# Patient Record
Sex: FEMALE | Race: WHITE | NOT HISPANIC OR LATINO
[De-identification: names, ages, dates, MRNs, and addresses within clinical notes are randomized per-mention and may not be internally consistent; named-entity substitution may affect disease eponyms.]

---

## 2017-04-06 ENCOUNTER — CHART COPY (OUTPATIENT)
Age: 82
End: 2017-04-06

## 2017-05-05 ENCOUNTER — CHART COPY (OUTPATIENT)
Age: 82
End: 2017-05-05

## 2017-06-07 ENCOUNTER — APPOINTMENT (OUTPATIENT)
Dept: FAMILY MEDICINE | Facility: CLINIC | Age: 82
End: 2017-06-07

## 2017-06-07 VITALS
RESPIRATION RATE: 15 BRPM | SYSTOLIC BLOOD PRESSURE: 110 MMHG | HEART RATE: 67 BPM | BODY MASS INDEX: 22.64 KG/M2 | OXYGEN SATURATION: 98 % | DIASTOLIC BLOOD PRESSURE: 73 MMHG | WEIGHT: 114 LBS

## 2017-06-07 DIAGNOSIS — K64.4 RESIDUAL HEMORRHOIDAL SKIN TAGS: ICD-10-CM

## 2017-06-08 LAB
25(OH)D3 SERPL-MCNC: 41.9 NG/ML
ALBUMIN SERPL ELPH-MCNC: 4.4 G/DL
ALP BLD-CCNC: 66 U/L
ALT SERPL-CCNC: 18 U/L
ANION GAP SERPL CALC-SCNC: 17 MMOL/L
AST SERPL-CCNC: 26 U/L
BASOPHILS # BLD AUTO: 0.02 K/UL
BASOPHILS NFR BLD AUTO: 0.3 %
BILIRUB SERPL-MCNC: 0.7 MG/DL
BUN SERPL-MCNC: 16 MG/DL
CALCIUM SERPL-MCNC: 9.4 MG/DL
CHLORIDE SERPL-SCNC: 100 MMOL/L
CHOLEST SERPL-MCNC: 178 MG/DL
CHOLEST/HDLC SERPL: 2.4 RATIO
CO2 SERPL-SCNC: 24 MMOL/L
CREAT SERPL-MCNC: 0.83 MG/DL
EOSINOPHIL # BLD AUTO: 0.08 K/UL
EOSINOPHIL NFR BLD AUTO: 1.2 %
FOLATE SERPL-MCNC: >20 NG/ML
GLUCOSE SERPL-MCNC: 134 MG/DL
HBA1C MFR BLD HPLC: 5.6 %
HCT VFR BLD CALC: 38.5 %
HDLC SERPL-MCNC: 75 MG/DL
HGB BLD-MCNC: 12.6 G/DL
IMM GRANULOCYTES NFR BLD AUTO: 0.8 %
LDLC SERPL CALC-MCNC: 87 MG/DL
LYMPHOCYTES # BLD AUTO: 1.03 K/UL
LYMPHOCYTES NFR BLD AUTO: 16 %
MAN DIFF?: NORMAL
MCHC RBC-ENTMCNC: 32.7 GM/DL
MCHC RBC-ENTMCNC: 33.1 PG
MCV RBC AUTO: 101 FL
MONOCYTES # BLD AUTO: 0.43 K/UL
MONOCYTES NFR BLD AUTO: 6.7 %
NEUTROPHILS # BLD AUTO: 4.84 K/UL
NEUTROPHILS NFR BLD AUTO: 75 %
PLATELET # BLD AUTO: 218 K/UL
POTASSIUM SERPL-SCNC: 4.1 MMOL/L
PROT SERPL-MCNC: 6.6 G/DL
RBC # BLD: 3.81 M/UL
RBC # FLD: 15.9 %
SODIUM SERPL-SCNC: 141 MMOL/L
TRIGL SERPL-MCNC: 80 MG/DL
TSH SERPL-ACNC: 2.87 UIU/ML
URATE SERPL-MCNC: 3.8 MG/DL
VIT B12 SERPL-MCNC: 1110 PG/ML
WBC # FLD AUTO: 6.45 K/UL

## 2017-06-26 ENCOUNTER — APPOINTMENT (OUTPATIENT)
Dept: ORTHOPEDIC SURGERY | Facility: CLINIC | Age: 82
End: 2017-06-26

## 2017-06-26 VITALS
WEIGHT: 112 LBS | DIASTOLIC BLOOD PRESSURE: 71 MMHG | SYSTOLIC BLOOD PRESSURE: 122 MMHG | HEIGHT: 59.5 IN | BODY MASS INDEX: 22.28 KG/M2 | HEART RATE: 68 BPM

## 2017-07-06 ENCOUNTER — CHART COPY (OUTPATIENT)
Age: 82
End: 2017-07-06

## 2017-07-06 DIAGNOSIS — R26.89 OTHER ABNORMALITIES OF GAIT AND MOBILITY: ICD-10-CM

## 2017-07-21 DIAGNOSIS — R27.8 OTHER LACK OF COORDINATION: ICD-10-CM

## 2017-07-21 DIAGNOSIS — M47.12 OTHER SPONDYLOSIS WITH MYELOPATHY, CERVICAL REGION: ICD-10-CM

## 2017-10-05 PROBLEM — M47.12 OSTEOARTHRITIS OF CERVICAL SPINE WITH MYELOPATHY: Status: ACTIVE | Noted: 2017-06-26

## 2017-12-01 ENCOUNTER — LABORATORY RESULT (OUTPATIENT)
Age: 82
End: 2017-12-01

## 2017-12-01 ENCOUNTER — APPOINTMENT (OUTPATIENT)
Dept: FAMILY MEDICINE | Facility: CLINIC | Age: 82
End: 2017-12-01
Payer: MEDICARE

## 2017-12-01 VITALS
WEIGHT: 112 LBS | DIASTOLIC BLOOD PRESSURE: 64 MMHG | SYSTOLIC BLOOD PRESSURE: 124 MMHG | HEIGHT: 59 IN | TEMPERATURE: 98.8 F | OXYGEN SATURATION: 96 % | BODY MASS INDEX: 22.58 KG/M2 | RESPIRATION RATE: 14 BRPM | HEART RATE: 87 BPM

## 2017-12-01 PROCEDURE — 99214 OFFICE O/P EST MOD 30 MIN: CPT | Mod: 25

## 2017-12-01 PROCEDURE — 36415 COLL VENOUS BLD VENIPUNCTURE: CPT

## 2017-12-04 ENCOUNTER — EMERGENCY (EMERGENCY)
Facility: HOSPITAL | Age: 82
LOS: 1 days | Discharge: HOME | End: 2017-12-04
Admitting: EMERGENCY MEDICINE
Payer: MEDICARE
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
## 2017-12-04 DIAGNOSIS — M54.9 DORSALGIA, UNSPECIFIED: ICD-10-CM
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
## 2017-12-04 DIAGNOSIS — Z79.899 OTHER LONG TERM (CURRENT) DRUG THERAPY: ICD-10-CM

## 2017-12-04 DIAGNOSIS — R07.81 PLEURODYNIA: ICD-10-CM

## 2017-12-04 DIAGNOSIS — R05 COUGH: ICD-10-CM
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
## 2017-12-04 DIAGNOSIS — R10.9 UNSPECIFIED ABDOMINAL PAIN: ICD-10-CM

## 2017-12-04 DIAGNOSIS — Z79.2 LONG TERM (CURRENT) USE OF ANTIBIOTICS: ICD-10-CM

## 2017-12-04 LAB
25(OH)D3 SERPL-MCNC: 34.8 NG/ML
ALBUMIN SERPL ELPH-MCNC: 3.8 G/DL
ALP BLD-CCNC: 161 U/L
ALT SERPL-CCNC: 49 U/L
ANION GAP SERPL CALC-SCNC: 14 MMOL/L
AST SERPL-CCNC: 39 U/L
BASOPHILS # BLD AUTO: 0.02 K/UL
BASOPHILS NFR BLD AUTO: 0.1 %
BILIRUB DIRECT SERPL-MCNC: 0.3 MG/DL
BILIRUB INDIRECT SERPL-MCNC: 0.6 MG/DL
BILIRUB SERPL-MCNC: 0.9 MG/DL
BUN SERPL-MCNC: 19 MG/DL
CALCIUM SERPL-MCNC: 8.7 MG/DL
CHLORIDE SERPL-SCNC: 100 MMOL/L
CHOLEST SERPL-MCNC: 153 MG/DL
CHOLEST/HDLC SERPL: 2.3 RATIO
CO2 SERPL-SCNC: 23 MMOL/L
CREAT SERPL-MCNC: 0.68 MG/DL
EOSINOPHIL # BLD AUTO: 0.07 K/UL
EOSINOPHIL NFR BLD AUTO: 0.5 %
GLUCOSE SERPL-MCNC: 141 MG/DL
HBA1C MFR BLD HPLC: 5.4 %
HCT VFR BLD CALC: 32.9 %
HDLC SERPL-MCNC: 68 MG/DL
HGB BLD-MCNC: 11 G/DL
IMM GRANULOCYTES NFR BLD AUTO: 1.2 %
LDLC SERPL CALC-MCNC: 72 MG/DL
LYMPHOCYTES # BLD AUTO: 0.59 K/UL
LYMPHOCYTES NFR BLD AUTO: 3.8 %
MAN DIFF?: NORMAL
MCHC RBC-ENTMCNC: 33.4 GM/DL
MCHC RBC-ENTMCNC: 33.5 PG
MCV RBC AUTO: 100.3 FL
MONOCYTES # BLD AUTO: 1.53 K/UL
MONOCYTES NFR BLD AUTO: 9.9 %
NEUTROPHILS # BLD AUTO: 13 K/UL
NEUTROPHILS NFR BLD AUTO: 84.5 %
PLATELET # BLD AUTO: 244 K/UL
POTASSIUM SERPL-SCNC: 3.5 MMOL/L
PROT SERPL-MCNC: 6 G/DL
RBC # BLD: 3.28 M/UL
RBC # FLD: 16.1 %
SODIUM SERPL-SCNC: 137 MMOL/L
TRIGL SERPL-MCNC: 65 MG/DL
TSH SERPL-ACNC: 2.99 UIU/ML
URATE SERPL-MCNC: 3.6 MG/DL
VIT B12 SERPL-MCNC: 1708 PG/ML
WBC # FLD AUTO: 15.4 K/UL

## 2017-12-04 PROCEDURE — 74177 CT ABD & PELVIS W/CONTRAST: CPT | Mod: 26

## 2017-12-04 PROCEDURE — 83605 ASSAY OF LACTIC ACID: CPT

## 2017-12-04 PROCEDURE — 71020: CPT | Mod: 26

## 2017-12-04 PROCEDURE — 99284 EMERGENCY DEPT VISIT MOD MDM: CPT | Mod: 25

## 2017-12-04 PROCEDURE — 99284 EMERGENCY DEPT VISIT MOD MDM: CPT

## 2017-12-04 PROCEDURE — 85027 COMPLETE CBC AUTOMATED: CPT

## 2017-12-04 PROCEDURE — 80048 BASIC METABOLIC PNL TOTAL CA: CPT

## 2017-12-04 PROCEDURE — 74177 CT ABD & PELVIS W/CONTRAST: CPT

## 2017-12-04 PROCEDURE — 71046 X-RAY EXAM CHEST 2 VIEWS: CPT

## 2017-12-04 PROCEDURE — 96360 HYDRATION IV INFUSION INIT: CPT | Mod: XU

## 2017-12-04 PROCEDURE — 96361 HYDRATE IV INFUSION ADD-ON: CPT

## 2017-12-05 ENCOUNTER — LABORATORY RESULT (OUTPATIENT)
Age: 82
End: 2017-12-05

## 2017-12-05 ENCOUNTER — APPOINTMENT (OUTPATIENT)
Dept: FAMILY MEDICINE | Facility: CLINIC | Age: 82
End: 2017-12-05
Payer: MEDICARE

## 2017-12-05 VITALS
HEART RATE: 84 BPM | SYSTOLIC BLOOD PRESSURE: 138 MMHG | RESPIRATION RATE: 14 BRPM | TEMPERATURE: 97.9 F | OXYGEN SATURATION: 97 % | DIASTOLIC BLOOD PRESSURE: 84 MMHG

## 2017-12-05 DIAGNOSIS — M54.5 LOW BACK PAIN: ICD-10-CM

## 2017-12-05 DIAGNOSIS — R93.8 ABNORMAL FINDINGS ON DIAGNOSTIC IMAGING OF OTHER SPECIFIED BODY STRUCTURES: ICD-10-CM

## 2017-12-05 PROCEDURE — 99214 OFFICE O/P EST MOD 30 MIN: CPT | Mod: 25

## 2017-12-05 PROCEDURE — 36415 COLL VENOUS BLD VENIPUNCTURE: CPT

## 2017-12-06 ENCOUNTER — RESULT CHARGE (OUTPATIENT)
Age: 82
End: 2017-12-06

## 2017-12-06 ENCOUNTER — RX RENEWAL (OUTPATIENT)
Age: 82
End: 2017-12-06

## 2017-12-06 LAB
AMYLASE/CREAT SERPL: 58 U/L
ANION GAP SERPL CALC-SCNC: 16 MMOL/L
BASOPHILS # BLD AUTO: 0 K/UL
BASOPHILS NFR BLD AUTO: 0 %
BUN SERPL-MCNC: 16 MG/DL
CALCIUM SERPL-MCNC: 9.1 MG/DL
CHLORIDE SERPL-SCNC: 98 MMOL/L
CO2 SERPL-SCNC: 21 MMOL/L
CREAT SERPL-MCNC: 0.68 MG/DL
EOSINOPHIL # BLD AUTO: 0 K/UL
EOSINOPHIL NFR BLD AUTO: 0
GLUCOSE SERPL-MCNC: 88 MG/DL
HCT VFR BLD CALC: 35.3 %
HGB BLD-MCNC: 11.7 G/DL
LPL SERPL-CCNC: 20 U/L
LYMPHOCYTES # BLD AUTO: 1.04 K/UL
LYMPHOCYTES NFR BLD AUTO: 5.3 %
MAN DIFF?: NORMAL
MCHC RBC-ENTMCNC: 33.1 GM/DL
MCHC RBC-ENTMCNC: 33.1 PG
MCV RBC AUTO: 99.7 FL
MONOCYTES # BLD AUTO: 1.22 K/UL
MONOCYTES NFR BLD AUTO: 6.2 %
NEUTROPHILS # BLD AUTO: 15.32 K/UL
NEUTROPHILS NFR BLD AUTO: 77.9 %
PLATELET # BLD AUTO: 352 K/UL
POTASSIUM SERPL-SCNC: 4 MMOL/L
RBC # BLD: 3.54 M/UL
RBC # FLD: 16.2 %
SODIUM SERPL-SCNC: 135 MMOL/L
WBC # FLD AUTO: 19.67 K/UL

## 2017-12-06 RX ORDER — LIDOCAINE 5% 700 MG/1
5 PATCH TOPICAL
Qty: 30 | Refills: 3 | Status: COMPLETED | COMMUNITY
Start: 2017-12-05 | End: 2017-12-06

## 2017-12-07 ENCOUNTER — FORM ENCOUNTER (OUTPATIENT)
Age: 82
End: 2017-12-07

## 2017-12-08 ENCOUNTER — APPOINTMENT (OUTPATIENT)
Dept: CT IMAGING | Facility: HOSPITAL | Age: 82
End: 2017-12-08
Payer: MEDICARE

## 2017-12-08 ENCOUNTER — OUTPATIENT (OUTPATIENT)
Dept: OUTPATIENT SERVICES | Facility: HOSPITAL | Age: 82
LOS: 1 days | End: 2017-12-08
Payer: MEDICARE

## 2017-12-08 DIAGNOSIS — J98.11 ATELECTASIS: ICD-10-CM

## 2017-12-08 DIAGNOSIS — R93.8 ABNORMAL FINDINGS ON DIAGNOSTIC IMAGING OF OTHER SPECIFIED BODY STRUCTURES: ICD-10-CM

## 2017-12-08 DIAGNOSIS — J90 PLEURAL EFFUSION, NOT ELSEWHERE CLASSIFIED: ICD-10-CM

## 2017-12-08 PROCEDURE — 71250 CT THORAX DX C-: CPT | Mod: 26

## 2017-12-08 PROCEDURE — 71250 CT THORAX DX C-: CPT

## 2017-12-12 ENCOUNTER — APPOINTMENT (OUTPATIENT)
Dept: FAMILY MEDICINE | Facility: CLINIC | Age: 82
End: 2017-12-12
Payer: MEDICARE

## 2017-12-12 ENCOUNTER — LABORATORY RESULT (OUTPATIENT)
Age: 82
End: 2017-12-12

## 2017-12-12 VITALS
WEIGHT: 112 LBS | SYSTOLIC BLOOD PRESSURE: 122 MMHG | OXYGEN SATURATION: 85 % | BODY MASS INDEX: 21.99 KG/M2 | DIASTOLIC BLOOD PRESSURE: 82 MMHG | HEART RATE: 81 BPM | HEIGHT: 60 IN

## 2017-12-12 PROCEDURE — 36415 COLL VENOUS BLD VENIPUNCTURE: CPT

## 2017-12-12 PROCEDURE — 99214 OFFICE O/P EST MOD 30 MIN: CPT | Mod: 25

## 2017-12-12 RX ORDER — AMOXICILLIN AND CLAVULANATE POTASSIUM 875; 125 MG/1; MG/1
875-125 TABLET, COATED ORAL
Qty: 14 | Refills: 1 | Status: COMPLETED | COMMUNITY
Start: 2017-12-01 | End: 2017-12-12

## 2017-12-12 RX ORDER — PREDNISONE 10 MG/1
10 TABLET ORAL DAILY
Qty: 10 | Refills: 0 | Status: COMPLETED | COMMUNITY
Start: 2017-12-01 | End: 2017-12-12

## 2017-12-14 LAB
ALBUMIN SERPL ELPH-MCNC: 4 G/DL
ALP BLD-CCNC: 113 U/L
ALT SERPL-CCNC: 22 U/L
ANION GAP SERPL CALC-SCNC: 15 MMOL/L
AST SERPL-CCNC: 22 U/L
BASOPHILS # BLD AUTO: 0 K/UL
BASOPHILS NFR BLD AUTO: 0 %
BILIRUB SERPL-MCNC: 0.4 MG/DL
BUN SERPL-MCNC: 24 MG/DL
CALCIUM SERPL-MCNC: 9.3 MG/DL
CHLORIDE SERPL-SCNC: 97 MMOL/L
CO2 SERPL-SCNC: 26 MMOL/L
CREAT SERPL-MCNC: 0.78 MG/DL
EOSINOPHIL # BLD AUTO: 0.14 K/UL
EOSINOPHIL NFR BLD AUTO: 0.9
GLUCOSE SERPL-MCNC: 98 MG/DL
HCT VFR BLD CALC: 35.9 %
HGB BLD-MCNC: 11.9 G/DL
LYMPHOCYTES # BLD AUTO: 1.76 K/UL
LYMPHOCYTES NFR BLD AUTO: 11.2 %
MAGNESIUM SERPL-MCNC: 2.4 MG/DL
MAN DIFF?: NORMAL
MCHC RBC-ENTMCNC: 33.1 GM/DL
MCHC RBC-ENTMCNC: 33.6 PG
MCV RBC AUTO: 101.4 FL
MONOCYTES # BLD AUTO: 0.53 K/UL
MONOCYTES NFR BLD AUTO: 3.4 %
NEUTROPHILS # BLD AUTO: 12.06 K/UL
NEUTROPHILS NFR BLD AUTO: 76.7 %
PLATELET # BLD AUTO: 342 K/UL
POTASSIUM SERPL-SCNC: 4.1 MMOL/L
PROT SERPL-MCNC: 6.2 G/DL
RBC # BLD: 3.54 M/UL
RBC # FLD: 16.4 %
SODIUM SERPL-SCNC: 138 MMOL/L
WBC # FLD AUTO: 15.72 K/UL

## 2017-12-25 ENCOUNTER — FORM ENCOUNTER (OUTPATIENT)
Age: 82
End: 2017-12-25

## 2017-12-26 ENCOUNTER — APPOINTMENT (OUTPATIENT)
Dept: FAMILY MEDICINE | Facility: CLINIC | Age: 82
End: 2017-12-26
Payer: MEDICARE

## 2017-12-26 ENCOUNTER — OUTPATIENT (OUTPATIENT)
Dept: OUTPATIENT SERVICES | Facility: HOSPITAL | Age: 82
LOS: 1 days | End: 2017-12-26
Payer: MEDICARE

## 2017-12-26 ENCOUNTER — APPOINTMENT (OUTPATIENT)
Dept: ULTRASOUND IMAGING | Facility: HOSPITAL | Age: 82
End: 2017-12-26
Payer: MEDICARE

## 2017-12-26 ENCOUNTER — LABORATORY RESULT (OUTPATIENT)
Age: 82
End: 2017-12-26

## 2017-12-26 VITALS
SYSTOLIC BLOOD PRESSURE: 122 MMHG | RESPIRATION RATE: 15 BRPM | OXYGEN SATURATION: 99 % | HEART RATE: 84 BPM | DIASTOLIC BLOOD PRESSURE: 72 MMHG

## 2017-12-26 DIAGNOSIS — S22.39XA FRACTURE OF ONE RIB, UNSPECIFIED SIDE, INITIAL ENCOUNTER FOR CLOSED FRACTURE: ICD-10-CM
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
## 2017-12-26 DIAGNOSIS — R60.0 LOCALIZED EDEMA: ICD-10-CM
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
## 2017-12-26 PROCEDURE — 93970 EXTREMITY STUDY: CPT | Mod: 26

## 2017-12-26 PROCEDURE — 93970 EXTREMITY STUDY: CPT

## 2017-12-26 PROCEDURE — 99214 OFFICE O/P EST MOD 30 MIN: CPT | Mod: 25

## 2017-12-26 PROCEDURE — 36415 COLL VENOUS BLD VENIPUNCTURE: CPT

## 2017-12-26 RX ORDER — BENZONATATE 200 MG/1
200 CAPSULE ORAL
Qty: 20 | Refills: 0 | Status: COMPLETED | COMMUNITY
Start: 2017-12-01 | End: 2017-12-26

## 2017-12-26 RX ORDER — LEVOFLOXACIN 500 MG/1
500 TABLET, FILM COATED ORAL DAILY
Qty: 10 | Refills: 0 | Status: COMPLETED | COMMUNITY
Start: 2017-12-06 | End: 2017-12-26

## 2017-12-27 LAB
ALBUMIN SERPL ELPH-MCNC: 3.7 G/DL
ALP BLD-CCNC: 136 U/L
ALT SERPL-CCNC: 35 U/L
ANION GAP SERPL CALC-SCNC: 13 MMOL/L
AST SERPL-CCNC: 28 U/L
BASOPHILS # BLD AUTO: 0.09 K/UL
BASOPHILS NFR BLD AUTO: 0.9 %
BILIRUB SERPL-MCNC: 0.3 MG/DL
BUN SERPL-MCNC: 23 MG/DL
CALCIUM SERPL-MCNC: 8.7 MG/DL
CHLORIDE SERPL-SCNC: 103 MMOL/L
CO2 SERPL-SCNC: 26 MMOL/L
CREAT SERPL-MCNC: 0.67 MG/DL
EOSINOPHIL # BLD AUTO: 0.64 K/UL
EOSINOPHIL NFR BLD AUTO: 6.1
GLUCOSE SERPL-MCNC: 94 MG/DL
HCT VFR BLD CALC: 31.5 %
HGB BLD-MCNC: 10.5 G/DL
LYMPHOCYTES # BLD AUTO: 1.28 K/UL
LYMPHOCYTES NFR BLD AUTO: 12.2 %
MAN DIFF?: NORMAL
MCHC RBC-ENTMCNC: 33.2 PG
MCHC RBC-ENTMCNC: 33.3 GM/DL
MCV RBC AUTO: 99.7 FL
MONOCYTES # BLD AUTO: 0.91 K/UL
MONOCYTES NFR BLD AUTO: 8.7 %
NEUTROPHILS # BLD AUTO: 7.38 K/UL
NEUTROPHILS NFR BLD AUTO: 70.4 %
PLATELET # BLD AUTO: 250 K/UL
POTASSIUM SERPL-SCNC: 4.3 MMOL/L
PROT SERPL-MCNC: 6.2 G/DL
RBC # BLD: 3.16 M/UL
RBC # FLD: 17 %
SODIUM SERPL-SCNC: 142 MMOL/L
WBC # FLD AUTO: 10.49 K/UL

## 2018-01-10 ENCOUNTER — APPOINTMENT (OUTPATIENT)
Dept: ORTHOPEDIC SURGERY | Facility: CLINIC | Age: 83
End: 2018-01-10
Payer: MEDICARE

## 2018-01-10 VITALS
HEIGHT: 60 IN | SYSTOLIC BLOOD PRESSURE: 129 MMHG | HEART RATE: 73 BPM | WEIGHT: 112 LBS | DIASTOLIC BLOOD PRESSURE: 71 MMHG | BODY MASS INDEX: 21.99 KG/M2

## 2018-01-10 PROCEDURE — 72050 X-RAY EXAM NECK SPINE 4/5VWS: CPT

## 2018-01-10 PROCEDURE — 99213 OFFICE O/P EST LOW 20 MIN: CPT

## 2018-01-17 ENCOUNTER — EMERGENCY (EMERGENCY)
Facility: HOSPITAL | Age: 83
LOS: 1 days | Discharge: HOME | End: 2018-01-17
Admitting: EMERGENCY MEDICINE
Payer: MEDICARE

## 2018-01-17 ENCOUNTER — APPOINTMENT (OUTPATIENT)
Dept: FAMILY MEDICINE | Facility: CLINIC | Age: 83
End: 2018-01-17
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
## 2018-01-17 DIAGNOSIS — Z79.2 LONG TERM (CURRENT) USE OF ANTIBIOTICS: ICD-10-CM

## 2018-01-17 DIAGNOSIS — Z79.52 LONG TERM (CURRENT) USE OF SYSTEMIC STEROIDS: ICD-10-CM

## 2018-01-17 DIAGNOSIS — Z79.899 OTHER LONG TERM (CURRENT) DRUG THERAPY: ICD-10-CM
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
## 2018-01-17 DIAGNOSIS — S30.1XXA CONTUSION OF ABDOMINAL WALL, INITIAL ENCOUNTER: ICD-10-CM
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
## 2018-01-17 DIAGNOSIS — R10.9 UNSPECIFIED ABDOMINAL PAIN: ICD-10-CM

## 2018-01-17 PROCEDURE — 99284 EMERGENCY DEPT VISIT MOD MDM: CPT | Mod: 25

## 2018-01-17 PROCEDURE — 99284 EMERGENCY DEPT VISIT MOD MDM: CPT

## 2018-01-17 PROCEDURE — 74177 CT ABD & PELVIS W/CONTRAST: CPT

## 2018-01-17 PROCEDURE — 36415 COLL VENOUS BLD VENIPUNCTURE: CPT

## 2018-01-17 PROCEDURE — 80048 BASIC METABOLIC PNL TOTAL CA: CPT

## 2018-01-17 PROCEDURE — 74177 CT ABD & PELVIS W/CONTRAST: CPT | Mod: 26

## 2018-01-19 ENCOUNTER — APPOINTMENT (OUTPATIENT)
Dept: FAMILY MEDICINE | Facility: CLINIC | Age: 83
End: 2018-01-19
Payer: MEDICARE

## 2018-01-19 VITALS
RESPIRATION RATE: 14 BRPM | HEART RATE: 78 BPM | DIASTOLIC BLOOD PRESSURE: 78 MMHG | OXYGEN SATURATION: 98 % | SYSTOLIC BLOOD PRESSURE: 118 MMHG

## 2018-01-19 DIAGNOSIS — Z91.81 HISTORY OF FALLING: ICD-10-CM

## 2018-01-19 PROCEDURE — 99214 OFFICE O/P EST MOD 30 MIN: CPT

## 2018-01-29 ENCOUNTER — CHART COPY (OUTPATIENT)
Age: 83
End: 2018-01-29

## 2018-01-30 ENCOUNTER — APPOINTMENT (OUTPATIENT)
Dept: FAMILY MEDICINE | Facility: CLINIC | Age: 83
End: 2018-01-30

## 2018-01-30 ENCOUNTER — APPOINTMENT (OUTPATIENT)
Dept: FAMILY MEDICINE | Facility: CLINIC | Age: 83
End: 2018-01-30
Payer: MEDICARE

## 2018-01-30 VITALS
HEART RATE: 77 BPM | SYSTOLIC BLOOD PRESSURE: 120 MMHG | RESPIRATION RATE: 15 BRPM | TEMPERATURE: 98.3 F | DIASTOLIC BLOOD PRESSURE: 74 MMHG | OXYGEN SATURATION: 99 %

## 2018-01-30 PROCEDURE — 99214 OFFICE O/P EST MOD 30 MIN: CPT

## 2018-02-01 ENCOUNTER — FORM ENCOUNTER (OUTPATIENT)
Age: 83
End: 2018-02-01

## 2018-02-02 ENCOUNTER — OUTPATIENT (OUTPATIENT)
Dept: OUTPATIENT SERVICES | Facility: HOSPITAL | Age: 83
LOS: 1 days | End: 2018-02-02
Payer: MEDICARE

## 2018-02-02 ENCOUNTER — APPOINTMENT (OUTPATIENT)
Dept: MRI IMAGING | Facility: HOSPITAL | Age: 83
End: 2018-02-02
Payer: MEDICARE

## 2018-02-02 DIAGNOSIS — Z00.8 ENCOUNTER FOR OTHER GENERAL EXAMINATION: ICD-10-CM

## 2018-02-02 DIAGNOSIS — S29.9XXA UNSPECIFIED INJURY OF THORAX, INITIAL ENCOUNTER: ICD-10-CM

## 2018-02-02 PROCEDURE — 72146 MRI CHEST SPINE W/O DYE: CPT

## 2018-02-02 PROCEDURE — 72146 MRI CHEST SPINE W/O DYE: CPT | Mod: 26

## 2018-02-13 ENCOUNTER — APPOINTMENT (OUTPATIENT)
Dept: FAMILY MEDICINE | Facility: CLINIC | Age: 83
End: 2018-02-13
Payer: MEDICARE

## 2018-02-13 VITALS
RESPIRATION RATE: 14 BRPM | HEART RATE: 96 BPM | SYSTOLIC BLOOD PRESSURE: 100 MMHG | BODY MASS INDEX: 22.46 KG/M2 | WEIGHT: 115 LBS | DIASTOLIC BLOOD PRESSURE: 60 MMHG | OXYGEN SATURATION: 98 %

## 2018-02-13 DIAGNOSIS — K86.89 OTHER SPECIFIED DISEASES OF PANCREAS: ICD-10-CM

## 2018-02-13 DIAGNOSIS — M81.0 AGE-RELATED OSTEOPOROSIS W/OUT CURRENT PATHOLOGICAL FRACTURE: ICD-10-CM

## 2018-02-13 DIAGNOSIS — K59.09 OTHER CONSTIPATION: ICD-10-CM

## 2018-02-13 PROCEDURE — 99214 OFFICE O/P EST MOD 30 MIN: CPT

## 2018-02-14 PROBLEM — K59.09 CONSTIPATION, CHRONIC: Status: ACTIVE | Noted: 2017-06-07

## 2018-02-14 PROBLEM — K86.89 PANCREATIC DUCT DILATED: Status: ACTIVE | Noted: 2017-12-05

## 2018-02-15 ENCOUNTER — OUTPATIENT (OUTPATIENT)
Dept: OUTPATIENT SERVICES | Facility: HOSPITAL | Age: 83
LOS: 1 days | End: 2018-02-15
Payer: MEDICARE

## 2018-02-15 ENCOUNTER — APPOINTMENT (OUTPATIENT)
Dept: MRI IMAGING | Facility: CLINIC | Age: 83
End: 2018-02-15
Payer: MEDICARE

## 2018-02-15 ENCOUNTER — APPOINTMENT (OUTPATIENT)
Dept: FAMILY MEDICINE | Facility: CLINIC | Age: 83
End: 2018-02-15

## 2018-02-15 DIAGNOSIS — K86.9 DISEASE OF PANCREAS, UNSPECIFIED: ICD-10-CM

## 2018-02-15 PROCEDURE — 74183 MRI ABD W/O CNTR FLWD CNTR: CPT

## 2018-02-15 PROCEDURE — A9585: CPT

## 2018-02-15 PROCEDURE — 74183 MRI ABD W/O CNTR FLWD CNTR: CPT | Mod: 26

## 2018-02-26 ENCOUNTER — APPOINTMENT (OUTPATIENT)
Dept: ORTHOPEDIC SURGERY | Facility: CLINIC | Age: 83
End: 2018-02-26
Payer: MEDICARE

## 2018-02-26 VITALS
BODY MASS INDEX: 22.58 KG/M2 | SYSTOLIC BLOOD PRESSURE: 127 MMHG | HEART RATE: 89 BPM | WEIGHT: 115 LBS | HEIGHT: 60 IN | DIASTOLIC BLOOD PRESSURE: 80 MMHG

## 2018-02-26 PROCEDURE — 99214 OFFICE O/P EST MOD 30 MIN: CPT

## 2018-03-15 ENCOUNTER — CHART COPY (OUTPATIENT)
Age: 83
End: 2018-03-15

## 2018-05-10 ENCOUNTER — CHART COPY (OUTPATIENT)
Age: 83
End: 2018-05-10

## 2018-05-16 ENCOUNTER — APPOINTMENT (OUTPATIENT)
Dept: ORTHOPEDIC SURGERY | Facility: CLINIC | Age: 83
End: 2018-05-16
Payer: MEDICARE

## 2018-05-16 VITALS
HEART RATE: 71 BPM | SYSTOLIC BLOOD PRESSURE: 134 MMHG | DIASTOLIC BLOOD PRESSURE: 87 MMHG | HEIGHT: 60 IN | BODY MASS INDEX: 22.58 KG/M2 | WEIGHT: 115 LBS

## 2018-05-16 DIAGNOSIS — M40.03 POSTURAL KYPHOSIS, CERVICOTHORACIC REGION: ICD-10-CM

## 2018-05-16 PROCEDURE — 99214 OFFICE O/P EST MOD 30 MIN: CPT

## 2018-05-16 RX ORDER — TRAMADOL HYDROCHLORIDE 50 MG/1
50 TABLET, COATED ORAL TWICE DAILY
Qty: 40 | Refills: 0 | Status: DISCONTINUED | COMMUNITY
Start: 2018-01-30 | End: 2018-05-16

## 2018-05-16 RX ORDER — DOCUSATE SODIUM 100 MG/1
100 CAPSULE ORAL
Qty: 180 | Refills: 2 | Status: DISCONTINUED | COMMUNITY
Start: 2017-06-07 | End: 2018-05-16

## 2018-05-16 RX ORDER — HYDROCORTISONE 25 MG/G
2.5 CREAM TOPICAL 3 TIMES DAILY
Qty: 1 | Refills: 3 | Status: DISCONTINUED | COMMUNITY
Start: 2017-06-07 | End: 2018-05-16

## 2018-05-16 RX ORDER — SENNOSIDES 8.6 MG
8.6 TABLET ORAL
Qty: 60 | Refills: 0 | Status: DISCONTINUED | COMMUNITY
Start: 2017-06-07 | End: 2018-05-16

## 2018-05-16 RX ORDER — MELOXICAM 15 MG/1
15 TABLET ORAL
Qty: 30 | Refills: 1 | Status: DISCONTINUED | COMMUNITY
Start: 2018-01-19 | End: 2018-05-16

## 2018-05-16 RX ORDER — LINACLOTIDE 72 UG/1
72 CAPSULE, GELATIN COATED ORAL
Qty: 90 | Refills: 0 | Status: DISCONTINUED | COMMUNITY
Start: 2017-10-24 | End: 2018-05-16

## 2018-05-16 RX ORDER — LIDOCAINE, MENTHOL, AND METHYL SALICYLATE 4; 5; 4 MG/100MG; MG/100MG; MG/100MG
4-4-5 PATCH TOPICAL
Qty: 30 | Refills: 1 | Status: DISCONTINUED | COMMUNITY
Start: 2017-12-06 | End: 2018-05-16

## 2018-07-23 ENCOUNTER — RX RENEWAL (OUTPATIENT)
Age: 83
End: 2018-07-23

## 2018-08-08 ENCOUNTER — APPOINTMENT (OUTPATIENT)
Dept: ORTHOPEDIC SURGERY | Facility: CLINIC | Age: 83
End: 2018-08-08

## 2018-08-17 ENCOUNTER — CHART COPY (OUTPATIENT)
Age: 83
End: 2018-08-17

## 2018-08-22 ENCOUNTER — APPOINTMENT (OUTPATIENT)
Dept: ORTHOPEDIC SURGERY | Facility: CLINIC | Age: 83
End: 2018-08-22
Payer: MEDICARE

## 2018-08-22 VITALS
HEART RATE: 70 BPM | HEIGHT: 60 IN | SYSTOLIC BLOOD PRESSURE: 144 MMHG | WEIGHT: 115 LBS | BODY MASS INDEX: 22.58 KG/M2 | DIASTOLIC BLOOD PRESSURE: 82 MMHG

## 2018-08-22 PROCEDURE — 99213 OFFICE O/P EST LOW 20 MIN: CPT

## 2018-09-11 ENCOUNTER — CHART COPY (OUTPATIENT)
Age: 83
End: 2018-09-11

## 2018-09-19 ENCOUNTER — CHART COPY (OUTPATIENT)
Age: 83
End: 2018-09-19

## 2018-11-06 ENCOUNTER — CHART COPY (OUTPATIENT)
Age: 83
End: 2018-11-06

## 2018-12-03 ENCOUNTER — APPOINTMENT (OUTPATIENT)
Dept: ORTHOPEDIC SURGERY | Facility: CLINIC | Age: 83
End: 2018-12-03
Payer: MEDICARE

## 2018-12-03 VITALS
HEART RATE: 69 BPM | WEIGHT: 115 LBS | BODY MASS INDEX: 22.58 KG/M2 | DIASTOLIC BLOOD PRESSURE: 79 MMHG | HEIGHT: 60 IN | SYSTOLIC BLOOD PRESSURE: 124 MMHG

## 2018-12-03 PROCEDURE — 99213 OFFICE O/P EST LOW 20 MIN: CPT

## 2018-12-31 ENCOUNTER — TRANSCRIPTION ENCOUNTER (OUTPATIENT)
Age: 83
End: 2018-12-31

## 2018-12-31 ENCOUNTER — CHART COPY (OUTPATIENT)
Age: 83
End: 2018-12-31

## 2019-01-04 ENCOUNTER — LABORATORY RESULT (OUTPATIENT)
Age: 84
End: 2019-01-04

## 2019-01-04 ENCOUNTER — APPOINTMENT (OUTPATIENT)
Dept: FAMILY MEDICINE | Facility: CLINIC | Age: 84
End: 2019-01-04
Payer: MEDICARE

## 2019-01-04 VITALS
TEMPERATURE: 97.6 F | HEIGHT: 60 IN | OXYGEN SATURATION: 91 % | HEART RATE: 86 BPM | SYSTOLIC BLOOD PRESSURE: 140 MMHG | RESPIRATION RATE: 16 BRPM | DIASTOLIC BLOOD PRESSURE: 80 MMHG | WEIGHT: 105 LBS | BODY MASS INDEX: 20.62 KG/M2

## 2019-01-04 DIAGNOSIS — Z00.00 ENCOUNTER FOR GENERAL ADULT MEDICAL EXAMINATION W/OUT ABNORMAL FINDINGS: ICD-10-CM

## 2019-01-04 DIAGNOSIS — J20.9 ACUTE BRONCHITIS, UNSPECIFIED: ICD-10-CM

## 2019-01-04 DIAGNOSIS — M50.30 OTHER CERVICAL DISC DEGENERATION, UNSPECIFIED CERVICAL REGION: ICD-10-CM

## 2019-01-04 PROCEDURE — 99214 OFFICE O/P EST MOD 30 MIN: CPT | Mod: 25

## 2019-01-04 PROCEDURE — 36415 COLL VENOUS BLD VENIPUNCTURE: CPT

## 2019-01-05 ENCOUNTER — EMERGENCY (EMERGENCY)
Facility: HOSPITAL | Age: 84
LOS: 1 days | Discharge: HOME | End: 2019-01-05
Attending: EMERGENCY MEDICINE
Payer: MEDICARE

## 2019-01-05 ENCOUNTER — EMERGENCY (EMERGENCY)
Facility: HOSPITAL | Age: 84
LOS: 1 days | Discharge: OTHER INSTITUTION | End: 2019-01-05
Attending: EMERGENCY MEDICINE | Admitting: EMERGENCY MEDICINE
Payer: MEDICARE

## 2019-01-05 VITALS
OXYGEN SATURATION: 98 % | WEIGHT: 104.94 LBS | TEMPERATURE: 98 F | DIASTOLIC BLOOD PRESSURE: 84 MMHG | SYSTOLIC BLOOD PRESSURE: 137 MMHG | HEART RATE: 85 BPM | RESPIRATION RATE: 18 BRPM

## 2019-01-05 VITALS
HEART RATE: 86 BPM | RESPIRATION RATE: 17 BRPM | DIASTOLIC BLOOD PRESSURE: 49 MMHG | SYSTOLIC BLOOD PRESSURE: 112 MMHG | OXYGEN SATURATION: 96 %

## 2019-01-05 VITALS
SYSTOLIC BLOOD PRESSURE: 158 MMHG | OXYGEN SATURATION: 96 % | RESPIRATION RATE: 16 BRPM | HEART RATE: 61 BPM | TEMPERATURE: 99 F | DIASTOLIC BLOOD PRESSURE: 78 MMHG

## 2019-01-05 VITALS
OXYGEN SATURATION: 98 % | SYSTOLIC BLOOD PRESSURE: 117 MMHG | RESPIRATION RATE: 16 BRPM | TEMPERATURE: 98 F | HEART RATE: 88 BPM | DIASTOLIC BLOOD PRESSURE: 60 MMHG

## 2019-01-05 DIAGNOSIS — R89.9 UNSPECIFIED ABNORMAL FINDING IN SPECIMENS FROM OTHER ORGANS, SYSTEMS AND TISSUES: ICD-10-CM

## 2019-01-05 PROBLEM — J20.9 ACUTE BRONCHITIS, UNSPECIFIED ORGANISM: Status: ACTIVE | Noted: 2017-12-01

## 2019-01-05 LAB
25(OH)D3 SERPL-MCNC: 33.1 NG/ML
ALBUMIN SERPL ELPH-MCNC: 3.5 G/DL — SIGNIFICANT CHANGE UP (ref 3.3–5)
ALP SERPL-CCNC: 311 U/L — HIGH (ref 40–120)
ALT FLD-CCNC: 29 U/L DA — SIGNIFICANT CHANGE UP (ref 10–45)
ANION GAP SERPL CALC-SCNC: 7 MMOL/L — SIGNIFICANT CHANGE UP (ref 5–17)
ANISOCYTOSIS BLD QL: SLIGHT — SIGNIFICANT CHANGE UP
ANISOCYTOSIS BLD QL: SLIGHT — SIGNIFICANT CHANGE UP
APPEARANCE UR: SIGNIFICANT CHANGE UP
APPEARANCE: ABNORMAL
APTT BLD: 31.6 SEC — SIGNIFICANT CHANGE UP (ref 27.5–36.3)
AST SERPL-CCNC: 25 U/L — SIGNIFICANT CHANGE UP (ref 10–40)
BACTERIA # UR AUTO: ABNORMAL /HPF
BASOPHILS # BLD AUTO: 0 K/UL
BASOPHILS # BLD AUTO: 0.1 K/UL — SIGNIFICANT CHANGE UP (ref 0–0.2)
BASOPHILS # BLD AUTO: 0.2 K/UL — SIGNIFICANT CHANGE UP (ref 0–0.2)
BASOPHILS NFR BLD AUTO: 0 %
BASOPHILS NFR BLD AUTO: 0.3 % — SIGNIFICANT CHANGE UP (ref 0–2)
BILIRUB SERPL-MCNC: 0.4 MG/DL — SIGNIFICANT CHANGE UP (ref 0.2–1.2)
BILIRUB UR-MCNC: NEGATIVE — SIGNIFICANT CHANGE UP
BILIRUBIN URINE: NEGATIVE
BLOOD URINE: ABNORMAL
BUN SERPL-MCNC: 16 MG/DL — SIGNIFICANT CHANGE UP (ref 7–23)
CALCIUM SERPL-MCNC: 8.7 MG/DL — SIGNIFICANT CHANGE UP (ref 8.4–10.5)
CHLORIDE SERPL-SCNC: 100 MMOL/L — SIGNIFICANT CHANGE UP (ref 96–108)
CO2 SERPL-SCNC: 30 MMOL/L — SIGNIFICANT CHANGE UP (ref 22–31)
COLOR SPEC: YELLOW — SIGNIFICANT CHANGE UP
COLOR: YELLOW
CREAT SERPL-MCNC: 0.95 MG/DL — SIGNIFICANT CHANGE UP (ref 0.5–1.3)
DIFF PNL FLD: ABNORMAL
ELLIPTOCYTES BLD QL SMEAR: SLIGHT — SIGNIFICANT CHANGE UP
ELLIPTOCYTES BLD QL SMEAR: SLIGHT — SIGNIFICANT CHANGE UP
EOSINOPHIL # BLD AUTO: 0 K/UL
EOSINOPHIL # BLD AUTO: 0 K/UL — SIGNIFICANT CHANGE UP (ref 0–0.5)
EOSINOPHIL # BLD AUTO: 0.3 K/UL — SIGNIFICANT CHANGE UP (ref 0–0.5)
EOSINOPHIL NFR BLD AUTO: 0 %
EOSINOPHIL NFR BLD AUTO: 0.3 % — SIGNIFICANT CHANGE UP (ref 0–6)
EPI CELLS # UR: SIGNIFICANT CHANGE UP
FOLATE SERPL-MCNC: 6.8 NG/ML
GLUCOSE QUALITATIVE U: NEGATIVE MG/DL
GLUCOSE SERPL-MCNC: 104 MG/DL — HIGH (ref 70–99)
GLUCOSE UR QL: NEGATIVE — SIGNIFICANT CHANGE UP
HCT VFR BLD CALC: 30.5 % — LOW (ref 34.5–45)
HCT VFR BLD CALC: 31.8 % — LOW (ref 34.5–45)
HCT VFR BLD CALC: 33.7 %
HGB BLD-MCNC: 10.4 G/DL — LOW (ref 11.5–15.5)
HGB BLD-MCNC: 10.7 G/DL
HGB BLD-MCNC: 10.7 G/DL — LOW (ref 11.5–15.5)
INR BLD: 1.09 RATIO — SIGNIFICANT CHANGE UP (ref 0.88–1.16)
KETONES UR-MCNC: NEGATIVE — SIGNIFICANT CHANGE UP
KETONES URINE: NEGATIVE
LACTATE SERPL-SCNC: 1 MMOL/L — SIGNIFICANT CHANGE UP (ref 0.7–2)
LEUKOCYTE ESTERASE UR-ACNC: ABNORMAL
LEUKOCYTE ESTERASE URINE: ABNORMAL
LG PLATELETS BLD QL AUTO: SLIGHT — SIGNIFICANT CHANGE UP
LYMPHOCYTES # BLD AUTO: 1 % — LOW (ref 13–44)
LYMPHOCYTES # BLD AUTO: 1.4 K/UL — SIGNIFICANT CHANGE UP (ref 1–3.3)
LYMPHOCYTES # BLD AUTO: 2.4 K/UL — SIGNIFICANT CHANGE UP (ref 1–3.3)
LYMPHOCYTES # BLD AUTO: 2.45 K/UL
LYMPHOCYTES NFR BLD AUTO: 4.5 %
MACROCYTES BLD QL: SLIGHT — SIGNIFICANT CHANGE UP
MACROCYTES BLD QL: SLIGHT — SIGNIFICANT CHANGE UP
MAN DIFF?: NORMAL
MCHC RBC-ENTMCNC: 31.8 GM/DL
MCHC RBC-ENTMCNC: 32.8 PG — SIGNIFICANT CHANGE UP (ref 27–34)
MCHC RBC-ENTMCNC: 33 PG
MCHC RBC-ENTMCNC: 33.7 GM/DL — SIGNIFICANT CHANGE UP (ref 32–36)
MCHC RBC-ENTMCNC: 33.7 PG — SIGNIFICANT CHANGE UP (ref 27–34)
MCHC RBC-ENTMCNC: 34.1 GM/DL — SIGNIFICANT CHANGE UP (ref 32–36)
MCV RBC AUTO: 104 FL
MCV RBC AUTO: 97.3 FL — SIGNIFICANT CHANGE UP (ref 80–100)
MCV RBC AUTO: 98.7 FL — SIGNIFICANT CHANGE UP (ref 80–100)
METAMYELOCYTES # FLD: 1 % — HIGH (ref 0–0)
METAMYELOCYTES # FLD: 3 % — HIGH (ref 0–0)
MONOCYTES # BLD AUTO: 0.98 K/UL
MONOCYTES # BLD AUTO: 1.5 K/UL — HIGH (ref 0–0.9)
MONOCYTES # BLD AUTO: 1.8 K/UL — HIGH (ref 0–0.9)
MONOCYTES NFR BLD AUTO: 1 % — LOW (ref 2–14)
MONOCYTES NFR BLD AUTO: 1.8 %
MONOCYTES NFR BLD AUTO: 4 % — SIGNIFICANT CHANGE UP (ref 2–14)
MYELOCYTES NFR BLD: 4 % — HIGH (ref 0–0)
NEUTROPHILS # BLD AUTO: 46.7 K/UL — HIGH (ref 1.8–7.4)
NEUTROPHILS # BLD AUTO: 48.09 K/UL
NEUTROPHILS # BLD AUTO: 48.9 K/UL — HIGH (ref 1.8–7.4)
NEUTROPHILS NFR BLD AUTO: 77.6 %
NEUTROPHILS NFR BLD AUTO: 86 % — HIGH (ref 43–77)
NEUTROPHILS NFR BLD AUTO: 89 % — HIGH (ref 43–77)
NEUTS BAND # BLD: 3 % — SIGNIFICANT CHANGE UP (ref 0–8)
NEUTS BAND # BLD: 8 % — SIGNIFICANT CHANGE UP (ref 0–8)
NITRITE UR-MCNC: NEGATIVE — SIGNIFICANT CHANGE UP
NITRITE URINE: NEGATIVE
OVALOCYTES BLD QL SMEAR: SLIGHT — SIGNIFICANT CHANGE UP
PH UR: 7 — SIGNIFICANT CHANGE UP (ref 5–8)
PH URINE: 6
PLAT MORPH BLD: ABNORMAL
PLAT MORPH BLD: NORMAL — SIGNIFICANT CHANGE UP
PLATELET # BLD AUTO: 215 K/UL
PLATELET # BLD AUTO: 216 K/UL — SIGNIFICANT CHANGE UP (ref 150–400)
PLATELET # BLD AUTO: 235 K/UL — SIGNIFICANT CHANGE UP (ref 150–400)
POIKILOCYTOSIS BLD QL AUTO: SLIGHT — SIGNIFICANT CHANGE UP
POIKILOCYTOSIS BLD QL AUTO: SLIGHT — SIGNIFICANT CHANGE UP
POTASSIUM SERPL-MCNC: 3.8 MMOL/L — SIGNIFICANT CHANGE UP (ref 3.5–5.3)
POTASSIUM SERPL-SCNC: 3.8 MMOL/L — SIGNIFICANT CHANGE UP (ref 3.5–5.3)
PROT SERPL-MCNC: 6.4 G/DL — SIGNIFICANT CHANGE UP (ref 6–8.3)
PROT UR-MCNC: 15
PROTEIN URINE: 30 MG/DL
PROTHROM AB SERPL-ACNC: 12.2 SEC — SIGNIFICANT CHANGE UP (ref 10–12.9)
RBC # BLD: 3.09 M/UL — LOW (ref 3.8–5.2)
RBC # BLD: 3.24 M/UL
RBC # BLD: 3.27 M/UL — LOW (ref 3.8–5.2)
RBC # FLD: 17.8 % — HIGH (ref 10.3–14.5)
RBC # FLD: 18.1 % — HIGH (ref 10.3–14.5)
RBC # FLD: 19.4 %
RBC BLD AUTO: ABNORMAL
RBC BLD AUTO: ABNORMAL
RBC CASTS # UR COMP ASSIST: SIGNIFICANT CHANGE UP /HPF (ref 0–4)
SODIUM SERPL-SCNC: 137 MMOL/L — SIGNIFICANT CHANGE UP (ref 135–145)
SP GR SPEC: 1.01 — SIGNIFICANT CHANGE UP (ref 1.01–1.02)
SPECIFIC GRAVITY URINE: 1.02
TSH SERPL-ACNC: 3.14 UIU/ML
UROBILINOGEN FLD QL: NEGATIVE — SIGNIFICANT CHANGE UP
UROBILINOGEN URINE: NEGATIVE MG/DL
VARIANT LYMPHS # BLD: 1 % — SIGNIFICANT CHANGE UP (ref 0–6)
VIT B12 SERPL-MCNC: >2000 PG/ML
WBC # BLD: 50.9 K/UL — CRITICAL HIGH (ref 3.8–10.5)
WBC # BLD: 52.1 K/UL — CRITICAL HIGH (ref 3.8–10.5)
WBC # FLD AUTO: 50.9 K/UL — CRITICAL HIGH (ref 3.8–10.5)
WBC # FLD AUTO: 52.1 K/UL — CRITICAL HIGH (ref 3.8–10.5)
WBC # FLD AUTO: 54.46 K/UL
WBC UR QL: SIGNIFICANT CHANGE UP /HPF (ref 0–5)

## 2019-01-05 PROCEDURE — 99284 EMERGENCY DEPT VISIT MOD MDM: CPT | Mod: GC,25

## 2019-01-05 PROCEDURE — 85027 COMPLETE CBC AUTOMATED: CPT

## 2019-01-05 PROCEDURE — 85730 THROMBOPLASTIN TIME PARTIAL: CPT

## 2019-01-05 PROCEDURE — 81001 URINALYSIS AUTO W/SCOPE: CPT

## 2019-01-05 PROCEDURE — 93010 ELECTROCARDIOGRAM REPORT: CPT | Mod: GC

## 2019-01-05 PROCEDURE — 71046 X-RAY EXAM CHEST 2 VIEWS: CPT | Mod: 26

## 2019-01-05 PROCEDURE — 93010 ELECTROCARDIOGRAM REPORT: CPT

## 2019-01-05 PROCEDURE — 87040 BLOOD CULTURE FOR BACTERIA: CPT

## 2019-01-05 PROCEDURE — 87086 URINE CULTURE/COLONY COUNT: CPT

## 2019-01-05 PROCEDURE — 71046 X-RAY EXAM CHEST 2 VIEWS: CPT

## 2019-01-05 PROCEDURE — 85610 PROTHROMBIN TIME: CPT

## 2019-01-05 PROCEDURE — 93005 ELECTROCARDIOGRAM TRACING: CPT

## 2019-01-05 PROCEDURE — 99285 EMERGENCY DEPT VISIT HI MDM: CPT | Mod: 25

## 2019-01-05 PROCEDURE — 99285 EMERGENCY DEPT VISIT HI MDM: CPT

## 2019-01-05 PROCEDURE — 99283 EMERGENCY DEPT VISIT LOW MDM: CPT

## 2019-01-05 PROCEDURE — 80053 COMPREHEN METABOLIC PANEL: CPT

## 2019-01-05 PROCEDURE — 83605 ASSAY OF LACTIC ACID: CPT

## 2019-01-05 NOTE — ED PROVIDER NOTE - OBJECTIVE STATEMENT
88F no pmh no meds last labs x1 year ago presents as transfer from  w/ a c/f elevated WBC, reports x1 week has had cough congestion runny nose went to urgent care yesterday was told had high WBC, instructed to present to hosiptal went today to  found again to have WBC to 50 transferred to [facility] for heme/onc eval lost 20lbs over last year, more tired, decreased PO and energy. no night sweats, no rashes. no sick contacts. Notes worsening neck and back pain 2/2 fall x1 year ago. no new acute changes. Denies n/v/f/c/cp/sob. Denies headache, syncope, lightheadedness, dizziness. Denies chest palpitations, abdominal pain. Denies dysuria, hematuria, hematochezia, BRBPR, tarry stools, diarrhea, constipation.

## 2019-01-05 NOTE — ED PROVIDER NOTE - ATTENDING CONTRIBUTION TO CARE
Dr. Crisostomo: I performed a face to face bedside interview with patient regarding history of present illness, review of symptoms and past medical history. I completed an independent physical exam.  I have discussed patient's plan of care with PA.   I agree with note as stated above, having amended the EMR as needed to reflect my findings.   This includes HISTORY OF PRESENT ILLNESS, HIV, PAST MEDICAL/SURGICAL/FAMILY/SOCIAL HISTORY, ALLERGIES AND HOME MEDICATIONS, REVIEW OF SYSTEMS, PHYSICAL EXAM, and any PROGRESS NOTES during the time I functioned as the attending physician for this patient.    dr crisostomo: pt with cough, uri symptoms, started on augmentin yesterday, found to have wbc 54 yesterday, here for eval, will recheck bloodwork, eval for source of infection

## 2019-01-05 NOTE — ED PROVIDER NOTE - CARE PLAN
Principal Discharge DX:	Leukocytosis  Assessment and plan of treatment:	Thank you for visiting our Emergency Department, it has been a pleasure taking part in your healthcare.    Your discharge diagnosis is: leukocytosis  Please take all discharge medications as indicated below:  Please continue all medications as rx'd by your PMD.  Please follow up with your PMD within x48 hours.  Please follow up with heme/onc at the [facility]  Please call [phone]  A copy of resulted labs, imaging, and findings have been provided to you.   You have had a detailed discussion with your provider regarding your diagnosis, care management and discharge planning including, but not limited to: return precautions, follow up visits with existing or new providers, new prescriptions and/or medication changes, wound and/or splint/cast care or other care   aspects specific to your diagnosis and treatment. You have been given the opportunity to have your questions answered. At this time you have been deemed stable and fit for discharge.  Return precautions to the Emergency Department include but are not limited to: unrelenting nausea, vomiting, fever, chills, chest pain, shortness of breath, dizziness, chest or abdominal pain, worsening back pain, syncope, blood in urine or stool, headache that doesn't resolve, numbness or tingling, loss of sensation, loss of motor function, or any other concerning symptoms.

## 2019-01-05 NOTE — ED PROVIDER NOTE - PHYSICAL EXAMINATION
GEN APPEARANCE: WDWN, alert and cooperative, non-toxic appearing and in NAD, elderly, thin.   HEAD: Atraumatic, normocephalic   EYES: PERRLa, EOMI, vision grossly intact.   EARS: Gross hearing intact.   NOSE: No nasal discharge, no external evidence of epistaxis.   NECK: Supple  CV: RRR, S1S2, no c/r/m/g. No cyanosis or pallor. Extremities warm, well perfused. Cap refill <2 seconds. No bruits.   LUNGS: CTAB. No wheezing. No rales. No rhonchi. No diminished breath sounds.   ABDOMEN: Soft, NTND. No guarding or rebound. No masses.   MSK: Spine appears normal, no spine point tenderness. No CVA ttp. No joint erythema or tenderness. Normal muscular development. Pelvis stable.  EXTREMITIES: No peripheral edema. No obvious joint or bony deformity.  NEURO: Alert, follows commands. Weight bearing normal. Speech normal. Sensation and motor normal x4 extremities.   SKIN: Normal color for race, warm, dry and intact. No evidence of rash.  PSYCH: Normal mood and affect.

## 2019-01-05 NOTE — ED ADULT TRIAGE NOTE - CHIEF COMPLAINT QUOTE
Pt arrived from home states she was sent in by her MD who she saw yesterday and had some blood work and her WBC count was high

## 2019-01-05 NOTE — ED PROVIDER NOTE - ATTENDING CONTRIBUTION TO CARE
89 yo F transferred from [place] for leukocytosis of 50K. for the past 1 week she has had cough productive of yellow phlegm. + uri symptoms. she had outpatient bloodwork at PMD's office yesterday and was called with abnormal results and reffered to the er. she was started on empiric augmentin yesterday, took one dose thus far.   reports 15-20 lb weight loss over past year.   no f/ch, night sweats, cp, sob, abd pain, N/V/D, urinary complkaints, rash  PE well appearing. lungs cta. abd soft and NT. no skin changes 87 yo F transferred from [place] for leukocytosis of 50K for hem/onc eval. for the past 1 week she has had cough productive of yellow phlegm. + uri symptoms. she had outpatient bloodwork at PMD's office yesterday and was called with abnormal results and reffered to the er. she was started on empiric augmentin yesterday, took one dose thus far.   reports 15-20 lb weight loss over past year.   no f/ch, night sweats, cp, sob, abd pain, N/V/D, urinary complaints, rash  PE well appearing. lungs cta. oropharync normal. abd soft and NT. no skin changes. no spinal TTP. normal rectal tone. normal perirecal sensation. NEURO: pupils 3 mm, PERRL bl, EOMI bl, CN2-12 intact, finger to nose test nl bilat, normal heel-shin test bl, negative pronator drift bilat, speech is clear without dysarthria; 5/5 motor strength BUE and BLE; sensation intact to light touch BUE and BLE; normal gait with no ataxia  labs with wbc50k.  cxr already done as outpatient, no e/o infection. has age indeterminant compression fractures, but entirely neuro intct. we consulted hem/onc who determined that pt could be dicharged with an outpatient oncologic wokrup. patient amenable with plan. ambulating around the er. tolerating po, well appearing. Patient was discharged with instructions to take all meds as prescribed, and follow up with hem/onc in 1-2 days for repeat evaluation and further management.    I reviewed all results from this ED visit, and discussed ALL results with the patient and/or family, including all abnormal results and incidental findings. All questions/concerns were addressed, and I recommended appropriate follow up for all findings. All discharge instructions were thoroughly discussed with the patient and/or family, as well as important warning signs and new/ worsening symptoms which should necessitate patient's immediate return to the ED. The patient expressed understanding of all results discussed and follow up instructions given.The patient was agreeable with discharge and was discharged from the ED in stable condition.

## 2019-01-05 NOTE — HEALTH RISK ASSESSMENT
[Patient declined mammogram] : Patient declined mammogram [Patient declined PAP Smear] : Patient declined PAP Smear [Patient declined bone density test] : Patient declined bone density test [Patient declined colonoscopy] : Patient declined colonoscopy [HIV test declined] : HIV test declined [Hepatitis C test declined] : Hepatitis C test declined [0] : 2) Feeling down, depressed, or hopeless: Not at all (0) [OFW1Sapsp] : 0

## 2019-01-05 NOTE — ED PROVIDER NOTE - PLAN OF CARE
Thank you for visiting our Emergency Department, it has been a pleasure taking part in your healthcare.    Your discharge diagnosis is: leukocytosis  Please take all discharge medications as indicated below:  Please continue all medications as rx'd by your PMD.  Please follow up with your PMD within x48 hours.  Please follow up with heme/onc at the [facility]  Please call [phone]  A copy of resulted labs, imaging, and findings have been provided to you.   You have had a detailed discussion with your provider regarding your diagnosis, care management and discharge planning including, but not limited to: return precautions, follow up visits with existing or new providers, new prescriptions and/or medication changes, wound and/or splint/cast care or other care   aspects specific to your diagnosis and treatment. You have been given the opportunity to have your questions answered. At this time you have been deemed stable and fit for discharge.  Return precautions to the Emergency Department include but are not limited to: unrelenting nausea, vomiting, fever, chills, chest pain, shortness of breath, dizziness, chest or abdominal pain, worsening back pain, syncope, blood in urine or stool, headache that doesn't resolve, numbness or tingling, loss of sensation, loss of motor function, or any other concerning symptoms.

## 2019-01-05 NOTE — ED PROVIDER NOTE - MEDICAL DECISION MAKING DETAILS
88 yr old female with no pmhx presents from pmd Dr. Owens office due to high wbc. Pt reports URI symptoms, and seen on 1/31- told supportive treatment. Pt seen pmd yesterday, and labs were drawn, called today to come to ED due to wbc of 50,000. Pt reports intermittent cough. Denies any fever, chills or any other symptoms.: will check rectal temp, cbc, cmp, cxray 88 yr old female with no pmhx presents from pmd Dr. Owens office due to high wbc. Pt reports URI symptoms, and seen on 12/31- told supportive treatment. Pt seen pmd yesterday, and labs were drawn, called today to come to ED due to wbc of 50,000. Pt reports intermittent cough. Denies any fever, chills or any other symptoms.: will check rectal temp, cbc, cmp, cxray 88 yr old female with no pmhx presents from pmd Dr. Owens office due to high wbc. Pt reports URI symptoms, and seen on 12/31- told supportive treatment. Pt seen pmd yesterday, and labs were drawn, called today to come to ED due to wbc of 50,000. Pt reports intermittent cough. Denies any fever, chills or any other symptoms.: will check rectal temp, cbc, cmp, cxray.

## 2019-01-05 NOTE — ED PROVIDER NOTE - PROGRESS NOTE DETAILS
elevated WBCS. no signs of infection. Spoke to heme/ onco on call-Dr. Mills- suggest pt to be transferred to [facility] for consult for r/o acute leukemia. Transfer center called- pt accepted to [facility] ED. Pt and family agree with plan. Pt awaiting for EMS for transfer. Dr. Valiente called about pt condition, and agrees with transfer.

## 2019-01-05 NOTE — ED ADULT NURSE NOTE - OBJECTIVE STATEMENT
Patient presents to Ed via amb transferred from [facility] for elevated white blood cells. Per Ems patient  was seen at [place] and her wbc count was 52.1. Patient transferred here for a hematology oncology consultation.  Patient A&Ox3 arrived with RFA 20g iv lock in place, denies chest pain sob nausea vomiting fever chills headache or  dizziness. Patient presents to Ed via amb transferred from [facility] for elevated white blood cells. Per Ems patient  was seen at [place] and her wbc count was 52.1. Patient transferred here for a hematology oncology consultation.  Patient A&Ox3 arrived with RFA 20g iv lock in place, denies chest pain sob nausea vomiting fever chills headache or  dizziness. Patient ambulates with a cane.

## 2019-01-05 NOTE — ED PROVIDER NOTE - NSFOLLOWUPINSTRUCTIONS_ED_ALL_ED_FT
Thank you for visiting our Emergency Department, it has been a pleasure taking part in your healthcare.    Your discharge diagnosis is: leukocytosis  Please take all discharge medications as indicated below:  Please continue all medications as rx'd by your PMD.  Please follow up with your PMD within x48 hours.  Please follow up with heme/onc at the [facility]  Please call [phone]  A copy of resulted labs, imaging, and findings have been provided to you.   You have had a detailed discussion with your provider regarding your diagnosis, care management and discharge planning including, but not limited to: return precautions, follow up visits with existing or new providers, new prescriptions and/or medication changes, wound and/or splint/cast care or other care   aspects specific to your diagnosis and treatment. You have been given the opportunity to have your questions answered. At this time you have been deemed stable and fit for discharge.  Return precautions to the Emergency Department include but are not limited to: unrelenting nausea, vomiting, fever, chills, chest pain, shortness of breath, dizziness, chest or abdominal pain, worsening back pain, syncope, blood in urine or stool, headache that doesn't resolve, numbness or tingling, loss of sensation, loss of motor function, or any other concerning symptoms.

## 2019-01-05 NOTE — REVIEW OF SYSTEMS
[Shortness Of Breath] : no shortness of breath [Wheezing] : no wheezing [Cough] : cough [Dyspnea on Exertion] : dyspnea on exertion [Joint Pain] : joint pain [Joint Stiffness] : joint stiffness [Muscle Pain] : muscle pain [Back Pain] : back pain [Negative] : Genitourinary

## 2019-01-05 NOTE — ED ADULT NURSE NOTE - NSIMPLEMENTINTERV_GEN_ALL_ED
Implemented All Fall with Harm Risk Interventions:  [place] to call system. Call bell, personal items and telephone within reach. Instruct patient to call for assistance. Room bathroom lighting operational. Non-slip footwear when patient is off stretcher. Physically safe environment: no spills, clutter or unnecessary equipment. Stretcher in lowest position, wheels locked, appropriate side rails in place. Provide visual cue, wrist band, yellow gown, etc. Monitor gait and stability. Monitor for mental status changes and reorient to person, place, and time. Review medications for side effects contributing to fall risk. Reinforce activity limits and safety measures with patient and family. Provide visual clues: red socks.

## 2019-01-05 NOTE — ED PROVIDER NOTE - PROGRESS NOTE DETAILS
Todd PGY2: discussed with heme/onc okay to dc w pmd [facility] follow up. pt feels well w/o complaints. Will d/c with PMD f/u, strict return precautions given with read back per pt/family/caregiver.

## 2019-01-05 NOTE — HISTORY OF PRESENT ILLNESS
[Congestion] : congestion [Moderate] : moderate [___ Weeks ago] :  [unfilled] weeks ago [Constant] : constant [Cough] : cough [Worsening] : worsening [Chills] : no chills [Anorexia] : no anorexia [Shortness Of Breath] : no shortness of breath [Earache] : no earache [Fatigue] : not fatigue [Headache] : no headache [Fever] : no fever [FreeTextEntry8] : Patient was seen in UC on  12/31/18 - Dgn with URI - CXR was negative, was Dgn with URI , supportive care was recommended . Patient was instructed if no improvement f/u with PCP. Pt was brought by her friend - she denies CP, no ABd pain.  [Friend] : friend

## 2019-01-05 NOTE — ED PROVIDER NOTE - PHYSICAL EXAMINATION
Gen:  alert, awake, no acute distress  Head:  atraumatic, normocephalic, kyphosis  HEENT: PERRLA, EOMI, normal nose, normal oropharynx, no tonsillar edema, erythema, or exudate  CV:  rrr, nl S1, S2, no m/r/g  Pulm:  lungs CTA b/l  Abd: s/nt/nd, +BS  MSK:  moving all extremities, no back midline ttp, no stepoffs, no cva TTP  Neuro:  grossly intact, no focal deficits  Skin:  clear, dry, intact  Psych: AOx3, normal affect, no apparent risk to self or others

## 2019-01-05 NOTE — ED PROVIDER NOTE - OBJECTIVE STATEMENT
88 yr old female with no pmhx presents from pmd Dr. Owens office due to high wbc. Pt reports URI symptoms, and seen on 1/31- told supportive treatment. Pt seen pmd yesterday, and labs were drawn, called today to come to ED due to wbc of 50,000. Pt reports intermittent cough. Denies any fever, chills or any other symptoms. 88 yr old female with no pmhx presents from pmd Dr. Owens office due to high wbc. Pt reports URI symptoms, and seen on 12/31- told supportive treatment. Pt seen pmd yesterday, and labs were drawn, called today to come to ED due to wbc of 54,000. Pt reports intermittent cough. Denies any fever, chills or any other symptoms. Was started on augmentin, took today and tessalon perles. 88 yr old female with no pmhx presents from pmd Dr. Owens office due to high wbc. Pt reports URI symptoms, and seen on 12/31- told supportive treatment. Pt seen pmd yesterday, and labs were drawn, called today to come to ED due to wbc of 54,000. Pt reports intermittent cough. Denies any fever, chills or any other symptoms. Was started on Augmentin, took today and tessalon perles.

## 2019-01-05 NOTE — ED PROVIDER NOTE - MEDICAL DECISION MAKING DETAILS
Todd PGY2: 88F no pmh no meds last labs x1 year ago presents as transfer from  w/ a c/f elevated WBC, reports x1 week has had cough congestion runny nose went to urgent care yesterday was told had high WBC, [facility]  found again to have WBC to 50 transferred to [facility] for heme/onc eval lost 20lbs over last year, more tired, decreased PO and energy. exam vss well appearing non toxic elderly appearing WBC to 50 from  will discuss with heme onc, repeat cbc, reassess

## 2019-01-05 NOTE — CHART NOTE - NSCHARTNOTEFT_GEN_A_CORE
88F no pmh transferred from , c/o 1 week cough congestion runny nose found to have WBC to 50, anemia to 10.7, plts wnl, coags wnl.  Peripheral smear reviewed with mostly left shift neutrophils, toxic neutrophils, smudge cells, no blasts, normochromic rbcs, some giant plts.  Low concern for acute leukemia.  Rec to fu with repeat labs in 1 week by PMD or can give new patient referral line at [facility] [phone].  Discussed recs with CUATE.    Pau Keyes  Hematology Fellow  [phone]

## 2019-01-05 NOTE — PHYSICAL EXAM
[No Acute Distress] : no acute distress [Normal Sclera/Conjunctiva] : normal sclera/conjunctiva [Normal Outer Ear/Nose] : the outer ears and nose were normal in appearance [No Respiratory Distress] : no respiratory distress  [No Accessory Muscle Use] : no accessory muscle use [Normal Rate] : normal rate  [Regular Rhythm] : with a regular rhythm [Normal S1, S2] : normal S1 and S2 [No Varicosities] : no varicosities [No Edema] : there was no peripheral edema [Soft] : abdomen soft [Non Tender] : non-tender [Non-distended] : non-distended [No HSM] : no HSM [Normal Supraclavicular Nodes] : no supraclavicular lymphadenopathy [Normal Axillary Nodes] : no axillary lymphadenopathy [Normal Posterior Cervical Nodes] : no posterior cervical lymphadenopathy [Normal Anterior Cervical Nodes] : no anterior cervical lymphadenopathy [No Joint Swelling] : no joint swelling [No Rash] : no rash [de-identified] : fragile  [de-identified] : + pharynx erythema  [de-identified] : + b/l posterior rhonchi  [de-identified] : walking with the walker

## 2019-01-05 NOTE — ED PROVIDER NOTE - NSFOLLOWUPCLINICS_GEN_ALL_ED_FT
[facility]  Hematology/Oncology  [address] [id number]  Phone: [phone]  Fax:   Follow Up Time:

## 2019-01-05 NOTE — ED ADULT NURSE NOTE - NSFALLRSKASSESSTYPE_ED_ALL_ED
Initial (On Arrival) patient slept, feels much better, is po tolerant, advised to stop smoking marijuana and f/u with GI. Patient counseled regarding conditions which should prompt return.

## 2019-01-05 NOTE — ED ADULT NURSE NOTE - NSIMPLEMENTINTERV_GEN_ALL_ED
Implemented All Universal Safety Interventions:  [place] to call system. Call bell, personal items and telephone within reach. Instruct patient to call for assistance. Room bathroom lighting operational. Non-slip footwear when patient is off stretcher. Physically safe environment: no spills, clutter or unnecessary equipment. Stretcher in lowest position, wheels locked, appropriate side rails in place.

## 2019-01-06 LAB
ALBUMIN SERPL ELPH-MCNC: 4.4 G/DL
ALP BLD-CCNC: 358 U/L
ALT SERPL-CCNC: 24 U/L
ANION GAP SERPL CALC-SCNC: 18 MMOL/L
AST SERPL-CCNC: 28 U/L
BILIRUB SERPL-MCNC: 0.6 MG/DL
BUN SERPL-MCNC: 20 MG/DL
CALCIUM SERPL-MCNC: 8.7 MG/DL
CHLORIDE SERPL-SCNC: 101 MMOL/L
CHOLEST SERPL-MCNC: 123 MG/DL
CHOLEST/HDLC SERPL: 2.9 RATIO
CO2 SERPL-SCNC: 21 MMOL/L
CREAT SERPL-MCNC: 1.56 MG/DL
CULTURE RESULTS: NO GROWTH — SIGNIFICANT CHANGE UP
GLUCOSE SERPL-MCNC: 93 MG/DL
HDLC SERPL-MCNC: 42 MG/DL
LDLC SERPL CALC-MCNC: 64 MG/DL
POTASSIUM SERPL-SCNC: 4.7 MMOL/L
PROT SERPL-MCNC: 6.4 G/DL
SODIUM SERPL-SCNC: 140 MMOL/L
SPECIMEN SOURCE: SIGNIFICANT CHANGE UP
TRIGL SERPL-MCNC: 87 MG/DL
URATE SERPL-MCNC: 7.4 MG/DL

## 2019-01-10 LAB
CULTURE RESULTS: SIGNIFICANT CHANGE UP
CULTURE RESULTS: SIGNIFICANT CHANGE UP
SPECIMEN SOURCE: SIGNIFICANT CHANGE UP
SPECIMEN SOURCE: SIGNIFICANT CHANGE UP

## 2019-01-11 ENCOUNTER — RX RENEWAL (OUTPATIENT)
Age: 84
End: 2019-01-11

## 2019-01-19 ENCOUNTER — OUTPATIENT (OUTPATIENT)
Dept: OUTPATIENT SERVICES | Facility: HOSPITAL | Age: 84
LOS: 1 days | Discharge: HOME | End: 2019-01-19

## 2019-01-19 DIAGNOSIS — D72.89 OTHER SPECIFIED DISORDERS OF WHITE BLOOD CELLS: ICD-10-CM

## 2019-01-21 PROBLEM — R27.8 ABNORMAL COORDINATION: Status: ACTIVE | Noted: 2017-10-05

## 2019-01-22 ENCOUNTER — CHART COPY (OUTPATIENT)
Age: 84
End: 2019-01-22

## 2019-01-28 ENCOUNTER — RESULT REVIEW (OUTPATIENT)
Age: 84
End: 2019-01-28

## 2019-01-28 ENCOUNTER — OUTPATIENT (OUTPATIENT)
Dept: OUTPATIENT SERVICES | Facility: HOSPITAL | Age: 84
LOS: 1 days | End: 2019-01-28
Payer: MEDICARE

## 2019-01-28 ENCOUNTER — APPOINTMENT (OUTPATIENT)
Dept: HEMATOLOGY ONCOLOGY | Facility: CLINIC | Age: 84
End: 2019-01-28
Payer: MEDICARE

## 2019-01-28 VITALS
HEIGHT: 59.84 IN | WEIGHT: 108.03 LBS | SYSTOLIC BLOOD PRESSURE: 136 MMHG | HEART RATE: 76 BPM | DIASTOLIC BLOOD PRESSURE: 76 MMHG | RESPIRATION RATE: 16 BRPM | TEMPERATURE: 98.1 F | BODY MASS INDEX: 21.21 KG/M2 | OXYGEN SATURATION: 98 %

## 2019-01-28 DIAGNOSIS — D72.829 ELEVATED WHITE BLOOD CELL COUNT, UNSPECIFIED: ICD-10-CM

## 2019-01-28 LAB
ALBUMIN SERPL ELPH-MCNC: 4 G/DL
ALP BLD-CCNC: 296 U/L
ALT SERPL-CCNC: 16 U/L
ANION GAP SERPL CALC-SCNC: 12 MMOL/L
AST SERPL-CCNC: 23 U/L
BASOPHILS # BLD AUTO: 0.1 K/UL — SIGNIFICANT CHANGE UP (ref 0–0.2)
BASOPHILS NFR BLD AUTO: 1 % — SIGNIFICANT CHANGE UP (ref 0–2)
BILIRUB SERPL-MCNC: 0.5 MG/DL
BUN SERPL-MCNC: 19 MG/DL
CALCIUM SERPL-MCNC: 8.9 MG/DL
CHLORIDE SERPL-SCNC: 102 MMOL/L
CO2 SERPL-SCNC: 27 MMOL/L
CREAT SERPL-MCNC: 0.82 MG/DL
EOSINOPHIL # BLD AUTO: 0.3 K/UL — SIGNIFICANT CHANGE UP (ref 0–0.5)
EOSINOPHIL NFR BLD AUTO: 1 % — SIGNIFICANT CHANGE UP (ref 0–6)
GLUCOSE SERPL-MCNC: 92 MG/DL
HCT VFR BLD CALC: 32.7 % — LOW (ref 34.5–45)
HGB BLD-MCNC: 10.6 G/DL — LOW (ref 11.5–15.5)
LDH SERPL-CCNC: 398 U/L
LYMPHOCYTES # BLD AUTO: 1.8 K/UL — SIGNIFICANT CHANGE UP (ref 1–3.3)
LYMPHOCYTES # BLD AUTO: 4 % — LOW (ref 13–44)
MACROCYTES BLD QL: SLIGHT — SIGNIFICANT CHANGE UP
MCHC RBC-ENTMCNC: 32.3 PG — SIGNIFICANT CHANGE UP (ref 27–34)
MCHC RBC-ENTMCNC: 32.5 G/DL — SIGNIFICANT CHANGE UP (ref 32–36)
MCV RBC AUTO: 99.4 FL — SIGNIFICANT CHANGE UP (ref 80–100)
METAMYELOCYTES # FLD: 1 % — HIGH (ref 0–0)
MONOCYTES # BLD AUTO: 1 K/UL — HIGH (ref 0–0.9)
MONOCYTES NFR BLD AUTO: 3 % — SIGNIFICANT CHANGE UP (ref 2–14)
NEUTROPHILS # BLD AUTO: 55.4 K/UL — HIGH (ref 1.8–7.4)
NEUTROPHILS NFR BLD AUTO: 84 % — HIGH (ref 43–77)
NEUTS BAND # BLD: 6 % — SIGNIFICANT CHANGE UP (ref 0–8)
PLAT MORPH BLD: NORMAL — SIGNIFICANT CHANGE UP
PLATELET # BLD AUTO: 186 K/UL — SIGNIFICANT CHANGE UP (ref 150–400)
POTASSIUM SERPL-SCNC: 4.2 MMOL/L
PROT SERPL-MCNC: 6 G/DL
RBC # BLD: 3.29 M/UL — LOW (ref 3.8–5.2)
RBC # FLD: 18.5 % — HIGH (ref 10.3–14.5)
RBC BLD AUTO: ABNORMAL
SODIUM SERPL-SCNC: 141 MMOL/L
URATE SERPL-MCNC: 6.2 MG/DL
WBC # BLD: 58.5 K/UL — CRITICAL HIGH (ref 3.8–10.5)
WBC # FLD AUTO: 58.5 K/UL — CRITICAL HIGH (ref 3.8–10.5)

## 2019-01-28 PROCEDURE — 88189 FLOWCYTOMETRY/READ 16 & >: CPT

## 2019-01-28 PROCEDURE — G0452: CPT | Mod: 26

## 2019-01-28 PROCEDURE — 99205 OFFICE O/P NEW HI 60 MIN: CPT

## 2019-01-28 RX ORDER — BENZONATATE 200 MG/1
200 CAPSULE ORAL 3 TIMES DAILY
Qty: 60 | Refills: 1 | Status: DISCONTINUED | COMMUNITY
Start: 2019-01-04 | End: 2019-01-28

## 2019-01-28 RX ORDER — AMOXICILLIN AND CLAVULANATE POTASSIUM 875; 125 MG/1; MG/1
875-125 TABLET, COATED ORAL
Qty: 14 | Refills: 0 | Status: DISCONTINUED | COMMUNITY
Start: 2019-01-04 | End: 2019-01-28

## 2019-01-28 NOTE — ASSESSMENT
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 Will do peripheral flow cytometry and BCR-ABL peripheral blood today\par \par -given wbc high, pt may have tumor lysis -thus, will check CMP, Mg, Phos, LDH and uric acid today\par \par -will check a baseline Hep B, C and HIV\par \par -discussed with pt and son that if CML is confirmed, pt will need to start TKI, and Gleevec is a good initial option given no cardiac toxicities\par \par -BM bx scheduled for 1/31 -send r/o CML, r/o CMMoL, cytogenetics/FISH CML and MDS panel\par \par -follow up on 2/12/19 to go over results with pt

## 2019-01-28 NOTE — RESULTS/DATA
[FreeTextEntry1] : Today's CBC (On 1/28/19) wbc 58.5 ANC 55.4 ALC 1800 AMoC 1000 Eo 300 Ba 100 Hb 10.6 plt 186\par \par The peripheral smear was reviewed. It showed neutrophils with toxic granulations, Dohle bodies. Monocytes noted, few myelocytes as well. Normocytic normochromic rbc's. Platelets -large plt noted. \par \par The previous medical records were reviewed\par On 1/4/19 wbc 54.46 Hb 10.7 plt 215\par on 12/26/17 wbc 10.5 hb 10.5 plt 250\par ON 12/5/17 wbc 19.7 Hb 11.7 plt 352

## 2019-01-28 NOTE — HISTORY OF PRESENT ILLNESS
[de-identified] : Ms. Muir was referred to my office for evaluation of leukocytosis -noted when she saw her PCP on 1/4/19 (wbc 54k/ul). The patient is present here with her son Enrique who is helping related the medical history (patient suffers from dementia). According to the son, the patient had a 'sinusitis' on 1/4/19, had fevers/sinus pains, was prescribed Augmentin for 10 days which she has since finished. \par  [de-identified] : The patient is here for leukocytosis. She has no complaints.

## 2019-01-28 NOTE — REVIEW OF SYSTEMS
[Fatigue] : fatigue [Recent Change In Weight] : ~T recent weight change [Negative] : Allergic/Immunologic [Easy Bruising] : a tendency for easy bruising [Fever] : no fever [Chills] : no chills [Dysphagia] : no dysphagia [Loss of Hearing] : no loss of hearing [Odynophagia] : no odynophagia [Chest Pain] : no chest pain [Palpitations] : no palpitations [Leg Claudication] : no intermittent leg claudication [Lower Ext Edema] : no lower extremity edema [Wheezing] : no wheezing [Cough] : no cough [SOB on Exertion] : no shortness of breath during exertion [Abdominal Pain] : no abdominal pain [Joint Pain] : no joint pain [Joint Stiffness] : no joint stiffness [Skin Rash] : no skin rash [Proptosis] : no proptosis [Muscle Weakness] : no muscle weakness [Easy Bleeding] : no tendency for easy bleeding [Swollen Glands] : no swollen glands [FreeTextEntry2] : lost 8 lbs over the last 6 months [de-identified] : s/p falls -in 2018 fell 2x, in Jan 2019 also fell. Has dementia

## 2019-01-28 NOTE — PHYSICAL EXAM
[Capable of only limited self care, confined to bed or chair more than 50% of waking hours] : Status 3- Capable of only limited self care, confined to bed or chair more than 50% of waking hours [Normal] : affect appropriate [Thin] : thin [Ulcers] : no ulcers [de-identified] : mod b/l LE edema pedal [de-identified] : uses cane for balance. Severe kyphosis [de-identified] : multiple actinic keratoses present on chest [de-identified] : unstable, shuffling gait

## 2019-01-29 LAB
HBV CORE IGG+IGM SER QL: NONREACTIVE
HBV SURFACE AB SER QL: NONREACTIVE
HBV SURFACE AG SER QL: NONREACTIVE
HCV AB SER QL: NONREACTIVE
HCV S/CO RATIO: 0.18 S/CO
HIV1+2 AB SPEC QL IA.RAPID: NONREACTIVE
TM INTERPRETATION: SIGNIFICANT CHANGE UP

## 2019-01-31 ENCOUNTER — RESULT REVIEW (OUTPATIENT)
Age: 84
End: 2019-01-31

## 2019-01-31 ENCOUNTER — APPOINTMENT (OUTPATIENT)
Dept: HEMATOLOGY ONCOLOGY | Facility: CLINIC | Age: 84
End: 2019-01-31
Payer: MEDICARE

## 2019-01-31 ENCOUNTER — OUTPATIENT (OUTPATIENT)
Dept: OUTPATIENT SERVICES | Facility: HOSPITAL | Age: 84
LOS: 1 days | End: 2019-01-31
Payer: MEDICARE

## 2019-01-31 VITALS
SYSTOLIC BLOOD PRESSURE: 159 MMHG | DIASTOLIC BLOOD PRESSURE: 94 MMHG | WEIGHT: 107.81 LBS | RESPIRATION RATE: 16 BRPM | BODY MASS INDEX: 21.17 KG/M2 | OXYGEN SATURATION: 99 % | TEMPERATURE: 97.6 F | HEART RATE: 79 BPM

## 2019-01-31 LAB
BASOPHILS # BLD AUTO: 0 K/UL — SIGNIFICANT CHANGE UP (ref 0–0.2)
BCR/ABL BY RT - PCR QUANTITATIVE: SIGNIFICANT CHANGE UP
DOHLE BOD BLD QL SMEAR: PRESENT — SIGNIFICANT CHANGE UP
EOSINOPHIL # BLD AUTO: 0.3 K/UL — SIGNIFICANT CHANGE UP (ref 0–0.5)
EOSINOPHIL NFR BLD AUTO: 1 % — SIGNIFICANT CHANGE UP (ref 0–6)
HCT VFR BLD CALC: 33.2 % — LOW (ref 34.5–45)
HGB BLD-MCNC: 10.7 G/DL — LOW (ref 11.5–15.5)
LYMPHOCYTES # BLD AUTO: 2 K/UL — SIGNIFICANT CHANGE UP (ref 1–3.3)
LYMPHOCYTES # BLD AUTO: 7 % — LOW (ref 13–44)
MCHC RBC-ENTMCNC: 32.3 G/DL — SIGNIFICANT CHANGE UP (ref 32–36)
MCHC RBC-ENTMCNC: 32.4 PG — SIGNIFICANT CHANGE UP (ref 27–34)
MCV RBC AUTO: 101 FL — HIGH (ref 80–100)
METAMYELOCYTES # FLD: 2 % — HIGH (ref 0–0)
MONOCYTES # BLD AUTO: 1.5 K/UL — HIGH (ref 0–0.9)
MONOCYTES NFR BLD AUTO: 7 % — SIGNIFICANT CHANGE UP (ref 2–14)
MYELOCYTES NFR BLD: 3 % — HIGH (ref 0–0)
NEUTROPHILS # BLD AUTO: 46.9 K/UL — HIGH (ref 1.8–7.4)
NEUTROPHILS NFR BLD AUTO: 77 % — SIGNIFICANT CHANGE UP (ref 43–77)
NEUTS BAND # BLD: 3 % — SIGNIFICANT CHANGE UP (ref 0–8)
PLAT MORPH BLD: NORMAL — SIGNIFICANT CHANGE UP
PLATELET # BLD AUTO: 166 K/UL — SIGNIFICANT CHANGE UP (ref 150–400)
RBC # BLD: 3.3 M/UL — LOW (ref 3.8–5.2)
RBC # FLD: 18.3 % — HIGH (ref 10.3–14.5)
RBC BLD AUTO: SIGNIFICANT CHANGE UP
TOXIC GRANULES BLD QL SMEAR: PRESENT — SIGNIFICANT CHANGE UP
WBC # BLD: 50.8 K/UL — CRITICAL HIGH (ref 3.8–10.5)
WBC # FLD AUTO: 50.8 K/UL — CRITICAL HIGH (ref 3.8–10.5)

## 2019-01-31 PROCEDURE — 81402 MOPATH PROCEDURE LEVEL 3: CPT

## 2019-01-31 PROCEDURE — 81170 ABL1 GENE: CPT

## 2019-01-31 PROCEDURE — 81275 KRAS GENE VARIANTS EXON 2: CPT

## 2019-01-31 PROCEDURE — 88189 FLOWCYTOMETRY/READ 16 & >: CPT

## 2019-01-31 PROCEDURE — 88342 IMHCHEM/IMCYTCHM 1ST ANTB: CPT | Mod: 26,59

## 2019-01-31 PROCEDURE — 81311 NRAS GENE VARIANTS EXON 2&3: CPT

## 2019-01-31 PROCEDURE — 88305 TISSUE EXAM BY PATHOLOGIST: CPT | Mod: 26

## 2019-01-31 PROCEDURE — 88360 TUMOR IMMUNOHISTOCHEM/MANUAL: CPT | Mod: 26

## 2019-01-31 PROCEDURE — 88313 SPECIAL STAINS GROUP 2: CPT | Mod: 26

## 2019-01-31 PROCEDURE — 81272 KIT GENE TARGETED SEQ ANALYS: CPT

## 2019-01-31 PROCEDURE — 81210 BRAF GENE: CPT

## 2019-01-31 PROCEDURE — 81246 FLT3 GENE ANALYSIS: CPT

## 2019-01-31 PROCEDURE — 81276 KRAS GENE ADDL VARIANTS: CPT

## 2019-01-31 PROCEDURE — 81403 MOPATH PROCEDURE LEVEL 4: CPT

## 2019-01-31 PROCEDURE — 81219 CALR GENE COM VARIANTS: CPT

## 2019-01-31 PROCEDURE — 81405 MOPATH PROCEDURE LEVEL 6: CPT

## 2019-01-31 PROCEDURE — 81121 IDH2 COMMON VARIANTS: CPT

## 2019-01-31 PROCEDURE — 88341 IMHCHEM/IMCYTCHM EA ADD ANTB: CPT | Mod: 26,59

## 2019-01-31 PROCEDURE — 81120 IDH1 COMMON VARIANTS: CPT

## 2019-01-31 PROCEDURE — 81270 JAK2 GENE: CPT

## 2019-01-31 PROCEDURE — 85097 BONE MARROW INTERPRETATION: CPT

## 2019-01-31 PROCEDURE — 81245 FLT3 GENE: CPT

## 2019-01-31 PROCEDURE — 38222 DX BONE MARROW BX & ASPIR: CPT | Mod: RT

## 2019-01-31 PROCEDURE — 81310 NPM1 GENE: CPT

## 2019-01-31 NOTE — REASON FOR VISIT
[Bone Marrow Biopsy] : bone marrow biopsy [Bone Marrow Aspiration] : bone marrow aspiration [Formal Caregiver] : formal caregiver [FreeTextEntry2] : 89 yrs old F h/o dementia,  leukocytosis r/o CML/ CMMol

## 2019-01-31 NOTE — PROCEDURE
[Bone Marrow Biopsy] : bone marrow biopsy [Bone Marrow Aspiration] : bone marrow aspiration  [Patient] : the patient [Verbal Consent Obtained] : verbal consent was obtained prior to the procedure [Patient identification verified] : patient identification verified [Procedure verified and consent obtained] : procedure verified and consent obtained [Laterality verified and correct site marked] : laterality verified and correct site marked [Right] : site: right [Correct positioning] : correct positioning [Correct implant and/ or special equipment obtained] : correct impact and/ or special equipment obtained [Prone] : prone [Superior iliac spine was identified] : the superior iliac spine was identified. [The right posterior iliac crest was prepped with betadine and draped, using sterile technique.] : The right posterior iliac crest was prepped with betadine and draped, using sterile technique. [Lidocaine was injected and into the periosteum overlying the site.] : Lidocaine was injected and into the periosteum overlying the site. [Aspirate] : aspirate [Cytogenetics] : cytogenetics [FISH] : FISH [Biopsy] : biopsy [Flow Cytometry] : flow cytometry [] : The patient was instructed to remove the bandage the following AM. The patient may bathe. Acetaminophen may be taken for discomfort, as per package directions.If there are any other problems, the patient was instructed to call the office. The patient verbalized understanding, and is aware of the office contact numbers. [FreeTextEntry1] : 89 yrs old F h/o dementia,  leukocytosis r/o CML/ CMMol

## 2019-02-01 ENCOUNTER — RESULT REVIEW (OUTPATIENT)
Age: 84
End: 2019-02-01

## 2019-02-01 LAB
MAGNESIUM SERPL-MCNC: 2.5 MG/DL
PHOSPHATE SERPL-MCNC: 3.9 MG/DL

## 2019-02-04 LAB — TM INTERPRETATION: SIGNIFICANT CHANGE UP

## 2019-02-05 LAB — HEMATOPATHOLOGY REPORT: SIGNIFICANT CHANGE UP

## 2019-02-12 ENCOUNTER — RESULT REVIEW (OUTPATIENT)
Age: 84
End: 2019-02-12

## 2019-02-12 ENCOUNTER — APPOINTMENT (OUTPATIENT)
Dept: HEMATOLOGY ONCOLOGY | Facility: CLINIC | Age: 84
End: 2019-02-12
Payer: MEDICARE

## 2019-02-12 VITALS
RESPIRATION RATE: 16 BRPM | DIASTOLIC BLOOD PRESSURE: 81 MMHG | HEART RATE: 78 BPM | SYSTOLIC BLOOD PRESSURE: 159 MMHG | TEMPERATURE: 97.5 F | BODY MASS INDEX: 21.21 KG/M2 | OXYGEN SATURATION: 99 % | WEIGHT: 108.02 LBS

## 2019-02-12 LAB
BASOPHILS # BLD AUTO: 0.1 K/UL — SIGNIFICANT CHANGE UP (ref 0–0.2)
BLD GP AB SCN SERPL QL: NEGATIVE — SIGNIFICANT CHANGE UP
DOHLE BOD BLD QL SMEAR: PRESENT — SIGNIFICANT CHANGE UP
EOSINOPHIL NFR BLD AUTO: 1 % — SIGNIFICANT CHANGE UP (ref 0–6)
HCT VFR BLD CALC: 30.7 % — LOW (ref 34.5–45)
HGB BLD-MCNC: 10.2 G/DL — LOW (ref 11.5–15.5)
LYMPHOCYTES # BLD AUTO: 2.5 K/UL — SIGNIFICANT CHANGE UP (ref 1–3.3)
LYMPHOCYTES # BLD AUTO: 6 % — LOW (ref 13–44)
MCHC RBC-ENTMCNC: 32.9 PG — SIGNIFICANT CHANGE UP (ref 27–34)
MCHC RBC-ENTMCNC: 33.1 G/DL — SIGNIFICANT CHANGE UP (ref 32–36)
MCV RBC AUTO: 99.3 FL — SIGNIFICANT CHANGE UP (ref 80–100)
METAMYELOCYTES # FLD: 5 % — HIGH (ref 0–0)
MONOCYTES # BLD AUTO: 1.3 K/UL — HIGH (ref 0–0.9)
MONOCYTES NFR BLD AUTO: 1 % — LOW (ref 2–14)
NEUTROPHILS # BLD AUTO: 71 K/UL — HIGH (ref 1.8–7.4)
NEUTROPHILS NFR BLD AUTO: 82 % — HIGH (ref 43–77)
NEUTS BAND # BLD: 5 % — SIGNIFICANT CHANGE UP (ref 0–8)
PLAT MORPH BLD: NORMAL — SIGNIFICANT CHANGE UP
PLATELET # BLD AUTO: 188 K/UL — SIGNIFICANT CHANGE UP (ref 150–400)
RBC # BLD: 3.09 M/UL — LOW (ref 3.8–5.2)
RBC # FLD: 18.6 % — HIGH (ref 10.3–14.5)
RBC BLD AUTO: SIGNIFICANT CHANGE UP
RH IG SCN BLD-IMP: POSITIVE — SIGNIFICANT CHANGE UP
TOXIC GRANULES BLD QL SMEAR: PRESENT — SIGNIFICANT CHANGE UP
WBC # BLD: 75 K/UL — CRITICAL HIGH (ref 3.8–10.5)
WBC # FLD AUTO: 75 K/UL — CRITICAL HIGH (ref 3.8–10.5)

## 2019-02-12 PROCEDURE — 88305 TISSUE EXAM BY PATHOLOGIST: CPT

## 2019-02-12 PROCEDURE — 88237 TISSUE CULTURE BONE MARROW: CPT

## 2019-02-12 PROCEDURE — 81210 BRAF GENE: CPT

## 2019-02-12 PROCEDURE — 81170 ABL1 GENE: CPT

## 2019-02-12 PROCEDURE — 81275 KRAS GENE VARIANTS EXON 2: CPT

## 2019-02-12 PROCEDURE — 88360 TUMOR IMMUNOHISTOCHEM/MANUAL: CPT

## 2019-02-12 PROCEDURE — 81403 MOPATH PROCEDURE LEVEL 4: CPT

## 2019-02-12 PROCEDURE — 81219 CALR GENE COM VARIANTS: CPT

## 2019-02-12 PROCEDURE — 81272 KIT GENE TARGETED SEQ ANALYS: CPT

## 2019-02-12 PROCEDURE — 81207 BCR/ABL1 GENE MINOR BP: CPT

## 2019-02-12 PROCEDURE — 88264 CHROMOSOME ANALYSIS 20-25: CPT

## 2019-02-12 PROCEDURE — 85097 BONE MARROW INTERPRETATION: CPT

## 2019-02-12 PROCEDURE — 81120 IDH1 COMMON VARIANTS: CPT

## 2019-02-12 PROCEDURE — 99215 OFFICE O/P EST HI 40 MIN: CPT

## 2019-02-12 PROCEDURE — 86901 BLOOD TYPING SEROLOGIC RH(D): CPT

## 2019-02-12 PROCEDURE — 81121 IDH2 COMMON VARIANTS: CPT

## 2019-02-12 PROCEDURE — 81206 BCR/ABL1 GENE MAJOR BP: CPT

## 2019-02-12 PROCEDURE — 81276 KRAS GENE ADDL VARIANTS: CPT

## 2019-02-12 PROCEDURE — 88185 FLOWCYTOMETRY/TC ADD-ON: CPT

## 2019-02-12 PROCEDURE — 87205 SMEAR GRAM STAIN: CPT

## 2019-02-12 PROCEDURE — 86900 BLOOD TYPING SEROLOGIC ABO: CPT

## 2019-02-12 PROCEDURE — 88313 SPECIAL STAINS GROUP 2: CPT

## 2019-02-12 PROCEDURE — 81246 FLT3 GENE ANALYSIS: CPT

## 2019-02-12 PROCEDURE — 81402 MOPATH PROCEDURE LEVEL 3: CPT

## 2019-02-12 PROCEDURE — 88184 FLOWCYTOMETRY/ TC 1 MARKER: CPT

## 2019-02-12 PROCEDURE — 81245 FLT3 GENE: CPT

## 2019-02-12 PROCEDURE — 88280 CHROMOSOME KARYOTYPE STUDY: CPT

## 2019-02-12 PROCEDURE — 88341 IMHCHEM/IMCYTCHM EA ADD ANTB: CPT

## 2019-02-12 PROCEDURE — 81405 MOPATH PROCEDURE LEVEL 6: CPT

## 2019-02-12 PROCEDURE — 88275 CYTOGENETICS 100-300: CPT

## 2019-02-12 PROCEDURE — 81310 NPM1 GENE: CPT

## 2019-02-12 PROCEDURE — 88342 IMHCHEM/IMCYTCHM 1ST ANTB: CPT

## 2019-02-12 PROCEDURE — 86850 RBC ANTIBODY SCREEN: CPT

## 2019-02-12 PROCEDURE — 81270 JAK2 GENE: CPT

## 2019-02-12 PROCEDURE — 88271 CYTOGENETICS DNA PROBE: CPT

## 2019-02-12 PROCEDURE — 81311 NRAS GENE VARIANTS EXON 2&3: CPT

## 2019-02-12 RX ORDER — DONEPEZIL HYDROCHLORIDE 5 MG/1
5 TABLET ORAL
Qty: 30 | Refills: 0 | Status: DISCONTINUED | COMMUNITY
Start: 2018-11-28 | End: 2019-02-12

## 2019-02-12 NOTE — RESULTS/DATA
[FreeTextEntry1] : Today's CBC (On 2/12/19) wbc 75 Hb 10.2 plt 188\par \par On 1/28/19) wbc 58.5 ANC 55.4 ALC 1800 AMoC 1000 Eo 300 Ba 100 Hb 10.6 plt 186\par On 1/4/19 wbc 54.46 Hb 10.7 plt 215\par on 12/26/17 wbc 10.5 hb 10.5 plt 250\par ON 12/5/17 wbc 19.7 Hb 11.7 plt 352

## 2019-02-12 NOTE — CONSULT LETTER
[Dear  ___] : Dear  [unfilled], [Please see my note below.] : Please see my note below. [Consult Closing:] : Thank you very much for allowing me to participate in the care of this patient.  If you have any questions, please do not hesitate to contact me. [Sincerely,] : Sincerely, [Courtesy Letter:] : I had the pleasure of seeing your patient, [unfilled], in my office today.

## 2019-02-12 NOTE — REVIEW OF SYSTEMS
[Fatigue] : fatigue [Recent Change In Weight] : ~T recent weight change [Easy Bruising] : a tendency for easy bruising [Negative] : Allergic/Immunologic [Fever] : no fever [Chills] : no chills [Dysphagia] : no dysphagia [Loss of Hearing] : no loss of hearing [Odynophagia] : no odynophagia [Chest Pain] : no chest pain [Palpitations] : no palpitations [Leg Claudication] : no intermittent leg claudication [Lower Ext Edema] : no lower extremity edema [Wheezing] : no wheezing [Cough] : no cough [SOB on Exertion] : no shortness of breath during exertion [Abdominal Pain] : no abdominal pain [Joint Pain] : no joint pain [Joint Stiffness] : no joint stiffness [Skin Rash] : no skin rash [Proptosis] : no proptosis [Muscle Weakness] : no muscle weakness [Easy Bleeding] : no tendency for easy bleeding [Swollen Glands] : no swollen glands [FreeTextEntry2] : lost 8 lbs over the last 6 months [de-identified] : s/p falls -in 2018 fell 2x, in Jan 2019 also fell. Has dementia

## 2019-02-12 NOTE — ASSESSMENT
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 Discussed with patient and son's mother in law, Shruthi Soto, results of BM bx and possible tx -which is hypomethylating agent Dacogen x5 days -to be scheduled for 2/25/19. Written consent obtained\par \par -Meantime, will start Hydrea 500mg po daily to lower wbc count. CBC weekly -on Wed's, next CBC on 2/20 to monitor and adjust Hydrea\par \par -will do blood work today for iron studies, Epo level, CMP/LDH/uric acid and lytes and type and screen for possible transfusions. Transfusion consent done today\par -given wbc high, pt may have tumor lysis -LDH/uric acid checked on 1/28, uric acid 6.2. Will start Allopurinol 300mg po daily\par \par -baseline Hep B, C and HIV done on 1/28 -nonreactive\par \par -follow up in 3 wks

## 2019-02-12 NOTE — HISTORY OF PRESENT ILLNESS
[de-identified] : Ms. Muir was referred to my office for evaluation of leukocytosis -noted when she saw her PCP on 1/4/19 (wbc 54k/ul). The patient is present here with her son Enrique who is helping related the medical history (patient suffers from dementia). According to the son, the patient had a 'sinusitis' on 1/4/19, had fevers/sinus pains, was prescribed Augmentin for 10 days which she has since finished. \par  [de-identified] : The patient is here for leukocytosis follow up. She is here for BM bx results.

## 2019-02-12 NOTE — PHYSICAL EXAM
[Ulcers] : no ulcers [de-identified] : mod b/l LE edema pedal [de-identified] : uses cane for balance. Severe kyphosis [de-identified] : multiple actinic keratoses present on chest [de-identified] : in wheelchair

## 2019-02-13 ENCOUNTER — OUTPATIENT (OUTPATIENT)
Dept: OUTPATIENT SERVICES | Facility: HOSPITAL | Age: 84
LOS: 1 days | Discharge: HOME | End: 2019-02-13

## 2019-02-13 DIAGNOSIS — D72.89 OTHER SPECIFIED DISORDERS OF WHITE BLOOD CELLS: ICD-10-CM

## 2019-02-13 LAB
ALBUMIN SERPL ELPH-MCNC: 4.3 G/DL
ALP BLD-CCNC: 359 U/L
ALT SERPL-CCNC: 18 U/L
ANION GAP SERPL CALC-SCNC: 13 MMOL/L
AST SERPL-CCNC: 25 U/L
BILIRUB SERPL-MCNC: 0.6 MG/DL
BUN SERPL-MCNC: 19 MG/DL
CALCIUM SERPL-MCNC: 9 MG/DL
CHLORIDE SERPL-SCNC: 102 MMOL/L
CO2 SERPL-SCNC: 27 MMOL/L
CREAT SERPL-MCNC: 0.77 MG/DL
FERRITIN SERPL-MCNC: 428 NG/ML
GLUCOSE SERPL-MCNC: 98 MG/DL
IRON SATN MFR SERPL: 31 %
IRON SERPL-MCNC: 92 UG/DL
LDH SERPL-CCNC: 447 U/L
MAGNESIUM SERPL-MCNC: 2.5 MG/DL
PHOSPHATE SERPL-MCNC: 3.5 MG/DL
POTASSIUM SERPL-SCNC: 4.3 MMOL/L
PROT SERPL-MCNC: 6.4 G/DL
SODIUM SERPL-SCNC: 142 MMOL/L
TIBC SERPL-MCNC: 300 UG/DL
UIBC SERPL-MCNC: 208 UG/DL
URATE SERPL-MCNC: 6.4 MG/DL

## 2019-02-14 LAB
CHROM ANALY INTERPHASE BLD FISH-IMP: SIGNIFICANT CHANGE UP
EPO SERPL-MCNC: 34.1 MIU/ML

## 2019-02-15 LAB — CHROM ANALY OVERALL INTERP SPEC-IMP: SIGNIFICANT CHANGE UP

## 2019-02-20 ENCOUNTER — APPOINTMENT (OUTPATIENT)
Dept: HEMATOLOGY ONCOLOGY | Facility: CLINIC | Age: 84
End: 2019-02-20

## 2019-02-20 ENCOUNTER — RESULT REVIEW (OUTPATIENT)
Age: 84
End: 2019-02-20

## 2019-02-20 LAB
BASOPHILS # BLD AUTO: 0.1 K/UL — SIGNIFICANT CHANGE UP (ref 0–0.2)
BASOPHILS NFR BLD AUTO: 1 % — SIGNIFICANT CHANGE UP (ref 0–2)
EOSINOPHIL # BLD AUTO: 0 K/UL — SIGNIFICANT CHANGE UP (ref 0–0.5)
HCT VFR BLD CALC: 31 % — LOW (ref 34.5–45)
HGB BLD-MCNC: 10 G/DL — LOW (ref 11.5–15.5)
LYMPHOCYTES # BLD AUTO: 2.6 K/UL — SIGNIFICANT CHANGE UP (ref 1–3.3)
LYMPHOCYTES # BLD AUTO: 3 % — LOW (ref 13–44)
MCHC RBC-ENTMCNC: 32.3 G/DL — SIGNIFICANT CHANGE UP (ref 32–36)
MCHC RBC-ENTMCNC: 32.3 PG — SIGNIFICANT CHANGE UP (ref 27–34)
MCV RBC AUTO: 100 FL — SIGNIFICANT CHANGE UP (ref 80–100)
METAMYELOCYTES # FLD: 10 % — HIGH (ref 0–0)
MONOCYTES # BLD AUTO: 1.7 K/UL — HIGH (ref 0–0.9)
MONOCYTES NFR BLD AUTO: 4 % — SIGNIFICANT CHANGE UP (ref 2–14)
MYELOCYTES NFR BLD: 5 % — HIGH (ref 0–0)
NEUTROPHILS # BLD AUTO: 65.9 K/UL — HIGH (ref 1.8–7.4)
NEUTROPHILS NFR BLD AUTO: 70 % — SIGNIFICANT CHANGE UP (ref 43–77)
NEUTS BAND # BLD: 7 % — SIGNIFICANT CHANGE UP (ref 0–8)
PLAT MORPH BLD: NORMAL — SIGNIFICANT CHANGE UP
PLATELET # BLD AUTO: 169 K/UL — SIGNIFICANT CHANGE UP (ref 150–400)
RBC # BLD: 3.1 M/UL — LOW (ref 3.8–5.2)
RBC # FLD: 19.5 % — HIGH (ref 10.3–14.5)
RBC BLD AUTO: SIGNIFICANT CHANGE UP
WBC # BLD: 70.3 K/UL — CRITICAL HIGH (ref 3.8–10.5)
WBC # FLD AUTO: 70.3 K/UL — CRITICAL HIGH (ref 3.8–10.5)

## 2019-02-25 ENCOUNTER — APPOINTMENT (OUTPATIENT)
Dept: INFUSION THERAPY | Facility: HOSPITAL | Age: 84
End: 2019-02-25

## 2019-02-26 ENCOUNTER — APPOINTMENT (OUTPATIENT)
Dept: INFUSION THERAPY | Facility: HOSPITAL | Age: 84
End: 2019-02-26

## 2019-02-26 ENCOUNTER — APPOINTMENT (OUTPATIENT)
Dept: ULTRASOUND IMAGING | Facility: IMAGING CENTER | Age: 84
End: 2019-02-26

## 2019-02-27 ENCOUNTER — APPOINTMENT (OUTPATIENT)
Dept: INFUSION THERAPY | Facility: HOSPITAL | Age: 84
End: 2019-02-27

## 2019-02-27 ENCOUNTER — APPOINTMENT (OUTPATIENT)
Dept: HEMATOLOGY ONCOLOGY | Facility: CLINIC | Age: 84
End: 2019-02-27
Payer: MEDICARE

## 2019-02-27 ENCOUNTER — RESULT REVIEW (OUTPATIENT)
Age: 84
End: 2019-02-27

## 2019-02-27 ENCOUNTER — APPOINTMENT (OUTPATIENT)
Dept: HEMATOLOGY ONCOLOGY | Facility: CLINIC | Age: 84
End: 2019-02-27

## 2019-02-27 VITALS
WEIGHT: 106.9 LBS | BODY MASS INDEX: 20.99 KG/M2 | HEART RATE: 84 BPM | RESPIRATION RATE: 16 BRPM | TEMPERATURE: 97.8 F | OXYGEN SATURATION: 99 % | SYSTOLIC BLOOD PRESSURE: 132 MMHG | DIASTOLIC BLOOD PRESSURE: 80 MMHG

## 2019-02-27 LAB
BASOPHILS # BLD AUTO: 0 K/UL — SIGNIFICANT CHANGE UP (ref 0–0.2)
EOSINOPHIL # BLD AUTO: 0 K/UL — SIGNIFICANT CHANGE UP (ref 0–0.5)
EOSINOPHIL NFR BLD AUTO: 1 % — SIGNIFICANT CHANGE UP (ref 0–6)
HCT VFR BLD CALC: 28.8 % — LOW (ref 34.5–45)
HGB BLD-MCNC: 9.5 G/DL — LOW (ref 11.5–15.5)
LYMPHOCYTES # BLD AUTO: 1.2 K/UL — SIGNIFICANT CHANGE UP (ref 1–3.3)
LYMPHOCYTES # BLD AUTO: 3 % — LOW (ref 13–44)
MCHC RBC-ENTMCNC: 33.1 PG — SIGNIFICANT CHANGE UP (ref 27–34)
MCHC RBC-ENTMCNC: 33.2 G/DL — SIGNIFICANT CHANGE UP (ref 32–36)
MCV RBC AUTO: 99.7 FL — SIGNIFICANT CHANGE UP (ref 80–100)
MONOCYTES # BLD AUTO: 0.3 K/UL — SIGNIFICANT CHANGE UP (ref 0–0.9)
MONOCYTES NFR BLD AUTO: 4 % — SIGNIFICANT CHANGE UP (ref 2–14)
NEUTROPHILS # BLD AUTO: 31.8 K/UL — HIGH (ref 1.8–7.4)
NEUTROPHILS NFR BLD AUTO: 88 % — HIGH (ref 43–77)
NEUTS BAND # BLD: 4 % — SIGNIFICANT CHANGE UP (ref 0–8)
PLAT MORPH BLD: NORMAL — SIGNIFICANT CHANGE UP
PLATELET # BLD AUTO: 149 K/UL — LOW (ref 150–400)
RBC # BLD: 2.88 M/UL — LOW (ref 3.8–5.2)
RBC # FLD: 20 % — HIGH (ref 10.3–14.5)
RBC BLD AUTO: SIGNIFICANT CHANGE UP
WBC # BLD: 33.3 K/UL — HIGH (ref 3.8–10.5)
WBC # FLD AUTO: 33.3 K/UL — HIGH (ref 3.8–10.5)

## 2019-02-27 PROCEDURE — 99214 OFFICE O/P EST MOD 30 MIN: CPT

## 2019-02-27 NOTE — ASSESSMENT
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 Risks/benefits discussed with pt, will schedule in 2 wks, hold for Hb>10g/dl. First Aranesp to be given on 3/5/19\par \par -cont Allopurinol 300mg po daily\par \par -baseline Hep B, C and HIV done on 1/28 -nonreactive\par \par -follow up in 4 wks

## 2019-02-27 NOTE — PHYSICAL EXAM
[Capable of only limited self care, confined to bed or chair more than 50% of waking hours] : Status 3- Capable of only limited self care, confined to bed or chair more than 50% of waking hours [Normal] : affect appropriate [Ulcers] : no ulcers [de-identified] : mod b/l LE edema pedal [de-identified] : uses cane for balance. Severe kyphosis [de-identified] : multiple actinic keratoses present on chest [de-identified] : in wheelchair

## 2019-02-27 NOTE — RESULTS/DATA
[FreeTextEntry1] : Today's CBC (On 2/27/19) wbc 33.3 hb 9.5 plt 149\par \par On 2/20/19 wbc 70.3 Hb 10 plt 169\par On 2/12/19) wbc 75 Hb 10.2 plt 188\par On 1/28/19) wbc 58.5 ANC 55.4 ALC 1800 AMoC 1000 Eo 300 Ba 100 Hb 10.6 plt 186\par On 1/4/19 wbc 54.46 Hb 10.7 plt 215\par on 12/26/17 wbc 10.5 hb 10.5 plt 250\par ON 12/5/17 wbc 19.7 Hb 11.7 plt 352

## 2019-02-27 NOTE — HISTORY OF PRESENT ILLNESS
[de-identified] : Ms. Muir was referred to my office for evaluation of leukocytosis -noted when she saw her PCP on 1/4/19 (wbc 54k/ul). The patient is present here with her son Enrique who is helping related the medical history (patient suffers from dementia). According to the son, the patient had a 'sinusitis' on 1/4/19, had fevers/sinus pains, was prescribed Augmentin for 10 days which she has since finished. \par  [de-identified] : The patient is here for leukocytosis follow up. She is here for follow up of blood counts. At last blood check on 2/20/19, her Hydrea was increased to 500mg twice daily Thurs through Sunday, once daily Mon-Wed. She is tolerating it well, has no symptoms.

## 2019-02-27 NOTE — REVIEW OF SYSTEMS
[Fatigue] : fatigue [Recent Change In Weight] : ~T recent weight change [Easy Bruising] : a tendency for easy bruising [Negative] : Allergic/Immunologic [Fever] : no fever [Chills] : no chills [Dysphagia] : no dysphagia [Loss of Hearing] : no loss of hearing [Odynophagia] : no odynophagia [Chest Pain] : no chest pain [Palpitations] : no palpitations [Leg Claudication] : no intermittent leg claudication [Lower Ext Edema] : no lower extremity edema [Wheezing] : no wheezing [Cough] : no cough [SOB on Exertion] : no shortness of breath during exertion [Abdominal Pain] : no abdominal pain [Joint Pain] : no joint pain [Joint Stiffness] : no joint stiffness [Skin Rash] : no skin rash [Proptosis] : no proptosis [Muscle Weakness] : no muscle weakness [Easy Bleeding] : no tendency for easy bleeding [Swollen Glands] : no swollen glands [FreeTextEntry2] : lost 8 lbs over the last 6 months [de-identified] : s/p falls -in 2018 fell 2x, in Jan 2019 also fell. Has dementia

## 2019-02-28 ENCOUNTER — APPOINTMENT (OUTPATIENT)
Dept: INFUSION THERAPY | Facility: HOSPITAL | Age: 84
End: 2019-02-28

## 2019-02-28 LAB
BLUEBERRY IGG RAST: (no result)
BROCCOLI IGG RAST: SIGNIFICANT CHANGE UP
CABBAGE IGG RAST: SIGNIFICANT CHANGE UP
CARDAMON IGE RAST: SIGNIFICANT CHANGE UP
CARP IGE RAST: SIGNIFICANT CHANGE UP
CARROT IGG RAST: SIGNIFICANT CHANGE UP
CASEIN IGG RAST: SIGNIFICANT CHANGE UP
CAULIFLOWER IGG RAST: SIGNIFICANT CHANGE UP
MYE REFERENCES 2: SIGNIFICANT CHANGE UP
ONKOSIGHT MYELOID SEQUENCE: (no result)

## 2019-03-01 ENCOUNTER — APPOINTMENT (OUTPATIENT)
Dept: INFUSION THERAPY | Facility: HOSPITAL | Age: 84
End: 2019-03-01

## 2019-03-05 ENCOUNTER — CHART COPY (OUTPATIENT)
Age: 84
End: 2019-03-05

## 2019-03-05 ENCOUNTER — RESULT REVIEW (OUTPATIENT)
Age: 84
End: 2019-03-05

## 2019-03-05 ENCOUNTER — APPOINTMENT (OUTPATIENT)
Dept: INFUSION THERAPY | Facility: HOSPITAL | Age: 84
End: 2019-03-05

## 2019-03-05 LAB
EOSINOPHIL # BLD AUTO: 0.1 K/UL — SIGNIFICANT CHANGE UP (ref 0–0.5)
HCT VFR BLD CALC: 28.2 % — LOW (ref 34.5–45)
HGB BLD-MCNC: 9.7 G/DL — LOW (ref 11.5–15.5)
LYMPHOCYTES # BLD AUTO: 1 K/UL — SIGNIFICANT CHANGE UP (ref 1–3.3)
LYMPHOCYTES # BLD AUTO: 2 % — LOW (ref 13–44)
MCHC RBC-ENTMCNC: 34.5 G/DL — SIGNIFICANT CHANGE UP (ref 32–36)
MCHC RBC-ENTMCNC: 34.8 PG — HIGH (ref 27–34)
MCV RBC AUTO: 101 FL — HIGH (ref 80–100)
MONOCYTES # BLD AUTO: 0.2 K/UL — SIGNIFICANT CHANGE UP (ref 0–0.9)
MONOCYTES NFR BLD AUTO: 3 % — SIGNIFICANT CHANGE UP (ref 2–14)
NEUTROPHILS # BLD AUTO: 22.2 K/UL — HIGH (ref 1.8–7.4)
NEUTROPHILS NFR BLD AUTO: 95 % — HIGH (ref 43–77)
PLAT MORPH BLD: NORMAL — SIGNIFICANT CHANGE UP
PLATELET # BLD AUTO: 140 K/UL — LOW (ref 150–400)
RBC # BLD: 2.79 M/UL — LOW (ref 3.8–5.2)
RBC # FLD: 21.4 % — HIGH (ref 10.3–14.5)
RBC BLD AUTO: SIGNIFICANT CHANGE UP
WBC # BLD: 23.7 K/UL — HIGH (ref 3.8–10.5)
WBC # FLD AUTO: 23.7 K/UL — HIGH (ref 3.8–10.5)

## 2019-03-06 ENCOUNTER — APPOINTMENT (OUTPATIENT)
Dept: HEMATOLOGY ONCOLOGY | Facility: CLINIC | Age: 84
End: 2019-03-06

## 2019-03-06 DIAGNOSIS — D46.9 MYELODYSPLASTIC SYNDROME, UNSPECIFIED: ICD-10-CM

## 2019-03-08 ENCOUNTER — OUTPATIENT (OUTPATIENT)
Dept: OUTPATIENT SERVICES | Facility: HOSPITAL | Age: 84
LOS: 1 days | Discharge: HOME | End: 2019-03-08

## 2019-03-08 DIAGNOSIS — D72.89 OTHER SPECIFIED DISORDERS OF WHITE BLOOD CELLS: ICD-10-CM

## 2019-03-19 ENCOUNTER — APPOINTMENT (OUTPATIENT)
Dept: INFUSION THERAPY | Facility: HOSPITAL | Age: 84
End: 2019-03-19

## 2019-03-19 ENCOUNTER — RESULT REVIEW (OUTPATIENT)
Age: 84
End: 2019-03-19

## 2019-03-19 LAB
BASOPHILS # BLD AUTO: 0 K/UL — SIGNIFICANT CHANGE UP (ref 0–0.2)
BASOPHILS NFR BLD AUTO: 0.1 % — SIGNIFICANT CHANGE UP (ref 0–2)
EOSINOPHIL # BLD AUTO: 0 K/UL — SIGNIFICANT CHANGE UP (ref 0–0.5)
EOSINOPHIL NFR BLD AUTO: 0 % — SIGNIFICANT CHANGE UP (ref 0–6)
HCT VFR BLD CALC: 27.1 % — LOW (ref 34.5–45)
HGB BLD-MCNC: 9.8 G/DL — LOW (ref 11.5–15.5)
LYMPHOCYTES # BLD AUTO: 0.8 K/UL — LOW (ref 1–3.3)
LYMPHOCYTES # BLD AUTO: 6 % — LOW (ref 13–44)
MCHC RBC-ENTMCNC: 36.3 G/DL — HIGH (ref 32–36)
MCHC RBC-ENTMCNC: 39.1 PG — HIGH (ref 27–34)
MCV RBC AUTO: 108 FL — HIGH (ref 80–100)
MONOCYTES # BLD AUTO: 0.4 K/UL — SIGNIFICANT CHANGE UP (ref 0–0.9)
MONOCYTES NFR BLD AUTO: 2.7 % — SIGNIFICANT CHANGE UP (ref 2–14)
NEUTROPHILS # BLD AUTO: 12.6 K/UL — HIGH (ref 1.8–7.4)
NEUTROPHILS NFR BLD AUTO: 91.2 % — HIGH (ref 43–77)
PLATELET # BLD AUTO: 79 K/UL — LOW (ref 150–400)
RBC # BLD: 2.52 M/UL — LOW (ref 3.8–5.2)
RBC # FLD: 22.3 % — HIGH (ref 10.3–14.5)
WBC # BLD: 13.8 K/UL — HIGH (ref 3.8–10.5)
WBC # FLD AUTO: 13.8 K/UL — HIGH (ref 3.8–10.5)

## 2019-03-20 ENCOUNTER — APPOINTMENT (OUTPATIENT)
Dept: HEMATOLOGY ONCOLOGY | Facility: CLINIC | Age: 84
End: 2019-03-20

## 2019-03-20 DIAGNOSIS — D46.9 MYELODYSPLASTIC SYNDROME, UNSPECIFIED: ICD-10-CM

## 2019-03-26 ENCOUNTER — RESULT REVIEW (OUTPATIENT)
Age: 84
End: 2019-03-26

## 2019-03-26 ENCOUNTER — APPOINTMENT (OUTPATIENT)
Dept: HEMATOLOGY ONCOLOGY | Facility: CLINIC | Age: 84
End: 2019-03-26
Payer: MEDICARE

## 2019-03-26 VITALS
HEART RATE: 74 BPM | TEMPERATURE: 97.8 F | WEIGHT: 104.72 LBS | BODY MASS INDEX: 20.56 KG/M2 | SYSTOLIC BLOOD PRESSURE: 145 MMHG | OXYGEN SATURATION: 100 % | RESPIRATION RATE: 16 BRPM | DIASTOLIC BLOOD PRESSURE: 77 MMHG

## 2019-03-26 DIAGNOSIS — D46.9 MYELODYSPLASTIC SYNDROME, UNSPECIFIED: ICD-10-CM

## 2019-03-26 LAB
BASOPHILS # BLD AUTO: 0.1 K/UL — SIGNIFICANT CHANGE UP (ref 0–0.2)
BASOPHILS NFR BLD AUTO: 0.3 % — SIGNIFICANT CHANGE UP (ref 0–2)
EOSINOPHIL # BLD AUTO: 0.1 K/UL — SIGNIFICANT CHANGE UP (ref 0–0.5)
EOSINOPHIL NFR BLD AUTO: 0.7 % — SIGNIFICANT CHANGE UP (ref 0–6)
HCT VFR BLD CALC: 30.1 % — LOW (ref 34.5–45)
HGB BLD-MCNC: 10.6 G/DL — LOW (ref 11.5–15.5)
LYMPHOCYTES # BLD AUTO: 1 K/UL — SIGNIFICANT CHANGE UP (ref 1–3.3)
LYMPHOCYTES # BLD AUTO: 5.6 % — LOW (ref 13–44)
MCHC RBC-ENTMCNC: 35.2 G/DL — SIGNIFICANT CHANGE UP (ref 32–36)
MCHC RBC-ENTMCNC: 38.8 PG — HIGH (ref 27–34)
MCV RBC AUTO: 110 FL — HIGH (ref 80–100)
MONOCYTES # BLD AUTO: 0.4 K/UL — SIGNIFICANT CHANGE UP (ref 0–0.9)
MONOCYTES NFR BLD AUTO: 2.2 % — SIGNIFICANT CHANGE UP (ref 2–14)
NEUTROPHILS # BLD AUTO: 15.6 K/UL — HIGH (ref 1.8–7.4)
NEUTROPHILS NFR BLD AUTO: 91.1 % — HIGH (ref 43–77)
PLATELET # BLD AUTO: 67 K/UL — LOW (ref 150–400)
RBC # BLD: 2.73 M/UL — LOW (ref 3.8–5.2)
RBC # FLD: 21.7 % — HIGH (ref 10.3–14.5)
WBC # BLD: 17.1 K/UL — HIGH (ref 3.8–10.5)
WBC # FLD AUTO: 17.1 K/UL — HIGH (ref 3.8–10.5)

## 2019-03-26 PROCEDURE — 99213 OFFICE O/P EST LOW 20 MIN: CPT

## 2019-03-26 NOTE — ASSESSMENT
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 Repeat counts in 1 week\par \par -anemia improved on Aranesp -last given on 3/19/19. Hb 10.6g/dl today\par \par -cont Allopurinol 300mg po daily\par \par -follow up in 4 wks

## 2019-03-26 NOTE — REVIEW OF SYSTEMS
[Fatigue] : fatigue [Recent Change In Weight] : ~T recent weight change [Easy Bruising] : a tendency for easy bruising [Negative] : Allergic/Immunologic [Fever] : no fever [Chills] : no chills [Dysphagia] : no dysphagia [Loss of Hearing] : no loss of hearing [Odynophagia] : no odynophagia [Chest Pain] : no chest pain [Palpitations] : no palpitations [Leg Claudication] : no intermittent leg claudication [Lower Ext Edema] : no lower extremity edema [Wheezing] : no wheezing [Cough] : no cough [SOB on Exertion] : no shortness of breath during exertion [Abdominal Pain] : no abdominal pain [Joint Pain] : no joint pain [Joint Stiffness] : no joint stiffness [Skin Rash] : no skin rash [Proptosis] : no proptosis [Muscle Weakness] : no muscle weakness [Easy Bleeding] : no tendency for easy bleeding [Swollen Glands] : no swollen glands [FreeTextEntry2] : lost 8 lbs over the last 6 months [de-identified] : s/p falls -in 2018 fell 2x, in Jan 2019 also fell. Has dementia

## 2019-03-26 NOTE — HISTORY OF PRESENT ILLNESS
[de-identified] : Ms. Muir was referred to my office for evaluation of leukocytosis -noted when she saw her PCP on 1/4/19 (wbc 54k/ul). The patient is present here with her son Enrique who is helping related the medical history (patient suffers from dementia). According to the son, the patient had a 'sinusitis' on 1/4/19, had fevers/sinus pains, was prescribed Augmentin for 10 days which she has since finished. \par  [de-identified] : The patient is here for leukocytosis follow up. She is here for follow up of blood counts. At last blood check on 3/19/19, her Hydrea was decreased to 500mg twice daily Frid through Sunday, once daily Mon-Thursday She is tolerating it well, has no symptoms.

## 2019-04-02 ENCOUNTER — RESULT REVIEW (OUTPATIENT)
Age: 84
End: 2019-04-02

## 2019-04-02 ENCOUNTER — APPOINTMENT (OUTPATIENT)
Dept: INFUSION THERAPY | Facility: HOSPITAL | Age: 84
End: 2019-04-02

## 2019-04-02 LAB
BASOPHILS # BLD AUTO: 0 K/UL — SIGNIFICANT CHANGE UP (ref 0–0.2)
DOHLE BOD BLD QL SMEAR: PRESENT — SIGNIFICANT CHANGE UP
EOSINOPHIL # BLD AUTO: 0 K/UL — SIGNIFICANT CHANGE UP (ref 0–0.5)
HCT VFR BLD CALC: 30.7 % — LOW (ref 34.5–45)
HGB BLD-MCNC: 11.1 G/DL — LOW (ref 11.5–15.5)
LYMPHOCYTES # BLD AUTO: 1.4 K/UL — SIGNIFICANT CHANGE UP (ref 1–3.3)
LYMPHOCYTES # BLD AUTO: 7 % — LOW (ref 13–44)
MCHC RBC-ENTMCNC: 36.1 G/DL — HIGH (ref 32–36)
MCHC RBC-ENTMCNC: 40.6 PG — HIGH (ref 27–34)
MCV RBC AUTO: 113 FL — HIGH (ref 80–100)
MONOCYTES # BLD AUTO: 0.4 K/UL — SIGNIFICANT CHANGE UP (ref 0–0.9)
MONOCYTES NFR BLD AUTO: 4 % — SIGNIFICANT CHANGE UP (ref 2–14)
NEUTROPHILS # BLD AUTO: 23.3 K/UL — HIGH (ref 1.8–7.4)
NEUTROPHILS NFR BLD AUTO: 80 % — HIGH (ref 43–77)
NEUTS BAND # BLD: 9 % — HIGH (ref 0–8)
PLAT MORPH BLD: NORMAL — SIGNIFICANT CHANGE UP
PLATELET # BLD AUTO: 133 K/UL — LOW (ref 150–400)
RBC # BLD: 2.73 M/UL — LOW (ref 3.8–5.2)
RBC # FLD: 21.7 % — HIGH (ref 10.3–14.5)
RBC BLD AUTO: SIGNIFICANT CHANGE UP
WBC # BLD: 25.1 K/UL — HIGH (ref 3.8–10.5)
WBC # FLD AUTO: 25.1 K/UL — HIGH (ref 3.8–10.5)

## 2019-04-03 ENCOUNTER — OUTPATIENT (OUTPATIENT)
Dept: OUTPATIENT SERVICES | Facility: HOSPITAL | Age: 84
LOS: 1 days | Discharge: HOME | End: 2019-04-03

## 2019-04-03 ENCOUNTER — APPOINTMENT (OUTPATIENT)
Dept: ORTHOPEDIC SURGERY | Facility: CLINIC | Age: 84
End: 2019-04-03
Payer: MEDICARE

## 2019-04-03 VITALS
DIASTOLIC BLOOD PRESSURE: 68 MMHG | BODY MASS INDEX: 20.96 KG/M2 | WEIGHT: 104 LBS | HEART RATE: 85 BPM | SYSTOLIC BLOOD PRESSURE: 127 MMHG | HEIGHT: 59 IN

## 2019-04-03 DIAGNOSIS — M40.202 UNSPECIFIED KYPHOSIS, CERVICAL REGION: ICD-10-CM

## 2019-04-03 DIAGNOSIS — D47.1 CHRONIC MYELOPROLIFERATIVE DISEASE: ICD-10-CM

## 2019-04-03 DIAGNOSIS — D72.89 OTHER SPECIFIED DISORDERS OF WHITE BLOOD CELLS: ICD-10-CM

## 2019-04-03 DIAGNOSIS — D46.9 MYELODYSPLASTIC SYNDROME, UNSPECIFIED: ICD-10-CM

## 2019-04-03 DIAGNOSIS — D72.829 ELEVATED WHITE BLOOD CELL COUNT, UNSPECIFIED: ICD-10-CM

## 2019-04-03 PROCEDURE — 99214 OFFICE O/P EST MOD 30 MIN: CPT

## 2019-04-08 ENCOUNTER — APPOINTMENT (OUTPATIENT)
Dept: HEMATOLOGY ONCOLOGY | Facility: CLINIC | Age: 84
End: 2019-04-08

## 2019-04-16 ENCOUNTER — APPOINTMENT (OUTPATIENT)
Dept: INFUSION THERAPY | Facility: HOSPITAL | Age: 84
End: 2019-04-16

## 2019-04-16 ENCOUNTER — RESULT REVIEW (OUTPATIENT)
Age: 84
End: 2019-04-16

## 2019-04-16 LAB
BASOPHILS # BLD AUTO: 0 K/UL — SIGNIFICANT CHANGE UP (ref 0–0.2)
BASOPHILS NFR BLD AUTO: 0.2 % — SIGNIFICANT CHANGE UP (ref 0–2)
EOSINOPHIL # BLD AUTO: 0 K/UL — SIGNIFICANT CHANGE UP (ref 0–0.5)
EOSINOPHIL NFR BLD AUTO: 0 % — SIGNIFICANT CHANGE UP (ref 0–6)
HCT VFR BLD CALC: 32.4 % — LOW (ref 34.5–45)
HGB BLD-MCNC: 11.3 G/DL — LOW (ref 11.5–15.5)
LYMPHOCYTES # BLD AUTO: 1.3 K/UL — SIGNIFICANT CHANGE UP (ref 1–3.3)
LYMPHOCYTES # BLD AUTO: 6.1 % — LOW (ref 13–44)
MCHC RBC-ENTMCNC: 35 G/DL — SIGNIFICANT CHANGE UP (ref 32–36)
MCHC RBC-ENTMCNC: 39.8 PG — HIGH (ref 27–34)
MCV RBC AUTO: 114 FL — HIGH (ref 80–100)
MONOCYTES # BLD AUTO: 0.2 K/UL — SIGNIFICANT CHANGE UP (ref 0–0.9)
MONOCYTES NFR BLD AUTO: 1 % — LOW (ref 2–14)
NEUTROPHILS # BLD AUTO: 20 K/UL — HIGH (ref 1.8–7.4)
NEUTROPHILS NFR BLD AUTO: 92.7 % — HIGH (ref 43–77)
PLATELET # BLD AUTO: 98 K/UL — LOW (ref 150–400)
RBC # BLD: 2.85 M/UL — LOW (ref 3.8–5.2)
RBC # FLD: 18.9 % — HIGH (ref 10.3–14.5)
WBC # BLD: 21.6 K/UL — HIGH (ref 3.8–10.5)
WBC # FLD AUTO: 21.6 K/UL — HIGH (ref 3.8–10.5)

## 2019-04-30 ENCOUNTER — RESULT REVIEW (OUTPATIENT)
Age: 84
End: 2019-04-30

## 2019-04-30 ENCOUNTER — APPOINTMENT (OUTPATIENT)
Dept: INFUSION THERAPY | Facility: HOSPITAL | Age: 84
End: 2019-04-30

## 2019-04-30 LAB
EOSINOPHIL # BLD AUTO: 0 K/UL — SIGNIFICANT CHANGE UP (ref 0–0.5)
HCT VFR BLD CALC: 30.7 % — LOW (ref 34.5–45)
HGB BLD-MCNC: 11.1 G/DL — LOW (ref 11.5–15.5)
LYMPHOCYTES # BLD AUTO: 1.5 K/UL — SIGNIFICANT CHANGE UP (ref 1–3.3)
LYMPHOCYTES # BLD AUTO: 7 % — LOW (ref 13–44)
MCHC RBC-ENTMCNC: 36.2 G/DL — HIGH (ref 32–36)
MCHC RBC-ENTMCNC: 42 PG — HIGH (ref 27–34)
MCV RBC AUTO: 116 FL — HIGH (ref 80–100)
MONOCYTES # BLD AUTO: 0.4 K/UL — SIGNIFICANT CHANGE UP (ref 0–0.9)
MONOCYTES NFR BLD AUTO: 6 % — SIGNIFICANT CHANGE UP (ref 2–14)
NEUTROPHILS # BLD AUTO: 28.7 K/UL — HIGH (ref 1.8–7.4)
NEUTROPHILS NFR BLD AUTO: 85 % — HIGH (ref 43–77)
NEUTS BAND # BLD: 2 % — SIGNIFICANT CHANGE UP (ref 0–8)
PLAT MORPH BLD: NORMAL — SIGNIFICANT CHANGE UP
PLATELET # BLD AUTO: 193 K/UL — SIGNIFICANT CHANGE UP (ref 150–400)
RBC # BLD: 2.65 M/UL — LOW (ref 3.8–5.2)
RBC # FLD: 17.3 % — HIGH (ref 10.3–14.5)
RBC BLD AUTO: SIGNIFICANT CHANGE UP
WBC # BLD: 30.6 K/UL — HIGH (ref 3.8–10.5)
WBC # FLD AUTO: 30.6 K/UL — HIGH (ref 3.8–10.5)

## 2019-05-03 ENCOUNTER — OUTPATIENT (OUTPATIENT)
Dept: OUTPATIENT SERVICES | Facility: HOSPITAL | Age: 84
LOS: 1 days | Discharge: HOME | End: 2019-05-03

## 2019-05-03 ENCOUNTER — CHART COPY (OUTPATIENT)
Age: 84
End: 2019-05-03

## 2019-05-03 DIAGNOSIS — D72.89 OTHER SPECIFIED DISORDERS OF WHITE BLOOD CELLS: ICD-10-CM

## 2019-05-07 ENCOUNTER — APPOINTMENT (OUTPATIENT)
Dept: HEMATOLOGY ONCOLOGY | Facility: CLINIC | Age: 84
End: 2019-05-07
Payer: MEDICARE

## 2019-05-07 ENCOUNTER — RESULT REVIEW (OUTPATIENT)
Age: 84
End: 2019-05-07

## 2019-05-07 VITALS
OXYGEN SATURATION: 100 % | WEIGHT: 103.62 LBS | SYSTOLIC BLOOD PRESSURE: 151 MMHG | BODY MASS INDEX: 20.93 KG/M2 | HEART RATE: 72 BPM | TEMPERATURE: 98 F | DIASTOLIC BLOOD PRESSURE: 81 MMHG | RESPIRATION RATE: 16 BRPM

## 2019-05-07 DIAGNOSIS — D72.829 ELEVATED WHITE BLOOD CELL COUNT, UNSPECIFIED: ICD-10-CM

## 2019-05-07 LAB
BASOPHILS # BLD AUTO: 0 K/UL — SIGNIFICANT CHANGE UP (ref 0–0.2)
EOSINOPHIL # BLD AUTO: 0.1 K/UL — SIGNIFICANT CHANGE UP (ref 0–0.5)
HCT VFR BLD CALC: 30.1 % — LOW (ref 34.5–45)
HGB BLD-MCNC: 10.9 G/DL — LOW (ref 11.5–15.5)
LYMPHOCYTES # BLD AUTO: 1.3 K/UL — SIGNIFICANT CHANGE UP (ref 1–3.3)
LYMPHOCYTES # BLD AUTO: 5 % — LOW (ref 13–44)
MCHC RBC-ENTMCNC: 36.2 G/DL — HIGH (ref 32–36)
MCHC RBC-ENTMCNC: 42.2 PG — HIGH (ref 27–34)
MCV RBC AUTO: 117 FL — HIGH (ref 80–100)
MONOCYTES # BLD AUTO: 0.2 K/UL — SIGNIFICANT CHANGE UP (ref 0–0.9)
MONOCYTES NFR BLD AUTO: 2 % — SIGNIFICANT CHANGE UP (ref 2–14)
NEUTROPHILS # BLD AUTO: 26.1 K/UL — HIGH (ref 1.8–7.4)
NEUTROPHILS NFR BLD AUTO: 76 % — SIGNIFICANT CHANGE UP (ref 43–77)
NEUTS BAND # BLD: 17 % — HIGH (ref 0–8)
PLAT MORPH BLD: NORMAL — SIGNIFICANT CHANGE UP
PLATELET # BLD AUTO: 173 K/UL — SIGNIFICANT CHANGE UP (ref 150–400)
RBC # BLD: 2.58 M/UL — LOW (ref 3.8–5.2)
RBC # FLD: 16.2 % — HIGH (ref 10.3–14.5)
RBC BLD AUTO: SIGNIFICANT CHANGE UP
WBC # BLD: 27.7 K/UL — HIGH (ref 3.8–10.5)
WBC # FLD AUTO: 27.7 K/UL — HIGH (ref 3.8–10.5)

## 2019-05-07 PROCEDURE — 99213 OFFICE O/P EST LOW 20 MIN: CPT

## 2019-05-07 NOTE — HISTORY OF PRESENT ILLNESS
[de-identified] : Ms. Muir was referred to my office for evaluation of leukocytosis -noted when she saw her PCP on 1/4/19 (wbc 54k/ul). The patient is present here with her son Enrique who is helping related the medical history (patient suffers from dementia). According to the son, the patient had a 'sinusitis' on 1/4/19, had fevers/sinus pains, was prescribed Augmentin for 10 days which she has since finished. \par  [de-identified] : The patient is here for leukocytosis follow up. She is here for follow up of blood counts. At last blood check on 3/19/19, her Hydrea was decreased to 500mg twice daily Frid through Sunday, once daily Mon-Thursday She is tolerating it well, has no symptoms.

## 2019-05-07 NOTE — REVIEW OF SYSTEMS
[Recent Change In Weight] : ~T recent weight change [Fatigue] : fatigue [Easy Bruising] : a tendency for easy bruising [Negative] : Allergic/Immunologic [Fever] : no fever [Dysphagia] : no dysphagia [Chills] : no chills [Loss of Hearing] : no loss of hearing [Chest Pain] : no chest pain [Odynophagia] : no odynophagia [Palpitations] : no palpitations [Leg Claudication] : no intermittent leg claudication [Wheezing] : no wheezing [Cough] : no cough [Lower Ext Edema] : no lower extremity edema [SOB on Exertion] : no shortness of breath during exertion [Abdominal Pain] : no abdominal pain [Joint Pain] : no joint pain [Joint Stiffness] : no joint stiffness [Skin Rash] : no skin rash [Proptosis] : no proptosis [Muscle Weakness] : no muscle weakness [Easy Bleeding] : no tendency for easy bleeding [Swollen Glands] : no swollen glands [FreeTextEntry2] : lost 8 lbs over the last 6 months [de-identified] : s/p falls -in 2018 fell 2x, in Jan 2019 also fell. Has dementia

## 2019-05-07 NOTE — RESULTS/DATA
[FreeTextEntry1] : Today's CBC (ON 5/7/19) wbc 27.7 Hb 10.9 plt 173\par \par On 2/27/19) wbc 33.3 hb 9.5 plt 149\par On 2/20/19 wbc 70.3 Hb 10 plt 169\par On 2/12/19) wbc 75 Hb 10.2 plt 188\par On 1/28/19) wbc 58.5 ANC 55.4 ALC 1800 AMoC 1000 Eo 300 Ba 100 Hb 10.6 plt 186\par On 1/4/19 wbc 54.46 Hb 10.7 plt 215\par on 12/26/17 wbc 10.5 hb 10.5 plt 250\par ON 12/5/17 wbc 19.7 Hb 11.7 plt 352

## 2019-05-08 LAB
ALBUMIN SERPL ELPH-MCNC: 4.5 G/DL
ALP BLD-CCNC: 213 U/L
ALT SERPL-CCNC: 15 U/L
ANION GAP SERPL CALC-SCNC: 15 MMOL/L
AST SERPL-CCNC: 23 U/L
BILIRUB SERPL-MCNC: 0.6 MG/DL
BUN SERPL-MCNC: 20 MG/DL
CALCIUM SERPL-MCNC: 9.2 MG/DL
CHLORIDE SERPL-SCNC: 97 MMOL/L
CO2 SERPL-SCNC: 25 MMOL/L
CREAT SERPL-MCNC: 0.68 MG/DL
GLUCOSE SERPL-MCNC: 81 MG/DL
POTASSIUM SERPL-SCNC: 4.5 MMOL/L
PROT SERPL-MCNC: 6.3 G/DL
SODIUM SERPL-SCNC: 137 MMOL/L

## 2019-06-06 ENCOUNTER — OUTPATIENT (OUTPATIENT)
Dept: OUTPATIENT SERVICES | Facility: HOSPITAL | Age: 84
LOS: 1 days | Discharge: HOME | End: 2019-06-06

## 2019-06-06 DIAGNOSIS — D72.89 OTHER SPECIFIED DISORDERS OF WHITE BLOOD CELLS: ICD-10-CM

## 2019-06-11 ENCOUNTER — RESULT REVIEW (OUTPATIENT)
Age: 84
End: 2019-06-11

## 2019-06-11 ENCOUNTER — APPOINTMENT (OUTPATIENT)
Dept: INFUSION THERAPY | Facility: HOSPITAL | Age: 84
End: 2019-06-11

## 2019-06-11 LAB
BASOPHILS # BLD AUTO: 0.1 K/UL — SIGNIFICANT CHANGE UP (ref 0–0.2)
EOSINOPHIL # BLD AUTO: 0 K/UL — SIGNIFICANT CHANGE UP (ref 0–0.5)
EOSINOPHIL NFR BLD AUTO: 1 % — SIGNIFICANT CHANGE UP (ref 0–6)
HCT VFR BLD CALC: 28.3 % — LOW (ref 34.5–45)
HGB BLD-MCNC: 10.3 G/DL — LOW (ref 11.5–15.5)
LYMPHOCYTES # BLD AUTO: 1.1 K/UL — SIGNIFICANT CHANGE UP (ref 1–3.3)
LYMPHOCYTES # BLD AUTO: 6 % — LOW (ref 13–44)
MCHC RBC-ENTMCNC: 36.2 G/DL — HIGH (ref 32–36)
MCHC RBC-ENTMCNC: 42.6 PG — HIGH (ref 27–34)
MCV RBC AUTO: 118 FL — HIGH (ref 80–100)
MONOCYTES # BLD AUTO: 0.6 K/UL — SIGNIFICANT CHANGE UP (ref 0–0.9)
MONOCYTES NFR BLD AUTO: 2 % — SIGNIFICANT CHANGE UP (ref 2–14)
NEUTROPHILS # BLD AUTO: 25.4 K/UL — HIGH (ref 1.8–7.4)
NEUTROPHILS NFR BLD AUTO: 85 % — HIGH (ref 43–77)
NEUTS BAND # BLD: 6 % — SIGNIFICANT CHANGE UP (ref 0–8)
PLAT MORPH BLD: NORMAL — SIGNIFICANT CHANGE UP
PLATELET # BLD AUTO: 160 K/UL — SIGNIFICANT CHANGE UP (ref 150–400)
RBC # BLD: 2.41 M/UL — LOW (ref 3.8–5.2)
RBC # FLD: 14.4 % — SIGNIFICANT CHANGE UP (ref 10.3–14.5)
RBC BLD AUTO: SIGNIFICANT CHANGE UP
WBC # BLD: 27.1 K/UL — HIGH (ref 3.8–10.5)
WBC # FLD AUTO: 27.1 K/UL — HIGH (ref 3.8–10.5)

## 2019-06-25 ENCOUNTER — APPOINTMENT (OUTPATIENT)
Dept: HEMATOLOGY ONCOLOGY | Facility: CLINIC | Age: 84
End: 2019-06-25

## 2019-06-25 ENCOUNTER — RESULT REVIEW (OUTPATIENT)
Age: 84
End: 2019-06-25

## 2019-06-25 ENCOUNTER — APPOINTMENT (OUTPATIENT)
Dept: INFUSION THERAPY | Facility: HOSPITAL | Age: 84
End: 2019-06-25

## 2019-06-25 LAB
EOSINOPHIL # BLD AUTO: 0 K/UL — SIGNIFICANT CHANGE UP (ref 0–0.5)
HCT VFR BLD CALC: 28.9 % — LOW (ref 34.5–45)
HGB BLD-MCNC: 10.2 G/DL — LOW (ref 11.5–15.5)
LYMPHOCYTES # BLD AUTO: 0.9 K/UL — LOW (ref 1–3.3)
LYMPHOCYTES # BLD AUTO: 3 % — LOW (ref 13–44)
MCHC RBC-ENTMCNC: 35.4 G/DL — SIGNIFICANT CHANGE UP (ref 32–36)
MCHC RBC-ENTMCNC: 42.8 PG — HIGH (ref 27–34)
MCV RBC AUTO: 121 FL — HIGH (ref 80–100)
MONOCYTES # BLD AUTO: 0.5 K/UL — SIGNIFICANT CHANGE UP (ref 0–0.9)
MONOCYTES NFR BLD AUTO: 5 % — SIGNIFICANT CHANGE UP (ref 2–14)
NEUTROPHILS # BLD AUTO: 25.1 K/UL — HIGH (ref 1.8–7.4)
NEUTROPHILS NFR BLD AUTO: 90 % — HIGH (ref 43–77)
NEUTS BAND # BLD: 2 % — SIGNIFICANT CHANGE UP (ref 0–8)
PLAT MORPH BLD: NORMAL — SIGNIFICANT CHANGE UP
PLATELET # BLD AUTO: 144 K/UL — LOW (ref 150–400)
RBC # BLD: 2.39 M/UL — LOW (ref 3.8–5.2)
RBC # FLD: 14.3 % — SIGNIFICANT CHANGE UP (ref 10.3–14.5)
RBC BLD AUTO: SIGNIFICANT CHANGE UP
WBC # BLD: 26.5 K/UL — HIGH (ref 3.8–10.5)
WBC # FLD AUTO: 26.5 K/UL — HIGH (ref 3.8–10.5)

## 2019-07-01 ENCOUNTER — CHART COPY (OUTPATIENT)
Age: 84
End: 2019-07-01

## 2019-07-03 ENCOUNTER — OUTPATIENT (OUTPATIENT)
Dept: OUTPATIENT SERVICES | Facility: HOSPITAL | Age: 84
LOS: 1 days | Discharge: HOME | End: 2019-07-03

## 2019-07-03 DIAGNOSIS — D72.89 OTHER SPECIFIED DISORDERS OF WHITE BLOOD CELLS: ICD-10-CM

## 2019-07-09 ENCOUNTER — RESULT REVIEW (OUTPATIENT)
Age: 84
End: 2019-07-09

## 2019-07-09 ENCOUNTER — APPOINTMENT (OUTPATIENT)
Dept: INFUSION THERAPY | Facility: HOSPITAL | Age: 84
End: 2019-07-09

## 2019-07-09 LAB
BASOPHILS # BLD AUTO: 0.1 K/UL — SIGNIFICANT CHANGE UP (ref 0–0.2)
EOSINOPHIL # BLD AUTO: 0 K/UL — SIGNIFICANT CHANGE UP (ref 0–0.5)
EOSINOPHIL NFR BLD AUTO: 2 % — SIGNIFICANT CHANGE UP (ref 0–6)
HCT VFR BLD CALC: 31.1 % — LOW (ref 34.5–45)
HGB BLD-MCNC: 10.3 G/DL — LOW (ref 11.5–15.5)
LYMPHOCYTES # BLD AUTO: 1.1 K/UL — SIGNIFICANT CHANGE UP (ref 1–3.3)
LYMPHOCYTES # BLD AUTO: 3 % — LOW (ref 13–44)
MCHC RBC-ENTMCNC: 33.1 G/DL — SIGNIFICANT CHANGE UP (ref 32–36)
MCHC RBC-ENTMCNC: 40.4 PG — HIGH (ref 27–34)
MCV RBC AUTO: 122 FL — HIGH (ref 80–100)
MONOCYTES # BLD AUTO: 0.1 K/UL — SIGNIFICANT CHANGE UP (ref 0–0.9)
MONOCYTES NFR BLD AUTO: 5 % — SIGNIFICANT CHANGE UP (ref 2–14)
NEUTROPHILS # BLD AUTO: 27.2 K/UL — HIGH (ref 1.8–7.4)
NEUTROPHILS NFR BLD AUTO: 85 % — HIGH (ref 43–77)
NEUTS BAND # BLD: 5 % — SIGNIFICANT CHANGE UP (ref 0–8)
PLAT MORPH BLD: NORMAL — SIGNIFICANT CHANGE UP
PLATELET # BLD AUTO: 164 K/UL — SIGNIFICANT CHANGE UP (ref 150–400)
RBC # BLD: 2.55 M/UL — LOW (ref 3.8–5.2)
RBC # FLD: 13.6 % — SIGNIFICANT CHANGE UP (ref 10.3–14.5)
RBC BLD AUTO: SIGNIFICANT CHANGE UP
WBC # BLD: 28.4 K/UL — HIGH (ref 3.8–10.5)
WBC # FLD AUTO: 28.4 K/UL — HIGH (ref 3.8–10.5)

## 2019-07-16 ENCOUNTER — APPOINTMENT (OUTPATIENT)
Dept: INFUSION THERAPY | Facility: HOSPITAL | Age: 84
End: 2019-07-16

## 2019-07-16 ENCOUNTER — RESULT REVIEW (OUTPATIENT)
Age: 84
End: 2019-07-16

## 2019-07-16 LAB
BASOPHILS # BLD AUTO: 0 K/UL — SIGNIFICANT CHANGE UP (ref 0–0.2)
EOSINOPHIL # BLD AUTO: 0.2 K/UL — SIGNIFICANT CHANGE UP (ref 0–0.5)
EOSINOPHIL NFR BLD AUTO: 1 % — SIGNIFICANT CHANGE UP (ref 0–6)
HCT VFR BLD CALC: 30.8 % — LOW (ref 34.5–45)
HGB BLD-MCNC: 10.5 G/DL — LOW (ref 11.5–15.5)
LYMPHOCYTES # BLD AUTO: 1.2 K/UL — SIGNIFICANT CHANGE UP (ref 1–3.3)
LYMPHOCYTES # BLD AUTO: 7 % — LOW (ref 13–44)
MCHC RBC-ENTMCNC: 34.2 G/DL — SIGNIFICANT CHANGE UP (ref 32–36)
MCHC RBC-ENTMCNC: 41.7 PG — HIGH (ref 27–34)
MCV RBC AUTO: 122 FL — HIGH (ref 80–100)
MONOCYTES # BLD AUTO: 0.6 K/UL — SIGNIFICANT CHANGE UP (ref 0–0.9)
MONOCYTES NFR BLD AUTO: 5 % — SIGNIFICANT CHANGE UP (ref 2–14)
NEUTROPHILS # BLD AUTO: 27.7 K/UL — HIGH (ref 1.8–7.4)
NEUTROPHILS NFR BLD AUTO: 81 % — HIGH (ref 43–77)
NEUTS BAND # BLD: 6 % — SIGNIFICANT CHANGE UP (ref 0–8)
PLAT MORPH BLD: NORMAL — SIGNIFICANT CHANGE UP
PLATELET # BLD AUTO: 207 K/UL — SIGNIFICANT CHANGE UP (ref 150–400)
RBC # BLD: 2.52 M/UL — LOW (ref 3.8–5.2)
RBC # FLD: 14.3 % — SIGNIFICANT CHANGE UP (ref 10.3–14.5)
RBC BLD AUTO: SIGNIFICANT CHANGE UP
WBC # BLD: 29.9 K/UL — HIGH (ref 3.8–10.5)
WBC # FLD AUTO: 29.9 K/UL — HIGH (ref 3.8–10.5)

## 2019-08-09 ENCOUNTER — OUTPATIENT (OUTPATIENT)
Dept: OUTPATIENT SERVICES | Facility: HOSPITAL | Age: 84
LOS: 1 days | Discharge: HOME | End: 2019-08-09

## 2019-08-09 DIAGNOSIS — D72.89 OTHER SPECIFIED DISORDERS OF WHITE BLOOD CELLS: ICD-10-CM

## 2019-08-12 ENCOUNTER — RX RENEWAL (OUTPATIENT)
Age: 84
End: 2019-08-12

## 2019-08-13 ENCOUNTER — RESULT REVIEW (OUTPATIENT)
Age: 84
End: 2019-08-13

## 2019-08-13 ENCOUNTER — APPOINTMENT (OUTPATIENT)
Dept: INFUSION THERAPY | Facility: HOSPITAL | Age: 84
End: 2019-08-13

## 2019-08-13 ENCOUNTER — APPOINTMENT (OUTPATIENT)
Dept: HEMATOLOGY ONCOLOGY | Facility: CLINIC | Age: 84
End: 2019-08-13
Payer: MEDICARE

## 2019-08-13 VITALS
WEIGHT: 102.93 LBS | RESPIRATION RATE: 14 BRPM | TEMPERATURE: 98.3 F | DIASTOLIC BLOOD PRESSURE: 72 MMHG | HEART RATE: 72 BPM | BODY MASS INDEX: 20.79 KG/M2 | OXYGEN SATURATION: 98 % | SYSTOLIC BLOOD PRESSURE: 130 MMHG

## 2019-08-13 LAB
BASOPHILS # BLD AUTO: 0 K/UL — SIGNIFICANT CHANGE UP (ref 0–0.2)
BASOPHILS NFR BLD AUTO: 0.2 % — SIGNIFICANT CHANGE UP (ref 0–2)
EOSINOPHIL # BLD AUTO: 0.2 K/UL — SIGNIFICANT CHANGE UP (ref 0–0.5)
EOSINOPHIL NFR BLD AUTO: 0.7 % — SIGNIFICANT CHANGE UP (ref 0–6)
HCT VFR BLD CALC: 30.3 % — LOW (ref 34.5–45)
HGB BLD-MCNC: 10.3 G/DL — LOW (ref 11.5–15.5)
LYMPHOCYTES # BLD AUTO: 0.9 K/UL — LOW (ref 1–3.3)
LYMPHOCYTES # BLD AUTO: 3.5 % — LOW (ref 13–44)
MCHC RBC-ENTMCNC: 34 G/DL — SIGNIFICANT CHANGE UP (ref 32–36)
MCHC RBC-ENTMCNC: 41.4 PG — HIGH (ref 27–34)
MCV RBC AUTO: 122 FL — HIGH (ref 80–100)
MONOCYTES # BLD AUTO: 0.5 K/UL — SIGNIFICANT CHANGE UP (ref 0–0.9)
MONOCYTES NFR BLD AUTO: 1.9 % — LOW (ref 2–14)
NEUTROPHILS # BLD AUTO: 25.1 K/UL — HIGH (ref 1.8–7.4)
NEUTROPHILS NFR BLD AUTO: 93.7 % — HIGH (ref 43–77)
PLATELET # BLD AUTO: 177 K/UL — SIGNIFICANT CHANGE UP (ref 150–400)
RBC # BLD: 2.49 M/UL — LOW (ref 3.8–5.2)
RBC # FLD: 14.4 % — SIGNIFICANT CHANGE UP (ref 10.3–14.5)
WBC # BLD: 26.8 K/UL — HIGH (ref 3.8–10.5)
WBC # FLD AUTO: 26.8 K/UL — HIGH (ref 3.8–10.5)

## 2019-08-13 PROCEDURE — 99214 OFFICE O/P EST MOD 30 MIN: CPT

## 2019-08-13 NOTE — ASSESSMENT
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 Refills given today\par \par -anemia improved on Aranesp -last given on 3/19/19. Hb 10.3g/dl today\par \par -cont Allopurinol 300mg po daily\par \par -follow up in 3 months\par \par

## 2019-08-13 NOTE — RESULTS/DATA
[FreeTextEntry1] : Today's CBC (ON 8/13/19) wbc 26.8 Hb 10.3 plt 177\par \par ON 5/7/19) wbc 27.7 Hb 10.9 plt 173\par On 2/27/19) wbc 33.3 hb 9.5 plt 149\par On 2/20/19 wbc 70.3 Hb 10 plt 169\par On 2/12/19) wbc 75 Hb 10.2 plt 188\par On 1/28/19) wbc 58.5 ANC 55.4 ALC 1800 AMoC 1000 Eo 300 Ba 100 Hb 10.6 plt 186\par On 1/4/19 wbc 54.46 Hb 10.7 plt 215\par on 12/26/17 wbc 10.5 hb 10.5 plt 250\par ON 12/5/17 wbc 19.7 Hb 11.7 plt 352

## 2019-08-13 NOTE — REVIEW OF SYSTEMS
[Fatigue] : fatigue [Recent Change In Weight] : ~T recent weight change [Easy Bruising] : a tendency for easy bruising [Negative] : Endocrine [Fever] : no fever [Chills] : no chills [Dysphagia] : no dysphagia [Loss of Hearing] : no loss of hearing [Odynophagia] : no odynophagia [Chest Pain] : no chest pain [Palpitations] : no palpitations [Leg Claudication] : no intermittent leg claudication [Lower Ext Edema] : no lower extremity edema [Wheezing] : no wheezing [Cough] : no cough [SOB on Exertion] : no shortness of breath during exertion [Abdominal Pain] : no abdominal pain [Joint Pain] : no joint pain [Joint Stiffness] : no joint stiffness [Skin Rash] : no skin rash [Proptosis] : no proptosis [Muscle Weakness] : no muscle weakness [Easy Bleeding] : no tendency for easy bleeding [Swollen Glands] : no swollen glands [de-identified] : s/p falls -in 2018 fell 2x, in Jan 2019 also fell. Has dementia [FreeTextEntry2] : lost 8 lbs over the last 6 months

## 2019-08-13 NOTE — PHYSICAL EXAM
[Ambulatory and capable of all self care but unable to carry out any work activities] : Status 2- Ambulatory and capable of all self care but unable to carry out any work activities. Up and about more than 50% of waking hours [Thin] : thin [Ulcers] : no ulcers [Normal] : affect appropriate [de-identified] : significant foot/ankle edema b/l, chronic, wears supportive stockings [de-identified] : few crackles at bases b/l, improved after coughing [de-identified] : distended [de-identified] : bruising, no wounds [de-identified] : severe kyphosis

## 2019-08-13 NOTE — HISTORY OF PRESENT ILLNESS
[de-identified] : Ms. Muir was referred to my office for evaluation of leukocytosis -noted when she saw her PCP on 1/4/19 (wbc 54k/ul). The patient is present here with her son Enrique who is helping related the medical history (patient suffers from dementia). According to the son, the patient had a 'sinusitis' on 1/4/19, had fevers/sinus pains, was prescribed Augmentin for 10 days which she has since finished. \par  [de-identified] : The patient is here for leukocytosis follow up. She is here for follow up of blood counts. She has been taking Hydrea was  500mg twice daily Fri through Sunday, once daily Mon-Thursday She is tolerating it well, has no symptoms. She has no wounds.

## 2019-09-04 ENCOUNTER — CHART COPY (OUTPATIENT)
Age: 84
End: 2019-09-04

## 2019-09-06 NOTE — ED PROVIDER NOTE - CROS ED GU ALL NEG
eMERGENCY dEPARTMENT eNCOUnter      Pt Name: Meng Santos  MRN: [id number]  [place] 1998  Date of evaluation: 9/6/2019  Provider: Liana Regalado MD     [address] [place] [address]       Chief Complaint   Patient presents with    Laceration     to left index finger approx 1030 this am- taking down drywall and cut fonger on metal bracket- not wearing gloves         HISTORY OF PRESENT ILLNESS   (Location/Symptom, Timing/Onset,Context/Setting, Quality, Duration, Modifying Factors, Severity) Note limiting factors. HPI    Meng Santos is a 24 y.o. male who presents to the emergency department with a left index finger avulsion laceration that started at work today. Patient states he was picking up some metal scrap when he was removing some drywall and 1 of the sharp edges cut his left index finger. There was some bleeding initially. This was work-related. He has a 2 cm avulsion flap laceration on the radial aspect of the index finger. Bleeding is controlled on arrival.  Patient denies any numbness no tingling sensation. There was no tendon involvement. Nursing Notes were reviewed. REVIEW OFSYSTEMS    (2+ for level 4; 10+ for level 5)   Review of Systems    General: No fevers, chills or night sweats, No weight loss    Head:  No Sore throat,  No Ear Pain    Chest:  Nontender. No Cough, No SOB,  Chest Pain    GI: No abdominal pain or vomiting    : No dysuria or hematuria    Musculoskeletal: No unrelenting pain or night pain    Neurologic: No bowel or bladder incontinence, No saddle anesthesia, No leg weakness    All other systems reviewed and are negative. PAST MEDICAL HISTORY     Past Medical History:   Diagnosis Date    ADHD (attention deficit hyperactivity disorder)     Asthma        SURGICAL HISTORY     History reviewed. No pertinent surgical history.     CURRENT MEDICATIONS       Previous Medications    MELATONIN 3 MG TABS TABLET    Take 3 mg by mouth daily    VENTOLIN  (90 BASE) rate and rhythm with no murmurs rubs or gallops  Pulmonary: Lungs are clear in all lung fields. No wheezing. No Rales. Abdomen: Soft and nontender. Negative hepatosplenomegaly. Bowel sounds are active  Extremities: Moving all extremities. No calf tenderness. Peripheral pulses all intact. 2 cm avulsion flap laceration noted. Superficial.  No tendon involvement. Sensory exam was normal.  No bleeding. Skin: No skin lesions. No rashes  Neurologic: Cranial nerves II through XII was grossly intact. Nonfocal neurological exam  Psychiatric: Patient is pleasant. Mood is appropriate. DIAGNOSTIC RESULTS     EKG (Per Emergency Physician):     RADIOLOGY (Per Emergency Physician): Interpretation per the Radiologist below, if available at the time of this note:  No results found. ED BEDSIDE ULTRASOUND:   Performed by ED Physician - none    LABS:  Labs Reviewed - No data to display     All other labs were within normal range or not returned as of this dictation. Lac Repair  Date/Time: 9/6/2019 11:28 AM  Performed by: Micaela Dozier MD  Authorized by: Micaela Dozier MD     Consent:     Consent obtained:  Verbal    Consent given by:  Patient    Risks discussed:  Infection and pain    Alternatives discussed:  No treatment  Anesthesia (see MAR for exact dosages):      Anesthesia method:  None  Laceration details:     Location:  Finger    Finger location:  L index finger    Length (cm):  2    Depth (mm):  2  Repair type:     Repair type:  Simple  Pre-procedure details:     Preparation:  Patient was prepped and draped in usual sterile fashion  Exploration:     Hemostasis achieved with:  Direct pressure    Wound exploration: wound explored through full range of motion      Contaminated: no    Treatment:     Area cleansed with:  Narendra-Lida    Amount of cleaning:  Standard    Irrigation solution:  Sterile saline    Irrigation method:  Syringe    Visualized foreign bodies/material removed: no    Skin repair: negative...

## 2019-11-11 ENCOUNTER — CHART COPY (OUTPATIENT)
Age: 84
End: 2019-11-11

## 2019-11-11 ENCOUNTER — OUTPATIENT (OUTPATIENT)
Dept: OUTPATIENT SERVICES | Facility: HOSPITAL | Age: 84
LOS: 1 days | Discharge: HOME | End: 2019-11-11

## 2019-11-11 DIAGNOSIS — D72.89 OTHER SPECIFIED DISORDERS OF WHITE BLOOD CELLS: ICD-10-CM

## 2019-11-19 ENCOUNTER — APPOINTMENT (OUTPATIENT)
Dept: HEMATOLOGY ONCOLOGY | Facility: CLINIC | Age: 84
End: 2019-11-19

## 2019-12-04 ENCOUNTER — CHART COPY (OUTPATIENT)
Age: 84
End: 2019-12-04

## 2019-12-12 ENCOUNTER — RX RENEWAL (OUTPATIENT)
Age: 84
End: 2019-12-12

## 2020-01-01 ENCOUNTER — APPOINTMENT (OUTPATIENT)
Dept: FAMILY MEDICINE | Facility: CLINIC | Age: 85
End: 2020-01-01

## 2020-01-01 ENCOUNTER — NON-APPOINTMENT (OUTPATIENT)
Age: 85
End: 2020-01-01

## 2020-01-09 ENCOUNTER — APPOINTMENT (OUTPATIENT)
Dept: CARDIOLOGY | Facility: CLINIC | Age: 85
End: 2020-01-09
Payer: MEDICARE

## 2020-01-09 ENCOUNTER — NON-APPOINTMENT (OUTPATIENT)
Age: 85
End: 2020-01-09

## 2020-01-09 VITALS
RESPIRATION RATE: 16 BRPM | HEART RATE: 74 BPM | DIASTOLIC BLOOD PRESSURE: 96 MMHG | BODY MASS INDEX: 21.57 KG/M2 | HEIGHT: 59 IN | OXYGEN SATURATION: 100 % | WEIGHT: 107 LBS | SYSTOLIC BLOOD PRESSURE: 164 MMHG

## 2020-01-09 VITALS — DIASTOLIC BLOOD PRESSURE: 82 MMHG | HEART RATE: 84 BPM | SYSTOLIC BLOOD PRESSURE: 120 MMHG

## 2020-01-09 PROCEDURE — 93000 ELECTROCARDIOGRAM COMPLETE: CPT

## 2020-01-09 PROCEDURE — 99204 OFFICE O/P NEW MOD 45 MIN: CPT

## 2020-01-09 PROCEDURE — 93306 TTE W/DOPPLER COMPLETE: CPT

## 2020-01-09 NOTE — ASSESSMENT
[FreeTextEntry1] : The patient was referred to the echocardiography laboratory. Echocardiogram revealed normal left ventricular systolic function and no evidence of significant valvular stenosis or insufficiency.\par \par In summary, the patient is an elderly woman with bilateral nonpitting edema was probably in keeping with venous insufficiency. Diastolic dysfunction causing CHF is much less likely.\par \par For now I have reinforced that she should undergo bilateral Dopplers. She will continue with support stockings and leg elevation. Should she still have difficulty with leg edema could at some point consider diuretics although these often are not efficacious and may result in significant mitral abnormalities and dehydration.\par \par Discussed the case with the patient's son by phone

## 2020-01-09 NOTE — PHYSICAL EXAM
[General Appearance - Well Developed] : well developed [Well Groomed] : well groomed [General Appearance - Well Nourished] : well nourished [Normal Appearance] : normal appearance [No Deformities] : no deformities [General Appearance - In No Acute Distress] : no acute distress [Normal Oral Mucosa] : normal oral mucosa [No Oral Cyanosis] : no oral cyanosis [No Oral Pallor] : no oral pallor [Normal Jugular Venous V Waves Present] : normal jugular venous V waves present [No Jugular Venous Lan A Waves] : no jugular venous lan A waves [Normal Jugular Venous A Waves Present] : normal jugular venous A waves present [Normal S2] : normal S2 [Normal Rate] : normal [Normal S1] : normal S1 [No Murmur] : no murmurs heard [2+] : left 2+ [No Abnormalities] : the abdominal aorta was not enlarged and no bruit was heard [Nonpitting Edema] : nonpitting edema present [Auscultation Breath Sounds / Voice Sounds] : lungs were clear to auscultation bilaterally [Respiration, Rhythm And Depth] : normal respiratory rhythm and effort [Exaggerated Use Of Accessory Muscles For Inspiration] : no accessory muscle use [Abdomen Tenderness] : non-tender [Abdomen Mass (___ Cm)] : no abdominal mass palpated [Abdomen Soft] : soft [Gait - Sufficient For Exercise Testing] : the gait was sufficient for exercise testing [Abnormal Walk] : normal gait [Nail Clubbing] : no clubbing of the fingernails [Cyanosis, Localized] : no localized cyanosis [Petechial Hemorrhages (___cm)] : no petechial hemorrhages [Skin Color & Pigmentation] : normal skin color and pigmentation [] : no rash [No Venous Stasis] : no venous stasis [Skin Lesions] : no skin lesions [Oriented To Time, Place, And Person] : oriented to person, place, and time [No Skin Ulcers] : no skin ulcer [No Xanthoma] : no  xanthoma was observed [Affect] : the affect was normal [No Anxiety] : not feeling anxious [Mood] : the mood was normal [S3] : no S3 [Right Carotid Bruit] : no bruit heard over the right carotid [S4] : no S4 [Left Femoral Bruit] : no bruit heard over the left femoral artery [Right Femoral Bruit] : no bruit heard over the right femoral artery [Left Carotid Bruit] : no bruit heard over the left carotid

## 2020-01-09 NOTE — REASON FOR VISIT
[Consultation] : a consultation regarding [FreeTextEntry1] : 89-year-old woman referred for cardiac evaluation for leg edema. The patient is scheduled to have a vascular study with Dr. Rosado next week. The patient states she has had leg edema "forever". She wears support stockings. She does not notice whether they are better in the morning. She does not state that they are getting any worse. She denies any prior cardiac history. She specifically denies a history of coronary disease or heart failure. She denies hypertension diabetes smoking or alcohol use. She denies chest discomfort shortness of breath palpitations dizziness or syncope.

## 2020-01-14 ENCOUNTER — RX RENEWAL (OUTPATIENT)
Age: 85
End: 2020-01-14

## 2020-01-17 ENCOUNTER — OUTPATIENT (OUTPATIENT)
Dept: OUTPATIENT SERVICES | Facility: HOSPITAL | Age: 85
LOS: 1 days | Discharge: HOME | End: 2020-01-17

## 2020-01-17 DIAGNOSIS — D72.89 OTHER SPECIFIED DISORDERS OF WHITE BLOOD CELLS: ICD-10-CM

## 2020-01-23 ENCOUNTER — APPOINTMENT (OUTPATIENT)
Dept: HEMATOLOGY ONCOLOGY | Facility: CLINIC | Age: 85
End: 2020-01-23

## 2020-01-30 ENCOUNTER — RESULT REVIEW (OUTPATIENT)
Age: 85
End: 2020-01-30

## 2020-01-30 ENCOUNTER — APPOINTMENT (OUTPATIENT)
Dept: HEMATOLOGY ONCOLOGY | Facility: CLINIC | Age: 85
End: 2020-01-30

## 2020-01-30 LAB
BASOPHILS # BLD AUTO: 0 K/UL — SIGNIFICANT CHANGE UP (ref 0–0.2)
BASOPHILS NFR BLD AUTO: 0.2 % — SIGNIFICANT CHANGE UP (ref 0–2)
EOSINOPHIL # BLD AUTO: 0.3 K/UL — SIGNIFICANT CHANGE UP (ref 0–0.5)
EOSINOPHIL NFR BLD AUTO: 1.4 % — SIGNIFICANT CHANGE UP (ref 0–6)
HCT VFR BLD CALC: 29.9 % — LOW (ref 34.5–45)
HGB BLD-MCNC: 10.2 G/DL — LOW (ref 11.5–15.5)
LYMPHOCYTES # BLD AUTO: 0.9 K/UL — LOW (ref 1–3.3)
LYMPHOCYTES # BLD AUTO: 4.6 % — LOW (ref 13–44)
MCHC RBC-ENTMCNC: 34 G/DL — SIGNIFICANT CHANGE UP (ref 32–36)
MCHC RBC-ENTMCNC: 40 PG — HIGH (ref 27–34)
MCV RBC AUTO: 118 FL — HIGH (ref 80–100)
MONOCYTES # BLD AUTO: 0.3 K/UL — SIGNIFICANT CHANGE UP (ref 0–0.9)
MONOCYTES NFR BLD AUTO: 1.4 % — LOW (ref 2–14)
NEUTROPHILS # BLD AUTO: 17.6 K/UL — HIGH (ref 1.8–7.4)
NEUTROPHILS NFR BLD AUTO: 92.4 % — HIGH (ref 43–77)
PLATELET # BLD AUTO: 100 K/UL — LOW (ref 150–400)
RBC # BLD: 2.54 M/UL — LOW (ref 3.8–5.2)
RBC # FLD: 12.7 % — SIGNIFICANT CHANGE UP (ref 10.3–14.5)
WBC # BLD: 19 K/UL — HIGH (ref 3.8–10.5)
WBC # FLD AUTO: 19 K/UL — HIGH (ref 3.8–10.5)

## 2020-02-10 ENCOUNTER — RX RENEWAL (OUTPATIENT)
Age: 85
End: 2020-02-10

## 2020-02-11 ENCOUNTER — RESULT REVIEW (OUTPATIENT)
Age: 85
End: 2020-02-11

## 2020-02-11 ENCOUNTER — APPOINTMENT (OUTPATIENT)
Dept: HEMATOLOGY ONCOLOGY | Facility: CLINIC | Age: 85
End: 2020-02-11
Payer: MEDICARE

## 2020-02-11 VITALS
TEMPERATURE: 97.7 F | BODY MASS INDEX: 22.17 KG/M2 | HEART RATE: 68 BPM | SYSTOLIC BLOOD PRESSURE: 148 MMHG | WEIGHT: 109.79 LBS | RESPIRATION RATE: 17 BRPM | OXYGEN SATURATION: 98 % | DIASTOLIC BLOOD PRESSURE: 86 MMHG

## 2020-02-11 LAB
BASOPHILS # BLD AUTO: 0.1 K/UL — SIGNIFICANT CHANGE UP (ref 0–0.2)
BASOPHILS NFR BLD AUTO: 1 % — SIGNIFICANT CHANGE UP (ref 0–2)
EOSINOPHIL # BLD AUTO: 0.2 K/UL — SIGNIFICANT CHANGE UP (ref 0–0.5)
EOSINOPHIL NFR BLD AUTO: 1 % — SIGNIFICANT CHANGE UP (ref 0–6)
HCT VFR BLD CALC: 31.7 % — LOW (ref 34.5–45)
HGB BLD-MCNC: 10.9 G/DL — LOW (ref 11.5–15.5)
LYMPHOCYTES # BLD AUTO: 1.2 K/UL — SIGNIFICANT CHANGE UP (ref 1–3.3)
LYMPHOCYTES # BLD AUTO: 6 % — LOW (ref 13–44)
MCHC RBC-ENTMCNC: 34.4 G/DL — SIGNIFICANT CHANGE UP (ref 32–36)
MCHC RBC-ENTMCNC: 40.3 PG — HIGH (ref 27–34)
MCV RBC AUTO: 117 FL — HIGH (ref 80–100)
MONOCYTES # BLD AUTO: 0.5 K/UL — SIGNIFICANT CHANGE UP (ref 0–0.9)
MONOCYTES NFR BLD AUTO: 1 % — LOW (ref 2–14)
NEUTROPHILS # BLD AUTO: 22.6 K/UL — HIGH (ref 1.8–7.4)
NEUTROPHILS NFR BLD AUTO: 91 % — HIGH (ref 43–77)
PLAT MORPH BLD: NORMAL — SIGNIFICANT CHANGE UP
PLATELET # BLD AUTO: 246 K/UL — SIGNIFICANT CHANGE UP (ref 150–400)
RBC # BLD: 2.7 M/UL — LOW (ref 3.8–5.2)
RBC # FLD: 12.5 % — SIGNIFICANT CHANGE UP (ref 10.3–14.5)
RBC BLD AUTO: SIGNIFICANT CHANGE UP
WBC # BLD: 24.6 K/UL — HIGH (ref 3.8–10.5)
WBC # FLD AUTO: 24.6 K/UL — HIGH (ref 3.8–10.5)

## 2020-02-11 PROCEDURE — 99214 OFFICE O/P EST MOD 30 MIN: CPT

## 2020-02-13 NOTE — ASSESSMENT
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 \par \par -anemia improved on Aranesp -last given on 3/19/19. Hb 10.9 g/dl today\par \par -cont Allopurinol 300mg po daily\par \par -follow up in 3 months\par \par

## 2020-02-13 NOTE — PHYSICAL EXAM
[Ambulatory and capable of all self care but unable to carry out any work activities] : Status 2- Ambulatory and capable of all self care but unable to carry out any work activities. Up and about more than 50% of waking hours [Thin] : thin [Normal] : affect appropriate [Ulcers] : no ulcers [de-identified] : few crackles at bases b/l, improved after coughing [de-identified] : significant foot/ankle edema b/l, chronic, wears supportive stockings [de-identified] : distended [de-identified] : severe kyphosis [de-identified] : bruising, no wounds

## 2020-02-13 NOTE — HISTORY OF PRESENT ILLNESS
[de-identified] : Ms. Muir was referred to my office for evaluation of leukocytosis -noted when she saw her PCP on 1/4/19 (wbc 54k/ul). The patient is present here with her son Enrique who is helping related the medical history (patient suffers from dementia). According to the son, the patient had a 'sinusitis' on 1/4/19, had fevers/sinus pains, was prescribed Augmentin for 10 days which she has since finished. \par  [de-identified] : The patient is here for leukocytosis follow up. She is here for follow up of blood counts. She has been taking Hydrea was  500mg twice daily Fri through Sunday, once daily Mon-Thursday She is tolerating it well, has no symptoms. She has no wounds.   Reports no recent infections-some fatigue.

## 2020-02-13 NOTE — RESULTS/DATA
[FreeTextEntry1] : Today's CBC (2/11/20) wbc 24.6 Hb 10.9 plt 246\par \par (ON 8/13/19) wbc 26.8 Hb 10.3 plt 177\par ON 5/7/19) wbc 27.7 Hb 10.9 plt 173\par On 2/27/19) wbc 33.3 hb 9.5 plt 149\par On 2/20/19 wbc 70.3 Hb 10 plt 169\par On 2/12/19) wbc 75 Hb 10.2 plt 188\par On 1/28/19) wbc 58.5 ANC 55.4 ALC 1800 AMoC 1000 Eo 300 Ba 100 Hb 10.6 plt 186\par On 1/4/19 wbc 54.46 Hb 10.7 plt 215\par on 12/26/17 wbc 10.5 hb 10.5 plt 250\par ON 12/5/17 wbc 19.7 Hb 11.7 plt 352

## 2020-02-13 NOTE — REVIEW OF SYSTEMS
[Fatigue] : fatigue [Easy Bruising] : a tendency for easy bruising [Negative] : Allergic/Immunologic [Fever] : no fever [Recent Change In Weight] : ~T no recent weight change [Dysphagia] : no dysphagia [Chills] : no chills [Loss of Hearing] : no loss of hearing [Palpitations] : no palpitations [Odynophagia] : no odynophagia [Chest Pain] : no chest pain [Lower Ext Edema] : no lower extremity edema [Leg Claudication] : no intermittent leg claudication [Wheezing] : no wheezing [Cough] : no cough [Abdominal Pain] : no abdominal pain [SOB on Exertion] : no shortness of breath during exertion [Joint Pain] : no joint pain [Joint Stiffness] : no joint stiffness [Skin Rash] : no skin rash [Muscle Weakness] : no muscle weakness [Proptosis] : no proptosis [Easy Bleeding] : no tendency for easy bleeding [Swollen Glands] : no swollen glands [FreeTextEntry2] : weight is stable since last visit [de-identified] : s/p falls -in 2018 fell 2x, in Jan 2019 also fell. Has dementia

## 2020-02-18 ENCOUNTER — RX RENEWAL (OUTPATIENT)
Age: 85
End: 2020-02-18

## 2020-03-20 ENCOUNTER — RX RENEWAL (OUTPATIENT)
Age: 85
End: 2020-03-20

## 2020-03-25 ENCOUNTER — RX RENEWAL (OUTPATIENT)
Age: 85
End: 2020-03-25

## 2020-04-26 ENCOUNTER — APPOINTMENT (OUTPATIENT)
Dept: HEMATOLOGY ONCOLOGY | Facility: CLINIC | Age: 85
End: 2020-04-26
Payer: MEDICARE

## 2020-04-26 DIAGNOSIS — M54.6 PAIN IN THORACIC SPINE: ICD-10-CM

## 2020-04-26 PROCEDURE — 99443: CPT | Mod: CR

## 2020-05-05 ENCOUNTER — APPOINTMENT (OUTPATIENT)
Dept: CARDIOLOGY | Facility: CLINIC | Age: 85
End: 2020-05-05
Payer: MEDICARE

## 2020-05-05 DIAGNOSIS — R26.2 DIFFICULTY IN WALKING, NOT ELSEWHERE CLASSIFIED: ICD-10-CM

## 2020-05-05 PROCEDURE — 99443: CPT | Mod: CR

## 2020-05-06 PROBLEM — R26.2 DIFFICULTY IN WALKING: Status: ACTIVE | Noted: 2017-10-05

## 2020-05-06 NOTE — REASON FOR VISIT
[Follow-Up - Clinic] : a clinic follow-up of [FreeTextEntry1] : A phone call initiated by patient's son. The patient saw Dr. Rosado and was told that her legs were most probably do a heart problem and told to see a cardiologist immediately. Due to the COVID situation the patient is refusing office visit. Over the last several months since I saw her in January a leg edema has gotten worse according to her son and the patient. She also has developed back pain that is radiating to the front of her chest at times. It is made better with repositioning. She has been inactive. She has not been able to raise her legs as she normally does. There is no shortness of breath with minimal exertion. She does not wish to go out of the house to get a blood draw either. The son is asking me what she should do about her back pain as well.

## 2020-05-06 NOTE — ASSESSMENT
[FreeTextEntry1] : in summary, the patient is an elderly woman who has had leg edema for many years. An echocardiogram several months ago revealed normal LV systolic function and no evidence of significant valvular stenosis or insufficiency. It is my opinion that her leg edema is due to chronic venous insufficiency. According to the son blood clots have been ruled out.\par \par I have explained to them that we could do a trial of diuretics although these usually do not help in venous insufficiency. She however has not had a renal profile for approximately one year and therefore this should be done so that if we are to initiate diuretics we can do so safely.\par \par I have told the patient and her son that we will arrange a home draw and depending on the results of this could consider initiation of diuretic therapy. I have offered them office visits whenever they are able

## 2020-05-07 ENCOUNTER — LABORATORY RESULT (OUTPATIENT)
Age: 85
End: 2020-05-07

## 2020-05-12 ENCOUNTER — APPOINTMENT (OUTPATIENT)
Dept: FAMILY MEDICINE | Facility: CLINIC | Age: 85
End: 2020-05-12
Payer: MEDICARE

## 2020-05-12 VITALS
DIASTOLIC BLOOD PRESSURE: 56 MMHG | HEART RATE: 84 BPM | TEMPERATURE: 98.3 F | OXYGEN SATURATION: 94 % | SYSTOLIC BLOOD PRESSURE: 92 MMHG

## 2020-05-12 PROCEDURE — 99215 OFFICE O/P EST HI 40 MIN: CPT

## 2020-05-12 RX ORDER — DONEPEZIL HYDROCHLORIDE 5 MG/1
5 TABLET, FILM COATED ORAL DAILY
Refills: 3 | Status: ACTIVE | COMMUNITY
Start: 2020-05-12

## 2020-05-12 RX ORDER — FUROSEMIDE 20 MG/1
20 TABLET ORAL DAILY
Qty: 7 | Refills: 0 | Status: DISCONTINUED | COMMUNITY
Start: 2020-05-12 | End: 2020-05-12

## 2020-05-12 RX ORDER — IBUPROFEN 200 MG/1
200 TABLET ORAL EVERY 4 HOURS
Refills: 0 | Status: ACTIVE | COMMUNITY
Start: 2020-05-12

## 2020-05-12 NOTE — HISTORY OF PRESENT ILLNESS
[Formal Caregiver] : formal caregiver [FreeTextEntry8] : cc: b/l LE edema \par Patient presented with her Aid , she c/o b/l LE edema, getting recently worse, she is not able to put her shoes on, seen by Vascular and had tele phone encounter Cardiol last week. Patient is not able to get her PT due to COVID, she deneis CP,SOB, c/o chronic neck discomfort. Patient has an Aid twice a week, lives alone.

## 2020-05-12 NOTE — COUNSELING
[Fall prevention counseling provided] : Fall prevention counseling provided [Adequate lighting] : Adequate lighting [No throw rugs] : No throw rugs [Use proper foot wear] : Use proper foot wear [Use recommended devices] : Use recommended devices [Good understanding] : Patient has a good understanding of lifestyle changes and steps needed to achieve self management goal

## 2020-05-12 NOTE — REVIEW OF SYSTEMS
[Lower Ext Edema] : lower extremity edema [Negative] : Psychiatric [Chest Pain] : no chest pain [Palpitations] : no palpitations [Orthopnea] : no orthopnea [Paroxysmal Nocturnal Dyspnea] : no paroxysmal nocturnal dyspnea [FreeTextEntry5] : +

## 2020-05-12 NOTE — PHYSICAL EXAM
[Normal] : soft, non-tender, non-distended, no masses palpated, no HSM and normal bowel sounds [de-identified] : + 2 b/l LE edema , b/l LE erythema., left LE shine + warm  [de-identified] : unsteady gait - using cane  [de-identified] : decreased ROM

## 2020-05-14 ENCOUNTER — APPOINTMENT (OUTPATIENT)
Dept: ORTHOPEDIC SURGERY | Facility: CLINIC | Age: 85
End: 2020-05-14
Payer: MEDICARE

## 2020-05-14 DIAGNOSIS — M54.2 CERVICALGIA: ICD-10-CM

## 2020-05-14 DIAGNOSIS — M48.062 SPINAL STENOSIS, LUMBAR REGION WITH NEUROGENIC CLAUDICATION: ICD-10-CM

## 2020-05-14 PROCEDURE — 99211 OFF/OP EST MAY X REQ PHY/QHP: CPT

## 2020-05-19 ENCOUNTER — APPOINTMENT (OUTPATIENT)
Dept: FAMILY MEDICINE | Facility: CLINIC | Age: 85
End: 2020-05-19
Payer: MEDICARE

## 2020-05-19 ENCOUNTER — LABORATORY RESULT (OUTPATIENT)
Age: 85
End: 2020-05-19

## 2020-05-19 VITALS
WEIGHT: 113.31 LBS | HEART RATE: 86 BPM | OXYGEN SATURATION: 99 % | RESPIRATION RATE: 14 BRPM | HEIGHT: 57 IN | DIASTOLIC BLOOD PRESSURE: 80 MMHG | SYSTOLIC BLOOD PRESSURE: 145 MMHG | TEMPERATURE: 97.8 F | BODY MASS INDEX: 24.45 KG/M2

## 2020-05-19 DIAGNOSIS — R14.0 ABDOMINAL DISTENSION (GASEOUS): ICD-10-CM

## 2020-05-19 PROCEDURE — 36415 COLL VENOUS BLD VENIPUNCTURE: CPT

## 2020-05-19 PROCEDURE — 99214 OFFICE O/P EST MOD 30 MIN: CPT | Mod: 25

## 2020-05-19 NOTE — COUNSELING
[Adequate lighting] : Adequate lighting [Fall prevention counseling provided] : Fall prevention counseling provided [No throw rugs] : No throw rugs [Use proper foot wear] : Use proper foot wear [Inadequate social support] : Inadequate social support [Other: ____] : [unfilled] [Needs reinforcement, provided] : Patient needs reinforcement on understanding of lifestyle changes and steps needed to achieve self management goal; reinforcement was provided [de-identified] : spoke to the son several times in the last week that patient needs more supervision, ELENA adler recommedned

## 2020-05-19 NOTE — HEALTH RISK ASSESSMENT
[No] : No [No falls in past year] : Patient reported no falls in the past year [] : No [de-identified] : walking

## 2020-05-19 NOTE — HEALTH RISK ASSESSMENT
[No] : No [No falls in past year] : Patient reported no falls in the past year [] : No [de-identified] : walking

## 2020-05-19 NOTE — COUNSELING
[Fall prevention counseling provided] : Fall prevention counseling provided [Adequate lighting] : Adequate lighting [No throw rugs] : No throw rugs [Use proper foot wear] : Use proper foot wear [Inadequate social support] : Inadequate social support [Other: ____] : [unfilled] [Needs reinforcement, provided] : Patient needs reinforcement on understanding of lifestyle changes and steps needed to achieve self management goal; reinforcement was provided [de-identified] : spoke to the son several times in the last week that patient needs more supervision, ELENA adler recommedned

## 2020-05-19 NOTE — REVIEW OF SYSTEMS
[Lower Ext Edema] : lower extremity edema [Skin Rash] : skin rash [Negative] : Musculoskeletal [Abdominal Pain] : no abdominal pain [Vomiting] : no vomiting [FreeTextEntry7] : abd distension  [de-identified] : LE less red

## 2020-05-19 NOTE — HISTORY OF PRESENT ILLNESS
[FreeTextEntry1] : cc: LE edema, cellulitis [de-identified] : [phone]. Patient is doing better, her LE edema improved - was able to walk to the Office with the cane, her LE  excoriation -  healed , she deneis CP, SOB, abd pain, no N,V,C, + abd distension on and off, She was seen by Vaculatricia Garcia on 5/5/20 - Consult d/w the pt today. VNS was not able to see the patient because she lives alone. Patient c/o [place]  and that her shoes are to narrow. Patient has a medical alert but not able to find it.

## 2020-05-19 NOTE — REVIEW OF SYSTEMS
[Lower Ext Edema] : lower extremity edema [Skin Rash] : skin rash [Negative] : Musculoskeletal [Abdominal Pain] : no abdominal pain [Vomiting] : no vomiting [FreeTextEntry7] : abd distension  [de-identified] : LE less red

## 2020-05-19 NOTE — HISTORY OF PRESENT ILLNESS
[FreeTextEntry1] : cc: LE edema, cellulitis [de-identified] : [phone]. Patient is doing better, her LE edema improved - was able to walk to the Office with the cane, her LE  excoriation -  healed , she deneis CP, SOB, abd pain, no N,V,C, + abd distension on and off, She was seen by Vaculatricia Garcia on 5/5/20 - Consult d/w the pt today. VNS was not able to see the patient because she lives alone. Patient c/o [place]  and that her shoes are to narrow. Patient has a medical alert but not able to find it.

## 2020-05-19 NOTE — PHYSICAL EXAM
[Soft] : abdomen soft [Non Tender] : non-tender [No HSM] : no HSM [Normal Bowel Sounds] : normal bowel sounds [No Rash] : no rash [Alert and Oriented x3] : oriented to person, place, and time [Normal] : affect was normal and insight and judgment were intact [de-identified] : b/l LE edema improved - now 2/3 above the ankle b/l  [de-identified] : 2 small almost healed  decoration  [de-identified] : + distension

## 2020-05-19 NOTE — PHYSICAL EXAM
[Soft] : abdomen soft [Non Tender] : non-tender [No HSM] : no HSM [Normal Bowel Sounds] : normal bowel sounds [No Rash] : no rash [Alert and Oriented x3] : oriented to person, place, and time [Normal] : affect was normal and insight and judgment were intact [de-identified] : b/l LE edema improved - now 2/3 above the ankle b/l  [de-identified] : 2 small almost healed  decoration  [de-identified] : + distension

## 2020-05-20 ENCOUNTER — OUTPATIENT (OUTPATIENT)
Dept: OUTPATIENT SERVICES | Facility: HOSPITAL | Age: 85
LOS: 1 days | Discharge: HOME | End: 2020-05-20

## 2020-05-20 DIAGNOSIS — D72.89 OTHER SPECIFIED DISORDERS OF WHITE BLOOD CELLS: ICD-10-CM

## 2020-05-20 LAB
ALBUMIN SERPL ELPH-MCNC: 4 G/DL
ALP BLD-CCNC: 159 U/L
ALT SERPL-CCNC: 16 U/L
ANION GAP SERPL CALC-SCNC: 12 MMOL/L
AST SERPL-CCNC: 21 U/L
BASOPHILS # BLD AUTO: 0.1 K/UL
BASOPHILS NFR BLD AUTO: 0.6 %
BILIRUB SERPL-MCNC: 0.4 MG/DL
BUN SERPL-MCNC: 29 MG/DL
CALCIUM SERPL-MCNC: 8.6 MG/DL
CHLORIDE SERPL-SCNC: 100 MMOL/L
CO2 SERPL-SCNC: 26 MMOL/L
CREAT SERPL-MCNC: 0.73 MG/DL
EOSINOPHIL # BLD AUTO: 0.08 K/UL
EOSINOPHIL NFR BLD AUTO: 0.4 %
GLUCOSE SERPL-MCNC: 93 MG/DL
HCT VFR BLD CALC: 31.8 %
HGB BLD-MCNC: 10.2 G/DL
IMM GRANULOCYTES NFR BLD AUTO: 3.5 %
LYMPHOCYTES # BLD AUTO: 0.63 K/UL
LYMPHOCYTES NFR BLD AUTO: 3.5 %
MAN DIFF?: NORMAL
MCHC RBC-ENTMCNC: 32.1 GM/DL
MCHC RBC-ENTMCNC: 38.5 PG
MCV RBC AUTO: 120 FL
MONOCYTES # BLD AUTO: 0.62 K/UL
MONOCYTES NFR BLD AUTO: 3.4 %
NEUTROPHILS # BLD AUTO: 15.97 K/UL
NEUTROPHILS NFR BLD AUTO: 88.6 %
PLATELET # BLD AUTO: 265 K/UL
POTASSIUM SERPL-SCNC: 4.3 MMOL/L
PROT SERPL-MCNC: 5.7 G/DL
RBC # BLD: 2.65 M/UL
RBC # FLD: 14.1 %
SODIUM SERPL-SCNC: 138 MMOL/L
WBC # FLD AUTO: 18.04 K/UL

## 2020-05-26 ENCOUNTER — APPOINTMENT (OUTPATIENT)
Dept: HEMATOLOGY ONCOLOGY | Facility: CLINIC | Age: 85
End: 2020-05-26

## 2020-05-28 ENCOUNTER — RX RENEWAL (OUTPATIENT)
Age: 85
End: 2020-05-28

## 2020-05-28 ENCOUNTER — APPOINTMENT (OUTPATIENT)
Dept: HEMATOLOGY ONCOLOGY | Facility: CLINIC | Age: 85
End: 2020-05-28
Payer: MEDICARE

## 2020-05-28 PROCEDURE — 99442: CPT | Mod: 95

## 2020-05-28 RX ORDER — AMOXICILLIN AND CLAVULANATE POTASSIUM 875; 125 MG/1; MG/1
875-125 TABLET, COATED ORAL TWICE DAILY
Qty: 20 | Refills: 0 | Status: DISCONTINUED | COMMUNITY
Start: 2020-05-12 | End: 2020-05-28

## 2020-05-28 NOTE — HISTORY OF PRESENT ILLNESS
[FreeTextEntry3] : aid [FreeTextEntry4] : Sarah [de-identified] : Ms. Muir was referred to my office for evaluation of leukocytosis -noted when she saw her PCP on 1/4/19 (wbc 54k/ul). The patient is present here with her son Enrique who is helping related the medical history (patient suffers from dementia). According to the son, the patient had a 'sinusitis' on 1/4/19, had fevers/sinus pains, was prescribed Augmentin for 10 days which she has since finished. \par  [de-identified] : The patient is being followed for MDS/MPN NOS, on Hydrea. Patient seen via Telehealth today in order to minimize risk of francy COVID-19 infection. Verbal consent given on 5/28/20 at 3:10pm by patient. She feels ok -no fevers/cough/SOB. The aid reports that the patient's LE's are swollen and red, she saw her cardiologist. NO ulcerations. \par She lives alone, has aids Tue/Thursdays. She has not had any sick contacts, no hx COVID19.\par \par \par \par \par

## 2020-05-28 NOTE — ASSESSMENT
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 Patient is asymptomatic. NO exposure to COVID. Cont Hydrea 500mg po daily as directed -will set up home blood draws q2 wks, 1st blood on 6/2/20\par \par -also discussed with patient signs/symptoms of COVID-19 infection and preventative measures\par \par -cont Allopurinol 300mg po daily to prevent tumor lysis syndrome\par \par -follow up in 4 wks TELE visit, in person in July 2020\par

## 2020-05-28 NOTE — REVIEW OF SYSTEMS
[Fever] : no fever [Chills] : no chills [Dysphagia] : no dysphagia [Loss of Hearing] : no loss of hearing [Odynophagia] : no odynophagia [Chest Pain] : no chest pain [Palpitations] : no palpitations [Leg Claudication] : no intermittent leg claudication [Lower Ext Edema] : no lower extremity edema [Wheezing] : no wheezing [Cough] : no cough [SOB on Exertion] : no shortness of breath during exertion [Abdominal Pain] : no abdominal pain [Joint Pain] : no joint pain [Joint Stiffness] : no joint stiffness [Skin Rash] : no skin rash [Proptosis] : no proptosis [Muscle Weakness] : no muscle weakness [Easy Bleeding] : no tendency for easy bleeding [Swollen Glands] : no swollen glands [FreeTextEntry2] : lost 8 lbs over the last 6 months [de-identified] : s/p falls -in 2018 fell 2x, in Jan 2019 also fell. Has dementia

## 2020-05-28 NOTE — PHYSICAL EXAM
[Ulcers] : no ulcers [de-identified] : uses cane for balance. Severe kyphosis [de-identified] : multiple actinic keratoses present on chest [de-identified] : in wheelchair

## 2020-05-28 NOTE — RESULTS/DATA
[FreeTextEntry1] : On 5/19/20 wbc 18 Hb 10.2 plt 265\par On 5/7/20 wbc 26 Hb 10.3 plt \par \par On 2/12/19) wbc 75 Hb 10.2 plt 188\par On 1/28/19) wbc 58.5 ANC 55.4 ALC 1800 AMoC 1000 Eo 300 Ba 100 Hb 10.6 plt 186\par On 1/4/19 wbc 54.46 Hb 10.7 plt 215\par on 12/26/17 wbc 10.5 hb 10.5 plt 250\par ON 12/5/17 wbc 19.7 Hb 11.7 plt 352

## 2020-06-04 ENCOUNTER — APPOINTMENT (OUTPATIENT)
Dept: FAMILY MEDICINE | Facility: CLINIC | Age: 85
End: 2020-06-04
Payer: MEDICARE

## 2020-06-04 ENCOUNTER — LABORATORY RESULT (OUTPATIENT)
Age: 85
End: 2020-06-04

## 2020-06-04 VITALS
DIASTOLIC BLOOD PRESSURE: 70 MMHG | HEIGHT: 57 IN | SYSTOLIC BLOOD PRESSURE: 118 MMHG | HEART RATE: 79 BPM | BODY MASS INDEX: 23.95 KG/M2 | OXYGEN SATURATION: 96 % | TEMPERATURE: 98.1 F | WEIGHT: 111 LBS

## 2020-06-04 DIAGNOSIS — H91.90 UNSPECIFIED HEARING LOSS, UNSPECIFIED EAR: ICD-10-CM

## 2020-06-04 DIAGNOSIS — Z11.59 ENCOUNTER FOR SCREENING FOR OTHER VIRAL DISEASES: ICD-10-CM

## 2020-06-04 PROCEDURE — 99214 OFFICE O/P EST MOD 30 MIN: CPT | Mod: 25

## 2020-06-04 PROCEDURE — 36415 COLL VENOUS BLD VENIPUNCTURE: CPT | Mod: 59

## 2020-06-04 NOTE — PHYSICAL EXAM
[No Acute Distress] : no acute distress [Well Nourished] : well nourished [Soft] : abdomen soft [Non Tender] : non-tender [No HSM] : no HSM [Normal Bowel Sounds] : normal bowel sounds [No Rash] : no rash [Alert and Oriented x3] : oriented to person, place, and time [Normal] : affect was normal and insight and judgment were intact [de-identified] : pt hols her head flex, full ROM  [de-identified] : + distension  [de-identified] : b/l LE edema improved - now 1/3 above the ankle b/l  [de-identified] : 2 small almost healed  decoration

## 2020-06-04 NOTE — COUNSELING
[Fall prevention counseling provided] : Fall prevention counseling provided [No throw rugs] : No throw rugs [Adequate lighting] : Adequate lighting [Use recommended devices] : Use recommended devices [Use proper foot wear] : Use proper foot wear [Good understanding] : Patient has a good understanding of disease, goals and obesity follow-up plan

## 2020-06-04 NOTE — ASSESSMENT
[FreeTextEntry1] : cont medication, leg elevation\par low NA diet\par f/u blood work done in the Office\par restart PT

## 2020-06-04 NOTE — REVIEW OF SYSTEMS
[Fatigue] : fatigue [Recent Change In Weight] : ~T recent weight change [Easy Bruising] : a tendency for easy bruising [Joint Stiffness] : joint stiffness [Muscle Weakness] : muscle weakness [Unsteady Walking] : ataxia [Negative] : Psychiatric [Joint Pain] : no joint pain [Joint Swelling] : no joint swelling [Muscle Pain] : no muscle pain [FreeTextEntry9] : neck stiffness  [Back Pain] : no back pain

## 2020-06-04 NOTE — HISTORY OF PRESENT ILLNESS
[Home] : at home, [unfilled] , at the time of the visit. [Medical Office: ([facility])___] : at the medical office located in  [Other:____] : [unfilled] [FreeTextEntry1] : cc: f/u LE edema, hearing problem,  [de-identified] : patient came with her AId, doing much better, her LE b/l edema decreased, no erythema ,no warm, she is able to walk better with the cane, she keeps her legs up often , deneis any recent falls, she deneis CP,SOB,Abd pain, She has TEB with Oncol, have not had BW yet - will repeat today at the Office, She was evaluated for her hearing  aid. Next week she is scheduled to see Podiatrist. Patient wants to be tasted for COVID Ab.

## 2020-06-08 LAB
ALBUMIN SERPL ELPH-MCNC: 4 G/DL
ALP BLD-CCNC: 148 U/L
ALT SERPL-CCNC: 15 U/L
ANION GAP SERPL CALC-SCNC: 12 MMOL/L
AST SERPL-CCNC: 19 U/L
BASOPHILS # BLD AUTO: 0.08 K/UL
BASOPHILS NFR BLD AUTO: 0.5 %
BILIRUB SERPL-MCNC: 0.4 MG/DL
BUN SERPL-MCNC: 32 MG/DL
CALCIUM SERPL-MCNC: 8.6 MG/DL
CHLORIDE SERPL-SCNC: 102 MMOL/L
CHOLEST SERPL-MCNC: 130 MG/DL
CHOLEST/HDLC SERPL: 2.5 RATIO
CO2 SERPL-SCNC: 24 MMOL/L
CREAT SERPL-MCNC: 0.73 MG/DL
EOSINOPHIL # BLD AUTO: 0.03 K/UL
EOSINOPHIL NFR BLD AUTO: 0.2 %
ESTIMATED AVERAGE GLUCOSE: 103 MG/DL
FOLATE SERPL-MCNC: 3.7 NG/ML
GLUCOSE SERPL-MCNC: 78 MG/DL
HBA1C MFR BLD HPLC: 5.2 %
HCT VFR BLD CALC: 30.9 %
HDLC SERPL-MCNC: 52 MG/DL
HGB BLD-MCNC: 10 G/DL
IMM GRANULOCYTES NFR BLD AUTO: 1.9 %
LDLC SERPL CALC-MCNC: 65 MG/DL
LYMPHOCYTES # BLD AUTO: 0.82 K/UL
LYMPHOCYTES NFR BLD AUTO: 5.3 %
MAN DIFF?: NORMAL
MCHC RBC-ENTMCNC: 32.4 GM/DL
MCHC RBC-ENTMCNC: 39.2 PG
MCV RBC AUTO: 121.2 FL
MONOCYTES # BLD AUTO: 0.59 K/UL
MONOCYTES NFR BLD AUTO: 3.8 %
NEUTROPHILS # BLD AUTO: 13.57 K/UL
NEUTROPHILS NFR BLD AUTO: 88.3 %
PLATELET # BLD AUTO: 218 K/UL
POTASSIUM SERPL-SCNC: 4.3 MMOL/L
PROT SERPL-MCNC: 5.7 G/DL
RBC # BLD: 2.55 M/UL
RBC # FLD: 14.2 %
SARS-COV-2 IGG SERPL IA-ACNC: <0.1 INDEX
SARS-COV-2 IGG SERPL QL IA: NEGATIVE
SODIUM SERPL-SCNC: 137 MMOL/L
TRIGL SERPL-MCNC: 63 MG/DL
URATE SERPL-MCNC: 2.6 MG/DL
VIT B12 SERPL-MCNC: >2000 PG/ML
WBC # FLD AUTO: 15.38 K/UL

## 2020-06-11 RX ORDER — FOLIC ACID 1 MG/1
1 TABLET ORAL
Qty: 90 | Refills: 1 | Status: ACTIVE | COMMUNITY
Start: 2020-06-10 | End: 1900-01-01

## 2020-06-22 ENCOUNTER — RX RENEWAL (OUTPATIENT)
Age: 85
End: 2020-06-22

## 2020-07-27 ENCOUNTER — RX RENEWAL (OUTPATIENT)
Age: 85
End: 2020-07-27

## 2020-07-30 ENCOUNTER — APPOINTMENT (OUTPATIENT)
Dept: FAMILY MEDICINE | Facility: CLINIC | Age: 85
End: 2020-07-30
Payer: MEDICARE

## 2020-07-30 VITALS
TEMPERATURE: 96 F | RESPIRATION RATE: 17 BRPM | DIASTOLIC BLOOD PRESSURE: 70 MMHG | BODY MASS INDEX: 23.08 KG/M2 | SYSTOLIC BLOOD PRESSURE: 106 MMHG | WEIGHT: 107 LBS | OXYGEN SATURATION: 97 % | HEART RATE: 81 BPM | HEIGHT: 57 IN

## 2020-07-30 DIAGNOSIS — Z00.00 ENCOUNTER FOR GENERAL ADULT MEDICAL EXAMINATION W/OUT ABNORMAL FINDINGS: ICD-10-CM

## 2020-07-30 DIAGNOSIS — E53.8 DEFICIENCY OF OTHER SPECIFIED B GROUP VITAMINS: ICD-10-CM

## 2020-07-30 DIAGNOSIS — M48.02 SPINAL STENOSIS, CERVICAL REGION: ICD-10-CM

## 2020-07-30 DIAGNOSIS — L03.116 CELLULITIS OF LEFT LOWER LIMB: ICD-10-CM

## 2020-07-30 DIAGNOSIS — R60.0 LOCALIZED EDEMA: ICD-10-CM

## 2020-07-30 PROCEDURE — 36415 COLL VENOUS BLD VENIPUNCTURE: CPT

## 2020-07-30 PROCEDURE — G0439: CPT

## 2020-07-30 NOTE — COUNSELING
[Fall prevention counseling provided] : Fall prevention counseling provided [Adequate lighting] : Adequate lighting [Use recommended devices] : Use recommended devices [No throw rugs] : No throw rugs [Use proper foot wear] : Use proper foot wear [Good understanding] : Patient has a good understanding of lifestyle changes and steps needed to achieve self management goal

## 2020-07-30 NOTE — REVIEW OF SYSTEMS
[Lower Ext Edema] : lower extremity edema [Joint Pain] : joint pain [Joint Stiffness] : joint stiffness [Muscle Pain] : muscle pain [Back Pain] : back pain [Negative] : Neurological [Chest Pain] : no chest pain [Palpitations] : no palpitations [Leg Claudication] : no leg claudication [Orthopnea] : no orthopnea [Insomnia] : no insomnia [Suicidal] : not suicidal [Paroxysmal Nocturnal Dyspnea] : no paroxysmal nocturnal dyspnea [Anxiety] : no anxiety [Depression] : no depression

## 2020-07-30 NOTE — HISTORY OF PRESENT ILLNESS
[FreeTextEntry1] : cc: physical ,f/u  [de-identified] : Patient came for f/u and wellness visit.  Denies CP,SOB,Abd pain, no N,V,C,DLE edema chronic, positional " If I elevate my legs - they are much better". Patient has  twice a week for 4 hours, d/w the son multiple times that his mother needs more supervision and help. patient still dose not have specialty shoes - I made her aware again re: importance of wearing comfortable shoes. \par

## 2020-07-30 NOTE — HEALTH RISK ASSESSMENT
[Good] : ~his/her~  mood as  good [1 or 2 (0 pts)] : 1 or 2 (0 points) [No] : In the past 12 months have you used drugs other than those required for medical reasons? No [Never (0 pts)] : Never (0 points) [No falls in past year] : Patient reported no falls in the past year [0] : 2) Feeling down, depressed, or hopeless: Not at all (0) [Patient declined mammogram] : Patient declined mammogram [Patient declined PAP Smear] : Patient declined PAP Smear [Patient declined colonoscopy] : Patient declined colonoscopy [Patient declined bone density test] : Patient declined bone density test [Alone] : lives alone [None] : None [] :  [# Of Children ___] : has [unfilled] children [Feels Safe at Home] : Feels safe at home [Independent] : managing finances [Full assistance needed] : using transportation [Seat Belt] :  uses seat belt [Aggressive treatment] : aggressive treatment [FreeTextEntry1] : lower extr edema  [] : No [de-identified] : No [Audit-CScore] : 0 [de-identified] : Card  [de-identified] : healing  [[id number]] : 0 [de-identified] : walking   [Change in mental status noted] : No change in mental status noted [Language] : denies difficulty with language [Reports changes in hearing] : Reports no changes in hearing [Reports changes in vision] : Reports no changes in vision [Reports changes in dental health] : Reports no changes in dental health [Smoke Detector] : no smoke detector [Carbon Monoxide Detector] : no carbon monoxide detector [HIVDate] : 01/19 [HepatitisCDate] : 01/19 [AdvancecareDate] : 07/20

## 2020-07-30 NOTE — PHYSICAL EXAM
[No Acute Distress] : no acute distress [Normal Outer Ear/Nose] : the outer ears and nose were normal in appearance [Normal Sclera/Conjunctiva] : normal sclera/conjunctiva [No JVD] : no jugular venous distention [No Joint Swelling] : no joint swelling [Normal] : no rash [Alert and Oriented x3] : oriented to person, place, and time [de-identified] : + 2 edema ( improved since last time )  [de-identified] : walking with the cane

## 2020-07-31 ENCOUNTER — LABORATORY RESULT (OUTPATIENT)
Age: 85
End: 2020-07-31

## 2020-08-02 LAB
ALBUMIN SERPL ELPH-MCNC: 4.4 G/DL
ALP BLD-CCNC: 143 U/L
ALT SERPL-CCNC: 14 U/L
ANION GAP SERPL CALC-SCNC: 11 MMOL/L
AST SERPL-CCNC: 23 U/L
BASOPHILS # BLD AUTO: 0.08 K/UL
BASOPHILS NFR BLD AUTO: 0.5 %
BILIRUB SERPL-MCNC: 0.4 MG/DL
BUN SERPL-MCNC: 26 MG/DL
CALCIUM SERPL-MCNC: 8.5 MG/DL
CHLORIDE SERPL-SCNC: 102 MMOL/L
CO2 SERPL-SCNC: 24 MMOL/L
CREAT SERPL-MCNC: 0.67 MG/DL
EOSINOPHIL # BLD AUTO: 0.03 K/UL
EOSINOPHIL NFR BLD AUTO: 0.2 %
FOLATE SERPL-MCNC: >20 NG/ML
GLUCOSE SERPL-MCNC: 86 MG/DL
HCT VFR BLD CALC: 32 %
HGB BLD-MCNC: 10.3 G/DL
IMM GRANULOCYTES NFR BLD AUTO: 1.5 %
LYMPHOCYTES # BLD AUTO: 0.9 K/UL
LYMPHOCYTES NFR BLD AUTO: 5.6 %
MAN DIFF?: NORMAL
MCHC RBC-ENTMCNC: 32.2 GM/DL
MCHC RBC-ENTMCNC: 39.3 PG
MCV RBC AUTO: 122.1 FL
MONOCYTES # BLD AUTO: 0.54 K/UL
MONOCYTES NFR BLD AUTO: 3.4 %
NEUTROPHILS # BLD AUTO: 14.25 K/UL
NEUTROPHILS NFR BLD AUTO: 88.8 %
PLATELET # BLD AUTO: 205 K/UL
POTASSIUM SERPL-SCNC: 4.1 MMOL/L
PROT SERPL-MCNC: 5.9 G/DL
RBC # BLD: 2.62 M/UL
RBC # FLD: 14.5 %
SODIUM SERPL-SCNC: 137 MMOL/L
T3FREE SERPL-MCNC: 2.57 PG/ML
T4 FREE SERPL-MCNC: 0.9 NG/DL
TSH SERPL-ACNC: 3.5 UIU/ML
WBC # FLD AUTO: 16.04 K/UL

## 2020-08-20 ENCOUNTER — RX RENEWAL (OUTPATIENT)
Age: 85
End: 2020-08-20

## 2020-09-30 ENCOUNTER — LABORATORY RESULT (OUTPATIENT)
Age: 85
End: 2020-09-30

## 2020-09-30 ENCOUNTER — APPOINTMENT (OUTPATIENT)
Dept: FAMILY MEDICINE | Facility: CLINIC | Age: 85
End: 2020-09-30
Payer: MEDICARE

## 2020-09-30 VITALS
HEART RATE: 69 BPM | RESPIRATION RATE: 16 BRPM | WEIGHT: 101 LBS | TEMPERATURE: 97.9 F | OXYGEN SATURATION: 98 % | HEIGHT: 57 IN | BODY MASS INDEX: 21.79 KG/M2 | SYSTOLIC BLOOD PRESSURE: 122 MMHG | DIASTOLIC BLOOD PRESSURE: 76 MMHG

## 2020-09-30 DIAGNOSIS — Z23 ENCOUNTER FOR IMMUNIZATION: ICD-10-CM

## 2020-09-30 DIAGNOSIS — R41.3 OTHER AMNESIA: ICD-10-CM

## 2020-09-30 PROCEDURE — 36415 COLL VENOUS BLD VENIPUNCTURE: CPT

## 2020-09-30 PROCEDURE — 90662 IIV NO PRSV INCREASED AG IM: CPT

## 2020-09-30 PROCEDURE — G0008: CPT

## 2020-09-30 PROCEDURE — 99215 OFFICE O/P EST HI 40 MIN: CPT | Mod: 25

## 2020-09-30 RX ORDER — LIDOCAINE 5% 700 MG/1
5 PATCH TOPICAL
Qty: 1 | Refills: 0 | Status: COMPLETED | COMMUNITY
Start: 2020-04-28 | End: 2020-09-30

## 2020-09-30 NOTE — HISTORY OF PRESENT ILLNESS
[Formal Caregiver] : formal caregiver [FreeTextEntry1] : cc: f/u LE edema, weight,   [de-identified] : Patient presented to f/u on her LE edema. Patient came with her HHA Sarah - who noticed that patient lost weight, patient has no refrigerator for the past 6 weeks , son Enrique is working to be fixed - pt dose not have any fresh food, no milk, she dose not have fresh cook meals - for  dinner she eats can soup. As per aid son is limiting sugar intake for his mom. Patient  CP,SOB,Abd pain, no N,V,C,DLE edema chronic, better in AM.  Patient has  twice a week for 4 hours, d/w the son multiple times that his mother needs more supervision and help.I tried to speak to the son during the encounter - LMOM to call back. Patient has RN home evaluation today.Pt needs a new cane - more stable.

## 2020-09-30 NOTE — HEALTH RISK ASSESSMENT
[No] : In the past 12 months have you used drugs other than those required for medical reasons? No [] : No [Audit-CScore] : 0 [de-identified] : walking  [de-identified] : regular

## 2020-09-30 NOTE — PHYSICAL EXAM
[No Acute Distress] : no acute distress [Normal] : no joint swelling and grossly normal strength and tone [de-identified] : fragile  [de-identified] : + 1 b/l edema  [de-identified] : walking with the cane  [de-identified] : AAO x 2 ( place and time)

## 2020-10-05 LAB
ALBUMIN SERPL ELPH-MCNC: 4.4 G/DL
ALP BLD-CCNC: 140 U/L
ALT SERPL-CCNC: 12 U/L
ANION GAP SERPL CALC-SCNC: 12 MMOL/L
AST SERPL-CCNC: 24 U/L
BILIRUB SERPL-MCNC: 0.5 MG/DL
BUN SERPL-MCNC: 23 MG/DL
CALCIUM SERPL-MCNC: 8.5 MG/DL
CHLORIDE SERPL-SCNC: 102 MMOL/L
CO2 SERPL-SCNC: 27 MMOL/L
CREAT SERPL-MCNC: 0.77 MG/DL
FOLATE SERPL-MCNC: 16.5 NG/ML
GLUCOSE SERPL-MCNC: 73 MG/DL
POTASSIUM SERPL-SCNC: 4.2 MMOL/L
PREALB SERPL NEPH-MCNC: 18 MG/DL
PROT SERPL-MCNC: 5.8 G/DL
SODIUM SERPL-SCNC: 141 MMOL/L
T3FREE SERPL-MCNC: 2.01 PG/ML
T4 FREE SERPL-MCNC: 0.9 NG/DL
TSH SERPL-ACNC: 4.74 UIU/ML
VIT B12 SERPL-MCNC: >2000 PG/ML

## 2020-10-13 ENCOUNTER — OUTPATIENT (OUTPATIENT)
Dept: OUTPATIENT SERVICES | Facility: HOSPITAL | Age: 85
LOS: 1 days | Discharge: HOME | End: 2020-10-13

## 2020-10-13 DIAGNOSIS — D72.89 OTHER SPECIFIED DISORDERS OF WHITE BLOOD CELLS: ICD-10-CM

## 2020-10-14 ENCOUNTER — APPOINTMENT (OUTPATIENT)
Dept: FAMILY MEDICINE | Facility: CLINIC | Age: 85
End: 2020-10-14
Payer: MEDICARE

## 2020-10-14 ENCOUNTER — INPATIENT (INPATIENT)
Facility: HOSPITAL | Age: 85
LOS: 2 days | Discharge: NURSING, REHAB OR LONG-TERM CARE | DRG: 641 | End: 2020-10-17
Attending: INTERNAL MEDICINE | Admitting: HOSPITALIST
Payer: MEDICARE

## 2020-10-14 VITALS
WEIGHT: 106.04 LBS | SYSTOLIC BLOOD PRESSURE: 127 MMHG | OXYGEN SATURATION: 97 % | HEART RATE: 85 BPM | HEIGHT: 60 IN | DIASTOLIC BLOOD PRESSURE: 75 MMHG | RESPIRATION RATE: 17 BRPM | TEMPERATURE: 98 F

## 2020-10-14 VITALS
TEMPERATURE: 98.1 F | HEART RATE: 67 BPM | WEIGHT: 106 LBS | RESPIRATION RATE: 14 BRPM | HEIGHT: 57 IN | OXYGEN SATURATION: 98 % | DIASTOLIC BLOOD PRESSURE: 69 MMHG | BODY MASS INDEX: 22.87 KG/M2 | SYSTOLIC BLOOD PRESSURE: 131 MMHG

## 2020-10-14 DIAGNOSIS — R41.0 DISORIENTATION, UNSPECIFIED: ICD-10-CM

## 2020-10-14 DIAGNOSIS — D46.9 MYELODYSPLASTIC SYNDROME, UNSPECIFIED: ICD-10-CM

## 2020-10-14 DIAGNOSIS — R63.4 ABNORMAL WEIGHT LOSS: ICD-10-CM
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
## 2020-10-14 DIAGNOSIS — R62.7 ADULT FAILURE TO THRIVE: ICD-10-CM

## 2020-10-14 LAB
ALBUMIN SERPL ELPH-MCNC: 3.4 G/DL — SIGNIFICANT CHANGE UP (ref 3.3–5)
ALP SERPL-CCNC: 178 U/L — HIGH (ref 40–120)
ALT FLD-CCNC: 44 U/L — SIGNIFICANT CHANGE UP (ref 10–45)
ANION GAP SERPL CALC-SCNC: 6 MMOL/L — SIGNIFICANT CHANGE UP (ref 5–17)
ANISOCYTOSIS BLD QL: SLIGHT — SIGNIFICANT CHANGE UP
AST SERPL-CCNC: 32 U/L — SIGNIFICANT CHANGE UP (ref 10–40)
BASOPHILS # BLD AUTO: 0 K/UL — SIGNIFICANT CHANGE UP (ref 0–0.2)
BASOPHILS NFR BLD AUTO: 0 % — SIGNIFICANT CHANGE UP (ref 0–2)
BILIRUB SERPL-MCNC: 0.3 MG/DL — SIGNIFICANT CHANGE UP (ref 0.2–1.2)
BUN SERPL-MCNC: 33 MG/DL — HIGH (ref 7–23)
CALCIUM SERPL-MCNC: 8.5 MG/DL — SIGNIFICANT CHANGE UP (ref 8.4–10.5)
CHLORIDE SERPL-SCNC: 103 MMOL/L — SIGNIFICANT CHANGE UP (ref 96–108)
CO2 SERPL-SCNC: 29 MMOL/L — SIGNIFICANT CHANGE UP (ref 22–31)
CREAT SERPL-MCNC: 0.84 MG/DL — SIGNIFICANT CHANGE UP (ref 0.5–1.3)
DACRYOCYTES BLD QL SMEAR: SLIGHT — SIGNIFICANT CHANGE UP
EOSINOPHIL # BLD AUTO: 0 K/UL — SIGNIFICANT CHANGE UP (ref 0–0.5)
EOSINOPHIL NFR BLD AUTO: 0 % — SIGNIFICANT CHANGE UP (ref 0–6)
GLUCOSE SERPL-MCNC: 115 MG/DL — HIGH (ref 70–99)
HCT VFR BLD CALC: 31.2 % — LOW (ref 34.5–45)
HGB BLD-MCNC: 10.1 G/DL — LOW (ref 11.5–15.5)
LYMPHOCYTES # BLD AUTO: 1.26 K/UL — SIGNIFICANT CHANGE UP (ref 1–3.3)
LYMPHOCYTES # BLD AUTO: 8 % — LOW (ref 13–44)
MACROCYTES BLD QL: SLIGHT — SIGNIFICANT CHANGE UP
MANUAL SMEAR VERIFICATION: SIGNIFICANT CHANGE UP
MCHC RBC-ENTMCNC: 32.4 GM/DL — SIGNIFICANT CHANGE UP (ref 32–36)
MCHC RBC-ENTMCNC: 38.7 PG — HIGH (ref 27–34)
MCV RBC AUTO: 119.5 FL — HIGH (ref 80–100)
MONOCYTES # BLD AUTO: 0.79 K/UL — SIGNIFICANT CHANGE UP (ref 0–0.9)
MONOCYTES NFR BLD AUTO: 5 % — SIGNIFICANT CHANGE UP (ref 2–14)
NEUTROPHILS # BLD AUTO: 13.73 K/UL — HIGH (ref 1.8–7.4)
NEUTROPHILS NFR BLD AUTO: 81 % — HIGH (ref 43–77)
NEUTS BAND # BLD: 6 % — SIGNIFICANT CHANGE UP (ref 0–8)
NRBC # BLD: 0 /100 — SIGNIFICANT CHANGE UP (ref 0–0)
PLAT MORPH BLD: NORMAL — SIGNIFICANT CHANGE UP
PLATELET # BLD AUTO: 246 K/UL — SIGNIFICANT CHANGE UP (ref 150–400)
POTASSIUM SERPL-MCNC: 4.1 MMOL/L — SIGNIFICANT CHANGE UP (ref 3.5–5.3)
POTASSIUM SERPL-SCNC: 4.1 MMOL/L — SIGNIFICANT CHANGE UP (ref 3.5–5.3)
PROT SERPL-MCNC: 6.1 G/DL — SIGNIFICANT CHANGE UP (ref 6–8.3)
RBC # BLD: 2.61 M/UL — LOW (ref 3.8–5.2)
RBC # FLD: 15.9 % — HIGH (ref 10.3–14.5)
RBC BLD AUTO: ABNORMAL
SARS-COV-2 RNA SPEC QL NAA+PROBE: SIGNIFICANT CHANGE UP
SCHISTOCYTES BLD QL AUTO: SLIGHT — SIGNIFICANT CHANGE UP
SODIUM SERPL-SCNC: 138 MMOL/L — SIGNIFICANT CHANGE UP (ref 135–145)
WBC # BLD: 15.78 K/UL — HIGH (ref 3.8–10.5)
WBC # FLD AUTO: 15.78 K/UL — HIGH (ref 3.8–10.5)

## 2020-10-14 PROCEDURE — 99215 OFFICE O/P EST HI 40 MIN: CPT

## 2020-10-14 PROCEDURE — 99223 1ST HOSP IP/OBS HIGH 75: CPT

## 2020-10-14 PROCEDURE — 93010 ELECTROCARDIOGRAM REPORT: CPT

## 2020-10-14 PROCEDURE — 99285 EMERGENCY DEPT VISIT HI MDM: CPT | Mod: CS

## 2020-10-14 PROCEDURE — 71045 X-RAY EXAM CHEST 1 VIEW: CPT | Mod: 26

## 2020-10-14 RX ORDER — ALLOPURINOL 300 MG
0 TABLET ORAL
Qty: 0 | Refills: 0 | DISCHARGE

## 2020-10-14 RX ORDER — DONEPEZIL HYDROCHLORIDE 10 MG/1
5 TABLET, FILM COATED ORAL AT BEDTIME
Refills: 0 | Status: DISCONTINUED | OUTPATIENT
Start: 2020-10-14 | End: 2020-10-17

## 2020-10-14 RX ORDER — ALLOPURINOL 300 MG
300 TABLET ORAL DAILY
Refills: 0 | Status: DISCONTINUED | OUTPATIENT
Start: 2020-10-14 | End: 2020-10-17

## 2020-10-14 RX ORDER — HYDROXYUREA 500 MG/1
500 CAPSULE ORAL
Refills: 0 | Status: DISCONTINUED | OUTPATIENT
Start: 2020-10-15 | End: 2020-10-17

## 2020-10-14 RX ORDER — HYDROXYUREA 500 MG/1
500 CAPSULE ORAL
Refills: 0 | Status: DISCONTINUED | OUTPATIENT
Start: 2020-10-16 | End: 2020-10-17

## 2020-10-14 RX ORDER — DONEPEZIL HYDROCHLORIDE 10 MG/1
0 TABLET, FILM COATED ORAL
Qty: 0 | Refills: 0 | DISCHARGE

## 2020-10-14 RX ORDER — AMOXICILLIN 500 MG/1
500 CAPSULE ORAL
Qty: 4 | Refills: 6 | Status: COMPLETED | COMMUNITY
Start: 2020-09-02 | End: 2020-10-14

## 2020-10-14 RX ADMIN — DONEPEZIL HYDROCHLORIDE 5 MILLIGRAM(S): 10 TABLET, FILM COATED ORAL at 21:50

## 2020-10-14 NOTE — ASSESSMENT
[FreeTextEntry1] : Patient was referred to ED [facility] with her Aid for further  evaluation and admission. Case d/w [place] aware of patient condition and situation. I called the son to inform him about above , unfortunately I was not able to speak to him and I left a message to call me back ASAP re: his mother.
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
## 2020-10-14 NOTE — H&P ADULT - NSHPSOCIALHISTORY_GEN_ALL_CORE
Lives home alone  Has 2 HHA from [employer] who come once a week for 4hrs  Denies smoking, EtOH use
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
## 2020-10-14 NOTE — COUNSELING
[Fall prevention counseling provided] : Fall prevention counseling provided [Adequate lighting] : Adequate lighting [No throw rugs] : No throw rugs [Use recommended devices] : Use recommended devices [Use proper foot wear] : Use proper foot wear [Good understanding] : Patient has a good understanding of lifestyle changes and steps needed to achieve self management goal
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
## 2020-10-14 NOTE — PHYSICAL EXAM
[No Acute Distress] : no acute distress [Non Tender] : non-tender [Soft] : abdomen soft [Normal] : affect was normal and insight and judgment were intact [de-identified] : fragile  [de-identified] : + 1  edema  right LE, Left LE + 2 edema  [de-identified] : + mild distension  [de-identified] : walking with the cane  [de-identified] : AAO x 1 person

## 2020-10-14 NOTE — ED ADULT NURSE NOTE - INTERVENTIONS DEFINITIONS
[place] to call system/Monitor gait and stability/Non-slip footwear when patient is off stretcher/Physically safe environment: no spills, clutter or unnecessary equipment
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
## 2020-10-14 NOTE — REVIEW OF SYSTEMS
[Confusion] : confusion [Memory Loss] : memory loss [Unsteady Walking] : ataxia [Negative] : Cardiovascular [Dizziness] : no dizziness [Headache] : no headache [Fainting] : no fainting
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
## 2020-10-14 NOTE — GOALS OF CARE CONVERSATION - ADVANCED CARE PLANNING - CONVERSATION DETAILS
Met with patient at bedside. She has private pay aide Adelaide at her bedside. Patient oriented to person, place, month, POTUS, not year. Unsure if she ever designayted anyone as her HCP, but only has 1 son, Enrique. Pt. pleasant and cooperative. She states she wants attempted resuscitation and intubation in the event of cardiac arrest or cardiopulmonary decompensation. Full Code. "I want to fight".

## 2020-10-15 ENCOUNTER — APPOINTMENT (OUTPATIENT)
Dept: HEMATOLOGY ONCOLOGY | Facility: CLINIC | Age: 85
End: 2020-10-15

## 2020-10-15 LAB
ALBUMIN SERPL ELPH-MCNC: 3.2 G/DL — LOW (ref 3.3–5)
ALP SERPL-CCNC: 168 U/L — HIGH (ref 40–120)
ALT FLD-CCNC: 39 U/L — SIGNIFICANT CHANGE UP (ref 10–45)
ANION GAP SERPL CALC-SCNC: 6 MMOL/L — SIGNIFICANT CHANGE UP (ref 5–17)
AST SERPL-CCNC: 26 U/L — SIGNIFICANT CHANGE UP (ref 10–40)
BASOPHILS # BLD AUTO: 0.08 K/UL — SIGNIFICANT CHANGE UP (ref 0–0.2)
BASOPHILS NFR BLD AUTO: 0.5 % — SIGNIFICANT CHANGE UP (ref 0–2)
BILIRUB SERPL-MCNC: 0.5 MG/DL — SIGNIFICANT CHANGE UP (ref 0.2–1.2)
BUN SERPL-MCNC: 26 MG/DL — HIGH (ref 7–23)
CALCIUM SERPL-MCNC: 8.5 MG/DL — SIGNIFICANT CHANGE UP (ref 8.4–10.5)
CHLORIDE SERPL-SCNC: 105 MMOL/L — SIGNIFICANT CHANGE UP (ref 96–108)
CO2 SERPL-SCNC: 30 MMOL/L — SIGNIFICANT CHANGE UP (ref 22–31)
CREAT SERPL-MCNC: 0.78 MG/DL — SIGNIFICANT CHANGE UP (ref 0.5–1.3)
EOSINOPHIL # BLD AUTO: 0.13 K/UL — SIGNIFICANT CHANGE UP (ref 0–0.5)
EOSINOPHIL NFR BLD AUTO: 0.9 % — SIGNIFICANT CHANGE UP (ref 0–6)
GLUCOSE SERPL-MCNC: 88 MG/DL — SIGNIFICANT CHANGE UP (ref 70–99)
HCT VFR BLD CALC: 31.6 % — LOW (ref 34.5–45)
HGB BLD-MCNC: 10 G/DL — LOW (ref 11.5–15.5)
IMM GRANULOCYTES NFR BLD AUTO: 5.1 % — HIGH (ref 0–1.5)
LYMPHOCYTES # BLD AUTO: 1.19 K/UL — SIGNIFICANT CHANGE UP (ref 1–3.3)
LYMPHOCYTES # BLD AUTO: 8.2 % — LOW (ref 13–44)
MAGNESIUM SERPL-MCNC: 2.6 MG/DL — SIGNIFICANT CHANGE UP (ref 1.6–2.6)
MCHC RBC-ENTMCNC: 31.6 GM/DL — LOW (ref 32–36)
MCHC RBC-ENTMCNC: 37.2 PG — HIGH (ref 27–34)
MCV RBC AUTO: 117.5 FL — HIGH (ref 80–100)
MONOCYTES # BLD AUTO: 0.63 K/UL — SIGNIFICANT CHANGE UP (ref 0–0.9)
MONOCYTES NFR BLD AUTO: 4.3 % — SIGNIFICANT CHANGE UP (ref 2–14)
NEUTROPHILS # BLD AUTO: 11.82 K/UL — HIGH (ref 1.8–7.4)
NEUTROPHILS NFR BLD AUTO: 81 % — HIGH (ref 43–77)
NRBC # BLD: 0 /100 WBCS — SIGNIFICANT CHANGE UP (ref 0–0)
PHOSPHATE SERPL-MCNC: 3.7 MG/DL — SIGNIFICANT CHANGE UP (ref 2.5–4.5)
PLATELET # BLD AUTO: 213 K/UL — SIGNIFICANT CHANGE UP (ref 150–400)
POTASSIUM SERPL-MCNC: 3.6 MMOL/L — SIGNIFICANT CHANGE UP (ref 3.5–5.3)
POTASSIUM SERPL-SCNC: 3.6 MMOL/L — SIGNIFICANT CHANGE UP (ref 3.5–5.3)
PREALB SERPL-MCNC: 20 MG/DL — SIGNIFICANT CHANGE UP (ref 20–40)
PROT SERPL-MCNC: 5.7 G/DL — LOW (ref 6–8.3)
RBC # BLD: 2.69 M/UL — LOW (ref 3.8–5.2)
RBC # FLD: 15.2 % — HIGH (ref 10.3–14.5)
SARS-COV-2 IGG SERPL QL IA: NEGATIVE — SIGNIFICANT CHANGE UP
SARS-COV-2 IGM SERPL IA-ACNC: 0.2 RATIO — SIGNIFICANT CHANGE UP
SODIUM SERPL-SCNC: 141 MMOL/L — SIGNIFICANT CHANGE UP (ref 135–145)
TSH SERPL-MCNC: 6.32 UIU/ML — HIGH (ref 0.27–4.2)
WBC # BLD: 14.6 K/UL — HIGH (ref 3.8–10.5)
WBC # FLD AUTO: 14.6 K/UL — HIGH (ref 3.8–10.5)

## 2020-10-15 PROCEDURE — 99223 1ST HOSP IP/OBS HIGH 75: CPT

## 2020-10-15 PROCEDURE — 99232 SBSQ HOSP IP/OBS MODERATE 35: CPT

## 2020-10-15 RX ADMIN — Medication 300 MILLIGRAM(S): at 11:29

## 2020-10-15 RX ADMIN — HYDROXYUREA 500 MILLIGRAM(S): 500 CAPSULE ORAL at 08:51

## 2020-10-15 RX ADMIN — DONEPEZIL HYDROCHLORIDE 5 MILLIGRAM(S): 10 TABLET, FILM COATED ORAL at 22:34
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
## 2020-10-15 NOTE — DIETITIAN INITIAL EVALUATION ADULT. - ORAL INTAKE PTA/DIET HISTORY
Pt. states her appetite is good. Hx dementia; poor historian. denies any n/v/ diarrhea. last bm was "a couple of days ago". no preferences at this time. skin intact. left leg / left ankle edema. HHA reported 16 lb. weight loss in several months. eating well; tolerates regular diet well. denies any difficulty chewing/ swallowing. pt. may benefit by supplements.
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
## 2020-10-15 NOTE — DIETITIAN INITIAL EVALUATION ADULT. - PATIENT PROFILE REVIEWED
yes I will STOP taking the medications listed below when I get home from the hospital:    HYDROmorphone 2 mg oral tablet  -- 1 tab(s) by mouth every 4 hours, As Needed    hydrocodone-acetaminophen 10 mg-325 mg oral tablet  -- 1 tab(s) by mouth every 6 hours, As Needed    morphine 15 mg/12 hr oral tablet, extended release  -- 2 tab(s) by mouth 2 times a day    ibuprofen 600 mg oral tablet  -- 1 tab(s) by mouth every 6 hours, As Needed    metoclopramide 5 mg oral tablet  -- 1 tab(s) by mouth 3 times a day, As Needed

## 2020-10-15 NOTE — DIETITIAN INITIAL EVALUATION ADULT. - BODY MASS INDEX
CMS SUB-3  Measure  Has the patient ever used any alcohol or any other substance such as cocaine, marijuana, ecstasy, pain medications, heroin, methamphetamines, etc. at any point in their lifetime? Yes    If yes,...  1. Is the pt interested in being prescribed an FDA-approved medication for alcohol or opiate addiction at discharge? No  2. Is the pt interested in an aftercare appointment for addictions treatment (i.e., AODA PHP/IOP, RTC, MH PHP/IOP, OP therapy, OP psychiatry, OP PCP, OP NP/PA)? No    Therapist will notify patient's inpatient attending physician and .     Release of Information (Verbal Consent)  Reviewed and discussed Release of Information (ÁNGEL) form for his wife, Lois Cadet, with patient. Verbal consent obtained in order to maintain social distancing recommendations during the COVID crisis.    Jazmine Wesley, LPC-IT, Behavioral Health Therapist           19.6

## 2020-10-16 PROCEDURE — 99232 SBSQ HOSP IP/OBS MODERATE 35: CPT

## 2020-10-16 RX ADMIN — HYDROXYUREA 500 MILLIGRAM(S): 500 CAPSULE ORAL at 22:04

## 2020-10-16 RX ADMIN — DONEPEZIL HYDROCHLORIDE 5 MILLIGRAM(S): 10 TABLET, FILM COATED ORAL at 22:04

## 2020-10-16 RX ADMIN — Medication 300 MILLIGRAM(S): at 12:07

## 2020-10-16 RX ADMIN — HYDROXYUREA 500 MILLIGRAM(S): 500 CAPSULE ORAL at 08:25

## 2020-10-16 NOTE — CHART NOTE - NSCHARTNOTEFT_GEN_A_CORE
Spoke to patient's son Enrique    Discussed Dr. Chong's absence as she had moved out of state. Columba was to see Dr. Carrion on 10/15 the same day she was admitted to . Informed them not to worry about it as I have started seeing Patient here at  which accomplishes same purpose as the missed visit.  Informed them that the CBC is doing remarkably well and the counts are better now than they have been. Clarified that the MDS/MPD is not a metastatic cancer and does not metastasize which means she does not need to have body imaging to rule out metastasis or progression.  It may not even be malignant per say. There was some thought at one point in 2019 that this could have progressed to a CMML however this was uncertain.   There was consideration that  patient would start Vidaza therapy at one point but this was never actually given.  The counts now look better than they have, which suggests it had not progressed to CMML.      Informed family that while I do hear their concerns about an organic diet at the Nursing facility, I do not believe this will have any impact on her hematologic disorder.  Asked them to please talk to  about this accomodation.
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
## 2020-10-17 ENCOUNTER — TRANSCRIPTION ENCOUNTER (OUTPATIENT)
Age: 85
End: 2020-10-17

## 2020-10-17 VITALS
HEART RATE: 81 BPM | RESPIRATION RATE: 16 BRPM | TEMPERATURE: 98 F | OXYGEN SATURATION: 98 % | DIASTOLIC BLOOD PRESSURE: 78 MMHG | SYSTOLIC BLOOD PRESSURE: 130 MMHG

## 2020-10-17 PROCEDURE — 97162 PT EVAL MOD COMPLEX 30 MIN: CPT

## 2020-10-17 PROCEDURE — 93005 ELECTROCARDIOGRAM TRACING: CPT

## 2020-10-17 PROCEDURE — 36415 COLL VENOUS BLD VENIPUNCTURE: CPT

## 2020-10-17 PROCEDURE — 99285 EMERGENCY DEPT VISIT HI MDM: CPT | Mod: 25

## 2020-10-17 PROCEDURE — 84443 ASSAY THYROID STIM HORMONE: CPT

## 2020-10-17 PROCEDURE — 87635 SARS-COV-2 COVID-19 AMP PRB: CPT

## 2020-10-17 PROCEDURE — 85025 COMPLETE CBC W/AUTO DIFF WBC: CPT

## 2020-10-17 PROCEDURE — 83735 ASSAY OF MAGNESIUM: CPT

## 2020-10-17 PROCEDURE — 84100 ASSAY OF PHOSPHORUS: CPT

## 2020-10-17 PROCEDURE — 99239 HOSP IP/OBS DSCHRG MGMT >30: CPT

## 2020-10-17 PROCEDURE — 71045 X-RAY EXAM CHEST 1 VIEW: CPT

## 2020-10-17 PROCEDURE — 84134 ASSAY OF PREALBUMIN: CPT

## 2020-10-17 PROCEDURE — 80053 COMPREHEN METABOLIC PANEL: CPT

## 2020-10-17 PROCEDURE — 86769 SARS-COV-2 COVID-19 ANTIBODY: CPT

## 2020-10-17 PROCEDURE — 36000 PLACE NEEDLE IN VEIN: CPT

## 2020-10-17 RX ADMIN — HYDROXYUREA 500 MILLIGRAM(S): 500 CAPSULE ORAL at 09:16

## 2020-10-17 RX ADMIN — Medication 300 MILLIGRAM(S): at 13:20
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
## 2020-10-17 NOTE — DISCHARGE NOTE PROVIDER - INSTRUCTIONS
Reg Diet  Felice Nunez-Requests Organic Vegetarian Diet although pt has been eating meats well during this admission
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
## 2020-10-17 NOTE — DISCHARGE NOTE NURSING/CASE MANAGEMENT/SOCIAL WORK - PATIENT PORTAL LINK FT
You can access the FollowMyHealth Patient Portal offered by [facility] by registering at the following website: [email or web address]. By joining [facility]’s FollowMyHealth portal, you will also be able to view your health information using other applications (apps) compatible with our system.

## 2020-10-17 NOTE — DISCHARGE NOTE PROVIDER - NSDCCPCAREPLAN_GEN_ALL_CORE_FT
PRINCIPAL DISCHARGE DIAGNOSIS  Diagnosis: Unintentional weight loss  Assessment and Plan of Treatment:

## 2020-10-17 NOTE — DISCHARGE NOTE PROVIDER - NSDCMRMEDTOKEN_GEN_ALL_CORE_FT
allopurinol 300 mg oral tablet: 1 tab(s) orally once a day  Aricept 5 mg oral tablet: 1 tab(s) orally once a day (at bedtime)  hydroxyurea 500 mg oral capsule: 1 tab(s) orally once a day M-Thur  1 tab(s) orally twice a day Fr-Sun

## 2020-10-17 NOTE — DISCHARGE NOTE PROVIDER - CARE PROVIDER_API CALL
Jaymie Petersen  [facility] MEDICINE  [address] [place] [address], [address]  [place] [address]  Phone: [phone]  Fax: [phone]  Established Patient  Follow Up Time: 1 week

## 2020-10-19 ENCOUNTER — NON-APPOINTMENT (OUTPATIENT)
Age: 85
End: 2020-10-19

## 2020-10-21 PROBLEM — D46.9 MYELODYSPLASTIC SYNDROME, UNSPECIFIED: Chronic | Status: ACTIVE | Noted: 2020-10-14

## 2020-10-21 PROBLEM — F03.90 UNSPECIFIED DEMENTIA WITHOUT BEHAVIORAL DISTURBANCE: Chronic | Status: ACTIVE | Noted: 2020-10-14

## 2020-11-09 ENCOUNTER — APPOINTMENT (OUTPATIENT)
Dept: HEMATOLOGY ONCOLOGY | Facility: CLINIC | Age: 85
End: 2020-11-09

## 2021-01-01 ENCOUNTER — TRANSCRIPTION ENCOUNTER (OUTPATIENT)
Age: 86
End: 2021-01-01

## 2021-01-01 ENCOUNTER — RESULT REVIEW (OUTPATIENT)
Age: 86
End: 2021-01-01

## 2021-01-01 ENCOUNTER — NON-APPOINTMENT (OUTPATIENT)
Age: 86
End: 2021-01-01

## 2021-01-01 ENCOUNTER — INPATIENT (INPATIENT)
Facility: HOSPITAL | Age: 86
LOS: 15 days | Discharge: NURSING, REHAB OR LONG-TERM CARE | DRG: 956 | End: 2021-10-12
Attending: SURGERY | Admitting: SURGERY
Payer: MEDICARE

## 2021-01-01 ENCOUNTER — EMERGENCY (EMERGENCY)
Facility: HOSPITAL | Age: 86
LOS: 1 days | Discharge: HOME | End: 2021-01-01
Attending: EMERGENCY MEDICINE | Admitting: EMERGENCY MEDICINE
Payer: MEDICARE

## 2021-01-01 ENCOUNTER — APPOINTMENT (OUTPATIENT)
Dept: ORTHOPEDIC SURGERY | Facility: CLINIC | Age: 86
End: 2021-01-01

## 2021-01-01 ENCOUNTER — EMERGENCY (EMERGENCY)
Facility: HOSPITAL | Age: 86
LOS: 1 days | Discharge: HOME | End: 2021-01-01
Attending: STUDENT IN AN ORGANIZED HEALTH CARE EDUCATION/TRAINING PROGRAM | Admitting: STUDENT IN AN ORGANIZED HEALTH CARE EDUCATION/TRAINING PROGRAM
Payer: MEDICARE

## 2021-01-01 ENCOUNTER — APPOINTMENT (OUTPATIENT)
Dept: SPINE | Facility: HOSPITAL | Age: 86
End: 2021-01-01

## 2021-01-01 ENCOUNTER — INPATIENT (INPATIENT)
Facility: HOSPITAL | Age: 86
LOS: 2 days | Discharge: NURSING, REHAB OR LONG-TERM CARE | DRG: 511 | End: 2021-02-27
Attending: INTERNAL MEDICINE | Admitting: INTERNAL MEDICINE
Payer: MEDICARE

## 2021-01-01 ENCOUNTER — EMERGENCY (EMERGENCY)
Facility: HOSPITAL | Age: 86
LOS: 1 days | Discharge: ANOTHER HOSPITAL | End: 2021-01-01
Attending: EMERGENCY MEDICINE | Admitting: EMERGENCY MEDICINE
Payer: MEDICARE

## 2021-01-01 ENCOUNTER — INPATIENT (INPATIENT)
Facility: HOSPITAL | Age: 86
LOS: 20 days | DRG: 25 | End: 2021-11-18
Attending: HOSPITALIST | Admitting: PSYCHIATRY & NEUROLOGY
Payer: MEDICARE

## 2021-01-01 ENCOUNTER — INPATIENT (INPATIENT)
Facility: HOSPITAL | Age: 86
LOS: 2 days | Discharge: HOME | DRG: 480 | End: 2021-05-22
Attending: INTERNAL MEDICINE | Admitting: INTERNAL MEDICINE
Payer: MEDICARE

## 2021-01-01 ENCOUNTER — INPATIENT (INPATIENT)
Facility: HOSPITAL | Age: 86
LOS: 0 days | Discharge: ANOTHER HOSPITAL | DRG: 871 | End: 2021-10-28
Attending: INTERNAL MEDICINE | Admitting: INTERNAL MEDICINE
Payer: MEDICARE

## 2021-01-01 VITALS
RESPIRATION RATE: 16 BRPM | HEIGHT: 64 IN | WEIGHT: 125 LBS | TEMPERATURE: 98 F | HEART RATE: 75 BPM | SYSTOLIC BLOOD PRESSURE: 125 MMHG | DIASTOLIC BLOOD PRESSURE: 73 MMHG | OXYGEN SATURATION: 94 %

## 2021-01-01 VITALS
DIASTOLIC BLOOD PRESSURE: 50 MMHG | RESPIRATION RATE: 18 BRPM | TEMPERATURE: 98 F | HEART RATE: 105 BPM | OXYGEN SATURATION: 100 % | SYSTOLIC BLOOD PRESSURE: 81 MMHG

## 2021-01-01 VITALS
OXYGEN SATURATION: 96 % | RESPIRATION RATE: 18 BRPM | SYSTOLIC BLOOD PRESSURE: 135 MMHG | HEART RATE: 86 BPM | TEMPERATURE: 99 F | DIASTOLIC BLOOD PRESSURE: 77 MMHG

## 2021-01-01 VITALS
WEIGHT: 95.02 LBS | RESPIRATION RATE: 16 BRPM | OXYGEN SATURATION: 96 % | SYSTOLIC BLOOD PRESSURE: 170 MMHG | DIASTOLIC BLOOD PRESSURE: 89 MMHG | TEMPERATURE: 97 F | HEIGHT: 64 IN | HEART RATE: 94 BPM

## 2021-01-01 VITALS
WEIGHT: 108.03 LBS | SYSTOLIC BLOOD PRESSURE: 145 MMHG | TEMPERATURE: 98 F | HEART RATE: 69 BPM | HEIGHT: 64 IN | RESPIRATION RATE: 14 BRPM | OXYGEN SATURATION: 99 % | DIASTOLIC BLOOD PRESSURE: 81 MMHG

## 2021-01-01 VITALS
OXYGEN SATURATION: 92 % | HEART RATE: 82 BPM | TEMPERATURE: 98 F | RESPIRATION RATE: 18 BRPM | SYSTOLIC BLOOD PRESSURE: 129 MMHG | DIASTOLIC BLOOD PRESSURE: 67 MMHG

## 2021-01-01 VITALS
TEMPERATURE: 98 F | DIASTOLIC BLOOD PRESSURE: 39 MMHG | HEART RATE: 129 BPM | WEIGHT: 110.01 LBS | RESPIRATION RATE: 20 BRPM | SYSTOLIC BLOOD PRESSURE: 53 MMHG | OXYGEN SATURATION: 100 % | HEIGHT: 58 IN

## 2021-01-01 VITALS
OXYGEN SATURATION: 94 % | SYSTOLIC BLOOD PRESSURE: 113 MMHG | DIASTOLIC BLOOD PRESSURE: 71 MMHG | HEART RATE: 81 BPM | RESPIRATION RATE: 18 BRPM | TEMPERATURE: 99 F

## 2021-01-01 VITALS
DIASTOLIC BLOOD PRESSURE: 66 MMHG | RESPIRATION RATE: 16 BRPM | OXYGEN SATURATION: 96 % | HEART RATE: 72 BPM | SYSTOLIC BLOOD PRESSURE: 112 MMHG

## 2021-01-01 VITALS
DIASTOLIC BLOOD PRESSURE: 66 MMHG | TEMPERATURE: 97 F | HEART RATE: 87 BPM | SYSTOLIC BLOOD PRESSURE: 106 MMHG | OXYGEN SATURATION: 100 % | RESPIRATION RATE: 22 BRPM

## 2021-01-01 VITALS
HEART RATE: 85 BPM | OXYGEN SATURATION: 99 % | SYSTOLIC BLOOD PRESSURE: 105 MMHG | DIASTOLIC BLOOD PRESSURE: 71 MMHG | RESPIRATION RATE: 17 BRPM

## 2021-01-01 VITALS
SYSTOLIC BLOOD PRESSURE: 162 MMHG | RESPIRATION RATE: 17 BRPM | DIASTOLIC BLOOD PRESSURE: 89 MMHG | OXYGEN SATURATION: 96 % | HEART RATE: 102 BPM | TEMPERATURE: 98 F

## 2021-01-01 VITALS
RESPIRATION RATE: 19 BRPM | DIASTOLIC BLOOD PRESSURE: 70 MMHG | HEART RATE: 80 BPM | OXYGEN SATURATION: 98 % | SYSTOLIC BLOOD PRESSURE: 118 MMHG

## 2021-01-01 VITALS
TEMPERATURE: 97 F | RESPIRATION RATE: 16 BRPM | OXYGEN SATURATION: 97 % | HEIGHT: 64 IN | DIASTOLIC BLOOD PRESSURE: 63 MMHG | WEIGHT: 104.94 LBS | SYSTOLIC BLOOD PRESSURE: 91 MMHG | HEART RATE: 73 BPM

## 2021-01-01 VITALS
TEMPERATURE: 98 F | HEIGHT: 64 IN | WEIGHT: 100.09 LBS | RESPIRATION RATE: 18 BRPM | SYSTOLIC BLOOD PRESSURE: 151 MMHG | HEART RATE: 78 BPM | OXYGEN SATURATION: 93 % | DIASTOLIC BLOOD PRESSURE: 73 MMHG

## 2021-01-01 VITALS — HEIGHT: 64 IN

## 2021-01-01 DIAGNOSIS — S42.402A UNSPECIFIED FRACTURE OF LOWER END OF LEFT HUMERUS, INITIAL ENCOUNTER FOR CLOSED FRACTURE: ICD-10-CM

## 2021-01-01 DIAGNOSIS — Z98.890 OTHER SPECIFIED POSTPROCEDURAL STATES: Chronic | ICD-10-CM

## 2021-01-01 DIAGNOSIS — R09.89 OTHER SPECIFIED SYMPTOMS AND SIGNS INVOLVING THE CIRCULATORY AND RESPIRATORY SYSTEMS: ICD-10-CM

## 2021-01-01 DIAGNOSIS — A41.9 SEPSIS, UNSPECIFIED ORGANISM: ICD-10-CM

## 2021-01-01 DIAGNOSIS — I21.4 NON-ST ELEVATION (NSTEMI) MYOCARDIAL INFARCTION: ICD-10-CM

## 2021-01-01 DIAGNOSIS — R13.10 DYSPHAGIA, UNSPECIFIED: ICD-10-CM

## 2021-01-01 DIAGNOSIS — D62 ACUTE POSTHEMORRHAGIC ANEMIA: ICD-10-CM

## 2021-01-01 DIAGNOSIS — F03.90 UNSPECIFIED DEMENTIA, UNSPECIFIED SEVERITY, WITHOUT BEHAVIORAL DISTURBANCE, PSYCHOTIC DISTURBANCE, MOOD DISTURBANCE, AND ANXIETY: ICD-10-CM

## 2021-01-01 DIAGNOSIS — R50.9 FEVER, UNSPECIFIED: ICD-10-CM

## 2021-01-01 DIAGNOSIS — D72.829 ELEVATED WHITE BLOOD CELL COUNT, UNSPECIFIED: ICD-10-CM

## 2021-01-01 DIAGNOSIS — I48.20 CHRONIC ATRIAL FIBRILLATION, UNSPECIFIED: ICD-10-CM

## 2021-01-01 DIAGNOSIS — I21.A1 MYOCARDIAL INFARCTION TYPE 2: ICD-10-CM

## 2021-01-01 DIAGNOSIS — K56.609 UNSPECIFIED INTESTINAL OBSTRUCTION, UNSPECIFIED AS TO PARTIAL VERSUS COMPLETE OBSTRUCTION: ICD-10-CM

## 2021-01-01 DIAGNOSIS — Z51.5 ENCOUNTER FOR PALLIATIVE CARE: ICD-10-CM

## 2021-01-01 DIAGNOSIS — S06.5X9A TRAUMATIC SUBDURAL HEMORRHAGE WITH LOSS OF CONSCIOUSNESS OF UNSPECIFIED DURATION, INITIAL ENCOUNTER: ICD-10-CM

## 2021-01-01 DIAGNOSIS — S72.009A FRACTURE OF UNSPECIFIED PART OF NECK OF UNSPECIFIED FEMUR, INITIAL ENCOUNTER FOR CLOSED FRACTURE: ICD-10-CM

## 2021-01-01 DIAGNOSIS — D46.9 MYELODYSPLASTIC SYNDROME, UNSPECIFIED: ICD-10-CM

## 2021-01-01 DIAGNOSIS — R94.31 ABNORMAL ELECTROCARDIOGRAM [ECG] [EKG]: ICD-10-CM

## 2021-01-01 DIAGNOSIS — E87.0 HYPEROSMOLALITY AND HYPERNATREMIA: ICD-10-CM

## 2021-01-01 DIAGNOSIS — R41.82 ALTERED MENTAL STATUS, UNSPECIFIED: ICD-10-CM

## 2021-01-01 DIAGNOSIS — I63.9 CEREBRAL INFARCTION, UNSPECIFIED: ICD-10-CM

## 2021-01-01 DIAGNOSIS — K56.7 ILEUS, UNSPECIFIED: ICD-10-CM

## 2021-01-01 DIAGNOSIS — E87.6 HYPOKALEMIA: ICD-10-CM

## 2021-01-01 DIAGNOSIS — K52.9 NONINFECTIVE GASTROENTERITIS AND COLITIS, UNSPECIFIED: ICD-10-CM

## 2021-01-01 DIAGNOSIS — Z29.9 ENCOUNTER FOR PROPHYLACTIC MEASURES, UNSPECIFIED: ICD-10-CM

## 2021-01-01 DIAGNOSIS — R06.00 DYSPNEA, UNSPECIFIED: ICD-10-CM

## 2021-01-01 DIAGNOSIS — Z01.818 ENCOUNTER FOR OTHER PREPROCEDURAL EXAMINATION: ICD-10-CM

## 2021-01-01 DIAGNOSIS — D64.9 ANEMIA, UNSPECIFIED: ICD-10-CM

## 2021-01-01 DIAGNOSIS — Z71.89 OTHER SPECIFIED COUNSELING: ICD-10-CM

## 2021-01-01 DIAGNOSIS — F03.90 UNSPECIFIED DEMENTIA WITHOUT BEHAVIORAL DISTURBANCE: ICD-10-CM

## 2021-01-01 DIAGNOSIS — S72.001A FRACTURE OF UNSPECIFIED PART OF NECK OF RIGHT FEMUR, INITIAL ENCOUNTER FOR CLOSED FRACTURE: ICD-10-CM

## 2021-01-01 DIAGNOSIS — R29.898 OTHER SYMPTOMS AND SIGNS INVOLVING THE MUSCULOSKELETAL SYSTEM: ICD-10-CM

## 2021-01-01 DIAGNOSIS — K59.00 CONSTIPATION, UNSPECIFIED: ICD-10-CM

## 2021-01-01 DIAGNOSIS — R53.2 FUNCTIONAL QUADRIPLEGIA: ICD-10-CM

## 2021-01-01 DIAGNOSIS — R53.81 OTHER MALAISE: ICD-10-CM

## 2021-01-01 LAB
-  COAGULASE NEGATIVE STAPHYLOCOCCUS: SIGNIFICANT CHANGE UP
A1C WITH ESTIMATED AVERAGE GLUCOSE RESULT: 5.1 % — SIGNIFICANT CHANGE UP (ref 4–5.6)
ACETOHEXAMIDE SERPL-MCNC: <0.5 CD:431152031 — LOW
ALBUMIN SERPL ELPH-MCNC: 1.3 G/DL — LOW (ref 3.3–5)
ALBUMIN SERPL ELPH-MCNC: 1.6 G/DL — LOW (ref 3.3–5)
ALBUMIN SERPL ELPH-MCNC: 1.6 G/DL — LOW (ref 3.3–5)
ALBUMIN SERPL ELPH-MCNC: 1.9 G/DL — LOW (ref 3.3–5)
ALBUMIN SERPL ELPH-MCNC: 2.1 G/DL — LOW (ref 3.3–5)
ALBUMIN SERPL ELPH-MCNC: 2.2 G/DL — LOW (ref 3.3–5)
ALBUMIN SERPL ELPH-MCNC: 2.4 G/DL — LOW (ref 3.3–5)
ALBUMIN SERPL ELPH-MCNC: 2.5 G/DL — LOW (ref 3.3–5)
ALBUMIN SERPL ELPH-MCNC: 2.5 G/DL — LOW (ref 3.3–5)
ALBUMIN SERPL ELPH-MCNC: 2.6 G/DL — LOW (ref 3.3–5)
ALBUMIN SERPL ELPH-MCNC: 2.8 G/DL — LOW (ref 3.3–5)
ALBUMIN SERPL ELPH-MCNC: 2.8 G/DL — LOW (ref 3.3–5)
ALBUMIN SERPL ELPH-MCNC: 2.9 G/DL — LOW (ref 3.3–5)
ALBUMIN SERPL ELPH-MCNC: 2.9 G/DL — LOW (ref 3.3–5)
ALBUMIN SERPL ELPH-MCNC: 3.2 G/DL — LOW (ref 3.3–5)
ALBUMIN SERPL ELPH-MCNC: 3.2 G/DL — LOW (ref 3.3–5)
ALBUMIN SERPL ELPH-MCNC: 3.8 G/DL — SIGNIFICANT CHANGE UP (ref 3.3–5)
ALBUMIN SERPL ELPH-MCNC: 3.8 G/DL — SIGNIFICANT CHANGE UP (ref 3.3–5)
ALBUMIN SERPL ELPH-MCNC: 3.9 G/DL — SIGNIFICANT CHANGE UP (ref 3.3–5)
ALP SERPL-CCNC: 103 U/L — SIGNIFICANT CHANGE UP (ref 30–120)
ALP SERPL-CCNC: 107 U/L — SIGNIFICANT CHANGE UP (ref 40–120)
ALP SERPL-CCNC: 109 U/L — SIGNIFICANT CHANGE UP (ref 40–120)
ALP SERPL-CCNC: 126 U/L — HIGH (ref 40–120)
ALP SERPL-CCNC: 143 U/L — HIGH (ref 40–120)
ALP SERPL-CCNC: 145 U/L — HIGH (ref 40–120)
ALP SERPL-CCNC: 157 U/L — HIGH (ref 40–120)
ALP SERPL-CCNC: 165 U/L — HIGH (ref 40–120)
ALP SERPL-CCNC: 171 U/L — HIGH (ref 40–120)
ALP SERPL-CCNC: 177 U/L — HIGH (ref 40–120)
ALP SERPL-CCNC: 182 U/L — HIGH (ref 40–120)
ALP SERPL-CCNC: 186 U/L — HIGH (ref 40–120)
ALP SERPL-CCNC: 208 U/L — HIGH (ref 40–120)
ALP SERPL-CCNC: 240 U/L — HIGH (ref 40–120)
ALP SERPL-CCNC: 242 U/L — HIGH (ref 40–120)
ALP SERPL-CCNC: 250 U/L — HIGH (ref 40–120)
ALP SERPL-CCNC: 253 U/L — HIGH (ref 40–120)
ALP SERPL-CCNC: 91 U/L — SIGNIFICANT CHANGE UP (ref 40–120)
ALT FLD-CCNC: 107 U/L — HIGH (ref 10–45)
ALT FLD-CCNC: 12 U/L — SIGNIFICANT CHANGE UP (ref 10–45)
ALT FLD-CCNC: 12 U/L — SIGNIFICANT CHANGE UP (ref 10–45)
ALT FLD-CCNC: 134 U/L — HIGH (ref 10–45)
ALT FLD-CCNC: 148 U/L — HIGH (ref 10–45)
ALT FLD-CCNC: 196 U/L — HIGH (ref 10–45)
ALT FLD-CCNC: 22 U/L DA — SIGNIFICANT CHANGE UP (ref 10–60)
ALT FLD-CCNC: 23 U/L — SIGNIFICANT CHANGE UP (ref 10–45)
ALT FLD-CCNC: 239 U/L — HIGH (ref 10–45)
ALT FLD-CCNC: 259 U/L — HIGH (ref 10–45)
ALT FLD-CCNC: 31 U/L — SIGNIFICANT CHANGE UP (ref 10–45)
ALT FLD-CCNC: 366 U/L — HIGH (ref 10–45)
ALT FLD-CCNC: 390 U/L — HIGH (ref 10–45)
ALT FLD-CCNC: 6 U/L — LOW (ref 10–45)
ALT FLD-CCNC: 7 U/L — LOW (ref 10–45)
ALT FLD-CCNC: 70 U/L — HIGH (ref 10–45)
ALT FLD-CCNC: 8 U/L — LOW (ref 10–45)
ALT FLD-CCNC: 93 U/L — HIGH (ref 10–45)
ANION GAP SERPL CALC-SCNC: 10 MMOL/L — SIGNIFICANT CHANGE UP (ref 5–17)
ANION GAP SERPL CALC-SCNC: 11 MMOL/L — SIGNIFICANT CHANGE UP (ref 5–17)
ANION GAP SERPL CALC-SCNC: 12 MMOL/L — SIGNIFICANT CHANGE UP (ref 5–17)
ANION GAP SERPL CALC-SCNC: 13 MMOL/L — SIGNIFICANT CHANGE UP (ref 5–17)
ANION GAP SERPL CALC-SCNC: 13 MMOL/L — SIGNIFICANT CHANGE UP (ref 5–17)
ANION GAP SERPL CALC-SCNC: 14 MMOL/L — SIGNIFICANT CHANGE UP (ref 5–17)
ANION GAP SERPL CALC-SCNC: 15 MMOL/L — SIGNIFICANT CHANGE UP (ref 5–17)
ANION GAP SERPL CALC-SCNC: 16 MMOL/L — SIGNIFICANT CHANGE UP (ref 5–17)
ANION GAP SERPL CALC-SCNC: 17 MMOL/L — SIGNIFICANT CHANGE UP (ref 5–17)
ANION GAP SERPL CALC-SCNC: 18 MMOL/L — HIGH (ref 5–17)
ANION GAP SERPL CALC-SCNC: 18 MMOL/L — HIGH (ref 5–17)
ANION GAP SERPL CALC-SCNC: 22 MMOL/L — HIGH (ref 5–17)
ANION GAP SERPL CALC-SCNC: 25 MMOL/L — HIGH (ref 5–17)
ANION GAP SERPL CALC-SCNC: 3 MMOL/L — LOW (ref 5–17)
ANION GAP SERPL CALC-SCNC: 4 MMOL/L — LOW (ref 5–17)
ANION GAP SERPL CALC-SCNC: 4 MMOL/L — LOW (ref 5–17)
ANION GAP SERPL CALC-SCNC: 5 MMOL/L — SIGNIFICANT CHANGE UP (ref 5–17)
ANION GAP SERPL CALC-SCNC: 6 MMOL/L — SIGNIFICANT CHANGE UP (ref 5–17)
ANION GAP SERPL CALC-SCNC: 6 MMOL/L — SIGNIFICANT CHANGE UP (ref 5–17)
ANION GAP SERPL CALC-SCNC: 7 MMOL/L — SIGNIFICANT CHANGE UP (ref 5–17)
ANION GAP SERPL CALC-SCNC: 8 MMOL/L — SIGNIFICANT CHANGE UP (ref 5–17)
ANION GAP SERPL CALC-SCNC: 9 MMOL/L — SIGNIFICANT CHANGE UP (ref 5–17)
ANISOCYTOSIS BLD QL: SIGNIFICANT CHANGE UP
ANISOCYTOSIS BLD QL: SLIGHT — SIGNIFICANT CHANGE UP
APPEARANCE UR: ABNORMAL
APPEARANCE UR: CLEAR — SIGNIFICANT CHANGE UP
APTT BLD: 28 SEC — SIGNIFICANT CHANGE UP (ref 27.5–35.5)
APTT BLD: 28.3 SEC — SIGNIFICANT CHANGE UP (ref 27.5–35.5)
APTT BLD: 28.5 SEC — SIGNIFICANT CHANGE UP (ref 27.5–35.5)
APTT BLD: 28.8 SEC — SIGNIFICANT CHANGE UP (ref 27.5–35.5)
APTT BLD: 29.3 SEC — SIGNIFICANT CHANGE UP (ref 27.5–35.5)
APTT BLD: 30.2 SEC — SIGNIFICANT CHANGE UP (ref 27.5–35.5)
APTT BLD: 30.5 SEC — SIGNIFICANT CHANGE UP (ref 27.5–35.5)
APTT BLD: 31.1 SEC — SIGNIFICANT CHANGE UP (ref 27.5–35.5)
APTT BLD: 31.2 SEC — SIGNIFICANT CHANGE UP (ref 27.5–35.5)
APTT BLD: 31.2 SEC — SIGNIFICANT CHANGE UP (ref 27.5–35.5)
APTT BLD: 32 SEC — SIGNIFICANT CHANGE UP (ref 27.5–35.5)
AST SERPL-CCNC: 1158 U/L — HIGH (ref 10–40)
AST SERPL-CCNC: 1177 U/L — HIGH (ref 10–40)
AST SERPL-CCNC: 120 U/L — HIGH (ref 10–40)
AST SERPL-CCNC: 125 U/L — HIGH (ref 10–40)
AST SERPL-CCNC: 148 U/L — HIGH (ref 10–40)
AST SERPL-CCNC: 16 U/L — SIGNIFICANT CHANGE UP (ref 10–40)
AST SERPL-CCNC: 17 U/L — SIGNIFICANT CHANGE UP (ref 10–40)
AST SERPL-CCNC: 173 U/L — HIGH (ref 10–40)
AST SERPL-CCNC: 200 U/L — HIGH (ref 10–40)
AST SERPL-CCNC: 22 U/L — SIGNIFICANT CHANGE UP (ref 10–40)
AST SERPL-CCNC: 24 U/L — SIGNIFICANT CHANGE UP (ref 10–40)
AST SERPL-CCNC: 25 U/L — SIGNIFICANT CHANGE UP (ref 10–40)
AST SERPL-CCNC: 305 U/L — HIGH (ref 10–40)
AST SERPL-CCNC: 36 U/L — SIGNIFICANT CHANGE UP (ref 10–40)
AST SERPL-CCNC: 37 U/L — SIGNIFICANT CHANGE UP (ref 10–40)
AST SERPL-CCNC: 434 U/L — HIGH (ref 10–40)
AST SERPL-CCNC: 46 U/L — HIGH (ref 10–40)
AST SERPL-CCNC: 558 U/L — HIGH (ref 10–40)
BACTERIA # UR AUTO: ABNORMAL
BACTERIA # UR AUTO: ABNORMAL
BACTERIA # UR AUTO: ABNORMAL /HPF
BACTERIA # UR AUTO: ABNORMAL /HPF
BACTERIA # UR AUTO: NEGATIVE — SIGNIFICANT CHANGE UP
BASE EXCESS BLDA CALC-SCNC: 0.8 MMOL/L — SIGNIFICANT CHANGE UP (ref -2–3)
BASE EXCESS BLDA CALC-SCNC: 3.1 MMOL/L — HIGH (ref -2–3)
BASOPHILS # BLD AUTO: 0 K/UL — SIGNIFICANT CHANGE UP (ref 0–0.2)
BASOPHILS # BLD AUTO: 0.03 K/UL — SIGNIFICANT CHANGE UP (ref 0–0.2)
BASOPHILS # BLD AUTO: 0.03 K/UL — SIGNIFICANT CHANGE UP (ref 0–0.2)
BASOPHILS # BLD AUTO: 0.06 K/UL — SIGNIFICANT CHANGE UP (ref 0–0.2)
BASOPHILS # BLD AUTO: 0.09 K/UL — SIGNIFICANT CHANGE UP (ref 0–0.2)
BASOPHILS # BLD AUTO: 0.12 K/UL — SIGNIFICANT CHANGE UP (ref 0–0.2)
BASOPHILS NFR BLD AUTO: 0 % — SIGNIFICANT CHANGE UP (ref 0–2)
BASOPHILS NFR BLD AUTO: 0.2 % — SIGNIFICANT CHANGE UP (ref 0–2)
BASOPHILS NFR BLD AUTO: 0.2 % — SIGNIFICANT CHANGE UP (ref 0–2)
BASOPHILS NFR BLD AUTO: 0.3 % — SIGNIFICANT CHANGE UP (ref 0–2)
BASOPHILS NFR BLD AUTO: 0.3 % — SIGNIFICANT CHANGE UP (ref 0–2)
BASOPHILS NFR BLD AUTO: 0.7 % — SIGNIFICANT CHANGE UP (ref 0–2)
BILIRUB DIRECT SERPL-MCNC: 0.1 MG/DL — SIGNIFICANT CHANGE UP (ref 0–0.2)
BILIRUB DIRECT SERPL-MCNC: 0.1 MG/DL — SIGNIFICANT CHANGE UP (ref 0–0.2)
BILIRUB INDIRECT FLD-MCNC: 0.4 MG/DL — SIGNIFICANT CHANGE UP (ref 0.2–1)
BILIRUB INDIRECT FLD-MCNC: 0.4 MG/DL — SIGNIFICANT CHANGE UP (ref 0.2–1.2)
BILIRUB SERPL-MCNC: 0.3 MG/DL — SIGNIFICANT CHANGE UP (ref 0.2–1.2)
BILIRUB SERPL-MCNC: 0.3 MG/DL — SIGNIFICANT CHANGE UP (ref 0.2–1.2)
BILIRUB SERPL-MCNC: 0.4 MG/DL — SIGNIFICANT CHANGE UP (ref 0.2–1.2)
BILIRUB SERPL-MCNC: 0.5 MG/DL — SIGNIFICANT CHANGE UP (ref 0.2–1.2)
BILIRUB SERPL-MCNC: 0.5 MG/DL — SIGNIFICANT CHANGE UP (ref 0.2–1.2)
BILIRUB SERPL-MCNC: 0.6 MG/DL — SIGNIFICANT CHANGE UP (ref 0.2–1.2)
BILIRUB SERPL-MCNC: 0.7 MG/DL — SIGNIFICANT CHANGE UP (ref 0.2–1.2)
BILIRUB SERPL-MCNC: 0.7 MG/DL — SIGNIFICANT CHANGE UP (ref 0.2–1.2)
BILIRUB SERPL-MCNC: 0.8 MG/DL — SIGNIFICANT CHANGE UP (ref 0.2–1.2)
BILIRUB SERPL-MCNC: 0.8 MG/DL — SIGNIFICANT CHANGE UP (ref 0.2–1.2)
BILIRUB SERPL-MCNC: 0.9 MG/DL — SIGNIFICANT CHANGE UP (ref 0.2–1.2)
BILIRUB SERPL-MCNC: 1 MG/DL — SIGNIFICANT CHANGE UP (ref 0.2–1.2)
BILIRUB UR-MCNC: NEGATIVE — SIGNIFICANT CHANGE UP
BLD GP AB SCN SERPL QL: NEGATIVE — SIGNIFICANT CHANGE UP
BLD GP AB SCN SERPL QL: SIGNIFICANT CHANGE UP
BLD GP AB SCN SERPL QL: SIGNIFICANT CHANGE UP
BLOOD GAS COMMENTS ARTERIAL: SIGNIFICANT CHANGE UP
BUN SERPL-MCNC: 10 MG/DL — SIGNIFICANT CHANGE UP (ref 7–23)
BUN SERPL-MCNC: 11 MG/DL — SIGNIFICANT CHANGE UP (ref 7–23)
BUN SERPL-MCNC: 12 MG/DL — SIGNIFICANT CHANGE UP (ref 7–23)
BUN SERPL-MCNC: 12 MG/DL — SIGNIFICANT CHANGE UP (ref 7–23)
BUN SERPL-MCNC: 13 MG/DL — SIGNIFICANT CHANGE UP (ref 7–23)
BUN SERPL-MCNC: 15 MG/DL — SIGNIFICANT CHANGE UP (ref 7–23)
BUN SERPL-MCNC: 16 MG/DL — SIGNIFICANT CHANGE UP (ref 7–23)
BUN SERPL-MCNC: 16 MG/DL — SIGNIFICANT CHANGE UP (ref 7–23)
BUN SERPL-MCNC: 17 MG/DL — SIGNIFICANT CHANGE UP (ref 7–23)
BUN SERPL-MCNC: 18 MG/DL — SIGNIFICANT CHANGE UP (ref 7–23)
BUN SERPL-MCNC: 19 MG/DL — SIGNIFICANT CHANGE UP (ref 7–23)
BUN SERPL-MCNC: 20 MG/DL — SIGNIFICANT CHANGE UP (ref 7–23)
BUN SERPL-MCNC: 20 MG/DL — SIGNIFICANT CHANGE UP (ref 7–23)
BUN SERPL-MCNC: 21 MG/DL — SIGNIFICANT CHANGE UP (ref 7–23)
BUN SERPL-MCNC: 21 MG/DL — SIGNIFICANT CHANGE UP (ref 7–23)
BUN SERPL-MCNC: 22 MG/DL — SIGNIFICANT CHANGE UP (ref 7–23)
BUN SERPL-MCNC: 23 MG/DL — SIGNIFICANT CHANGE UP (ref 7–23)
BUN SERPL-MCNC: 24 MG/DL — HIGH (ref 7–23)
BUN SERPL-MCNC: 26 MG/DL — HIGH (ref 7–23)
BUN SERPL-MCNC: 27 MG/DL — HIGH (ref 7–23)
BUN SERPL-MCNC: 27 MG/DL — HIGH (ref 7–23)
BUN SERPL-MCNC: 28 MG/DL — HIGH (ref 7–23)
BUN SERPL-MCNC: 28 MG/DL — HIGH (ref 7–23)
BUN SERPL-MCNC: 29 MG/DL — HIGH (ref 7–23)
BUN SERPL-MCNC: 30 MG/DL — HIGH (ref 7–23)
BUN SERPL-MCNC: 31 MG/DL — HIGH (ref 7–23)
BUN SERPL-MCNC: 32 MG/DL — HIGH (ref 7–23)
BUN SERPL-MCNC: 32 MG/DL — HIGH (ref 7–23)
BUN SERPL-MCNC: 33 MG/DL — HIGH (ref 7–23)
BUN SERPL-MCNC: 34 MG/DL — HIGH (ref 7–23)
BUN SERPL-MCNC: 34 MG/DL — HIGH (ref 7–23)
BUN SERPL-MCNC: 40 MG/DL — HIGH (ref 7–23)
BUN SERPL-MCNC: 48 MG/DL — HIGH (ref 7–23)
BUN SERPL-MCNC: 49 MG/DL — HIGH (ref 7–23)
BUN SERPL-MCNC: 49 MG/DL — HIGH (ref 7–23)
BUN SERPL-MCNC: 51 MG/DL — HIGH (ref 7–23)
C DIFF GDH STL QL: NEGATIVE — SIGNIFICANT CHANGE UP
C DIFF GDH STL QL: SIGNIFICANT CHANGE UP
CALCIUM SERPL-MCNC: 3.6 MG/DL — CRITICAL LOW (ref 8.4–10.5)
CALCIUM SERPL-MCNC: 4.7 MG/DL — CRITICAL LOW (ref 8.4–10.5)
CALCIUM SERPL-MCNC: 5.8 MG/DL — CRITICAL LOW (ref 8.4–10.5)
CALCIUM SERPL-MCNC: 6 MG/DL — CRITICAL LOW (ref 8.4–10.5)
CALCIUM SERPL-MCNC: 6.8 MG/DL — LOW (ref 8.4–10.5)
CALCIUM SERPL-MCNC: 6.9 MG/DL — LOW (ref 8.4–10.5)
CALCIUM SERPL-MCNC: 6.9 MG/DL — LOW (ref 8.4–10.5)
CALCIUM SERPL-MCNC: 7 MG/DL — LOW (ref 8.4–10.5)
CALCIUM SERPL-MCNC: 7.2 MG/DL — LOW (ref 8.4–10.5)
CALCIUM SERPL-MCNC: 7.3 MG/DL — LOW (ref 8.4–10.5)
CALCIUM SERPL-MCNC: 7.4 MG/DL — LOW (ref 8.4–10.5)
CALCIUM SERPL-MCNC: 7.5 MG/DL — LOW (ref 8.4–10.5)
CALCIUM SERPL-MCNC: 7.5 MG/DL — LOW (ref 8.4–10.5)
CALCIUM SERPL-MCNC: 7.6 MG/DL — LOW (ref 8.4–10.5)
CALCIUM SERPL-MCNC: 7.7 MG/DL — LOW (ref 8.4–10.5)
CALCIUM SERPL-MCNC: 7.7 MG/DL — LOW (ref 8.4–10.5)
CALCIUM SERPL-MCNC: 7.8 MG/DL — LOW (ref 8.4–10.5)
CALCIUM SERPL-MCNC: 7.9 MG/DL — LOW (ref 8.4–10.5)
CALCIUM SERPL-MCNC: 8 MG/DL — LOW (ref 8.4–10.5)
CALCIUM SERPL-MCNC: 8.1 MG/DL — LOW (ref 8.4–10.5)
CALCIUM SERPL-MCNC: 8.2 MG/DL — LOW (ref 8.4–10.5)
CALCIUM SERPL-MCNC: 8.3 MG/DL — LOW (ref 8.4–10.5)
CALCIUM SERPL-MCNC: 8.3 MG/DL — LOW (ref 8.4–10.5)
CALCIUM SERPL-MCNC: 8.4 MG/DL — SIGNIFICANT CHANGE UP (ref 8.4–10.5)
CALCIUM SERPL-MCNC: 8.6 MG/DL — SIGNIFICANT CHANGE UP (ref 8.4–10.5)
CALCIUM SERPL-MCNC: 8.7 MG/DL — SIGNIFICANT CHANGE UP (ref 8.4–10.5)
CALCIUM SERPL-MCNC: 8.8 MG/DL — SIGNIFICANT CHANGE UP (ref 8.4–10.5)
CHLORIDE SERPL-SCNC: 100 MMOL/L — SIGNIFICANT CHANGE UP (ref 96–108)
CHLORIDE SERPL-SCNC: 101 MMOL/L — SIGNIFICANT CHANGE UP (ref 96–108)
CHLORIDE SERPL-SCNC: 102 MMOL/L — SIGNIFICANT CHANGE UP (ref 96–108)
CHLORIDE SERPL-SCNC: 103 MMOL/L — SIGNIFICANT CHANGE UP (ref 96–108)
CHLORIDE SERPL-SCNC: 104 MMOL/L — SIGNIFICANT CHANGE UP (ref 96–108)
CHLORIDE SERPL-SCNC: 106 MMOL/L — SIGNIFICANT CHANGE UP (ref 96–108)
CHLORIDE SERPL-SCNC: 106 MMOL/L — SIGNIFICANT CHANGE UP (ref 96–108)
CHLORIDE SERPL-SCNC: 107 MMOL/L — SIGNIFICANT CHANGE UP (ref 96–108)
CHLORIDE SERPL-SCNC: 107 MMOL/L — SIGNIFICANT CHANGE UP (ref 96–108)
CHLORIDE SERPL-SCNC: 111 MMOL/L — HIGH (ref 96–108)
CHLORIDE SERPL-SCNC: 112 MMOL/L — HIGH (ref 96–108)
CHLORIDE SERPL-SCNC: 113 MMOL/L — HIGH (ref 96–108)
CHLORIDE SERPL-SCNC: 114 MMOL/L — HIGH (ref 96–108)
CHLORIDE SERPL-SCNC: 115 MMOL/L — HIGH (ref 96–108)
CHLORIDE SERPL-SCNC: 115 MMOL/L — HIGH (ref 96–108)
CHLORIDE SERPL-SCNC: 116 MMOL/L — HIGH (ref 96–108)
CHLORIDE SERPL-SCNC: 118 MMOL/L — HIGH (ref 96–108)
CHLORIDE SERPL-SCNC: 118 MMOL/L — HIGH (ref 96–108)
CHLORIDE SERPL-SCNC: 119 MMOL/L — HIGH (ref 96–108)
CHLORIDE SERPL-SCNC: 119 MMOL/L — HIGH (ref 96–108)
CHLORIDE SERPL-SCNC: 120 MMOL/L — HIGH (ref 96–108)
CHLORIDE SERPL-SCNC: 121 MMOL/L — HIGH (ref 96–108)
CHLORIDE SERPL-SCNC: 94 MMOL/L — LOW (ref 96–108)
CHLORIDE SERPL-SCNC: 95 MMOL/L — LOW (ref 96–108)
CHLORIDE SERPL-SCNC: 96 MMOL/L — SIGNIFICANT CHANGE UP (ref 96–108)
CHLORIDE SERPL-SCNC: 96 MMOL/L — SIGNIFICANT CHANGE UP (ref 96–108)
CHLORIDE SERPL-SCNC: 97 MMOL/L — SIGNIFICANT CHANGE UP (ref 96–108)
CHLORIDE SERPL-SCNC: 98 MMOL/L — SIGNIFICANT CHANGE UP (ref 96–108)
CHLORIDE SERPL-SCNC: 99 MMOL/L — SIGNIFICANT CHANGE UP (ref 96–108)
CHLORIDE UR-SCNC: 77 MMOL/L — SIGNIFICANT CHANGE UP
CK SERPL-CCNC: 75 U/L — SIGNIFICANT CHANGE UP (ref 26–192)
CO2 BLDA-SCNC: 26 MMOL/L — HIGH (ref 19–24)
CO2 BLDA-SCNC: 28 MMOL/L — HIGH (ref 19–24)
CO2 SERPL-SCNC: 10 MMOL/L — CRITICAL LOW (ref 22–31)
CO2 SERPL-SCNC: 13 MMOL/L — LOW (ref 22–31)
CO2 SERPL-SCNC: 17 MMOL/L — LOW (ref 22–31)
CO2 SERPL-SCNC: 18 MMOL/L — LOW (ref 22–31)
CO2 SERPL-SCNC: 19 MMOL/L — LOW (ref 22–31)
CO2 SERPL-SCNC: 20 MMOL/L — LOW (ref 22–31)
CO2 SERPL-SCNC: 20 MMOL/L — LOW (ref 22–31)
CO2 SERPL-SCNC: 21 MMOL/L — LOW (ref 22–31)
CO2 SERPL-SCNC: 22 MMOL/L — SIGNIFICANT CHANGE UP (ref 22–31)
CO2 SERPL-SCNC: 23 MMOL/L — SIGNIFICANT CHANGE UP (ref 22–31)
CO2 SERPL-SCNC: 24 MMOL/L — SIGNIFICANT CHANGE UP (ref 22–31)
CO2 SERPL-SCNC: 25 MMOL/L — SIGNIFICANT CHANGE UP (ref 22–31)
CO2 SERPL-SCNC: 26 MMOL/L — SIGNIFICANT CHANGE UP (ref 22–31)
CO2 SERPL-SCNC: 27 MMOL/L — SIGNIFICANT CHANGE UP (ref 22–31)
CO2 SERPL-SCNC: 28 MMOL/L — SIGNIFICANT CHANGE UP (ref 22–31)
CO2 SERPL-SCNC: 30 MMOL/L — SIGNIFICANT CHANGE UP (ref 22–31)
CO2 SERPL-SCNC: 30 MMOL/L — SIGNIFICANT CHANGE UP (ref 22–31)
CO2 SERPL-SCNC: 31 MMOL/L — SIGNIFICANT CHANGE UP (ref 22–31)
CO2 SERPL-SCNC: 33 MMOL/L — HIGH (ref 22–31)
CO2 SERPL-SCNC: 33 MMOL/L — HIGH (ref 22–31)
COD CRY URNS QL: ABNORMAL
COLOR SPEC: ABNORMAL
COLOR SPEC: SIGNIFICANT CHANGE UP
COLOR SPEC: SIGNIFICANT CHANGE UP
COLOR SPEC: YELLOW — SIGNIFICANT CHANGE UP
COMMENT - URINE: SIGNIFICANT CHANGE UP
COVID-19 SPIKE DOMAIN AB INTERP: NEGATIVE — SIGNIFICANT CHANGE UP
COVID-19 SPIKE DOMAIN AB INTERP: NEGATIVE — SIGNIFICANT CHANGE UP
COVID-19 SPIKE DOMAIN ANTIBODY RESULT: 0.4 U/ML — SIGNIFICANT CHANGE UP
COVID-19 SPIKE DOMAIN ANTIBODY RESULT: 0.4 U/ML — SIGNIFICANT CHANGE UP
CREAT ?TM UR-MCNC: 43 MG/DL — SIGNIFICANT CHANGE UP
CREAT SERPL-MCNC: 0.33 MG/DL — LOW (ref 0.5–1.3)
CREAT SERPL-MCNC: 0.34 MG/DL — LOW (ref 0.5–1.3)
CREAT SERPL-MCNC: 0.34 MG/DL — LOW (ref 0.5–1.3)
CREAT SERPL-MCNC: 0.35 MG/DL — LOW (ref 0.5–1.3)
CREAT SERPL-MCNC: 0.36 MG/DL — LOW (ref 0.5–1.3)
CREAT SERPL-MCNC: 0.4 MG/DL — LOW (ref 0.5–1.3)
CREAT SERPL-MCNC: 0.4 MG/DL — LOW (ref 0.5–1.3)
CREAT SERPL-MCNC: 0.44 MG/DL — LOW (ref 0.5–1.3)
CREAT SERPL-MCNC: 0.47 MG/DL — LOW (ref 0.5–1.3)
CREAT SERPL-MCNC: 0.48 MG/DL — LOW (ref 0.5–1.3)
CREAT SERPL-MCNC: 0.49 MG/DL — LOW (ref 0.5–1.3)
CREAT SERPL-MCNC: 0.52 MG/DL — SIGNIFICANT CHANGE UP (ref 0.5–1.3)
CREAT SERPL-MCNC: 0.53 MG/DL — SIGNIFICANT CHANGE UP (ref 0.5–1.3)
CREAT SERPL-MCNC: 0.54 MG/DL — SIGNIFICANT CHANGE UP (ref 0.5–1.3)
CREAT SERPL-MCNC: 0.55 MG/DL — SIGNIFICANT CHANGE UP (ref 0.5–1.3)
CREAT SERPL-MCNC: 0.55 MG/DL — SIGNIFICANT CHANGE UP (ref 0.5–1.3)
CREAT SERPL-MCNC: 0.56 MG/DL — SIGNIFICANT CHANGE UP (ref 0.5–1.3)
CREAT SERPL-MCNC: 0.57 MG/DL — SIGNIFICANT CHANGE UP (ref 0.5–1.3)
CREAT SERPL-MCNC: 0.59 MG/DL — SIGNIFICANT CHANGE UP (ref 0.5–1.3)
CREAT SERPL-MCNC: 0.6 MG/DL — SIGNIFICANT CHANGE UP (ref 0.5–1.3)
CREAT SERPL-MCNC: 0.61 MG/DL — SIGNIFICANT CHANGE UP (ref 0.5–1.3)
CREAT SERPL-MCNC: 0.62 MG/DL — SIGNIFICANT CHANGE UP (ref 0.5–1.3)
CREAT SERPL-MCNC: 0.63 MG/DL — SIGNIFICANT CHANGE UP (ref 0.5–1.3)
CREAT SERPL-MCNC: 0.64 MG/DL — SIGNIFICANT CHANGE UP (ref 0.5–1.3)
CREAT SERPL-MCNC: 0.65 MG/DL — SIGNIFICANT CHANGE UP (ref 0.5–1.3)
CREAT SERPL-MCNC: 0.66 MG/DL — SIGNIFICANT CHANGE UP (ref 0.5–1.3)
CREAT SERPL-MCNC: 0.74 MG/DL — SIGNIFICANT CHANGE UP (ref 0.5–1.3)
CREAT SERPL-MCNC: 0.77 MG/DL — SIGNIFICANT CHANGE UP (ref 0.5–1.3)
CREAT SERPL-MCNC: 0.8 MG/DL — SIGNIFICANT CHANGE UP (ref 0.5–1.3)
CREAT SERPL-MCNC: 0.81 MG/DL — SIGNIFICANT CHANGE UP (ref 0.5–1.3)
CREAT SERPL-MCNC: 0.83 MG/DL — SIGNIFICANT CHANGE UP (ref 0.5–1.3)
CREAT SERPL-MCNC: 0.86 MG/DL — SIGNIFICANT CHANGE UP (ref 0.5–1.3)
CREAT SERPL-MCNC: 0.86 MG/DL — SIGNIFICANT CHANGE UP (ref 0.5–1.3)
CREAT SERPL-MCNC: 1.01 MG/DL — SIGNIFICANT CHANGE UP (ref 0.5–1.3)
CREAT SERPL-MCNC: 1.13 MG/DL — SIGNIFICANT CHANGE UP (ref 0.5–1.3)
CREAT SERPL-MCNC: 1.14 MG/DL — SIGNIFICANT CHANGE UP (ref 0.5–1.3)
CREAT SERPL-MCNC: 1.42 MG/DL — HIGH (ref 0.5–1.3)
CREAT SERPL-MCNC: <0.3 MG/DL — LOW (ref 0.5–1.3)
CRP SERPL-MCNC: 3.1 MG/DL — HIGH (ref 0–0.4)
CULTURE RESULTS: NO GROWTH — SIGNIFICANT CHANGE UP
CULTURE RESULTS: SIGNIFICANT CHANGE UP
D DIMER BLD IA.RAPID-MCNC: 2145 NG/ML DDU — HIGH
DACRYOCYTES BLD QL SMEAR: SLIGHT — SIGNIFICANT CHANGE UP
DACRYOCYTES BLD QL SMEAR: SLIGHT — SIGNIFICANT CHANGE UP
DAT POLY-SP REAG RBC QL: NEGATIVE — SIGNIFICANT CHANGE UP
DIFF PNL FLD: ABNORMAL
ELLIPTOCYTES BLD QL SMEAR: SLIGHT — SIGNIFICANT CHANGE UP
EOSINOPHIL # BLD AUTO: 0 K/UL — SIGNIFICANT CHANGE UP (ref 0–0.5)
EOSINOPHIL # BLD AUTO: 0.02 K/UL — SIGNIFICANT CHANGE UP (ref 0–0.5)
EOSINOPHIL # BLD AUTO: 0.08 K/UL — SIGNIFICANT CHANGE UP (ref 0–0.5)
EOSINOPHIL # BLD AUTO: 0.08 K/UL — SIGNIFICANT CHANGE UP (ref 0–0.5)
EOSINOPHIL # BLD AUTO: 0.49 K/UL — SIGNIFICANT CHANGE UP (ref 0–0.5)
EOSINOPHIL # BLD AUTO: 0.61 K/UL — HIGH (ref 0–0.5)
EOSINOPHIL NFR BLD AUTO: 0 % — SIGNIFICANT CHANGE UP (ref 0–6)
EOSINOPHIL NFR BLD AUTO: 0.2 % — SIGNIFICANT CHANGE UP (ref 0–6)
EOSINOPHIL NFR BLD AUTO: 0.4 % — SIGNIFICANT CHANGE UP (ref 0–6)
EOSINOPHIL NFR BLD AUTO: 0.5 % — SIGNIFICANT CHANGE UP (ref 0–6)
EOSINOPHIL NFR BLD AUTO: 1.7 % — SIGNIFICANT CHANGE UP (ref 0–6)
EOSINOPHIL NFR BLD AUTO: 1.8 % — SIGNIFICANT CHANGE UP (ref 0–6)
EPI CELLS # UR: 12 /HPF — HIGH
EPI CELLS # UR: 13 — HIGH
EPI CELLS # UR: 2 /HPF — SIGNIFICANT CHANGE UP
EPI CELLS # UR: ABNORMAL
EPI CELLS # UR: SIGNIFICANT CHANGE UP
ERYTHROCYTE [SEDIMENTATION RATE] IN BLOOD: 50 MM/HR — HIGH (ref 0–20)
ESTIMATED AVERAGE GLUCOSE: 100 MG/DL — SIGNIFICANT CHANGE UP (ref 68–114)
ETHANOL SERPL-MCNC: SIGNIFICANT CHANGE UP MG/DL (ref 0–10)
FERRITIN SERPL-MCNC: 111 NG/ML — SIGNIFICANT CHANGE UP (ref 15–150)
FERRITIN SERPL-MCNC: 1285 NG/ML — HIGH (ref 15–150)
FOLATE SERPL-MCNC: 4.9 NG/ML — SIGNIFICANT CHANGE UP
GAS PNL BLDA: SIGNIFICANT CHANGE UP
GAS PNL BLDA: SIGNIFICANT CHANGE UP
GAS PNL BLDV: SIGNIFICANT CHANGE UP
GAS PNL BLDV: SIGNIFICANT CHANGE UP
GIANT PLATELETS BLD QL SMEAR: PRESENT — SIGNIFICANT CHANGE UP
GIANT PLATELETS BLD QL SMEAR: PRESENT — SIGNIFICANT CHANGE UP
GLUCOSE BLDC GLUCOMTR-MCNC: 107 MG/DL — HIGH (ref 70–99)
GLUCOSE BLDC GLUCOMTR-MCNC: 109 MG/DL — HIGH (ref 70–99)
GLUCOSE BLDC GLUCOMTR-MCNC: 110 MG/DL — HIGH (ref 70–99)
GLUCOSE BLDC GLUCOMTR-MCNC: 127 MG/DL — HIGH (ref 70–99)
GLUCOSE SERPL-MCNC: 100 MG/DL — HIGH (ref 70–99)
GLUCOSE SERPL-MCNC: 101 MG/DL — HIGH (ref 70–99)
GLUCOSE SERPL-MCNC: 102 MG/DL — HIGH (ref 70–99)
GLUCOSE SERPL-MCNC: 103 MG/DL — HIGH (ref 70–99)
GLUCOSE SERPL-MCNC: 105 MG/DL — HIGH (ref 70–99)
GLUCOSE SERPL-MCNC: 105 MG/DL — HIGH (ref 70–99)
GLUCOSE SERPL-MCNC: 107 MG/DL — HIGH (ref 70–99)
GLUCOSE SERPL-MCNC: 110 MG/DL — HIGH (ref 70–99)
GLUCOSE SERPL-MCNC: 111 MG/DL — HIGH (ref 70–99)
GLUCOSE SERPL-MCNC: 111 MG/DL — HIGH (ref 70–99)
GLUCOSE SERPL-MCNC: 114 MG/DL — HIGH (ref 70–99)
GLUCOSE SERPL-MCNC: 114 MG/DL — HIGH (ref 70–99)
GLUCOSE SERPL-MCNC: 115 MG/DL — HIGH (ref 70–99)
GLUCOSE SERPL-MCNC: 115 MG/DL — HIGH (ref 70–99)
GLUCOSE SERPL-MCNC: 118 MG/DL — HIGH (ref 70–99)
GLUCOSE SERPL-MCNC: 120 MG/DL — HIGH (ref 70–99)
GLUCOSE SERPL-MCNC: 122 MG/DL — HIGH (ref 70–99)
GLUCOSE SERPL-MCNC: 123 MG/DL — HIGH (ref 70–99)
GLUCOSE SERPL-MCNC: 124 MG/DL — HIGH (ref 70–99)
GLUCOSE SERPL-MCNC: 128 MG/DL — HIGH (ref 70–99)
GLUCOSE SERPL-MCNC: 134 MG/DL — HIGH (ref 70–99)
GLUCOSE SERPL-MCNC: 136 MG/DL — HIGH (ref 70–99)
GLUCOSE SERPL-MCNC: 136 MG/DL — HIGH (ref 70–99)
GLUCOSE SERPL-MCNC: 139 MG/DL — HIGH (ref 70–99)
GLUCOSE SERPL-MCNC: 141 MG/DL — HIGH (ref 70–99)
GLUCOSE SERPL-MCNC: 142 MG/DL — HIGH (ref 70–99)
GLUCOSE SERPL-MCNC: 142 MG/DL — HIGH (ref 70–99)
GLUCOSE SERPL-MCNC: 145 MG/DL — HIGH (ref 70–99)
GLUCOSE SERPL-MCNC: 148 MG/DL — HIGH (ref 70–99)
GLUCOSE SERPL-MCNC: 151 MG/DL — HIGH (ref 70–99)
GLUCOSE SERPL-MCNC: 164 MG/DL — HIGH (ref 70–99)
GLUCOSE SERPL-MCNC: 168 MG/DL — HIGH (ref 70–99)
GLUCOSE SERPL-MCNC: 173 MG/DL — HIGH (ref 70–99)
GLUCOSE SERPL-MCNC: 178 MG/DL — HIGH (ref 70–99)
GLUCOSE SERPL-MCNC: 179 MG/DL — HIGH (ref 70–99)
GLUCOSE SERPL-MCNC: 237 MG/DL — HIGH (ref 70–99)
GLUCOSE SERPL-MCNC: 62 MG/DL — LOW (ref 70–99)
GLUCOSE SERPL-MCNC: 662 MG/DL — CRITICAL HIGH (ref 70–99)
GLUCOSE SERPL-MCNC: 78 MG/DL — SIGNIFICANT CHANGE UP (ref 70–99)
GLUCOSE SERPL-MCNC: 79 MG/DL — SIGNIFICANT CHANGE UP (ref 70–99)
GLUCOSE SERPL-MCNC: 80 MG/DL — SIGNIFICANT CHANGE UP (ref 70–99)
GLUCOSE SERPL-MCNC: 815 MG/DL — CRITICAL HIGH (ref 70–99)
GLUCOSE SERPL-MCNC: 86 MG/DL — SIGNIFICANT CHANGE UP (ref 70–99)
GLUCOSE SERPL-MCNC: 87 MG/DL — SIGNIFICANT CHANGE UP (ref 70–99)
GLUCOSE SERPL-MCNC: 91 MG/DL — SIGNIFICANT CHANGE UP (ref 70–99)
GLUCOSE SERPL-MCNC: 92 MG/DL — SIGNIFICANT CHANGE UP (ref 70–99)
GLUCOSE SERPL-MCNC: 92 MG/DL — SIGNIFICANT CHANGE UP (ref 70–99)
GLUCOSE SERPL-MCNC: 94 MG/DL — SIGNIFICANT CHANGE UP (ref 70–99)
GLUCOSE SERPL-MCNC: 96 MG/DL — SIGNIFICANT CHANGE UP (ref 70–99)
GLUCOSE UR QL: ABNORMAL
GLUCOSE UR QL: ABNORMAL
GLUCOSE UR QL: NEGATIVE MG/DL — SIGNIFICANT CHANGE UP
GLUCOSE UR QL: NEGATIVE — SIGNIFICANT CHANGE UP
GRAM STN FLD: SIGNIFICANT CHANGE UP
GRAN CASTS # UR COMP ASSIST: 3 /LPF — SIGNIFICANT CHANGE UP
HAPTOGLOB SERPL-MCNC: 94 MG/DL — SIGNIFICANT CHANGE UP (ref 34–200)
HAPTOGLOB SERPL-MCNC: <20 MG/DL — LOW (ref 34–200)
HAPTOGLOB SERPL-MCNC: <20 MG/DL — LOW (ref 34–200)
HAV IGM SER-ACNC: SIGNIFICANT CHANGE UP
HBV CORE IGM SER-ACNC: SIGNIFICANT CHANGE UP
HBV SURFACE AG SER-ACNC: SIGNIFICANT CHANGE UP
HCO3 BLDA-SCNC: 25 MMOL/L — SIGNIFICANT CHANGE UP (ref 21–28)
HCO3 BLDA-SCNC: 27 MMOL/L — SIGNIFICANT CHANGE UP (ref 21–28)
HCT VFR BLD CALC: 11.3 % — CRITICAL LOW (ref 34.5–45)
HCT VFR BLD CALC: 11.8 % — CRITICAL LOW (ref 34.5–45)
HCT VFR BLD CALC: 16.4 % — CRITICAL LOW (ref 34.5–45)
HCT VFR BLD CALC: 19.5 % — CRITICAL LOW (ref 34.5–45)
HCT VFR BLD CALC: 20.5 % — CRITICAL LOW (ref 34.5–45)
HCT VFR BLD CALC: 21.7 % — LOW (ref 34.5–45)
HCT VFR BLD CALC: 21.8 % — LOW (ref 34.5–45)
HCT VFR BLD CALC: 22.1 % — LOW (ref 34.5–45)
HCT VFR BLD CALC: 22.2 % — LOW (ref 34.5–45)
HCT VFR BLD CALC: 22.2 % — LOW (ref 34.5–45)
HCT VFR BLD CALC: 22.6 % — LOW (ref 34.5–45)
HCT VFR BLD CALC: 22.9 % — LOW (ref 34.5–45)
HCT VFR BLD CALC: 23.4 % — LOW (ref 34.5–45)
HCT VFR BLD CALC: 23.6 % — LOW (ref 34.5–45)
HCT VFR BLD CALC: 23.9 % — LOW (ref 34.5–45)
HCT VFR BLD CALC: 24.1 % — LOW (ref 34.5–45)
HCT VFR BLD CALC: 24.3 % — LOW (ref 34.5–45)
HCT VFR BLD CALC: 24.4 % — LOW (ref 34.5–45)
HCT VFR BLD CALC: 24.4 % — LOW (ref 34.5–45)
HCT VFR BLD CALC: 24.8 % — LOW (ref 34.5–45)
HCT VFR BLD CALC: 25.1 % — LOW (ref 34.5–45)
HCT VFR BLD CALC: 25.1 % — LOW (ref 34.5–45)
HCT VFR BLD CALC: 25.5 % — LOW (ref 34.5–45)
HCT VFR BLD CALC: 25.5 % — LOW (ref 34.5–45)
HCT VFR BLD CALC: 25.7 % — LOW (ref 34.5–45)
HCT VFR BLD CALC: 25.9 % — LOW (ref 34.5–45)
HCT VFR BLD CALC: 26.2 % — LOW (ref 34.5–45)
HCT VFR BLD CALC: 26.4 % — LOW (ref 34.5–45)
HCT VFR BLD CALC: 26.4 % — LOW (ref 34.5–45)
HCT VFR BLD CALC: 26.6 % — LOW (ref 34.5–45)
HCT VFR BLD CALC: 26.7 % — LOW (ref 34.5–45)
HCT VFR BLD CALC: 26.8 % — LOW (ref 34.5–45)
HCT VFR BLD CALC: 26.8 % — LOW (ref 34.5–45)
HCT VFR BLD CALC: 26.9 % — LOW (ref 34.5–45)
HCT VFR BLD CALC: 26.9 % — LOW (ref 34.5–45)
HCT VFR BLD CALC: 27.1 % — LOW (ref 34.5–45)
HCT VFR BLD CALC: 27.2 % — LOW (ref 34.5–45)
HCT VFR BLD CALC: 27.6 % — LOW (ref 34.5–45)
HCT VFR BLD CALC: 27.9 % — LOW (ref 34.5–45)
HCT VFR BLD CALC: 27.9 % — LOW (ref 34.5–45)
HCT VFR BLD CALC: 28.1 % — LOW (ref 34.5–45)
HCT VFR BLD CALC: 28.4 % — LOW (ref 34.5–45)
HCT VFR BLD CALC: 28.6 % — LOW (ref 34.5–45)
HCT VFR BLD CALC: 28.6 % — LOW (ref 34.5–45)
HCT VFR BLD CALC: 28.7 % — LOW (ref 34.5–45)
HCT VFR BLD CALC: 28.9 % — LOW (ref 34.5–45)
HCT VFR BLD CALC: 28.9 % — LOW (ref 34.5–45)
HCT VFR BLD CALC: 29.1 % — LOW (ref 34.5–45)
HCT VFR BLD CALC: 29.2 % — LOW (ref 34.5–45)
HCT VFR BLD CALC: 29.5 % — LOW (ref 34.5–45)
HCT VFR BLD CALC: 29.8 % — LOW (ref 34.5–45)
HCT VFR BLD CALC: 29.9 % — LOW (ref 34.5–45)
HCT VFR BLD CALC: 29.9 % — LOW (ref 34.5–45)
HCT VFR BLD CALC: 30.3 % — LOW (ref 34.5–45)
HCT VFR BLD CALC: 30.5 % — LOW (ref 34.5–45)
HCT VFR BLD CALC: 30.7 % — LOW (ref 34.5–45)
HCT VFR BLD CALC: 31.5 % — LOW (ref 34.5–45)
HCT VFR BLD CALC: 31.8 % — LOW (ref 34.5–45)
HCT VFR BLD CALC: 32.4 % — LOW (ref 34.5–45)
HCT VFR BLD CALC: 32.8 % — LOW (ref 34.5–45)
HCT VFR BLD CALC: 33 % — LOW (ref 34.5–45)
HCT VFR BLD CALC: 33.5 % — LOW (ref 34.5–45)
HCT VFR BLD CALC: 33.8 % — LOW (ref 34.5–45)
HCT VFR BLD CALC: 34.5 % — SIGNIFICANT CHANGE UP (ref 34.5–45)
HCV AB S/CO SERPL IA: 0.11 S/CO — SIGNIFICANT CHANGE UP (ref 0–0.99)
HCV AB SERPL-IMP: SIGNIFICANT CHANGE UP
HEPARINASE TEG R TIME: 4.5 MIN — SIGNIFICANT CHANGE UP (ref 4.3–8.3)
HGB BLD-MCNC: 10 G/DL — LOW (ref 11.5–15.5)
HGB BLD-MCNC: 10.2 G/DL — LOW (ref 11.5–15.5)
HGB BLD-MCNC: 10.3 G/DL — LOW (ref 11.5–15.5)
HGB BLD-MCNC: 10.4 G/DL — LOW (ref 11.5–15.5)
HGB BLD-MCNC: 10.6 G/DL — LOW (ref 11.5–15.5)
HGB BLD-MCNC: 10.7 G/DL — LOW (ref 11.5–15.5)
HGB BLD-MCNC: 10.7 G/DL — LOW (ref 11.5–15.5)
HGB BLD-MCNC: 10.8 G/DL — LOW (ref 11.5–15.5)
HGB BLD-MCNC: 3.3 G/DL — CRITICAL LOW (ref 11.5–15.5)
HGB BLD-MCNC: 3.4 G/DL — CRITICAL LOW (ref 11.5–15.5)
HGB BLD-MCNC: 4.8 G/DL — CRITICAL LOW (ref 11.5–15.5)
HGB BLD-MCNC: 6.2 G/DL — CRITICAL LOW (ref 11.5–15.5)
HGB BLD-MCNC: 6.3 G/DL — CRITICAL LOW (ref 11.5–15.5)
HGB BLD-MCNC: 6.3 G/DL — CRITICAL LOW (ref 11.5–15.5)
HGB BLD-MCNC: 6.6 G/DL — CRITICAL LOW (ref 11.5–15.5)
HGB BLD-MCNC: 6.7 G/DL — CRITICAL LOW (ref 11.5–15.5)
HGB BLD-MCNC: 6.7 G/DL — CRITICAL LOW (ref 11.5–15.5)
HGB BLD-MCNC: 6.8 G/DL — CRITICAL LOW (ref 11.5–15.5)
HGB BLD-MCNC: 6.9 G/DL — CRITICAL LOW (ref 11.5–15.5)
HGB BLD-MCNC: 7 G/DL — CRITICAL LOW (ref 11.5–15.5)
HGB BLD-MCNC: 7.1 G/DL — LOW (ref 11.5–15.5)
HGB BLD-MCNC: 7.3 G/DL — LOW (ref 11.5–15.5)
HGB BLD-MCNC: 7.4 G/DL — LOW (ref 11.5–15.5)
HGB BLD-MCNC: 7.4 G/DL — LOW (ref 11.5–15.5)
HGB BLD-MCNC: 7.6 G/DL — LOW (ref 11.5–15.5)
HGB BLD-MCNC: 7.8 G/DL — LOW (ref 11.5–15.5)
HGB BLD-MCNC: 7.8 G/DL — LOW (ref 11.5–15.5)
HGB BLD-MCNC: 7.9 G/DL — LOW (ref 11.5–15.5)
HGB BLD-MCNC: 8 G/DL — LOW (ref 11.5–15.5)
HGB BLD-MCNC: 8.1 G/DL — LOW (ref 11.5–15.5)
HGB BLD-MCNC: 8.2 G/DL — LOW (ref 11.5–15.5)
HGB BLD-MCNC: 8.4 G/DL — LOW (ref 11.5–15.5)
HGB BLD-MCNC: 8.5 G/DL — LOW (ref 11.5–15.5)
HGB BLD-MCNC: 8.5 G/DL — LOW (ref 11.5–15.5)
HGB BLD-MCNC: 8.6 G/DL — LOW (ref 11.5–15.5)
HGB BLD-MCNC: 8.8 G/DL — LOW (ref 11.5–15.5)
HGB BLD-MCNC: 8.8 G/DL — LOW (ref 11.5–15.5)
HGB BLD-MCNC: 8.9 G/DL — LOW (ref 11.5–15.5)
HGB BLD-MCNC: 8.9 G/DL — LOW (ref 11.5–15.5)
HGB BLD-MCNC: 9 G/DL — LOW (ref 11.5–15.5)
HGB BLD-MCNC: 9 G/DL — LOW (ref 11.5–15.5)
HGB BLD-MCNC: 9.1 G/DL — LOW (ref 11.5–15.5)
HGB BLD-MCNC: 9.3 G/DL — LOW (ref 11.5–15.5)
HGB BLD-MCNC: 9.3 G/DL — LOW (ref 11.5–15.5)
HGB BLD-MCNC: 9.4 G/DL — LOW (ref 11.5–15.5)
HGB BLD-MCNC: 9.5 G/DL — LOW (ref 11.5–15.5)
HGB BLD-MCNC: 9.6 G/DL — LOW (ref 11.5–15.5)
HGB BLD-MCNC: 9.7 G/DL — LOW (ref 11.5–15.5)
HGB BLD-MCNC: 9.7 G/DL — LOW (ref 11.5–15.5)
HGB BLD-MCNC: 9.9 G/DL — LOW (ref 11.5–15.5)
HGB BLD-MCNC: 9.9 G/DL — LOW (ref 11.5–15.5)
HOROWITZ INDEX BLDA+IHG-RTO: 100 — SIGNIFICANT CHANGE UP
HOROWITZ INDEX BLDA+IHG-RTO: 28 — SIGNIFICANT CHANGE UP
HYALINE CASTS # UR AUTO: 0 /LPF — SIGNIFICANT CHANGE UP (ref 0–2)
HYALINE CASTS # UR AUTO: 126 /LPF — HIGH (ref 0–2)
HYALINE CASTS # UR AUTO: 5 /LPF — HIGH (ref 0–7)
HYALINE CASTS # UR AUTO: ABNORMAL
HYALINE CASTS # UR AUTO: SIGNIFICANT CHANGE UP /LPF (ref 0–7)
HYPOCHROMIA BLD QL: SLIGHT — SIGNIFICANT CHANGE UP
HYPOCHROMIA BLD QL: SLIGHT — SIGNIFICANT CHANGE UP
IMM GRANULOCYTES NFR BLD AUTO: 11.4 % — HIGH (ref 0–1.5)
IMM GRANULOCYTES NFR BLD AUTO: 13.6 % — HIGH (ref 0–1.5)
IMM GRANULOCYTES NFR BLD AUTO: 33.7 % — HIGH (ref 0–1.5)
IMM GRANULOCYTES NFR BLD AUTO: 7.7 % — HIGH (ref 0–1.5)
IMM GRANULOCYTES NFR BLD AUTO: 8.3 % — HIGH (ref 0–1.5)
INR BLD: 0.96 RATIO — SIGNIFICANT CHANGE UP (ref 0.88–1.16)
INR BLD: 0.97 RATIO — SIGNIFICANT CHANGE UP (ref 0.88–1.16)
INR BLD: 0.98 RATIO — SIGNIFICANT CHANGE UP (ref 0.88–1.16)
INR BLD: 0.99 RATIO — SIGNIFICANT CHANGE UP (ref 0.88–1.16)
INR BLD: 1.02 RATIO — SIGNIFICANT CHANGE UP (ref 0.88–1.16)
INR BLD: 1.07 RATIO — SIGNIFICANT CHANGE UP (ref 0.88–1.16)
INR BLD: 1.08 RATIO — SIGNIFICANT CHANGE UP (ref 0.88–1.16)
INR BLD: 1.08 RATIO — SIGNIFICANT CHANGE UP (ref 0.88–1.16)
INR BLD: 1.11 RATIO — SIGNIFICANT CHANGE UP (ref 0.88–1.16)
INR BLD: 1.11 RATIO — SIGNIFICANT CHANGE UP (ref 0.88–1.16)
INR BLD: 1.14 RATIO — SIGNIFICANT CHANGE UP (ref 0.88–1.16)
INR BLD: 1.58 RATIO — HIGH (ref 0.88–1.16)
INR BLD: 1.9 RATIO — HIGH (ref 0.88–1.16)
IRON SATN MFR SERPL: 27 % — SIGNIFICANT CHANGE UP (ref 14–50)
IRON SATN MFR SERPL: 28 % — SIGNIFICANT CHANGE UP (ref 14–50)
IRON SATN MFR SERPL: 51 UG/DL — SIGNIFICANT CHANGE UP (ref 30–160)
IRON SATN MFR SERPL: 66 UG/DL — SIGNIFICANT CHANGE UP (ref 30–160)
KETONES UR-MCNC: ABNORMAL
KETONES UR-MCNC: NEGATIVE — SIGNIFICANT CHANGE UP
KETONES UR-MCNC: SIGNIFICANT CHANGE UP
KETONES UR-MCNC: SIGNIFICANT CHANGE UP
LACTATE SERPL-SCNC: 0.6 MMOL/L — LOW (ref 0.7–2)
LACTATE SERPL-SCNC: 0.7 MMOL/L — SIGNIFICANT CHANGE UP (ref 0.7–2)
LACTATE SERPL-SCNC: 0.9 MMOL/L — SIGNIFICANT CHANGE UP (ref 0.7–2)
LACTATE SERPL-SCNC: 1 MMOL/L — SIGNIFICANT CHANGE UP (ref 0.7–2)
LACTATE SERPL-SCNC: 1.1 MMOL/L — SIGNIFICANT CHANGE UP (ref 0.7–2)
LACTATE SERPL-SCNC: 1.3 MMOL/L — SIGNIFICANT CHANGE UP (ref 0.7–2)
LACTATE SERPL-SCNC: 2.1 MMOL/L — HIGH (ref 0.7–2)
LACTATE SERPL-SCNC: 2.3 MMOL/L — HIGH (ref 0.7–2)
LDH SERPL L TO P-CCNC: 292 U/L — HIGH (ref 50–242)
LDH SERPL L TO P-CCNC: 776 U/L — HIGH (ref 50–242)
LEUKOCYTE ESTERASE UR-ACNC: ABNORMAL
LEUKOCYTE ESTERASE UR-ACNC: NEGATIVE — SIGNIFICANT CHANGE UP
LG PLATELETS BLD QL AUTO: SIGNIFICANT CHANGE UP
LIDOCAIN IGE QN: 57 U/L — SIGNIFICANT CHANGE UP (ref 7–60)
LYMPHOCYTES # BLD AUTO: 0.28 K/UL — LOW (ref 1–3.3)
LYMPHOCYTES # BLD AUTO: 0.41 K/UL — LOW (ref 1–3.3)
LYMPHOCYTES # BLD AUTO: 0.49 K/UL — LOW (ref 1–3.3)
LYMPHOCYTES # BLD AUTO: 0.65 K/UL — LOW (ref 1–3.3)
LYMPHOCYTES # BLD AUTO: 0.65 K/UL — LOW (ref 1–3.3)
LYMPHOCYTES # BLD AUTO: 0.7 K/UL — LOW (ref 1–3.3)
LYMPHOCYTES # BLD AUTO: 0.78 K/UL — LOW (ref 1–3.3)
LYMPHOCYTES # BLD AUTO: 1.07 K/UL — SIGNIFICANT CHANGE UP (ref 1–3.3)
LYMPHOCYTES # BLD AUTO: 1.3 % — LOW (ref 13–44)
LYMPHOCYTES # BLD AUTO: 1.5 % — LOW (ref 13–44)
LYMPHOCYTES # BLD AUTO: 1.6 % — LOW (ref 13–44)
LYMPHOCYTES # BLD AUTO: 1.7 % — LOW (ref 13–44)
LYMPHOCYTES # BLD AUTO: 1.77 K/UL — SIGNIFICANT CHANGE UP (ref 1–3.3)
LYMPHOCYTES # BLD AUTO: 2.4 K/UL — SIGNIFICANT CHANGE UP (ref 1–3.3)
LYMPHOCYTES # BLD AUTO: 2.57 K/UL — SIGNIFICANT CHANGE UP (ref 1–3.3)
LYMPHOCYTES # BLD AUTO: 3 % — LOW (ref 13–44)
LYMPHOCYTES # BLD AUTO: 3.7 % — LOW (ref 13–44)
LYMPHOCYTES # BLD AUTO: 3.7 % — LOW (ref 13–44)
LYMPHOCYTES # BLD AUTO: 4.9 % — LOW (ref 13–44)
LYMPHOCYTES # BLD AUTO: 5.6 % — LOW (ref 13–44)
LYMPHOCYTES # BLD AUTO: 6.1 % — LOW (ref 13–44)
LYMPHOCYTES # BLD AUTO: 7.1 % — LOW (ref 13–44)
MACROCYTES BLD QL: SLIGHT — SIGNIFICANT CHANGE UP
MAGNESIUM SERPL-MCNC: 1.8 MG/DL — SIGNIFICANT CHANGE UP (ref 1.6–2.6)
MAGNESIUM SERPL-MCNC: 1.9 MG/DL — SIGNIFICANT CHANGE UP (ref 1.6–2.6)
MAGNESIUM SERPL-MCNC: 2 MG/DL — SIGNIFICANT CHANGE UP (ref 1.6–2.6)
MAGNESIUM SERPL-MCNC: 2.1 MG/DL — SIGNIFICANT CHANGE UP (ref 1.6–2.6)
MAGNESIUM SERPL-MCNC: 2.2 MG/DL — SIGNIFICANT CHANGE UP (ref 1.6–2.6)
MAGNESIUM SERPL-MCNC: 2.2 MG/DL — SIGNIFICANT CHANGE UP (ref 1.6–2.6)
MAGNESIUM SERPL-MCNC: 2.3 MG/DL — SIGNIFICANT CHANGE UP (ref 1.6–2.6)
MAGNESIUM SERPL-MCNC: 2.4 MG/DL — SIGNIFICANT CHANGE UP (ref 1.6–2.6)
MAGNESIUM SERPL-MCNC: 2.4 MG/DL — SIGNIFICANT CHANGE UP (ref 1.6–2.6)
MAGNESIUM SERPL-MCNC: 2.5 MG/DL — SIGNIFICANT CHANGE UP (ref 1.6–2.6)
MAGNESIUM SERPL-MCNC: 2.6 MG/DL — SIGNIFICANT CHANGE UP (ref 1.6–2.6)
MAGNESIUM SERPL-MCNC: 3.7 MG/DL — HIGH (ref 1.6–2.6)
MANUAL SMEAR VERIFICATION: SIGNIFICANT CHANGE UP
MCHC RBC-ENTMCNC: 27.9 GM/DL — LOW (ref 32–36)
MCHC RBC-ENTMCNC: 28 GM/DL — LOW (ref 32–36)
MCHC RBC-ENTMCNC: 28.5 GM/DL — LOW (ref 32–36)
MCHC RBC-ENTMCNC: 28.9 GM/DL — LOW (ref 32–36)
MCHC RBC-ENTMCNC: 29 GM/DL — LOW (ref 32–36)
MCHC RBC-ENTMCNC: 29.3 GM/DL — LOW (ref 32–36)
MCHC RBC-ENTMCNC: 29.4 GM/DL — LOW (ref 32–36)
MCHC RBC-ENTMCNC: 29.7 GM/DL — LOW (ref 32–36)
MCHC RBC-ENTMCNC: 29.9 PG — SIGNIFICANT CHANGE UP (ref 27–34)
MCHC RBC-ENTMCNC: 29.9 PG — SIGNIFICANT CHANGE UP (ref 27–34)
MCHC RBC-ENTMCNC: 30 PG — SIGNIFICANT CHANGE UP (ref 27–34)
MCHC RBC-ENTMCNC: 30 PG — SIGNIFICANT CHANGE UP (ref 27–34)
MCHC RBC-ENTMCNC: 30.1 GM/DL — LOW (ref 32–36)
MCHC RBC-ENTMCNC: 30.1 PG — SIGNIFICANT CHANGE UP (ref 27–34)
MCHC RBC-ENTMCNC: 30.2 PG — SIGNIFICANT CHANGE UP (ref 27–34)
MCHC RBC-ENTMCNC: 30.3 GM/DL — LOW (ref 32–36)
MCHC RBC-ENTMCNC: 30.3 GM/DL — LOW (ref 32–36)
MCHC RBC-ENTMCNC: 30.3 PG — SIGNIFICANT CHANGE UP (ref 27–34)
MCHC RBC-ENTMCNC: 30.4 GM/DL — LOW (ref 32–36)
MCHC RBC-ENTMCNC: 30.4 PG — SIGNIFICANT CHANGE UP (ref 27–34)
MCHC RBC-ENTMCNC: 30.4 PG — SIGNIFICANT CHANGE UP (ref 27–34)
MCHC RBC-ENTMCNC: 30.5 GM/DL — LOW (ref 32–36)
MCHC RBC-ENTMCNC: 30.5 GM/DL — LOW (ref 32–36)
MCHC RBC-ENTMCNC: 30.5 PG — SIGNIFICANT CHANGE UP (ref 27–34)
MCHC RBC-ENTMCNC: 30.6 GM/DL — LOW (ref 32–36)
MCHC RBC-ENTMCNC: 30.6 GM/DL — LOW (ref 32–36)
MCHC RBC-ENTMCNC: 30.7 GM/DL — LOW (ref 32–36)
MCHC RBC-ENTMCNC: 30.7 GM/DL — LOW (ref 32–36)
MCHC RBC-ENTMCNC: 30.7 PG — SIGNIFICANT CHANGE UP (ref 27–34)
MCHC RBC-ENTMCNC: 30.8 GM/DL — LOW (ref 32–36)
MCHC RBC-ENTMCNC: 30.8 GM/DL — LOW (ref 32–36)
MCHC RBC-ENTMCNC: 30.8 PG — SIGNIFICANT CHANGE UP (ref 27–34)
MCHC RBC-ENTMCNC: 30.8 PG — SIGNIFICANT CHANGE UP (ref 27–34)
MCHC RBC-ENTMCNC: 30.9 GM/DL — LOW (ref 32–36)
MCHC RBC-ENTMCNC: 30.9 PG — SIGNIFICANT CHANGE UP (ref 27–34)
MCHC RBC-ENTMCNC: 31 GM/DL — LOW (ref 32–36)
MCHC RBC-ENTMCNC: 31 GM/DL — LOW (ref 32–36)
MCHC RBC-ENTMCNC: 31 PG — SIGNIFICANT CHANGE UP (ref 27–34)
MCHC RBC-ENTMCNC: 31.1 GM/DL — LOW (ref 32–36)
MCHC RBC-ENTMCNC: 31.2 PG — SIGNIFICANT CHANGE UP (ref 27–34)
MCHC RBC-ENTMCNC: 31.3 GM/DL — LOW (ref 32–36)
MCHC RBC-ENTMCNC: 31.3 PG — SIGNIFICANT CHANGE UP (ref 27–34)
MCHC RBC-ENTMCNC: 31.3 PG — SIGNIFICANT CHANGE UP (ref 27–34)
MCHC RBC-ENTMCNC: 31.4 GM/DL — LOW (ref 32–36)
MCHC RBC-ENTMCNC: 31.4 GM/DL — LOW (ref 32–36)
MCHC RBC-ENTMCNC: 31.5 PG — SIGNIFICANT CHANGE UP (ref 27–34)
MCHC RBC-ENTMCNC: 31.6 PG — SIGNIFICANT CHANGE UP (ref 27–34)
MCHC RBC-ENTMCNC: 31.7 GM/DL — LOW (ref 32–36)
MCHC RBC-ENTMCNC: 31.7 PG — SIGNIFICANT CHANGE UP (ref 27–34)
MCHC RBC-ENTMCNC: 31.8 PG — SIGNIFICANT CHANGE UP (ref 27–34)
MCHC RBC-ENTMCNC: 31.9 GM/DL — LOW (ref 32–36)
MCHC RBC-ENTMCNC: 31.9 GM/DL — LOW (ref 32–36)
MCHC RBC-ENTMCNC: 31.9 PG — SIGNIFICANT CHANGE UP (ref 27–34)
MCHC RBC-ENTMCNC: 32 GM/DL — SIGNIFICANT CHANGE UP (ref 32–36)
MCHC RBC-ENTMCNC: 32 GM/DL — SIGNIFICANT CHANGE UP (ref 32–36)
MCHC RBC-ENTMCNC: 32.1 GM/DL — SIGNIFICANT CHANGE UP (ref 32–36)
MCHC RBC-ENTMCNC: 32.1 GM/DL — SIGNIFICANT CHANGE UP (ref 32–36)
MCHC RBC-ENTMCNC: 32.1 PG — SIGNIFICANT CHANGE UP (ref 27–34)
MCHC RBC-ENTMCNC: 32.2 GM/DL — SIGNIFICANT CHANGE UP (ref 32–36)
MCHC RBC-ENTMCNC: 32.2 PG — SIGNIFICANT CHANGE UP (ref 27–34)
MCHC RBC-ENTMCNC: 32.2 PG — SIGNIFICANT CHANGE UP (ref 27–34)
MCHC RBC-ENTMCNC: 32.3 GM/DL — SIGNIFICANT CHANGE UP (ref 32–36)
MCHC RBC-ENTMCNC: 32.3 PG — SIGNIFICANT CHANGE UP (ref 27–34)
MCHC RBC-ENTMCNC: 32.3 PG — SIGNIFICANT CHANGE UP (ref 27–34)
MCHC RBC-ENTMCNC: 32.4 GM/DL — SIGNIFICANT CHANGE UP (ref 32–36)
MCHC RBC-ENTMCNC: 32.4 PG — SIGNIFICANT CHANGE UP (ref 27–34)
MCHC RBC-ENTMCNC: 32.5 GM/DL — SIGNIFICANT CHANGE UP (ref 32–36)
MCHC RBC-ENTMCNC: 32.5 GM/DL — SIGNIFICANT CHANGE UP (ref 32–36)
MCHC RBC-ENTMCNC: 32.5 PG — SIGNIFICANT CHANGE UP (ref 27–34)
MCHC RBC-ENTMCNC: 32.6 GM/DL — SIGNIFICANT CHANGE UP (ref 32–36)
MCHC RBC-ENTMCNC: 32.6 GM/DL — SIGNIFICANT CHANGE UP (ref 32–36)
MCHC RBC-ENTMCNC: 32.7 GM/DL — SIGNIFICANT CHANGE UP (ref 32–36)
MCHC RBC-ENTMCNC: 32.7 PG — SIGNIFICANT CHANGE UP (ref 27–34)
MCHC RBC-ENTMCNC: 32.9 GM/DL — SIGNIFICANT CHANGE UP (ref 32–36)
MCHC RBC-ENTMCNC: 32.9 GM/DL — SIGNIFICANT CHANGE UP (ref 32–36)
MCHC RBC-ENTMCNC: 33 GM/DL — SIGNIFICANT CHANGE UP (ref 32–36)
MCHC RBC-ENTMCNC: 33.1 PG — SIGNIFICANT CHANGE UP (ref 27–34)
MCHC RBC-ENTMCNC: 33.2 GM/DL — SIGNIFICANT CHANGE UP (ref 32–36)
MCHC RBC-ENTMCNC: 33.3 GM/DL — SIGNIFICANT CHANGE UP (ref 32–36)
MCHC RBC-ENTMCNC: 33.3 GM/DL — SIGNIFICANT CHANGE UP (ref 32–36)
MCHC RBC-ENTMCNC: 33.5 PG — SIGNIFICANT CHANGE UP (ref 27–34)
MCHC RBC-ENTMCNC: 33.6 GM/DL — SIGNIFICANT CHANGE UP (ref 32–36)
MCHC RBC-ENTMCNC: 33.6 GM/DL — SIGNIFICANT CHANGE UP (ref 32–36)
MCHC RBC-ENTMCNC: 33.7 GM/DL — SIGNIFICANT CHANGE UP (ref 32–36)
MCHC RBC-ENTMCNC: 33.8 PG — SIGNIFICANT CHANGE UP (ref 27–34)
MCHC RBC-ENTMCNC: 33.8 PG — SIGNIFICANT CHANGE UP (ref 27–34)
MCHC RBC-ENTMCNC: 33.9 PG — SIGNIFICANT CHANGE UP (ref 27–34)
MCHC RBC-ENTMCNC: 33.9 PG — SIGNIFICANT CHANGE UP (ref 27–34)
MCHC RBC-ENTMCNC: 34.6 PG — HIGH (ref 27–34)
MCHC RBC-ENTMCNC: 35 PG — HIGH (ref 27–34)
MCHC RBC-ENTMCNC: 36.2 PG — HIGH (ref 27–34)
MCHC RBC-ENTMCNC: 36.3 PG — HIGH (ref 27–34)
MCHC RBC-ENTMCNC: 36.4 PG — HIGH (ref 27–34)
MCHC RBC-ENTMCNC: 37.6 PG — HIGH (ref 27–34)
MCHC RBC-ENTMCNC: 37.9 PG — HIGH (ref 27–34)
MCHC RBC-ENTMCNC: 38 PG — HIGH (ref 27–34)
MCHC RBC-ENTMCNC: 38 PG — HIGH (ref 27–34)
MCHC RBC-ENTMCNC: 38.2 PG — HIGH (ref 27–34)
MCHC RBC-ENTMCNC: 38.3 PG — HIGH (ref 27–34)
MCV RBC AUTO: 100 FL — SIGNIFICANT CHANGE UP (ref 80–100)
MCV RBC AUTO: 100.7 FL — HIGH (ref 80–100)
MCV RBC AUTO: 101 FL — HIGH (ref 80–100)
MCV RBC AUTO: 101.1 FL — HIGH (ref 80–100)
MCV RBC AUTO: 101.4 FL — HIGH (ref 80–100)
MCV RBC AUTO: 101.9 FL — HIGH (ref 80–100)
MCV RBC AUTO: 101.9 FL — HIGH (ref 80–100)
MCV RBC AUTO: 102.1 FL — HIGH (ref 80–100)
MCV RBC AUTO: 102.5 FL — HIGH (ref 80–100)
MCV RBC AUTO: 102.7 FL — HIGH (ref 80–100)
MCV RBC AUTO: 103.5 FL — HIGH (ref 80–100)
MCV RBC AUTO: 103.7 FL — HIGH (ref 80–100)
MCV RBC AUTO: 103.8 FL — HIGH (ref 80–100)
MCV RBC AUTO: 104 FL — HIGH (ref 80–100)
MCV RBC AUTO: 104.2 FL — HIGH (ref 80–100)
MCV RBC AUTO: 104.5 FL — HIGH (ref 80–100)
MCV RBC AUTO: 104.6 FL — HIGH (ref 80–100)
MCV RBC AUTO: 104.8 FL — HIGH (ref 80–100)
MCV RBC AUTO: 105 FL — HIGH (ref 80–100)
MCV RBC AUTO: 105 FL — HIGH (ref 80–100)
MCV RBC AUTO: 105.3 FL — HIGH (ref 80–100)
MCV RBC AUTO: 105.4 FL — HIGH (ref 80–100)
MCV RBC AUTO: 105.5 FL — HIGH (ref 80–100)
MCV RBC AUTO: 105.7 FL — HIGH (ref 80–100)
MCV RBC AUTO: 106.1 FL — HIGH (ref 80–100)
MCV RBC AUTO: 106.4 FL — HIGH (ref 80–100)
MCV RBC AUTO: 106.8 FL — HIGH (ref 80–100)
MCV RBC AUTO: 107.7 FL — HIGH (ref 80–100)
MCV RBC AUTO: 107.9 FL — HIGH (ref 80–100)
MCV RBC AUTO: 108.6 FL — HIGH (ref 80–100)
MCV RBC AUTO: 109.9 FL — HIGH (ref 80–100)
MCV RBC AUTO: 110.1 FL — HIGH (ref 80–100)
MCV RBC AUTO: 110.3 FL — HIGH (ref 80–100)
MCV RBC AUTO: 111 FL — HIGH (ref 80–100)
MCV RBC AUTO: 111.1 FL — HIGH (ref 80–100)
MCV RBC AUTO: 112.5 FL — HIGH (ref 80–100)
MCV RBC AUTO: 113.3 FL — HIGH (ref 80–100)
MCV RBC AUTO: 113.3 FL — HIGH (ref 80–100)
MCV RBC AUTO: 113.8 FL — HIGH (ref 80–100)
MCV RBC AUTO: 113.9 FL — HIGH (ref 80–100)
MCV RBC AUTO: 114.6 FL — HIGH (ref 80–100)
MCV RBC AUTO: 115.3 FL — HIGH (ref 80–100)
MCV RBC AUTO: 92.7 FL — SIGNIFICANT CHANGE UP (ref 80–100)
MCV RBC AUTO: 92.8 FL — SIGNIFICANT CHANGE UP (ref 80–100)
MCV RBC AUTO: 93.6 FL — SIGNIFICANT CHANGE UP (ref 80–100)
MCV RBC AUTO: 95.1 FL — SIGNIFICANT CHANGE UP (ref 80–100)
MCV RBC AUTO: 96.2 FL — SIGNIFICANT CHANGE UP (ref 80–100)
MCV RBC AUTO: 96.2 FL — SIGNIFICANT CHANGE UP (ref 80–100)
MCV RBC AUTO: 96.7 FL — SIGNIFICANT CHANGE UP (ref 80–100)
MCV RBC AUTO: 97.1 FL — SIGNIFICANT CHANGE UP (ref 80–100)
MCV RBC AUTO: 97.3 FL — SIGNIFICANT CHANGE UP (ref 80–100)
MCV RBC AUTO: 97.4 FL — SIGNIFICANT CHANGE UP (ref 80–100)
MCV RBC AUTO: 97.6 FL — SIGNIFICANT CHANGE UP (ref 80–100)
MCV RBC AUTO: 97.8 FL — SIGNIFICANT CHANGE UP (ref 80–100)
MCV RBC AUTO: 98 FL — SIGNIFICANT CHANGE UP (ref 80–100)
MCV RBC AUTO: 98.1 FL — SIGNIFICANT CHANGE UP (ref 80–100)
MCV RBC AUTO: 98.1 FL — SIGNIFICANT CHANGE UP (ref 80–100)
MCV RBC AUTO: 98.2 FL — SIGNIFICANT CHANGE UP (ref 80–100)
MCV RBC AUTO: 98.5 FL — SIGNIFICANT CHANGE UP (ref 80–100)
MCV RBC AUTO: 98.5 FL — SIGNIFICANT CHANGE UP (ref 80–100)
MCV RBC AUTO: 98.7 FL — SIGNIFICANT CHANGE UP (ref 80–100)
MCV RBC AUTO: 99 FL — SIGNIFICANT CHANGE UP (ref 80–100)
MCV RBC AUTO: 99.1 FL — SIGNIFICANT CHANGE UP (ref 80–100)
MCV RBC AUTO: 99.2 FL — SIGNIFICANT CHANGE UP (ref 80–100)
MCV RBC AUTO: 99.3 FL — SIGNIFICANT CHANGE UP (ref 80–100)
MCV RBC AUTO: 99.6 FL — SIGNIFICANT CHANGE UP (ref 80–100)
METAMYELOCYTES # FLD: 1 % — HIGH (ref 0–0)
METAMYELOCYTES # FLD: 2 % — HIGH (ref 0–0)
METAMYELOCYTES # FLD: 2.6 % — HIGH (ref 0–0)
METAMYELOCYTES # FLD: 6.7 % — HIGH (ref 0–0)
METHOD TYPE: SIGNIFICANT CHANGE UP
MICROCYTES BLD QL: SLIGHT — SIGNIFICANT CHANGE UP
MONOCYTES # BLD AUTO: 0.26 K/UL — SIGNIFICANT CHANGE UP (ref 0–0.9)
MONOCYTES # BLD AUTO: 0.33 K/UL — SIGNIFICANT CHANGE UP (ref 0–0.9)
MONOCYTES # BLD AUTO: 0.38 K/UL — SIGNIFICANT CHANGE UP (ref 0–0.9)
MONOCYTES # BLD AUTO: 0.48 K/UL — SIGNIFICANT CHANGE UP (ref 0–0.9)
MONOCYTES # BLD AUTO: 0.52 K/UL — SIGNIFICANT CHANGE UP (ref 0–0.9)
MONOCYTES # BLD AUTO: 0.54 K/UL — SIGNIFICANT CHANGE UP (ref 0–0.9)
MONOCYTES # BLD AUTO: 0.69 K/UL — SIGNIFICANT CHANGE UP (ref 0–0.9)
MONOCYTES # BLD AUTO: 0.72 K/UL — SIGNIFICANT CHANGE UP (ref 0–0.9)
MONOCYTES # BLD AUTO: 0.89 K/UL — SIGNIFICANT CHANGE UP (ref 0–0.9)
MONOCYTES # BLD AUTO: 1.18 K/UL — HIGH (ref 0–0.9)
MONOCYTES # BLD AUTO: 1.43 K/UL — HIGH (ref 0–0.9)
MONOCYTES NFR BLD AUTO: 0.9 % — LOW (ref 2–14)
MONOCYTES NFR BLD AUTO: 1.6 % — LOW (ref 2–14)
MONOCYTES NFR BLD AUTO: 1.7 % — LOW (ref 2–14)
MONOCYTES NFR BLD AUTO: 1.7 % — LOW (ref 2–14)
MONOCYTES NFR BLD AUTO: 1.8 % — LOW (ref 2–14)
MONOCYTES NFR BLD AUTO: 2 % — SIGNIFICANT CHANGE UP (ref 2–14)
MONOCYTES NFR BLD AUTO: 2.8 % — SIGNIFICANT CHANGE UP (ref 2–14)
MONOCYTES NFR BLD AUTO: 2.9 % — SIGNIFICANT CHANGE UP (ref 2–14)
MONOCYTES NFR BLD AUTO: 3.5 % — SIGNIFICANT CHANGE UP (ref 2–14)
MONOCYTES NFR BLD AUTO: 3.5 % — SIGNIFICANT CHANGE UP (ref 2–14)
MONOCYTES NFR BLD AUTO: 4.1 % — SIGNIFICANT CHANGE UP (ref 2–14)
MRSA PCR RESULT.: SIGNIFICANT CHANGE UP
MYELOCYTES NFR BLD: 11 % — HIGH (ref 0–0)
MYELOCYTES NFR BLD: 4.4 % — HIGH (ref 0–0)
MYELOCYTES NFR BLD: 9.2 % — HIGH (ref 0–0)
MYOGLOBIN SERPL-MCNC: 95 NG/ML — HIGH (ref 25–58)
NEUTROPHILS # BLD AUTO: 11.14 K/UL — HIGH (ref 1.8–7.4)
NEUTROPHILS # BLD AUTO: 13.64 K/UL — HIGH (ref 1.8–7.4)
NEUTROPHILS # BLD AUTO: 15.14 K/UL — HIGH (ref 1.8–7.4)
NEUTROPHILS # BLD AUTO: 16.21 K/UL — HIGH (ref 1.8–7.4)
NEUTROPHILS # BLD AUTO: 17.95 K/UL — HIGH (ref 1.8–7.4)
NEUTROPHILS # BLD AUTO: 19.52 K/UL — HIGH (ref 1.8–7.4)
NEUTROPHILS # BLD AUTO: 24.97 K/UL — HIGH (ref 1.8–7.4)
NEUTROPHILS # BLD AUTO: 28.72 K/UL — HIGH (ref 1.8–7.4)
NEUTROPHILS # BLD AUTO: 32.65 K/UL — HIGH (ref 1.8–7.4)
NEUTROPHILS # BLD AUTO: 35.94 K/UL — HIGH (ref 1.8–7.4)
NEUTROPHILS # BLD AUTO: 42.14 K/UL — HIGH (ref 1.8–7.4)
NEUTROPHILS NFR BLD AUTO: 61 % — SIGNIFICANT CHANGE UP (ref 43–77)
NEUTROPHILS NFR BLD AUTO: 63.1 % — SIGNIFICANT CHANGE UP (ref 43–77)
NEUTROPHILS NFR BLD AUTO: 77.4 % — HIGH (ref 43–77)
NEUTROPHILS NFR BLD AUTO: 78.3 % — HIGH (ref 43–77)
NEUTROPHILS NFR BLD AUTO: 79.5 % — HIGH (ref 43–77)
NEUTROPHILS NFR BLD AUTO: 79.5 % — HIGH (ref 43–77)
NEUTROPHILS NFR BLD AUTO: 84.7 % — HIGH (ref 43–77)
NEUTROPHILS NFR BLD AUTO: 85 % — HIGH (ref 43–77)
NEUTROPHILS NFR BLD AUTO: 85.1 % — HIGH (ref 43–77)
NEUTROPHILS NFR BLD AUTO: 85.2 % — HIGH (ref 43–77)
NEUTROPHILS NFR BLD AUTO: 88.8 % — HIGH (ref 43–77)
NEUTS BAND # BLD: 0.9 % — SIGNIFICANT CHANGE UP (ref 0–8)
NEUTS BAND # BLD: 1 % — SIGNIFICANT CHANGE UP (ref 0–8)
NEUTS BAND # BLD: 2.5 % — SIGNIFICANT CHANGE UP (ref 0–8)
NEUTS BAND # BLD: 8 % — SIGNIFICANT CHANGE UP (ref 0–8)
NITRITE UR-MCNC: NEGATIVE — SIGNIFICANT CHANGE UP
NITRITE UR-MCNC: POSITIVE
NRBC # BLD: 0 /100 WBCS — SIGNIFICANT CHANGE UP (ref 0–0)
NRBC # BLD: 0 /100 — SIGNIFICANT CHANGE UP (ref 0–0)
NRBC # BLD: 1 /100 — HIGH (ref 0–0)
NT-PROBNP SERPL-SCNC: 545 PG/ML — HIGH (ref 0–450)
OB PNL STL: NEGATIVE — SIGNIFICANT CHANGE UP
ORGANISM # SPEC MICROSCOPIC CNT: SIGNIFICANT CHANGE UP
ORGANISM # SPEC MICROSCOPIC CNT: SIGNIFICANT CHANGE UP
OSMOLALITY 24H UR: 547 MOSM/KG — SIGNIFICANT CHANGE UP (ref 50–1200)
OVALOCYTES BLD QL SMEAR: SLIGHT — SIGNIFICANT CHANGE UP
OVALOCYTES BLD QL SMEAR: SLIGHT — SIGNIFICANT CHANGE UP
PCO2 BLDA: 38 MMHG — SIGNIFICANT CHANGE UP (ref 32–45)
PCO2 BLDA: 39 MMHG — HIGH (ref 32–35)
PH BLDA: 7.42 — SIGNIFICANT CHANGE UP (ref 7.35–7.45)
PH BLDA: 7.46 — HIGH (ref 7.35–7.45)
PH UR: 5 — SIGNIFICANT CHANGE UP (ref 5–8)
PH UR: 6 — SIGNIFICANT CHANGE UP (ref 5–8)
PH UR: 6.5 — SIGNIFICANT CHANGE UP (ref 5–8)
PH UR: 7 — SIGNIFICANT CHANGE UP (ref 5–8)
PHOSPHATE SERPL-MCNC: 1.2 MG/DL — LOW (ref 2.5–4.5)
PHOSPHATE SERPL-MCNC: 1.6 MG/DL — LOW (ref 2.5–4.5)
PHOSPHATE SERPL-MCNC: 1.8 MG/DL — LOW (ref 2.5–4.5)
PHOSPHATE SERPL-MCNC: 1.9 MG/DL — LOW (ref 2.5–4.5)
PHOSPHATE SERPL-MCNC: 2 MG/DL — LOW (ref 2.5–4.5)
PHOSPHATE SERPL-MCNC: 2 MG/DL — LOW (ref 2.5–4.5)
PHOSPHATE SERPL-MCNC: 2.2 MG/DL — LOW (ref 2.5–4.5)
PHOSPHATE SERPL-MCNC: 2.3 MG/DL — LOW (ref 2.5–4.5)
PHOSPHATE SERPL-MCNC: 2.3 MG/DL — LOW (ref 2.5–4.5)
PHOSPHATE SERPL-MCNC: 2.4 MG/DL — LOW (ref 2.5–4.5)
PHOSPHATE SERPL-MCNC: 2.5 MG/DL — SIGNIFICANT CHANGE UP (ref 2.5–4.5)
PHOSPHATE SERPL-MCNC: 2.5 MG/DL — SIGNIFICANT CHANGE UP (ref 2.5–4.5)
PHOSPHATE SERPL-MCNC: 2.6 MG/DL — SIGNIFICANT CHANGE UP (ref 2.5–4.5)
PHOSPHATE SERPL-MCNC: 2.7 MG/DL — SIGNIFICANT CHANGE UP (ref 2.5–4.5)
PHOSPHATE SERPL-MCNC: 2.8 MG/DL — SIGNIFICANT CHANGE UP (ref 2.5–4.5)
PHOSPHATE SERPL-MCNC: 2.9 MG/DL — SIGNIFICANT CHANGE UP (ref 2.5–4.5)
PHOSPHATE SERPL-MCNC: 3.1 MG/DL — SIGNIFICANT CHANGE UP (ref 2.5–4.5)
PHOSPHATE SERPL-MCNC: 3.2 MG/DL — SIGNIFICANT CHANGE UP (ref 2.5–4.5)
PHOSPHATE SERPL-MCNC: 3.2 MG/DL — SIGNIFICANT CHANGE UP (ref 2.5–4.5)
PHOSPHATE SERPL-MCNC: 3.3 MG/DL — SIGNIFICANT CHANGE UP (ref 2.5–4.5)
PHOSPHATE SERPL-MCNC: 3.3 MG/DL — SIGNIFICANT CHANGE UP (ref 2.5–4.5)
PHOSPHATE SERPL-MCNC: 3.4 MG/DL — SIGNIFICANT CHANGE UP (ref 2.5–4.5)
PHOSPHATE SERPL-MCNC: 4.3 MG/DL — SIGNIFICANT CHANGE UP (ref 2.5–4.5)
PHOSPHATE SERPL-MCNC: 4.9 MG/DL — HIGH (ref 2.5–4.5)
PHOSPHATE SERPL-MCNC: 5.5 MG/DL — HIGH (ref 2.5–4.5)
PHOSPHATE SERPL-MCNC: 6.9 MG/DL — HIGH (ref 2.5–4.5)
PLAT MORPH BLD: ABNORMAL
PLAT MORPH BLD: NORMAL — SIGNIFICANT CHANGE UP
PLAT MORPH BLD: NORMAL — SIGNIFICANT CHANGE UP
PLATELET # BLD AUTO: 135 K/UL — LOW (ref 150–400)
PLATELET # BLD AUTO: 140 K/UL — LOW (ref 150–400)
PLATELET # BLD AUTO: 141 K/UL — LOW (ref 150–400)
PLATELET # BLD AUTO: 147 K/UL — LOW (ref 150–400)
PLATELET # BLD AUTO: 151 K/UL — SIGNIFICANT CHANGE UP (ref 150–400)
PLATELET # BLD AUTO: 154 K/UL — SIGNIFICANT CHANGE UP (ref 150–400)
PLATELET # BLD AUTO: 162 K/UL — SIGNIFICANT CHANGE UP (ref 150–400)
PLATELET # BLD AUTO: 165 K/UL — SIGNIFICANT CHANGE UP (ref 150–400)
PLATELET # BLD AUTO: 166 K/UL — SIGNIFICANT CHANGE UP (ref 150–400)
PLATELET # BLD AUTO: 190 K/UL — SIGNIFICANT CHANGE UP (ref 150–400)
PLATELET # BLD AUTO: 202 K/UL — SIGNIFICANT CHANGE UP (ref 150–400)
PLATELET # BLD AUTO: 206 K/UL — SIGNIFICANT CHANGE UP (ref 150–400)
PLATELET # BLD AUTO: 208 K/UL — SIGNIFICANT CHANGE UP (ref 150–400)
PLATELET # BLD AUTO: 208 K/UL — SIGNIFICANT CHANGE UP (ref 150–400)
PLATELET # BLD AUTO: 230 K/UL — SIGNIFICANT CHANGE UP (ref 150–400)
PLATELET # BLD AUTO: 243 K/UL — SIGNIFICANT CHANGE UP (ref 150–400)
PLATELET # BLD AUTO: 258 K/UL — SIGNIFICANT CHANGE UP (ref 150–400)
PLATELET # BLD AUTO: 268 K/UL — SIGNIFICANT CHANGE UP (ref 150–400)
PLATELET # BLD AUTO: 276 K/UL — SIGNIFICANT CHANGE UP (ref 150–400)
PLATELET # BLD AUTO: 278 K/UL — SIGNIFICANT CHANGE UP (ref 150–400)
PLATELET # BLD AUTO: 281 K/UL — SIGNIFICANT CHANGE UP (ref 150–400)
PLATELET # BLD AUTO: 291 K/UL — SIGNIFICANT CHANGE UP (ref 150–400)
PLATELET # BLD AUTO: 292 K/UL — SIGNIFICANT CHANGE UP (ref 150–400)
PLATELET # BLD AUTO: 297 K/UL — SIGNIFICANT CHANGE UP (ref 150–400)
PLATELET # BLD AUTO: 306 K/UL — SIGNIFICANT CHANGE UP (ref 150–400)
PLATELET # BLD AUTO: 320 K/UL — SIGNIFICANT CHANGE UP (ref 150–400)
PLATELET # BLD AUTO: 321 K/UL — SIGNIFICANT CHANGE UP (ref 150–400)
PLATELET # BLD AUTO: 322 K/UL — SIGNIFICANT CHANGE UP (ref 150–400)
PLATELET # BLD AUTO: 324 K/UL — SIGNIFICANT CHANGE UP (ref 150–400)
PLATELET # BLD AUTO: 333 K/UL — SIGNIFICANT CHANGE UP (ref 150–400)
PLATELET # BLD AUTO: 334 K/UL — SIGNIFICANT CHANGE UP (ref 150–400)
PLATELET # BLD AUTO: 345 K/UL — SIGNIFICANT CHANGE UP (ref 150–400)
PLATELET # BLD AUTO: 349 K/UL — SIGNIFICANT CHANGE UP (ref 150–400)
PLATELET # BLD AUTO: 352 K/UL — SIGNIFICANT CHANGE UP (ref 150–400)
PLATELET # BLD AUTO: 361 K/UL — SIGNIFICANT CHANGE UP (ref 150–400)
PLATELET # BLD AUTO: 362 K/UL — SIGNIFICANT CHANGE UP (ref 150–400)
PLATELET # BLD AUTO: 374 K/UL — SIGNIFICANT CHANGE UP (ref 150–400)
PLATELET # BLD AUTO: 375 K/UL — SIGNIFICANT CHANGE UP (ref 150–400)
PLATELET # BLD AUTO: 376 K/UL — SIGNIFICANT CHANGE UP (ref 150–400)
PLATELET # BLD AUTO: 379 K/UL — SIGNIFICANT CHANGE UP (ref 150–400)
PLATELET # BLD AUTO: 382 K/UL — SIGNIFICANT CHANGE UP (ref 150–400)
PLATELET # BLD AUTO: 408 K/UL — HIGH (ref 150–400)
PLATELET # BLD AUTO: 412 K/UL — HIGH (ref 150–400)
PLATELET # BLD AUTO: 415 K/UL — HIGH (ref 150–400)
PLATELET # BLD AUTO: 441 K/UL — HIGH (ref 150–400)
PLATELET # BLD AUTO: 445 K/UL — HIGH (ref 150–400)
PLATELET # BLD AUTO: 450 K/UL — HIGH (ref 150–400)
PLATELET # BLD AUTO: 460 K/UL — HIGH (ref 150–400)
PLATELET # BLD AUTO: 462 K/UL — HIGH (ref 150–400)
PLATELET # BLD AUTO: 465 K/UL — HIGH (ref 150–400)
PLATELET # BLD AUTO: 486 K/UL — HIGH (ref 150–400)
PLATELET # BLD AUTO: 490 K/UL — HIGH (ref 150–400)
PLATELET # BLD AUTO: 494 K/UL — HIGH (ref 150–400)
PLATELET # BLD AUTO: 513 K/UL — HIGH (ref 150–400)
PLATELET # BLD AUTO: 539 K/UL — HIGH (ref 150–400)
PLATELET # BLD AUTO: 564 K/UL — HIGH (ref 150–400)
PLATELET # BLD AUTO: 570 K/UL — HIGH (ref 150–400)
PLATELET # BLD AUTO: 573 K/UL — HIGH (ref 150–400)
PLATELET # BLD AUTO: 576 K/UL — HIGH (ref 150–400)
PLATELET # BLD AUTO: 592 K/UL — HIGH (ref 150–400)
PLATELET # BLD AUTO: 616 K/UL — HIGH (ref 150–400)
PLATELET # BLD AUTO: 620 K/UL — HIGH (ref 150–400)
PLATELET # BLD AUTO: 741 K/UL — HIGH (ref 150–400)
PLATELET # BLD AUTO: 751 K/UL — HIGH (ref 150–400)
PLATELET # BLD AUTO: 753 K/UL — HIGH (ref 150–400)
PLATELET # BLD AUTO: SIGNIFICANT CHANGE UP (ref 150–400)
PLATELET COUNT - ESTIMATE: NORMAL — SIGNIFICANT CHANGE UP
PLATELET MAPPING ACTF MAX AMPLITUDE: 12.2 MM — SIGNIFICANT CHANGE UP (ref 2–19)
PLATELET MAPPING ADP MAXIMUM AMPLITUDE: 22.5 MM (ref 45–69)
PLATELET MAPPING ADP PERCENT INHIBITION: 82.1 % (ref 0–17)
PLATELET MAPPING HKH MAXIMUM AMPLITUDE: 69.6 MM (ref 53–68)
PO2 BLDA: 359 MMHG — HIGH (ref 83–108)
PO2 BLDA: 83 MMHG — SIGNIFICANT CHANGE UP (ref 83–108)
POIKILOCYTOSIS BLD QL AUTO: SIGNIFICANT CHANGE UP
POIKILOCYTOSIS BLD QL AUTO: SLIGHT — SIGNIFICANT CHANGE UP
POLYCHROMASIA BLD QL SMEAR: SLIGHT — SIGNIFICANT CHANGE UP
POTASSIUM SERPL-MCNC: 2 MMOL/L — CRITICAL LOW (ref 3.5–5.3)
POTASSIUM SERPL-MCNC: 2.4 MMOL/L — CRITICAL LOW (ref 3.5–5.3)
POTASSIUM SERPL-MCNC: 2.6 MMOL/L — CRITICAL LOW (ref 3.5–5.3)
POTASSIUM SERPL-MCNC: 2.6 MMOL/L — CRITICAL LOW (ref 3.5–5.3)
POTASSIUM SERPL-MCNC: 2.8 MMOL/L — CRITICAL LOW (ref 3.5–5.3)
POTASSIUM SERPL-MCNC: 2.8 MMOL/L — CRITICAL LOW (ref 3.5–5.3)
POTASSIUM SERPL-MCNC: 2.9 MMOL/L — CRITICAL LOW (ref 3.5–5.3)
POTASSIUM SERPL-MCNC: 3 MMOL/L — LOW (ref 3.5–5.3)
POTASSIUM SERPL-MCNC: 3.1 MMOL/L — LOW (ref 3.5–5.3)
POTASSIUM SERPL-MCNC: 3.1 MMOL/L — LOW (ref 3.5–5.3)
POTASSIUM SERPL-MCNC: 3.2 MMOL/L — LOW (ref 3.5–5.3)
POTASSIUM SERPL-MCNC: 3.3 MMOL/L — LOW (ref 3.5–5.3)
POTASSIUM SERPL-MCNC: 3.5 MMOL/L — SIGNIFICANT CHANGE UP (ref 3.5–5.3)
POTASSIUM SERPL-MCNC: 3.6 MMOL/L — SIGNIFICANT CHANGE UP (ref 3.5–5.3)
POTASSIUM SERPL-MCNC: 3.7 MMOL/L — SIGNIFICANT CHANGE UP (ref 3.5–5.3)
POTASSIUM SERPL-MCNC: 3.8 MMOL/L — SIGNIFICANT CHANGE UP (ref 3.5–5.3)
POTASSIUM SERPL-MCNC: 3.9 MMOL/L — SIGNIFICANT CHANGE UP (ref 3.5–5.3)
POTASSIUM SERPL-MCNC: 4 MMOL/L — SIGNIFICANT CHANGE UP (ref 3.5–5.3)
POTASSIUM SERPL-MCNC: 4.1 MMOL/L — SIGNIFICANT CHANGE UP (ref 3.5–5.3)
POTASSIUM SERPL-MCNC: 4.2 MMOL/L — SIGNIFICANT CHANGE UP (ref 3.5–5.3)
POTASSIUM SERPL-MCNC: 4.2 MMOL/L — SIGNIFICANT CHANGE UP (ref 3.5–5.3)
POTASSIUM SERPL-MCNC: 4.3 MMOL/L — SIGNIFICANT CHANGE UP (ref 3.5–5.3)
POTASSIUM SERPL-MCNC: 4.3 MMOL/L — SIGNIFICANT CHANGE UP (ref 3.5–5.3)
POTASSIUM SERPL-MCNC: 4.4 MMOL/L — SIGNIFICANT CHANGE UP (ref 3.5–5.3)
POTASSIUM SERPL-MCNC: 4.5 MMOL/L — SIGNIFICANT CHANGE UP (ref 3.5–5.3)
POTASSIUM SERPL-MCNC: 6 MMOL/L — HIGH (ref 3.5–5.3)
POTASSIUM SERPL-SCNC: 2 MMOL/L — CRITICAL LOW (ref 3.5–5.3)
POTASSIUM SERPL-SCNC: 2.4 MMOL/L — CRITICAL LOW (ref 3.5–5.3)
POTASSIUM SERPL-SCNC: 2.6 MMOL/L — CRITICAL LOW (ref 3.5–5.3)
POTASSIUM SERPL-SCNC: 2.6 MMOL/L — CRITICAL LOW (ref 3.5–5.3)
POTASSIUM SERPL-SCNC: 2.8 MMOL/L — CRITICAL LOW (ref 3.5–5.3)
POTASSIUM SERPL-SCNC: 2.8 MMOL/L — CRITICAL LOW (ref 3.5–5.3)
POTASSIUM SERPL-SCNC: 2.9 MMOL/L — CRITICAL LOW (ref 3.5–5.3)
POTASSIUM SERPL-SCNC: 3 MMOL/L — LOW (ref 3.5–5.3)
POTASSIUM SERPL-SCNC: 3.1 MMOL/L — LOW (ref 3.5–5.3)
POTASSIUM SERPL-SCNC: 3.1 MMOL/L — LOW (ref 3.5–5.3)
POTASSIUM SERPL-SCNC: 3.2 MMOL/L — LOW (ref 3.5–5.3)
POTASSIUM SERPL-SCNC: 3.3 MMOL/L — LOW (ref 3.5–5.3)
POTASSIUM SERPL-SCNC: 3.5 MMOL/L — SIGNIFICANT CHANGE UP (ref 3.5–5.3)
POTASSIUM SERPL-SCNC: 3.6 MMOL/L — SIGNIFICANT CHANGE UP (ref 3.5–5.3)
POTASSIUM SERPL-SCNC: 3.7 MMOL/L — SIGNIFICANT CHANGE UP (ref 3.5–5.3)
POTASSIUM SERPL-SCNC: 3.8 MMOL/L — SIGNIFICANT CHANGE UP (ref 3.5–5.3)
POTASSIUM SERPL-SCNC: 3.9 MMOL/L — SIGNIFICANT CHANGE UP (ref 3.5–5.3)
POTASSIUM SERPL-SCNC: 4 MMOL/L — SIGNIFICANT CHANGE UP (ref 3.5–5.3)
POTASSIUM SERPL-SCNC: 4.1 MMOL/L — SIGNIFICANT CHANGE UP (ref 3.5–5.3)
POTASSIUM SERPL-SCNC: 4.2 MMOL/L — SIGNIFICANT CHANGE UP (ref 3.5–5.3)
POTASSIUM SERPL-SCNC: 4.2 MMOL/L — SIGNIFICANT CHANGE UP (ref 3.5–5.3)
POTASSIUM SERPL-SCNC: 4.3 MMOL/L — SIGNIFICANT CHANGE UP (ref 3.5–5.3)
POTASSIUM SERPL-SCNC: 4.3 MMOL/L — SIGNIFICANT CHANGE UP (ref 3.5–5.3)
POTASSIUM SERPL-SCNC: 4.4 MMOL/L — SIGNIFICANT CHANGE UP (ref 3.5–5.3)
POTASSIUM SERPL-SCNC: 4.5 MMOL/L — SIGNIFICANT CHANGE UP (ref 3.5–5.3)
POTASSIUM SERPL-SCNC: 6 MMOL/L — HIGH (ref 3.5–5.3)
POTASSIUM UR-SCNC: 57 MMOL/L — SIGNIFICANT CHANGE UP
PROCALCITONIN SERPL-MCNC: 0.14 NG/ML — HIGH (ref 0.02–0.1)
PROCALCITONIN SERPL-MCNC: 0.14 NG/ML — HIGH (ref 0.02–0.1)
PROCALCITONIN SERPL-MCNC: 0.17 NG/ML — HIGH (ref 0.02–0.1)
PROCALCITONIN SERPL-MCNC: 0.47 NG/ML — HIGH (ref 0.02–0.1)
PROCALCITONIN SERPL-MCNC: 1.42 NG/ML — HIGH
PROCALCITONIN SERPL-MCNC: 5.87 NG/ML — HIGH (ref 0.02–0.1)
PROMYELOCYTES # FLD: 1 % — HIGH (ref 0–0)
PROMYELOCYTES # FLD: 25 % — HIGH (ref 0–0)
PROT SERPL-MCNC: 2.2 G/DL — LOW (ref 6–8.3)
PROT SERPL-MCNC: 2.7 G/DL — LOW (ref 6–8.3)
PROT SERPL-MCNC: 3.5 G/DL — LOW (ref 6–8.3)
PROT SERPL-MCNC: 4.2 G/DL — LOW (ref 6–8.3)
PROT SERPL-MCNC: 4.2 G/DL — LOW (ref 6–8.3)
PROT SERPL-MCNC: 4.4 G/DL — LOW (ref 6–8.3)
PROT SERPL-MCNC: 4.6 G/DL — LOW (ref 6–8.3)
PROT SERPL-MCNC: 4.8 G/DL — LOW (ref 6–8.3)
PROT SERPL-MCNC: 5.1 G/DL — LOW (ref 6–8.3)
PROT SERPL-MCNC: 5.7 G/DL — LOW (ref 6–8.3)
PROT SERPL-MCNC: 6 G/DL — SIGNIFICANT CHANGE UP (ref 6–8.3)
PROT SERPL-MCNC: 6.7 G/DL — SIGNIFICANT CHANGE UP (ref 6–8.3)
PROT SERPL-MCNC: 6.9 G/DL — SIGNIFICANT CHANGE UP (ref 6–8.3)
PROT UR-MCNC: 100
PROT UR-MCNC: 30 MG/DL
PROT UR-MCNC: 30 MG/DL
PROT UR-MCNC: ABNORMAL
PROTHROM AB SERPL-ACNC: 11.6 SEC — SIGNIFICANT CHANGE UP (ref 10.6–13.6)
PROTHROM AB SERPL-ACNC: 11.8 SEC — SIGNIFICANT CHANGE UP (ref 10.6–13.6)
PROTHROM AB SERPL-ACNC: 11.8 SEC — SIGNIFICANT CHANGE UP (ref 10.6–13.6)
PROTHROM AB SERPL-ACNC: 11.9 SEC — SIGNIFICANT CHANGE UP (ref 10.6–13.6)
PROTHROM AB SERPL-ACNC: 12.2 SEC — SIGNIFICANT CHANGE UP (ref 10.6–13.6)
PROTHROM AB SERPL-ACNC: 12.8 SEC — SIGNIFICANT CHANGE UP (ref 10.6–13.6)
PROTHROM AB SERPL-ACNC: 12.9 SEC — SIGNIFICANT CHANGE UP (ref 10.6–13.6)
PROTHROM AB SERPL-ACNC: 13 SEC — SIGNIFICANT CHANGE UP (ref 10.6–13.6)
PROTHROM AB SERPL-ACNC: 13.3 SEC — SIGNIFICANT CHANGE UP (ref 10.6–13.6)
PROTHROM AB SERPL-ACNC: 13.3 SEC — SIGNIFICANT CHANGE UP (ref 10.6–13.6)
PROTHROM AB SERPL-ACNC: 13.6 SEC — SIGNIFICANT CHANGE UP (ref 10.6–13.6)
PROTHROM AB SERPL-ACNC: 18.7 SEC — HIGH (ref 10.6–13.6)
PROTHROM AB SERPL-ACNC: 22.1 SEC — HIGH (ref 10.6–13.6)
RAPID RVP RESULT: SIGNIFICANT CHANGE UP
RAPIDTEG MAXIMUM AMPLITUDE: 68.9 MM — SIGNIFICANT CHANGE UP (ref 52–70)
RBC # BLD: 1.07 M/UL — LOW (ref 3.8–5.2)
RBC # BLD: 1.1 M/UL — LOW (ref 3.8–5.2)
RBC # BLD: 1.51 M/UL — LOW (ref 3.8–5.2)
RBC # BLD: 1.8 M/UL — LOW (ref 3.8–5.2)
RBC # BLD: 1.89 M/UL — LOW (ref 3.8–5.2)
RBC # BLD: 1.95 M/UL — LOW (ref 3.8–5.2)
RBC # BLD: 2 M/UL — LOW (ref 3.8–5.2)
RBC # BLD: 2 M/UL — LOW (ref 3.8–5.2)
RBC # BLD: 2.02 M/UL — LOW (ref 3.8–5.2)
RBC # BLD: 2.04 M/UL — LOW (ref 3.8–5.2)
RBC # BLD: 2.05 M/UL — LOW (ref 3.8–5.2)
RBC # BLD: 2.07 M/UL — LOW (ref 3.8–5.2)
RBC # BLD: 2.12 M/UL — LOW (ref 3.8–5.2)
RBC # BLD: 2.12 M/UL — LOW (ref 3.8–5.2)
RBC # BLD: 2.13 M/UL — LOW (ref 3.8–5.2)
RBC # BLD: 2.18 M/UL — LOW (ref 3.8–5.2)
RBC # BLD: 2.24 M/UL — LOW (ref 3.8–5.2)
RBC # BLD: 2.3 M/UL — LOW (ref 3.8–5.2)
RBC # BLD: 2.35 M/UL — LOW (ref 3.8–5.2)
RBC # BLD: 2.38 M/UL — LOW (ref 3.8–5.2)
RBC # BLD: 2.39 M/UL — LOW (ref 3.8–5.2)
RBC # BLD: 2.4 M/UL — LOW (ref 3.8–5.2)
RBC # BLD: 2.42 M/UL — LOW (ref 3.8–5.2)
RBC # BLD: 2.43 M/UL — LOW (ref 3.8–5.2)
RBC # BLD: 2.45 M/UL — LOW (ref 3.8–5.2)
RBC # BLD: 2.49 M/UL — LOW (ref 3.8–5.2)
RBC # BLD: 2.51 M/UL — LOW (ref 3.8–5.2)
RBC # BLD: 2.54 M/UL — LOW (ref 3.8–5.2)
RBC # BLD: 2.55 M/UL — LOW (ref 3.8–5.2)
RBC # BLD: 2.55 M/UL — LOW (ref 3.8–5.2)
RBC # BLD: 2.58 M/UL — LOW (ref 3.8–5.2)
RBC # BLD: 2.59 M/UL — LOW (ref 3.8–5.2)
RBC # BLD: 2.61 M/UL — LOW (ref 3.8–5.2)
RBC # BLD: 2.62 M/UL — LOW (ref 3.8–5.2)
RBC # BLD: 2.63 M/UL — LOW (ref 3.8–5.2)
RBC # BLD: 2.63 M/UL — LOW (ref 3.8–5.2)
RBC # BLD: 2.64 M/UL — LOW (ref 3.8–5.2)
RBC # BLD: 2.64 M/UL — LOW (ref 3.8–5.2)
RBC # BLD: 2.65 M/UL — LOW (ref 3.8–5.2)
RBC # BLD: 2.66 M/UL — LOW (ref 3.8–5.2)
RBC # BLD: 2.66 M/UL — LOW (ref 3.8–5.2)
RBC # BLD: 2.68 M/UL — LOW (ref 3.8–5.2)
RBC # BLD: 2.69 M/UL — LOW (ref 3.8–5.2)
RBC # BLD: 2.7 M/UL — LOW (ref 3.8–5.2)
RBC # BLD: 2.71 M/UL — LOW (ref 3.8–5.2)
RBC # BLD: 2.71 M/UL — LOW (ref 3.8–5.2)
RBC # BLD: 2.73 M/UL — LOW (ref 3.8–5.2)
RBC # BLD: 2.78 M/UL — LOW (ref 3.8–5.2)
RBC # BLD: 2.79 M/UL — LOW (ref 3.8–5.2)
RBC # BLD: 2.8 M/UL — LOW (ref 3.8–5.2)
RBC # BLD: 2.82 M/UL — LOW (ref 3.8–5.2)
RBC # BLD: 2.83 M/UL — LOW (ref 3.8–5.2)
RBC # BLD: 2.84 M/UL — LOW (ref 3.8–5.2)
RBC # BLD: 2.89 M/UL — LOW (ref 3.8–5.2)
RBC # BLD: 2.95 M/UL — LOW (ref 3.8–5.2)
RBC # BLD: 2.96 M/UL — LOW (ref 3.8–5.2)
RBC # BLD: 3.09 M/UL — LOW (ref 3.8–5.2)
RBC # BLD: 3.11 M/UL — LOW (ref 3.8–5.2)
RBC # BLD: 3.11 M/UL — LOW (ref 3.8–5.2)
RBC # BLD: 3.25 M/UL — LOW (ref 3.8–5.2)
RBC # BLD: 3.27 M/UL — LOW (ref 3.8–5.2)
RBC # BLD: 3.41 M/UL — LOW (ref 3.8–5.2)
RBC # BLD: 3.43 M/UL — LOW (ref 3.8–5.2)
RBC # BLD: 3.48 M/UL — LOW (ref 3.8–5.2)
RBC # BLD: 3.49 M/UL — LOW (ref 3.8–5.2)
RBC # BLD: 3.58 M/UL — LOW (ref 3.8–5.2)
RBC # FLD: 14.1 % — SIGNIFICANT CHANGE UP (ref 10.3–14.5)
RBC # FLD: 14.3 % — SIGNIFICANT CHANGE UP (ref 10.3–14.5)
RBC # FLD: 14.4 % — SIGNIFICANT CHANGE UP (ref 10.3–14.5)
RBC # FLD: 14.4 % — SIGNIFICANT CHANGE UP (ref 10.3–14.5)
RBC # FLD: 14.6 % — HIGH (ref 10.3–14.5)
RBC # FLD: 14.8 % — HIGH (ref 10.3–14.5)
RBC # FLD: 15 % — HIGH (ref 10.3–14.5)
RBC # FLD: 15.1 % — HIGH (ref 10.3–14.5)
RBC # FLD: 15.3 % — HIGH (ref 10.3–14.5)
RBC # FLD: 15.4 % — HIGH (ref 10.3–14.5)
RBC # FLD: 15.5 % — HIGH (ref 10.3–14.5)
RBC # FLD: 15.6 % — HIGH (ref 10.3–14.5)
RBC # FLD: 15.7 % — HIGH (ref 10.3–14.5)
RBC # FLD: 16.2 % — HIGH (ref 10.3–14.5)
RBC # FLD: 16.4 % — HIGH (ref 10.3–14.5)
RBC # FLD: 16.5 % — HIGH (ref 10.3–14.5)
RBC # FLD: 16.5 % — HIGH (ref 10.3–14.5)
RBC # FLD: 16.6 % — HIGH (ref 10.3–14.5)
RBC # FLD: 16.7 % — HIGH (ref 10.3–14.5)
RBC # FLD: 17.2 % — HIGH (ref 10.3–14.5)
RBC # FLD: 17.2 % — HIGH (ref 10.3–14.5)
RBC # FLD: 17.4 % — HIGH (ref 10.3–14.5)
RBC # FLD: 17.5 % — HIGH (ref 10.3–14.5)
RBC # FLD: 17.6 % — HIGH (ref 10.3–14.5)
RBC # FLD: 17.6 % — HIGH (ref 10.3–14.5)
RBC # FLD: 17.8 % — HIGH (ref 10.3–14.5)
RBC # FLD: 17.9 % — HIGH (ref 10.3–14.5)
RBC # FLD: 18 % — HIGH (ref 10.3–14.5)
RBC # FLD: 18.1 % — HIGH (ref 10.3–14.5)
RBC # FLD: 18.2 % — HIGH (ref 10.3–14.5)
RBC # FLD: 18.3 % — HIGH (ref 10.3–14.5)
RBC # FLD: 18.5 % — HIGH (ref 10.3–14.5)
RBC # FLD: 18.6 % — HIGH (ref 10.3–14.5)
RBC # FLD: 18.7 % — HIGH (ref 10.3–14.5)
RBC # FLD: 18.8 % — HIGH (ref 10.3–14.5)
RBC # FLD: 18.8 % — HIGH (ref 10.3–14.5)
RBC # FLD: 19.1 % — HIGH (ref 10.3–14.5)
RBC # FLD: 19.2 % — HIGH (ref 10.3–14.5)
RBC # FLD: 19.3 % — HIGH (ref 10.3–14.5)
RBC # FLD: 19.8 % — HIGH (ref 10.3–14.5)
RBC # FLD: 19.9 % — HIGH (ref 10.3–14.5)
RBC # FLD: 20 % — HIGH (ref 10.3–14.5)
RBC # FLD: 20.1 % — HIGH (ref 10.3–14.5)
RBC # FLD: 20.1 % — HIGH (ref 10.3–14.5)
RBC # FLD: 20.2 % — HIGH (ref 10.3–14.5)
RBC # FLD: 20.3 % — HIGH (ref 10.3–14.5)
RBC # FLD: 20.3 % — HIGH (ref 10.3–14.5)
RBC # FLD: 20.5 % — HIGH (ref 10.3–14.5)
RBC # FLD: 20.9 % — HIGH (ref 10.3–14.5)
RBC # FLD: 21.1 % — HIGH (ref 10.3–14.5)
RBC # FLD: 21.6 % — HIGH (ref 10.3–14.5)
RBC # FLD: 21.6 % — HIGH (ref 10.3–14.5)
RBC # FLD: 22.2 % — HIGH (ref 10.3–14.5)
RBC # FLD: 22.4 % — HIGH (ref 10.3–14.5)
RBC BLD AUTO: ABNORMAL
RBC BLD AUTO: NORMAL — SIGNIFICANT CHANGE UP
RBC BLD AUTO: SIGNIFICANT CHANGE UP
RBC CASTS # UR COMP ASSIST: 1 /HPF — SIGNIFICANT CHANGE UP (ref 0–4)
RBC CASTS # UR COMP ASSIST: 1177 /HPF — HIGH (ref 0–4)
RBC CASTS # UR COMP ASSIST: 349.3 /HPF — SIGNIFICANT CHANGE UP (ref 0–4)
RBC CASTS # UR COMP ASSIST: 5 /HPF — HIGH (ref 0–4)
RBC CASTS # UR COMP ASSIST: ABNORMAL /HPF (ref 0–4)
RETICS #: 65 K/UL — SIGNIFICANT CHANGE UP (ref 25–125)
RETICS #: 93.4 K/UL — SIGNIFICANT CHANGE UP (ref 25–125)
RETICS/RBC NFR: 2.6 % — HIGH (ref 0.5–2.5)
RETICS/RBC NFR: 4.7 % — HIGH (ref 0.5–2.5)
RH IG SCN BLD-IMP: POSITIVE — SIGNIFICANT CHANGE UP
S AUREUS DNA NOSE QL NAA+PROBE: SIGNIFICANT CHANGE UP
SAO2 % BLDA: 98.6 % — HIGH (ref 94–98)
SAO2 % BLDA: 99.7 % — HIGH (ref 94–98)
SARS-COV-2 IGG SERPL QL IA: NEGATIVE — SIGNIFICANT CHANGE UP
SARS-COV-2 IGG+IGM SERPL QL IA: 0.4 U/ML — SIGNIFICANT CHANGE UP
SARS-COV-2 IGG+IGM SERPL QL IA: 0.4 U/ML — SIGNIFICANT CHANGE UP
SARS-COV-2 IGG+IGM SERPL QL IA: NEGATIVE — SIGNIFICANT CHANGE UP
SARS-COV-2 IGG+IGM SERPL QL IA: NEGATIVE — SIGNIFICANT CHANGE UP
SARS-COV-2 IGM SERPL IA-ACNC: <0.1 INDEX — SIGNIFICANT CHANGE UP
SARS-COV-2 RNA SPEC QL NAA+PROBE: SIGNIFICANT CHANGE UP
SCHISTOCYTES BLD QL AUTO: SLIGHT — SIGNIFICANT CHANGE UP
SMUDGE CELLS # BLD: PRESENT — SIGNIFICANT CHANGE UP
SODIUM SERPL-SCNC: 127 MMOL/L — LOW (ref 135–145)
SODIUM SERPL-SCNC: 129 MMOL/L — LOW (ref 135–145)
SODIUM SERPL-SCNC: 131 MMOL/L — LOW (ref 135–145)
SODIUM SERPL-SCNC: 131 MMOL/L — LOW (ref 135–145)
SODIUM SERPL-SCNC: 132 MMOL/L — LOW (ref 135–145)
SODIUM SERPL-SCNC: 133 MMOL/L — LOW (ref 135–145)
SODIUM SERPL-SCNC: 134 MMOL/L — LOW (ref 135–145)
SODIUM SERPL-SCNC: 135 MMOL/L — SIGNIFICANT CHANGE UP (ref 135–145)
SODIUM SERPL-SCNC: 136 MMOL/L — SIGNIFICANT CHANGE UP (ref 135–145)
SODIUM SERPL-SCNC: 137 MMOL/L — SIGNIFICANT CHANGE UP (ref 135–145)
SODIUM SERPL-SCNC: 138 MMOL/L — SIGNIFICANT CHANGE UP (ref 135–145)
SODIUM SERPL-SCNC: 139 MMOL/L — SIGNIFICANT CHANGE UP (ref 135–145)
SODIUM SERPL-SCNC: 141 MMOL/L — SIGNIFICANT CHANGE UP (ref 135–145)
SODIUM SERPL-SCNC: 142 MMOL/L — SIGNIFICANT CHANGE UP (ref 135–145)
SODIUM SERPL-SCNC: 143 MMOL/L — SIGNIFICANT CHANGE UP (ref 135–145)
SODIUM SERPL-SCNC: 144 MMOL/L — SIGNIFICANT CHANGE UP (ref 135–145)
SODIUM SERPL-SCNC: 144 MMOL/L — SIGNIFICANT CHANGE UP (ref 135–145)
SODIUM SERPL-SCNC: 145 MMOL/L — SIGNIFICANT CHANGE UP (ref 135–145)
SODIUM SERPL-SCNC: 145 MMOL/L — SIGNIFICANT CHANGE UP (ref 135–145)
SODIUM SERPL-SCNC: 147 MMOL/L — HIGH (ref 135–145)
SODIUM SERPL-SCNC: 148 MMOL/L — HIGH (ref 135–145)
SODIUM SERPL-SCNC: 148 MMOL/L — HIGH (ref 135–145)
SODIUM SERPL-SCNC: 149 MMOL/L — HIGH (ref 135–145)
SODIUM SERPL-SCNC: 149 MMOL/L — HIGH (ref 135–145)
SODIUM SERPL-SCNC: 150 MMOL/L — HIGH (ref 135–145)
SODIUM SERPL-SCNC: 151 MMOL/L — HIGH (ref 135–145)
SODIUM SERPL-SCNC: 152 MMOL/L — HIGH (ref 135–145)
SODIUM SERPL-SCNC: 155 MMOL/L — HIGH (ref 135–145)
SODIUM SERPL-SCNC: 156 MMOL/L — HIGH (ref 135–145)
SODIUM UR-SCNC: 65 MMOL/L — SIGNIFICANT CHANGE UP
SP GR SPEC: 1.01 — SIGNIFICANT CHANGE UP (ref 1.01–1.02)
SP GR SPEC: 1.02 — SIGNIFICANT CHANGE UP (ref 1.01–1.02)
SP GR SPEC: 1.03 — HIGH (ref 1.01–1.02)
SP GR SPEC: 1.04 — HIGH (ref 1.01–1.02)
SP GR SPEC: >1.05 (ref 1.01–1.02)
SPECIMEN SOURCE: SIGNIFICANT CHANGE UP
SPHEROCYTES BLD QL SMEAR: SLIGHT — SIGNIFICANT CHANGE UP
STOMATOCYTES BLD QL SMEAR: SLIGHT — SIGNIFICANT CHANGE UP
TEG FUNCTIONAL FIBRINOGEN: 21.8 MM — SIGNIFICANT CHANGE UP (ref 15–32)
TEG MAXIMUM AMPLITUDE: 69.4 MM (ref 52–69)
TEG REACTION TIME: 4.4 MIN (ref 4.6–9.1)
THROMBIN TIME: 27.9 SEC — HIGH (ref 16–25)
TIBC SERPL-MCNC: 184 UG/DL — LOW (ref 220–430)
TIBC SERPL-MCNC: 245 UG/DL — SIGNIFICANT CHANGE UP (ref 220–430)
TOXIC GRANULES BLD QL SMEAR: PRESENT — SIGNIFICANT CHANGE UP
TROPONIN I SERPL-MCNC: 0.01 NG/ML — LOW (ref 0.02–0.06)
TROPONIN I SERPL-MCNC: <.017 NG/ML — LOW (ref 0.02–0.06)
TROPONIN I, HIGH SENSITIVITY RESULT: 1861.7 NG/L — HIGH
UIBC SERPL-MCNC: 133 UG/DL — SIGNIFICANT CHANGE UP (ref 110–370)
UIBC SERPL-MCNC: 179 UG/DL — SIGNIFICANT CHANGE UP (ref 110–370)
UROBILINOGEN FLD QL: 1
UROBILINOGEN FLD QL: ABNORMAL
UROBILINOGEN FLD QL: NEGATIVE MG/DL — SIGNIFICANT CHANGE UP
UROBILINOGEN FLD QL: NEGATIVE — SIGNIFICANT CHANGE UP
VARIANT LYMPHS # BLD: 0.9 % — SIGNIFICANT CHANGE UP (ref 0–6)
VIT B12 SERPL-MCNC: >2000 PG/ML — HIGH (ref 232–1245)
WBC # BLD: 11.97 K/UL — HIGH (ref 3.8–10.5)
WBC # BLD: 13.15 K/UL — HIGH (ref 3.8–10.5)
WBC # BLD: 13.95 K/UL — HIGH (ref 3.8–10.5)
WBC # BLD: 14.55 K/UL — HIGH (ref 3.8–10.5)
WBC # BLD: 16.47 K/UL — HIGH (ref 3.8–10.5)
WBC # BLD: 16.6 K/UL — HIGH (ref 3.8–10.5)
WBC # BLD: 16.68 K/UL — HIGH (ref 3.8–10.5)
WBC # BLD: 17.61 K/UL — HIGH (ref 3.8–10.5)
WBC # BLD: 17.74 K/UL — HIGH (ref 3.8–10.5)
WBC # BLD: 18.1 K/UL — HIGH (ref 3.8–10.5)
WBC # BLD: 18.26 K/UL — HIGH (ref 3.8–10.5)
WBC # BLD: 19.06 K/UL — HIGH (ref 3.8–10.5)
WBC # BLD: 19.75 K/UL — HIGH (ref 3.8–10.5)
WBC # BLD: 19.95 K/UL — HIGH (ref 3.8–10.5)
WBC # BLD: 20.66 K/UL — HIGH (ref 3.8–10.5)
WBC # BLD: 20.86 K/UL — HIGH (ref 3.8–10.5)
WBC # BLD: 21.15 K/UL — HIGH (ref 3.8–10.5)
WBC # BLD: 23.36 K/UL — HIGH (ref 3.8–10.5)
WBC # BLD: 23.4 K/UL — HIGH (ref 3.8–10.5)
WBC # BLD: 23.71 K/UL — HIGH (ref 3.8–10.5)
WBC # BLD: 24.2 K/UL — HIGH (ref 3.8–10.5)
WBC # BLD: 26.01 K/UL — HIGH (ref 3.8–10.5)
WBC # BLD: 26.84 K/UL — HIGH (ref 3.8–10.5)
WBC # BLD: 27.26 K/UL — HIGH (ref 3.8–10.5)
WBC # BLD: 27.47 K/UL — HIGH (ref 3.8–10.5)
WBC # BLD: 27.85 K/UL — HIGH (ref 3.8–10.5)
WBC # BLD: 29.03 K/UL — HIGH (ref 3.8–10.5)
WBC # BLD: 29.07 K/UL — HIGH (ref 3.8–10.5)
WBC # BLD: 29.48 K/UL — HIGH (ref 3.8–10.5)
WBC # BLD: 29.54 K/UL — HIGH (ref 3.8–10.5)
WBC # BLD: 30.17 K/UL — HIGH (ref 3.8–10.5)
WBC # BLD: 30.37 K/UL — HIGH (ref 3.8–10.5)
WBC # BLD: 30.92 K/UL — HIGH (ref 3.8–10.5)
WBC # BLD: 31.54 K/UL — HIGH (ref 3.8–10.5)
WBC # BLD: 31.65 K/UL — HIGH (ref 3.8–10.5)
WBC # BLD: 31.8 K/UL — HIGH (ref 3.8–10.5)
WBC # BLD: 32.26 K/UL — HIGH (ref 3.8–10.5)
WBC # BLD: 33.36 K/UL — HIGH (ref 3.8–10.5)
WBC # BLD: 33.73 K/UL — HIGH (ref 3.8–10.5)
WBC # BLD: 33.79 K/UL — HIGH (ref 3.8–10.5)
WBC # BLD: 33.87 K/UL — HIGH (ref 3.8–10.5)
WBC # BLD: 34.09 K/UL — HIGH (ref 3.8–10.5)
WBC # BLD: 34.33 K/UL — HIGH (ref 3.8–10.5)
WBC # BLD: 34.7 K/UL — HIGH (ref 3.8–10.5)
WBC # BLD: 35.09 K/UL — HIGH (ref 3.8–10.5)
WBC # BLD: 35.3 K/UL — HIGH (ref 3.8–10.5)
WBC # BLD: 35.32 K/UL — HIGH (ref 3.8–10.5)
WBC # BLD: 35.6 K/UL — HIGH (ref 3.8–10.5)
WBC # BLD: 36.26 K/UL — HIGH (ref 3.8–10.5)
WBC # BLD: 37.8 K/UL — HIGH (ref 3.8–10.5)
WBC # BLD: 38.03 K/UL — HIGH (ref 3.8–10.5)
WBC # BLD: 38.96 K/UL — HIGH (ref 3.8–10.5)
WBC # BLD: 39.19 K/UL — HIGH (ref 3.8–10.5)
WBC # BLD: 39.34 K/UL — HIGH (ref 3.8–10.5)
WBC # BLD: 39.34 K/UL — HIGH (ref 3.8–10.5)
WBC # BLD: 39.83 K/UL — HIGH (ref 3.8–10.5)
WBC # BLD: 40.41 K/UL — CRITICAL HIGH (ref 3.8–10.5)
WBC # BLD: 40.5 K/UL — CRITICAL HIGH (ref 3.8–10.5)
WBC # BLD: 40.93 K/UL — CRITICAL HIGH (ref 3.8–10.5)
WBC # BLD: 41.1 K/UL — CRITICAL HIGH (ref 3.8–10.5)
WBC # BLD: 42.01 K/UL — CRITICAL HIGH (ref 3.8–10.5)
WBC # BLD: 46.48 K/UL — CRITICAL HIGH (ref 3.8–10.5)
WBC # BLD: 48.35 K/UL — CRITICAL HIGH (ref 3.8–10.5)
WBC # BLD: 52.48 K/UL — CRITICAL HIGH (ref 3.8–10.5)
WBC # BLD: 56.23 K/UL — CRITICAL HIGH (ref 3.8–10.5)
WBC # BLD: 89.95 K/UL — CRITICAL HIGH (ref 3.8–10.5)
WBC # FLD AUTO: 11.97 K/UL — HIGH (ref 3.8–10.5)
WBC # FLD AUTO: 13.15 K/UL — HIGH (ref 3.8–10.5)
WBC # FLD AUTO: 13.95 K/UL — HIGH (ref 3.8–10.5)
WBC # FLD AUTO: 14.55 K/UL — HIGH (ref 3.8–10.5)
WBC # FLD AUTO: 16.47 K/UL — HIGH (ref 3.8–10.5)
WBC # FLD AUTO: 16.6 K/UL — HIGH (ref 3.8–10.5)
WBC # FLD AUTO: 16.68 K/UL — HIGH (ref 3.8–10.5)
WBC # FLD AUTO: 17.61 K/UL — HIGH (ref 3.8–10.5)
WBC # FLD AUTO: 17.74 K/UL — HIGH (ref 3.8–10.5)
WBC # FLD AUTO: 18.1 K/UL — HIGH (ref 3.8–10.5)
WBC # FLD AUTO: 18.26 K/UL — HIGH (ref 3.8–10.5)
WBC # FLD AUTO: 19.06 K/UL — HIGH (ref 3.8–10.5)
WBC # FLD AUTO: 19.75 K/UL — HIGH (ref 3.8–10.5)
WBC # FLD AUTO: 19.95 K/UL — HIGH (ref 3.8–10.5)
WBC # FLD AUTO: 20.66 K/UL — HIGH (ref 3.8–10.5)
WBC # FLD AUTO: 20.86 K/UL — HIGH (ref 3.8–10.5)
WBC # FLD AUTO: 21.15 K/UL — HIGH (ref 3.8–10.5)
WBC # FLD AUTO: 23.36 K/UL — HIGH (ref 3.8–10.5)
WBC # FLD AUTO: 23.4 K/UL — HIGH (ref 3.8–10.5)
WBC # FLD AUTO: 23.71 K/UL — HIGH (ref 3.8–10.5)
WBC # FLD AUTO: 24.2 K/UL — HIGH (ref 3.8–10.5)
WBC # FLD AUTO: 26.01 K/UL — HIGH (ref 3.8–10.5)
WBC # FLD AUTO: 26.84 K/UL — HIGH (ref 3.8–10.5)
WBC # FLD AUTO: 27.26 K/UL — HIGH (ref 3.8–10.5)
WBC # FLD AUTO: 27.47 K/UL — HIGH (ref 3.8–10.5)
WBC # FLD AUTO: 27.85 K/UL — HIGH (ref 3.8–10.5)
WBC # FLD AUTO: 29.03 K/UL — HIGH (ref 3.8–10.5)
WBC # FLD AUTO: 29.07 K/UL — HIGH (ref 3.8–10.5)
WBC # FLD AUTO: 29.48 K/UL — HIGH (ref 3.8–10.5)
WBC # FLD AUTO: 29.54 K/UL — HIGH (ref 3.8–10.5)
WBC # FLD AUTO: 30.17 K/UL — HIGH (ref 3.8–10.5)
WBC # FLD AUTO: 30.37 K/UL — HIGH (ref 3.8–10.5)
WBC # FLD AUTO: 30.92 K/UL — HIGH (ref 3.8–10.5)
WBC # FLD AUTO: 31.54 K/UL — HIGH (ref 3.8–10.5)
WBC # FLD AUTO: 31.65 K/UL — HIGH (ref 3.8–10.5)
WBC # FLD AUTO: 31.8 K/UL — HIGH (ref 3.8–10.5)
WBC # FLD AUTO: 32.26 K/UL — HIGH (ref 3.8–10.5)
WBC # FLD AUTO: 33.36 K/UL — HIGH (ref 3.8–10.5)
WBC # FLD AUTO: 33.73 K/UL — HIGH (ref 3.8–10.5)
WBC # FLD AUTO: 33.79 K/UL — HIGH (ref 3.8–10.5)
WBC # FLD AUTO: 33.87 K/UL — HIGH (ref 3.8–10.5)
WBC # FLD AUTO: 34.09 K/UL — HIGH (ref 3.8–10.5)
WBC # FLD AUTO: 34.33 K/UL — HIGH (ref 3.8–10.5)
WBC # FLD AUTO: 34.7 K/UL — HIGH (ref 3.8–10.5)
WBC # FLD AUTO: 35.09 K/UL — HIGH (ref 3.8–10.5)
WBC # FLD AUTO: 35.3 K/UL — HIGH (ref 3.8–10.5)
WBC # FLD AUTO: 35.32 K/UL — HIGH (ref 3.8–10.5)
WBC # FLD AUTO: 35.6 K/UL — HIGH (ref 3.8–10.5)
WBC # FLD AUTO: 36.26 K/UL — HIGH (ref 3.8–10.5)
WBC # FLD AUTO: 37.8 K/UL — HIGH (ref 3.8–10.5)
WBC # FLD AUTO: 38.03 K/UL — HIGH (ref 3.8–10.5)
WBC # FLD AUTO: 38.96 K/UL — HIGH (ref 3.8–10.5)
WBC # FLD AUTO: 39.19 K/UL — HIGH (ref 3.8–10.5)
WBC # FLD AUTO: 39.34 K/UL — HIGH (ref 3.8–10.5)
WBC # FLD AUTO: 39.34 K/UL — HIGH (ref 3.8–10.5)
WBC # FLD AUTO: 39.83 K/UL — HIGH (ref 3.8–10.5)
WBC # FLD AUTO: 40.41 K/UL — CRITICAL HIGH (ref 3.8–10.5)
WBC # FLD AUTO: 40.5 K/UL — CRITICAL HIGH (ref 3.8–10.5)
WBC # FLD AUTO: 40.93 K/UL — CRITICAL HIGH (ref 3.8–10.5)
WBC # FLD AUTO: 41.1 K/UL — CRITICAL HIGH (ref 3.8–10.5)
WBC # FLD AUTO: 42.01 K/UL — CRITICAL HIGH (ref 3.8–10.5)
WBC # FLD AUTO: 46.48 K/UL — CRITICAL HIGH (ref 3.8–10.5)
WBC # FLD AUTO: 48.35 K/UL — CRITICAL HIGH (ref 3.8–10.5)
WBC # FLD AUTO: 52.48 K/UL — CRITICAL HIGH (ref 3.8–10.5)
WBC # FLD AUTO: 56.23 K/UL — CRITICAL HIGH (ref 3.8–10.5)
WBC # FLD AUTO: 89.95 K/UL — CRITICAL HIGH (ref 3.8–10.5)
WBC UR QL: 218 /HPF — HIGH (ref 0–5)
WBC UR QL: 2477.3 /HPF — SIGNIFICANT CHANGE UP (ref 0–5)
WBC UR QL: 7 /HPF — HIGH (ref 0–5)
WBC UR QL: 8 /HPF — HIGH (ref 0–5)
WBC UR QL: SIGNIFICANT CHANGE UP
WBC UR QL: SIGNIFICANT CHANGE UP /HPF (ref 0–5)
WBC UR QL: SIGNIFICANT CHANGE UP /HPF (ref 0–5)

## 2021-01-01 PROCEDURE — 70450 CT HEAD/BRAIN W/O DYE: CPT | Mod: MA

## 2021-01-01 PROCEDURE — 80048 BASIC METABOLIC PNL TOTAL CA: CPT

## 2021-01-01 PROCEDURE — 96361 HYDRATE IV INFUSION ADD-ON: CPT

## 2021-01-01 PROCEDURE — 80053 COMPREHEN METABOLIC PANEL: CPT

## 2021-01-01 PROCEDURE — 84145 PROCALCITONIN (PCT): CPT

## 2021-01-01 PROCEDURE — 70450 CT HEAD/BRAIN W/O DYE: CPT | Mod: 26,77

## 2021-01-01 PROCEDURE — 93005 ELECTROCARDIOGRAM TRACING: CPT

## 2021-01-01 PROCEDURE — 85045 AUTOMATED RETICULOCYTE COUNT: CPT

## 2021-01-01 PROCEDURE — 74018 RADEX ABDOMEN 1 VIEW: CPT | Mod: 26

## 2021-01-01 PROCEDURE — 82728 ASSAY OF FERRITIN: CPT

## 2021-01-01 PROCEDURE — 99223 1ST HOSP IP/OBS HIGH 75: CPT

## 2021-01-01 PROCEDURE — 85025 COMPLETE CBC W/AUTO DIFF WBC: CPT

## 2021-01-01 PROCEDURE — 99285 EMERGENCY DEPT VISIT HI MDM: CPT

## 2021-01-01 PROCEDURE — 97535 SELF CARE MNGMENT TRAINING: CPT

## 2021-01-01 PROCEDURE — 99233 SBSQ HOSP IP/OBS HIGH 50: CPT

## 2021-01-01 PROCEDURE — 93970 EXTREMITY STUDY: CPT

## 2021-01-01 PROCEDURE — 99291 CRITICAL CARE FIRST HOUR: CPT

## 2021-01-01 PROCEDURE — 73070 X-RAY EXAM OF ELBOW: CPT

## 2021-01-01 PROCEDURE — 82570 ASSAY OF URINE CREATININE: CPT

## 2021-01-01 PROCEDURE — 99231 SBSQ HOSP IP/OBS SF/LOW 25: CPT

## 2021-01-01 PROCEDURE — 87635 SARS-COV-2 COVID-19 AMP PRB: CPT

## 2021-01-01 PROCEDURE — 82247 BILIRUBIN TOTAL: CPT

## 2021-01-01 PROCEDURE — 99285 EMERGENCY DEPT VISIT HI MDM: CPT | Mod: 25

## 2021-01-01 PROCEDURE — 87507 IADNA-DNA/RNA PROBE TQ 12-25: CPT

## 2021-01-01 PROCEDURE — 83880 ASSAY OF NATRIURETIC PEPTIDE: CPT

## 2021-01-01 PROCEDURE — P9016: CPT

## 2021-01-01 PROCEDURE — 81001 URINALYSIS AUTO W/SCOPE: CPT

## 2021-01-01 PROCEDURE — 70450 CT HEAD/BRAIN W/O DYE: CPT | Mod: 26

## 2021-01-01 PROCEDURE — 72170 X-RAY EXAM OF PELVIS: CPT

## 2021-01-01 PROCEDURE — 74177 CT ABD & PELVIS W/CONTRAST: CPT | Mod: 26

## 2021-01-01 PROCEDURE — 71250 CT THORAX DX C-: CPT | Mod: MA

## 2021-01-01 PROCEDURE — 83615 LACTATE (LD) (LDH) ENZYME: CPT

## 2021-01-01 PROCEDURE — 97110 THERAPEUTIC EXERCISES: CPT

## 2021-01-01 PROCEDURE — 86900 BLOOD TYPING SEROLOGIC ABO: CPT

## 2021-01-01 PROCEDURE — 83735 ASSAY OF MAGNESIUM: CPT

## 2021-01-01 PROCEDURE — 83010 ASSAY OF HAPTOGLOBIN QUANT: CPT

## 2021-01-01 PROCEDURE — 99232 SBSQ HOSP IP/OBS MODERATE 35: CPT

## 2021-01-01 PROCEDURE — 93306 TTE W/DOPPLER COMPLETE: CPT | Mod: 26

## 2021-01-01 PROCEDURE — 74176 CT ABD & PELVIS W/O CONTRAST: CPT | Mod: 26

## 2021-01-01 PROCEDURE — 72170 X-RAY EXAM OF PELVIS: CPT | Mod: 26

## 2021-01-01 PROCEDURE — 51701 INSERT BLADDER CATHETER: CPT

## 2021-01-01 PROCEDURE — 71045 X-RAY EXAM CHEST 1 VIEW: CPT | Mod: 26

## 2021-01-01 PROCEDURE — 97162 PT EVAL MOD COMPLEX 30 MIN: CPT

## 2021-01-01 PROCEDURE — 36430 TRANSFUSION BLD/BLD COMPNT: CPT

## 2021-01-01 PROCEDURE — 99223 1ST HOSP IP/OBS HIGH 75: CPT | Mod: GC

## 2021-01-01 PROCEDURE — 86901 BLOOD TYPING SEROLOGIC RH(D): CPT

## 2021-01-01 PROCEDURE — 82248 BILIRUBIN DIRECT: CPT

## 2021-01-01 PROCEDURE — 99497 ADVNCD CARE PLAN 30 MIN: CPT | Mod: 25

## 2021-01-01 PROCEDURE — 82962 GLUCOSE BLOOD TEST: CPT

## 2021-01-01 PROCEDURE — 73060 X-RAY EXAM OF HUMERUS: CPT | Mod: 26,LT

## 2021-01-01 PROCEDURE — 73502 X-RAY EXAM HIP UNI 2-3 VIEWS: CPT

## 2021-01-01 PROCEDURE — 36415 COLL VENOUS BLD VENIPUNCTURE: CPT

## 2021-01-01 PROCEDURE — 76775 US EXAM ABDO BACK WALL LIM: CPT

## 2021-01-01 PROCEDURE — 73552 X-RAY EXAM OF FEMUR 2/>: CPT | Mod: 26,RT

## 2021-01-01 PROCEDURE — 70450 CT HEAD/BRAIN W/O DYE: CPT | Mod: 26,MA

## 2021-01-01 PROCEDURE — C1713: CPT

## 2021-01-01 PROCEDURE — 99239 HOSP IP/OBS DSCHRG MGMT >30: CPT

## 2021-01-01 PROCEDURE — 99284 EMERGENCY DEPT VISIT MOD MDM: CPT | Mod: 25

## 2021-01-01 PROCEDURE — U0003: CPT

## 2021-01-01 PROCEDURE — 72192 CT PELVIS W/O DYE: CPT | Mod: 26,MA

## 2021-01-01 PROCEDURE — 85610 PROTHROMBIN TIME: CPT

## 2021-01-01 PROCEDURE — 72125 CT NECK SPINE W/O DYE: CPT

## 2021-01-01 PROCEDURE — 83935 ASSAY OF URINE OSMOLALITY: CPT

## 2021-01-01 PROCEDURE — 74018 RADEX ABDOMEN 1 VIEW: CPT

## 2021-01-01 PROCEDURE — 76775 US EXAM ABDO BACK WALL LIM: CPT | Mod: 26

## 2021-01-01 PROCEDURE — 96365 THER/PROPH/DIAG IV INF INIT: CPT

## 2021-01-01 PROCEDURE — 99232 SBSQ HOSP IP/OBS MODERATE 35: CPT | Mod: GC

## 2021-01-01 PROCEDURE — 85384 FIBRINOGEN ACTIVITY: CPT

## 2021-01-01 PROCEDURE — 93010 ELECTROCARDIOGRAM REPORT: CPT

## 2021-01-01 PROCEDURE — 27236 TREAT THIGH FRACTURE: CPT | Mod: AS,RT

## 2021-01-01 PROCEDURE — 87150 DNA/RNA AMPLIFIED PROBE: CPT

## 2021-01-01 PROCEDURE — 73620 X-RAY EXAM OF FOOT: CPT | Mod: 26,RT

## 2021-01-01 PROCEDURE — 87040 BLOOD CULTURE FOR BACTERIA: CPT

## 2021-01-01 PROCEDURE — 86850 RBC ANTIBODY SCREEN: CPT

## 2021-01-01 PROCEDURE — 85652 RBC SED RATE AUTOMATED: CPT

## 2021-01-01 PROCEDURE — 74176 CT ABD & PELVIS W/O CONTRAST: CPT | Mod: MA

## 2021-01-01 PROCEDURE — P9040: CPT

## 2021-01-01 PROCEDURE — 85027 COMPLETE CBC AUTOMATED: CPT

## 2021-01-01 PROCEDURE — 93306 TTE W/DOPPLER COMPLETE: CPT

## 2021-01-01 PROCEDURE — 84300 ASSAY OF URINE SODIUM: CPT

## 2021-01-01 PROCEDURE — 70450 CT HEAD/BRAIN W/O DYE: CPT

## 2021-01-01 PROCEDURE — 71250 CT THORAX DX C-: CPT | Mod: 26

## 2021-01-01 PROCEDURE — 99222 1ST HOSP IP/OBS MODERATE 55: CPT

## 2021-01-01 PROCEDURE — 76000 FLUOROSCOPY <1 HR PHYS/QHP: CPT

## 2021-01-01 PROCEDURE — 84100 ASSAY OF PHOSPHORUS: CPT

## 2021-01-01 PROCEDURE — 73610 X-RAY EXAM OF ANKLE: CPT | Mod: 26,RT

## 2021-01-01 PROCEDURE — 87077 CULTURE AEROBIC IDENTIFY: CPT

## 2021-01-01 PROCEDURE — 80299 QUANTITATIVE ASSAY DRUG: CPT

## 2021-01-01 PROCEDURE — 73502 X-RAY EXAM HIP UNI 2-3 VIEWS: CPT | Mod: 26,RT

## 2021-01-01 PROCEDURE — 71045 X-RAY EXAM CHEST 1 VIEW: CPT

## 2021-01-01 PROCEDURE — 72125 CT NECK SPINE W/O DYE: CPT | Mod: 26,MH

## 2021-01-01 PROCEDURE — 86880 COOMBS TEST DIRECT: CPT

## 2021-01-01 PROCEDURE — 85635 REPTILASE TEST: CPT

## 2021-01-01 PROCEDURE — 72125 CT NECK SPINE W/O DYE: CPT | Mod: MA

## 2021-01-01 PROCEDURE — 87324 CLOSTRIDIUM AG IA: CPT

## 2021-01-01 PROCEDURE — 82272 OCCULT BLD FECES 1-3 TESTS: CPT

## 2021-01-01 PROCEDURE — 73502 X-RAY EXAM HIP UNI 2-3 VIEWS: CPT | Mod: 26,LT

## 2021-01-01 PROCEDURE — 82550 ASSAY OF CK (CPK): CPT

## 2021-01-01 PROCEDURE — 86140 C-REACTIVE PROTEIN: CPT

## 2021-01-01 PROCEDURE — 72125 CT NECK SPINE W/O DYE: CPT | Mod: 26,MA

## 2021-01-01 PROCEDURE — C1776: CPT

## 2021-01-01 PROCEDURE — 85732 THROMBOPLASTIN TIME PARTIAL: CPT

## 2021-01-01 PROCEDURE — 83605 ASSAY OF LACTIC ACID: CPT

## 2021-01-01 PROCEDURE — 97116 GAIT TRAINING THERAPY: CPT

## 2021-01-01 PROCEDURE — 74176 CT ABD & PELVIS W/O CONTRAST: CPT

## 2021-01-01 PROCEDURE — 96375 TX/PRO/DX INJ NEW DRUG ADDON: CPT

## 2021-01-01 PROCEDURE — U0005: CPT

## 2021-01-01 PROCEDURE — 87086 URINE CULTURE/COLONY COUNT: CPT

## 2021-01-01 PROCEDURE — 86769 SARS-COV-2 COVID-19 ANTIBODY: CPT

## 2021-01-01 PROCEDURE — 73706 CT ANGIO LWR EXTR W/O&W/DYE: CPT | Mod: 26,LT

## 2021-01-01 PROCEDURE — 36000 PLACE NEEDLE IN VEIN: CPT

## 2021-01-01 PROCEDURE — 27236 TREAT THIGH FRACTURE: CPT | Mod: LT

## 2021-01-01 PROCEDURE — 85730 THROMBOPLASTIN TIME PARTIAL: CPT

## 2021-01-01 PROCEDURE — 97164 PT RE-EVAL EST PLAN CARE: CPT

## 2021-01-01 PROCEDURE — 76376 3D RENDER W/INTRP POSTPROCES: CPT

## 2021-01-01 PROCEDURE — 83540 ASSAY OF IRON: CPT

## 2021-01-01 PROCEDURE — 93970 EXTREMITY STUDY: CPT | Mod: 26

## 2021-01-01 PROCEDURE — 82803 BLOOD GASES ANY COMBINATION: CPT

## 2021-01-01 PROCEDURE — 83874 ASSAY OF MYOGLOBIN: CPT

## 2021-01-01 PROCEDURE — 84484 ASSAY OF TROPONIN QUANT: CPT

## 2021-01-01 PROCEDURE — 80076 HEPATIC FUNCTION PANEL: CPT

## 2021-01-01 PROCEDURE — 95714 VEEG EA 12-26 HR UNMNTR: CPT

## 2021-01-01 PROCEDURE — 82607 VITAMIN B-12: CPT

## 2021-01-01 PROCEDURE — 84132 ASSAY OF SERUM POTASSIUM: CPT

## 2021-01-01 PROCEDURE — C9254: CPT

## 2021-01-01 PROCEDURE — 80074 ACUTE HEPATITIS PANEL: CPT

## 2021-01-01 PROCEDURE — 73610 X-RAY EXAM OF ANKLE: CPT

## 2021-01-01 PROCEDURE — 99292 CRITICAL CARE ADDL 30 MIN: CPT

## 2021-01-01 PROCEDURE — 0225U NFCT DS DNA&RNA 21 SARSCOV2: CPT

## 2021-01-01 PROCEDURE — 71045 X-RAY EXAM CHEST 1 VIEW: CPT | Mod: 26,77

## 2021-01-01 PROCEDURE — 72192 CT PELVIS W/O DYE: CPT | Mod: MA

## 2021-01-01 PROCEDURE — 95720 EEG PHY/QHP EA INCR W/VEEG: CPT

## 2021-01-01 PROCEDURE — 73501 X-RAY EXAM HIP UNI 1 VIEW: CPT | Mod: 26,RT

## 2021-01-01 PROCEDURE — 84133 ASSAY OF URINE POTASSIUM: CPT

## 2021-01-01 PROCEDURE — 95700 EEG CONT REC W/VID EEG TECH: CPT

## 2021-01-01 PROCEDURE — 87640 STAPH A DNA AMP PROBE: CPT

## 2021-01-01 PROCEDURE — 72131 CT LUMBAR SPINE W/O DYE: CPT | Mod: MA

## 2021-01-01 PROCEDURE — 70450 CT HEAD/BRAIN W/O DYE: CPT | Mod: 26,MH

## 2021-01-01 PROCEDURE — 73552 X-RAY EXAM OF FEMUR 2/>: CPT

## 2021-01-01 PROCEDURE — 96374 THER/PROPH/DIAG INJ IV PUSH: CPT

## 2021-01-01 PROCEDURE — 87641 MR-STAPH DNA AMP PROBE: CPT

## 2021-01-01 PROCEDURE — 93971 EXTREMITY STUDY: CPT | Mod: 26,RT

## 2021-01-01 PROCEDURE — 76376 3D RENDER W/INTRP POSTPROCES: CPT | Mod: 26

## 2021-01-01 PROCEDURE — 73590 X-RAY EXAM OF LOWER LEG: CPT

## 2021-01-01 PROCEDURE — 99284 EMERGENCY DEPT VISIT MOD MDM: CPT

## 2021-01-01 PROCEDURE — 95711 VEEG 2-12 HR UNMONITORED: CPT

## 2021-01-01 PROCEDURE — 82746 ASSAY OF FOLIC ACID SERUM: CPT

## 2021-01-01 PROCEDURE — 86923 COMPATIBILITY TEST ELECTRIC: CPT

## 2021-01-01 PROCEDURE — 85379 FIBRIN DEGRADATION QUANT: CPT

## 2021-01-01 PROCEDURE — 73620 X-RAY EXAM OF FOOT: CPT

## 2021-01-01 PROCEDURE — 82040 ASSAY OF SERUM ALBUMIN: CPT

## 2021-01-01 PROCEDURE — 61154 BURR HOLE W/EVAC&/DRG HMTMA: CPT

## 2021-01-01 PROCEDURE — 73610 X-RAY EXAM OF ANKLE: CPT | Mod: 26,50

## 2021-01-01 PROCEDURE — 97530 THERAPEUTIC ACTIVITIES: CPT

## 2021-01-01 PROCEDURE — 97161 PT EVAL LOW COMPLEX 20 MIN: CPT

## 2021-01-01 PROCEDURE — 87449 NOS EACH ORGANISM AG IA: CPT

## 2021-01-01 PROCEDURE — 73502 X-RAY EXAM HIP UNI 2-3 VIEWS: CPT | Mod: 26,LT,77

## 2021-01-01 PROCEDURE — 85670 THROMBIN TIME PLASMA: CPT

## 2021-01-01 PROCEDURE — 73070 X-RAY EXAM OF ELBOW: CPT | Mod: 26,LT

## 2021-01-01 PROCEDURE — 83550 IRON BINDING TEST: CPT

## 2021-01-01 PROCEDURE — 27236 TREAT THIGH FRACTURE: CPT | Mod: RT

## 2021-01-01 PROCEDURE — 73552 X-RAY EXAM OF FEMUR 2/>: CPT | Mod: 26,LT

## 2021-01-01 PROCEDURE — 73700 CT LOWER EXTREMITY W/O DYE: CPT | Mod: 26,LT,MA

## 2021-01-01 PROCEDURE — 99285 EMERGENCY DEPT VISIT HI MDM: CPT | Mod: CS

## 2021-01-01 PROCEDURE — 73060 X-RAY EXAM OF HUMERUS: CPT

## 2021-01-01 PROCEDURE — 73590 X-RAY EXAM OF LOWER LEG: CPT | Mod: 26,RT

## 2021-01-01 PROCEDURE — 73702 CT LWR EXTREMITY W/O&W/DYE: CPT | Mod: 26,50

## 2021-01-01 PROCEDURE — 97165 OT EVAL LOW COMPLEX 30 MIN: CPT

## 2021-01-01 PROCEDURE — 72131 CT LUMBAR SPINE W/O DYE: CPT | Mod: 26,MA

## 2021-01-01 PROCEDURE — 93971 EXTREMITY STUDY: CPT

## 2021-01-01 PROCEDURE — 95718 EEG PHYS/QHP 2-12 HR W/VEEG: CPT

## 2021-01-01 PROCEDURE — 72192 CT PELVIS W/O DYE: CPT

## 2021-01-01 PROCEDURE — 99223 1ST HOSP IP/OBS HIGH 75: CPT | Mod: 57

## 2021-01-01 PROCEDURE — 82553 CREATINE MB FRACTION: CPT

## 2021-01-01 PROCEDURE — 71260 CT THORAX DX C+: CPT | Mod: 26,MA

## 2021-01-01 PROCEDURE — 73501 X-RAY EXAM HIP UNI 1 VIEW: CPT

## 2021-01-01 PROCEDURE — 73700 CT LOWER EXTREMITY W/O DYE: CPT | Mod: MA

## 2021-01-01 PROCEDURE — 82436 ASSAY OF URINE CHLORIDE: CPT

## 2021-01-01 RX ORDER — MAGNESIUM SULFATE 500 MG/ML
2 VIAL (ML) INJECTION ONCE
Refills: 0 | Status: COMPLETED | OUTPATIENT
Start: 2021-01-01 | End: 2021-01-01

## 2021-01-01 RX ORDER — POTASSIUM CHLORIDE 20 MEQ
10 PACKET (EA) ORAL
Refills: 0 | Status: COMPLETED | OUTPATIENT
Start: 2021-01-01 | End: 2021-01-01

## 2021-01-01 RX ORDER — ALLOPURINOL 300 MG
300 TABLET ORAL DAILY
Refills: 0 | Status: DISCONTINUED | OUTPATIENT
Start: 2021-01-01 | End: 2021-01-01

## 2021-01-01 RX ORDER — POTASSIUM CHLORIDE 20 MEQ
40 PACKET (EA) ORAL ONCE
Refills: 0 | Status: COMPLETED | OUTPATIENT
Start: 2021-01-01 | End: 2021-01-01

## 2021-01-01 RX ORDER — DONEPEZIL HYDROCHLORIDE 10 MG/1
5 TABLET, FILM COATED ORAL AT BEDTIME
Refills: 0 | Status: DISCONTINUED | OUTPATIENT
Start: 2021-01-01 | End: 2021-01-01

## 2021-01-01 RX ORDER — OXYCODONE AND ACETAMINOPHEN 5; 325 MG/1; MG/1
1 TABLET ORAL EVERY 4 HOURS
Refills: 0 | Status: DISCONTINUED | OUTPATIENT
Start: 2021-01-01 | End: 2021-01-01

## 2021-01-01 RX ORDER — OXYCODONE AND ACETAMINOPHEN 5; 325 MG/1; MG/1
2 TABLET ORAL EVERY 6 HOURS
Refills: 0 | Status: DISCONTINUED | OUTPATIENT
Start: 2021-01-01 | End: 2021-01-01

## 2021-01-01 RX ORDER — LACOSAMIDE 50 MG/1
10 TABLET ORAL
Qty: 0 | Refills: 0 | DISCHARGE
Start: 2021-01-01

## 2021-01-01 RX ORDER — POTASSIUM CHLORIDE 20 MEQ
10 PACKET (EA) ORAL ONCE
Refills: 0 | Status: COMPLETED | OUTPATIENT
Start: 2021-01-01 | End: 2021-01-01

## 2021-01-01 RX ORDER — PROTHROMBIN COMPLEX CONCENTRATE (HUMAN) 25.5; 16.5; 24; 22; 22; 26 [IU]/ML; [IU]/ML; [IU]/ML; [IU]/ML; [IU]/ML; [IU]/ML
1500 POWDER, FOR SOLUTION INTRAVENOUS ONCE
Refills: 0 | Status: COMPLETED | OUTPATIENT
Start: 2021-01-01 | End: 2021-01-01

## 2021-01-01 RX ORDER — HYDROMORPHONE HYDROCHLORIDE 2 MG/ML
0.2 INJECTION INTRAMUSCULAR; INTRAVENOUS; SUBCUTANEOUS
Refills: 0 | Status: DISCONTINUED | OUTPATIENT
Start: 2021-01-01 | End: 2021-01-01

## 2021-01-01 RX ORDER — SODIUM CHLORIDE 9 MG/ML
1000 INJECTION, SOLUTION INTRAVENOUS
Refills: 0 | Status: DISCONTINUED | OUTPATIENT
Start: 2021-01-01 | End: 2021-01-01

## 2021-01-01 RX ORDER — CEFAZOLIN SODIUM 1 G
2000 VIAL (EA) INJECTION EVERY 8 HOURS
Refills: 0 | Status: COMPLETED | OUTPATIENT
Start: 2021-01-01 | End: 2021-01-01

## 2021-01-01 RX ORDER — PIPERACILLIN AND TAZOBACTAM 4; .5 G/20ML; G/20ML
3.38 INJECTION, POWDER, LYOPHILIZED, FOR SOLUTION INTRAVENOUS EVERY 12 HOURS
Refills: 0 | Status: DISCONTINUED | OUTPATIENT
Start: 2021-01-01 | End: 2021-01-01

## 2021-01-01 RX ORDER — SODIUM CHLORIDE 9 MG/ML
1000 INJECTION INTRAMUSCULAR; INTRAVENOUS; SUBCUTANEOUS
Refills: 0 | Status: DISCONTINUED | OUTPATIENT
Start: 2021-01-01 | End: 2021-01-01

## 2021-01-01 RX ORDER — MIDAZOLAM HYDROCHLORIDE 1 MG/ML
4 INJECTION, SOLUTION INTRAMUSCULAR; INTRAVENOUS ONCE
Refills: 0 | Status: DISCONTINUED | OUTPATIENT
Start: 2021-01-01 | End: 2021-01-01

## 2021-01-01 RX ORDER — ONDANSETRON 8 MG/1
4 TABLET, FILM COATED ORAL ONCE
Refills: 0 | Status: DISCONTINUED | OUTPATIENT
Start: 2021-01-01 | End: 2021-01-01

## 2021-01-01 RX ORDER — POTASSIUM CHLORIDE 20 MEQ
1 PACKET (EA) ORAL
Qty: 0 | Refills: 0 | DISCHARGE

## 2021-01-01 RX ORDER — SENNA PLUS 8.6 MG/1
1 TABLET ORAL
Qty: 0 | Refills: 0 | DISCHARGE
Start: 2021-01-01

## 2021-01-01 RX ORDER — MAGNESIUM SULFATE 500 MG/ML
1 VIAL (ML) INJECTION ONCE
Refills: 0 | Status: COMPLETED | OUTPATIENT
Start: 2021-01-01 | End: 2021-01-01

## 2021-01-01 RX ORDER — MAGNESIUM HYDROXIDE 400 MG/1
30 TABLET, CHEWABLE ORAL DAILY
Refills: 0 | Status: DISCONTINUED | OUTPATIENT
Start: 2021-01-01 | End: 2021-01-01

## 2021-01-01 RX ORDER — AMIODARONE HYDROCHLORIDE 400 MG/1
200 TABLET ORAL EVERY 12 HOURS
Refills: 0 | Status: DISCONTINUED | OUTPATIENT
Start: 2021-01-01 | End: 2021-01-01

## 2021-01-01 RX ORDER — HYDROXYUREA 500 MG/1
500 CAPSULE ORAL
Refills: 0 | Status: DISCONTINUED | OUTPATIENT
Start: 2021-01-01 | End: 2021-01-01

## 2021-01-01 RX ORDER — BACITRACIN ZINC 500 UNIT/G
1 OINTMENT IN PACKET (EA) TOPICAL
Qty: 0 | Refills: 0 | DISCHARGE
Start: 2021-01-01

## 2021-01-01 RX ORDER — HYDROMORPHONE HYDROCHLORIDE 2 MG/ML
0.5 INJECTION INTRAMUSCULAR; INTRAVENOUS; SUBCUTANEOUS EVERY 4 HOURS
Refills: 0 | Status: DISCONTINUED | OUTPATIENT
Start: 2021-01-01 | End: 2021-01-01

## 2021-01-01 RX ORDER — CHOLECALCIFEROL (VITAMIN D3) 125 MCG
400 CAPSULE ORAL DAILY
Refills: 0 | Status: DISCONTINUED | OUTPATIENT
Start: 2021-01-01 | End: 2021-01-01

## 2021-01-01 RX ORDER — HYDROXYUREA 500 MG/1
500 CAPSULE ORAL DAILY
Refills: 0 | Status: DISCONTINUED | OUTPATIENT
Start: 2021-01-01 | End: 2021-01-01

## 2021-01-01 RX ORDER — LACOSAMIDE 50 MG/1
200 TABLET ORAL EVERY 12 HOURS
Refills: 0 | Status: DISCONTINUED | OUTPATIENT
Start: 2021-01-01 | End: 2021-01-01

## 2021-01-01 RX ORDER — AMIODARONE HYDROCHLORIDE 400 MG/1
200 TABLET ORAL DAILY
Refills: 0 | Status: DISCONTINUED | OUTPATIENT
Start: 2021-01-01 | End: 2021-01-01

## 2021-01-01 RX ORDER — METRONIDAZOLE 500 MG
500 TABLET ORAL ONCE
Refills: 0 | Status: COMPLETED | OUTPATIENT
Start: 2021-01-01 | End: 2021-01-01

## 2021-01-01 RX ORDER — PROTHROMBIN COMPLEX CONCENTRATE (HUMAN) 25.5; 16.5; 24; 22; 22; 26 [IU]/ML; [IU]/ML; [IU]/ML; [IU]/ML; [IU]/ML; [IU]/ML
1500 POWDER, FOR SOLUTION INTRAVENOUS ONCE
Refills: 0 | Status: DISCONTINUED | OUTPATIENT
Start: 2021-01-01 | End: 2021-01-01

## 2021-01-01 RX ORDER — ROBINUL 0.2 MG/ML
0.4 INJECTION INTRAMUSCULAR; INTRAVENOUS EVERY 6 HOURS
Refills: 0 | Status: DISCONTINUED | OUTPATIENT
Start: 2021-01-01 | End: 2021-01-01

## 2021-01-01 RX ORDER — POTASSIUM CHLORIDE 20 MEQ
40 PACKET (EA) ORAL EVERY 4 HOURS
Refills: 0 | Status: COMPLETED | OUTPATIENT
Start: 2021-01-01 | End: 2021-01-01

## 2021-01-01 RX ORDER — SODIUM CHLORIDE 9 MG/ML
500 INJECTION, SOLUTION INTRAVENOUS ONCE
Refills: 0 | Status: COMPLETED | OUTPATIENT
Start: 2021-01-01 | End: 2021-01-01

## 2021-01-01 RX ORDER — VANCOMYCIN HCL 1 G
1000 VIAL (EA) INTRAVENOUS ONCE
Refills: 0 | Status: COMPLETED | OUTPATIENT
Start: 2021-01-01 | End: 2021-01-01

## 2021-01-01 RX ORDER — HYDROXYUREA 500 MG/1
1 CAPSULE ORAL
Qty: 0 | Refills: 0 | DISCHARGE

## 2021-01-01 RX ORDER — CEFAZOLIN SODIUM 1 G
2000 VIAL (EA) INJECTION ONCE
Refills: 0 | Status: COMPLETED | OUTPATIENT
Start: 2021-01-01 | End: 2021-01-01

## 2021-01-01 RX ORDER — SODIUM CHLORIDE 9 MG/ML
1000 INJECTION INTRAMUSCULAR; INTRAVENOUS; SUBCUTANEOUS ONCE
Refills: 0 | Status: COMPLETED | OUTPATIENT
Start: 2021-01-01 | End: 2021-01-01

## 2021-01-01 RX ORDER — ACETAMINOPHEN 500 MG
975 TABLET ORAL EVERY 8 HOURS
Refills: 0 | Status: COMPLETED | OUTPATIENT
Start: 2021-01-01 | End: 2021-01-01

## 2021-01-01 RX ORDER — CALCIUM GLUCONATE 100 MG/ML
2 VIAL (ML) INTRAVENOUS ONCE
Refills: 0 | Status: COMPLETED | OUTPATIENT
Start: 2021-01-01 | End: 2021-01-01

## 2021-01-01 RX ORDER — ASPIRIN/CALCIUM CARB/MAGNESIUM 324 MG
81 TABLET ORAL DAILY
Refills: 0 | Status: DISCONTINUED | OUTPATIENT
Start: 2021-01-01 | End: 2021-01-01

## 2021-01-01 RX ORDER — LACOSAMIDE 50 MG/1
100 TABLET ORAL EVERY 12 HOURS
Refills: 0 | Status: DISCONTINUED | OUTPATIENT
Start: 2021-01-01 | End: 2021-01-01

## 2021-01-01 RX ORDER — METRONIDAZOLE 500 MG
TABLET ORAL
Refills: 0 | Status: DISCONTINUED | OUTPATIENT
Start: 2021-01-01 | End: 2021-01-01

## 2021-01-01 RX ORDER — CEFAZOLIN SODIUM 1 G
2000 VIAL (EA) INJECTION ONCE
Refills: 0 | Status: DISCONTINUED | OUTPATIENT
Start: 2021-01-01 | End: 2021-01-01

## 2021-01-01 RX ORDER — ASPIRIN/CALCIUM CARB/MAGNESIUM 324 MG
81 TABLET ORAL DAILY
Refills: 0 | Status: CANCELLED | OUTPATIENT
Start: 2021-01-01 | End: 2021-01-01

## 2021-01-01 RX ORDER — POTASSIUM CHLORIDE 20 MEQ
10 PACKET (EA) ORAL
Refills: 0 | Status: DISCONTINUED | OUTPATIENT
Start: 2021-01-01 | End: 2021-01-01

## 2021-01-01 RX ORDER — HYDROXYUREA 500 MG/1
500 CAPSULE ORAL
Qty: 40 | Refills: 0 | Status: ACTIVE | COMMUNITY
Start: 2019-02-12 | End: 1900-01-01

## 2021-01-01 RX ORDER — BACITRACIN ZINC 500 UNIT/G
1 OINTMENT IN PACKET (EA) TOPICAL DAILY
Refills: 0 | Status: DISCONTINUED | OUTPATIENT
Start: 2021-01-01 | End: 2021-01-01

## 2021-01-01 RX ORDER — SENNA PLUS 8.6 MG/1
1 TABLET ORAL AT BEDTIME
Refills: 0 | Status: DISCONTINUED | OUTPATIENT
Start: 2021-01-01 | End: 2021-01-01

## 2021-01-01 RX ORDER — HYDROMORPHONE HYDROCHLORIDE 2 MG/ML
0.2 INJECTION INTRAMUSCULAR; INTRAVENOUS; SUBCUTANEOUS EVERY 4 HOURS
Refills: 0 | Status: DISCONTINUED | OUTPATIENT
Start: 2021-01-01 | End: 2021-01-01

## 2021-01-01 RX ORDER — POTASSIUM PHOSPHATE, MONOBASIC POTASSIUM PHOSPHATE, DIBASIC 236; 224 MG/ML; MG/ML
15 INJECTION, SOLUTION INTRAVENOUS ONCE
Refills: 0 | Status: COMPLETED | OUTPATIENT
Start: 2021-01-01 | End: 2021-01-01

## 2021-01-01 RX ORDER — CEFEPIME 1 G/1
1000 INJECTION, POWDER, FOR SOLUTION INTRAMUSCULAR; INTRAVENOUS ONCE
Refills: 0 | Status: COMPLETED | OUTPATIENT
Start: 2021-01-01 | End: 2021-01-01

## 2021-01-01 RX ORDER — NOREPINEPHRINE BITARTRATE/D5W 8 MG/250ML
0 PLASTIC BAG, INJECTION (ML) INTRAVENOUS
Qty: 0 | Refills: 0 | DISCHARGE
Start: 2021-01-01

## 2021-01-01 RX ORDER — ACETAMINOPHEN 500 MG
2 TABLET ORAL
Qty: 0 | Refills: 0 | DISCHARGE
Start: 2021-01-01

## 2021-01-01 RX ORDER — ASPIRIN/CALCIUM CARB/MAGNESIUM 324 MG
81 TABLET ORAL
Refills: 0 | Status: DISCONTINUED | OUTPATIENT
Start: 2021-01-01 | End: 2021-01-01

## 2021-01-01 RX ORDER — SENNA PLUS 8.6 MG/1
10 TABLET ORAL DAILY
Refills: 0 | Status: DISCONTINUED | OUTPATIENT
Start: 2021-01-01 | End: 2021-01-01

## 2021-01-01 RX ORDER — ACETAMINOPHEN 500 MG
1000 TABLET ORAL ONCE
Refills: 0 | Status: COMPLETED | OUTPATIENT
Start: 2021-01-01 | End: 2021-01-01

## 2021-01-01 RX ORDER — POTASSIUM CHLORIDE 20 MEQ
5 PACKET (EA) ORAL
Qty: 0 | Refills: 0 | DISCHARGE
Start: 2021-01-01

## 2021-01-01 RX ORDER — CHLORHEXIDINE GLUCONATE 213 G/1000ML
1 SOLUTION TOPICAL EVERY 24 HOURS
Refills: 0 | Status: DISCONTINUED | OUTPATIENT
Start: 2021-01-01 | End: 2021-01-01

## 2021-01-01 RX ORDER — SENNA PLUS 8.6 MG/1
2 TABLET ORAL AT BEDTIME
Refills: 0 | Status: DISCONTINUED | OUTPATIENT
Start: 2021-01-01 | End: 2021-01-01

## 2021-01-01 RX ORDER — POTASSIUM CHLORIDE 20 MEQ
20 PACKET (EA) ORAL ONCE
Refills: 0 | Status: DISCONTINUED | OUTPATIENT
Start: 2021-01-01 | End: 2021-01-01

## 2021-01-01 RX ORDER — ACETAMINOPHEN 500 MG
650 TABLET ORAL ONCE
Refills: 0 | Status: COMPLETED | OUTPATIENT
Start: 2021-01-01 | End: 2021-01-01

## 2021-01-01 RX ORDER — ASPIRIN/CALCIUM CARB/MAGNESIUM 324 MG
1 TABLET ORAL
Qty: 0 | Refills: 0 | DISCHARGE
Start: 2021-01-01

## 2021-01-01 RX ORDER — POLYETHYLENE GLYCOL 3350 17 G/17G
17 POWDER, FOR SOLUTION ORAL DAILY
Refills: 0 | Status: DISCONTINUED | OUTPATIENT
Start: 2021-01-01 | End: 2021-01-01

## 2021-01-01 RX ORDER — HYDROXYUREA 500 MG/1
1 CAPSULE ORAL
Qty: 0 | Refills: 0 | DISCHARGE
Start: 2021-01-01

## 2021-01-01 RX ORDER — METOPROLOL TARTRATE 50 MG
2.5 TABLET ORAL EVERY 6 HOURS
Refills: 0 | Status: DISCONTINUED | OUTPATIENT
Start: 2021-01-01 | End: 2021-01-01

## 2021-01-01 RX ORDER — CHOLECALCIFEROL (VITAMIN D3) 125 MCG
1 CAPSULE ORAL
Qty: 0 | Refills: 0 | DISCHARGE

## 2021-01-01 RX ORDER — LACOSAMIDE 50 MG/1
200 TABLET ORAL
Refills: 0 | Status: DISCONTINUED | OUTPATIENT
Start: 2021-01-01 | End: 2021-01-01

## 2021-01-01 RX ORDER — CEFEPIME 1 G/1
2000 INJECTION, POWDER, FOR SOLUTION INTRAMUSCULAR; INTRAVENOUS EVERY 12 HOURS
Refills: 0 | Status: DISCONTINUED | OUTPATIENT
Start: 2021-01-01 | End: 2021-01-01

## 2021-01-01 RX ORDER — INFLUENZA VIRUS VACCINE 15; 15; 15; 15 UG/.5ML; UG/.5ML; UG/.5ML; UG/.5ML
0.5 SUSPENSION INTRAMUSCULAR ONCE
Refills: 0 | Status: DISCONTINUED | OUTPATIENT
Start: 2021-01-01 | End: 2021-01-01

## 2021-01-01 RX ORDER — POLYETHYLENE GLYCOL 3350 17 G/17G
17 POWDER, FOR SOLUTION ORAL
Qty: 0 | Refills: 0 | DISCHARGE
Start: 2021-01-01

## 2021-01-01 RX ORDER — ACETAMINOPHEN 500 MG
975 TABLET ORAL EVERY 8 HOURS
Refills: 0 | Status: DISCONTINUED | OUTPATIENT
Start: 2021-01-01 | End: 2021-01-01

## 2021-01-01 RX ORDER — CEFAZOLIN SODIUM 1 G
1000 VIAL (EA) INJECTION EVERY 8 HOURS
Refills: 0 | Status: COMPLETED | OUTPATIENT
Start: 2021-01-01 | End: 2021-01-01

## 2021-01-01 RX ORDER — ALLOPURINOL 300 MG
1 TABLET ORAL
Qty: 0 | Refills: 0 | DISCHARGE

## 2021-01-01 RX ORDER — CHLORHEXIDINE GLUCONATE 213 G/1000ML
1 SOLUTION TOPICAL
Refills: 0 | Status: DISCONTINUED | OUTPATIENT
Start: 2021-01-01 | End: 2021-01-01

## 2021-01-01 RX ORDER — SODIUM CHLORIDE 9 MG/ML
250 INJECTION, SOLUTION INTRAVENOUS ONCE
Refills: 0 | Status: COMPLETED | OUTPATIENT
Start: 2021-01-01 | End: 2021-01-01

## 2021-01-01 RX ORDER — DONEPEZIL HYDROCHLORIDE 10 MG/1
1 TABLET, FILM COATED ORAL
Qty: 0 | Refills: 0 | DISCHARGE

## 2021-01-01 RX ORDER — FUROSEMIDE 40 MG
20 TABLET ORAL ONCE
Refills: 0 | Status: COMPLETED | OUTPATIENT
Start: 2021-01-01 | End: 2021-01-01

## 2021-01-01 RX ORDER — FOLIC ACID 0.8 MG
1 TABLET ORAL DAILY
Refills: 0 | Status: DISCONTINUED | OUTPATIENT
Start: 2021-01-01 | End: 2021-01-01

## 2021-01-01 RX ORDER — OXYCODONE HYDROCHLORIDE 5 MG/1
5 TABLET ORAL EVERY 4 HOURS
Refills: 0 | Status: DISCONTINUED | OUTPATIENT
Start: 2021-01-01 | End: 2021-01-01

## 2021-01-01 RX ORDER — PANTOPRAZOLE SODIUM 20 MG/1
40 TABLET, DELAYED RELEASE ORAL DAILY
Refills: 0 | Status: DISCONTINUED | OUTPATIENT
Start: 2021-01-01 | End: 2021-01-01

## 2021-01-01 RX ORDER — THIAMINE MONONITRATE (VIT B1) 100 MG
100 TABLET ORAL DAILY
Refills: 0 | Status: DISCONTINUED | OUTPATIENT
Start: 2021-01-01 | End: 2021-01-01

## 2021-01-01 RX ORDER — LACOSAMIDE 50 MG/1
100 TABLET ORAL ONCE
Refills: 0 | Status: DISCONTINUED | OUTPATIENT
Start: 2021-01-01 | End: 2021-01-01

## 2021-01-01 RX ORDER — POTASSIUM CHLORIDE 20 MEQ
30 PACKET (EA) ORAL
Refills: 0 | Status: COMPLETED | OUTPATIENT
Start: 2021-01-01 | End: 2021-01-01

## 2021-01-01 RX ORDER — CEFEPIME 1 G/1
INJECTION, POWDER, FOR SOLUTION INTRAMUSCULAR; INTRAVENOUS
Refills: 0 | Status: DISCONTINUED | OUTPATIENT
Start: 2021-01-01 | End: 2021-01-01

## 2021-01-01 RX ORDER — FENTANYL CITRATE 50 UG/ML
25 INJECTION INTRAVENOUS ONCE
Refills: 0 | Status: DISCONTINUED | OUTPATIENT
Start: 2021-01-01 | End: 2021-01-01

## 2021-01-01 RX ORDER — ACETAMINOPHEN 500 MG
650 TABLET ORAL EVERY 6 HOURS
Refills: 0 | Status: DISCONTINUED | OUTPATIENT
Start: 2021-01-01 | End: 2021-01-01

## 2021-01-01 RX ORDER — SODIUM CHLORIDE 9 MG/ML
500 INJECTION INTRAMUSCULAR; INTRAVENOUS; SUBCUTANEOUS ONCE
Refills: 0 | Status: COMPLETED | OUTPATIENT
Start: 2021-01-01 | End: 2021-01-01

## 2021-01-01 RX ORDER — METRONIDAZOLE 500 MG
500 TABLET ORAL EVERY 12 HOURS
Refills: 0 | Status: DISCONTINUED | OUTPATIENT
Start: 2021-01-01 | End: 2021-01-01

## 2021-01-01 RX ORDER — PIPERACILLIN AND TAZOBACTAM 4; .5 G/20ML; G/20ML
0 INJECTION, POWDER, LYOPHILIZED, FOR SOLUTION INTRAVENOUS
Qty: 0 | Refills: 0 | DISCHARGE
Start: 2021-01-01

## 2021-01-01 RX ORDER — PIPERACILLIN AND TAZOBACTAM 4; .5 G/20ML; G/20ML
3.38 INJECTION, POWDER, LYOPHILIZED, FOR SOLUTION INTRAVENOUS EVERY 8 HOURS
Refills: 0 | Status: DISCONTINUED | OUTPATIENT
Start: 2021-01-01 | End: 2021-01-01

## 2021-01-01 RX ORDER — HYDROMORPHONE HYDROCHLORIDE 2 MG/ML
0.5 INJECTION INTRAMUSCULAR; INTRAVENOUS; SUBCUTANEOUS
Refills: 0 | Status: DISCONTINUED | OUTPATIENT
Start: 2021-01-01 | End: 2021-01-01

## 2021-01-01 RX ORDER — ONDANSETRON 8 MG/1
4 TABLET, FILM COATED ORAL EVERY 6 HOURS
Refills: 0 | Status: DISCONTINUED | OUTPATIENT
Start: 2021-01-01 | End: 2021-01-01

## 2021-01-01 RX ORDER — DEXTROSE MONOHYDRATE, SODIUM CHLORIDE, AND POTASSIUM CHLORIDE 50; .745; 4.5 G/1000ML; G/1000ML; G/1000ML
1000 INJECTION, SOLUTION INTRAVENOUS
Refills: 0 | Status: DISCONTINUED | OUTPATIENT
Start: 2021-01-01 | End: 2021-01-01

## 2021-01-01 RX ORDER — OXYCODONE HYDROCHLORIDE 5 MG/1
5 TABLET ORAL
Refills: 0 | Status: DISCONTINUED | OUTPATIENT
Start: 2021-01-01 | End: 2021-01-01

## 2021-01-01 RX ORDER — LIDOCAINE 4 G/100G
1 CREAM TOPICAL
Qty: 0 | Refills: 0 | DISCHARGE
Start: 2021-01-01

## 2021-01-01 RX ORDER — METRONIDAZOLE 500 MG
500 TABLET ORAL EVERY 8 HOURS
Refills: 0 | Status: DISCONTINUED | OUTPATIENT
Start: 2021-01-01 | End: 2021-01-01

## 2021-01-01 RX ORDER — SODIUM CHLORIDE 9 MG/ML
1000 INJECTION, SOLUTION INTRAVENOUS ONCE
Refills: 0 | Status: COMPLETED | OUTPATIENT
Start: 2021-01-01 | End: 2021-01-01

## 2021-01-01 RX ORDER — VANCOMYCIN HCL 1 G
1000 VIAL (EA) INTRAVENOUS EVERY 24 HOURS
Refills: 0 | Status: DISCONTINUED | OUTPATIENT
Start: 2021-01-01 | End: 2021-01-01

## 2021-01-01 RX ORDER — CEFEPIME 1 G/1
2000 INJECTION, POWDER, FOR SOLUTION INTRAMUSCULAR; INTRAVENOUS ONCE
Refills: 0 | Status: COMPLETED | OUTPATIENT
Start: 2021-01-01 | End: 2021-01-01

## 2021-01-01 RX ORDER — NOREPINEPHRINE BITARTRATE/D5W 8 MG/250ML
0.05 PLASTIC BAG, INJECTION (ML) INTRAVENOUS
Qty: 8 | Refills: 0 | Status: DISCONTINUED | OUTPATIENT
Start: 2021-01-01 | End: 2021-01-01

## 2021-01-01 RX ORDER — CEFEPIME 1 G/1
1000 INJECTION, POWDER, FOR SOLUTION INTRAMUSCULAR; INTRAVENOUS EVERY 12 HOURS
Refills: 0 | Status: DISCONTINUED | OUTPATIENT
Start: 2021-01-01 | End: 2021-01-01

## 2021-01-01 RX ORDER — VALPROIC ACID (AS SODIUM SALT) 250 MG/5ML
250 SOLUTION, ORAL ORAL EVERY 8 HOURS
Refills: 0 | Status: DISCONTINUED | OUTPATIENT
Start: 2021-01-01 | End: 2021-01-01

## 2021-01-01 RX ORDER — METOPROLOL TARTRATE 50 MG
25 TABLET ORAL
Refills: 0 | Status: DISCONTINUED | OUTPATIENT
Start: 2021-01-01 | End: 2021-01-01

## 2021-01-01 RX ORDER — ALLOPURINOL 300 MG/1
300 TABLET ORAL
Qty: 30 | Refills: 0 | Status: ACTIVE | COMMUNITY
Start: 2019-02-12 | End: 1900-01-01

## 2021-01-01 RX ORDER — LANOLIN ALCOHOL/MO/W.PET/CERES
3 CREAM (GRAM) TOPICAL ONCE
Refills: 0 | Status: COMPLETED | OUTPATIENT
Start: 2021-01-01 | End: 2021-01-01

## 2021-01-01 RX ORDER — ALLOPURINOL 300 MG
100 TABLET ORAL DAILY
Refills: 0 | Status: DISCONTINUED | OUTPATIENT
Start: 2021-01-01 | End: 2021-01-01

## 2021-01-01 RX ORDER — OXYCODONE HYDROCHLORIDE 5 MG/1
1 TABLET ORAL
Qty: 0 | Refills: 0 | DISCHARGE
Start: 2021-01-01

## 2021-01-01 RX ORDER — SODIUM,POTASSIUM PHOSPHATES 278-250MG
1 POWDER IN PACKET (EA) ORAL ONCE
Refills: 0 | Status: COMPLETED | OUTPATIENT
Start: 2021-01-01 | End: 2021-01-01

## 2021-01-01 RX ORDER — POLYETHYLENE GLYCOL 3350 17 G/17G
17 POWDER, FOR SOLUTION ORAL
Refills: 0 | Status: DISCONTINUED | OUTPATIENT
Start: 2021-01-01 | End: 2021-01-01

## 2021-01-01 RX ORDER — FENTANYL CITRATE 50 UG/ML
50 INJECTION INTRAVENOUS ONCE
Refills: 0 | Status: DISCONTINUED | OUTPATIENT
Start: 2021-01-01 | End: 2021-01-01

## 2021-01-01 RX ORDER — SODIUM CHLORIDE 9 MG/ML
1550 INJECTION INTRAMUSCULAR; INTRAVENOUS; SUBCUTANEOUS ONCE
Refills: 0 | Status: COMPLETED | OUTPATIENT
Start: 2021-01-01 | End: 2021-01-01

## 2021-01-01 RX ORDER — HEPARIN SODIUM 5000 [USP'U]/ML
5000 INJECTION INTRAVENOUS; SUBCUTANEOUS
Refills: 0 | Status: DISCONTINUED | OUTPATIENT
Start: 2021-01-01 | End: 2021-01-01

## 2021-01-01 RX ORDER — PHENYLEPHRINE HYDROCHLORIDE 10 MG/ML
0.5 INJECTION INTRAVENOUS
Qty: 40 | Refills: 0 | Status: DISCONTINUED | OUTPATIENT
Start: 2021-01-01 | End: 2021-01-01

## 2021-01-01 RX ORDER — POTASSIUM PHOSPHATE, MONOBASIC POTASSIUM PHOSPHATE, DIBASIC 236; 224 MG/ML; MG/ML
30 INJECTION, SOLUTION INTRAVENOUS ONCE
Refills: 0 | Status: DISCONTINUED | OUTPATIENT
Start: 2021-01-01 | End: 2021-01-01

## 2021-01-01 RX ORDER — HYDROMORPHONE HYDROCHLORIDE 2 MG/ML
0.25 INJECTION INTRAMUSCULAR; INTRAVENOUS; SUBCUTANEOUS
Refills: 0 | Status: DISCONTINUED | OUTPATIENT
Start: 2021-01-01 | End: 2021-01-01

## 2021-01-01 RX ORDER — TRAMADOL HYDROCHLORIDE 50 MG/1
0.5 TABLET ORAL
Qty: 0 | Refills: 0 | DISCHARGE
Start: 2021-01-01

## 2021-01-01 RX ORDER — SODIUM CHLORIDE 9 MG/ML
250 INJECTION INTRAMUSCULAR; INTRAVENOUS; SUBCUTANEOUS ONCE
Refills: 0 | Status: COMPLETED | OUTPATIENT
Start: 2021-01-01 | End: 2021-01-01

## 2021-01-01 RX ORDER — CEFEPIME 1 G/1
1000 INJECTION, POWDER, FOR SOLUTION INTRAMUSCULAR; INTRAVENOUS ONCE
Refills: 0 | Status: DISCONTINUED | OUTPATIENT
Start: 2021-01-01 | End: 2021-01-01

## 2021-01-01 RX ORDER — ACETAMINOPHEN 500 MG
500 TABLET ORAL EVERY 6 HOURS
Refills: 0 | Status: CANCELLED | OUTPATIENT
Start: 2021-01-01 | End: 2021-01-01

## 2021-01-01 RX ORDER — PIPERACILLIN AND TAZOBACTAM 4; .5 G/20ML; G/20ML
3.38 INJECTION, POWDER, LYOPHILIZED, FOR SOLUTION INTRAVENOUS ONCE
Refills: 0 | Status: COMPLETED | OUTPATIENT
Start: 2021-01-01 | End: 2021-01-01

## 2021-01-01 RX ORDER — FENTANYL CITRATE 50 UG/ML
100 INJECTION INTRAVENOUS ONCE
Refills: 0 | Status: DISCONTINUED | OUTPATIENT
Start: 2021-01-01 | End: 2021-01-01

## 2021-01-01 RX ORDER — SODIUM CHLORIDE 9 MG/ML
0 INJECTION, SOLUTION INTRAVENOUS
Qty: 0 | Refills: 0 | DISCHARGE
Start: 2021-01-01

## 2021-01-01 RX ORDER — POTASSIUM CHLORIDE 20 MEQ
30 PACKET (EA) ORAL ONCE
Refills: 0 | Status: COMPLETED | OUTPATIENT
Start: 2021-01-01 | End: 2021-01-01

## 2021-01-01 RX ORDER — ACETAMINOPHEN 500 MG
750 TABLET ORAL ONCE
Refills: 0 | Status: COMPLETED | OUTPATIENT
Start: 2021-01-01 | End: 2021-01-01

## 2021-01-01 RX ORDER — HYDROMORPHONE HYDROCHLORIDE 2 MG/ML
0.5 INJECTION INTRAMUSCULAR; INTRAVENOUS; SUBCUTANEOUS EVERY 6 HOURS
Refills: 0 | Status: DISCONTINUED | OUTPATIENT
Start: 2021-01-01 | End: 2021-01-01

## 2021-01-01 RX ORDER — DESMOPRESSIN ACETATE 0.1 MG/1
15 TABLET ORAL ONCE
Refills: 0 | Status: COMPLETED | OUTPATIENT
Start: 2021-01-01 | End: 2021-01-01

## 2021-01-01 RX ORDER — NICARDIPINE HYDROCHLORIDE 30 MG/1
5 CAPSULE, EXTENDED RELEASE ORAL
Qty: 40 | Refills: 0 | Status: DISCONTINUED | OUTPATIENT
Start: 2021-01-01 | End: 2021-01-01

## 2021-01-01 RX ORDER — LIDOCAINE 4 G/100G
1 CREAM TOPICAL EVERY 24 HOURS
Refills: 0 | Status: DISCONTINUED | OUTPATIENT
Start: 2021-01-01 | End: 2021-01-01

## 2021-01-01 RX ORDER — TRAMADOL HYDROCHLORIDE 50 MG/1
25 TABLET ORAL EVERY 4 HOURS
Refills: 0 | Status: DISCONTINUED | OUTPATIENT
Start: 2021-01-01 | End: 2021-01-01

## 2021-01-01 RX ORDER — PHYTONADIONE (VIT K1) 5 MG
5 TABLET ORAL DAILY
Refills: 0 | Status: DISCONTINUED | OUTPATIENT
Start: 2021-01-01 | End: 2021-01-01

## 2021-01-01 RX ORDER — LEVETIRACETAM 250 MG/1
500 TABLET, FILM COATED ORAL EVERY 12 HOURS
Refills: 0 | Status: DISCONTINUED | OUTPATIENT
Start: 2021-01-01 | End: 2021-01-01

## 2021-01-01 RX ADMIN — OXYCODONE AND ACETAMINOPHEN 1 TABLET(S): 5; 325 TABLET ORAL at 05:12

## 2021-01-01 RX ADMIN — LACOSAMIDE 140 MILLIGRAM(S): 50 TABLET ORAL at 17:08

## 2021-01-01 RX ADMIN — Medication 100 GRAM(S): at 23:21

## 2021-01-01 RX ADMIN — DONEPEZIL HYDROCHLORIDE 5 MILLIGRAM(S): 10 TABLET, FILM COATED ORAL at 21:10

## 2021-01-01 RX ADMIN — LACOSAMIDE 140 MILLIGRAM(S): 50 TABLET ORAL at 17:07

## 2021-01-01 RX ADMIN — LACOSAMIDE 140 MILLIGRAM(S): 50 TABLET ORAL at 17:02

## 2021-01-01 RX ADMIN — LACOSAMIDE 120 MILLIGRAM(S): 50 TABLET ORAL at 05:59

## 2021-01-01 RX ADMIN — SODIUM CHLORIDE 20 MILLILITER(S): 9 INJECTION, SOLUTION INTRAVENOUS at 06:04

## 2021-01-01 RX ADMIN — SODIUM CHLORIDE 75 MILLILITER(S): 9 INJECTION, SOLUTION INTRAVENOUS at 16:30

## 2021-01-01 RX ADMIN — Medication 25 MILLIGRAM(S): at 17:26

## 2021-01-01 RX ADMIN — HYDROMORPHONE HYDROCHLORIDE 0.2 MILLIGRAM(S): 2 INJECTION INTRAMUSCULAR; INTRAVENOUS; SUBCUTANEOUS at 01:27

## 2021-01-01 RX ADMIN — MAGNESIUM HYDROXIDE 30 MILLILITER(S): 400 TABLET, CHEWABLE ORAL at 11:24

## 2021-01-01 RX ADMIN — POLYETHYLENE GLYCOL 3350 17 GRAM(S): 17 POWDER, FOR SOLUTION ORAL at 17:03

## 2021-01-01 RX ADMIN — LIDOCAINE 1 PATCH: 4 CREAM TOPICAL at 08:52

## 2021-01-01 RX ADMIN — HYDROMORPHONE HYDROCHLORIDE 0.2 MILLIGRAM(S): 2 INJECTION INTRAMUSCULAR; INTRAVENOUS; SUBCUTANEOUS at 06:39

## 2021-01-01 RX ADMIN — Medication 100 MILLIGRAM(S): at 21:09

## 2021-01-01 RX ADMIN — CEFEPIME 100 MILLIGRAM(S): 1 INJECTION, POWDER, FOR SOLUTION INTRAMUSCULAR; INTRAVENOUS at 06:59

## 2021-01-01 RX ADMIN — CEFEPIME 100 MILLIGRAM(S): 1 INJECTION, POWDER, FOR SOLUTION INTRAMUSCULAR; INTRAVENOUS at 05:24

## 2021-01-01 RX ADMIN — Medication 83.33 MILLIMOLE(S): at 09:22

## 2021-01-01 RX ADMIN — Medication 975 MILLIGRAM(S): at 06:30

## 2021-01-01 RX ADMIN — HYDROMORPHONE HYDROCHLORIDE 0.5 MILLIGRAM(S): 2 INJECTION INTRAMUSCULAR; INTRAVENOUS; SUBCUTANEOUS at 16:48

## 2021-01-01 RX ADMIN — Medication 400 UNIT(S): at 12:17

## 2021-01-01 RX ADMIN — DONEPEZIL HYDROCHLORIDE 5 MILLIGRAM(S): 10 TABLET, FILM COATED ORAL at 22:11

## 2021-01-01 RX ADMIN — PROTHROMBIN COMPLEX CONCENTRATE (HUMAN) 400 INTERNATIONAL UNIT(S): 25.5; 16.5; 24; 22; 22; 26 POWDER, FOR SOLUTION INTRAVENOUS at 13:19

## 2021-01-01 RX ADMIN — Medication 100 MILLIGRAM(S): at 05:24

## 2021-01-01 RX ADMIN — LACOSAMIDE 120 MILLIGRAM(S): 50 TABLET ORAL at 05:09

## 2021-01-01 RX ADMIN — Medication 1 MILLIGRAM(S): at 12:40

## 2021-01-01 RX ADMIN — LACOSAMIDE 140 MILLIGRAM(S): 50 TABLET ORAL at 17:52

## 2021-01-01 RX ADMIN — Medication 4.68 MICROGRAM(S)/KG/MIN: at 02:47

## 2021-01-01 RX ADMIN — HYDROXYUREA 500 MILLIGRAM(S): 500 CAPSULE ORAL at 11:52

## 2021-01-01 RX ADMIN — Medication 100 MILLIGRAM(S): at 12:34

## 2021-01-01 RX ADMIN — Medication 2.5 MILLIGRAM(S): at 19:02

## 2021-01-01 RX ADMIN — Medication 100 MILLIEQUIVALENT(S): at 13:00

## 2021-01-01 RX ADMIN — LIDOCAINE 1 PATCH: 4 CREAM TOPICAL at 19:29

## 2021-01-01 RX ADMIN — LIDOCAINE 1 PATCH: 4 CREAM TOPICAL at 12:30

## 2021-01-01 RX ADMIN — TRAMADOL HYDROCHLORIDE 25 MILLIGRAM(S): 50 TABLET ORAL at 11:00

## 2021-01-01 RX ADMIN — Medication 3 MILLIGRAM(S): at 01:46

## 2021-01-01 RX ADMIN — SODIUM CHLORIDE 500 MILLILITER(S): 9 INJECTION, SOLUTION INTRAVENOUS at 06:59

## 2021-01-01 RX ADMIN — DEXTROSE MONOHYDRATE, SODIUM CHLORIDE, AND POTASSIUM CHLORIDE 50 MILLILITER(S): 50; .745; 4.5 INJECTION, SOLUTION INTRAVENOUS at 05:29

## 2021-01-01 RX ADMIN — DONEPEZIL HYDROCHLORIDE 5 MILLIGRAM(S): 10 TABLET, FILM COATED ORAL at 21:41

## 2021-01-01 RX ADMIN — Medication 0.5 MILLIGRAM(S): at 18:23

## 2021-01-01 RX ADMIN — HYDROMORPHONE HYDROCHLORIDE 0.5 MILLIGRAM(S): 2 INJECTION INTRAMUSCULAR; INTRAVENOUS; SUBCUTANEOUS at 22:11

## 2021-01-01 RX ADMIN — Medication 100 MILLIGRAM(S): at 15:37

## 2021-01-01 RX ADMIN — LIDOCAINE 1 PATCH: 4 CREAM TOPICAL at 19:08

## 2021-01-01 RX ADMIN — HYDROXYUREA 500 MILLIGRAM(S): 500 CAPSULE ORAL at 14:14

## 2021-01-01 RX ADMIN — Medication 1 MILLIGRAM(S): at 13:07

## 2021-01-01 RX ADMIN — Medication 975 MILLIGRAM(S): at 06:59

## 2021-01-01 RX ADMIN — DONEPEZIL HYDROCHLORIDE 5 MILLIGRAM(S): 10 TABLET, FILM COATED ORAL at 21:43

## 2021-01-01 RX ADMIN — LACOSAMIDE 140 MILLIGRAM(S): 50 TABLET ORAL at 06:26

## 2021-01-01 RX ADMIN — SENNA PLUS 2 TABLET(S): 8.6 TABLET ORAL at 21:16

## 2021-01-01 RX ADMIN — LACOSAMIDE 120 MILLIGRAM(S): 50 TABLET ORAL at 17:58

## 2021-01-01 RX ADMIN — SENNA PLUS 2 TABLET(S): 8.6 TABLET ORAL at 21:00

## 2021-01-01 RX ADMIN — Medication 100 MILLIGRAM(S): at 06:59

## 2021-01-01 RX ADMIN — Medication 100 MILLIGRAM(S): at 12:27

## 2021-01-01 RX ADMIN — ROBINUL 0.4 MILLIGRAM(S): 0.2 INJECTION INTRAMUSCULAR; INTRAVENOUS at 18:04

## 2021-01-01 RX ADMIN — DEXTROSE MONOHYDRATE, SODIUM CHLORIDE, AND POTASSIUM CHLORIDE 75 MILLILITER(S): 50; .745; 4.5 INJECTION, SOLUTION INTRAVENOUS at 17:53

## 2021-01-01 RX ADMIN — Medication 1 MILLIGRAM(S): at 14:42

## 2021-01-01 RX ADMIN — DONEPEZIL HYDROCHLORIDE 5 MILLIGRAM(S): 10 TABLET, FILM COATED ORAL at 21:32

## 2021-01-01 RX ADMIN — SODIUM CHLORIDE 35 MILLILITER(S): 9 INJECTION, SOLUTION INTRAVENOUS at 08:26

## 2021-01-01 RX ADMIN — SODIUM CHLORIDE 20 MILLILITER(S): 9 INJECTION, SOLUTION INTRAVENOUS at 05:27

## 2021-01-01 RX ADMIN — Medication 400 UNIT(S): at 12:27

## 2021-01-01 RX ADMIN — POLYETHYLENE GLYCOL 3350 17 GRAM(S): 17 POWDER, FOR SOLUTION ORAL at 04:40

## 2021-01-01 RX ADMIN — ROBINUL 0.4 MILLIGRAM(S): 0.2 INJECTION INTRAMUSCULAR; INTRAVENOUS at 06:03

## 2021-01-01 RX ADMIN — Medication 400 UNIT(S): at 13:07

## 2021-01-01 RX ADMIN — LACOSAMIDE 120 MILLIGRAM(S): 50 TABLET ORAL at 17:05

## 2021-01-01 RX ADMIN — OXYCODONE AND ACETAMINOPHEN 1 TABLET(S): 5; 325 TABLET ORAL at 04:42

## 2021-01-01 RX ADMIN — LACOSAMIDE 140 MILLIGRAM(S): 50 TABLET ORAL at 17:19

## 2021-01-01 RX ADMIN — HYDROMORPHONE HYDROCHLORIDE 0.2 MILLIGRAM(S): 2 INJECTION INTRAMUSCULAR; INTRAVENOUS; SUBCUTANEOUS at 14:46

## 2021-01-01 RX ADMIN — Medication 100 MILLIEQUIVALENT(S): at 03:21

## 2021-01-01 RX ADMIN — LACOSAMIDE 140 MILLIGRAM(S): 50 TABLET ORAL at 05:08

## 2021-01-01 RX ADMIN — POLYETHYLENE GLYCOL 3350 17 GRAM(S): 17 POWDER, FOR SOLUTION ORAL at 11:23

## 2021-01-01 RX ADMIN — MAGNESIUM HYDROXIDE 30 MILLILITER(S): 400 TABLET, CHEWABLE ORAL at 12:18

## 2021-01-01 RX ADMIN — HYDROMORPHONE HYDROCHLORIDE 0.2 MILLIGRAM(S): 2 INJECTION INTRAMUSCULAR; INTRAVENOUS; SUBCUTANEOUS at 10:05

## 2021-01-01 RX ADMIN — LACOSAMIDE 140 MILLIGRAM(S): 50 TABLET ORAL at 05:53

## 2021-01-01 RX ADMIN — MAGNESIUM HYDROXIDE 30 MILLILITER(S): 400 TABLET, CHEWABLE ORAL at 12:06

## 2021-01-01 RX ADMIN — Medication 650 MILLIGRAM(S): at 10:33

## 2021-01-01 RX ADMIN — POLYETHYLENE GLYCOL 3350 17 GRAM(S): 17 POWDER, FOR SOLUTION ORAL at 12:07

## 2021-01-01 RX ADMIN — Medication 5 MILLIGRAM(S): at 22:11

## 2021-01-01 RX ADMIN — Medication 25 MILLIGRAM(S): at 04:40

## 2021-01-01 RX ADMIN — Medication 100 MILLIGRAM(S): at 05:08

## 2021-01-01 RX ADMIN — LIDOCAINE 1 PATCH: 4 CREAM TOPICAL at 12:05

## 2021-01-01 RX ADMIN — Medication 100 MILLIEQUIVALENT(S): at 15:15

## 2021-01-01 RX ADMIN — LACOSAMIDE 140 MILLIGRAM(S): 50 TABLET ORAL at 06:04

## 2021-01-01 RX ADMIN — Medication 400 UNIT(S): at 12:18

## 2021-01-01 RX ADMIN — CHLORHEXIDINE GLUCONATE 1 APPLICATION(S): 213 SOLUTION TOPICAL at 05:08

## 2021-01-01 RX ADMIN — HYDROMORPHONE HYDROCHLORIDE 0.5 MILLIGRAM(S): 2 INJECTION INTRAMUSCULAR; INTRAVENOUS; SUBCUTANEOUS at 17:37

## 2021-01-01 RX ADMIN — SODIUM CHLORIDE 35 MILLILITER(S): 9 INJECTION, SOLUTION INTRAVENOUS at 13:25

## 2021-01-01 RX ADMIN — Medication 25 MILLIGRAM(S): at 17:02

## 2021-01-01 RX ADMIN — Medication 975 MILLIGRAM(S): at 23:59

## 2021-01-01 RX ADMIN — LACOSAMIDE 120 MILLIGRAM(S): 50 TABLET ORAL at 05:29

## 2021-01-01 RX ADMIN — DEXTROSE MONOHYDRATE, SODIUM CHLORIDE, AND POTASSIUM CHLORIDE 50 MILLILITER(S): 50; .745; 4.5 INJECTION, SOLUTION INTRAVENOUS at 06:13

## 2021-01-01 RX ADMIN — LACOSAMIDE 120 MILLIGRAM(S): 50 TABLET ORAL at 18:38

## 2021-01-01 RX ADMIN — LACOSAMIDE 140 MILLIGRAM(S): 50 TABLET ORAL at 05:59

## 2021-01-01 RX ADMIN — LIDOCAINE 1 PATCH: 4 CREAM TOPICAL at 20:02

## 2021-01-01 RX ADMIN — HYDROMORPHONE HYDROCHLORIDE 0.5 MILLIGRAM(S): 2 INJECTION INTRAMUSCULAR; INTRAVENOUS; SUBCUTANEOUS at 12:32

## 2021-01-01 RX ADMIN — LIDOCAINE 1 PATCH: 4 CREAM TOPICAL at 19:38

## 2021-01-01 RX ADMIN — POLYETHYLENE GLYCOL 3350 17 GRAM(S): 17 POWDER, FOR SOLUTION ORAL at 11:28

## 2021-01-01 RX ADMIN — LACOSAMIDE 140 MILLIGRAM(S): 50 TABLET ORAL at 17:00

## 2021-01-01 RX ADMIN — LACOSAMIDE 140 MILLIGRAM(S): 50 TABLET ORAL at 05:42

## 2021-01-01 RX ADMIN — LACOSAMIDE 120 MILLIGRAM(S): 50 TABLET ORAL at 05:54

## 2021-01-01 RX ADMIN — LACOSAMIDE 140 MILLIGRAM(S): 50 TABLET ORAL at 17:04

## 2021-01-01 RX ADMIN — Medication 100 MILLIGRAM(S): at 11:39

## 2021-01-01 RX ADMIN — HYDROMORPHONE HYDROCHLORIDE 0.2 MILLIGRAM(S): 2 INJECTION INTRAMUSCULAR; INTRAVENOUS; SUBCUTANEOUS at 05:09

## 2021-01-01 RX ADMIN — LIDOCAINE 1 PATCH: 4 CREAM TOPICAL at 17:51

## 2021-01-01 RX ADMIN — Medication 100 MILLIGRAM(S): at 12:18

## 2021-01-01 RX ADMIN — LACOSAMIDE 120 MILLIGRAM(S): 50 TABLET ORAL at 17:08

## 2021-01-01 RX ADMIN — Medication 100 MILLIEQUIVALENT(S): at 02:12

## 2021-01-01 RX ADMIN — Medication 100 MILLIEQUIVALENT(S): at 13:06

## 2021-01-01 RX ADMIN — ROBINUL 0.4 MILLIGRAM(S): 0.2 INJECTION INTRAMUSCULAR; INTRAVENOUS at 12:46

## 2021-01-01 RX ADMIN — LIDOCAINE 1 PATCH: 4 CREAM TOPICAL at 22:16

## 2021-01-01 RX ADMIN — HYDROMORPHONE HYDROCHLORIDE 0.2 MILLIGRAM(S): 2 INJECTION INTRAMUSCULAR; INTRAVENOUS; SUBCUTANEOUS at 14:34

## 2021-01-01 RX ADMIN — AMIODARONE HYDROCHLORIDE 200 MILLIGRAM(S): 400 TABLET ORAL at 17:20

## 2021-01-01 RX ADMIN — Medication 40 MILLIEQUIVALENT(S): at 13:05

## 2021-01-01 RX ADMIN — SODIUM CHLORIDE 35 MILLILITER(S): 9 INJECTION, SOLUTION INTRAVENOUS at 19:30

## 2021-01-01 RX ADMIN — Medication 25 MILLIGRAM(S): at 05:45

## 2021-01-01 RX ADMIN — LACOSAMIDE 120 MILLIGRAM(S): 50 TABLET ORAL at 17:15

## 2021-01-01 RX ADMIN — Medication 1 MILLIGRAM(S): at 11:29

## 2021-01-01 RX ADMIN — ROBINUL 0.4 MILLIGRAM(S): 0.2 INJECTION INTRAMUSCULAR; INTRAVENOUS at 17:18

## 2021-01-01 RX ADMIN — HYDROMORPHONE HYDROCHLORIDE 0.2 MILLIGRAM(S): 2 INJECTION INTRAMUSCULAR; INTRAVENOUS; SUBCUTANEOUS at 13:32

## 2021-01-01 RX ADMIN — Medication 250 MILLIMOLE(S): at 03:20

## 2021-01-01 RX ADMIN — Medication 400 UNIT(S): at 17:49

## 2021-01-01 RX ADMIN — Medication 100 MILLIEQUIVALENT(S): at 17:25

## 2021-01-01 RX ADMIN — CHLORHEXIDINE GLUCONATE 1 APPLICATION(S): 213 SOLUTION TOPICAL at 10:18

## 2021-01-01 RX ADMIN — DEXTROSE MONOHYDRATE, SODIUM CHLORIDE, AND POTASSIUM CHLORIDE 50 MILLILITER(S): 50; .745; 4.5 INJECTION, SOLUTION INTRAVENOUS at 05:54

## 2021-01-01 RX ADMIN — Medication 1 MILLIGRAM(S): at 13:08

## 2021-01-01 RX ADMIN — LACOSAMIDE 140 MILLIGRAM(S): 50 TABLET ORAL at 17:35

## 2021-01-01 RX ADMIN — Medication 40 MILLIEQUIVALENT(S): at 15:37

## 2021-01-01 RX ADMIN — Medication 30 MILLIEQUIVALENT(S): at 02:24

## 2021-01-01 RX ADMIN — Medication 250 MILLIGRAM(S): at 14:33

## 2021-01-01 RX ADMIN — LACOSAMIDE 120 MILLIGRAM(S): 50 TABLET ORAL at 06:59

## 2021-01-01 RX ADMIN — LIDOCAINE 1 PATCH: 4 CREAM TOPICAL at 07:00

## 2021-01-01 RX ADMIN — Medication 650 MILLIGRAM(S): at 20:19

## 2021-01-01 RX ADMIN — LACOSAMIDE 120 MILLIGRAM(S): 50 TABLET ORAL at 17:04

## 2021-01-01 RX ADMIN — Medication 0.5 MILLIGRAM(S): at 02:36

## 2021-01-01 RX ADMIN — SENNA PLUS 2 TABLET(S): 8.6 TABLET ORAL at 21:10

## 2021-01-01 RX ADMIN — HYDROMORPHONE HYDROCHLORIDE 0.2 MILLIGRAM(S): 2 INJECTION INTRAMUSCULAR; INTRAVENOUS; SUBCUTANEOUS at 10:20

## 2021-01-01 RX ADMIN — Medication 100 MILLIEQUIVALENT(S): at 05:50

## 2021-01-01 RX ADMIN — Medication 400 UNIT(S): at 12:57

## 2021-01-01 RX ADMIN — Medication 975 MILLIGRAM(S): at 23:50

## 2021-01-01 RX ADMIN — MAGNESIUM HYDROXIDE 30 MILLILITER(S): 400 TABLET, CHEWABLE ORAL at 11:53

## 2021-01-01 RX ADMIN — Medication 1 PACKET(S): at 12:55

## 2021-01-01 RX ADMIN — HYDROMORPHONE HYDROCHLORIDE 0.2 MILLIGRAM(S): 2 INJECTION INTRAMUSCULAR; INTRAVENOUS; SUBCUTANEOUS at 18:15

## 2021-01-01 RX ADMIN — LACOSAMIDE 120 MILLIGRAM(S): 50 TABLET ORAL at 05:47

## 2021-01-01 RX ADMIN — HYDROXYUREA 500 MILLIGRAM(S): 500 CAPSULE ORAL at 12:42

## 2021-01-01 RX ADMIN — Medication 200 GRAM(S): at 13:19

## 2021-01-01 RX ADMIN — HYDROMORPHONE HYDROCHLORIDE 0.2 MILLIGRAM(S): 2 INJECTION INTRAMUSCULAR; INTRAVENOUS; SUBCUTANEOUS at 05:53

## 2021-01-01 RX ADMIN — DONEPEZIL HYDROCHLORIDE 5 MILLIGRAM(S): 10 TABLET, FILM COATED ORAL at 22:26

## 2021-01-01 RX ADMIN — SODIUM CHLORIDE 80 MILLILITER(S): 9 INJECTION, SOLUTION INTRAVENOUS at 23:31

## 2021-01-01 RX ADMIN — LACOSAMIDE 120 MILLIGRAM(S): 50 TABLET ORAL at 17:19

## 2021-01-01 RX ADMIN — LIDOCAINE 1 PATCH: 4 CREAM TOPICAL at 17:44

## 2021-01-01 RX ADMIN — LACOSAMIDE 140 MILLIGRAM(S): 50 TABLET ORAL at 17:16

## 2021-01-01 RX ADMIN — CEFEPIME 100 MILLIGRAM(S): 1 INJECTION, POWDER, FOR SOLUTION INTRAMUSCULAR; INTRAVENOUS at 05:08

## 2021-01-01 RX ADMIN — Medication 975 MILLIGRAM(S): at 21:00

## 2021-01-01 RX ADMIN — Medication 100 MILLIEQUIVALENT(S): at 16:12

## 2021-01-01 RX ADMIN — Medication 1 APPLICATION(S): at 12:04

## 2021-01-01 RX ADMIN — CEFEPIME 100 MILLIGRAM(S): 1 INJECTION, POWDER, FOR SOLUTION INTRAMUSCULAR; INTRAVENOUS at 17:40

## 2021-01-01 RX ADMIN — MAGNESIUM HYDROXIDE 30 MILLILITER(S): 400 TABLET, CHEWABLE ORAL at 12:30

## 2021-01-01 RX ADMIN — Medication 100 MILLIGRAM(S): at 12:32

## 2021-01-01 RX ADMIN — Medication 100 MILLIGRAM(S): at 18:33

## 2021-01-01 RX ADMIN — DONEPEZIL HYDROCHLORIDE 5 MILLIGRAM(S): 10 TABLET, FILM COATED ORAL at 21:18

## 2021-01-01 RX ADMIN — SODIUM CHLORIDE 2000 MILLILITER(S): 9 INJECTION, SOLUTION INTRAVENOUS at 10:53

## 2021-01-01 RX ADMIN — ROBINUL 0.4 MILLIGRAM(S): 0.2 INJECTION INTRAMUSCULAR; INTRAVENOUS at 06:21

## 2021-01-01 RX ADMIN — SODIUM CHLORIDE 75 MILLILITER(S): 9 INJECTION, SOLUTION INTRAVENOUS at 15:31

## 2021-01-01 RX ADMIN — Medication 400 UNIT(S): at 13:57

## 2021-01-01 RX ADMIN — Medication 300 MILLIGRAM(S): at 11:47

## 2021-01-01 RX ADMIN — FENTANYL CITRATE 25 MICROGRAM(S): 50 INJECTION INTRAVENOUS at 01:53

## 2021-01-01 RX ADMIN — Medication 100 MILLIEQUIVALENT(S): at 15:38

## 2021-01-01 RX ADMIN — LACOSAMIDE 140 MILLIGRAM(S): 50 TABLET ORAL at 05:04

## 2021-01-01 RX ADMIN — CHLORHEXIDINE GLUCONATE 1 APPLICATION(S): 213 SOLUTION TOPICAL at 06:59

## 2021-01-01 RX ADMIN — Medication 5 MILLIGRAM(S): at 21:43

## 2021-01-01 RX ADMIN — DONEPEZIL HYDROCHLORIDE 5 MILLIGRAM(S): 10 TABLET, FILM COATED ORAL at 23:57

## 2021-01-01 RX ADMIN — Medication 100 MILLIEQUIVALENT(S): at 08:53

## 2021-01-01 RX ADMIN — Medication 1 MILLIGRAM(S): at 12:09

## 2021-01-01 RX ADMIN — DONEPEZIL HYDROCHLORIDE 5 MILLIGRAM(S): 10 TABLET, FILM COATED ORAL at 22:02

## 2021-01-01 RX ADMIN — Medication 100 MILLIGRAM(S): at 12:17

## 2021-01-01 RX ADMIN — Medication 1 PACKET(S): at 11:33

## 2021-01-01 RX ADMIN — DEXTROSE MONOHYDRATE, SODIUM CHLORIDE, AND POTASSIUM CHLORIDE 50 MILLILITER(S): 50; .745; 4.5 INJECTION, SOLUTION INTRAVENOUS at 05:20

## 2021-01-01 RX ADMIN — LIDOCAINE 1 PATCH: 4 CREAM TOPICAL at 13:57

## 2021-01-01 RX ADMIN — ROBINUL 0.4 MILLIGRAM(S): 0.2 INJECTION INTRAMUSCULAR; INTRAVENOUS at 13:33

## 2021-01-01 RX ADMIN — CHLORHEXIDINE GLUCONATE 1 APPLICATION(S): 213 SOLUTION TOPICAL at 21:01

## 2021-01-01 RX ADMIN — HYDROMORPHONE HYDROCHLORIDE 0.2 MILLIGRAM(S): 2 INJECTION INTRAMUSCULAR; INTRAVENOUS; SUBCUTANEOUS at 21:43

## 2021-01-01 RX ADMIN — Medication 40 MILLIEQUIVALENT(S): at 17:22

## 2021-01-01 RX ADMIN — MAGNESIUM HYDROXIDE 30 MILLILITER(S): 400 TABLET, CHEWABLE ORAL at 12:46

## 2021-01-01 RX ADMIN — Medication 300 MILLIGRAM(S): at 11:00

## 2021-01-01 RX ADMIN — LIDOCAINE 1 PATCH: 4 CREAM TOPICAL at 10:02

## 2021-01-01 RX ADMIN — Medication 30 MILLIEQUIVALENT(S): at 02:37

## 2021-01-01 RX ADMIN — LIDOCAINE 1 PATCH: 4 CREAM TOPICAL at 19:21

## 2021-01-01 RX ADMIN — SODIUM CHLORIDE 50 MILLILITER(S): 9 INJECTION INTRAMUSCULAR; INTRAVENOUS; SUBCUTANEOUS at 14:33

## 2021-01-01 RX ADMIN — SENNA PLUS 2 TABLET(S): 8.6 TABLET ORAL at 22:45

## 2021-01-01 RX ADMIN — SODIUM CHLORIDE 75 MILLILITER(S): 9 INJECTION, SOLUTION INTRAVENOUS at 14:48

## 2021-01-01 RX ADMIN — MAGNESIUM HYDROXIDE 30 MILLILITER(S): 400 TABLET, CHEWABLE ORAL at 12:58

## 2021-01-01 RX ADMIN — Medication 100 MILLIGRAM(S): at 14:03

## 2021-01-01 RX ADMIN — SODIUM CHLORIDE 75 MILLILITER(S): 9 INJECTION INTRAMUSCULAR; INTRAVENOUS; SUBCUTANEOUS at 12:05

## 2021-01-01 RX ADMIN — Medication 100 MILLIEQUIVALENT(S): at 13:23

## 2021-01-01 RX ADMIN — LIDOCAINE 1 PATCH: 4 CREAM TOPICAL at 20:01

## 2021-01-01 RX ADMIN — Medication 100 MILLIGRAM(S): at 12:07

## 2021-01-01 RX ADMIN — Medication 81 MILLIGRAM(S): at 12:07

## 2021-01-01 RX ADMIN — PIPERACILLIN AND TAZOBACTAM 25 GRAM(S): 4; .5 INJECTION, POWDER, LYOPHILIZED, FOR SOLUTION INTRAVENOUS at 18:24

## 2021-01-01 RX ADMIN — Medication 975 MILLIGRAM(S): at 23:58

## 2021-01-01 RX ADMIN — SODIUM CHLORIDE 75 MILLILITER(S): 9 INJECTION INTRAMUSCULAR; INTRAVENOUS; SUBCUTANEOUS at 08:15

## 2021-01-01 RX ADMIN — LIDOCAINE 1 PATCH: 4 CREAM TOPICAL at 09:05

## 2021-01-01 RX ADMIN — ROBINUL 0.4 MILLIGRAM(S): 0.2 INJECTION INTRAMUSCULAR; INTRAVENOUS at 12:47

## 2021-01-01 RX ADMIN — DONEPEZIL HYDROCHLORIDE 5 MILLIGRAM(S): 10 TABLET, FILM COATED ORAL at 22:38

## 2021-01-01 RX ADMIN — LACOSAMIDE 120 MILLIGRAM(S): 50 TABLET ORAL at 16:40

## 2021-01-01 RX ADMIN — Medication 100 MILLIGRAM(S): at 03:50

## 2021-01-01 RX ADMIN — LIDOCAINE 1 PATCH: 4 CREAM TOPICAL at 23:55

## 2021-01-01 RX ADMIN — Medication 1 ENEMA: at 23:55

## 2021-01-01 RX ADMIN — CEFEPIME 100 MILLIGRAM(S): 1 INJECTION, POWDER, FOR SOLUTION INTRAMUSCULAR; INTRAVENOUS at 17:26

## 2021-01-01 RX ADMIN — Medication 83.33 MILLIMOLE(S): at 12:25

## 2021-01-01 RX ADMIN — CEFEPIME 100 MILLIGRAM(S): 1 INJECTION, POWDER, FOR SOLUTION INTRAMUSCULAR; INTRAVENOUS at 18:18

## 2021-01-01 RX ADMIN — LIDOCAINE 1 PATCH: 4 CREAM TOPICAL at 08:51

## 2021-01-01 RX ADMIN — LACOSAMIDE 120 MILLIGRAM(S): 50 TABLET ORAL at 18:51

## 2021-01-01 RX ADMIN — HYDROMORPHONE HYDROCHLORIDE 0.2 MILLIGRAM(S): 2 INJECTION INTRAMUSCULAR; INTRAVENOUS; SUBCUTANEOUS at 19:04

## 2021-01-01 RX ADMIN — Medication 975 MILLIGRAM(S): at 12:40

## 2021-01-01 RX ADMIN — POLYETHYLENE GLYCOL 3350 17 GRAM(S): 17 POWDER, FOR SOLUTION ORAL at 12:09

## 2021-01-01 RX ADMIN — POLYETHYLENE GLYCOL 3350 17 GRAM(S): 17 POWDER, FOR SOLUTION ORAL at 22:45

## 2021-01-01 RX ADMIN — Medication 100 MILLIGRAM(S): at 10:45

## 2021-01-01 RX ADMIN — Medication 650 MILLIGRAM(S): at 15:55

## 2021-01-01 RX ADMIN — ROBINUL 0.4 MILLIGRAM(S): 0.2 INJECTION INTRAMUSCULAR; INTRAVENOUS at 17:51

## 2021-01-01 RX ADMIN — LACOSAMIDE 140 MILLIGRAM(S): 50 TABLET ORAL at 17:12

## 2021-01-01 RX ADMIN — LACOSAMIDE 140 MILLIGRAM(S): 50 TABLET ORAL at 05:07

## 2021-01-01 RX ADMIN — Medication 100 MILLIEQUIVALENT(S): at 07:46

## 2021-01-01 RX ADMIN — LACOSAMIDE 140 MILLIGRAM(S): 50 TABLET ORAL at 17:38

## 2021-01-01 RX ADMIN — HYDROMORPHONE HYDROCHLORIDE 0.2 MILLIGRAM(S): 2 INJECTION INTRAMUSCULAR; INTRAVENOUS; SUBCUTANEOUS at 21:22

## 2021-01-01 RX ADMIN — Medication 81 MILLIGRAM(S): at 06:45

## 2021-01-01 RX ADMIN — CEFEPIME 100 MILLIGRAM(S): 1 INJECTION, POWDER, FOR SOLUTION INTRAMUSCULAR; INTRAVENOUS at 05:05

## 2021-01-01 RX ADMIN — Medication 5 MILLIGRAM(S): at 22:03

## 2021-01-01 RX ADMIN — SENNA PLUS 2 TABLET(S): 8.6 TABLET ORAL at 21:03

## 2021-01-01 RX ADMIN — LACOSAMIDE 140 MILLIGRAM(S): 50 TABLET ORAL at 18:40

## 2021-01-01 RX ADMIN — LIDOCAINE 1 PATCH: 4 CREAM TOPICAL at 08:41

## 2021-01-01 RX ADMIN — Medication 650 MILLIGRAM(S): at 02:08

## 2021-01-01 RX ADMIN — Medication 400 MILLIGRAM(S): at 08:58

## 2021-01-01 RX ADMIN — Medication 400 UNIT(S): at 12:30

## 2021-01-01 RX ADMIN — Medication 100 MILLIGRAM(S): at 12:46

## 2021-01-01 RX ADMIN — LACOSAMIDE 140 MILLIGRAM(S): 50 TABLET ORAL at 05:56

## 2021-01-01 RX ADMIN — Medication 200 GRAM(S): at 00:36

## 2021-01-01 RX ADMIN — Medication 20 MILLIGRAM(S): at 09:17

## 2021-01-01 RX ADMIN — LACOSAMIDE 140 MILLIGRAM(S): 50 TABLET ORAL at 05:27

## 2021-01-01 RX ADMIN — Medication 100 MILLIGRAM(S): at 12:31

## 2021-01-01 RX ADMIN — Medication 1 MILLIGRAM(S): at 12:46

## 2021-01-01 RX ADMIN — Medication 1 APPLICATION(S): at 11:35

## 2021-01-01 RX ADMIN — Medication 100 MILLIGRAM(S): at 11:29

## 2021-01-01 RX ADMIN — ROBINUL 0.4 MILLIGRAM(S): 0.2 INJECTION INTRAMUSCULAR; INTRAVENOUS at 05:07

## 2021-01-01 RX ADMIN — HYDROMORPHONE HYDROCHLORIDE 0.2 MILLIGRAM(S): 2 INJECTION INTRAMUSCULAR; INTRAVENOUS; SUBCUTANEOUS at 05:08

## 2021-01-01 RX ADMIN — Medication 100 MILLIEQUIVALENT(S): at 07:40

## 2021-01-01 RX ADMIN — PIPERACILLIN AND TAZOBACTAM 200 GRAM(S): 4; .5 INJECTION, POWDER, LYOPHILIZED, FOR SOLUTION INTRAVENOUS at 12:35

## 2021-01-01 RX ADMIN — OXYCODONE HYDROCHLORIDE 5 MILLIGRAM(S): 5 TABLET ORAL at 17:27

## 2021-01-01 RX ADMIN — Medication 250 MILLIGRAM(S): at 09:35

## 2021-01-01 RX ADMIN — SODIUM CHLORIDE 1000 MILLILITER(S): 9 INJECTION INTRAMUSCULAR; INTRAVENOUS; SUBCUTANEOUS at 01:10

## 2021-01-01 RX ADMIN — LACOSAMIDE 140 MILLIGRAM(S): 50 TABLET ORAL at 17:39

## 2021-01-01 RX ADMIN — Medication 5 MILLIGRAM(S): at 22:38

## 2021-01-01 RX ADMIN — Medication 100 MILLIGRAM(S): at 21:27

## 2021-01-01 RX ADMIN — LACOSAMIDE 120 MILLIGRAM(S): 50 TABLET ORAL at 05:13

## 2021-01-01 RX ADMIN — OXYCODONE HYDROCHLORIDE 5 MILLIGRAM(S): 5 TABLET ORAL at 02:47

## 2021-01-01 RX ADMIN — SODIUM CHLORIDE 1550 MILLILITER(S): 9 INJECTION INTRAMUSCULAR; INTRAVENOUS; SUBCUTANEOUS at 02:10

## 2021-01-01 RX ADMIN — POLYETHYLENE GLYCOL 3350 17 GRAM(S): 17 POWDER, FOR SOLUTION ORAL at 17:20

## 2021-01-01 RX ADMIN — POLYETHYLENE GLYCOL 3350 17 GRAM(S): 17 POWDER, FOR SOLUTION ORAL at 13:29

## 2021-01-01 RX ADMIN — Medication 1 APPLICATION(S): at 12:30

## 2021-01-01 RX ADMIN — LACOSAMIDE 140 MILLIGRAM(S): 50 TABLET ORAL at 06:07

## 2021-01-01 RX ADMIN — Medication 100 MILLIEQUIVALENT(S): at 11:37

## 2021-01-01 RX ADMIN — DEXTROSE MONOHYDRATE, SODIUM CHLORIDE, AND POTASSIUM CHLORIDE 50 MILLILITER(S): 50; .745; 4.5 INJECTION, SOLUTION INTRAVENOUS at 21:32

## 2021-01-01 RX ADMIN — Medication 1 APPLICATION(S): at 13:30

## 2021-01-01 RX ADMIN — HYDROMORPHONE HYDROCHLORIDE 0.5 MILLIGRAM(S): 2 INJECTION INTRAMUSCULAR; INTRAVENOUS; SUBCUTANEOUS at 15:55

## 2021-01-01 RX ADMIN — HYDROMORPHONE HYDROCHLORIDE 0.2 MILLIGRAM(S): 2 INJECTION INTRAMUSCULAR; INTRAVENOUS; SUBCUTANEOUS at 17:51

## 2021-01-01 RX ADMIN — HYDROMORPHONE HYDROCHLORIDE 0.2 MILLIGRAM(S): 2 INJECTION INTRAMUSCULAR; INTRAVENOUS; SUBCUTANEOUS at 02:56

## 2021-01-01 RX ADMIN — CHLORHEXIDINE GLUCONATE 1 APPLICATION(S): 213 SOLUTION TOPICAL at 21:21

## 2021-01-01 RX ADMIN — Medication 1 MILLIGRAM(S): at 11:54

## 2021-01-01 RX ADMIN — Medication 81 MILLIGRAM(S): at 05:17

## 2021-01-01 RX ADMIN — HYDROMORPHONE HYDROCHLORIDE 0.2 MILLIGRAM(S): 2 INJECTION INTRAMUSCULAR; INTRAVENOUS; SUBCUTANEOUS at 14:07

## 2021-01-01 RX ADMIN — SODIUM CHLORIDE 75 MILLILITER(S): 9 INJECTION, SOLUTION INTRAVENOUS at 19:53

## 2021-01-01 RX ADMIN — Medication 975 MILLIGRAM(S): at 23:20

## 2021-01-01 RX ADMIN — Medication 100 MILLIGRAM(S): at 13:28

## 2021-01-01 RX ADMIN — SODIUM CHLORIDE 75 MILLILITER(S): 9 INJECTION, SOLUTION INTRAVENOUS at 20:04

## 2021-01-01 RX ADMIN — Medication 100 MILLIGRAM(S): at 11:28

## 2021-01-01 RX ADMIN — POLYETHYLENE GLYCOL 3350 17 GRAM(S): 17 POWDER, FOR SOLUTION ORAL at 12:58

## 2021-01-01 RX ADMIN — Medication 975 MILLIGRAM(S): at 13:02

## 2021-01-01 RX ADMIN — LACOSAMIDE 120 MILLIGRAM(S): 50 TABLET ORAL at 05:35

## 2021-01-01 RX ADMIN — ROBINUL 0.4 MILLIGRAM(S): 0.2 INJECTION INTRAMUSCULAR; INTRAVENOUS at 05:53

## 2021-01-01 RX ADMIN — Medication 300 MILLIGRAM(S): at 12:36

## 2021-01-01 RX ADMIN — Medication 1 APPLICATION(S): at 12:32

## 2021-01-01 RX ADMIN — LIDOCAINE 1 PATCH: 4 CREAM TOPICAL at 08:38

## 2021-01-01 RX ADMIN — ROBINUL 0.4 MILLIGRAM(S): 0.2 INJECTION INTRAMUSCULAR; INTRAVENOUS at 12:42

## 2021-01-01 RX ADMIN — Medication 1 MILLIGRAM(S): at 11:15

## 2021-01-01 RX ADMIN — LACOSAMIDE 140 MILLIGRAM(S): 50 TABLET ORAL at 05:23

## 2021-01-01 RX ADMIN — CEFEPIME 100 MILLIGRAM(S): 1 INJECTION, POWDER, FOR SOLUTION INTRAMUSCULAR; INTRAVENOUS at 17:12

## 2021-01-01 RX ADMIN — Medication 100 MILLIEQUIVALENT(S): at 14:08

## 2021-01-01 RX ADMIN — DONEPEZIL HYDROCHLORIDE 5 MILLIGRAM(S): 10 TABLET, FILM COATED ORAL at 21:07

## 2021-01-01 RX ADMIN — LIDOCAINE 1 PATCH: 4 CREAM TOPICAL at 21:05

## 2021-01-01 RX ADMIN — CHLORHEXIDINE GLUCONATE 1 APPLICATION(S): 213 SOLUTION TOPICAL at 05:30

## 2021-01-01 RX ADMIN — LIDOCAINE 1 PATCH: 4 CREAM TOPICAL at 20:00

## 2021-01-01 RX ADMIN — Medication 100 MILLIEQUIVALENT(S): at 03:50

## 2021-01-01 RX ADMIN — Medication 100 MILLIGRAM(S): at 13:25

## 2021-01-01 RX ADMIN — LACOSAMIDE 120 MILLIGRAM(S): 50 TABLET ORAL at 16:48

## 2021-01-01 RX ADMIN — HYDROMORPHONE HYDROCHLORIDE 0.2 MILLIGRAM(S): 2 INJECTION INTRAMUSCULAR; INTRAVENOUS; SUBCUTANEOUS at 10:23

## 2021-01-01 RX ADMIN — Medication 100 MILLIGRAM(S): at 05:43

## 2021-01-01 RX ADMIN — SENNA PLUS 2 TABLET(S): 8.6 TABLET ORAL at 21:01

## 2021-01-01 RX ADMIN — Medication 81 MILLIGRAM(S): at 18:06

## 2021-01-01 RX ADMIN — Medication 1 APPLICATION(S): at 17:08

## 2021-01-01 RX ADMIN — LACOSAMIDE 140 MILLIGRAM(S): 50 TABLET ORAL at 06:25

## 2021-01-01 RX ADMIN — LIDOCAINE 1 PATCH: 4 CREAM TOPICAL at 19:22

## 2021-01-01 RX ADMIN — AMIODARONE HYDROCHLORIDE 200 MILLIGRAM(S): 400 TABLET ORAL at 05:05

## 2021-01-01 RX ADMIN — DONEPEZIL HYDROCHLORIDE 5 MILLIGRAM(S): 10 TABLET, FILM COATED ORAL at 22:41

## 2021-01-01 RX ADMIN — DONEPEZIL HYDROCHLORIDE 5 MILLIGRAM(S): 10 TABLET, FILM COATED ORAL at 23:20

## 2021-01-01 RX ADMIN — POTASSIUM PHOSPHATE, MONOBASIC POTASSIUM PHOSPHATE, DIBASIC 62.5 MILLIMOLE(S): 236; 224 INJECTION, SOLUTION INTRAVENOUS at 22:47

## 2021-01-01 RX ADMIN — Medication 650 MILLIGRAM(S): at 16:46

## 2021-01-01 RX ADMIN — Medication 975 MILLIGRAM(S): at 14:08

## 2021-01-01 RX ADMIN — LIDOCAINE 1 PATCH: 4 CREAM TOPICAL at 23:16

## 2021-01-01 RX ADMIN — SENNA PLUS 2 TABLET(S): 8.6 TABLET ORAL at 22:37

## 2021-01-01 RX ADMIN — ROBINUL 0.4 MILLIGRAM(S): 0.2 INJECTION INTRAMUSCULAR; INTRAVENOUS at 12:25

## 2021-01-01 RX ADMIN — Medication 100 MILLIGRAM(S): at 19:22

## 2021-01-01 RX ADMIN — Medication 1 APPLICATION(S): at 12:22

## 2021-01-01 RX ADMIN — LIDOCAINE 1 PATCH: 4 CREAM TOPICAL at 19:50

## 2021-01-01 RX ADMIN — Medication 400 UNIT(S): at 11:50

## 2021-01-01 RX ADMIN — SODIUM CHLORIDE 1000 MILLILITER(S): 9 INJECTION, SOLUTION INTRAVENOUS at 07:13

## 2021-01-01 RX ADMIN — LIDOCAINE 1 PATCH: 4 CREAM TOPICAL at 10:29

## 2021-01-01 RX ADMIN — Medication 1 APPLICATION(S): at 12:08

## 2021-01-01 RX ADMIN — ROBINUL 0.4 MILLIGRAM(S): 0.2 INJECTION INTRAMUSCULAR; INTRAVENOUS at 17:48

## 2021-01-01 RX ADMIN — ROBINUL 0.4 MILLIGRAM(S): 0.2 INJECTION INTRAMUSCULAR; INTRAVENOUS at 05:27

## 2021-01-01 RX ADMIN — SODIUM CHLORIDE 100 MILLILITER(S): 9 INJECTION, SOLUTION INTRAVENOUS at 05:50

## 2021-01-01 RX ADMIN — Medication 2.5 MILLIGRAM(S): at 01:53

## 2021-01-01 RX ADMIN — Medication 100 MILLIGRAM(S): at 05:05

## 2021-01-01 RX ADMIN — SODIUM CHLORIDE 3000 MILLILITER(S): 9 INJECTION INTRAMUSCULAR; INTRAVENOUS; SUBCUTANEOUS at 18:39

## 2021-01-01 RX ADMIN — CHLORHEXIDINE GLUCONATE 1 APPLICATION(S): 213 SOLUTION TOPICAL at 05:51

## 2021-01-01 RX ADMIN — SODIUM CHLORIDE 35 MILLILITER(S): 9 INJECTION, SOLUTION INTRAVENOUS at 05:03

## 2021-01-01 RX ADMIN — LIDOCAINE 1 PATCH: 4 CREAM TOPICAL at 10:03

## 2021-01-01 RX ADMIN — HYDROMORPHONE HYDROCHLORIDE 0.2 MILLIGRAM(S): 2 INJECTION INTRAMUSCULAR; INTRAVENOUS; SUBCUTANEOUS at 10:11

## 2021-01-01 RX ADMIN — HYDROMORPHONE HYDROCHLORIDE 0.2 MILLIGRAM(S): 2 INJECTION INTRAMUSCULAR; INTRAVENOUS; SUBCUTANEOUS at 16:13

## 2021-01-01 RX ADMIN — HYDROMORPHONE HYDROCHLORIDE 0.2 MILLIGRAM(S): 2 INJECTION INTRAMUSCULAR; INTRAVENOUS; SUBCUTANEOUS at 17:07

## 2021-01-01 RX ADMIN — HYDROMORPHONE HYDROCHLORIDE 0.2 MILLIGRAM(S): 2 INJECTION INTRAMUSCULAR; INTRAVENOUS; SUBCUTANEOUS at 22:42

## 2021-01-01 RX ADMIN — CEFEPIME 100 MILLIGRAM(S): 1 INJECTION, POWDER, FOR SOLUTION INTRAMUSCULAR; INTRAVENOUS at 05:09

## 2021-01-01 RX ADMIN — Medication 100 MILLIGRAM(S): at 11:50

## 2021-01-01 RX ADMIN — POLYETHYLENE GLYCOL 3350 17 GRAM(S): 17 POWDER, FOR SOLUTION ORAL at 12:34

## 2021-01-01 RX ADMIN — Medication 83.33 MILLIMOLE(S): at 11:23

## 2021-01-01 RX ADMIN — CEFEPIME 100 MILLIGRAM(S): 1 INJECTION, POWDER, FOR SOLUTION INTRAMUSCULAR; INTRAVENOUS at 17:03

## 2021-01-01 RX ADMIN — HYDROMORPHONE HYDROCHLORIDE 0.5 MILLIGRAM(S): 2 INJECTION INTRAMUSCULAR; INTRAVENOUS; SUBCUTANEOUS at 14:33

## 2021-01-01 RX ADMIN — Medication 100 MILLIGRAM(S): at 11:16

## 2021-01-01 RX ADMIN — Medication 100 MILLIEQUIVALENT(S): at 11:12

## 2021-01-01 RX ADMIN — Medication 1 MILLIGRAM(S): at 12:52

## 2021-01-01 RX ADMIN — DONEPEZIL HYDROCHLORIDE 5 MILLIGRAM(S): 10 TABLET, FILM COATED ORAL at 21:00

## 2021-01-01 RX ADMIN — Medication 100 MILLIGRAM(S): at 11:54

## 2021-01-01 RX ADMIN — HYDROXYUREA 500 MILLIGRAM(S): 500 CAPSULE ORAL at 11:01

## 2021-01-01 RX ADMIN — Medication 100 MILLIEQUIVALENT(S): at 14:27

## 2021-01-01 RX ADMIN — Medication 1 APPLICATION(S): at 12:00

## 2021-01-01 RX ADMIN — OXYCODONE HYDROCHLORIDE 5 MILLIGRAM(S): 5 TABLET ORAL at 12:24

## 2021-01-01 RX ADMIN — POLYETHYLENE GLYCOL 3350 17 GRAM(S): 17 POWDER, FOR SOLUTION ORAL at 17:25

## 2021-01-01 RX ADMIN — CEFEPIME 100 MILLIGRAM(S): 1 INJECTION, POWDER, FOR SOLUTION INTRAMUSCULAR; INTRAVENOUS at 05:04

## 2021-01-01 RX ADMIN — HYDROMORPHONE HYDROCHLORIDE 0.2 MILLIGRAM(S): 2 INJECTION INTRAMUSCULAR; INTRAVENOUS; SUBCUTANEOUS at 10:35

## 2021-01-01 RX ADMIN — Medication 40 MILLIEQUIVALENT(S): at 09:06

## 2021-01-01 RX ADMIN — LIDOCAINE 1 PATCH: 4 CREAM TOPICAL at 22:03

## 2021-01-01 RX ADMIN — Medication 975 MILLIGRAM(S): at 12:10

## 2021-01-01 RX ADMIN — ROBINUL 0.4 MILLIGRAM(S): 0.2 INJECTION INTRAMUSCULAR; INTRAVENOUS at 12:29

## 2021-01-01 RX ADMIN — SODIUM CHLORIDE 1550 MILLILITER(S): 9 INJECTION INTRAMUSCULAR; INTRAVENOUS; SUBCUTANEOUS at 03:17

## 2021-01-01 RX ADMIN — CEFEPIME 100 MILLIGRAM(S): 1 INJECTION, POWDER, FOR SOLUTION INTRAMUSCULAR; INTRAVENOUS at 05:18

## 2021-01-01 RX ADMIN — LACOSAMIDE 140 MILLIGRAM(S): 50 TABLET ORAL at 17:27

## 2021-01-01 RX ADMIN — HYDROMORPHONE HYDROCHLORIDE 0.2 MILLIGRAM(S): 2 INJECTION INTRAMUSCULAR; INTRAVENOUS; SUBCUTANEOUS at 13:07

## 2021-01-01 RX ADMIN — Medication 5 MILLIGRAM(S): at 21:08

## 2021-01-01 RX ADMIN — LACOSAMIDE 140 MILLIGRAM(S): 50 TABLET ORAL at 18:04

## 2021-01-01 RX ADMIN — Medication 100 MILLIEQUIVALENT(S): at 01:02

## 2021-01-01 RX ADMIN — HYDROXYUREA 500 MILLIGRAM(S): 500 CAPSULE ORAL at 12:40

## 2021-01-01 RX ADMIN — POLYETHYLENE GLYCOL 3350 17 GRAM(S): 17 POWDER, FOR SOLUTION ORAL at 12:35

## 2021-01-01 RX ADMIN — SODIUM CHLORIDE 40 MILLILITER(S): 9 INJECTION, SOLUTION INTRAVENOUS at 12:56

## 2021-01-01 RX ADMIN — HYDROMORPHONE HYDROCHLORIDE 0.2 MILLIGRAM(S): 2 INJECTION INTRAMUSCULAR; INTRAVENOUS; SUBCUTANEOUS at 03:08

## 2021-01-01 RX ADMIN — Medication 40 MILLIEQUIVALENT(S): at 06:30

## 2021-01-01 RX ADMIN — SODIUM CHLORIDE 35 MILLILITER(S): 9 INJECTION, SOLUTION INTRAVENOUS at 05:06

## 2021-01-01 RX ADMIN — Medication 100 MILLIGRAM(S): at 12:40

## 2021-01-01 RX ADMIN — POLYETHYLENE GLYCOL 3350 17 GRAM(S): 17 POWDER, FOR SOLUTION ORAL at 12:19

## 2021-01-01 RX ADMIN — ROBINUL 0.4 MILLIGRAM(S): 0.2 INJECTION INTRAMUSCULAR; INTRAVENOUS at 18:15

## 2021-01-01 RX ADMIN — SENNA PLUS 2 TABLET(S): 8.6 TABLET ORAL at 21:07

## 2021-01-01 RX ADMIN — Medication 650 MILLIGRAM(S): at 11:33

## 2021-01-01 RX ADMIN — CHLORHEXIDINE GLUCONATE 1 APPLICATION(S): 213 SOLUTION TOPICAL at 21:08

## 2021-01-01 RX ADMIN — LIDOCAINE 1 PATCH: 4 CREAM TOPICAL at 19:37

## 2021-01-01 RX ADMIN — Medication 5 MILLIGRAM(S): at 21:31

## 2021-01-01 RX ADMIN — Medication 100 MILLIGRAM(S): at 11:14

## 2021-01-01 RX ADMIN — POLYETHYLENE GLYCOL 3350 17 GRAM(S): 17 POWDER, FOR SOLUTION ORAL at 12:05

## 2021-01-01 RX ADMIN — LACOSAMIDE 120 MILLIGRAM(S): 50 TABLET ORAL at 17:29

## 2021-01-01 RX ADMIN — Medication 400 UNIT(S): at 11:34

## 2021-01-01 RX ADMIN — SODIUM CHLORIDE 1000 MILLILITER(S): 9 INJECTION INTRAMUSCULAR; INTRAVENOUS; SUBCUTANEOUS at 03:16

## 2021-01-01 RX ADMIN — SODIUM CHLORIDE 250 MILLILITER(S): 9 INJECTION INTRAMUSCULAR; INTRAVENOUS; SUBCUTANEOUS at 12:05

## 2021-01-01 RX ADMIN — Medication 1 MILLIGRAM(S): at 12:45

## 2021-01-01 RX ADMIN — ROBINUL 0.4 MILLIGRAM(S): 0.2 INJECTION INTRAMUSCULAR; INTRAVENOUS at 23:03

## 2021-01-01 RX ADMIN — SENNA PLUS 2 TABLET(S): 8.6 TABLET ORAL at 21:41

## 2021-01-01 RX ADMIN — LACOSAMIDE 140 MILLIGRAM(S): 50 TABLET ORAL at 17:37

## 2021-01-01 RX ADMIN — AMIODARONE HYDROCHLORIDE 200 MILLIGRAM(S): 400 TABLET ORAL at 06:32

## 2021-01-01 RX ADMIN — Medication 650 MILLIGRAM(S): at 09:41

## 2021-01-01 RX ADMIN — Medication 200 GRAM(S): at 02:30

## 2021-01-01 RX ADMIN — LIDOCAINE 1 PATCH: 4 CREAM TOPICAL at 10:00

## 2021-01-01 RX ADMIN — Medication 400 UNIT(S): at 12:34

## 2021-01-01 RX ADMIN — Medication 1 MILLIGRAM(S): at 11:39

## 2021-01-01 RX ADMIN — Medication 100 MILLIGRAM(S): at 17:40

## 2021-01-01 RX ADMIN — SODIUM CHLORIDE 1000 MILLILITER(S): 9 INJECTION INTRAMUSCULAR; INTRAVENOUS; SUBCUTANEOUS at 12:56

## 2021-01-01 RX ADMIN — Medication 100 MILLIGRAM(S): at 11:25

## 2021-01-01 RX ADMIN — FENTANYL CITRATE 25 MICROGRAM(S): 50 INJECTION INTRAVENOUS at 02:20

## 2021-01-01 RX ADMIN — SODIUM CHLORIDE 50 MILLILITER(S): 9 INJECTION, SOLUTION INTRAVENOUS at 16:56

## 2021-01-01 RX ADMIN — Medication 2.5 MILLIGRAM(S): at 12:32

## 2021-01-01 RX ADMIN — Medication 100 MILLIGRAM(S): at 12:04

## 2021-01-01 RX ADMIN — HYDROMORPHONE HYDROCHLORIDE 0.2 MILLIGRAM(S): 2 INJECTION INTRAMUSCULAR; INTRAVENOUS; SUBCUTANEOUS at 23:01

## 2021-01-01 RX ADMIN — Medication 750 MILLIGRAM(S): at 10:31

## 2021-01-01 RX ADMIN — Medication 100 MILLIGRAM(S): at 12:52

## 2021-01-01 RX ADMIN — POLYETHYLENE GLYCOL 3350 17 GRAM(S): 17 POWDER, FOR SOLUTION ORAL at 11:48

## 2021-01-01 RX ADMIN — Medication 100 MILLIEQUIVALENT(S): at 02:18

## 2021-01-01 RX ADMIN — Medication 100 MILLIEQUIVALENT(S): at 18:24

## 2021-01-01 RX ADMIN — POLYETHYLENE GLYCOL 3350 17 GRAM(S): 17 POWDER, FOR SOLUTION ORAL at 06:05

## 2021-01-01 RX ADMIN — AMIODARONE HYDROCHLORIDE 200 MILLIGRAM(S): 400 TABLET ORAL at 17:40

## 2021-01-01 RX ADMIN — Medication 100 MILLIGRAM(S): at 13:01

## 2021-01-01 RX ADMIN — Medication 81 MILLIGRAM(S): at 11:47

## 2021-01-01 RX ADMIN — POLYETHYLENE GLYCOL 3350 17 GRAM(S): 17 POWDER, FOR SOLUTION ORAL at 12:45

## 2021-01-01 RX ADMIN — FENTANYL CITRATE 50 MICROGRAM(S): 50 INJECTION INTRAVENOUS at 10:45

## 2021-01-01 RX ADMIN — LACOSAMIDE 120 MILLIGRAM(S): 50 TABLET ORAL at 17:00

## 2021-01-01 RX ADMIN — DEXTROSE MONOHYDRATE, SODIUM CHLORIDE, AND POTASSIUM CHLORIDE 50 MILLILITER(S): 50; .745; 4.5 INJECTION, SOLUTION INTRAVENOUS at 18:14

## 2021-01-01 RX ADMIN — LACOSAMIDE 140 MILLIGRAM(S): 50 TABLET ORAL at 05:45

## 2021-01-01 RX ADMIN — DONEPEZIL HYDROCHLORIDE 5 MILLIGRAM(S): 10 TABLET, FILM COATED ORAL at 21:56

## 2021-01-01 RX ADMIN — SODIUM CHLORIDE 35 MILLILITER(S): 9 INJECTION, SOLUTION INTRAVENOUS at 07:47

## 2021-01-01 RX ADMIN — CEFEPIME 2000 MILLIGRAM(S): 1 INJECTION, POWDER, FOR SOLUTION INTRAMUSCULAR; INTRAVENOUS at 02:40

## 2021-01-01 RX ADMIN — HYDROMORPHONE HYDROCHLORIDE 0.2 MILLIGRAM(S): 2 INJECTION INTRAMUSCULAR; INTRAVENOUS; SUBCUTANEOUS at 06:04

## 2021-01-01 RX ADMIN — HYDROMORPHONE HYDROCHLORIDE 0.2 MILLIGRAM(S): 2 INJECTION INTRAMUSCULAR; INTRAVENOUS; SUBCUTANEOUS at 10:59

## 2021-01-01 RX ADMIN — Medication 81 MILLIGRAM(S): at 12:40

## 2021-01-01 RX ADMIN — Medication 100 MILLIEQUIVALENT(S): at 12:16

## 2021-01-01 RX ADMIN — LACOSAMIDE 120 MILLIGRAM(S): 50 TABLET ORAL at 05:18

## 2021-01-01 RX ADMIN — Medication 83.33 MILLIMOLE(S): at 13:26

## 2021-01-01 RX ADMIN — Medication 400 MILLIGRAM(S): at 02:15

## 2021-01-01 RX ADMIN — Medication 100 MILLIGRAM(S): at 11:47

## 2021-01-01 RX ADMIN — ROBINUL 0.4 MILLIGRAM(S): 0.2 INJECTION INTRAMUSCULAR; INTRAVENOUS at 00:34

## 2021-01-01 RX ADMIN — Medication 100 MILLIEQUIVALENT(S): at 04:01

## 2021-01-01 RX ADMIN — Medication 2.5 MILLIGRAM(S): at 12:03

## 2021-01-01 RX ADMIN — HYDROMORPHONE HYDROCHLORIDE 0.2 MILLIGRAM(S): 2 INJECTION INTRAMUSCULAR; INTRAVENOUS; SUBCUTANEOUS at 05:42

## 2021-01-01 RX ADMIN — AMIODARONE HYDROCHLORIDE 200 MILLIGRAM(S): 400 TABLET ORAL at 15:37

## 2021-01-01 RX ADMIN — Medication 1 MILLIGRAM(S): at 12:32

## 2021-01-01 RX ADMIN — Medication 100 MILLIEQUIVALENT(S): at 00:39

## 2021-01-01 RX ADMIN — Medication 40 MILLIEQUIVALENT(S): at 17:51

## 2021-01-01 RX ADMIN — Medication 1000 MILLIGRAM(S): at 02:45

## 2021-01-01 RX ADMIN — Medication 400 UNIT(S): at 12:46

## 2021-01-01 RX ADMIN — Medication 1 APPLICATION(S): at 11:25

## 2021-01-01 RX ADMIN — SODIUM CHLORIDE 75 MILLILITER(S): 9 INJECTION INTRAMUSCULAR; INTRAVENOUS; SUBCUTANEOUS at 18:34

## 2021-01-01 RX ADMIN — LIDOCAINE 1 PATCH: 4 CREAM TOPICAL at 20:17

## 2021-01-01 RX ADMIN — Medication 85 MILLIMOLE(S): at 04:16

## 2021-01-01 RX ADMIN — LIDOCAINE 1 PATCH: 4 CREAM TOPICAL at 08:02

## 2021-01-01 RX ADMIN — AMIODARONE HYDROCHLORIDE 200 MILLIGRAM(S): 400 TABLET ORAL at 05:08

## 2021-01-01 RX ADMIN — Medication 100 GRAM(S): at 12:46

## 2021-01-01 RX ADMIN — Medication 300 MILLIGRAM(S): at 10:29

## 2021-01-01 RX ADMIN — Medication 1 APPLICATION(S): at 11:54

## 2021-01-01 RX ADMIN — Medication 100 MILLIGRAM(S): at 13:08

## 2021-01-01 RX ADMIN — Medication 100 MILLIGRAM(S): at 12:45

## 2021-01-01 RX ADMIN — HYDROMORPHONE HYDROCHLORIDE 0.2 MILLIGRAM(S): 2 INJECTION INTRAMUSCULAR; INTRAVENOUS; SUBCUTANEOUS at 10:16

## 2021-01-01 RX ADMIN — Medication 250 MILLIMOLE(S): at 02:19

## 2021-01-01 RX ADMIN — CHLORHEXIDINE GLUCONATE 1 APPLICATION(S): 213 SOLUTION TOPICAL at 09:45

## 2021-01-01 RX ADMIN — LACOSAMIDE 120 MILLIGRAM(S): 50 TABLET ORAL at 17:53

## 2021-01-01 RX ADMIN — Medication 40 MILLIEQUIVALENT(S): at 12:07

## 2021-01-01 RX ADMIN — Medication 100 MILLIEQUIVALENT(S): at 03:05

## 2021-01-01 RX ADMIN — CHLORHEXIDINE GLUCONATE 1 APPLICATION(S): 213 SOLUTION TOPICAL at 06:30

## 2021-01-01 RX ADMIN — DONEPEZIL HYDROCHLORIDE 5 MILLIGRAM(S): 10 TABLET, FILM COATED ORAL at 21:01

## 2021-01-01 RX ADMIN — Medication 400 UNIT(S): at 12:03

## 2021-01-01 RX ADMIN — SENNA PLUS 2 TABLET(S): 8.6 TABLET ORAL at 22:11

## 2021-01-01 RX ADMIN — LACOSAMIDE 120 MILLIGRAM(S): 50 TABLET ORAL at 06:35

## 2021-01-01 RX ADMIN — Medication 100 MILLIGRAM(S): at 12:09

## 2021-01-01 RX ADMIN — Medication 400 UNIT(S): at 11:27

## 2021-01-01 RX ADMIN — Medication 1 ENEMA: at 17:25

## 2021-01-01 RX ADMIN — HYDROMORPHONE HYDROCHLORIDE 0.2 MILLIGRAM(S): 2 INJECTION INTRAMUSCULAR; INTRAVENOUS; SUBCUTANEOUS at 18:05

## 2021-01-01 RX ADMIN — LACOSAMIDE 140 MILLIGRAM(S): 50 TABLET ORAL at 05:03

## 2021-01-01 RX ADMIN — HYDROMORPHONE HYDROCHLORIDE 0.2 MILLIGRAM(S): 2 INJECTION INTRAMUSCULAR; INTRAVENOUS; SUBCUTANEOUS at 01:19

## 2021-01-01 RX ADMIN — POLYETHYLENE GLYCOL 3350 17 GRAM(S): 17 POWDER, FOR SOLUTION ORAL at 12:27

## 2021-01-01 RX ADMIN — LACOSAMIDE 120 MILLIGRAM(S): 50 TABLET ORAL at 17:48

## 2021-01-01 RX ADMIN — Medication 1 APPLICATION(S): at 12:19

## 2021-01-01 RX ADMIN — HYDROMORPHONE HYDROCHLORIDE 0.2 MILLIGRAM(S): 2 INJECTION INTRAMUSCULAR; INTRAVENOUS; SUBCUTANEOUS at 21:38

## 2021-01-01 RX ADMIN — HYDROMORPHONE HYDROCHLORIDE 0.2 MILLIGRAM(S): 2 INJECTION INTRAMUSCULAR; INTRAVENOUS; SUBCUTANEOUS at 05:26

## 2021-01-01 RX ADMIN — Medication 5 MILLIGRAM(S): at 22:39

## 2021-01-01 RX ADMIN — Medication 300 MILLIGRAM(S): at 14:15

## 2021-01-01 RX ADMIN — POLYETHYLENE GLYCOL 3350 17 GRAM(S): 17 POWDER, FOR SOLUTION ORAL at 12:17

## 2021-01-01 RX ADMIN — Medication 500 MILLIGRAM(S): at 17:42

## 2021-01-01 RX ADMIN — ROBINUL 0.4 MILLIGRAM(S): 0.2 INJECTION INTRAMUSCULAR; INTRAVENOUS at 23:18

## 2021-01-01 RX ADMIN — SODIUM CHLORIDE 1000 MILLILITER(S): 9 INJECTION INTRAMUSCULAR; INTRAVENOUS; SUBCUTANEOUS at 01:45

## 2021-01-01 RX ADMIN — Medication 1 APPLICATION(S): at 12:28

## 2021-01-01 RX ADMIN — LIDOCAINE 1 PATCH: 4 CREAM TOPICAL at 20:18

## 2021-01-01 RX ADMIN — Medication 25 MILLIGRAM(S): at 06:05

## 2021-01-01 RX ADMIN — POLYETHYLENE GLYCOL 3350 17 GRAM(S): 17 POWDER, FOR SOLUTION ORAL at 05:45

## 2021-01-01 RX ADMIN — AMIODARONE HYDROCHLORIDE 200 MILLIGRAM(S): 400 TABLET ORAL at 05:09

## 2021-01-01 RX ADMIN — LIDOCAINE 1 PATCH: 4 CREAM TOPICAL at 08:08

## 2021-01-01 RX ADMIN — Medication 1 APPLICATION(S): at 12:26

## 2021-01-01 RX ADMIN — Medication 100 MILLIEQUIVALENT(S): at 04:20

## 2021-01-01 RX ADMIN — CEFEPIME 100 MILLIGRAM(S): 1 INJECTION, POWDER, FOR SOLUTION INTRAMUSCULAR; INTRAVENOUS at 17:21

## 2021-01-01 RX ADMIN — Medication 300 MILLIGRAM(S): at 12:40

## 2021-01-01 RX ADMIN — AMIODARONE HYDROCHLORIDE 200 MILLIGRAM(S): 400 TABLET ORAL at 17:41

## 2021-01-01 RX ADMIN — LIDOCAINE 1 PATCH: 4 CREAM TOPICAL at 21:30

## 2021-01-01 RX ADMIN — ROBINUL 0.4 MILLIGRAM(S): 0.2 INJECTION INTRAMUSCULAR; INTRAVENOUS at 23:38

## 2021-01-01 RX ADMIN — Medication 100 MILLIGRAM(S): at 11:34

## 2021-01-01 RX ADMIN — Medication 1 APPLICATION(S): at 12:46

## 2021-01-01 RX ADMIN — LIDOCAINE 1 PATCH: 4 CREAM TOPICAL at 02:04

## 2021-01-01 RX ADMIN — AMIODARONE HYDROCHLORIDE 200 MILLIGRAM(S): 400 TABLET ORAL at 17:43

## 2021-01-01 RX ADMIN — Medication 1 MILLIGRAM(S): at 15:37

## 2021-01-01 RX ADMIN — Medication 400 UNIT(S): at 13:28

## 2021-01-01 RX ADMIN — SENNA PLUS 2 TABLET(S): 8.6 TABLET ORAL at 22:39

## 2021-01-01 RX ADMIN — CEFEPIME 100 MILLIGRAM(S): 1 INJECTION, POWDER, FOR SOLUTION INTRAMUSCULAR; INTRAVENOUS at 02:10

## 2021-01-01 RX ADMIN — LACOSAMIDE 120 MILLIGRAM(S): 50 TABLET ORAL at 17:02

## 2021-01-01 RX ADMIN — Medication 100 MILLIEQUIVALENT(S): at 10:13

## 2021-01-01 RX ADMIN — SODIUM CHLORIDE 35 MILLILITER(S): 9 INJECTION, SOLUTION INTRAVENOUS at 06:26

## 2021-01-01 RX ADMIN — SENNA PLUS 2 TABLET(S): 8.6 TABLET ORAL at 22:03

## 2021-01-01 RX ADMIN — LIDOCAINE 1 PATCH: 4 CREAM TOPICAL at 22:59

## 2021-01-01 RX ADMIN — DEXTROSE MONOHYDRATE, SODIUM CHLORIDE, AND POTASSIUM CHLORIDE 50 MILLILITER(S): 50; .745; 4.5 INJECTION, SOLUTION INTRAVENOUS at 05:18

## 2021-01-01 RX ADMIN — Medication 2.5 MILLIGRAM(S): at 06:01

## 2021-01-01 RX ADMIN — Medication 650 MILLIGRAM(S): at 03:08

## 2021-01-01 RX ADMIN — Medication 50 GRAM(S): at 02:18

## 2021-01-01 RX ADMIN — Medication 100 MILLIEQUIVALENT(S): at 01:46

## 2021-01-01 RX ADMIN — SENNA PLUS 2 TABLET(S): 8.6 TABLET ORAL at 21:43

## 2021-01-01 RX ADMIN — LACOSAMIDE 140 MILLIGRAM(S): 50 TABLET ORAL at 05:28

## 2021-01-01 RX ADMIN — POTASSIUM PHOSPHATE, MONOBASIC POTASSIUM PHOSPHATE, DIBASIC 62.5 MILLIMOLE(S): 236; 224 INJECTION, SOLUTION INTRAVENOUS at 02:37

## 2021-01-01 RX ADMIN — SENNA PLUS 2 TABLET(S): 8.6 TABLET ORAL at 22:41

## 2021-01-01 RX ADMIN — LACOSAMIDE 140 MILLIGRAM(S): 50 TABLET ORAL at 05:05

## 2021-01-01 RX ADMIN — Medication 650 MILLIGRAM(S): at 01:49

## 2021-01-01 RX ADMIN — LIDOCAINE 1 PATCH: 4 CREAM TOPICAL at 08:10

## 2021-01-01 RX ADMIN — Medication 40 MILLIEQUIVALENT(S): at 08:53

## 2021-01-01 RX ADMIN — Medication 100 MILLIGRAM(S): at 12:55

## 2021-01-01 RX ADMIN — Medication 1 MILLIGRAM(S): at 12:03

## 2021-01-01 RX ADMIN — SENNA PLUS 2 TABLET(S): 8.6 TABLET ORAL at 21:32

## 2021-01-01 RX ADMIN — HYDROMORPHONE HYDROCHLORIDE 0.2 MILLIGRAM(S): 2 INJECTION INTRAMUSCULAR; INTRAVENOUS; SUBCUTANEOUS at 09:32

## 2021-01-01 RX ADMIN — Medication 1 MILLIGRAM(S): at 11:14

## 2021-01-01 RX ADMIN — AMIODARONE HYDROCHLORIDE 200 MILLIGRAM(S): 400 TABLET ORAL at 05:24

## 2021-01-01 RX ADMIN — LACOSAMIDE 140 MILLIGRAM(S): 50 TABLET ORAL at 17:03

## 2021-01-01 RX ADMIN — Medication 100 MILLIEQUIVALENT(S): at 03:15

## 2021-01-01 RX ADMIN — Medication 100 MILLIGRAM(S): at 13:07

## 2021-01-01 RX ADMIN — Medication 100 MILLIGRAM(S): at 12:03

## 2021-01-01 RX ADMIN — Medication 650 MILLIGRAM(S): at 02:19

## 2021-01-01 RX ADMIN — Medication 300 MILLIGRAM(S): at 12:07

## 2021-01-01 RX ADMIN — SODIUM CHLORIDE 1000 MILLILITER(S): 9 INJECTION INTRAMUSCULAR; INTRAVENOUS; SUBCUTANEOUS at 10:03

## 2021-01-01 RX ADMIN — LACOSAMIDE 120 MILLIGRAM(S): 50 TABLET ORAL at 05:49

## 2021-01-01 RX ADMIN — SENNA PLUS 1 TABLET(S): 8.6 TABLET ORAL at 21:56

## 2021-01-01 RX ADMIN — LACOSAMIDE 120 MILLIGRAM(S): 50 TABLET ORAL at 05:16

## 2021-01-01 RX ADMIN — Medication 2.5 MILLIGRAM(S): at 05:56

## 2021-01-01 RX ADMIN — SODIUM CHLORIDE 20 MILLILITER(S): 9 INJECTION, SOLUTION INTRAVENOUS at 07:29

## 2021-01-01 RX ADMIN — POLYETHYLENE GLYCOL 3350 17 GRAM(S): 17 POWDER, FOR SOLUTION ORAL at 11:34

## 2021-01-01 RX ADMIN — LIDOCAINE 1 PATCH: 4 CREAM TOPICAL at 00:17

## 2021-01-01 RX ADMIN — Medication 400 MILLIGRAM(S): at 03:05

## 2021-01-01 RX ADMIN — LACOSAMIDE 120 MILLIGRAM(S): 50 TABLET ORAL at 17:51

## 2021-01-01 RX ADMIN — Medication 100 MILLIGRAM(S): at 15:36

## 2021-01-01 RX ADMIN — PANTOPRAZOLE SODIUM 40 MILLIGRAM(S): 20 TABLET, DELAYED RELEASE ORAL at 17:57

## 2021-01-01 RX ADMIN — LIDOCAINE 1 PATCH: 4 CREAM TOPICAL at 12:33

## 2021-01-01 RX ADMIN — POTASSIUM PHOSPHATE, MONOBASIC POTASSIUM PHOSPHATE, DIBASIC 62.5 MILLIMOLE(S): 236; 224 INJECTION, SOLUTION INTRAVENOUS at 01:02

## 2021-01-01 RX ADMIN — SODIUM CHLORIDE 20 MILLILITER(S): 9 INJECTION, SOLUTION INTRAVENOUS at 18:04

## 2021-01-01 RX ADMIN — Medication 100 MILLIEQUIVALENT(S): at 13:19

## 2021-01-01 RX ADMIN — AMIODARONE HYDROCHLORIDE 200 MILLIGRAM(S): 400 TABLET ORAL at 05:28

## 2021-01-01 RX ADMIN — SODIUM CHLORIDE 35 MILLILITER(S): 9 INJECTION, SOLUTION INTRAVENOUS at 08:18

## 2021-01-01 RX ADMIN — SODIUM CHLORIDE 20 MILLILITER(S): 9 INJECTION, SOLUTION INTRAVENOUS at 10:59

## 2021-01-01 RX ADMIN — Medication 400 UNIT(S): at 12:32

## 2021-01-01 RX ADMIN — CEFEPIME 100 MILLIGRAM(S): 1 INJECTION, POWDER, FOR SOLUTION INTRAMUSCULAR; INTRAVENOUS at 09:35

## 2021-01-01 RX ADMIN — Medication 100 MILLIEQUIVALENT(S): at 23:30

## 2021-01-01 RX ADMIN — Medication 10 MILLIGRAM(S): at 16:22

## 2021-01-01 RX ADMIN — LACOSAMIDE 120 MILLIGRAM(S): 50 TABLET ORAL at 17:43

## 2021-01-01 RX ADMIN — ONDANSETRON 4 MILLIGRAM(S): 8 TABLET, FILM COATED ORAL at 22:11

## 2021-01-01 RX ADMIN — Medication 0.2 MILLIGRAM(S): at 13:22

## 2021-01-01 RX ADMIN — Medication 100 MILLIGRAM(S): at 13:04

## 2021-01-01 RX ADMIN — LIDOCAINE 1 PATCH: 4 CREAM TOPICAL at 17:52

## 2021-01-01 RX ADMIN — DEXTROSE MONOHYDRATE, SODIUM CHLORIDE, AND POTASSIUM CHLORIDE 50 MILLILITER(S): 50; .745; 4.5 INJECTION, SOLUTION INTRAVENOUS at 22:03

## 2021-01-01 RX ADMIN — LACOSAMIDE 140 MILLIGRAM(S): 50 TABLET ORAL at 17:47

## 2021-01-01 RX ADMIN — Medication 5 MILLIGRAM(S): at 21:18

## 2021-01-01 RX ADMIN — LIDOCAINE 1 PATCH: 4 CREAM TOPICAL at 18:33

## 2021-01-01 RX ADMIN — SODIUM CHLORIDE 75 MILLILITER(S): 9 INJECTION, SOLUTION INTRAVENOUS at 04:40

## 2021-01-01 RX ADMIN — LACOSAMIDE 120 MILLIGRAM(S): 50 TABLET ORAL at 05:34

## 2021-01-01 RX ADMIN — LIDOCAINE 1 PATCH: 4 CREAM TOPICAL at 18:27

## 2021-01-01 RX ADMIN — Medication 2.5 MILLIGRAM(S): at 00:31

## 2021-01-01 RX ADMIN — CEFEPIME 100 MILLIGRAM(S): 1 INJECTION, POWDER, FOR SOLUTION INTRAMUSCULAR; INTRAVENOUS at 17:25

## 2021-01-01 RX ADMIN — ROBINUL 0.4 MILLIGRAM(S): 0.2 INJECTION INTRAMUSCULAR; INTRAVENOUS at 17:37

## 2021-01-01 RX ADMIN — LACOSAMIDE 120 MILLIGRAM(S): 50 TABLET ORAL at 06:01

## 2021-01-01 RX ADMIN — Medication 100 MILLIGRAM(S): at 06:52

## 2021-01-01 RX ADMIN — POTASSIUM PHOSPHATE, MONOBASIC POTASSIUM PHOSPHATE, DIBASIC 62.5 MILLIMOLE(S): 236; 224 INJECTION, SOLUTION INTRAVENOUS at 05:19

## 2021-01-01 RX ADMIN — LACOSAMIDE 120 MILLIGRAM(S): 50 TABLET ORAL at 17:26

## 2021-01-01 RX ADMIN — Medication 1 MILLIGRAM(S): at 13:04

## 2021-01-01 RX ADMIN — ROBINUL 0.4 MILLIGRAM(S): 0.2 INJECTION INTRAMUSCULAR; INTRAVENOUS at 00:10

## 2021-01-01 RX ADMIN — SENNA PLUS 2 TABLET(S): 8.6 TABLET ORAL at 23:58

## 2021-01-01 RX ADMIN — Medication 100 MILLIEQUIVALENT(S): at 10:56

## 2021-01-01 RX ADMIN — Medication 30 MILLIEQUIVALENT(S): at 23:22

## 2021-01-01 RX ADMIN — POLYETHYLENE GLYCOL 3350 17 GRAM(S): 17 POWDER, FOR SOLUTION ORAL at 11:49

## 2021-01-01 RX ADMIN — SENNA PLUS 2 TABLET(S): 8.6 TABLET ORAL at 21:38

## 2021-01-01 RX ADMIN — POLYETHYLENE GLYCOL 3350 17 GRAM(S): 17 POWDER, FOR SOLUTION ORAL at 05:28

## 2021-01-01 RX ADMIN — Medication 100 MILLIGRAM(S): at 21:21

## 2021-01-01 RX ADMIN — HYDROMORPHONE HYDROCHLORIDE 0.2 MILLIGRAM(S): 2 INJECTION INTRAMUSCULAR; INTRAVENOUS; SUBCUTANEOUS at 06:03

## 2021-01-01 RX ADMIN — OXYCODONE HYDROCHLORIDE 5 MILLIGRAM(S): 5 TABLET ORAL at 02:17

## 2021-01-01 RX ADMIN — PANTOPRAZOLE SODIUM 40 MILLIGRAM(S): 20 TABLET, DELAYED RELEASE ORAL at 11:54

## 2021-01-01 RX ADMIN — LIDOCAINE 1 PATCH: 4 CREAM TOPICAL at 21:09

## 2021-01-01 RX ADMIN — Medication 40 MILLIEQUIVALENT(S): at 13:27

## 2021-01-01 RX ADMIN — Medication 1 APPLICATION(S): at 12:56

## 2021-01-01 RX ADMIN — LACOSAMIDE 140 MILLIGRAM(S): 50 TABLET ORAL at 05:24

## 2021-01-01 RX ADMIN — LACOSAMIDE 120 MILLIGRAM(S): 50 TABLET ORAL at 06:02

## 2021-01-01 RX ADMIN — SODIUM CHLORIDE 1000 MILLILITER(S): 9 INJECTION INTRAMUSCULAR; INTRAVENOUS; SUBCUTANEOUS at 03:17

## 2021-01-01 RX ADMIN — Medication 200 GRAM(S): at 02:37

## 2021-01-01 RX ADMIN — LACOSAMIDE 140 MILLIGRAM(S): 50 TABLET ORAL at 17:26

## 2021-01-01 RX ADMIN — LACOSAMIDE 120 MILLIGRAM(S): 50 TABLET ORAL at 05:20

## 2021-01-01 RX ADMIN — DONEPEZIL HYDROCHLORIDE 5 MILLIGRAM(S): 10 TABLET, FILM COATED ORAL at 21:03

## 2021-01-01 RX ADMIN — DONEPEZIL HYDROCHLORIDE 5 MILLIGRAM(S): 10 TABLET, FILM COATED ORAL at 23:29

## 2021-01-01 RX ADMIN — SENNA PLUS 2 TABLET(S): 8.6 TABLET ORAL at 21:18

## 2021-01-01 RX ADMIN — CHLORHEXIDINE GLUCONATE 1 APPLICATION(S): 213 SOLUTION TOPICAL at 05:34

## 2021-01-01 RX ADMIN — Medication 5 MILLIGRAM(S): at 21:41

## 2021-01-01 RX ADMIN — HYDROMORPHONE HYDROCHLORIDE 0.5 MILLIGRAM(S): 2 INJECTION INTRAMUSCULAR; INTRAVENOUS; SUBCUTANEOUS at 07:25

## 2021-01-01 RX ADMIN — LACOSAMIDE 120 MILLIGRAM(S): 50 TABLET ORAL at 17:28

## 2021-01-01 RX ADMIN — DEXTROSE MONOHYDRATE, SODIUM CHLORIDE, AND POTASSIUM CHLORIDE 50 MILLILITER(S): 50; .745; 4.5 INJECTION, SOLUTION INTRAVENOUS at 23:03

## 2021-01-01 RX ADMIN — MAGNESIUM HYDROXIDE 30 MILLILITER(S): 400 TABLET, CHEWABLE ORAL at 13:29

## 2021-01-01 RX ADMIN — POLYETHYLENE GLYCOL 3350 17 GRAM(S): 17 POWDER, FOR SOLUTION ORAL at 12:32

## 2021-01-01 RX ADMIN — HYDROMORPHONE HYDROCHLORIDE 0.2 MILLIGRAM(S): 2 INJECTION INTRAMUSCULAR; INTRAVENOUS; SUBCUTANEOUS at 22:12

## 2021-01-01 RX ADMIN — LACOSAMIDE 120 MILLIGRAM(S): 50 TABLET ORAL at 05:28

## 2021-02-24 NOTE — H&P ADULT - PROBLEM SELECTOR PLAN 1
ortho eval called  awail evaluation  continue sling  pain control  further management pending ortho eval
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
## 2021-02-24 NOTE — CONSULT NOTE ADULT - SUBJECTIVE AND OBJECTIVE BOX
History of Present Illness: The patient is a 89 year old female with a history of MDS, dementia who presents with a fall. She was walking to her window when she turned poorly and fell. She denies dizziness, palpitations, chest pain, shortness of breath. She does not recall any recent cardiac testing but she is a poor historian.    Past Medical/Surgical History:  MDS, dementia    Medications:  Home Medications:  allopurinol 300 mg oral tablet: 1 tab(s) orally once a day (24 Feb 2021 08:28)  Aricept 5 mg oral tablet: 1 tab(s) orally once a day (at bedtime) (24 Feb 2021 08:28)  hydroxyurea 500 mg oral capsule: 1 tab(s) orally once a day M-Thur  1 tab(s) orally twice a day Fr-Sun (24 Feb 2021 08:28)      Family History: Non-contributory family history of premature cardiovascular atherosclerotic disease    Social History: No tobacco, alcohol or drug use    Review of Systems:  General: No fevers, chills, weight loss or gain  Skin: No rashes, color changes  Cardiovascular: No chest pain, orthopnea  Respiratory: No shortness of breath, cough  Gastrointestinal: No nausea, abdominal pain  Genitourinary: No incontinence, pain with urination  Musculoskeletal: No pain, swelling, decreased range of motion  Neurological: No headache, weakness  Psychiatric: No depression, anxiety  Endocrine: No weight loss or gain, increased thirst  All other systems are comprehensively negative.    Physical Exam:  Vitals:        Vital Signs Last 24 Hrs  T(C): 36.7 (24 Feb 2021 08:24), Max: 36.7 (24 Feb 2021 08:24)  T(F): 98 (24 Feb 2021 08:24), Max: 98 (24 Feb 2021 08:24)  HR: 69 (24 Feb 2021 08:24) (69 - 69)  BP: 145/81 (24 Feb 2021 08:24) (145/81 - 145/81)  BP(mean): --  RR: 14 (24 Feb 2021 08:24) (14 - 14)  SpO2: 99% (24 Feb 2021 08:24) (99% - 99%)  General: NAD  HEENT: MMM  Neck: No JVD, no carotid bruit  Lungs: CTAB  CV: RRR, nl S1/S2, 2/6 systolic murmur  Abdomen: S/NT/ND, +BS  Extremities: No LE edema, no cyanosis  Neuro: AAOx3, non-focal  Skin: No rash    Labs:                        9.7    13.15 )-----------( 165      ( 24 Feb 2021 10:21 )             28.9     02-24    135  |  99  |  28<H>  ----------------------------<  105<H>  3.3<L>   |  33<H>  |  0.61    Ca    8.4      24 Feb 2021 10:21    TPro  5.7<L>  /  Alb  3.2<L>  /  TBili  0.6  /  DBili  x   /  AST  36  /  ALT  22  /  AlkPhos  103  02-24    CARDIAC MARKERS ( 24 Feb 2021 10:21 )  .006 ng/mL / x     / 75 U/L / x     / x          PT/INR - ( 24 Feb 2021 10:21 )   PT: 13.0 sec;   INR: 1.08 ratio         PTT - ( 24 Feb 2021 10:21 )  PTT:30.2 sec    ECG: NSR, LAD, anterior and lateral TWI

## 2021-02-24 NOTE — ED PROVIDER NOTE - PROGRESS NOTE DETAILS
Xray reveals elbow fx. Dr Fajardo to eval. Dr Villanueva to admit. If no surgery indicated or planned will need rehab admission.

## 2021-02-24 NOTE — ED PROVIDER NOTE - ENMT, MLM
Airway patent, Nasal mucosa clear. Mouth with normal mucosa. Throat has no vesicles, no oropharyngeal exudates and uvula is midline.  Atraumatic scalp.

## 2021-02-24 NOTE — H&P ADULT - ATTENDING COMMENTS
ortho/pt and sw eval addendum  cardio addendum noted  pt cardiac wise optimized for or  pt medially optimized for orthopedic intervention  pt medically stable for s  potassium supplements underway addendum  cardio addendum noted  pt cardiac wise optimized for or  pt medially optimized for orthopedic intervention  pt medically stable for proposed orthopedic intervention  potassium supplements underway
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
## 2021-02-24 NOTE — H&P ADULT - PROBLEM SELECTOR PLAN 4
ekg noted  cardio eval noted  no signs of acute ischmia  check echo  will need echo and cardio clearance if sx planned

## 2021-02-24 NOTE — ED PROVIDER NOTE - CARE PLAN
Principal Discharge DX:	Elbow fracture, left  Secondary Diagnosis:	Dementia  Secondary Diagnosis:	MDS (myelodysplastic syndrome)
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
## 2021-02-24 NOTE — CONSULT NOTE ADULT - ASSESSMENT
The patient is a 89 year old female with a history of MDS, dementia who presents with a fall.    Plan:  - ECG with anterior and lateral TWI  - No symptoms suggestive of ischemia  - Cardiac enzymes negative  - BNP not significantly elevated at 545  - Check echo given ECG abnormalities  - Ortho eval  - If plan is for surgery and echo unremarkable, patient will be optimized from a cardiac perspective to proceed for surgery The patient is a 89 year old female with a history of MDS, dementia who presents with a fall.    Plan:  - ECG with anterior and lateral TWI  - No symptoms suggestive of ischemia  - Cardiac enzymes negative  - BNP not significantly elevated at 545  - Check echo given ECG abnormalities  - Ortho eval  - If plan is for surgery and echo unremarkable, patient will be optimized from a cardiac perspective to proceed for surgery    ADDENDUM:  - Echo with normal LV systolic function, no significant valve issues, normal pulmonary pressures  - Optimized from a cardiac perspective if plan is for surgery

## 2021-02-25 NOTE — PROVIDER CONTACT NOTE (CRITICAL VALUE NOTIFICATION) - RECOMMENDATIONS
Dr. Ann ordered earlier 2 riders KCL 10 meq Dr. Ann ordered earlier 3 riders KCL 10 meq + Manesium 1 rider

## 2021-02-25 NOTE — DISCHARGE NOTE PROVIDER - NSDCFUADDAPPT_GEN_ALL_CORE_FT
Call Dr. Fajardo, the Orthopedic surgeon, to set up follow up appointment for 2 weeks after surgery to evaluate left elbow wound and for xrays.    It is advisable to follow up with your primary care provider within the next 2-3 weeks to ensure your medications are appropriate and there are no underlying problems after your procedure.

## 2021-02-25 NOTE — DISCHARGE NOTE PROVIDER - HOSPITAL COURSE
The patient is a 89 yo female with dementia who fell on 2/24/21 at her residence and injured her left elbow. Found on xrays in [facility] ED to have an olecranon fracture of her left elbow and was admitted for surgical repair of same by Orthopedic surgeon, Dr. Serafin Fajardo. After appropriate preop medical clearance, the patient was taken to the operating room on 2/24 and she underwent an uncomplicated open reduction and internal fixation of her left olecranon. Appropriate antibiotics were ordered for surgical prophylaxis. Aspirin and PAS were ordered for DVT prevention. Medical, anesthesia, PT and OT consults were requested during her hospitalization. After clearance by all attendings, the patient was discharged to subacute rehab with a good prognosis on 2/26/21. The patient is a 89 yo female with dementia who fell on 2/24/21 at her residence and injured her left elbow. Found on xrays in [facility] ED to have an olecranon fracture of her left elbow and was admitted for surgical repair of same by Orthopedic surgeon, Dr. Serafin Fajardo. After appropriate preop medical clearance, the patient was taken to the operating room on 2/24 and she underwent an uncomplicated open reduction and internal fixation of her left olecranon. Appropriate antibiotics were ordered for surgical prophylaxis. Aspirin and PAS were ordered for DVT prevention. Medical, anesthesia, PT and OT consults were requested during her hospitalization. After clearance by all attendings, the patient was discharged to subacute rehab with a good prognosis on 2/26/21. Pt has hx of MDS for which she was evaluated by [facility], she was transfused with one unit of pRBC. She is stable for dc and outpt [facility] fu.    >35 minutes spent on discharge

## 2021-02-25 NOTE — DIETITIAN INITIAL EVALUATION ADULT. - PHYSCIAL ASSESSMENT
12/5/2019      RE: Jhoan Hoffman  [address] [facility] [address]       Dear Colleague,    Thank you for the opportunity to participate in the care of your patient, Jhoan Hoffman, at the [facility] NEPHROLOGY at [facility]. Please see a copy of my visit note below.    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  Will refer patient for retransplant evaluation.   - Baseline Cr ~ 3.0-3.4   - Proteinuria: Moderate (1-3 grams)   - Date DSA Last Checked: Jul/2019      Latest DSA: No   - BK Viremia: No   - Kidney Tx Biopsy: Jul 23, 2019; Result: No diagnostic evidence of acute rejection.  Transplant glomerulopathy.             Jun 26, 2019; Result: Acute cellular-mediated rejection, [place] 1A.    # Immunosuppression: Tacrolimus immediate release (goal 4-6), Mycophenolate mofetil (goal not followed) and Prednisone (dose 5 mg daily)   - Changes: No    # Infection Prophylaxis:   - PJP: Sulfa/TMP (Bactrim)    # Hypertension: Borderline control;  Goal BP: < 130/80   - Changes: Yes - Will increase carvedilol to 12.5 mg bid.    # Leukocytosis: High normal to high WBC, stable.  No signs or symptoms of infection.     # Mineral Bone Disorder:   - Vitamin D; level: Not checked recently        On Supplement: Yes  - Calcium; level: Normal        On Supplement: No    # Electrolytes:   - Potassium; level: Normal        On Supplement: No  - Bicarbonate; level: Normal        On Supplement: No    # Obesity: Increased weight over the last year.   - Recommend increased exercise and watch caloric intake.    # Depression: Mood has been good lately, but still some anxiety at times.     # Skin Cancer Risk:    - Discussed sun protection and recommend regular follow up with Dermatology.    # Medical Compliance: Yes    # Transplant  "History:  Etiology of Kidney Failure: Congenital dysplagia  Tx: DDKT  Transplant: 4/28/2012 (Kidney), 7/13/1995 (Kidney), 5/18/2011 (Kidney)  Donor Type: Donation after Brain Death Donor Class: Standard Criteria Donor  Significant changes in immunosuppression: None  Significant transplant-related complications: None    Transplant Office Phone Number: [phone]    Assessment and plan was discussed with the patient and he voiced his understanding and agreement.    Return visit: Return in about 6 months (around 6/5/2020).    Jose Culp MD    Chief Complaint   Mr. Hoffman is a 25 year old here for routine follow up.    History of Present Illness    Mr. Hoffman reports feeling good overall with some medical complaints.  Since last clinic visit, patient reports no hospitalizations or new medical complaints and has been doing well overall.  He did have a sore throat last week for a few days, but that is resolved now.  His energy level is \"average\" and has been a bit up and down.  He is active, although really gets minimal exercise.  Denies any chest pain or shortness of breath with exertion.  Appetite is \"too good\" and his weight is up ~ 10-20 lbs in the last year or so.  No nausea, vomiting or diarrhea.  No fever, sweats or chills.  Some leg swelling, but decreased after he lowered his salt intake.    Recent Hospitalizations:  [x] No [] Yes    New Medical Issues: [x] No [] Yes    Decreased energy: [x] No [] Yes    Chest pain or SOB with exertion:  [x] No [] Yes    Appetite change or weight change: [] No [x] Yes Weight is up ~ 10-20 lbs in the last year or so   Nausea, vomiting or diarrhea:  [x] No [] Yes    Fever, sweats or chills: [x] No [] Yes    Leg swelling: [] No [x] Yes Better     Home BP: 140/90s    Review of Systems   A comprehensive review of systems was obtained and negative, except as noted in the HPI or PMH.    Problem List   Patient Active Problem List   Diagnosis     Kidney replaced by transplant "     HTN, kidney transplant related     Depression     BK viremia     GERD (gastroesophageal reflux disease)     Secondary renal hyperparathyroidism (H)     Vitamin D deficiency     Immunosuppression (H)     Aftercare following organ transplant       Social History   Social History     Tobacco Use     Smoking status: Never Smoker     Smokeless tobacco: Never Used     Tobacco comment: mother smokes outside   Substance Use Topics     Alcohol use: No     Drug use: No       Allergies   Allergies   Allergen Reactions     Amoxicillin Swelling     Azithromycin      Z pack - can't take with cyclosporine.     Vancomycin      Ruth syndrome     Cefprozil Rash       Medications   Current Outpatient Medications   Medication Sig     amLODIPine (NORVASC) 10 MG tablet Take 1 tablet (10 mg) by mouth At Bedtime     aspirin (ASPIRIN ADULT LOW STRENGTH) 81 MG EC tablet Take 1 tablet by mouth daily.     carvedilol (COREG) 12.5 MG tablet Take 1 tablet (12.5 mg) by mouth 2 times daily (with meals)     cholecalciferol 2000 UNITS CAPS Take 2,000 Int'l Units by mouth daily     FLUoxetine (PROZAC) 20 MG capsule Take 40 mg by mouth daily      mycophenolate (GENERIC EQUIVALENT) 250 MG capsule Take 3 capsules (750 mg) by mouth 2 times daily     omeprazole 20 MG tablet Take 1 tablet (20 mg) by mouth every 12 hours     predniSONE (DELTASONE) 10 MG tablet Take 5 mg by mouth daily.     sulfamethoxazole-trimethoprim (BACTRIM/SEPTRA) 400-80 MG tablet Take 1 tablet by mouth daily     tacrolimus (GENERIC EQUIVALENT) 0.5 MG capsule Take 3 capsules (1.5 mg) by mouth 2 times daily     clonazePAM (KLONOPIN) 0.5 MG tablet Take 1 tablet (0.5 mg) by mouth 2 times daily as needed for anxiety     No current facility-administered medications for this visit.      Medications Discontinued During This Encounter   Medication Reason     carvedilol (COREG) 6.25 MG tablet        Physical Exam   Vital Signs: BP (!) 140/99 (BP Location: Right arm, Patient Position:  Chair, Cuff Size: Adult Large)   Pulse 120   Temp 98.7  F (37.1  C) (Oral)   Wt 100.1 kg (220 lb 9.6 oz)   SpO2 96%   BMI 35.61 kg/m       GENERAL APPEARANCE: alert and no distress  HENT: mouth without ulcers or lesions  LYMPHATICS: no cervical or supraclavicular nodes  RESP: lungs clear to auscultation - no rales, rhonchi or wheezes  CV: regular rhythm, normal rate, no rub, no murmur  EDEMA: trace LE edema bilaterally  ABDOMEN: soft, nondistended, nontender, bowel sounds normal  MS: extremities normal - no gross deformities noted, no evidence of inflammation in joints, no muscle tenderness  SKIN: no rash  TX KIDNEY: normal  DIALYSIS ACCESS:  None      Data     Renal Latest Ref Rng & Units 11/22/2019 11/7/2019 9/17/2019   Na 133 - 144 mmol/L - - -   Na (external) 134 - 145 mmol/L 141 139 143   K 3.4 - 5.3 mmol/L - - -   K (external) 3.5 - 5.1 mmol/L 4.3 3.4(L) 4.0   Cl 94 - 109 mmol/L - - -   Cl (external) 97 - 107 mmol/L 105 106 110(H)   CO2 20 - 32 mmol/L - - -   CO2 (external) 22 - 29 mmol/L 22 18(L) 19(L)   BUN 7 - 30 mg/dL - - -   BUN (external) 7.0 - 27.0 mg/dL 34.0(H) 28.0(H) 33.0(H)   Cr 0.66 - 1.25 mg/dL - - -   Cr (external) 0.64 - 1.34 mg/dL 3.39(H) 3.01(H) 2.42(H)   Glucose 70 - 99 mg/dL - - -   Glucose (external) 70 - 110 mg/dL 130(H) 165(H) 111(H)   Ca  8.5 - 10.1 mg/dL - - -   Ca (external) 8.6 - 10.3 mg/dL 9.4 9.1 9.4   Mg 1.6 - 2.3 mg/dL - - -   Mg (external) 1.8 - 2.4 MG/DL - - -     Bone Health Latest Ref Rng & Units 9/18/2017 3/27/2015 1/28/2015   Phos 2.8 - 4.6 mg/dL - - -   Phos (external) 2.5 - 4.9 MG/DL - 4.1 3.7   PTHi 12 - 72 pg/mL 151(H) - -   Vit D Def 20 - 75 ug/L 28 - -     Heme Latest Ref Rng & Units 11/22/2019 11/7/2019 9/17/2019   WBC 4.0 - 11.0 10e9/L - - -   WBC (external) 4.8 - 10.8 10(3)/uL 13.0(H) 12.4(H) 11.8(H)   Hgb 13.3 - 17.7 g/dL - - -   Hgb (external) 14.0 - 18.0 g/dL 13.0(L) 12.8(L) 13.1(L)   Plt 150 - 450 10e9/L - - -   Plt (external) 150 - 350 10(3)/uL 214 242 191      Liver Latest Ref Rng & Units 6/17/2019 11/7/2017 9/18/2015   AP 40 - 150 U/L - - -   AP (external) 45 - 108 U/L 116(H) 146(H) 102   TBili 0.2 - 1.3 mg/dL - - -   TBili (external) 0.2 - 1.2 mg/dL 0.7 0.3 0.5   ALT 0 - 70 U/L - - -   ALT (external) 8 - 51 U/L 24 30 34   AST 0 - 45 U/L - - -   AST (external) 10 - 36 U/L 22 14(L) 15   Tot Protein 6.8 - 8.8 g/dL - - -   Tot Protein (external) 6.3 - 8.2 g/dL 7.1 7.3 7.0   Albumin 3.9 - 5.1 g/dL - - -   Albumin (external) 3.4 - 5.3 g/dL 4.5 4.0 4.1     Pancreas Latest Ref Rng & Units 2/16/2011 10/20/2010   Amylase 30 - 110 U/L 142(H) 127(H)   Lipase 20 - 250 U/L 202 256(H)     Iron studies Latest Ref Rng & Units 3/21/2012 12/13/2011 9/27/2011   Iron 35 - 180 ug/dL 138 63 81   Iron sat 15 - 46 % 75(H) 33 55(H)   Ferritin 20 - 300 ng/mL - - -     [facility] Txp Virology Latest Ref Rng & Units 7/23/2019 6/26/2019 11/7/2017   CMV IgG EU/mL - - -   CMV IgM <0.90 - - -   CMV IgM Interp <0.90 - - -   CVM DNA Quant - - - -   CMV Quant <100 Copies/mL - - -   CMV QT Log <2.0 Log copies/mL - - -   BK Spec - Plasma Plasma -   BK Res BKNEG:BK Virus DNA Not Detected copies/mL BK Virus DNA Not Detected BK Virus DNA Not Detected -   BK Log <2.7 Log copies/mL Not Calculated Not Calculated -   BK Quant Log Ext - - - -   BK Quant Result Ext None detected - - None detected   BK Quant Spec Ext - - - -   EBV IgG - - - -   Hep B Core NEG - - -   Hep B Surf - - - -   HIV 1&2 NEG - - -        Recent Labs   Lab Test 09/17/19  0955 11/07/19  0905 11/22/19  0930   DOSTAC 2100 09/16/19 11/06/19 2030 11/21/2019 21:30   TACROL 5.2 6.0 11.3           Please do not hesitate to contact me if you have any questions/concerns.     Sincerely,     Kidney/Pancreas Recipient   pt is underweight for age/underweight

## 2021-02-25 NOTE — OCCUPATIONAL THERAPY INITIAL EVALUATION ADULT - PERSONAL SAFETY AND JUDGMENT, REHAB EVAL
Call to pt's wife. She states pt woke up in am today with a scant amount of blood on the dressing on his chest. Blood is dark in color. Dressing is still intact and does not appear to be bleeding. Instructed to keep the dressing on till Thursday. On Thursday, pt's wife will do dressing change with steri strips and tape (pt lives 3 hrs away and not able to come in to clinic for nurse only visit) Educated pt's wife to watch for signs of infection, stitches that ruptured, open wounds. She verbalized understanding.  
Pt was seen 5/4/17 and states wound site was bleeding this AM (5/9).  Please advise.  
+ dementia/impaired

## 2021-02-25 NOTE — OCCUPATIONAL THERAPY INITIAL EVALUATION ADULT - PLANNED THERAPY INTERVENTIONS, OT EVAL
Patient will transfer to toilet with DME as needed with minimal assistance with in 3-5  sessions./ADL retraining/bed mobility training/transfer training

## 2021-02-25 NOTE — PHYSICAL THERAPY INITIAL EVALUATION ADULT - ADDITIONAL COMMENTS
As per pt, pt lives alone in a house w/ 2 steps to enter with rail.   No steps inside.  Pt states that she has a HHA 8 hrs/ 7 days. Pt stated she mostly used straight cane, but also uses RW sometimes
As per pt, pt lives alone in a house w/ 2 steps to enter with rail.   No steps inside.  Pt states that she has a HHA 8 hrs/ 7 days

## 2021-02-25 NOTE — PHYSICAL THERAPY INITIAL EVALUATION ADULT - BED MOBILITY LIMITATIONS, REHAB EVAL
decreased ability to use legs for bridging/pushing
decreased ability to use arms for pushing/pulling

## 2021-02-25 NOTE — DISCHARGE NOTE PROVIDER - NSDCFUADDINST_GEN_ALL_CORE_FT
Patient is presenting to the Emergency Department with a request to have COVID-19 testing.  Denies symptoms, including cough, shortness of breath, fever, chills, bodyaches or sore throat.   Call your doctor if you experience:  • An increase in pain not controlled by pain medication or change in activity or  position.  • Temperature greater than 101° F.  • Redness, increased swelling or foul smelling drainage from or around the surgical   incision.  • Numbness, tingling or a change in color or temperature of the operative arm.  • Call your doctor immediately if you experience chest pain, shortness of breath or calf pain.

## 2021-02-25 NOTE — PROGRESS NOTE ADULT - SUBJECTIVE AND OBJECTIVE BOX
Patient is a 89y old  Female who presents with a chief complaint of fall (2021 09:00)      INTERVAL HPI/OVERNIGHT EVENTS: stable, pod 1    MEDICATIONS  (STANDING):  allopurinol 300 milliGRAM(s) Oral daily  aspirin enteric coated 81 milliGRAM(s) Oral daily  ceFAZolin   IVPB 2000 milliGRAM(s) IV Intermittent every 8 hours  donepezil 5 milliGRAM(s) Oral at bedtime  hydroxyurea 500 milliGRAM(s) Oral daily  influenza   Vaccine 0.5 milliLiter(s) IntraMuscular once    MEDICATIONS  (PRN):  HYDROmorphone  Injectable 0.5 milliGRAM(s) IV Push every 6 hours PRN Moderate Pain (4 - 6)  ondansetron Injectable 4 milliGRAM(s) IV Push every 6 hours PRN Nausea and/or Vomiting  oxyCODONE    IR 5 milliGRAM(s) Oral every 3 hours PRN Mild Pain (1 - 3)      Allergies    No Known Allergies    Intolerances        REVIEW OF SYSTEMS:  CONSTITUTIONAL: No fever, weight loss, or fatigue  EYES: No eye pain, visual disturbances  ENMT:  No difficulty hearing, tinnitus, vertigo; No sinus or throat pain  NECK: No pain or stiffness  RESPIRATORY: No cough, wheezing, chills or hemoptysis; No shortness of breath  CARDIOVASCULAR: No chest pain, palpitations, dizziness  GASTROINTESTINAL: No abdominal or epigastric pain. No nausea, vomiting, or hematemesis; No diarrhea or constipation. No melena or hematochezia.  GENITOURINARY: No dysuria, frequency, hematuria, or incontinence  NEUROLOGICAL: No headaches, memory loss, loss of strength, numbness, or tremors  SKIN: No itching, burning  LYMPH NODES: No enlarged glands  MUSCULOSKELETAL: no joint or elbow pain  PSYCHIATRIC: No depression, mood swings  HEME/LYMPH: No easy bruising, or bleeding gums  ALLERGY AND IMMUNOLOGIC: No hives    Vital Signs Last 24 Hrs  T(C): 36.4 (2021 05:47), Max: 37.2 (2021 16:21)  T(F): 97.6 (2021 05:47), Max: 99 (2021 16:21)  HR: 74 (2021 05:47) (63 - 89)  BP: 102/57 (2021 05:47) (100/38 - 140/75)  BP(mean): --  RR: 16 (2021 05:47) (11 - 17)  SpO2: 98% (2021 05:47) (93% - 100%)    PHYSICAL EXAM:  GENERAL: NAD, well-groomed, well-developed  HEAD:  Atraumatic, Normocephalic  EYES: EOMI, PERRLA, conjunctiva and sclera clear  ENMT: No tonsillar erythema, exudates, or enlargement   NECK: Supple, No JVD  NERVOUS SYSTEM:  Alert & Oriented X3, Good concentration  CHEST/LUNG: Clear to auscultation bilaterally; No rales, rhonchi, wheezing  HEART: Regular rate and rhythm  ABDOMEN: Soft, Nontender, Nondistended; Bowel sounds present  EXTREMITIES:  2+ Peripheral Pulses, left arm in cast   LYMPH: No lymphadenopathy noted  SKIN: No rashes     LABS:                        8.1    18.26 )-----------( 166      ( 2021 06:31 )             24.3     2021 06:31    136    |  102    |  18     ----------------------------<  139    3.7     |  26     |  0.74     Ca    8.0        2021 06:31  Mg     3.7       2021 06:31    TPro  x      /  Alb  2.9    /  TBili  x      /  DBili  x      /  AST  x      /  ALT  x      /  AlkPhos  x      2021 06:31    PT/INR - ( 2021 10:21 )   PT: 13.0 sec;   INR: 1.08 ratio         PTT - ( 2021 10:21 )  PTT:30.2 sec  Urinalysis Basic - ( 2021 14:30 )    Color: Yellow / Appearance: Clear / S.010 / pH: x  Gluc: x / Ketone: Negative  / Bili: Negative / Urobili: Negative mg/dL   Blood: x / Protein: 30 mg/dL / Nitrite: Negative   Leuk Esterase: Negative / RBC: 25-50 /HPF / WBC 0-2   Sq Epi: x / Non Sq Epi: Few / Bacteria: Few      CAPILLARY BLOOD GLUCOSE                RADIOLOGY & ADDITIONAL TESTS:      Consultant(s) Notes Reviewed:  [x ] YES  [ ] NO    Care Discussed with Consultants/Other Providers [x ] YES  [ ] NO    Advanced care planning discussed with patient and family, advanced care planning forms reviewed, discussed, and completed.  20 minutes spent.

## 2021-02-25 NOTE — PHYSICAL THERAPY INITIAL EVALUATION ADULT - GAIT TRAINING, PT EVAL
Goals 1-3 days, Pt will ambulate 50 ft w/ straight cane with CGA
ambulate 50 feet with HHA and min assist x1 in 3-5 days

## 2021-02-25 NOTE — DISCHARGE NOTE PROVIDER - NSDCACTIVITY_GEN_ALL_CORE
Showering allowed/Stairs allowed/Walking - Indoors allowed/No heavy lifting/straining/Walking - Outdoors allowed Do not drive or operate machinery/Showering allowed/Do not make important decisions/Stairs allowed/Walking - Indoors allowed/No heavy lifting/straining/Walking - Outdoors allowed

## 2021-02-25 NOTE — DISCHARGE NOTE PROVIDER - NSDCMRMEDTOKEN_GEN_ALL_CORE_FT
allopurinol 300 mg oral tablet: 1 tab(s) orally once a day  Aricept 5 mg oral tablet: 1 tab(s) orally once a day (at bedtime)  aspirin 81 mg oral delayed release tablet: 1 tab(s) orally once a day while in Rehab.  hydroxyurea 500 mg oral capsule: 1 tab(s) orally once a day M-Thur  1 tab(s) orally twice a day Fr-Sun  oxyCODONE 5 mg oral tablet: 1 or 2 tab(s) orally every 4 hours, As Needed for moderate to severe pain.   allopurinol 300 mg oral tablet: 1 tab(s) orally once a day  Aricept 5 mg oral tablet: 1 tab(s) orally once a day (at bedtime)  aspirin 81 mg oral delayed release tablet: 1 tab(s) orally once a day while in Rehab.  hydroxyurea 500 mg oral capsule: hold for one week  resume 3/6/2021  oxyCODONE 5 mg oral tablet: 1 or 2 tab(s) orally every 4 hours, As Needed for moderate to severe pain.

## 2021-02-25 NOTE — DISCHARGE NOTE PROVIDER - INSTRUCTIONS
Regular diet.  For Constipation :   • Increase your daily water intake.   • Try adding fiber to your diet by eating fruits, vegetables and foods that are rich in grains.  • If you do experience constipation, you may take an over-the-counter stool softener/laxative such as Colace, Senokot , Milk of Magnesia or Miralax.

## 2021-02-25 NOTE — DISCHARGE NOTE PROVIDER - NSDCCPTREATMENT_GEN_ALL_CORE_FT
PRINCIPAL PROCEDURE  Procedure: Open reduction and internal fixation of fracture of left olecranon  Findings and Treatment: Keep surgical bandage and splint on left arm/elbow dry and intact at all times. Must cover left arm bandage when showering.  Sling for support, only if needed, when ambulating. Please remove sling to exercise the left shoulder nad open and close left fist to decrease swelling of the hand. Non weightbearing to be maintained left hand/arm.       PRINCIPAL PROCEDURE  Procedure: Open reduction and internal fixation of fracture of left olecranon  Findings and Treatment: Keep surgical bandage and splint on left arm/elbow dry and intact at all times. Must cover left arm bandage when showering. DO NOT REMOVE SPLINT.  Sling for support, only if needed, when ambulating. Please remove sling to exercise the left shoulder nad open and close left fist to decrease swelling of the hand. Non weightbearing to be maintained left hand/arm.

## 2021-02-25 NOTE — DISCHARGE NOTE PROVIDER - NSDCCPCAREPLAN_GEN_ALL_CORE_FT
PRINCIPAL DISCHARGE DIAGNOSIS  Diagnosis: Elbow fracture, left  Assessment and Plan of Treatment: Elbow fracture, left      SECONDARY DISCHARGE DIAGNOSES  Diagnosis: MDS (myelodysplastic syndrome)  Assessment and Plan of Treatment: MDS (myelodysplastic syndrome)    Diagnosis: Dementia  Assessment and Plan of Treatment:

## 2021-02-25 NOTE — PROGRESS NOTE ADULT - SUBJECTIVE AND OBJECTIVE BOX
SUBJECTIVE: Patient seen and examined. No nausea/vomiting, nor shortness of breath. Awake and answering questions appropriately.    OBJECTIVE:     Vital Signs Last 24 Hrs  T(C): 36.4 (25 Feb 2021 05:47), Max: 37.2 (24 Feb 2021 16:21)  T(F): 97.6 (25 Feb 2021 05:47), Max: 99 (24 Feb 2021 16:21)  HR: 74 (25 Feb 2021 05:47) (63 - 89)  BP: 102/57 (25 Feb 2021 05:47) (100/38 - 145/81)  BP(mean): --  RR: 16 (25 Feb 2021 05:47) (11 - 17)  SpO2: 98% (25 Feb 2021 05:47) (93% - 100%)    PAIN SCORE:    1     SCALE USED: (1-10 VNRS)        Affected extremity:          Dressing: clean/dry/intact of the post operative dressing and splint.           Sensation:  intact to light touch         Motor exam:          5 / 5 Finger extension/Finger flexion/Hand ,         warm well perfused; capillary refill <3 seconds     LABS:                        8.1    18.26 )-----------( 166      ( 25 Feb 2021 06:31 )             24.3     02-25    136  |  102  |  18  ----------------------------<  139<H>  3.7   |  26  |  0.74    Ca    8.0<L>      25 Feb 2021 06:31  Mg     3.7     02-25    TPro  x   /  Alb  2.9<L>  /  TBili  x   /  DBili  x   /  AST  x   /  ALT  x   /  AlkPhos  x   02-25    PT/INR - ( 24 Feb 2021 10:21 )   PT: 13.0 sec;   INR: 1.08 ratio         PTT - ( 24 Feb 2021 10:21 )  PTT:30.2 sec  CAPILLARY BLOOD GLUCOSE          MEDICATIONS:    Anticoagulation:  aspirin enteric coated 81 milliGRAM(s) Oral daily      Antibiotics:   ceFAZolin   IVPB 2000 milliGRAM(s) IV Intermittent every 8 hours      Pain medications:   acetaminophen  IVPB .. 1000 milliGRAM(s) IV Intermittent once  donepezil 5 milliGRAM(s) Oral at bedtime  HYDROmorphone  Injectable 0.5 milliGRAM(s) IV Push every 6 hours PRN  ondansetron Injectable 4 milliGRAM(s) IV Push every 6 hours PRN  oxyCODONE    IR 5 milliGRAM(s) Oral every 3 hours PRN      A/P :  s/p  Left elbow ORIF POD # 1  -    Pain control  -    DVT ppx: ASA   -    Check AM labs  -    Weight bearing status: WBAT   -    Physical Therapy  -    Dispo: Home

## 2021-02-25 NOTE — INPATIENT CERTIFICATION FOR MEDICARE PATIENTS - NS ICMP TWO DAYS INPATIENT
"IM progress note      Lupe Morillo  [id number]  1951     LOS: 2 days     Attending: Naveed Sims MD    Primary Care Provider: Michael Blackwell MD      Chief Complaint/Reason for visit:  No chief complaint on file.      Subjective   Doing ok. Adequate pain control. Still with dizziness on getting up( lightheaded). Denies f/c/n/v/sob/cp.    Objective     Vital Signs  Visit Vitals  /61 (BP Location: Right arm)   Pulse 90   Temp 97.6 °F (36.4 °C) (Tympanic)   Resp 18   Ht 172.7 cm (67.99\")   Wt 89.5 kg (197 lb 5 oz)   SpO2 96%   Breastfeeding? No   BMI 30.01 kg/m²     Temp (24hrs), Av.5 °F (36.9 °C), Min:97.1 °F (36.2 °C), Max:99.4 °F (37.4 °C)    Physical Therapy: Pt ambulated 90 feet with CGA and RW, limited by pain. PT will follow to increase strength and progress functional mobility with back precautions. Plan is home with assist at discharge      Physical Exam:     General Appearance:    Alert, cooperative, in no acute distress   Head:    Normocephalic, without obvious abnormality, atraumatic    Lungs:     Normal effort, symmetric chest rise, no crepitus, clear to      auscultation bilaterally         Back: dressing/ HV(likely out today)            Heart:    Regular rhythm and normal rate, normal S1 and S2    Abdomen:     Normal bowel sounds, no masses, no organomegaly, soft        non-tender, non-distended, no guarding, no rebound                tenderness   Extremities:   No clubbing, cyanosis or edema.  No deformities.    Pulses:   Pulses palpable and equal bilaterally   Skin:   No bleeding, bruising or rash   Neurologic:   No gross deficit. Flexion and dorsiflexion intact bilateral feet.       Results Review:     I reviewed the patient's new clinical results.   Results from last 7 days   Lab Units 19  1101 19  0543   WBC 10*3/mm3  --  10.25   HEMOGLOBIN g/dL 10.7* 10.4*   HEMATOCRIT % 33.6* 33.2*   PLATELETS 10*3/mm3  --  246     Results from last 7 days   Lab Units " 01/31/19  0543   SODIUM mmol/L 135   POTASSIUM mmol/L 3.8   CHLORIDE mmol/L 106   CO2 mmol/L 23.0   BUN mg/dL 14   CREATININE mg/dL 0.79   CALCIUM mg/dL 8.8   GLUCOSE mg/dL 110*     I reviewed the patient's new imaging including images and reports.  Results for SANDRA DREW (MRN [id number]) as of 2/1/2019 11:20   Ref. Range 1/31/2019 11:24 1/31/2019 16:40 1/31/2019 20:12 2/1/2019 07:08   Glucose Latest Ref Range: 70 - 130 mg/dL 200 (H) 226 (H) 178 (H) 121     All medications reviewed.     budesonide-formoterol 2 puff Inhalation BID - RT   docusate sodium 100 mg Oral BID   famotidine 20 mg Intravenous Q12H   Or      famotidine 20 mg Oral Q12H   insulin detemir 30 Units Subcutaneous Nightly   insulin lispro 0-9 Units Subcutaneous 4x Daily With Meals & Nightly   losartan 100 mg Oral Daily   montelukast 10 mg Oral Nightly   oxybutynin XL 5 mg Oral Daily   polyethylene glycol 17 g Oral Daily   sodium chloride 1,000 mL Intravenous Once   sodium chloride 3 mL Intravenous Q12H       acetaminophen 650 mg Q4H PRN   albuterol 2.5 mg Q6H PRN   bisacodyl 5 mg Daily PRN   bisacodyl 10 mg Daily PRN   dextrose 25 g Q15 Min PRN   dextrose 15 g Q15 Min PRN   glucagon (human recombinant) 1 mg PRN   HYDROmorphone 2 mg Q4H PRN   And     naloxone 0.4 mg Q5 Min PRN   labetalol 10 mg Q4H PRN   Morphine 1 mg Q4H PRN   And     naloxone 0.4 mg Q5 Min PRN   ondansetron 4 mg Q6H PRN   ondansetron 4 mg Q6H PRN   oxyCODONE-acetaminophen 1 tablet Q4H PRN   oxyCODONE-acetaminophen 1 tablet Q4H PRN   prochlorperazine 5 mg Q6H PRN   Or     prochlorperazine 5 mg Q6H PRN   Or     prochlorperazine 25 mg Q12H PRN   sodium chloride 500 mL TID PRN   sodium chloride 3-10 mL PRN   zolpidem 5 mg Nightly PRN       Assessment/Plan       S/P lumbar fusion    Back pain    Sleep apnea    Hypertension    Type 2 diabetes mellitus (CMS/HCC)    HLD (hyperlipidemia)    Asthma    Acute blood loss anemia, mild, asymptomatic    Acute postoperative  pain      Plan  1. PT/OT- ambulate  2. Pain control-prns   3. IS-encouraged  4. DVT proph-Mech.  5. Bowel regimen  6. Monitor post-op labs  7. DC planning for home, likely tomorrow    Stat H&H- stable  1 liter NS bolus    HTN, HLD  - Continue home cozaar  - Monitor BP   - Holding parameters for BP meds  - Labetalol PRN for SBP>170     DM  - hgb A1c on 1/23/19 7.9  - Continue home Levemir  - Hold OHA while inpt  - Accuchecks ACHS with moderate dose SSI  - DM educator consult     Asthma, ZACH  - CPAP at night  - Continue home inhalers and singulair  - Monitor O2 sats      Scribed for Dr. Jade by DARI Jruado. 2/1/2019  11:23 AM    DARI Jurado  02/01/19  11:22 AM'  I, Chantal Jade MD, personally performed the services described in this documentation as scribed by DARI Jurado ,and it is both accurate and complete.   Discussed with pt and with .   Yes

## 2021-02-25 NOTE — PHYSICAL THERAPY INITIAL EVALUATION ADULT - GAIT DEVIATIONS NOTED, PT EVAL
Has had lump on right side of head for 3 months but is now hurting for the last few weeks. Denies drainage.  
decreased mere/decreased velocity of limb motion/decreased step length/decreased stride length/decreased weight-shifting ability
decreased mere/decreased step length

## 2021-02-25 NOTE — OCCUPATIONAL THERAPY INITIAL EVALUATION ADULT - ADDITIONAL COMMENTS
As per patient , she did everything on her own prior to falling. Has a HHA 8 hrs a day 7 days a week. 2 steps to enter. No steps inside. Stall shower with TULIO.

## 2021-02-25 NOTE — DISCHARGE NOTE PROVIDER - CARE PROVIDER_API CALL
Serafin Fajardo)  Orthopaedic Surgery  [address] [place] [address]  Phone: [phone]  Fax: [phone]  Follow Up Time:

## 2021-02-25 NOTE — PHYSICAL THERAPY INITIAL EVALUATION ADULT - ACTIVE RANGE OF MOTION EXAMINATION, REHAB EVAL
left UE not tested, pt able to move fingers and shoulder/Right UE Active ROM was WFL (within functional limits)/bilateral  lower extremity Active ROM was WFL (within functional limits)

## 2021-02-25 NOTE — PROGRESS NOTE ADULT - SUBJECTIVE AND OBJECTIVE BOX
Chief Complaint: Fall    Interval Events: No events overnight.    Review of Systems:  General: No fevers, chills, weight loss or gain  Skin: No rashes, color changes  Cardiovascular: No chest pain, orthopnea  Respiratory: No shortness of breath, cough  Gastrointestinal: No nausea, abdominal pain  Genitourinary: No incontinence, pain with urination  Musculoskeletal: No pain, swelling, decreased range of motion  Neurological: No headache, weakness  Psychiatric: No depression, anxiety  Endocrine: No weight loss or gain, increased thirst  All other systems are comprehensively negative.    Physical Exam:  Vitals:        Vital Signs Last 24 Hrs  T(C): 36.4 (25 Feb 2021 05:47), Max: 37.2 (24 Feb 2021 16:21)  T(F): 97.6 (25 Feb 2021 05:47), Max: 99 (24 Feb 2021 16:21)  HR: 74 (25 Feb 2021 05:47) (63 - 89)  BP: 102/57 (25 Feb 2021 05:47) (100/38 - 140/75)  BP(mean): --  RR: 16 (25 Feb 2021 05:47) (11 - 17)  SpO2: 98% (25 Feb 2021 05:47) (93% - 100%)  General: NAD  HEENT: MMM  Neck: No JVD, no carotid bruit  Lungs: CTAB  CV: RRR, nl S1/S2, no M/R/G  Abdomen: S/NT/ND, +BS  Extremities: No LE edema, no cyanosis  Neuro: AAOx3, non-focal  Skin: No rash    Labs:                        8.1    18.26 )-----------( 166      ( 25 Feb 2021 06:31 )             24.3     02-25    136  |  102  |  18  ----------------------------<  139<H>  3.7   |  26  |  0.74    Ca    8.0<L>      25 Feb 2021 06:31  Mg     3.7     02-25    TPro  x   /  Alb  2.9<L>  /  TBili  x   /  DBili  x   /  AST  x   /  ALT  x   /  AlkPhos  x   02-25    CARDIAC MARKERS ( 24 Feb 2021 10:21 )  .006 ng/mL / x     / 75 U/L / x     / x          PT/INR - ( 24 Feb 2021 10:21 )   PT: 13.0 sec;   INR: 1.08 ratio         PTT - ( 24 Feb 2021 10:21 )  PTT:30.2 sec     Chief Complaint: Fall    Interval Events: No events overnight.    Review of Systems:  General: No fevers, chills, weight loss or gain  Skin: No rashes, color changes  Cardiovascular: No chest pain, orthopnea  Respiratory: No shortness of breath, cough  Gastrointestinal: No nausea, abdominal pain  Genitourinary: No incontinence, pain with urination  Musculoskeletal: No pain, swelling, decreased range of motion  Neurological: No headache, weakness  Psychiatric: No depression, anxiety  Endocrine: No weight loss or gain, increased thirst  All other systems are comprehensively negative.    Physical Exam:  Vitals:        Vital Signs Last 24 Hrs  T(C): 36.4 (25 Feb 2021 05:47), Max: 37.2 (24 Feb 2021 16:21)  T(F): 97.6 (25 Feb 2021 05:47), Max: 99 (24 Feb 2021 16:21)  HR: 74 (25 Feb 2021 05:47) (63 - 89)  BP: 102/57 (25 Feb 2021 05:47) (100/38 - 140/75)  BP(mean): --  RR: 16 (25 Feb 2021 05:47) (11 - 17)  SpO2: 98% (25 Feb 2021 05:47) (93% - 100%)  General: NAD  HEENT: MMM  Neck: No JVD, no carotid bruit  Lungs: CTAB  CV: RRR, nl S1/S2, no M/R/G  Abdomen: S/NT/ND, +BS  Extremities: No LE edema, no cyanosis  Neuro: AAOx3, non-focal  Skin: No rash    Labs:                        8.1    18.26 )-----------( 166      ( 25 Feb 2021 06:31 )             24.3     02-25    136  |  102  |  18  ----------------------------<  139<H>  3.7   |  26  |  0.74    Ca    8.0<L>      25 Feb 2021 06:31  Mg     3.7     02-25    TPro  x   /  Alb  2.9<L>  /  TBili  x   /  DBili  x   /  AST  x   /  ALT  x   /  AlkPhos  x   02-25    CARDIAC MARKERS ( 24 Feb 2021 10:21 )  .006 ng/mL / x     / 75 U/L / x     / x          PT/INR - ( 24 Feb 2021 10:21 )   PT: 13.0 sec;   INR: 1.08 ratio         PTT - ( 24 Feb 2021 10:21 )  PTT:30.2 sec    Telemetry: Sinus rhythm
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
## 2021-02-25 NOTE — PHYSICAL THERAPY INITIAL EVALUATION ADULT - MANUAL MUSCLE TESTING RESULTS, REHAB EVAL
at least 3+/5 throughout, left UE not tested/grossly assessed due to
BLEs grossly 3+/5/grossly assessed due to

## 2021-02-25 NOTE — PHYSICAL THERAPY INITIAL EVALUATION ADULT - PERTINENT HX OF CURRENT PROBLEM, REHAB EVAL
1 y/o F with h/o Dementia, Myelodysplastic Syndrome and lives alone.  Brought by ambulance for eval of left elbow pain after a fall today. Pt unsure how or why she fell. Left Elbow Xray: olecranon fracture
pt is a 89 y/o female, s/p above surgery

## 2021-02-25 NOTE — PHYSICAL THERAPY INITIAL EVALUATION ADULT - CRITERIA FOR SKILLED THERAPEUTIC INTERVENTIONS
impairments found/functional limitations in following categories/anticipated discharge recommendation
impairments found/anticipated discharge recommendation

## 2021-02-25 NOTE — DIETITIAN INITIAL EVALUATION ADULT. - PERTINENT LABORATORY DATA
WBC (18.26), RBC (2.12), H/H (8.1, 24.3), Ca (8.0), Mg (3.7), Protein (5.7), Albumin (2.9), Glucose (139)

## 2021-02-25 NOTE — PHYSICAL THERAPY INITIAL EVALUATION ADULT - GENERAL OBSERVATIONS, REHAB EVAL
pt received semisupine in ED, +left shoulder sling, +IV
pt received supine in bed, left UE in ace bandage

## 2021-02-25 NOTE — DIETITIAN INITIAL EVALUATION ADULT. - ENERGY INTAKE
Fair (>50%) Pt is consuming >75% of meals during admission, intake PTA is likely <75% of nutrient needs

## 2021-02-26 NOTE — PROVIDER CONTACT NOTE (OTHER) - REASON
Potassium replacement order 2 doses fell off after patient returned from OR
H&H result
Health and Wellness Patient Engagement Note
Health and Wellness Patient Engagement Note
Pt agitation

## 2021-02-26 NOTE — PROGRESS NOTE ADULT - SUBJECTIVE AND OBJECTIVE BOX
Patient is a 89y old  Female who presents with a chief complaint of fall and consequent left elbow fracture (2021 12:14)      INTERVAL HPI/OVERNIGHT EVENTS: stable feels ok, h/h noted    MEDICATIONS  (STANDING):  allopurinol 300 milliGRAM(s) Oral daily  aspirin enteric coated 81 milliGRAM(s) Oral daily  donepezil 5 milliGRAM(s) Oral at bedtime  hydroxyurea 500 milliGRAM(s) Oral daily  influenza   Vaccine 0.5 milliLiter(s) IntraMuscular once    MEDICATIONS  (PRN):  HYDROmorphone  Injectable 0.5 milliGRAM(s) IV Push every 6 hours PRN Moderate Pain (4 - 6)  ondansetron Injectable 4 milliGRAM(s) IV Push every 6 hours PRN Nausea and/or Vomiting  oxyCODONE    IR 5 milliGRAM(s) Oral every 3 hours PRN Mild Pain (1 - 3)      Allergies    No Known Allergies    Intolerances        REVIEW OF SYSTEMS:  CONSTITUTIONAL: No fever, weight loss, or fatigue  EYES: No eye pain, visual disturbances  ENMT:  No difficulty hearing, tinnitus, vertigo; No sinus or throat pain  NECK: No pain or stiffness  RESPIRATORY: No cough, wheezing, chills or hemoptysis; No shortness of breath  CARDIOVASCULAR: No chest pain, palpitations, dizziness  GASTROINTESTINAL: No abdominal or epigastric pain. No nausea, vomiting, or hematemesis; No diarrhea or constipation. No melena or hematochezia.  GENITOURINARY: No dysuria, frequency, hematuria, or incontinence  NEUROLOGICAL: No headaches, memory loss, loss of strength, numbness, or tremors  SKIN: No itching, burning  LYMPH NODES: No enlarged glands  MUSCULOSKELETAL: No joint pain or swelling; No muscle, back, or extremity pain  PSYCHIATRIC: No depression, mood swings  HEME/LYMPH: No easy bruising, or bleeding gums  ALLERGY AND IMMUNOLOGIC: No hives    Vital Signs Last 24 Hrs  T(C): 36.3 (2021 05:48), Max: 36.7 (2021 13:53)  T(F): 97.4 (2021 05:48), Max: 98.1 (2021 01:04)  HR: 86 (2021 05:48) (86 - 99)  BP: 113/68 (2021 05:48) (105/68 - 153/74)  BP(mean): --  RR: 16 (2021 05:48) (16 - 18)  SpO2: 96% (2021 05:48) (94% - 97%)    PHYSICAL EXAM:  GENERAL: NAD, well-groomed, well-developed  HEAD:  Atraumatic, Normocephalic  EYES: EOMI, PERRLA, conjunctiva and sclera clear  ENMT: No tonsillar erythema, exudates, or enlargement   NECK: Supple, No JVD  NERVOUS SYSTEM:  alert and awake but lethargic  CHEST/LUNG: Clear to auscultation bilaterally; No rales, rhonchi, wheezing  HEART: Regular rate and rhythm  ABDOMEN: Soft, Nontender, Nondistended; Bowel sounds present  EXTREMITIES:  2+ Peripheral Pulses   LYMPH: No lymphadenopathy noted  SKIN: No rashes     LABS:                        6.9    21.15 )-----------( 141      ( 2021 08:00 )             20.5     2021 08:00    135    |  100    |  24     ----------------------------<  110    3.5     |  28     |  0.64     Ca    7.9        2021 08:00      PT/INR - ( 2021 10:21 )   PT: 13.0 sec;   INR: 1.08 ratio         PTT - ( 2021 10:21 )  PTT:30.2 sec  Urinalysis Basic - ( 2021 14:30 )    Color: Yellow / Appearance: Clear / S.010 / pH: x  Gluc: x / Ketone: Negative  / Bili: Negative / Urobili: Negative mg/dL   Blood: x / Protein: 30 mg/dL / Nitrite: Negative   Leuk Esterase: Negative / RBC: 25-50 /HPF / WBC 0-2   Sq Epi: x / Non Sq Epi: Few / Bacteria: Few      CAPILLARY BLOOD GLUCOSE        blood culture --   <10,000 CFU/mL Normal Urogenital Shante    @ 22:03    blood culture --   No growth to date.    @ 13:38      urine culture --   @ 22:03  results   <10,000 CFU/mL Normal Urogenital Shante 02-24 @ 22:03  urine culture --   @ 13:38  results   No growth to date.  @ 13:38    wound with gram statin --     @ 22:03  organism  --    @ 22:03  specimen source .Urine Catheterized   @ 22:03  wound with gram statin --     @ 13:38  organism  --    @ 13:38  specimen source .Blood Blood   @ 13:38      RADIOLOGY & ADDITIONAL TESTS:      Consultant(s) Notes Reviewed:  [x ] YES  [ ] NO    Care Discussed with Consultants/Other Providers [ x] YES  [ ] NO    Advanced care planning discussed with patient and family, advanced care planning forms reviewed, discussed, and completed.  20 minutes spent.

## 2021-02-26 NOTE — PROGRESS NOTE ADULT - SUBJECTIVE AND OBJECTIVE BOX
Orthopedic Daily Progress Note    S: Patient seen and examined, resting comfortably in bed. She is POD #2 from ORIF left elbow, s/p fall at home. She is A&O x 1. She denies any pain at this time. Patient denies any CP, SOB, abdominal discomfort. She tolerated breakfast this morning.     O: General: Patient is awake and alert, appears comfortable.  VS: Vital Signs Last 24 Hrs  T(C): 36.3 (26 Feb 2021 05:48), Max: 36.7 (25 Feb 2021 13:53)  T(F): 97.4 (26 Feb 2021 05:48), Max: 98.1 (26 Feb 2021 01:04)  HR: 86 (26 Feb 2021 05:48) (81 - 99)  BP: 113/68 (26 Feb 2021 05:48) (100/60 - 153/74)  BP(mean): --  RR: 16 (26 Feb 2021 05:48) (16 - 18)  SpO2: 96% (26 Feb 2021 05:48) (94% - 97%)      Elbow splint is clean, dry, & intact. Sling in place  patient able to wiggle fingers, wrist flex/ext intact in splint  right UE /wrist flex/ext/biceps triceps intact  Radial pulse palpable 2+, extremities wwp                                 6.9    21.15 )-----------( 141      ( 26 Feb 2021 08:00 )             20.5     02-26    135  |  100  |  24<H>  ----------------------------<  110<H>  3.5   |  28  |  0.64    Ca    7.9<L>      26 Feb 2021 08:00  Mg     3.7     02-25    TPro  x   /  Alb  2.9<L>  /  TBili  x   /  DBili  x   /  AST  x   /  ALT  x   /  AlkPhos  x   02-25        Orthopedic Assessment: s/p ORIF left elbow fracture, now POD #2  • Stable post-op  • Neurovascular exam stable    Plan:   • Continue PT/OT  • Continue DVT prophylaxis: ASA 81mg daily  • Continue Pain Rx  • Bowel regimen  • Discharge planning for [facility] today when cleared by medicine     Orthopedic Daily Progress Note    S: Patient seen and examined, resting comfortably in bed. She is POD #2 from ORIF left elbow, s/p fall at home. She is A&O x 1. She denies any pain at this time. Patient denies any CP, SOB, abdominal discomfort. She tolerated breakfast this morning.     O: General: Patient is awake and alert, appears comfortable.  VS: Vital Signs Last 24 Hrs  T(C): 36.3 (26 Feb 2021 05:48), Max: 36.7 (25 Feb 2021 13:53)  T(F): 97.4 (26 Feb 2021 05:48), Max: 98.1 (26 Feb 2021 01:04)  HR: 86 (26 Feb 2021 05:48) (81 - 99)  BP: 113/68 (26 Feb 2021 05:48) (100/60 - 153/74)  BP(mean): --  RR: 16 (26 Feb 2021 05:48) (16 - 18)  SpO2: 96% (26 Feb 2021 05:48) (94% - 97%)      Elbow splint is clean, dry, & intact. Sling in place  patient able to wiggle fingers, wrist flex/ext intact in splint  right UE /wrist flex/ext/biceps triceps intact  Radial pulse palpable 2+, extremities wwp                                 6.9    21.15 )-----------( 141      ( 26 Feb 2021 08:00 )             20.5     02-26    135  |  100  |  24<H>  ----------------------------<  110<H>  3.5   |  28  |  0.64    Ca    7.9<L>      26 Feb 2021 08:00  Mg     3.7     02-25    TPro  x   /  Alb  2.9<L>  /  TBili  x   /  DBili  x   /  AST  x   /  ALT  x   /  AlkPhos  x   02-25        Orthopedic Assessment: s/p ORIF left elbow fracture, now POD #2  • Stable post-op  • Neurovascular exam stable    Plan:   • Continue PT/OT  • Continue DVT prophylaxis: ASA 81mg daily  • Continue Pain Rx  • Bowel regimen  H/H 6.9/20.5 this AM. Medicine to transfuse 1 unit. Follow up repeat CBC  • Discharge planning for [facility] when medically stable

## 2021-02-26 NOTE — PROVIDER CONTACT NOTE (OTHER) - BACKGROUND
This is a 89 y.o A+Ox1 female s/p ORIF following a fall at home with a L elbow fx. (Pt. knew her name, thought the month was January, was unsure of the year, and thought she was in a nursing home).
This is a 89 y.o A+Ox1 female s/p ORIF following a fall at home with a L elbow fx. (Pt. knew her name, thought the month was January, was unsure of the year, and thought she was in a nursing home).
89y F s/p fall, adm for fx of humerus. S/p ORIF
Admitted for L Elbow fracture  s/p ORIF, fracture, olecranon L elbow  On IVF, antibiotic Cefazolin
Patient admitted for fracture of lower end of left humerus. S/p ORIF left Humerus  .

## 2021-02-26 NOTE — PROVIDER CONTACT NOTE (OTHER) - ASSESSMENT
patient confused . No change from Baseline
Writer met with patient to do a health and wellness check and to offer emotional support. Pt. was in bed, awake, and with a soft cast and ace bandage on L UE. Pt. reports she slept "fine" and is eating "just a little." She denies any pain as she recalls feeling dizzy and falling at home.  Although pt. is talkative and appears cognizant, when asked the standard A+O questions, she only knew her name, not the month, nor the year, nor where she currently is. She lives at home and recently had a HHA from 9-5. The day this pt. fell, it happened around 6 AM and she said the aid wasn't there yet. There is a son in the picture, but she doesn't want to "call upon him" saying he is too busy with his girlfriend and the girlfriend's daughter.
Writer met with patient to do a health and wellness check and to offer emotional support. Pt. was in bed, looking around confused.  Pt. denies any pain and reports her sleep is "good." When asked if she ate her breakfast she stated "I didn't get breakfast yet." (breakfast had been given and she had eaten some of her food on her tray). During basic questioning, pt. appeared unsure, hesitated to answer, and eventually said, "I don't know; maybe; I don't remember."
Alert, oriented x 4, forgetful at times  VSS, afebrile  arrived with Left arm sling with ace wrap c/d/i
Pt shouting  and climbing out of recliner.  Frequent reorientation provided.

## 2021-02-26 NOTE — PROGRESS NOTE ADULT - PROBLEM SELECTOR PLAN 3
stable  continue home meds
trend labs- chronic as per old chart  no signs of acute infection, cxr /ua negative  fu cultures negative  no fever  heme eval called  will monitor

## 2021-02-26 NOTE — PROGRESS NOTE ADULT - PROBLEM SELECTOR PLAN 2
repeat cbc  gi and heme eval  uncleart etiology - due to hx of mds?  transfuse one unit of blood - awaiting son to call back for consent
trend labs  no signs of acute infection, cxr /ua negative  fu cultures  no fever  will monitor

## 2021-02-26 NOTE — PROGRESS NOTE ADULT - ATTENDING COMMENTS
left message for son to call back for blood transfusion consent
dc planning once stable from ortho stand point and wbc on downtrend

## 2021-02-26 NOTE — CONSULT NOTE ADULT - ASSESSMENT
INCOMPLETE NOTE.  Documentation in Progress  PT SEEN AND EVALUATED.   FULL/ADDITIONAL RECOMMENDATIONS TO FOLLOW   ***************************************************************   [ASSESSMENT and  PLAN]  Elderly frail 89yo F with MDS  admitted post fall with L olecranon fracture  s/p ORIF    Anemia due to MDS  Possible some anemia due to blood loss  Mild thrombocytopenia due to bleeding, impaired marrow response. Plts borderline, now improved to just above normal/   Macrocytosis due to medication, hydroxysurea    Elevated WBC likely due to underlying MDS      RECOMMENDATIONS  Transfuse PRBC as clinically indicated.   Transfuse PRBC if Hgb <7.0 or if symptomatic.   Follow CBC    Pt of small stature.   Repeated blood draws likely to cause iatrogenic decline in Hgb.   Consider limited lab testing once stable.     Consider hold HU as may short term make anemia worse.   Long term would make leukocytosis worse.     Check Anemia studies.     DVT Prophylaxis    Thank you for consulting us.

## 2021-02-26 NOTE — CONSULT NOTE ADULT - SUBJECTIVE AND OBJECTIVE BOX
INCOMPLETE NOTE.  Documentation in Progress  PT SEEN AND EVALUATED.   FULL/ADDITIONAL RECOMMENDATIONS TO FOLLOW   ***************************************************************      Patient is a 89y old  Female who presents with a chief complaint of fall (2021 12:16)      HPI:  · HPI Objective Statement: 89 y/o F with h/o Dementia, Myelodysplastic Syndrome (chronic elevation in WBC),  lives alone Brought by ambulance for eval of left elbow pain after a fall today. Pt unsure how or why she fell. Denies other injury or symptom. Cant remember PCP name. Denies fever, chills, headache, chest pain, shortness of breath, abdominal pain, nausea, vomiting, weakness, numbness, tingling. She lives alone with an 8 hr aid,other then that she is self suffieicent.     (2021 13:36)        PAST MEDICAL & SURGICAL HISTORY:  Dementia  MDS (myelodysplastic syndrome)  No significant past surgical history       HEALTH ISSUES - PROBLEM Dx:  Anemia  Leukocytosis  Abnormal EKG  MDS (myelodysplastic syndrome)  Dementia  Elbow fracture, left      Unspecified fracture of lower end of left humerus, initial encounter for closed fracture [S42.402A]  Dementia [F03.90]  MDS (myelodysplastic syndrome) [D46.9]  No pertinent past medical history [[id number]]  No significant past surgical history [[id number]]  MDS (myelodysplastic syndrome) [D46.9]  Dementia [F03.90]        FAMILY HISTORY:  FH: HTN (hypertension)          [SOCIAL HISTORY: ]     smoking:       EtOH:       illicit drugs:       occupation:       marital status:       Other:       [ALLERGIES/INTOLERANCES:]  Allergies      No Known Allergies  Intolerances          [MEDICATIONS]  MEDICATIONS  (STANDING):  allopurinol 300 milliGRAM(s) Oral daily  aspirin enteric coated 81 milliGRAM(s) Oral daily  donepezil 5 milliGRAM(s) Oral at bedtime  hydroxyurea 500 milliGRAM(s) Oral daily  influenza   Vaccine 0.5 milliLiter(s) IntraMuscular once    MEDICATIONS  (PRN):  HYDROmorphone  Injectable 0.5 milliGRAM(s) IV Push every 6 hours PRN Moderate Pain (4 - 6)  ondansetron Injectable 4 milliGRAM(s) IV Push every 6 hours PRN Nausea and/or Vomiting  oxyCODONE    IR 5 milliGRAM(s) Oral every 3 hours PRN Mild Pain (1 - 3)        [REVIEW OF SYSTEMS: ]  CONSTITUTIONAL: normal, no fever, no shakes, no chills   EYES: No eye pain, no visual disturbances, no discharge  ENMT:  no discharge  NECK: No pain, no stiffness  BREASTS: No pain, no masses, no nipple discharge  RESPIRATORY: No cough, no wheezing, no chills, no hemoptysis; No shortness of breath  CARDIOVASCULAR: No chest pain, no palpitations, no dizziness, no leg swelling  GASTROINTESTINAL: No abdominal, no epigastric pain. No nausea, no vomiting, no hematemesis; No diarrhea , no constipation. No melena, no hematochezia.  GENITOURINARY: No dysuria, no frequency, no hematuria, no incontinence  NEUROLOGICAL: No headaches, no memory loss, no loss of strength, no numbness, no tremors  SKIN: No itching, no burning, no rashes, no lesions   LYMPH NODES: No enlarged glands  ENDOCRINE: No heat or cold intolerance; No hair loss  MUSCULOSKELETAL: No joint pain or swelling; No muscle, no back, no extremity pain  PSYCHIATRIC: No depression, no anxiety, no mood swings, no difficulty sleeping  HEME/LYMPH: No easy bruising, no bleeding gums      [VITALS SIGNS 24hrs]  Vital Signs Last 24 Hrs  T(C): 36.6 (2021 09:30), Max: 36.7 (2021 13:53)  T(F): 97.8 (2021 09:30), Max: 98.1 (2021 01:04)  HR: 81 (2021 09:30) (81 - 99)  BP: 106/58 (2021 09:30) (105/68 - 153/74)  BP(mean): --  RR: 18 (2021 09:30) (16 - 18)  SpO2: 94% (2021 09:30) (94% - 97%)    [PHYSICAL EXAM]  General: adult in NAD,  WN,  WD.  HEENT: clear oropharynx, anicteric sclera, pink conjunctivae.  Neck: supple, no masses.  CV: normal S1S2, no murmur, no rubs, no gallops.  Lungs: clear to auscultation, no wheezes, no rales, no rhonchi.  Abdomen: soft, non-tender, non-distended, no hepatosplenomegaly, normal BS, no guarding.  Ext: no clubbing, no cyanosis, no edema.  Skin: no rashes,  no petechiae, no venous stasis changes.  Neuro: alert and oriented X3, no focal motor deficits.  LN: no SC GI.      [LABS: ]                        7.4    19.95 )-----------( 147      ( 2021 10:35 )             22.2     CBC Full  -  ( 2021 10:35 )  WBC Count : 19.95 K/uL  RBC Count : 1.95 M/uL  Hemoglobin : 7.4 g/dL  Hematocrit : 22.2 %  Platelet Count - Automated : 147 K/uL  Mean Cell Volume : 113.8 fl  Mean Cell Hemoglobin : 37.9 pg  Mean Cell Hemoglobin Concentration : 33.3 gm/dL  Auto Neutrophil # : x  Auto Lymphocyte # : x  Auto Monocyte # : x  Auto Eosinophil # : x  Auto Basophil # : x  Auto Neutrophil % : x  Auto Lymphocyte % : x  Auto Monocyte % : x  Auto Eosinophil % : x  Auto Basophil % : x        135  |  100  |  24<H>  ----------------------------<  110<H>  3.5   |  28  |  0.64    Ca    7.9<L>      2021 08:00  Mg     3.7       TPro  x   /  Alb  2.9<L>  /  TBili  x   /  DBili  x   /  AST  x   /  ALT  x   /  AlkPhos  x       LIVER FUNCTIONS - ( 2021 06:31 )  Alb: 2.9 g/dL / Pro: x     / ALK PHOS: x     / ALT: x     / AST: x     / GGT: x               Urinalysis Basic - ( 2021 14:30 )  Color: Yellow / Appearance: Clear / S.010 / pH: x  Gluc: x / Ketone: Negative  / Bili: Negative / Urobili: Negative mg/dL   Blood: x / Protein: 30 mg/dL / Nitrite: Negative   Leuk Esterase: Negative / RBC: 25-50 /HPF / WBC 0-2   Sq Epi: x / Non Sq Epi: Few / Bacteria: Few        WBC  TREND (5 Days)  WBC Count: 19.95 K/uL ( @ 10:35)  WBC Count: 21.15 K/uL ( @ 08:00)  WBC Count: 18.26 K/uL ( @ 06:31)  WBC Count: 13.15 K/uL ( @ 10:21)  WBC Count: Cancelled K/uL ( @ 09:10)    HGB  TREND (5 Days)  Hemoglobin: 7.4 g/dL ( @ 10:35)  Hemoglobin: 6.9 g/dL ( @ 08:00)  Hemoglobin: 8.1 g/dL (:31)  Hemoglobin: 9.7 g/dL ( 10:21)  Hemoglobin: Cancelled g/dL ( 09:10)    HCT  TREND (5 Days)  Hematocrit: 22.2 % ( @ 10:35)  Hematocrit: 20.5 % ( @ 08:00)  Hematocrit: 24.3 % ( @ 06:31)  Hematocrit: 28.9 % ( 10:21)  Hematocrit: Cancelled % ( 09:10)    PLT  TREND (5 Days)  Platelet Count - Automated: 147 K/uL ( @ 10:35)  Platelet Count - Automated: 141 K/uL ( @ 08:00)  Platelet Count - Automated: 166 K/uL ( @ 06:31)  Platelet Count - Automated: 165 K/uL ( @ 10:21)  Platelet Count - Automated: Cancelled Corwin Almendarez. notified K/uL ( @ 09:10)    Anemia Studies                   [RADIOLOGY & ADDITIONAL STUDIES:]        Patient is a 89y old  Female who presents with a chief complaint of fall (2021 12:16)      HPI:  · HPI Objective Statement: 89 y/o F with h/o Dementia, Myelodysplastic Syndrome (chronic elevation in WBC),  lives alone Brought by ambulance for eval of left elbow pain after a fall today. Pt unsure how or why she fell. Denies other injury or symptom. Cant remember PCP name. Denies fever, chills, headache, chest pain, shortness of breath, abdominal pain, nausea, vomiting, weakness, numbness, tingling. She lives alone with an 8 hr aid,other then that she is self suffieicent.     (2021 13:36)        PAST MEDICAL & SURGICAL HISTORY:  Dementia  MDS (myelodysplastic syndrome)  No significant past surgical history       HEALTH ISSUES - PROBLEM Dx:  Anemia  Leukocytosis  Abnormal EKG  MDS (myelodysplastic syndrome)  Dementia  Elbow fracture, left      Unspecified fracture of lower end of left humerus, initial encounter for closed fracture [S42.402A]  Dementia [F03.90]  MDS (myelodysplastic syndrome) [D46.9]  No pertinent past medical history [[id number]]  No significant past surgical history [[id number]]  MDS (myelodysplastic syndrome) [D46.9]  Dementia [F03.90]        FAMILY HISTORY:  FH: HTN (hypertension)          [SOCIAL HISTORY: ]     smoking:  denies     EtOH:  denies     illicit drugs:  denies     occupation:  retired     marital status:       Other: lives alone, has aide      [ALLERGIES/INTOLERANCES:]  Allergies      No Known Allergies  Intolerances          [MEDICATIONS]  MEDICATIONS  (STANDING):  allopurinol 300 milliGRAM(s) Oral daily  aspirin enteric coated 81 milliGRAM(s) Oral daily  donepezil 5 milliGRAM(s) Oral at bedtime  hydroxyurea 500 milliGRAM(s) Oral daily  influenza   Vaccine 0.5 milliLiter(s) IntraMuscular once    MEDICATIONS  (PRN):  HYDROmorphone  Injectable 0.5 milliGRAM(s) IV Push every 6 hours PRN Moderate Pain (4 - 6)  ondansetron Injectable 4 milliGRAM(s) IV Push every 6 hours PRN Nausea and/or Vomiting  oxyCODONE    IR 5 milliGRAM(s) Oral every 3 hours PRN Mild Pain (1 - 3)        [REVIEW OF SYSTEMS: ]  CONSTITUTIONAL: normal, no fever, no shakes, no chills   EYES: No eye pain, no visual disturbances, no discharge  ENMT:  no discharge  NECK: No pain, no stiffness  BREASTS: No pain, no masses, no nipple discharge  RESPIRATORY: No cough, no wheezing, no chills, no hemoptysis; No shortness of breath  CARDIOVASCULAR: No chest pain, no palpitations, no dizziness, no leg swelling  GASTROINTESTINAL: No abdominal, no epigastric pain. No nausea, no vomiting, no hematemesis; No diarrhea , no constipation. No melena, no hematochezia.  GENITOURINARY: No dysuria, no frequency, no hematuria, no incontinence  NEUROLOGICAL: No headaches, no memory loss, no loss of strength, no numbness, no tremors  SKIN: No itching, no burning, no rashes, no lesions   LYMPH NODES: No enlarged glands  ENDOCRINE: No heat or cold intolerance; No hair loss  MUSCULOSKELETAL: No joint pain or swelling; No muscle, no back, no extremity pain  PSYCHIATRIC: No depression, no anxiety, no mood swings, no difficulty sleeping  HEME/LYMPH: No easy bruising, no bleeding gums      [VITALS SIGNS 24hrs]  Vital Signs Last 24 Hrs  T(C): 36.6 (2021 09:30), Max: 36.7 (2021 13:53)  T(F): 97.8 (2021 09:30), Max: 98.1 (2021 01:04)  HR: 81 (2021 09:30) (81 - 99)  BP: 106/58 (2021 09:30) (105/68 - 153/74)  BP(mean): --  RR: 18 (2021 09:30) (16 - 18)  SpO2: 94% (2021 09:30) (94% - 97%)    [PHYSICAL EXAM]  General: adult in NAD,  WN,  WD.  HEENT: clear oropharynx, anicteric sclera, pink conjunctivae.  Neck: supple, no masses.  CV: normal S1S2, no murmur, no rubs, no gallops.  Lungs: clear to auscultation, no wheezes, no rales, no rhonchi.  Abdomen: soft, non-tender, +distended, no hepatosplenomegaly, normal BS, no guarding.  Ext: no clubbing, no cyanosis, +edema LUE in cast.   Skin: no rashes,  no petechiae, no venous stasis changes.  Neuro: alert and oriented X1, no focal motor deficits.  LN: no SC GI.      [LABS: ]                        7.4    19.95 )-----------( 147      ( 2021 10:35 )             22.2     CBC Full  -  ( 2021 10:35 )  WBC Count : 19.95 K/uL  RBC Count : 1.95 M/uL  Hemoglobin : 7.4 g/dL  Hematocrit : 22.2 %  Platelet Count - Automated : 147 K/uL  Mean Cell Volume : 113.8 fl  Mean Cell Hemoglobin : 37.9 pg  Mean Cell Hemoglobin Concentration : 33.3 gm/dL  Auto Neutrophil # : x  Auto Lymphocyte # : x  Auto Monocyte # : x  Auto Eosinophil # : x  Auto Basophil # : x  Auto Neutrophil % : x  Auto Lymphocyte % : x  Auto Monocyte % : x  Auto Eosinophil % : x  Auto Basophil % : x        135  |  100  |  24<H>  ----------------------------<  110<H>  3.5   |  28  |  0.64    Ca    7.9<L>      2021 08:00  Mg     3.7       TPro  x   /  Alb  2.9<L>  /  TBili  x   /  DBili  x   /  AST  x   /  ALT  x   /  AlkPhos  x       LIVER FUNCTIONS - ( 2021 06:31 )  Alb: 2.9 g/dL / Pro: x     / ALK PHOS: x     / ALT: x     / AST: x     / GGT: x               Urinalysis Basic - ( 2021 14:30 )  Color: Yellow / Appearance: Clear / S.010 / pH: x  Gluc: x / Ketone: Negative  / Bili: Negative / Urobili: Negative mg/dL   Blood: x / Protein: 30 mg/dL / Nitrite: Negative   Leuk Esterase: Negative / RBC: 25-50 /HPF / WBC 0-2   Sq Epi: x / Non Sq Epi: Few / Bacteria: Few        WBC  TREND (5 Days)  WBC Count: 19.95 K/uL ( @ 10:35)  WBC Count: 21.15 K/uL ( @ 08:00)  WBC Count: 18.26 K/uL ( @ 06:31)  WBC Count: 13.15 K/uL ( @ 10:21)  WBC Count: Cancelled K/uL ( @ 09:10)    HGB  TREND (5 Days)  Hemoglobin: 7.4 g/dL ( @ 10:35)  Hemoglobin: 6.9 g/dL ( @ 08:00)  Hemoglobin: 8.1 g/dL ( @ :31)  Hemoglobin: 9.7 g/dL ( @ 10:21)  Hemoglobin: Cancelled g/dL ( @ 09:10)    HCT  TREND (5 Days)  Hematocrit: 22.2 % ( @ 10:35)  Hematocrit: 20.5 % ( @ 08:00)  Hematocrit: 24.3 % ( @ :31)  Hematocrit: 28.9 % ( 10:21)  Hematocrit: Cancelled % ( @ 09:10)    PLT  TREND (5 Days)  Platelet Count - Automated: 147 K/uL ( @ 10:35)  Platelet Count - Automated: 141 K/uL ( @ 08:00)  Platelet Count - Automated: 166 K/uL ( @ 06:31)  Platelet Count - Automated: 165 K/uL ( @ 10:21)  Platelet Count - Automated: Cancelled Corwin Almendarez. notified K/uL ( @ 09:10)    Anemia Studies  Occult Blood, Feces: Negative: Method: Peroxidase Catalyzation Activity (21 @ 15:25)        [RADIOLOGY & ADDITIONAL STUDIES:]     < from: US Transthoracic Echocardiogram w/Doppler Complete (21 @ 14:06) >  EXAM:  US TTE W DOPPLER COMPLETE                        PROCEDURE DATE:  2021    INTERPRETATION:  Indication: Abnormal EKG preop evaluation  Technician: Zay Torres  Study Quality: Technical difficult study  Height 5 feet 4 inches, weight 108 pounds, blood pressure 145/81 mmHg  Measurements:   ·	Aortic root size 3.1 cm, left atrial size 2.7 cm, right atrial size 2.5 cm, right ventricular size 1.7 cm, left ventricular end-diastolic diameter 3.8 cm, left ventricular end-systolic diameter 2.0 cm, septal wall thickness 0.7 cm, posterior thickness 0.7 cm, aortic velocity 1.6 m/s, mitral E velocity 0.7 m/s, ejection fraction more than 65%.  ·	Mitral Valve: Mitral annular consultation, thickened mitral valve , normal opening with mild mitral regurgitation  ·	Aortic Valve: Fibrocalcific aortic valve changes with normal opening  ·	Left Atrium: Normal size  ·	Left Ventricle: Normal size, normal wall thickness with hyperdynamic left ventricular systolic function, ejection fraction more than 65%. Stage I diastolic dysfunction noted  ·	Pericardium: No pericardial effusion  ·	Tricuspid Valve: Appears to be normal . Mild to moderate tricuspid regurgitation with  estimated right ventricular systolic pressure 35 mmHg  ·	PulmonicValve: Appears normal but not well-visualized  ·	Right Ventricle: Grossly normal size with normal right ventricular systolic function  ·	Right Atrium: Normal size  SUMMARY:  1. hyperdynamic left ventricular systolic function with mild diastolic dysfunction  2. mild mitral regurgitation  3. mild to moderate tricuspid regurgitation with estimated right ventricular systolic pressure 35 mm hg.  [facility]  This document has been electronically signed. :21AM  < end of copied text >      < from: CT Cervical Spine No Cont (21 @ 09:57) >  EXAM:  CT CERVICAL SPINE                        EXAM:  CT BRAIN                        PROCEDURE DATE:  2021    INTERPRETATION:  CLINICAL STATEMENT: Trauma..  TECHNIQUE: CT of the head and cervical spine were performed without IV contrast. 3-D/MIP images obtained  COMPARISON: CT head and cervical spine 2015.  FINDINGS:  Head:  ·	There is moderate diffuse parenchymal volume loss. There are areas of low attenuation in the periventricular white matter likely related to mild chronic microvascular ischemic changes.  ·	There is no acute intracranial hemorrhage, parenchymal mass, mass effect or midline shift. There is no acute territorial infarct. There is no hydrocephalus.  ·	The cranium is intact. Postsurgical changes anterior wall right maxillary sinus The visualized paranasal sinuses are well-aerated.  Cervical spine:  ·	Vertebral body heights maintained. Grade 1 anterolisthesis C2 on C3 unchanged  ·	There is no prevertebral soft tissue swelling. The odontoid is intact.  ·	Evaluation of the spinal canal is limited on a CT exam.  Multilevel degenerative changes noted resulting in multilevel spinal canal stenosis and neural foraminal narrowing.  ·	Small thyroid nodule noted  IMPRESSION:  No acute intracranial hemorrhage.  No acute fracture cervical spine. If pain persists, follow-up MRI exam recommended  NELIA ESCOBAR MD; Attending Radiologist  This document has been electronically signed. 2021 10:04AM  < end of copied text >          < from: Xray Humerus, Left (21 @ 09:56) >  EXAM:  HUMERUS LEFT                        PROCEDURE DATE:  2021    INTERPRETATION:  Fall.  2 views left humerus.  Left humerus is intact without fracture dislocation. Avulsion fracture ulnar olecranon process described bev separate report. Multiple old left rib fractures incidentally noted.  Impression: No humeral fracture  JULIETTE DAVIES MD; Attending Radiologist  This document has been electronically signed. 2021 10:15AM  < end of copied text >      < from: Xray Elbow AP + Lateral, Left (21 @ 09:55) >  EXAM:  ELBOW 2VIEWS LT                        PROCEDURE DATE:  2021    INTERPRETATION:  Fall  3 views left elbow.  acute markedly displaced slightly comminuted avulsion fracture of the olecranon process of the ulna. No otherfractures. No dislocation. Marked soft tissue swelling.  Impression: Olecranon fracture  JULIETTE DAVIES MD; Attending Radiologist  This document has been electronically signed. 2021 10:12AM  < end of copied text >        < from: Xray Pelvis AP only (21 @ 09:56) >  EXAM:  PELVIS AP ONLY                        PROCEDURE DATE:  2021    INTERPRETATION:  Fall.  AP pelvis.  No displaced fracture, or dislocation.  Sacroiliac joints intact. Diffuse senile demineralization. Mild narrowing bilateral hip joints. Calcified uterine myomas.  Impression: No displaced fracture  JULIETTE DAVIES MD; Attending Radiologist  This document has been electronically signed. 2021 10:12AM  < end of copied text >      < from: Xray Ankle Complete 3 Views, Bilateral (21 @ 09:54) >  EXAM:  ANKLE BILATERAL (MINIMUM 3 VIEW)                        PROCEDURE DATE:  2021    INTERPRETATION:  Fall.  3 views right ankle.  No fracture dislocation. Ankle mortise intact. Small retrocalcaneal osteophyte. Diffuse senile demineralization. Soft tissue swelling.  Impression: No fracture  JULIETTE DAVIES MD; Attending Radiologist  This document has been electronically signed. 2021 10:10AM  < end of copied text >        < from: Xray Chest 1 View- PORTABLE-Urgent (Xray Chest 1 View- PORTABLE-Urgent .) (10.14.20 @ 15:37) >  EXAM:  XR CHEST PORTABLE URGENT 1V    PROCEDURE DATE:  10/14/2020    INTERPRETATION:  Admission.  AP chest. Prior 2019.  No change heart mediastinum. No consolidation effusion or other significant pleural-parenchymal abnormality. Chronic left rib fracture deformities. Gaseous distention bowel visualized upper abdomen. Recommend clinical correlation follow-up with abdominal radiographs as warranted.  Impression: As above  JULIETTE DAVIES M.D., ATTENDING RADIOLOGIST  This document has been electronically signed. Oct 14 2020  3:39PM  < end of copied text >

## 2021-02-26 NOTE — CONSULT NOTE ADULT - REASON FOR ADMISSION
fall
Principal Discharge DX:	Normal vaginal delivery  Goal:	full recovery  Assessment and plan of treatment:	normal activity, regular diet, follow up in office in 6 weeks.

## 2021-02-26 NOTE — PROVIDER CONTACT NOTE (CRITICAL VALUE NOTIFICATION) - BACKGROUND
Patient admitted for fracture of lower end of left humerus Patient admitted for fracture of lower end of left humerus. S/p ORIF left Humerus

## 2021-02-26 NOTE — PROVIDER CONTACT NOTE (OTHER) - RECOMMENDATIONS
Pt. was informed writer is available should she want to talk further or need any additional support.
Pt. was informed writer is available should she want to talk further or need any additional support.
Ativan 0.5mg IM
reorder 1 dose ordered KCL replacement  10 meq/100 ml IVPB x 2 doses

## 2021-02-26 NOTE — PROGRESS NOTE ADULT - SUBJECTIVE AND OBJECTIVE BOX
Chief Complaint: Fall    Interval Events: No events overnight.    Review of Systems:  General: No fevers, chills, weight loss or gain  Skin: No rashes, color changes  Cardiovascular: No chest pain, orthopnea  Respiratory: No shortness of breath, cough  Gastrointestinal: No nausea, abdominal pain  Genitourinary: No incontinence, pain with urination  Musculoskeletal: No pain, swelling, decreased range of motion  Neurological: No headache, weakness  Psychiatric: No depression, anxiety  Endocrine: No weight loss or gain, increased thirst  All other systems are comprehensively negative.    Physical Exam:  Vital Signs Last 24 Hrs  T(C): 36.3 (26 Feb 2021 05:48), Max: 36.7 (25 Feb 2021 13:53)  T(F): 97.4 (26 Feb 2021 05:48), Max: 98.1 (26 Feb 2021 01:04)  HR: 86 (26 Feb 2021 05:48) (81 - 99)  BP: 113/68 (26 Feb 2021 05:48) (100/60 - 153/74)  BP(mean): --  RR: 16 (26 Feb 2021 05:48) (16 - 18)  SpO2: 96% (26 Feb 2021 05:48) (94% - 97%)  General: NAD  HEENT: MMM  Neck: No JVD, no carotid bruit  Lungs: CTAB  CV: RRR, nl S1/S2, no M/R/G  Abdomen: S/NT/ND, +BS  Extremities: No LE edema, no cyanosis  Neuro: AAOx3, non-focal  Skin: No rash    Labs:             02-26    135  |  100  |  24<H>  ----------------------------<  110<H>  3.5   |  28  |  0.64    Ca    7.9<L>      26 Feb 2021 08:00  Mg     3.7     02-25    TPro  x   /  Alb  2.9<L>  /  TBili  x   /  DBili  x   /  AST  x   /  ALT  x   /  AlkPhos  x   02-25                        8.1    18.26 )-----------( 166      ( 25 Feb 2021 06:31 )             24.3       Telemetry: Sinus rhythm

## 2021-02-26 NOTE — DISCHARGE NOTE NURSING/CASE MANAGEMENT/SOCIAL WORK - PATIENT PORTAL LINK FT
You can access the FollowMyHealth Patient Portal offered by [facility] by registering at the following website: [email or web address]. By joining [employer]’s FollowMyHealth portal, you will also be able to view your health information using other applications (apps) compatible with our system.

## 2021-02-26 NOTE — PROGRESS NOTE ADULT - PROBLEM SELECTOR PLAN 1
pod 1  ot/pt eval - will need gaby  dvt prophylaxsis  pain control
pod 2  ot/pt eval - will need gaby  discussed with ortho, unlikely source of acute anemia - they will reassess her  dvt prophylaxsis  pain control

## 2021-02-27 NOTE — PROGRESS NOTE ADULT - SUBJECTIVE AND OBJECTIVE BOX
[INTERVAL HX: ]  Patient seen and examined;  Chart reviewed and events noted;   more alert and lucid today. Aware in hospital, had injured arm.  States got  late in life, had one son Enrique  cannot tell me who her usual oncologist or hematologist is  Hgb better    Patient is a 89y Female with a known history of :  Unspecified fracture of lower end of left humerus, initial encounter for closed fracture [S42.402A]    Dementia [F03.90]    MDS (myelodysplastic syndrome) [D46.9]    No pertinent past medical history [830526239]    No significant past surgical history [[id number]]      HPI:  · HPI Objective Statement: 89 y/o F with h/o Dementia, Myelodysplastic Syndrome (chronic elevation in WBC),  lives alone Brought by ambulance for eval of left elbow pain after a fall today. Pt unsure how or why she fell. Denies other injury or symptom. Cant remember PCP name. Denies fever, chills, headache, chest pain, shortness of breath, abdominal pain, nausea, vomiting, weakness, numbness, tingling. She lives alone with an 8 hr aid,other then that she is self suffieicent.     (24 Feb 2021 13:36)        MEDICATIONS  (STANDING):  allopurinol 300 milliGRAM(s) Oral daily  aspirin enteric coated 81 milliGRAM(s) Oral daily  donepezil 5 milliGRAM(s) Oral at bedtime  influenza   Vaccine 0.5 milliLiter(s) IntraMuscular once  polyethylene glycol 3350 17 Gram(s) Oral daily    MEDICATIONS  (PRN):  HYDROmorphone  Injectable 0.5 milliGRAM(s) IV Push every 6 hours PRN Moderate Pain (4 - 6)  ondansetron Injectable 4 milliGRAM(s) IV Push every 6 hours PRN Nausea and/or Vomiting  oxyCODONE    IR 5 milliGRAM(s) Oral every 3 hours PRN Mild Pain (1 - 3)      Vital Signs Last 24 Hrs  T(C): 37.3 (27 Feb 2021 10:50), Max: 37.4 (26 Feb 2021 17:45)  T(F): 99.2 (27 Feb 2021 10:50), Max: 99.4 (26 Feb 2021 17:45)  HR: 81 (27 Feb 2021 10:50) (79 - 98)  BP: 119/70 (27 Feb 2021 10:50) (104/66 - 135/74)  BP(mean): --  RR: 20 (27 Feb 2021 10:50) (17 - 20)  SpO2: 98% (27 Feb 2021 10:50) (94% - 98%)      [PHYSICAL EXAM]  General: adult in NAD,  WN,  WD.  HEENT: clear oropharynx, anicteric sclera, pink conjunctivae.  Neck: supple, no masses.  CV: normal S1S2, no murmur, no rubs, no gallops.  Lungs: clear to auscultation, no wheezes, no rales, no rhonchi.  Abdomen: soft, non-tender, mod -distended, no hepatosplenomegaly, normal BS, no guarding.  Ext: no clubbing, no cyanosis, +edema LUE  Skin: no rashes,  no petechiae, no venous stasis changes.  Neuro: alert and oriented X1  , no focal motor deficits.  LN: no SC GI.      [LABS:]                        8.9    17.74 )-----------( 135      ( 27 Feb 2021 06:05 )             26.8     02-27    138  |  104  |  21  ----------------------------<  100<H>  3.5   |  30  |  0.60    Ca    8.4      27 Feb 2021 06:05          Sedimentation Rate, Erythrocyte: 50 mm/Hr (02.27.21 @ 06:05)           [RADIOLOGY STUDIES:]

## 2021-02-27 NOTE — PROGRESS NOTE ADULT - SUBJECTIVE AND OBJECTIVE BOX
LORRI ILEANA                                                               [id number]                                                      Allergies---No Known Allergies        Pt is a 89y year old Female s/p ORIF left elbow due a fracture.   Pain is 2/10.   Tolerating regular diet.   (+) voids. The patient is resting comfortably in a chair, with no complaints.   Denies any chest pain / shortness of breath / dyspnea / nausea / vomiting / headaches or light headed ness.       Vital Signs Last 24 Hrs  T(F): 98.4 (02-27-21 @ 05:25), Max: 99 (02-26-21 @ 22:01)  HR: 79 (02-27-21 @ 05:25)  BP: 135/74 (02-27-21 @ 05:25)  RR: 18 (02-27-21 @ 05:25)  SpO2: 94% (02-27-21 @ 05:25)    I&O's Detail        PE:   Left upper Extremity:   Pt is currently in a post splint and arm sling.   Dressing is C/D/I and changed with no complications.   The wound is closed with sutures.   NO redness, swelling, heat, discharge or any other evidence of infection superficial or deep is noted.   NVI.   Sensation intact to light touch distally.   No gross evidence of motor deficit.   +2 pulses.   fingers are pink and mobile.   No evidence of impending compartment syndrome.                               8.9    17.74 )--------------( 135                          02-27-21 @ 06:05               26.8       138   |  104  |  21  -----------------------------<  100                  02-27-21 @ 06:05  3.5    |  30    |  0.60        Ca    8.4              A:   Pt is a 89y year old Female S/P ORIF left elbow, Post Op Day #3        Plan:    - Follow up with Medicine    - OOB with PT/OT   - DVT ppx = PAS +  [id number]   - Pain control    - Incentive spirometry   - Discharge Planning to [facility]

## 2021-02-27 NOTE — CHART NOTE - NSCHARTNOTEFT_GEN_A_CORE
Assessment: Pt is a 89 YO with dementia MDS admitted for L elbow fx now POD# 3 ORIF . The plan is transfer to [facility] facility. Pt sleeping in chair. Per nsg, received assistance with breakfast -ate 1/4 of pancakes, 1/2 of oatmeal and 1/2 of yogurt     Factors impacting intake: [ ] none [ ] nausea  [ ] vomiting [ ] diarrhea [ ] constipation  [ ]chewing problems [ ] swallowing issues  [ X] other: feeding assistance     Diet Presciption: Diet, Regular (02-25-21 @ 06:20)    Intake: 40-50%     Current Weight: Weight (kg): 49 (02-24 @ 08:24)  % Weight Change; N/A, not current wt     Pertinent Medications: MEDICATIONS  (STANDING):  allopurinol 300 milliGRAM(s) Oral daily  aspirin enteric coated 81 milliGRAM(s) Oral daily  donepezil 5 milliGRAM(s) Oral at bedtime  influenza   Vaccine 0.5 milliLiter(s) IntraMuscular once  polyethylene glycol 3350 17 Gram(s) Oral daily    MEDICATIONS  (PRN):  HYDROmorphone  Injectable 0.5 milliGRAM(s) IV Push every 6 hours PRN Moderate Pain (4 - 6)  ondansetron Injectable 4 milliGRAM(s) IV Push every 6 hours PRN Nausea and/or Vomiting  oxyCODONE    IR 5 milliGRAM(s) Oral every 3 hours PRN Mild Pain (1 - 3)    Pertinent Labs: 02-27 Na138 mmol/L Glu 100 mg/dL<H> K+ 3.5 mmol/L Cr  0.60 mg/dL BUN 21 mg/dL 02-25 Alb 2.9 g/dL<L>     CAPILLARY BLOOD GLUCOSE        Skin: intact    Estimated Needs:   [X ] no change since previous assessment  [ ] recalculated:     Previous Nutrition Diagnosis:   [ ] Inadequate Energy Intake [ ]Inadequate Oral Intake [ ] Excessive Energy Intake   [X ] Underweight [ ] Increased Nutrient Needs [ ] Overweight/Obesity   [ ] Altered GI Function [ ] Unintended Weight Loss [ ] Food & Nutrition Related Knowledge Deficit [ ] Malnutrition     Nutrition Diagnosis is [X ] ongoing  [ ] resolved [ ] not applicable     New Nutrition Diagnosis: [ ] not applicable       Interventions:   Recommend  [ ] Change Diet To:  [X ] Nutrition Supplement: Ensure Enlive 8 oz po BID   [ ] Nutrition Support  [ ] Other:     Monitoring and Evaluation:   [ X] PO intake [ x ] Tolerance to diet prescription [ x ] weights [ x ] labs[ x ] follow up per protocol  [ ] other: Assessment: Pt is a 89 YO with dementia MDS admitted for L elbow fx now POD# 3 ORIF . The plan is transfer to [facility] facility. Pt sleeping in chair. Per nsg, received assistance with breakfast -ate 1/4 of pancakes, 1/2 of oatmeal and 1/2 of yogurt     Factors impacting intake: [ ] none [ ] nausea  [ ] vomiting [ ] diarrhea [ ] constipation  [ ]chewing problems [ ] swallowing issues  [ X] other: feeding assistance     Diet Prescription: Diet, Regular (02-25-21 @ 06:20)    Intake: 40-50%     Current Weight: Weight (kg): 49 (02-24 @ 08:24)  % Weight Change; N/A, not current wt     Pertinent Medications: MEDICATIONS  (STANDING):  allopurinol 300 milliGRAM(s) Oral daily  aspirin enteric coated 81 milliGRAM(s) Oral daily  donepezil 5 milliGRAM(s) Oral at bedtime  influenza   Vaccine 0.5 milliLiter(s) IntraMuscular once  polyethylene glycol 3350 17 Gram(s) Oral daily    MEDICATIONS  (PRN):  HYDROmorphone  Injectable 0.5 milliGRAM(s) IV Push every 6 hours PRN Moderate Pain (4 - 6)  ondansetron Injectable 4 milliGRAM(s) IV Push every 6 hours PRN Nausea and/or Vomiting  oxyCODONE    IR 5 milliGRAM(s) Oral every 3 hours PRN Mild Pain (1 - 3)    Pertinent Labs: 02-27 Na138 mmol/L Glu 100 mg/dL<H> K+ 3.5 mmol/L Cr  0.60 mg/dL BUN 21 mg/dL 02-25 Alb 2.9 g/dL<L>     CAPILLARY BLOOD GLUCOSE        Skin: surgical incision     Estimated Needs:   [X ] no change since previous assessment  [ ] recalculated:     Previous Nutrition Diagnosis:   [ ] Inadequate Energy Intake [ ]Inadequate Oral Intake [ ] Excessive Energy Intake   [X ] Underweight [ ] Increased Nutrient Needs [ ] Overweight/Obesity   [ ] Altered GI Function [ ] Unintended Weight Loss [ ] Food & Nutrition Related Knowledge Deficit [ ] Malnutrition     Nutrition Diagnosis is [X ] ongoing  [ ] resolved [ ] not applicable     New Nutrition Diagnosis: [ ] not applicable       Interventions:   Recommend  [ ] Change Diet To:  [X ] Nutrition Supplement: Ensure Enlive 8 oz po BID   [ ] Nutrition Support  [ ] Other:     Monitoring and Evaluation:   [ X] PO intake [ x ] Tolerance to diet prescription [ x ] weights [ x ] labs[ x ] follow up per protocol  [ ] other:

## 2021-02-27 NOTE — PROGRESS NOTE ADULT - ASSESSMENT
The patient is a 89 year old female with a history of MDS, dementia who presents with a fall.    Plan:  - ECG with anterior and lateral TWI  - No symptoms suggestive of ischemia  - Cardiac enzymes negative  - BNP not significantly elevated at 545  - Echo with normal LV systolic function, no significant valve issues, normal pulmonary pressures  - BP on lower side; if remains low and patient symptomatic, resume IV fluids  - Ortho follow-up  - PT
[ASSESSMENT and  PLAN]  Elderly frail 89yo F with MDS  admitted post fall with L olecranon fracture  s/p ORIF    Anemia due to MDS  Possible some anemia due to blood loss  Mild thrombocytopenia due to bleeding, impaired marrow response. Plts borderline, now improved to just above normal/   Macrocytosis due to medication, hydroxysurea    Elevated WBC likely due to underlying MDS, or less likely MPN      RECOMMENDATIONS  Transfuse PRBC as clinically indicated.   Transfuse PRBC if Hgb <7.0 or if symptomatic.   Follow CBC    Pt of small stature.   Repeated blood draws likely to cause iatrogenic decline in Hgb.   Consider limited lab testing once stable.     Consider hold hydroxyurea [HU] as may short term make anemia worse.   Hold Hydroxyurea x1wk  Resume after 1wk if Hgb stable.     Long term would make leukocytosis worse if off HU.     pending anemia studies    DVT Prophylaxis    Outpt post DC from rehab or subacute, follow with pt usual heme-onc MD     Thank you for consulting us.   
The patient is a 89 year old female with a history of MDS, dementia who presents with a fall.    Plan:  - Echo with normal LV systolic function, no significant valve issues, normal pulmonary pressures  - Ortho follow-up  - PT  - Discharge planning

## 2021-02-27 NOTE — PROGRESS NOTE ADULT - PROVIDER SPECIALTY LIST ADULT
Orthopedics
Cardiology
Orthopedics
Cardiology
Heme/Onc
Internal Medicine
Orthopedics
Internal Medicine

## 2021-05-19 NOTE — ED PROVIDER NOTE - CLINICAL SUMMARY MEDICAL DECISION MAKING FREE TEXT BOX
pt with mechanical fall c/o right hip pain, will get xray hip r/o fx, ct head r/o ich, labs and pain medication admission B Fausto CARLSON

## 2021-05-19 NOTE — H&P ADULT - NSICDXPASTSURGICALHX_GEN_ALL_CORE_FT
PAST SURGICAL HISTORY:  No significant past surgical history      PAST SURGICAL HISTORY:  S/P ORIF (open reduction internal fixation) fracture elbow fracture 02/2021

## 2021-05-19 NOTE — H&P ADULT - HISTORY OF PRESENT ILLNESS
89 y/o F with h/o Dementia, Myelodysplastic Syndrome (chronic elevation in WBC)  Impacted subcapital fracture of the right femoral neck.    89 y/o F with h/o Dementia, Myelodysplastic Syndrome (chronic elevation in WBC), BIBEMS from [place], c/o severe right hip pain, right forehead hematoma, s/p slip and fall.  Pt states she was walking with walker, the floor was slippery, and she slipped, fell on her right side.  She hit her right forehead, unable to get up, has severe right hip pain. CT Pelvis reports impacted subcapital fracture of the right femoral neck.  Pt denies fever. chills, cough, chest pain, dyspnea, LOC, dizziness, HA, abd pain, nausea, vomiting.      89 y/o F with h/o Dementia, Myelodysplastic Syndrome (chronic elevation in WBC), BIBEMS from [place], c/o severe right hip pain, right forehead hematoma, s/p slip and fall.  Pt states she was walking with walker, the floor was slippery, and she slipped, fell on her right side.  She hit her right forehead, unable to get up, has severe right hip pain. CT Pelvis reports impacted subcapital fracture of the right femoral neck.  CT head reports right fontal scalp hematoma, no acute cva, bleed or fracture.  CT cervical spine reports spondylosis, no fracture. Pt denies fever. chills, cough, chest pain, dyspnea, LOC, dizziness, HA, abd pain, nausea, vomiting.    EKG NSR 80/min LAFB.  She had an echo    89 y/o F with h/o Dementia, Myelodysplastic Syndrome (chronic elevation in WBC), BIBEMS from [place], c/o severe right hip pain, right forehead hematoma, s/p slip and fall.  Pt states she was walking with walker, the floor was slippery, and she slipped, fell on her right side.  She hit her right forehead, unable to get up, has severe right hip pain. CT Pelvis reports impacted subcapital fracture of the right femoral neck.  CT head reports right fontal scalp hematoma, no acute cva, bleed or fracture.  CT cervical spine reports spondylosis, no fracture. Pt denies fever. chills, cough, chest pain, dyspnea, LOC, dizziness, HA, abd pain, nausea, vomiting.    EKG NSR 80/min LAFB.  She had an echo 02/24/21 reports hyperdynamic left ventricular systolic function with mild diastolic dysfunction, mild mitral regurgitation, mild to moderate tricuspid regurgitation.    89 y/o F with h/o Dementia, Myelodysplastic Syndrome (chronic elevation in WBC), BIBEMS from [place], c/o severe right hip pain, right forehead hematoma, s/p slip and fall.  Pt states she was walking with walker, the floor was slippery, and she slipped, fell on her right side.  She hit her right forehead, unable to get up, has severe right hip pain. CT Pelvis reports impacted subcapital fracture of the right femoral neck.  CT head reports right fontal scalp hematoma, no acute cva, bleed or fracture.  CT cervical spine reports spondylosis, no fracture. Pt denies fever. chills, cough, chest pain, dyspnea, LOC, dizziness, HA, abd pain, nausea, vomiting.    EKG NSR 80/min LAFB, unchanged from prior.  She had an echo 02/24/21 reports hyperdynamic left ventricular systolic function with mild diastolic dysfunction, mild mitral regurgitation, mild to moderate tricuspid regurgitation.

## 2021-05-19 NOTE — ED ADULT NURSE NOTE - CHIEF COMPLAINT QUOTE
BIB EMS from [place], s/p fall. Hematoma to forehead and c/o right knee pain. Denies LOC, denies use of blood thinners. ISAR positive.

## 2021-05-19 NOTE — ED PROVIDER NOTE - OBJECTIVE STATEMENT
89 yo female with ho dementia and mds biba s/p mechanical cady and fall with right frontal hematoma and abrasion. pt is c/o right hip pain

## 2021-05-19 NOTE — ED PROVIDER NOTE - PHYSICAL EXAMINATION
Gen:  Well appearing in distress with right hip pain  Head:  right frontal area with swelling, contusion , ecchymosis with abrasion  HEENT: pupils perrl,no pharyngeal erythema, uvula midline  Cardiac: S1S2, RRR  Abd: Soft, non tender  Resp: No distress, CTA   musculoskeletal:: no deformities, no swelling, right hip with ttp no rom secondary to pain, strength +5/+5, +2 dp right ehl +5  Skin: warm and dry as visualized, no rashes  Neuro: PENA, , aao x 2  Psych:alert, cooperative, appropriate mood and affect for situation

## 2021-05-19 NOTE — ED ADULT NURSE REASSESSMENT NOTE - NS ED NURSE REASSESS COMMENT FT1
Received report from Hiwot BARONE. Patient resting comfortably, safety maintained. Will continue to monitor and treat per orders.

## 2021-05-19 NOTE — ED PROVIDER NOTE - CARE PLAN
Principal Discharge DX:	Closed fracture of right hip, initial encounter  Secondary Diagnosis:	Facial contusion, initial encounter

## 2021-05-19 NOTE — H&P ADULT - NSHPPHYSICALEXAM_GEN_ALL_CORE
Vital Signs (24 Hrs):  T(C): 36.1 (05-19-21 @ 19:41), Max: 36.1 (05-19-21 @ 19:41)  HR: 94 (05-19-21 @ 19:41) (94 - 94)  BP: 170/89 (05-19-21 @ 19:41) (170/89 - 170/89)  RR: 16 (05-19-21 @ 19:41) (16 - 16)  SpO2: 96% (05-19-21 @ 19:41) (96% - 96%)  Daily Height in cm: 162.56 (19 May 2021 19:41)

## 2021-05-19 NOTE — H&P ADULT - ASSESSMENT
89 y/o F with h/o Dementia, Myelodysplastic Syndrome (chronic elevation in WBC), BIBEMS from [place], c/o severe right hip pain, right forehead hematoma, s/p slip and fall.   She hit her right forehead, unable to get up, has severe right hip pain. CT Pelvis reports impacted subcapital fracture of the right femoral neck.  CT head reports right fontal scalp hematoma, no acute cva, bleed or fracture.  CT cervical spine reports spondylosis, no fracture. Pt denies fever. chills, cough, chest pain, dyspnea, LOC, dizziness, HA, abd pain, nausea, vomiting.  EKG NSR 80/min LAFB, unchanged from prior.  She had an echo 02/24/21 reports hyperdynamic left ventricular systolic function with mild diastolic dysfunction, mild mitral regurgitation, mild to moderate tricuspid regurgitation.    Right hip fracture, right forehead hematoma  s/p slip and fall  MDS, stable  Mild dementia    Admit  Analgesics prn, ice packs to forehead  Cont current meds: hydroxyurea, aricept, allopurinol, ASA  ffup labs  DVT prophylaxis   Though she is of advanced age, she is Class ASA 2, medically stable, optimized and cleared for orthopedic surgery.  Will keep pt NPO at this time for possible surgery.

## 2021-05-19 NOTE — H&P ADULT - TIME BILLING
Medical management of acute medical problem, thorough review, ffup of imaging, labs. Discussion with consultant.

## 2021-05-19 NOTE — ED ADULT NURSE REASSESSMENT NOTE - NS ED NURSE REASSESS COMMENT FT1
Patient resting comfortably, given another blanket. Call bell at bedside. Safety maintained. Will continue to monitor and treat per orders.

## 2021-05-19 NOTE — ED ADULT TRIAGE NOTE - CHIEF COMPLAINT QUOTE
BIB EMS from [place], s/p fall. Hematoma to forehead and c/o right knee pain. Denies LOC, denies use of blood thinners. ISAR positive.

## 2021-05-19 NOTE — ED ADULT NURSE NOTE - OBJECTIVE STATEMENT
Pt presents to ED from [place] s/p fall. Pt states she was walking and slipped and hit her head on the floor. Hematoma and abrasion to right forehead. Pt also c/o right knee pain, ecchymosis noted. Denies any LOC.

## 2021-05-20 NOTE — PROGRESS NOTE ADULT - ASSESSMENT
89 y/o F with h/o Dementia, Myelodysplastic Syndrome (chronic elevation in WBC), BIBEMS from [place], c/o severe right hip pain, right forehead hematoma, s/p slip and fall.   She hit her right forehead, unable to get up, has severe right hip pain.    #Right hip fracture, right forehead hematoma  #s/p fall  CT of pelvis:  impacted fx of right femoral neck  CT of head:   right frontal scalp hematoma, no intracranial bleed  Echo on 2/21: hyperdynamic LV systolic function, mild diastolic failure and mild to mod tricuspid regurg  -Ortho; Dr. Vazquez seeing patient, she will go to the OR today for ORIF per Ortho PA  -cont pain mgmt    #MDS, stable  -pt with chronic elevated wbc  -cont to monitor  -H/H 10/31    #mild dementia  -cont Donepezil    #DVT ppx: heparin    Dispo:  pt medically cleared for the OR; she is Class ASA 2, optimized and cleared for orthopedic surgery.  Will keep pt NPO at this time for probable surgery later today.  Updated pt's Son, Enrique Muir at [phone] today on plan of care.  He is agreeable to Surgery.  89 y/o F with h/o Dementia, Myelodysplastic Syndrome (chronic elevation in WBC), BIBEMS from [place], c/o severe right hip pain, right forehead hematoma, s/p slip and fall.   She hit her right forehead, unable to get up, has severe right hip pain.    #Right hip fracture, right forehead hematoma  #s/p fall  CT of pelvis:  impacted fx of right femoral neck  CT of head:   right frontal scalp hematoma, no intracranial bleed  Echo on 2/21: hyperdynamic LV systolic function, mild diastolic failure and mild to mod tricuspid regurg  -Ortho; Dr. Vazquez seeing patient, she will go to the OR today for ORIF per Ortho PA  -cont pain mgmt    #MDS, stable  -pt with chronic elevated wbc  -cont to monitor  -H/H 10/31    #mild dementia  -cont Donepezil    #Possible SHANI  - encourage oral intake  - drop in eGFR, baseline 80 (2/2021)  - continue to monitor    #DVT ppx: heparin    Dispo:  pt medically cleared for the OR; she is Class ASA 2, optimized and cleared for orthopedic surgery.  Will keep pt NPO at this time for probable surgery later today.  Updated pt's Son, Enrique Muir at [phone] today on plan of care.  He is agreeable to Surgery.

## 2021-05-20 NOTE — DIETITIAN INITIAL EVALUATION ADULT. - ADD RECOMMEND
Suggest MVI when medically appropriate. Pt declines vegetarian diet. Obtain and honor food preferences as able upon diet advancement.

## 2021-05-20 NOTE — PRE-ANESTHESIA EVALUATION ADULT - NSANTHADDINFOFT_GEN_ALL_CORE
Discussed GA with LMA in detail with patient's HCP (felisha Nunez) and all questions sought and answered. Pt. HCP agrees to all plans and wishes to proceed with urgent surgical care.    Medical evaluation/optimization and clearance noted and appreciated.

## 2021-05-20 NOTE — DIETITIAN INITIAL EVALUATION ADULT. - ORAL INTAKE PTA/DIET HISTORY
Pt admitted from [place] where she was receiving a Vegetarian diet (allowed fish, dairy, eggs), NO RYE, NO WHEAT, NO WHOLE GRAIN (per chart review). As per interview with pt, she denies any intolerance to grains, wheat or gluten. She states that she would like to eat meat. Regular diet with no need for texture modifications. Pt reports good appetite and stating she "eats everything".

## 2021-05-20 NOTE — PRE-ANESTHESIA EVALUATION ADULT - NSANTHOSAYNRD_GEN_A_CORE
No. MONA screening performed.  STOP BANG Legend: 0-2 = LOW Risk; 3-4 = INTERMEDIATE Risk; 5-8 = HIGH Risk

## 2021-05-20 NOTE — DIETITIAN NUTRITION RISK NOTIFICATION - ADDITIONAL COMMENTS/DIETITIAN RECOMMENDATIONS
When medically feasible:  1. Clear liquids then regular (no wheat, no rye, no whole grains) as tolerated   2. Add Ensure Enlive BID  3. MVI po daily

## 2021-05-20 NOTE — DIETITIAN INITIAL EVALUATION ADULT. - PERTINENT MEDS FT
MEDICATIONS  (STANDING):  allopurinol 300 milliGRAM(s) Oral daily  donepezil 5 milliGRAM(s) Oral at bedtime  heparin   Injectable 5000 Unit(s) SubCutaneous two times a day  hydroxyurea 500 milliGRAM(s) Oral daily    MEDICATIONS  (PRN):  acetaminophen   Tablet .. 650 milliGRAM(s) Oral every 6 hours PRN Temp greater or equal to 38C (100.4F), Mild Pain (1 - 3)  oxycodone    5 mG/acetaminophen 325 mG 1 Tablet(s) Oral every 4 hours PRN Severe Pain (7 - 10)  oxyCODONE    IR 5 milliGRAM(s) Oral every 4 hours PRN Moderate Pain (4 - 6)

## 2021-05-20 NOTE — PRE-ANESTHESIA EVALUATION ADULT - NSRADCARDRESULTSFT_GEN_ALL_CORE
CXR: [facility]    Head CT: no IC hemorrhage.RIght scalp hematoma    EKG: NSR @ 80. + LAFB. No acute ST/T changes.    Echo: 2/21: Hyperdynamic LV. Mod. TR

## 2021-05-20 NOTE — DIETITIAN INITIAL EVALUATION ADULT. - PERTINENT LABORATORY DATA
Date: 19 May 2021 20:25  Hemoglobin 10.2   Hematocrit 31.5     Date: 05-19  Sodium 139  Potassium 3.6  Glucose Serum 115<H>  BUN 31<H>      Creatinine    ACCUCHECK

## 2021-05-20 NOTE — DIETITIAN INITIAL EVALUATION ADULT. - OTHER INFO
89 y/o F with h/o Dementia, Myelodysplastic Syndrome (chronic elevation in WBC), BIBEMS from [place], c/o severe right hip pain, right forehead hematoma, s/p slip and fall.   She hit her right forehead, unable to get up, has severe right hip pain.   Pt maintained on NPO diet, pending ortho/surgery. She did not recall UBW or weight history. NFPE performed; pt noted with severe muscle wasting/fat loss. Receptive to add Ensure Enlive supplements when diet advancement feasible. Noted +3 edema bilateral legs, no pressure ulcers (bruised skin), +hematoma on forehead above right eye. Pt could not recall last BM.

## 2021-05-21 NOTE — PROGRESS NOTE ADULT - ASSESSMENT
89 y/o F with h/o Dementia, Myelodysplastic Syndrome (chronic elevation in WBC), BIBEMS from [place], c/o severe right hip pain, right forehead hematoma, s/p slip and fall.       #Right hip fracture, POD #1 ORIF  #right forehead hematoma  #s/p fall  CT of pelvis:  impacted fx of right femoral neck  CT of head:   right frontal scalp hematoma, no intracranial bleed  Echo on 2/21: hyperdynamic LV systolic function, mild diastolic failure and mild to mod tricuspid regurg  -Ortho; Dr. Vazquez following  -PT eval; will probably need [facility]  -cont pain mgmt    #MDS, stable  -pt with chronic elevated wbc  -cont to monitor  -H/H 9.5/29    #mild dementia  -cont Donepezil  -supportive care    #resolved SHANI  - encourage oral intake  - drop in eGFR, baseline 80 (2/2021)  - continue to monitor    #DVT ppx: on ASA 81 mg bid (per Ortho)    Dispo:  await PT eval, will probably need [facility].  Updated pt's Son, Enrique Muir at [phone] today on plan of care.    89 y/o F with h/o Dementia, Myelodysplastic Syndrome (chronic elevation in WBC), BIBEMS from [place], c/o severe right hip pain, right forehead hematoma, s/p slip and fall.       #Right hip fracture, POD #1 ORIF  #right forehead hematoma  #s/p fall  CT of pelvis:  impacted fx of right femoral neck  CT of head:   right frontal scalp hematoma, no intracranial bleed  Echo on 2/21: hyperdynamic LV systolic function, mild diastolic failure and mild to mod tricuspid regurg  -Ortho; Dr. Vazquez following  -PT eval; rec is for [facility]  -cont pain mgmt    #MDS, stable  -pt with chronic elevated wbc  -cont to monitor  -H/H 9.5/29    #mild dementia  -cont Donepezil  -supportive care    #resolved SHANI  - encourage oral intake  - drop in eGFR, baseline 80 (2/2021)  - continue to monitor    #DVT ppx: on ASA 81 mg bid (per Ortho)    Dispo:  pt will need [facility]-- updated pt's Son, Enrique Muir at [phone] today on plan of care.  He is interested in [facility] for [facility].

## 2021-05-21 NOTE — DISCHARGE NOTE PROVIDER - NSDCCPCAREPLAN_GEN_ALL_CORE_FT
PRINCIPAL DISCHARGE DIAGNOSIS  Diagnosis: Closed fracture of right hip, initial encounter  Assessment and Plan of Treatment:   -you had surgery to repair hip on 5/20/21 (ORIF), can bear weight as tolerated on right leg, continue PT and pain management  -follow up with Dr. Winkler in 2 weeks      SECONDARY DISCHARGE DIAGNOSES  Diagnosis: Facial contusion, initial encounter  Assessment and Plan of Treatment: keep wound clean and dry

## 2021-05-21 NOTE — PROGRESS NOTE ADULT - ASSESSMENT
pt POD # 1 s/p right hip ORIF  no events noted over night  pt feels well, denies pain, ambulated with PT this am    pt doing well from orthopaedic standpoint    hg stable    pain well managed    plan  - DVT ppx with ASA q 12 hr  - WBAT RLE  - PT  - analgesia prn  - awaiting [facility] placement

## 2021-05-21 NOTE — DISCHARGE NOTE PROVIDER - HOSPITAL COURSE
Hospital Course   89 y/o F with h/o Dementia, Myelodysplastic Syndrome (chronic elevation in WBC), BIBEMS from [place], c/o severe right hip pain, right forehead hematoma, s/p slip and fall.  CT of brain showed right frontal hematoma, no bleed, CT of pelvis with impacted fx of right femoral neck.   She had an ORIF on 5/20 with Dr. Winkler, did well post-op, able to bear weight as tolerated on RLE.    Pt. with MDS, and  with chronic elevated wbc, H/H 9.5/29.  She has mild dementia, on Donepezil.    DVT ppx: on ASA 81 mg bid (per Ortho)    Dispo:  she was accepted to [facility] for [facility]- updated pt's Son, Enrique Muir at [phone].      Palliative Care / Advanced Care Planning  Code Status:  FULL CODE  Patient/Family agreeable to Hospice/Palliative- NO    Discharging Provider:  LEATHA Mckeon  Contact Info: Cell [phone] - Please call with any questions or concerns.    Outpatient Provider:  Dr. Jameson, notified of discharge    Signout given to  SNF Provider:       Hospital Course   89 y/o F with h/o Dementia, Myelodysplastic Syndrome (chronic elevation in WBC), BIBEMS from [place], c/o severe right hip pain, right forehead hematoma, s/p slip and fall.  CT of brain showed right frontal hematoma, no bleed, CT of pelvis with impacted fx of right femoral neck.   She had an ORIF on 5/20 with Dr. Winkler, did well post-op, able to bear weight as tolerated on RLE.    Pt. with MDS, and  with chronic elevated wbc, H/H 9.5/29.  She has mild dementia, on Donepezil.    DVT ppx: on ASA 81 mg bid (per Ortho) x 6 weeks    Dispo:  she was accepted to [facility] for [facility]- updated pt's Son, Enrique Muir at [phone].      Palliative Care / Advanced Care Planning  Code Status:  FULL CODE  Patient/Family agreeable to Hospice/Palliative- NO      Outpatient Provider:  Dr. Jameson, notified of discharge    Signout given to  SNF Provider: Sign out given to GARFIELD Almazan at [facility] [phone]

## 2021-05-21 NOTE — DISCHARGE NOTE PROVIDER - NSDCMRMEDTOKEN_GEN_ALL_CORE_FT
acetaminophen 325 mg oral tablet: 2 tab(s) orally every 6 hours, As needed, Temp greater or equal to 38C (100.4F), Mild Pain (1 - 3)  allopurinol 300 mg oral tablet: 1 tab(s) orally once a day  Aricept 5 mg oral tablet: 1 tab(s) orally once a day (at bedtime)  aspirin 81 mg oral delayed release tablet: 1 tab(s) orally 2 times a day  hydroxyurea 500 mg oral capsule: 1 cap(s) orally once a day  polyethylene glycol 3350 oral powder for reconstitution: 17 gram(s) orally once a day, As needed, constipaton  senna oral tablet: 1 tab(s) orally once a day (at bedtime)  traMADol 50 mg oral tablet: 0.5 tab(s) orally every 4 hours, As needed, Severe Pain (7 - 10)

## 2021-05-21 NOTE — PROGRESS NOTE ADULT - ATTENDING COMMENTS
I have personally seen and examined patient on the above date.  I discussed the case with Rossana Peguero and I agree with findings and plan as detailed per note above, which I have amended where appropriate.      #Right hip fracture, POD #1 ORIF  #right forehead hematoma  #s/p fall  #WBC likely from MDS, chronic    PT eval: [facility]  Son is interested in [facility]  Will wait on ortho for further recommendations  She is medically stable for discharge in my opinion  c/w current medications  DVT PPX  DISP: D/c to [facility], d/c 45 min I have personally seen and examined patient on the above date.  I discussed the case with Rossana Peguero and I agree with findings and plan as detailed per note above, which I have amended where appropriate.      #Right hip fracture, POD #1 ORIF  #right forehead hematoma  #s/p fall  #WBC likely from MDS, chronic    PT eval: [facility]  Son is interested in [facility]  Will wait on ortho for further recommendations  ASA 81mg BID for DVT PPX as per ortho  She is medically stable for discharge in my opinion  c/w current medications  DVT PPX  DISP: D/c to [facility], d/c 45 min

## 2021-05-21 NOTE — PHYSICAL THERAPY INITIAL EVALUATION ADULT - ADDITIONAL COMMENTS
pt lives in [facility], uses sac to ambulate,requires assist w/most ADL's pt lives in [facility], uses sac to ambulate,requires assist w/most ADL's. pt also has a w/c per chart

## 2021-05-21 NOTE — DISCHARGE NOTE PROVIDER - CARE PROVIDER_API CALL
Gwyn Jameson  [facility]  [address] [place] [address]  Phone: [phone]  Fax: [phone]  Follow Up Time:     Roni Vazquez)  Orthopaedic Sports Medicine; Orthopaedic Surgery  [address], [facility] [address]  [place] [address]  Phone: [phone]  Fax: [phone]  Follow Up Time:

## 2021-05-22 NOTE — PROGRESS NOTE ADULT - SUBJECTIVE AND OBJECTIVE BOX
Patient is a 89y old  Female who presents with a chief complaint of right hip pain, right forehead hematoma, s/p slip and fall (19 May 2021 23:37)    Patient seen and examined at bedside.  Pt. states right hip has pain.      ALLERGIES:  No Known Allergies    MEDICATIONS  (STANDING):  allopurinol 300 milliGRAM(s) Oral daily  donepezil 5 milliGRAM(s) Oral at bedtime  heparin   Injectable 5000 Unit(s) SubCutaneous two times a day  hydroxyurea 500 milliGRAM(s) Oral daily    MEDICATIONS  (PRN):  acetaminophen   Tablet .. 650 milliGRAM(s) Oral every 6 hours PRN Temp greater or equal to 38C (100.4F), Mild Pain (1 - 3)  oxycodone    5 mG/acetaminophen 325 mG 1 Tablet(s) Oral every 4 hours PRN Severe Pain (7 - 10)  oxyCODONE    IR 5 milliGRAM(s) Oral every 4 hours PRN Moderate Pain (4 - 6)    Vital Signs Last 24 Hrs  T(F): 98.7 (20 May 2021 08:27), Max: 98.7 (20 May 2021 08:27)  HR: 83 (20 May 2021 08:27) (82 - 94)  BP: 114/54 (20 May 2021 08:27) (114/54 - 170/89)  RR: 16 (20 May 2021 08:27) (16 - 20)  SpO2: 95% (20 May 2021 08:27) (95% - 97%)  I&O's Summary    19 May 2021 07:01  -  20 May 2021 07:00  --------------------------------------------------------  IN: 0 mL / OUT: 650 mL / NET: -650 mL      PHYSICAL EXAM:  General: NAD, A/O x 2.  HEENT: RIGHT periorbital area with ecchymoses and right forehead with scabbed over abrasion  Neck: Supple, No JVD  Lungs: non-labored breathing, CTA bilaterally, no w/r/r  Cardio: RRR, S1/S2, No murmurs  Abdomen: Soft, Nontender, Nondistended; Bowel sounds present  Extremities: right lower ext with decreased ROM, tender to palp., +lower ext edema; non-pitting  Neuro:  alert, follows commands, nonfocal    LABS:                        10.2   19.06 )-----------( 352      ( 19 May 2021 20:25 )             31.5         139  |  101  |  31  ----------------------------<  115  3.6   |  31  |  0.81    Ca    8.8      19 May 2021 20:25    TPro  6.7  /  Alb  3.8  /  TBili  0.3  /  DBili  x   /  AST  37  /  ALT  31  /  AlkPhos  186      eGFR if Non African American: 64 mL/min/1.73M2 (21 @ 20:25)  eGFR if : 74 mL/min/1.73M2 (21 @ 20:25)    PT/INR - ( 19 May 2021 20:25 )   PT: 11.6 sec;   INR: 0.96 ratio         PTT - ( 19 May 2021 20:25 )  PTT:31.2 sec    CARDIAC MARKERS ( 19 May 2021 20:25 )  <.017 ng/mL / x     / x     / x     / x                Urinalysis Basic - ( 19 May 2021 20:25 )    Color: Yellow / Appearance: Slightly Turbid / S.015 / pH: x  Gluc: x / Ketone: Negative  / Bili: Negative / Urobili: Negative   Blood: x / Protein: 30 mg/dL / Nitrite: Negative   Leuk Esterase: Trace / RBC: 11-25 /HPF / WBC 0-2 /HPF   Sq Epi: x / Non Sq Epi: Neg.-Few / Bacteria: Few /HPF        COVID-19 PCR: NotDetec (21 @ 20:25)      RADIOLOGY & ADDITIONAL TESTS:    Care Discussed with Consultants/Other Providers:   
Patient is a 89y old  Female who presents with a chief complaint of right hip pain, right forehead hematoma, s/p slip and fall (20 May 2021 10:23)      Patient seen and examined at bedside.  Pt. c/o mild pain at right hip.  No events overnight.    ALLERGIES:  No Known Allergies    MEDICATIONS  (STANDING):  allopurinol 300 milliGRAM(s) Oral daily  aspirin enteric coated 81 milliGRAM(s) Oral two times a day  ceFAZolin   IVPB 1000 milliGRAM(s) IV Intermittent every 8 hours  donepezil 5 milliGRAM(s) Oral at bedtime  hydroxyurea 500 milliGRAM(s) Oral daily  lactated ringers. 1000 milliLiter(s) (75 mL/Hr) IV Continuous <Continuous>  senna 1 Tablet(s) Oral at bedtime    MEDICATIONS  (PRN):  acetaminophen   Tablet .. 650 milliGRAM(s) Oral every 6 hours PRN Temp greater or equal to 38C (100.4F), Mild Pain (1 - 3)  ondansetron Injectable 4 milliGRAM(s) IV Push every 6 hours PRN Nausea and/or Vomiting  polyethylene glycol 3350 17 Gram(s) Oral daily PRN constipaton  traMADol 25 milliGRAM(s) Oral every 4 hours PRN Severe Pain (7 - 10)    Vital Signs Last 24 Hrs  T(F): 97.8 (21 May 2021 05:00), Max: 98.7 (20 May 2021 16:40)  HR: 68 (21 May 2021 05:00) (68 - 91)  BP: 119/59 (21 May 2021 05:00) (119/59 - 151/82)  RR: 18 (21 May 2021 05:00) (12 - 18)  SpO2: 98% (21 May 2021 05:00) (96% - 100%)  I&O's Summary    20 May 2021 07:01  -  21 May 2021 07:00  --------------------------------------------------------  IN: 100 mL / OUT: 0 mL / NET: 100 mL      PHYSICAL EXAM:  General: NAD, A/O x 2; frail.  HEENT: right periorbital area and forehead with ecchymoses, crusted over abrasion, no active bleeding  Neck: Supple, No JVD  Lungs: non-labored breathing, Clear to auscultation bilaterally, no w/r/r  Cardio: RRR, S1/S2, No murmurs  Abdomen: Soft, Nontender, Nondistended; Bowel sounds present  Extremities: right hip with wound dressing, no drainage, mildly tender, No calf tenderness, No pitting edema, scattered ecchymotic areas on lower exts  Neuro:  pleasantly confused, calm, nonfocal    LABS:                        9.5    19.75 )-----------( 297      ( 21 May 2021 07:04 )             29.1         137  |  102  |  23  ----------------------------<  141  3.7   |  27  |  0.83    Ca    7.9      21 May 2021 07:04  Mg     2.3         TPro  6.7  /  Alb  3.8  /  TBili  0.3  /  DBili  x   /  AST  37  /  ALT  31  /  AlkPhos  186      eGFR if Non African American: 62 mL/min/1.73M2 (21 @ 07:04)  eGFR if : 72 mL/min/1.73M2 (21 @ 07:04)    PT/INR - ( 19 May 2021 20:25 )   PT: 11.6 sec;   INR: 0.96 ratio         PTT - ( 19 May 2021 20:25 )  PTT:31.2 sec    CARDIAC MARKERS ( 19 May 2021 20:25 )  <.017 ng/mL / x     / x     / x     / x                            Urinalysis Basic - ( 19 May 2021 20:25 )    Color: Yellow / Appearance: Slightly Turbid / S.015 / pH: x  Gluc: x / Ketone: Negative  / Bili: Negative / Urobili: Negative   Blood: x / Protein: 30 mg/dL / Nitrite: Negative   Leuk Esterase: Trace / RBC: 11-25 /HPF / WBC 0-2 /HPF   Sq Epi: x / Non Sq Epi: Neg.-Few / Bacteria: Few /HPF        COVID-19 PCR: NotDetec (21 @ 20:25)      RADIOLOGY & ADDITIONAL TESTS:    Care Discussed with Consultants/Other Providers:   
Patient is a 89y old  Female who presents with a chief complaint of right hip pain, right forehead hematoma, s/p slip and fall (21 May 2021 15:06)  Eager to leave the hospital    Patient seen and examined at bedside.    ALLERGIES:  No Known Allergies    MEDICATIONS  (STANDING):  allopurinol 300 milliGRAM(s) Oral daily  aspirin enteric coated 81 milliGRAM(s) Oral two times a day  donepezil 5 milliGRAM(s) Oral at bedtime  hydroxyurea 500 milliGRAM(s) Oral daily  senna 1 Tablet(s) Oral at bedtime    MEDICATIONS  (PRN):  acetaminophen   Tablet .. 650 milliGRAM(s) Oral every 6 hours PRN Temp greater or equal to 38C (100.4F), Mild Pain (1 - 3)  ondansetron Injectable 4 milliGRAM(s) IV Push every 6 hours PRN Nausea and/or Vomiting  polyethylene glycol 3350 17 Gram(s) Oral daily PRN constipaton  traMADol 25 milliGRAM(s) Oral every 4 hours PRN Severe Pain (7 - 10)    Vital Signs Last 24 Hrs  T(F): 97.7 (22 May 2021 07:43), Max: 98.5 (22 May 2021 00:29)  HR: 90 (22 May 2021 07:43) (82 - 94)  BP: 159/88 (22 May 2021 07:43) (114/69 - 159/88)  RR: 18 (22 May 2021 07:43) (17 - 18)  SpO2: 97% (22 May 2021 07:43) (96% - 97%)  I&O's Summary    21 May 2021 07:01  -  22 May 2021 07:00  --------------------------------------------------------  IN: 400 mL / OUT: 500 mL / NET: -100 mL      BMI (kg/m2): 16.3 (21 @ 18:06)  PHYSICAL EXAM:  General: elderly, R periorbital and forehead ecchymosis   ENT: MMM, no thrush  Neck: Supple, No JVD  Lungs: Non labored breathing,  Clear to auscultation bilaterally,   Cardio: RRR, S1/S2   Abdomen: Soft, Nontender, Nondistended; Bowel sounds present  Extremities: No calf tenderness, R hip aquacell dressing in place    LABS:                        9.7    16.47 )-----------( 306      ( 22 May 2021 07:00 )             29.5           137  |  102  |  23  ----------------------------<  141  3.7   |  27  |  0.83    Ca    7.9      21 May 2021 07:04  Mg     2.3         TPro  6.7  /  Alb  3.8  /  TBili  0.3  /  DBili  x   /  AST  37  /  ALT  31  /  AlkPhos  186       eGFR if Non African American: 62 mL/min/1.73M2 (21 @ 07:04)  eGFR if : 72 mL/min/1.73M2 (21 @ 07:04)    PT/INR - ( 19 May 2021 20:25 )   PT: 11.6 sec;   INR: 0.96 ratio         PTT - ( 19 May 2021 20:25 )  PTT:31.2 sec     CARDIAC MARKERS ( 19 May 2021 20:25 )  <.017 ng/mL / x     / x     / x     / x                            Urinalysis Basic - ( 19 May 2021 20:25 )    Color: Yellow / Appearance: Slightly Turbid / S.015 / pH: x  Gluc: x / Ketone: Negative  / Bili: Negative / Urobili: Negative   Blood: x / Protein: 30 mg/dL / Nitrite: Negative   Leuk Esterase: Trace / RBC: 11-25 /HPF / WBC 0-2 /HPF   Sq Epi: x / Non Sq Epi: Neg.-Few / Bacteria: Few /HPF        COVID-19 PCR: NotDetec (21 @ 20:25)      RADIOLOGY & ADDITIONAL TESTS:    Care Discussed with Consultants/Other Providers:   
S/P Right hip pinning POD #2    Patient was seen and examined by me today.    Patient is sitting up eating her ice cream.    She is without complaints at this time.   She Denies CP or SOB .    Vital Signs Last 24 Hrs  T(C): 36.5 (22 May 2021 07:43), Max: 36.9 (22 May 2021 00:29)  T(F): 97.7 (22 May 2021 07:43), Max: 98.5 (22 May 2021 00:29)  HR: 90 (22 May 2021 07:43) (82 - 94)  BP: 159/88 (22 May 2021 07:43) (114/69 - 159/88)  RR: 18 (22 May 2021 07:43) (17 - 18)  SpO2: 97% (22 May 2021 07:43) (96% - 97%)    PAST MEDICAL & SURGICAL HISTORY:  MDS (myelodysplastic syndrome)  Dementia  S/P ORIF (open reduction internal fixation) fracture  elbow fracture 02/2021    MEDICATIONS  (STANDING):  allopurinol 300 milliGRAM(s) Oral daily  aspirin enteric coated 81 milliGRAM(s) Oral two times a day  donepezil 5 milliGRAM(s) Oral at bedtime  hydroxyurea 500 milliGRAM(s) Oral daily  senna 1 Tablet(s) Oral at bedtime    MEDICATIONS  (PRN):  acetaminophen   Tablet .. 650 milliGRAM(s) Oral every 6 hours PRN Temp greater or equal to 38C (100.4F), Mild Pain (1 - 3)  ondansetron Injectable 4 milliGRAM(s) IV Push every 6 hours PRN Nausea and/or Vomiting  polyethylene glycol 3350 17 Gram(s) Oral daily PRN constipaton  traMADol 25 milliGRAM(s) Oral every 4 hours PRN Severe Pain (7 - 10)      PE:  Alert and oriented X1  Lungs :: CTA anterior   Cor:  S1S2  Abd:  soft, +BS , voiding with prima fit in place   Right hip dressing dry and intact, nickel size bloody drainge on dressing.  thigh supple, + neurovascular intact   '                      9.7    16.47 )-----------( 306      ( 22 May 2021 07:00 )             29.5   05-21    137  |  102  |  23  ----------------------------<  141<H>  3.7   |  27  |  0.83    Ca    7.9<L>      21 May 2021 07:04  Mg     2.3     05-21      
Patient is a 89y old  Female who presents with a chief complaint of right hip pain, right forehead hematoma, s/p slip and fall (21 May 2021 15:06)  pt POD # 1 s/p right hip ORIF  no events noted over night  pt feels well, denies pain, ambulated with PT this am    Patient seen and examined at bedside    ALLERGIES:  No Known Allergies    MEDICATIONS  (STANDING):  allopurinol 300 milliGRAM(s) Oral daily  aspirin enteric coated 81 milliGRAM(s) Oral two times a day  donepezil 5 milliGRAM(s) Oral at bedtime  hydroxyurea 500 milliGRAM(s) Oral daily  senna 1 Tablet(s) Oral at bedtime    MEDICATIONS  (PRN):  acetaminophen   Tablet .. 650 milliGRAM(s) Oral every 6 hours PRN Temp greater or equal to 38C (100.4F), Mild Pain (1 - 3)  ondansetron Injectable 4 milliGRAM(s) IV Push every 6 hours PRN Nausea and/or Vomiting  polyethylene glycol 3350 17 Gram(s) Oral daily PRN constipaton  traMADol 25 milliGRAM(s) Oral every 4 hours PRN Severe Pain (7 - 10)    Vital Signs Last 24 Hrs  T(F): 97.6 (21 May 2021 10:23), Max: 98.7 (20 May 2021 16:40)  HR: 80 (21 May 2021 10:23) (66 - 91)  BP: 117/62 (21 May 2021 10:23) (117/62 - 151/82)  RR: 18 (21 May 2021 10:23) (12 - 18)  SpO2: 98% (21 May 2021 10:23) (96% - 100%)    I&O's Summary    20 May 2021 07:01  -  21 May 2021 07:00  --------------------------------------------------------  IN: 100 mL / OUT: 0 mL / NET: 100 mL    21 May 2021 07:01  -  21 May 2021 16:11  --------------------------------------------------------  IN: 400 mL / OUT: 500 mL / NET: -100 mL    PHYSICAL EXAM:  General: NAD, A/O x 2  ENT: MMM  Neck: Supple, No JVD  Abdomen: Soft, Nontender, Nondistended  Extremities: LLE - wnl, RLE - thigh slightly swollen but soft, Aquacel c/d/i, knee wnl, calf soft, EHL/FHL 4/5, skin warm and dry, pt has equal b/l ankle swelling, chronic    LABS:                        9.5    19.75 )-----------( 297      ( 21 May 2021 07:04 )             29.1         137  |  102  |  23  ----------------------------<  141  3.7   |  27  |  0.83    Ca    7.9      21 May 2021 07:04  Mg     2.3         TPro  6.7  /  Alb  3.8  /  TBili  0.3  /  DBili  x   /  AST  37  /  ALT  31  /  AlkPhos  186      eGFR if Non African American: 62 mL/min/1.73M2 (21 @ 07:04)  eGFR if : 72 mL/min/1.73M2 (21 @ 07:04)    PT/INR - ( 19 May 2021 20:25 )   PT: 11.6 sec;   INR: 0.96 ratio         PTT - ( 19 May 2021 20:25 )  PTT:31.2 sec    CARDIAC MARKERS ( 19 May 2021 20:25 )  <.017 ng/mL / x     / x     / x     / x        Urinalysis Basic - ( 19 May 2021 20:25 )    Color: Yellow / Appearance: Slightly Turbid / S.015 / pH: x  Gluc: x / Ketone: Negative  / Bili: Negative / Urobili: Negative   Blood: x / Protein: 30 mg/dL / Nitrite: Negative   Leuk Esterase: Trace / RBC: 11-25 /HPF / WBC 0-2 /HPF   Sq Epi: x / Non Sq Epi: Neg.-Few / Bacteria: Few /HPF    COVID-19 PCR: NotDetec (21 @ 20:25)

## 2021-05-22 NOTE — DISCHARGE NOTE NURSING/CASE MANAGEMENT/SOCIAL WORK - PATIENT PORTAL LINK FT
You can access the FollowMyHealth Patient Portal offered by [facility] by registering at the following website: [email or web address]. By joining [employer]’s FollowMyHealth portal, you will also be able to view your health information using other applications (apps) compatible with our system.

## 2021-05-22 NOTE — PROGRESS NOTE ADULT - REASON FOR ADMISSION
right hip pain, right forehead hematoma, s/p slip and fall

## 2021-05-22 NOTE — PROGRESS NOTE ADULT - NUTRITIONAL ASSESSMENT
This patient has been assessed with a concern for Malnutrition and has been determined to have a diagnosis/diagnoses of Severe protein-calorie malnutrition and Underweight (BMI < 19).    This patient is being managed with:   Diet Dysphagia 3 Soft-Thin Liquids-  Entered: May 20 2021  8:03PM    

## 2021-05-22 NOTE — PROGRESS NOTE ADULT - ASSESSMENT
89 year old female with hx of MDS, dementia is stable s/p Right hip pinning .    stable labs       Plan:  PT WBAT right leg           ASA 81 mg bid x 6 weeks for DVT prophylaxis            D/c to [facility] today

## 2021-05-22 NOTE — PROGRESS NOTE ADULT - ASSESSMENT
89 y/o F with h/o Dementia, Myelodysplastic Syndrome (chronic elevation in WBC), BIBEMS from [place], c/o severe right hip pain, right forehead hematoma, s/p slip and fall.       #Right hip fracture, POD #2 ORIF  #right forehead hematoma  #s/p fall  CT of pelvis:  impacted fx of right femoral neck  CT of head:   right frontal scalp hematoma, no intracranial bleed  Echo on 2/21: hyperdynamic LV systolic function, mild diastolic failure and mild to mod tricuspid regurg  -Ortho; Dr. Vazquez following  -PT eval; rec is for [facility]  -cont pain mgmt  -DVT ppx with ASA BID     #MDS, stable  -pt with chronic elevated wbc  -cont to monitor  -H/H stable: 9.7    #mild dementia  -cont Donepezil  -supportive care    #resolved SHANI  - encourage oral intake  - drop in eGFR, baseline 80 (2/2021)  - continue to monitor    #DVT ppx: on ASA 81 mg bid (per Ortho)    Dispo:  pt will need [facility]. Accepted to [facility]   5/22: Called felisha Muir [phone] and left message with callback number

## 2021-05-22 NOTE — PROGRESS NOTE ADULT - ATTENDING COMMENTS
I have personally seen and examined patient on the above date.  I discussed the case with Madiha Perez  and I agree with findings and plan as detailed per note above, which I have amended where appropriate.      Stable for discharge to [facility]  F/U PCP, ortho  C/w currenet medications  D/c 45 min

## 2021-07-13 NOTE — ED PROVIDER NOTE - NSFOLLOWUPINSTRUCTIONS_ED_ALL_ED_FT
Follow up with your primary care physician. Take the copy of your test results you were given in the emergency room for your doctor to review.     On lumbar catscan compression fracture of L1 at the inferior endplate with some buckling of the cortex, age indeterminate but new since 2018. Follow up with your doctor for this     Stay hydrated    Return to the ER if your symptoms worsen or for any other medical emergencies  **********

## 2021-07-13 NOTE — ED ADULT TRIAGE NOTE - CHIEF COMPLAINT QUOTE
Back pain x 2 days; Sent by [facility] because portable radiology did not come to facility  ISAR Positive

## 2021-07-13 NOTE — ED PROVIDER NOTE - CLINICAL SUMMARY MEDICAL DECISION MAKING FREE TEXT BOX
89 y/o F with h/o Dementia, Myelodysplastic Syndrome (chronic elevation in WBC), BIBEMS from [place] for a back xray. Per EMS pt was supposed to have an xray of the back at the facility today, but no one showed up so she was sent to the ED. No reported falls. Pt was admitted 5/2021 for impacted fx of right femoral neck with ORIF with Dr. Vazquez. pt denies back pain, she is unable to provide a hx. PE as noted. NH called for info, awaiting call back 89 y/o F with h/o Dementia, Myelodysplastic Syndrome (chronic elevation in WBC), BIBEMS from [place] for a back xray. Per EMS pt was supposed to have an xray of the back at the facility today, but no one showed up so she was sent to the ED. No reported falls. Pt was admitted 5/2021 for impacted fx of right femoral neck with ORIF with Dr. Vazquez. pt denies back pain, she is unable to provide a hx. PE as noted. NH called for info, awaiting call back    Patient, while on CT table, c/o hip pain. CT pelvis added.   Per results, there is a compression fracture of L1 at the inferior endplate with some buckling of the cortex, age indeterminate but new since 2018. No acute concerns. Pt may be d/c. Tylenol prn. Worsening, continued or ANY new concerning symptoms return to the emergency department. 89 y/o F with h/o Dementia, Myelodysplastic Syndrome (chronic elevation in WBC), BIBEMS from [place] for a back xray. Per EMS pt was supposed to have an xray of the back at the facility today, but no one showed up so she was sent to the ED. No reported falls. Pt was admitted 5/2021 for impacted fx of right femoral neck with ORIF with Dr. Vazquez. pt denies back pain, she is unable to provide a hx. PE as noted. NH called for info, awaiting call back  Spoke with ABISAI Lima from the SNF, she reports pt had 2 unwitnessed falls yesterday and c/o back pain today so they wanted to do an xray.     Patient, while on CT table, c/o hip pain. CT pelvis added.     Per results, there is a compression fracture of L1 at the inferior endplate with some buckling of the cortex, age indeterminate but new since 2018. No acute concerns. Pt may be d/c. Tylenol prn. Worsening, continued or ANY new concerning symptoms return to the emergency department.

## 2021-07-13 NOTE — ED ADULT NURSE NOTE - OBJECTIVE STATEMENT
Pt presents to ED from [place] for back pain. As per EMS, pt has been having back pain for 2 days and was supposed to have an xray at the facility, but the tech never showed up. Pt expresses pain with movement.

## 2021-07-13 NOTE — ED PROVIDER NOTE - OBJECTIVE STATEMENT
89 y/o F with h/o Dementia, Myelodysplastic Syndrome (chronic elevation in WBC), BIBEMS from [place] for a back xray. Per EMS pt was supposed to have an xray of the back at the facility today, but no one showed up so she was sent to the ED. No reported falls. Pt was admitted 5/2021 for impacted fx of right femoral neck with ORIF with Dr. Vazquez. pt denies back pain, she is unable to provide a hx.

## 2021-07-13 NOTE — ED PROVIDER NOTE - ATTENDING CONTRIBUTION TO CARE
Lucio with [facility]. 89 y/o F with h/o Dementia, Myelodysplastic Syndrome (chronic elevation in WBC), BIBEMS from [place] for a back xray. Per EMS pt was supposed to have an xray of the back at the facility today, but no one showed up so she was sent to the ED. No reported falls. Pt was admitted 5/2021 for impacted fx of right femoral neck with ORIF with Dr. Vazquez. pt denies back pain, she is unable to provide a hx. PE as noted. NH called for info, awaiting call back    Patient, while on CT table, c/o hip pain. CT pelvis added.   Per results, there is a compression fracture of L1 at the inferior endplate with some buckling of the cortex, age indeterminate but new since 2018. No acute concerns. Pt may be d/c. Tylenol prn. Worsening, continued or ANY new concerning symptoms return to the emergency department.     I performed a face to face bedside interview with patient regarding history of present illness, review of symptoms and past medical history. I completed an independent physical exam.  I have discussed the patient's plan of care with Physician Assistant (PA). I agree with note as stated above, having amended the EMR as needed to reflect my findings.   This includes History of Present Illness, HIV, Past Medical/Surgical/Family/Social History, Allergies and Home Medications, Review of Systems, Physical Exam, and any Progress Notes during the time I functioned as the attending physician for this patient.

## 2021-07-13 NOTE — ED PROVIDER NOTE - PATIENT PORTAL LINK FT
You can access the FollowMyHealth Patient Portal offered by [facility] by registering at the following website: [email or web address]. By joining [employer]’s FollowMyHealth portal, you will also be able to view your health information using other applications (apps) compatible with our system.

## 2021-07-16 NOTE — CHART NOTE - NSCHARTNOTEFT_GEN_A_CORE
ELENA contacted member facility, [facility] and spoke with Jolanta regarding follow up care post ED visit.  Jolanta confirmed patient is scheduled to see provider Dr Jameson on 7/19/21 at 11 AM.

## 2021-07-30 NOTE — PATIENT PROFILE ADULT - NSTOBACCONEVERSMOKERY/N_GEN_A
Per [facility] from [facility] they are willing to accept pt pending auth  CM will follow  No

## 2021-08-01 NOTE — ED PROVIDER NOTE - NSFOLLOWUPINSTRUCTIONS_ED_ALL_ED_FT
Contusion    A contusion is a deep bruise. Contusions are the result of a blunt injury to tissues and muscle fibers under the skin. The skin overlying the contusion may turn blue, purple, or yellow. Symptoms also include pain and swelling in the injured area.    SEEK IMMEDIATE MEDICAL CARE IF YOU HAVE ANY OF THE FOLLOWING SYMPTOMS: severe pain, numbness, tingling, pain, weakness, or skin color/temperature change in any part of your body distal to the injury.     Rest, keep extremity elevated whenever possible  Apply ice to affected area 15 min on/15 min off    Take acetaminophen 650 mg orally every 6-8 hours for pain control as needed. Please do not exceed 4,000 mg of acetaminophen during a 24 hours period. Acetaminophen can be found in many over-the-counter cold medications as well as opioid medications that may be given for pain.    Take ibuprofen (also known as MOTRIN or ADVIL) 400 mg orally every 6-8 hours for pain control as needed with food to avoid an upset stomach. Ibuprofen can be found in many over-the-counter medications. Please do not take ibuprofen if you have a bleeding disorder, stomach or gastrointestinal ulcer, or liver disease.    If needed, you can alternate these medications so that you can take one medication every 3 hours. For example, at noon take ibuprofen, then at 3PM take acetaminophen, then at 6PM take ibuprofen.     Rest, drink plenty of fluids.  Advance activity as tolerated.  Continue all previously prescribed medications as directed.  Follow up with your PMD 2-3 days and bring copies of your results.     Weight bear as tolerated.

## 2021-08-01 NOTE — ED PROVIDER NOTE - PROGRESS NOTE DETAILS
XR negative for acute fx, DVT negative, likely fall and with hx of MDS and low platelets, large ecchymosis. No expanding hematoma. I have discussed with the patient about the ED workup, lab results, diagnostics results, plan for discharge home, need for follow-up with primary care physician/specialists, and return precautions. At this time, the patient does not require further workup in the ED. The patient is subjectively feeling better and would like to be discharged home. The patient had the opportunity to ask questions and I have answered all inquiries. The patient verbalizes understanding and agreement with the plan. The patient is hemodynamically stable, clinically well-appearing, ambulatory, mentating well and ready for discharge home.

## 2021-08-01 NOTE — ED PROVIDER NOTE - OBJECTIVE STATEMENT
89F PMHx of dementia, MDS presenting with right LE ecchymosis found today, coming from [facility]. Patient with hx of dementia, poor historian, unable to give a history. Describes right LE pain on palpation, but does not know what happened. No other traumatic injury noted on exam. Denies any nausea/vomiting. No head trauma, no other extremity injury. Denies any back pain, chest pain, shortness of breath, neck pain, headaches, visual complaints, hip pain.

## 2021-08-01 NOTE — ED PROVIDER NOTE - PHYSICAL EXAMINATION
VITAL SIGNS: I have reviewed nursing notes and confirm.   GEN: Well-developed; well-nourished; in no acute distress. Speaking full sentences. GCS 15, (+) dementia  SKIN: Warm, pink, no rash, no diaphoresis, no cyanosis, well perfused. EXCEPT as below.   HEAD: Normocephalic; atraumatic. No scalp lacerations, no abrasions.  NECK: Supple; non tender.   EYES: Pupils 3mm equal, round, reactive to light and accomodation, conjunctiva and sclera clear. Extra-ocular movements intact bilaterally.  ENT: No nasal discharge; airway clear. Trachea is midline. ORAL: No oropharyngeal exudates or erythema. Normal dentition.  CV: Regular rate and rhythm. S1, S2 normal; no murmurs, gallops, or rubs. No lower extremity pitting edema bilaterally. Capillary refill < 2 seconds throughout. Distal pulses intact 2+ throughout.  RESP: CTA bilaterally. No wheezes, rales, or rhonchi.   ABD: Normal bowel sounds, soft, non-distended, non-tender, no hepatosplenomegaly. No CVA tenderness bilaterally.  MSK: Normal range of motion and movement of all 4 extremities. No joint or muscular pain throughout. No clubbing.   BACK: No thoracolumbar midline or paravertebral tenderness. No step-offs or obvious deformities.  NEURO: Alert & oriented x 3, Grossly unremarkable. Sensory and motor intact throughout. No focal deficits. Normal speech and coordination.   PSYCH: Cooperative, appropriate. VITAL SIGNS: I have reviewed nursing notes and confirm.   GEN: Well-developed; well-nourished; in no acute distress. Speaking full sentences. GCS 15, (+) dementia  SKIN: Warm, pink, no rash, no diaphoresis, no cyanosis, well perfused. EXCEPT as below.   HEAD: Normocephalic; atraumatic. No scalp lacerations, no abrasions.  NECK: Supple; non tender.   EYES: Pupils 3mm equal, round, reactive to light and accomodation, conjunctiva and sclera clear. Extra-ocular movements intact bilaterally.  ENT: No nasal discharge; airway clear. Trachea is midline. ORAL: No oropharyngeal exudates or erythema. Normal dentition.  CV: Regular rate and rhythm. S1, S2 normal; no murmurs, gallops, or rubs. No lower extremity pitting edema bilaterally. Capillary refill < 2 seconds throughout. Distal pulses intact 2+ throughout.  RESP: CTA bilaterally. No wheezes, rales, or rhonchi.   ABD: Normal bowel sounds, soft, non-distended, non-tender, no hepatosplenomegaly. No CVA tenderness bilaterally.  MSK: Normal range of motion and movement of all 4 extremities. No joint or muscular pain throughout. No clubbing.   BACK: No thoracolumbar midline or paravertebral tenderness. No step-offs or obvious deformities. EXCEPT: RLE: (+) anterior/circumferential ecchymosis of tib/fib down to ankle, mild swelling of right ankle; full ROM actively/passively with minor pain. neurovascularly intact.   NEURO: Alert & oriented x 3, Grossly unremarkable. Sensory and motor intact throughout. No focal deficits. Normal speech and coordination.   PSYCH: Cooperative, appropriate.

## 2021-08-01 NOTE — ED PROVIDER NOTE - PATIENT PORTAL LINK FT
You can access the FollowMyHealth Patient Portal offered by [facility] by registering at the following website: [email or web address]. By joining [employer]’s FollowMyHealth portal, you will also be able to view your health information using other applications (apps) compatible with our system.

## 2021-08-01 NOTE — ED PROVIDER NOTE - CLINICAL SUMMARY MEDICAL DECISION MAKING FREE TEXT BOX
DDX: ro dvt and ro fracture, doubt cellulitis or infectious sources.   - pain control.  - dc home f/u pmd.

## 2021-08-01 NOTE — ED ADULT NURSE NOTE - OBJECTIVE STATEMENT
Pt presents to ER with chief complaint of right leg discoloration. Pt denies pain/discomfort. No signs of infection noted, no bleeding, drainage or swelling noted. No s/s of distress noted, will continue to monitor, patient safety maintained. Bed placed in lowest position, non slip socks applied, call bell and bedside table within reach.

## 2021-08-01 NOTE — ED ADULT NURSE NOTE - TEMPLATE
General Unna Boot Text: An Unna boot was placed to help immobilize the limb and facilitate more rapid healing.

## 2021-09-26 NOTE — PHYSICAL THERAPY INITIAL EVALUATION ADULT - ADDITIONAL COMMENTS
Pt lives at [facility]. PTA the patient requires assistance with ADLs and uses a RW for ambulation.

## 2021-09-26 NOTE — H&P ADULT - NSHPPHYSICALEXAM_GEN_ALL_CORE
Primary Survey  A - airway intact  B - bilateral breath sounds and good chest rise  C - initially BP: 129/67 (09-26-21 @ 03:55), palpable pulses in all extremities  D - GCS 15 on arrival      Exposure obtained  Gen: NAD  HEENT: NC/AT  CV: s1, s2, RRR  Pulm: CTA B/L  Chest: No TTP  Abd: Soft, but distended, no rebound, no guarding  Groin: Normal appearing, pain to palpation in left hip, pelvis stable  Ext: Palp radial b/l, palp DP b/l  Back: TTP in thoracic spine  no palpable runoff, stepoff, or deformity

## 2021-09-26 NOTE — ED ADULT NURSE NOTE - NSIMPLEMENTINTERV_GEN_ALL_ED
ASSESSMENT/PLAN:    Cough/covid pneumonia history  With infiltrates on 6/10 xray  XR now shows resolution    ORDER TO PULMONOLGY PLEASE CONTACT PRIMARY CARE  DOCTOR TO HAVE ORDER CONVERTED TO REFERRAL.    Follow-up with primary care doctor in 2 days for a stat appointment    Diagnosis and prognosis, treatment and side-effects were explained and all questions were answered satisfactorily and there were no further questions upon discharge.      
Specific interventions were implemented:

## 2021-09-26 NOTE — ED PROVIDER NOTE - PHYSICAL EXAMINATION
General appearance: NAD, conversant, afebrile, airway intact   Neck: Trachea midline; Full range of motion, supple, no midline tenderness   Pulm: CTA bl, normal respiratory effort and no intercostal retractions, normal work of breathing   CV: RRR, No murmurs, rubs, or gallops   Abdomen: Soft, non-tender, non-distended; no guarding or rebound   Back: Midthoracic midline tenderness   Extremities: No peripheral edema, TTP L hip, distal extremities warm, well perfused   Skin: Dry, normal temperature, turgor and texture; no rash   Psych: Appropriate affect, cooperative; alert and oriented to person, place and time    Neuro: CN2-12 grossly intact, perrl 2mm b/l, MS +5/5 in UE and LE BL, gross sensation intact in UE and LE BL General appearance: NAD, conversant, afebrile, airway intact   Neck: Trachea midline; Full range of motion, supple, no midline tenderness   Pulm: CTA bl, normal respiratory effort and no intercostal retractions   CV: RRR, No murmurs, rubs, or gallops   Abdomen: Soft, non-tender, non-distended; no guarding or rebound  MSK:  Midthoracic midline tenderness. Externally rotated and shortened LLE.    Extremities: No peripheral edema, TTP L hip, distal extremities warm, well perfused   Skin: Dry, normal temperature, turgor and texture; no rash   Psych: Confused, AAO x 0.

## 2021-09-26 NOTE — PHYSICAL THERAPY INITIAL EVALUATION ADULT - PERTINENT HX OF CURRENT PROBLEM, REHAB EVAL
89 y/o F with h/o dementia, MDS, s/p fall at assisted living facility. pt transferred from [place] with subdural hematoma and L hip fx. now planned for L hip CRPP.

## 2021-09-26 NOTE — PHYSICAL THERAPY INITIAL EVALUATION ADULT - PHYSICAL ASSIST/NONPHYSICAL ASSIST, REHAB EVAL
Ok to have it. Need to set up device company to make appropriate pacing programming    supervision/verbal cues/1 person assist

## 2021-09-26 NOTE — CONSULT NOTE ADULT - ASSESSMENT
LORRI TEJEDA  89F LVL1 hx dementia, myelodysplastic syn (plts 345), ASA81 BID s/p fall from bed at nursing home, c/o LLE pain has hip fx, CTH initially w/ small acute L parietal 9mm SDH no MLS, sig atrophy, significantly expanded on 5h repeat, now holohemispheric with 5mm MLS, large posterior acute component. CT cspine neg. No HA/N/V. Exam: Wide awake, Ox1 (baseline), pleasantly confused, pupils 2R, no facial, BUE 5/5, no drift, RLE 5/5, LLE moves foot 5/5, prox pain mark.   - ADM SICU, q1h neuro checks  - Repeat CTH in 4h for acute interval scan at 9AM  - Pls give DDAVP and Plts stat for reversal. Send TEG. Coags wnl.  - poor surgical candidate given poor functional baseline, high level of comorbidity associated w/ craniotomy in >89yo, hx MDS, and now with L hip fx. Need to establish GOC with family.   - Will dw attending.  - vimpat 100 BID for sz ppx  - Plan discussed with ER  - GCS 14       LORRI TEJEDA  89F LVL1 hx dementia, myelodysplastic syn (plts 345), ASA81 BID s/p fall from bed at nursing home, c/o LLE pain has hip fx, CTH initially w/ small acute L parietal 9mm SDH no MLS, sig atrophy, significantly expanded on 5h repeat, now holohemispheric with 5mm MLS, large posterior acute component. CT cspine neg. Multilevel chronic comp fx, worst at T6-7, L1. No HA/N/V or back pain. Exam: Wide awake, Ox1 (baseline), pleasantly confused, pupils 2R, no facial, BUE 5/5, no drift, RLE 5/5, LLE moves foot 5/5, prox pain mark.   - ADM SICU, q1h neuro checks  - Repeat CTH in 4h for acute interval scan at 9AM  - Pls give DDAVP and Plts stat for reversal. Send TEG. Coags wnl.  - poor surgical candidate given poor functional baseline, high level of comorbidity associated w/ craniotomy in >91yo, hx MDS, and now with L hip fx. Need to establish GOC with family.   - Will dw attending.  - vimpat 100 BID for sz ppx  - Plan discussed with ER  - GCS 14  - if c/o new onset back pain, can consider TLSO brace, but consv mgmt/pain control warranted for multilevel degen seen in T and L spine.

## 2021-09-26 NOTE — ED PROVIDER NOTE - NSICDXPASTSURGICALHX_GEN_ALL_CORE_FT
PAST SURGICAL HISTORY:  S/P ORIF (open reduction internal fixation) fracture elbow fracture 02/2021

## 2021-09-26 NOTE — ED PROVIDER NOTE - PROGRESS NOTE DETAILS
Attending MD Norris : Higinio was found to have worsening SDH by NSGY. Recommended dosing with DDAVP to mitigate ASA, and platelets 1U. Patient to be admitted to SICU. No acute surgical intervention by NSGY in ED. Mental status stable while in ED.

## 2021-09-26 NOTE — ED PROVIDER NOTE - CLINICAL SUMMARY MEDICAL DECISION MAKING FREE TEXT BOX
89yo female with pmh mds, dementia presenting as transfer from [place] for SDH with midline shift.  Primary survey intact, 89yo female with pmh mds, dementia presenting as transfer from [place] for SDH with midline shift.  Level 1 trauma.  Primary survey intact.  Closely monitor neuro status.  Plan for repeat cts with admission. 89yo female with pmh mds, dementia presenting as transfer from [place] for SDH with midline shift.  Level 1 trauma.  Primary survey intact.  Closely monitor neuro status.  Plan for repeat cts with admission.    Attending MD Norris: Agree with above.

## 2021-09-26 NOTE — CONSULT NOTE ADULT - ASSESSMENT
89 year old female s/p fall with Traumatic SDH worsening on repeat 5 hr interval scan with 5mm midline shift. SICU consulted for Q1 hour neurological checks.    PLAN    Neuro: Hx of Dementia. Left sided 9mm SDH w/ interval increase and 5mm midline shift.   - Vimpat 100mg. BID for traumatic seizure prophylaxis as per NSGY  - Q1 hour neurological checks  - Tylenol standing for pain control  - No narcotics in setting of advanced age, dementia, and evolving SDH  - S/p platelets and DDAVP reversal for ASA use     Respiratory: Chronic B/L rib fractures  - Continuous pulse oximetry  - F/u AM CXR    CV: No active issues  - Monitor hemodynamics    GI: No active issues  - NPO for possible OR with NSGY/Ortho    : No active issues  - NS @ 75ml/hr    Heme: No active issues  - Holding chemical VTE prophylaxis in setting of head bleed    ID: No active issues  - Will send UA to r/o UTI in setting of fall    Endo: No active issues  - Monitor serum glucose on BMP    Dispo: SICU full code    - Andrea Burton PA-C

## 2021-09-26 NOTE — CONSULT NOTE ADULT - SUBJECTIVE AND OBJECTIVE BOX
p ([id number])     HPI:  89F LVL1 hx dementia, myelodysplastic syn, on ASA81 s/p fall from bed at nursing home, c/o LLE pain has hip fx, CTH w/ small acute L parietal 9mm SDH no MLS, sig atrophy, significatly expanded on 5h repeat, now with 5mm MLS, large post acute component. CT cspine neg. Exam: Wide awake, Ox1 (baseline), pleasantly confused, pupils 2R, no facial, BUE 5/5, no drift, RLE 5/5, LLE moves foot 5/5, prox pain mark.     --Anticoagulation:  ASA  =====================  PAST MEDICAL HISTORY   MDS (myelodysplastic syndrome)    Dementia      PAST SURGICAL HISTORY   No significant past surgical history    S/P ORIF (open reduction internal fixation) fracture          MEDICATIONS:  Antibiotics:    Neuro:    Other:      SOCIAL HISTORY:   Occupation:   Marital Status:     FAMILY HISTORY:  FH: HTN (hypertension) (Mother)        ROS: Negative except per HPI    LABS:  PT/INR - ( 26 Sep 2021 01:25 )   PT: 11.8 sec;   INR: 0.97 ratio         PTT - ( 26 Sep 2021 01:25 )  PTT:31.1 sec                        10.7   17.61 )-----------( 345      ( 26 Sep 2021 01:25 )             33.0     09-26    139  |  99  |  19  ----------------------------<  114<H>  3.6   |  33<H>  |  0.86    Ca    8.6      26 Sep 2021 01:25    TPro  6.9  /  Alb  3.8  /  TBili  0.6  /  DBili  x   /  AST  46<H>  /  ALT  23  /  AlkPhos  157<H>  09-26

## 2021-09-26 NOTE — H&P ADULT - NSHPLABSRESULTS_GEN_ALL_CORE
LABS:               9.4    23.71 )-----------( 376      ( 26 Sep 2021 06:14 )             28.9     09-26    139  |  99  |  19  ----------------------------<  114<H>  3.6   |  33<H>  |  0.86    Ca    8.6      26 Sep 2021 01:25    TPro  6.9  /  Alb  3.8  /  TBili  0.6  /  DBili  x   /  AST  46<H>  /  ALT  23  /  AlkPhos  157<H>  09-26    PT/INR - ( 26 Sep 2021 01:25 )   PT: 11.8 sec;   INR: 0.97 ratio         PTT - ( 26 Sep 2021 01:25 )  PTT:31.1 sec        < from: Xray Femur 2 Views, Left (09.26.21 @ 06:00) >    ******PRELIMINARY REPORT******    ******PRELIMINARY REPORT******          EXAM:  XR FEMUR 2 VIEWS LT                            PROCEDURE DATE:  09/26/2021      ******PRELIMINARY REPORT******    ******PRELIMINARY REPORT******              INTERPRETATION:  Acute impacted subcapital left hip fracture. No evidence of acute dislocations. For further evaluation see CT left hip of the same day.            ******PRELIMINARY REPORT******    ******PRELIMINARY REPORT******          MICHAEL AGUIRRE MD;Resident Radiologist    < end of copied text >    CT HEAD/CHEST/ABDOMEN: Pending Reads

## 2021-09-26 NOTE — ED ADULT NURSE NOTE - OBJECTIVE STATEMENT
Pt is a 89y Female transfer from [facility] for trauma eval s/p unwitnessed fall at nursing home, pt was found to have a 1.4 cm subdural bleed with 3 cm shift and L hip fx at [place]. Pt A&Ox2 with a hx of dementia. Pt PMHx MDS, dementia. Pt able to move all extremities, however unable to ROM L hip. Pt following commands. Pt c/o pain, however Md does not wish to manage pain at this time. Pt resting comfortably in bed with Trauma 1 team at bedside. Pt educated on call bell use and call bell placed at bedside. Pt safety maintained.

## 2021-09-26 NOTE — H&P ADULT - ASSESSMENT
89 y/o female on ASA s/p fall with SDH and midline shift, as well as left hip fracture    -Preliminary read of CT head showed significant increase in SDH from [place]. Discussed with Neurosurgery, patient will get stat platelets and ddAVP.  -Patient will require ICU monitoring for q1 hour neurochecks given severity of bleed.  -Will follow-up remainder of trauma labs  -Will need medication reconciliation in AM  -Pain control as needed  -Hold DVT PPX for now given evolving bleed  -Repeat head CT as per Neurosurgery   -NPO  - Seen and examined with Dr. Farris

## 2021-09-26 NOTE — CONSULT NOTE ADULT - ATTENDING COMMENTS
Patient seen and examined on AM SICU rounds and throughout day  Awake, alert, talking, mildly confused.  C/O pain at site of hip fx  Hemodynamically stable  Oxygenating well  HCT= 33 -> 29 -> 25    - Neurosurgery consult - patient is NOT a surgical candidate despite progression of CT  - Orthopedic consult - hip fracture is operative  - Given no intervention is planned for SDH, regardless of imaging or exam, patient is cleared for any needed orthopedic procedure.  Nonoperative treatment of hip fractures in this age group has a extremely high mortality.  - Has acute blood loss anemia, likely from hip fracture.  No signs of rapid ongoing blood loss, will continue serial labs, may need blood product support.  Fixation of hip will likely help decrease need for blood products.  - Not on chemical DVT prophylaxis secondary to SDH Patient seen and examined on AM SICU rounds and throughout day  Chart and all imaging reviewed  Awake, alert, talking, mildly confused.  C/O pain at site of hip fx  Hemodynamically stable  Oxygenating well  HCT= 33 -> 29 -> 25    - Neurosurgery consult - patient is NOT a surgical candidate despite progression of CT  - Orthopedic consult - hip fracture is operative  - Given no intervention is planned for SDH, regardless of imaging or exam, patient is cleared for any needed orthopedic procedure.  Nonoperative treatment of hip fractures in this age group has a extremely high mortality.  - Has acute blood loss anemia, likely from hip fracture.  No signs of rapid ongoing blood loss, will continue serial labs, may need blood product support.  Fixation of hip will likely help decrease need for blood products.  - Not on chemical DVT prophylaxis secondary to SDH

## 2021-09-26 NOTE — ED ADULT NURSE REASSESSMENT NOTE - NS ED NURSE REASSESS COMMENT FT1
Pt. c/o left inner leg pain s/p unwitnessed fall. Pt. with dementia at baseline but grimaces and verbalizes pain when her leg is moved. MD made aware. Xray to be ordered.

## 2021-09-26 NOTE — CONSULT NOTE ADULT - SUBJECTIVE AND OBJECTIVE BOX
89y Female presents after being found down, transferred from [facility] with L subcap FN fx and SDH. Complaining of severe L  hip pain and inability to ambulate.   Patient denies radiation of pain. Patient denies numbness/tingling/burning in the LLE. No other bone/joint complaints.   PAST MEDICAL & SURGICAL HISTORY:  MDS (myelodysplastic syndrome)    Dementia    S/P ORIF (open reduction internal fixation) fracture  elbow fracture 02/2021      MEDICATIONS  (STANDING):  acetaminophen   Tablet .. 975 milliGRAM(s) Oral every 8 hours  BACItracin   Ointment 1 Application(s) Topical daily  desmopressin IVPB 15 MICROGram(s) IV Intermittent Once  donepezil 5 milliGRAM(s) Oral at bedtime  lacosamide IVPB 100 milliGRAM(s) IV Intermittent every 12 hours  lidocaine   4% Patch 1 Patch Transdermal every 24 hours  sodium chloride 0.9%. 1000 milliLiter(s) (75 mL/Hr) IV Continuous <Continuous>    MEDICATIONS  (PRN):    Allergies    No Known Allergies    Intolerances                              9.4    23.71 )-----------( 376      ( 26 Sep 2021 06:14 )             28.9     09-26    137  |  99  |  18  ----------------------------<  114<H>  3.8   |  27  |  0.62    Ca    8.7      26 Sep 2021 06:14    TPro  6.0  /  Alb  3.9  /  TBili  0.6  /  DBili  x   /  AST  25  /  ALT  8<L>  /  AlkPhos  143<H>  09-26    PT/INR - ( 26 Sep 2021 06:14 )   PT: 12.2 sec;   INR: 1.02 ratio         PTT - ( 26 Sep 2021 06:14 )  PTT:29.3 sec    T(C): 36.8 (09-26-21 @ 06:06), Max: 36.9 (09-26-21 @ 03:55)  HR: 85 (09-26-21 @ 06:06) (78 - 85)  BP: 129/67 (09-26-21 @ 03:55) (129/67 - 151/73)  RR: 32 (09-26-21 @ 06:06) (18 - 32)  SpO2: 99% (09-26-21 @ 06:06) (92% - 99%)  Wt(kg): --    PE   LLE:  Skin intact; No ecchymosis/soft tissue swelling  Compartments soft; + TTP about hip. No TTP to knee/leg/ankle/foot   ROM lmited 2/2 pain   Unable to SLR; + Log Roll/Heel Strike  Motor intact GS/TA/FHL/EHL  SILT L2-S1  +DP    RLE/BUE:   No bony TTP; Good ROM w/o pain; Exam Unremarkable    Imaging:  XR demonstrating L subcap FN fx    89y Female with L subcap fx    - OR for L hip CRPP  - Please document clearance  - Pain control  - NPO/IVF  - Appreciate NSG recs  - Appreciate SICU care

## 2021-09-26 NOTE — H&P ADULT - ATTENDING COMMENTS
89F with dementia on ASA, fall from standing at nursing home, unknown LOC,  transferred from [place],  seen and examined upon arrival as level 1 trauma.    2/24/2021 @ [place] - ORIF left oclecranon fracture  5/20/2021 @ [facility] - ORIF right femoral neck fracture    GCS = 14 (confused at baseline)  hemodynamically stable  SpO2 = 90% on room air  right forehead and left parietal ecchymosis  PERRL  EOMI  no c-spine tenderness or limit in full active ROM  no t-spine or l-spine tenderness  mild left posterolateral chest wall tenderness  soft / NT / ND  pelvic girdle stable  left hip tender  no deformity x 4 extremities  no gross neurologic deficit      CT head (9/26 @ 0031) - acute left temporoparietal and left anteromedial temporal SDH with 3 mm midline shift  CT c-spine - no fracture  CT head (9/26 @ 0517) - acute left holohemispheric SDH with 8 mm midline shift.  CT chest / abd/pelvis w/ contrast - left 6-11 posterolateral nondisplaced rib fractures, T5-T8 compression deformities. acute-appearing L1 vertebral compression fracture. left subcapital femoral neck fracture, prior right hip pinning.      acute left holohemispheric SDH with 8 mm midline shift  -admit to SICU for serial neuro exams  -consult neurosurgery    L1 vertebral compression fracture  -spine surgery consult    left 6-11 posterolateral nondisplaced rib fractures  -pain control    RibScore (1 point for each) = _1_  _1_ 6 or more ribs fractured  __ bilateral rib fractures  __ 3 or more ribs with flail segments  __ 1st rib fracture  __ 3 or more displaced rib fractures  __ rib fractures in the anterior, lateral, AND posterior segments 89F with dementia on ASA, fall from standing at nursing home, unknown LOC,  transferred from [place],  seen and examined upon arrival as level 1 trauma.    2/24/2021 @ [place] - ORIF left oclecranon fracture  5/20/2021 @ [place] - ORIF right femoral neck fracture    GCS = 14 (confused at baseline)  hemodynamically stable  SpO2 = 90% on room air  right forehead and left parietal ecchymosis  PERRL  EOMI  no c-spine tenderness or limit in full active ROM  no t-spine or l-spine tenderness  mild left posterolateral chest wall tenderness  soft / NT / ND  pelvic girdle stable  left hip tender  no deformity x 4 extremities  no gross neurologic deficit      CT head (9/26 @ 0031) - acute left temporoparietal and left anteromedial temporal SDH with 3 mm midline shift  CT c-spine - no fracture  CT head (9/26 @ 0517) - acute left holohemispheric SDH with 8 mm midline shift.  CT chest / abd/pelvis w/ contrast - left 6-11 posterolateral nondisplaced rib fractures, T5-T8 compression deformities. acute-appearing L1 vertebral compression fracture. left subcapital femoral neck fracture, prior right hip pinning.      acute left holohemispheric SDH with 8 mm midline shift  -admit to SICU for serial neuro exams  -consult neurosurgery    L1 vertebral compression fracture  -consult spine surgery    left subcapital femoral neck fracture  -consult ortho    left 6-11 posterolateral nondisplaced rib fractures  -pain control    RibScore (1 point for each) = _1_  _1_ 6 or more ribs fractured  __ bilateral rib fractures  __ 3 or more ribs with flail segments  __ 1st rib fracture  __ 3 or more displaced rib fractures  __ rib fractures in the anterior, lateral, AND posterior segments

## 2021-09-26 NOTE — CONSULT NOTE ADULT - SUBJECTIVE AND OBJECTIVE BOX
HISTORY OF PRESENT ILLNESS:  LORRI TEJEDA is a 89y Female s/p fall at assisted living facility. Initially presented [place] and was found to have subdural hematoma and L hip fracture. Transferred to [facility] for further work-up. Initial scan demonstrated 9mm SDH no MLS, sig atrophy, significatly expanded on 5h repeat, now with 5mm MLS, large post acute component. CT cspine neg. Also found to have left hip fracture. SICU consulted for Q1 hour neurological checks.           Primary Survey  A - airway intact  B - bilateral breath sounds and good chest rise  C - initially BP: 129/67 (09-26-21 @ 03:55), palpable pulses in all extremities  D - GCS 14 on arrival  Exposure obtained      Secondary survey  Gen: NAD  HEENT: NC/AT  CV: s1, s2, RRR  Pulm: CTA B/L  Chest: No TTP  Abd: Soft, but distended, no rebound, no guarding  Groin: Normal appearing, pain to palpation in left hip, pelvis stable  Ext: Palp radial b/l, palp DP b/l  Back: TTP in thoracic spine  no palpable runoff, stepoff, or deformity    Patient taken to CT scanner directly from trauma bay.         PAST MEDICAL HISTORY: No pertinent past medical history    MDS (myelodysplastic syndrome)    Dementia        PAST SURGICAL HISTORY: No significant past surgical history    S/P ORIF (open reduction internal fixation) fracture        FAMILY HISTORY: FH: HTN (hypertension) (Mother)        SOCIAL HISTORY: Live at assisted living facility     CODE STATUS: full code    HOME MEDICATIONS:  · 	polyethylene glycol 3350 oral powder for reconstitution: 17 gram(s) orally once a day, As needed, constipaton  · 	senna oral tablet: 1 tab(s) orally once a day (at bedtime)  · 	aspirin 81 mg oral delayed release tablet: 1 tab(s) orally 2 times a day  · 	hydroxyurea 500 mg oral capsule: 1 cap(s) orally once a day  · 	acetaminophen 325 mg oral tablet: 2 tab(s) orally every 6 hours, As needed, Temp greater or equal to 38C (100.4F), Mild Pain (1 - 3)  · 	Aricept 5 mg oral tablet: 1 tab(s) orally once a day (at bedtime)  · 	allopurinol 100 mg oral tablet: 1 tab(s) orally once a day  · 	Vitamin D3 400 intl units (10 mcg) oral tablet: 1 tab(s) orally once a day  · 	potassium chloride 10 mEq oral capsule, extended release: 1 cap(s) orally 2 times a day    ALLERGIES: No Known Allergies      VITAL SIGNS:  ICU Vital Signs Last 24 Hrs  T(C): 36.8 (26 Sep 2021 06:06), Max: 36.9 (26 Sep 2021 03:55)  T(F): 98.2 (26 Sep 2021 06:06), Max: 98.5 (26 Sep 2021 03:55)  HR: 84 (26 Sep 2021 11:00) (78 - 90)  BP: 116/56 (26 Sep 2021 11:00) (103/55 - 151/73)  BP(mean): 79 (26 Sep 2021 11:00) (75 - 84)  RR: 21 (26 Sep 2021 11:00) (18 - 32)  SpO2: 97% (26 Sep 2021 11:00) (90% - 100%)      NEURO  Exam: Alert oriented to person only. Motor strength 5/5 b/l upper and b/l lower extremities  acetaminophen   Tablet .. 975 milliGRAM(s) Oral every 8 hours  donepezil 5 milliGRAM(s) Oral at bedtime  lacosamide IVPB 100 milliGRAM(s) IV Intermittent every 12 hours      RESPIRATORY  Mechanical Ventilation: none   ABG - ( 26 Sep 2021 06:19 )  pH: x     /  pCO2: x     /  pO2: x     / HCO3: x     / Base Excess: x     /  SaO2: x       Lactate: 0.6    Exam: clear to auscultation bilaterally       CARDIOVASCULAR  Exam: regular rate and rhythm   Cardiac Rhythm: normal sinus rhythm       GI/NUTRITION  Exam: soft, non-tender, non-distended   Diet: NPO       GENITOURINARY/RENAL  sodium chloride 0.9%. 1000 milliLiter(s) IV Continuous <Continuous>      09-26 @ 07:01  -  09-26 @ 12:12  --------------------------------------------------------  IN:    sodium chloride 0.9%: 75 mL  Total IN: 75 mL    OUT:  Total OUT: 0 mL    Total NET: 75 mL        Weight (kg): 41.8 (09-26 @ 06:06), 45.4 (09-26 @ 00:12)  09-26    137  |  99  |  18  ----------------------------<  114<H>  3.8   |  27  |  0.62    Ca    8.7      26 Sep 2021 06:14    TPro  6.0  /  Alb  3.9  /  TBili  0.6  /  DBili  x   /  AST  25  /  ALT  8<L>  /  AlkPhos  143<H>  09-26    [ ] Julio catheter, indication: urine output monitoring in critically ill patient    HEMATOLOGIC  [ ] VTE Prophylaxis:                          9.4    23.71 )-----------( 376      ( 26 Sep 2021 06:14 )             28.9     PT/INR - ( 26 Sep 2021 06:14 )   PT: 12.2 sec;   INR: 1.02 ratio         PTT - ( 26 Sep 2021 06:14 )  PTT:29.3 sec  Transfusion: [ ] PRBC	[ ] Platelets	[ ] FFP	[ ] Cryoprecipitate      INFECTIOUS DISEASES    RECENT CULTURES:      ENDOCRINE  desmopressin IVPB 15 MICROGram(s) IV Intermittent Once    CAPILLARY BLOOD GLUCOSE          PATIENT CARE ACCESS DEVICES:  [x] Peripheral IV  [ ] Central Venous Line	[ ] R	[ ] L	[ ] IJ	[ ] Fem	[ ] SC	Placed:   [ ] Arterial Line		[ ] R	[ ] L	[ ] Fem	[ ] Rad	[ ] Ax	Placed:   [ ] PICC:					[ ] Mediport  [ ] Urinary Catheter, Date Placed:   [x] Necessity of urinary, arterial, and venous catheters discussed    OTHER MEDICATIONS: BACItracin   Ointment 1 Application(s) Topical daily  lidocaine   4% Patch 1 Patch Transdermal every 24 hours      IMAGING STUDIES: < from: CT Abdomen and Pelvis w/ IV Cont (09.26.21 @ 06:29) >  FINDINGS:  CHEST:  LUNGS AND LARGE AIRWAYS: Patent central airways. Bibasilar dependent and platelike atelectasis. No lung consolidation, suspicious nodule, or mass.  PLEURA: No pleural effusion, hemothorax, or pneumothorax.  VESSELS: Thoracic aorta is normal in contour and caliber. No evidence of acutetraumatic aortic injury. Atherosclerotic calcification of the thoracic aorta. Main pulmonary artery is not dilated.  HEART: Heart is mildly enlarged. No pericardial effusion.  MEDIASTINUM AND FRIDA: No lymphadenopathy. No mediastinal hematoma.  CHEST WALL AND LOWER NECK: Coarse calcification in the left thyroid lobe. Heterogeneous nodules in the right thyroid lobe, largest measuring up to 1.5 cm.    ABDOMEN AND PELVIS:  LIVER: Within normal limits.  BILE DUCTS: Normal caliber.  GALLBLADDER: Withinnormal limits.  SPLEEN: Subcentimeter hypodense lesion that is too small to characterize.  PANCREAS: Atrophy of the pancreatic neck, body, and tail with pancreatic ductal dilatation to the level of the pancreatic head. Pancreatic duct measures up to 1.2 cm.  ADRENALS: Within normal limits.  KIDNEYS/URETERS: Kidneys enhance symmetrically without hydronephrosis. Left renal parapelvic cysts. Subcentimeter low-attenuation lesion in the left kidney which is too small to characterize.    BLADDER: Within normal limits.  REPRODUCTIVE ORGANS: Calcified uterine fibroids. Adnexa are unremarkable.    BOWEL: No bowel obstruction or overt bowel wall thickening. Appendix is not discretely visualized. Moderate fecal load in the rectum.  PERITONEUM: No ascites, pneumoperitoneum, or hemoperitoneum. No mesenteric lymphadenopathy.  VESSELS: Atherosclerotic calcifications of the aortoiliac tree. Normal caliber abdominal aorta.  RETROPERITONEUM/LYMPH NODES: No lymphadenopathy. No retroperitoneal hematoma.  ABDOMINAL WALL: Small fat-containing left inguinal hernia. Soft tissue swelling/hematoma adjacent to the left hip.  BONES: Acute impacted subcapital left hip fracture as seen on hip CT performed on the same day. Acute appearing fracture through the L1 vertebral body with moderate compression and mild bony retropulsion. Mild chronic compression fracture of T12. Indeterminate age moderate to severe compression fractures of T6 and T7 and mild compression fractures of T5 and T8. Old sternal fracture. Multiple chronic appearing bilateral rib fractures and chronic appearing left clavicular head fracture. Fixation of the right proximal femur. Degenerative changes of the spine.    IMPRESSION:  Acute impacted subcapital left hip fracture. Acute appearing fracture through the L1 vertebral body with moderate compression and mild bony retropulsion.    Indeterminate age moderate to severe compression fractures of T6 and T7 and mild compression fractures of T5 and T8 which are new compared with CT of the chest from 12/8/2017.    No evidence of acute intrathoracic or intra-abdominal/pelvic traumatic injury.    Atrophy of the pancreatic neck, body, and tail with pancreatic ductal dilatation to the level of the pancreatic head similar to but increased compared with prior exam from 1/17/2018.      < end of copied text >

## 2021-09-26 NOTE — ED PROVIDER NOTE - NSFOLLOWUPINSTRUCTIONS_ED_ALL_ED_FT
Closed Head Injury    A closed head injury is an injury to your head that may or may not involve a traumatic brain injury (TBI). Symptoms of TBI can be short or long lasting and include headache, dizziness, interference with memory or speech, fatigue, confusion, changes in sleep, mood changes, nausea, depression/anxiety, and dulling of senses. Make sure to obtain proper rest which includes getting plenty of sleep, avoiding excessive visual stimulation, and avoiding activities that may cause physical or mental stress. Avoid any situation where there is potential for another head injury, including sports.    SEEK IMMEDIATE MEDICAL CARE IF YOU HAVE ANY OF THE FOLLOWING SYMPTOMS: unusual drowsiness, vomiting, severe dizziness, seizures, lightheadedness, muscular weakness, different pupil sizes, visual changes, or clear or bloody discharge from your ears or nose.    keep wound dry and clean  change dressing daily, apply bacitracin  look for signs of infection as explained  like worsening redness,  swelling, pain or purulent discharge  take Tyelnol for pain  Follow up with your doctor in 2 days   Return to ER if any concern or problem    Fall Prevention for Older Adults    WHAT YOU NEED TO KNOW:    As you age, your muscles weaken and your risk for falls increases. Your risk also increases if you take medicines that make you sleepy or dizzy. You may also be at risk if you have vision or joint problems, have low blood pressure, or are not active.    DISCHARGE INSTRUCTIONS:    Call 911 or have someone else call if:   •You have fallen and are unconscious.      •You have fallen and cannot move part of your body.      Contact your healthcare provider if:   •You have fallen and have pain or a headache.      •You have questions or concerns about your condition or care.      Fall prevention tips:   •Stay active. Exercise can help strengthen your muscles and improve your balance. Your healthcare provider may recommend water aerobics, walking, or Shan Chi. He or she may also recommend physical therapy to improve your coordination. Never start an exercise program without asking your healthcare provider first.  Water Aerobics for Seniors       Shan Chi for Seniors           •Wear shoes that fit well and have soles that . Wear shoes both inside and outside. Use slippers with good . Avoid shoes with high heels.      •Use assistive devices as directed. Your healthcare provider may suggest that you use a cane or walker to help you keep your balance. You may need to have grab bars put in your bathroom near the toilet or in the shower.      •Stand or sit up slowly. This may help you keep your balance and prevent falls.      •Wear a personal alarm. This is a device that allows you to call 911 if you need help. Ask for more information on personal alarms.      •Manage your medical conditions.  Keep all appointments with your healthcare providers. Visit your eye doctor as directed.      Home safety tips:     Fall Prevention for Seniors     •Add items to prevent falls in the bathroom. Put nonslip strips on your bath or shower floor to prevent you from slipping. Use a bath mat if you do not have carpet in the bathroom. This will prevent you from falling when you step out of the bath or shower. Use a shower seat so you do not need to stand while you shower. Sit on the toilet or a chair in your bathroom to dry yourself and put on clothing. This will prevent you from losing your balance from drying or dressing yourself while you are standing.      •Keep paths clear. Remove books, shoes, and other objects from walkways and stairs. Place cords for telephones and lamps out of the way so that you do not need to walk over them. Tape them down if you cannot move them. Remove small rugs. If you cannot remove a rug, secure it with double-sided tape. This will prevent you from tripping.      •Install bright lights in your home. Use night lights to help light paths to the bathroom or kitchen. Always turn on the light before you start walking.      •Keep items you use often on shelves within reach. Do not use a step stool to help you reach an item.      •Paint or place reflective tape on the edges of your stairs. This will help you see the stairs better.      Follow up with your healthcare provider as directed: Write down your questions so you remember to ask them during your visits.

## 2021-09-26 NOTE — ED PROVIDER NOTE - OBJECTIVE STATEMENT
89 y/o F with h/o dementia BIB EMS from [facility] s/p unwitnessed fall, c/o abrasion on left elbow and minor bleeding,

## 2021-09-26 NOTE — ED PROVIDER NOTE - PHYSICAL EXAMINATION
General:     NAD, well-nourished, well-appearing  Head:     NC/AT, EOMI, oral mucosa moist  Neck:     supple  Lungs:     CTA b/l, no w/r/r  CVS:     S1S2, RRR, no m/g/r  Abd:     +BS, s/nt/nd, no organomegaly  Ext:    2+ radial and pedal pulses, no c/c/e  Neuro: grossly intact  skin : small superficial abrasion on left elbow.

## 2021-09-26 NOTE — PHYSICAL THERAPY INITIAL EVALUATION ADULT - GENERAL OBSERVATIONS, REHAB EVAL
Pt received semisupine in bed with +cardiac monitor, +IV, +pulse ox, +2LNC, +BP cuff, and +ext female catheter.

## 2021-09-26 NOTE — ED ADULT NURSE REASSESSMENT NOTE - NS ED NURSE REASSESS COMMENT FT1
Transfer center called to give receiving RN report. Report given to Bell BARONE at [facility] ED. Pt. asleep. Safety maintained. Will continue to monitor.

## 2021-09-26 NOTE — ED ADULT NURSE NOTE - OBJECTIVE STATEMENT
Pt. with h/o dementia BIBEMS from [facility] s/p unwitnessed fall. Pt. found with abrasion to left elbow with minor bleeding. Pt. also has a small knot to right forehead with bruising sustained during fall.

## 2021-09-26 NOTE — ED ADULT NURSE NOTE - INTERVENTIONS DEFINITIONS
[place] to call system/Call bell, personal items and telephone within reach/Non-slip footwear when patient is off stretcher/Physically safe environment: no spills, clutter or unnecessary equipment/Stretcher in lowest position, wheels locked, appropriate side rails in place/Monitor gait and stability

## 2021-09-26 NOTE — ED PROVIDER NOTE - PROGRESS NOTE DETAILS
called [facility] and informed about pt condition and told that she had bleeding in her brain and needs to be transferred to [place], he agreed for it and gave telephone consent , He also spoke to pt over phone

## 2021-09-26 NOTE — ED PROVIDER NOTE - PATIENT PORTAL LINK FT
You can access the FollowMyHealth Patient Portal offered by [facility] by registering at the following website: [email or web address]. By joining [facility]’s FollowMyHealth portal, you will also be able to view your health information using other applications (apps) compatible with our system.

## 2021-09-26 NOTE — ED PROVIDER NOTE - OBJECTIVE STATEMENT
89yo female with pmh mds, dementia presenting as transfer from [place] for SDH with midline shift.  Unwitnessed fall today at facility. 89yo female with pmh mds, dementia presenting as transfer from [place] for SDH with midline shift.  Unwitnessed fall today at facility.  Found to have sdh with midline shift at [facility] with left hip fracture. 89yo female with pmh mds, dementia presenting as transfer from [place] for SDH with midline shift.  Unwitnessed fall today at facility. At [facility] patient was found to have sdh with midline shift at [facility] with left hip fracture. Level 1 trauma called for potential worsening of SDH and to obtain NSGY at bedside and to expedite CTH.

## 2021-09-26 NOTE — PHYSICAL THERAPY INITIAL EVALUATION ADULT - STRENGTHENING, PT EVAL
GOAL: Patient will improve bilateral UE/LE strength to 4/5, for increased limb stability, and to improve gait in 4 weeks.

## 2021-09-26 NOTE — ED PROVIDER NOTE - CLINICAL SUMMARY MEDICAL DECISION MAKING FREE TEXT BOX
elderly female with dementia presented with abrasion on left elbow after fall, small soft tissue swelling on left side of forehead, ct scan was negative , pt was discharged back to NH elderly female with dementia presented with abrasion on left elbow after fall, small soft tissue swelling on left side of forehead, ct scan showed acute subdural hematoma , alos suspected left hip fracture, CTC was called case discussed with Dr. Ermelinda abbott and agreed for transfer

## 2021-09-26 NOTE — CONSULT NOTE ADULT - ATTENDING COMMENTS
Pt seen and examined.  Agree with above  Currently awake, fully alert, conversant.  follows simple commands  PENA well.  painful LLE secondary to hip fx  CTs - left SDH has incrased in size over 3 CTs    Have been trying to reach family at multiple phone numbers but have been unable to reach them. Have left multiple messages to have them call.  With regards to SDH this pt is not a surgical candiate given her age, underlying baseline function and medical history.  Would not recommned surgery for progression of SDH nor for progression of symptoms.  I believe that the risks for surgery for functional outcome outweigh any benefits.

## 2021-09-26 NOTE — CHART NOTE - NSCHARTNOTEFT_GEN_A_CORE
Neurosurgery consulted to comment on patient's hip pinning procedure with orthopedic surgery. The risks and benefits of surgery must be considered carefully in the setting of the patients medical history and current clinical condition. Patient is extremely high risk given acute subdural hematoma. Please obtain CTH immediately postoperatively to assess for any change in size of the patient's known acute subdural hematoma for prognostication. Please page neurosurgery [phone] with any questions. Neurosurgery consulted to comment on patient's hip pinning procedure with orthopedic surgery. The risks and benefits of surgery must be considered carefully in the setting of the patients medical history and current clinical condition. Patient is high risk given acute subdural hematoma. Please obtain CTH immediately postoperatively to assess for any change in size of the patient's known acute subdural hematoma for prognostication. Please page neurosurgery [phone] with any questions.

## 2021-09-26 NOTE — ED ADULT NURSE NOTE - CARDIO ASSESSMENT
I, Jalen Tinajero MD,  performed the initial face to face bedside interview with this patient regarding history of present illness, review of symptoms and relevant past medical, social and family history.  I completed an independent physical examination.    The history, relevant review of systems, past medical and surgical history, medical decision making, and physical examination was documented by the scribe in my presence and I attest to the accuracy of the documentation. ---

## 2021-09-26 NOTE — ED PROVIDER NOTE - CARE PLAN
1 Principal Discharge DX:	Closed head injury  Secondary Diagnosis:	Accidental fall   Principal Discharge DX:	Subdural hematoma  Secondary Diagnosis:	Accidental fall

## 2021-09-27 NOTE — PRE-ANESTHESIA EVALUATION ADULT - NSRADCARDRESULTSFT_GEN_ALL_CORE
CT Head:  INTERPRETATION:  Clinical indication: Follow-up subdural hematoma    Multiple axial sections were performed from the skull to vertex without contrast enhancement. Coronal and sagittal extremities were performed as well.    This exam is compared prior noncontrast head CT performed on September 26, 2021    Parenchymal volume loss and chronic microvascular ischemic changes again seen.    Acute on subacute subdural hematoma involving the left temporal frontal parietal region is identified. This finding measures approximately 2.1 cm in widest diameter and previously measured approximately 2.0 cm widest diameter. Localized mass effect is seen consisting of sulcal effacement.    Acute subdural hematoma is again seen involving the left tentorial region.    Parenchymal volume loss and chronic microvascular ischemic changes are again seen.    Evaluation of the osseous structures with the appropriate window appears unremarkable.    The visualized paranasal sinuses mastoid and middle ear regions appear clear.    IMPRESSION: Acute onset acute left-sided subdural hematoma again seen as described above.      SUMMARY: EF: 65%  1. hyperdynamic left ventricular systolic function with mild diastolic dysfunction  2. mild mitral regurgitation  3. mild to moderate tricuspid regurgitation with estimated right ventricular systolic pressure 35 mm hg.

## 2021-09-27 NOTE — PATIENT PROFILE ADULT - DEAF OR HARD OF HEARING?
AMD (DRY), OU:  PRESCRIBE AREDS 2 VITAMINS / AMSLER GRID DAILY / UV PROTECTION. SMOKING AVOIDANCE ADVISED. RETURN FOR FOLLOW-UP AS SCHEDULED. yes

## 2021-09-27 NOTE — PRE-ANESTHESIA EVALUATION ADULT - NSANTHADDINFOFT_GEN_ALL_CORE
Above noted. Discussed case with neurosurgical attending as well as ortho attending.  Patient not operative neurosurgical candidate at this time.  Will proceed with high risk procedure for neurologic decompensation postop for palliative hip fracture repair.

## 2021-09-27 NOTE — PROGRESS NOTE ADULT - ASSESSMENT
Assesment: 89y Female s/p fall at assisted living facility. Initially presented [facility] and was found to have subdural hematoma and L hip fracture. Transferred to [facility] for further work-up. Initial scan demonstrated 9mm SDH no MLS, sig atrophy, significatly expanded on 5h repeat, now with 5mm MLS, large post acute component. CT cspine neg. Also found to have left hip fracture. Admitted to SICU.     Plan:   - F/u patient after OR with Ortho  - c/w NPO and IVF  - c/w home meds  - appreciate excellent SICU care

## 2021-09-27 NOTE — PROGRESS NOTE ADULT - SUBJECTIVE AND OBJECTIVE BOX
SICU Daily Progress Note    History  LORRI TEJEDA is a 89y Female s/p fall at assisted living facility. Initially presented [facility] and was found to have subdural hematoma and L hip fracture. Transferred to [facility] for further work-up. Initial scan demonstrated 9mm SDH no MLS, sig atrophy, significatly expanded on 5h repeat, now with 5mm MLS, large post acute component. CT cspine neg. Also found to have left hip fracture. SICU consulted for Q1 hour neurological checks.     Patient currently denies headache, dizziness, weakness, fevers, chills, shortness of breath, chest pain, abdominal pain, or nausea/vomiting.    24 HOUR EVENTS:  - worsening intracranial hemorrhage on CTH x2  - h/h downtrending  - OR for left hip canceled    SUBJECTIVE/ROS:  A ten-point review of systems was otherwise negative except as noted.  History was obtained, to the extent possible, from review of the chart and collateral sources of information.    NEURO  Exam: awake, alert, oriented x3, no acute distress, no acute focal deficits, follows complex commands  Meds: acetaminophen   Tablet .. 975 milliGRAM(s) Oral every 8 hours  donepezil 5 milliGRAM(s) Oral at bedtime  lacosamide IVPB 100 milliGRAM(s) IV Intermittent every 12 hours      RESPIRATORY  RR: 24 (09-27-21 @ 02:00) (18 - 48)  SpO2: 96% (09-27-21 @ 02:00) (90% - 100%)  Wt(kg): --  Exam: clear to auscultation bilaterally coarse rhonchi in all lung fields  Mechanical Ventilation:   ABG - ( 26 Sep 2021 06:19 )  pH: x     /  pCO2: x     /  pO2: x     / HCO3: x     / Base Excess: x     /  SaO2: x       Lactate: 0.6          CARDIOVASCULAR  HR: 86 (09-27-21 @ 02:00) (76 - 90)  BP: 167/88 (09-27-21 @ 02:00) (103/55 - 175/79)  BP(mean): 114 (09-27-21 @ 02:00) (75 - 136)  ABP: --  ABP(mean): --  Wt(kg): --  CVP(cm H2O): --    Lactate, Blood: 0.6 mmol/L (09-26 @ 06:19)    Exam: regular rate and rhythm  Cardiac Rhythm: sinus   Perfusion     [x]Adequate   [ ]Inadequate  Mentation   [x]Normal       [ ]Reduced  Extremities  [x]Warm         [ ]Cool  Volume Status [ ]Hypervolemic [x]Euvolemic [ ]Hypovolemic  Meds:     GI/NUTRITION  Exam: soft, nontender, nondistended  Diet: regular  Meds: polyethylene glycol 3350 17 Gram(s) Oral daily  senna 2 Tablet(s) Oral at bedtime      GENITOURINARY  I&O's Detail    09-26 @ 07:01  -  09-27 @ 03:08  --------------------------------------------------------  IN:    IV PiggyBack: 75 mL    sodium chloride 0.9%: 1425 mL  Total IN: 1500 mL    OUT:    Voided (mL): 550 mL  Total OUT: 550 mL    Total NET: 950 mL        Weight (kg): 41.8 (09-26 @ 06:06)  09-26    137  |  99  |  18  ----------------------------<  114<H>  3.8   |  27  |  0.62    Ca    8.7      26 Sep 2021 06:14    TPro  6.0  /  Alb  3.9  /  TBili  0.6  /  DBili  x   /  AST  25  /  ALT  8<L>  /  AlkPhos  143<H>  09-26    [ ] Julio catheter, indication:   Meds: cholecalciferol 400 Unit(s) Oral daily  sodium chloride 0.9%. 1000 milliLiter(s) IV Continuous <Continuous>      HEMATOLOGIC  Meds:   [ ] VTE Prophylaxis - held due to                         7.8    14.55 )-----------( 281      ( 26 Sep 2021 18:02 )             24.1     PT/INR - ( 26 Sep 2021 06:14 )   PT: 12.2 sec;   INR: 1.02 ratio         PTT - ( 26 Sep 2021 06:14 )  PTT:29.3 sec    INFECTIOUS DISEASES  T(C): 36.2 (09-26-21 @ 23:00), Max: 36.9 (09-26-21 @ 03:55)  Wt(kg): --  WBC Count: 14.55 K/uL (09-26 @ 18:02)  WBC Count: 16.60 K/uL (09-26 @ 12:25)  WBC Count: 23.71 K/uL (09-26 @ 06:14)      ENDOCRINE  Capillary Blood Glucose    Meds: allopurinol 100 milliGRAM(s) Oral daily      ACCESS DEVICES:  [x] Peripheral IV  [ ] Central Venous Line	[ ] R	[ ] L	[ ] IJ	[ ] Fem	[ ] SC	Placed:   [ ] Arterial Line		[ ] R	[ ] L	[ ] Fem	[ ] Rad	[ ] Ax	Placed:   [ ] PICC:					[ ] Mediport  [ ] Urinary Catheter, Date Placed:   [ ] Necessity of urinary, arterial, and venous catheters discussed    OTHER MEDICATIONS:  BACItracin   Ointment 1 Application(s) Topical daily  chlorhexidine 2% Cloths 1 Application(s) Topical <User Schedule>  lidocaine   4% Patch 1 Patch Transdermal every 24 hours

## 2021-09-27 NOTE — PROGRESS NOTE ADULT - SUBJECTIVE AND OBJECTIVE BOX
I had a lengthy discussion with the son and family over the phone yesterday regarding her CT findings and prognosis.  I explained that given her age, underlying dementia and underlying medical history the risks of a craniotomy outweigh any benefits. They agree and do not want her to have a major craniotomy for evacuation of the SDH.  She will be going to OR for her hip fracture and we agreed that there is a risk of her deteriorating during or after the hip surgery from further enlargement of the SDH.  We also agreed that surgery at that time would not be beneficial either given her underlying risks.  If she remains stable over the ensuing 10-14 days and the hematoma liquifies she may be a candidate for a drainage procedure via burrhole under local anesthesia.    The family understands and agrees with all of the above.

## 2021-09-27 NOTE — PROGRESS NOTE ADULT - SUBJECTIVE AND OBJECTIVE BOX
Subjective:   Patient seen and examined at bedside. Denies nausea or vomiting.     Objective:   Vital Signs Last 24 Hrs  T(C): 37.3 (27 Sep 2021 11:00), Max: 37.3 (27 Sep 2021 07:00)  T(F): 99.1 (27 Sep 2021 11:00), Max: 99.1 (27 Sep 2021 07:00)  HR: 84 (27 Sep 2021 12:00) (75 - 90)  BP: 166/82 (27 Sep 2021 12:00) (136/63 - 175/79)  BP(mean): 117 (27 Sep 2021 12:00) (89 - 136)  RR: 21 (27 Sep 2021 12:00) (17 - 45)  SpO2: 99% (27 Sep 2021 12:00) (90% - 100%)  I&O's Summary    26 Sep 2021 07:01  -  27 Sep 2021 07:00  --------------------------------------------------------  IN: 1925 mL / OUT: 550 mL / NET: 1375 mL    27 Sep 2021 07:01  -  27 Sep 2021 13:05  --------------------------------------------------------  IN: 375 mL / OUT: 300 mL / NET: 75 mL        Physical Exam:  General: NAD, resting comfortably in bed  Pulmonary: Nonlabored breathing, no respiratory distress  Cardiovascular: NSR  Abdominal: soft, NT/ND  Extremities: WWP. Left hip tenderness.         LABS:                        8.0    16.68 )-----------( 278      ( 27 Sep 2021 04:59 )             25.5     09-27    134<L>  |  97  |  18  ----------------------------<  91  3.6   |  23  |  0.66    Ca    8.3<L>      27 Sep 2021 04:59  Phos  3.3     09-27  Mg     2.2     09-27    TPro  6.0  /  Alb  3.9  /  TBili  0.6  /  DBili  x   /  AST  25  /  ALT  8<L>  /  AlkPhos  143<H>  09-26    PT/INR - ( 27 Sep 2021 05:00 )   PT: 12.9 sec;   INR: 1.08 ratio         PTT - ( 27 Sep 2021 05:00 )  PTT:28.3 sec        Radiology and Additional Studies:    Assessment and Plan:

## 2021-09-27 NOTE — PROGRESS NOTE ADULT - SUBJECTIVE AND OBJECTIVE BOX
Ortho post-op check    Pt seen and examined at bedside, resting comfortably. No acute events overnight. Denies numbness/tingling, chest pain, SOB.    T(C): 36.4 (09-27-21 @ 03:00), Max: 36.9 (09-26-21 @ 11:00)  HR: 78 (09-27-21 @ 06:00) (76 - 90)  BP: 160/100 (09-27-21 @ 06:00) (104/61 - 175/79)  RR: 19 (09-27-21 @ 06:00) (18 - 48)  SpO2: 92% (09-27-21 @ 06:00) (90% - 100%)                          8.0    16.68 )-----------( 278      ( 27 Sep 2021 04:59 )             25.5     27 Sep 2021 04:59    134    |  97     |  18     ----------------------------<  91     3.6     |  23     |  0.66     Ca    8.3        27 Sep 2021 04:59  Phos  3.3       27 Sep 2021 04:59  Mg     2.2       27 Sep 2021 04:59    PE   LLE:  Skin intact; No ecchymosis/soft tissue swelling  Compartments soft; + TTP about hip. No TTP to knee/leg/ankle/foot   ROM lmited 2/2 pain   Unable to SLR; + Log Roll/Heel Strike  Motor intact GS/TA/FHL/EHL  SILT L2-S1  +DP    89y Female with L valgus impacted femoral neck fracture    - OR for L hip CRPP today  - Please document clearance and keep NPO/IVF  - Pain control  - NPO/IVF  - Appreciate NSG recs  - Appreciate SICU care    Jimbo Bonner, DO  PGY-3, Orthopaedic Surgery

## 2021-09-27 NOTE — PRE-ANESTHESIA EVALUATION ADULT - NSANTHPMHFT_GEN_ALL_CORE
Pt has SDH with midline shift along with hip fracture. Although SDH has not been entirely stable, patient is not a surgical candidate for evacuation of hematoma. However, patient will benefit from fixing his hip fracture in regards to pain and mobility. Although going under general anesthesia with a unstable SDH may worsen the SDH, given that he is not a neurosurgical candidate, it would be in the patient's best interest to address his hip fracture given the extensive pain and immobility he has to endure during this time. Spoke to patient's HCP (son Enrique) extensively about this risk, and the benefit of proceeding with the hip surgery, and the son understands and agrees to the surgery.

## 2021-09-27 NOTE — PROGRESS NOTE ADULT - ASSESSMENT
89 year old female s/p fall with Traumatic SDH worsening on repeat 5 hr interval scan with 5mm midline shift. SICU consulted for Q1 hour neurological checks.    PLAN    Neuro: Hx of Dementia. Left sided 9mm SDH w/ interval increase and 5mm midline shift.   - Vimpat 100mg. BID for traumatic seizure prophylaxis as per NSGY  - Q1 hour neurological checks  - Tylenol standing for pain control  - No narcotics in setting of advanced age, dementia, and evolving SDH  - S/p platelets and DDAVP reversal for ASA use   - possible repeat CTH tomorrow (for family)    Respiratory: Chronic B/L rib fractures  - Continuous pulse oximetry  - F/u AM CXR    CV: No active issues  - Monitor hemodynamics    GI: No active issues  - NPO for possible OR with Ortho    : No active issues  - NS @ 75ml/hr  - strict I/Os    Heme: No active issues  - Holding chemical VTE prophylaxis in setting of head bleed    ID: No active issues  - leukocytosis improving   - Will send UA to r/o UTI in setting of fall    Endo: No active issues  - Monitor serum glucose on BMP    Dispo: SICU full code   89 year old female s/p fall with Traumatic SDH worsening on repeat 5 hr interval scan with 5mm midline shift. SICU consulted for Q1 hour neurological checks.    PLAN    Neuro: Hx of Dementia. Left sided 9mm SDH w/ interval increase and 5mm midline shift.   - Vimpat 100mg. BID for traumatic seizure prophylaxis as per NSGY  - Q1 hour neurological checks  - Tylenol standing for pain control  - No narcotics in setting of advanced age, dementia, and evolving SDH  - S/p platelets and DDAVP reversal for ASA use   - possible repeat CTH tomorrow (for family)    Respiratory: Chronic B/L rib fractures  - Continuous pulse oximetry  - F/u AM CXR    CV: No active issues  - Monitor hemodynamics    GI: No active issues  - NPO for possible OR with Ortho    : No active issues  - NS @ 75ml/hr  - strict I/Os    Heme: No active issues  - Holding chemical VTE prophylaxis in setting of head bleed    ID: No active issues  - leukocytosis improving   - Will send UA to r/o UTI in setting of fall    Endo: No active issues  - Monitor serum glucose on BMP    MSK:  - OR today with ortho    Dispo: SICU full code   89 year old female s/p fall with Traumatic SDH worsening on repeat 5 hr interval scan with 5mm midline shift. SICU consulted for Q1 hour neurological checks.    PLAN    Neuro: Hx of Dementia. Left sided 9mm SDH w/ interval increase and 5mm midline shift.   - Vimpat 100mg. BID for traumatic seizure prophylaxis as per NSGY  - Q1 hour neurological checks  - Tylenol standing for pain control  - No narcotics in setting of advanced age, dementia, and evolving SDH  - S/p platelets and DDAVP reversal for ASA use   - possible repeat CTH tomorrow (for family)    Respiratory: Chronic B/L rib fractures  - Continuous pulse oximetry  - F/u AM CXR    CV: No active issues  - Monitor hemodynamics    GI: No active issues  - NPO for possible OR with Ortho    : No active issues  - NS @ 75ml/hr  - strict I/Os    Heme: No active issues  - Holding chemical VTE prophylaxis in setting of head bleed    ID: No active issues  - leukocytosis improving   - Will send UA to r/o UTI in setting of fall    Endo: No active issues  - Monitor serum glucose on BMP    MSK:  - OR today with ortho  - Patient is high risk for any procedure but cannot be medically optimized any further so no objection from SICU for the OR    Dispo: SICU full code

## 2021-09-28 NOTE — BRIEF OPERATIVE NOTE - NSICDXBRIEFPROCEDURE_GEN_ALL_CORE_FT
PROCEDURES:  Closed reduction and percutaneous pinning of left hip 28-Sep-2021 00:51:55  Jonh Monzon

## 2021-09-28 NOTE — CHART NOTE - NSCHARTNOTEFT_GEN_A_CORE
Neurosurgery reviewed CTH obtained after closed reduction and percutaneous pinning of left hip. Acute subdural hemorrhage appears stable from previous scan. Please repeat CTH in 10 days.

## 2021-09-28 NOTE — PROGRESS NOTE ADULT - ASSESSMENT
89 year old female s/p fall with Traumatic SDH worsening on repeat 5 hr interval scan with 5mm midline shift. SICU consulted for Q1 hour neurological checks. Patient with persistently worsening imaging, however with stable mental status. Now s/p closed reduction and pinning of L hip 9/27.    PLAN    Neuro: Hx of Dementia. Left sided 9mm SDH w/ interval increase and 5mm midline shift. AAOx3  - Vimpat 100mg. BID for traumatic seizure prophylaxis as per NSGY  - Q1 hour neurological checks  - Tylenol standing for pain control  - No narcotics in setting of advanced age, dementia, and evolving SDH  - S/p platelets and DDAVP reversal for ASA use   - postop CTH with increase in size of RIGHT sided SDH 2mm --> 5mm    Respiratory: Chronic B/L rib fractures  - Continuous pulse oximetry  - encourage IS    CV: No active issues  - Monitor hemodynamics    GI: No active issues  - regular diet    : No active issues  - NS @ 75ml/hr  - strict I/Os    Heme: No active issues  - Holding chemical VTE prophylaxis in setting of head bleed  - postop h/h 8.1/25.1 --> 7.6/23.4    ID: No active issues  - persistent leukocytosis, afebrile  - UA negative    Endo: No active issues  - Monitor serum glucose on BMP    MSK:  - s/p L hip closed reduction and pinning  - AM pelvic xray  - WBAT LLE  - Patient is high risk for any procedure but cannot be medically optimized any further so no objection from SICU for the OR    Dispo: SICU full code

## 2021-09-28 NOTE — OCCUPATIONAL THERAPY INITIAL EVALUATION ADULT - PERTINENT HX OF CURRENT PROBLEM, REHAB EVAL
89F s/p fall with Traumatic SDH worsening on repeat 5 hr interval scan with 5mm midline shift. SICU consulted for Q1 hour neurological checks. Patient with persistently worsening imaging, however with stable mental status. Now s/p closed reduction and pinning of L hip 9/27.

## 2021-09-28 NOTE — PROGRESS NOTE ADULT - ASSESSMENT
89y Female s/p fall at assisted living facility. Initially presented [facility] and was found to have subdural hematoma and L hip fracture. Transferred to [facility] for further work-up. Initial scan demonstrated 9mm SDH no MLS, sig atrophy, significatly expanded on 5h repeat, now with 5mm MLS, large post acute component. CT cspine neg. Also found to have left hip fracture. Admitted to SICU.  Now s/p closed reduction and pinning of L hip 9/27.    Plan:   - Ortho: WBAT to LLE  - Neurosurgery:  repeat CTH in 10 days from 9/28  - regular diet   - c/w home meds  - pain control as needed  - PT  - appreciate excellent SICU care     ACS   [id number]

## 2021-09-28 NOTE — ADVANCED PRACTICE NURSE CONSULT - ASSESSMENT
The pt was encountered in the 8ICU- Mrs Muir was awake and alert - asking repeatedly "why am I here, how did this happen?" Education and emotional support were provided.  Pt was reluctant to turn to her side but did so with encouragement. Staff had applied a Prima-Fit catheter to divert urine from the skin. On the b/l buttocks and sacrum skin changes were noted that were suggestive of deep tissue injury verus IAD: the skin was intact but with a dusky, purple hue, the area in question measured 5cmx 7cm x0cm. will recommend to continue to monitor and to apply Darryl with pericare.  A low air-loss surface is in use and the pt is being assisted with turning and positioning. Will recommend complete cAir boots to off-load the heels.  As the pt was a/w a deep tissue  injury, will request a nutrition consult for further evaluation.

## 2021-09-28 NOTE — PROGRESS NOTE ADULT - SUBJECTIVE AND OBJECTIVE BOX
Postop Check    Patient tolerated the procedure well. Patient seen and examined at bedside.  No acute complaints at this time. Pain well controlled. Denies chest pain, shortness of breath, nausea or vomiting.     PE:  Vital Signs Last 24 Hrs  T(C): 36.6 (09-28-21 @ 01:45), Max: 37.3 (09-27-21 @ 07:00)  T(F): 97.9 (09-28-21 @ 01:45), Max: 99.1 (09-27-21 @ 07:00)  HR: 69 (09-28-21 @ 02:00) (69 - 86)  BP: 150/63 (09-28-21 @ 01:45) (115/64 - 172/81)  BP(mean): 88 (09-28-21 @ 01:45) (79 - 126)  RR: 23 (09-28-21 @ 02:00) (14 - 28)  SpO2: 98% (09-28-21 @ 02:00) (92% - 100%)    General: NAD, resting comfortably in bed  LLE:   Dressing in place C/D/I  SCD in place bilaterally  No calf tenderness   TA/EHL/FHL/GSC+  SILT L2-S1  DP+                          7.6    30.37 )-----------( 333      ( 28 Sep 2021 01:56 )             23.4     28 Sep 2021 01:56    136    |  102    |  24     ----------------------------<  92     3.7     |  18     |  0.52     Ca    7.8        28 Sep 2021 01:56  Phos  3.2       28 Sep 2021 01:56  Mg     2.0       28 Sep 2021 01:56    TPro  6.0    /  Alb  3.9    /  TBili  0.6    /  DBili  x      /  AST  25     /  ALT  8      /  AlkPhos  143    26 Sep 2021 06:14    PT/INR - ( 28 Sep 2021 01:56 )   PT: 13.6 sec;   INR: 1.14 ratio         PTT - ( 28 Sep 2021 01:56 )  PTT:31.2 sec    A/P:  89y f s/p L Hip CRPP POD 1  -PT/OT -WBAT  -Pain Control  -DVT ppx per SICU team  -Continue perioperative abx x 24 hours  -FU AM Labs  -Rest, ice, compress and elevate the extremity as we needed  -Incentive Spirometry  -SICU management appreciated    Ortho

## 2021-09-28 NOTE — PROGRESS NOTE ADULT - SUBJECTIVE AND OBJECTIVE BOX
SURGERY DAILY PROGRESS NOTE:       SUBJECTIVE/ROS: Patient seen and evaluated on AM rounds. Feeling fine, no pain.        24 HOUR EVENTS:  - AM CTH with mild increase in SDH  - s/p closed reduction and pinning of left hip with ortho  - postop CTH with increase in size of RIGHT sided SDH 2mm --> 5mm        OBJECTIVE:    Vital Signs Last 24 Hrs  T(C): 36.7 (28 Sep 2021 07:00), Max: 37.3 (27 Sep 2021 11:00)  T(F): 98.1 (28 Sep 2021 07:00), Max: 99.1 (27 Sep 2021 11:00)  HR: 85 (28 Sep 2021 08:00) (69 - 85)  BP: 123/65 (28 Sep 2021 08:00) (115/55 - 172/81)  BP(mean): 87 (28 Sep 2021 08:00) (79 - 117)  RR: 21 (28 Sep 2021 08:00) (14 - 28)  SpO2: 99% (28 Sep 2021 08:00) (95% - 100%)  I&O's Detail    27 Sep 2021 07:01  -  28 Sep 2021 07:00  --------------------------------------------------------  IN:    IV PiggyBack: 110 mL    sodium chloride 0.9%: 1200 mL    sodium chloride 0.9%: 300 mL  Total IN: 1610 mL    OUT:    Incontinent per Collection Bag (mL): 700 mL    Voided (mL): 400 mL  Total OUT: 1100 mL    Total NET: 510 mL        Daily Height in cm: 147.32 (27 Sep 2021 22:20)    Daily Weight in k.7 (28 Sep 2021 04:00)  MEDICATIONS  (STANDING):  acetaminophen   Tablet .. 975 milliGRAM(s) Oral every 8 hours  allopurinol 100 milliGRAM(s) Oral daily  BACItracin   Ointment 1 Application(s) Topical daily  chlorhexidine 2% Cloths 1 Application(s) Topical <User Schedule>  cholecalciferol 400 Unit(s) Oral daily  donepezil 5 milliGRAM(s) Oral at bedtime  influenza   Vaccine 0.5 milliLiter(s) IntraMuscular once  lacosamide IVPB 100 milliGRAM(s) IV Intermittent every 12 hours  lidocaine   4% Patch 1 Patch Transdermal every 24 hours  polyethylene glycol 3350 17 Gram(s) Oral daily  senna 2 Tablet(s) Oral at bedtime    MEDICATIONS  (PRN):      LABS:                        7.6    30.37 )-----------( 333      ( 28 Sep 2021 01:56 )             23.4     -    136  |  102  |  24<H>  ----------------------------<  92  3.7   |  18<L>  |  0.52    Ca    7.8<L>      28 Sep 2021 01:56  Phos  3.2       Mg     2.0           PT/INR - ( 28 Sep 2021 01:56 )   PT: 13.6 sec;   INR: 1.14 ratio         PTT - ( 28 Sep 2021 01:56 )  PTT:31.2 sec  Urinalysis Basic - ( 27 Sep 2021 04:58 )    Color: Light Yellow / Appearance: Clear / S.028 / pH: x  Gluc: x / Ketone: Small  / Bili: Negative / Urobili: Negative   Blood: x / Protein: 30 mg/dL / Nitrite: Negative   Leuk Esterase: Negative / RBC: 3 /hpf / WBC 0 /HPF   Sq Epi: x / Non Sq Epi: 0 /hpf / Bacteria: Negative      Physical Exam:  General: NAD, resting comfortably in bed  Pulmonary: Nonlabored breathing, no respiratory distress  Cardiovascular: NSR  Abdominal: soft, NT/ND  Extremities: WWP. Left hip tenderness, surgical dressing in place

## 2021-09-28 NOTE — OCCUPATIONAL THERAPY INITIAL EVALUATION ADULT - DIAGNOSIS, OT EVAL
Pt p/w impaired balance, strength, endurance, coordination, cognition, safety awareness impacting fxnl ind.

## 2021-09-28 NOTE — PROGRESS NOTE ADULT - SUBJECTIVE AND OBJECTIVE BOX
SICU Daily Progress Note    History  LORRI TEJEDA is a 89y Female s/p fall at assisted living facility. Initially presented [facility] and was found to have subdural hematoma and L hip fracture. Transferred to [facility] for further work-up. Initial scan demonstrated 9mm SDH no MLS, sig atrophy, significantly expanded on 5h repeat, now with 5mm MLS, large post acute component. CT cspine neg. Also found to have left hip fracture. SICU consulted for Q1 hour neurological checks.     24 HOUR EVENTS:  - AM CTH with mild increase in SDH  - s/p closed reduction and pinning of left hip with ortho  - postop CTH with increase in size of RIGHT sided SDH 2mm --> 5mm      SUBJECTIVE/ROS:  A ten-point review of systems was otherwise negative except as noted.  History was obtained, to the extent possible, from review of the chart and collateral sources of information.    NEURO  GCS 15  Exam: awake, alert, oriented x2, no acute distress, no acute focal deficits, follows complex commands  Meds: acetaminophen   Tablet .. 975 milliGRAM(s) Oral every 8 hours  donepezil 5 milliGRAM(s) Oral at bedtime  lacosamide IVPB 100 milliGRAM(s) IV Intermittent every 12 hours      RESPIRATORY  RR: 23 (09-28-21 @ 02:00) (14 - 28)  SpO2: 98% (09-28-21 @ 02:00) (92% - 100%)  Wt(kg): --  Exam: clear to auscultation bilaterally coarse rhonchi in all lung fields  Mechanical Ventilation:   ABG - ( 26 Sep 2021 06:19 )  pH: x     /  pCO2: x     /  pO2: x     / HCO3: x     / Base Excess: x     /  SaO2: x       Lactate: 0.6          CARDIOVASCULAR  HR: 69 (09-28-21 @ 02:00) (69 - 86)  BP: 150/63 (09-28-21 @ 01:45) (115/64 - 172/81)  BP(mean): 88 (09-28-21 @ 01:45) (79 - 126)  ABP: 154/65 (09-28-21 @ 02:00) (154/65 - 164/71)  ABP(mean): 101 (09-28-21 @ 02:00) (101 - 109)  Wt(kg): --  CVP(cm H2O): --      Exam: regular rate and rhythm  Cardiac Rhythm: sinus   Perfusion     [x]Adequate   [ ]Inadequate  Mentation   [x]Normal       [ ]Reduced  Extremities  [x]Warm         [ ]Cool  Volume Status [ ]Hypervolemic [x]Euvolemic [ ]Hypovolemic  Meds:     GI/NUTRITION  Exam: soft, nontender, nondistended  Diet: regular  Meds: polyethylene glycol 3350 17 Gram(s) Oral daily  senna 2 Tablet(s) Oral at bedtime      GENITOURINARY  I&O's Detail    09-26 @ 07:01  -  09-27 @ 07:00  --------------------------------------------------------  IN:    IV PiggyBack: 125 mL    sodium chloride 0.9%: 1800 mL  Total IN: 1925 mL    OUT:    Voided (mL): 550 mL  Total OUT: 550 mL    Total NET: 1375 mL      09-27 @ 07:01  -  09-28 @ 04:38  --------------------------------------------------------  IN:    IV PiggyBack: 60 mL    sodium chloride 0.9%: 75 mL    sodium chloride 0.9%: 1200 mL  Total IN: 1335 mL    OUT:    Incontinent per Collection Bag (mL): 300 mL    Voided (mL): 400 mL  Total OUT: 700 mL    Total NET: 635 mL        Weight (kg): 41.8 (09-27 @ 22:20)  09-28    136  |  102  |  24<H>  ----------------------------<  92  3.7   |  18<L>  |  0.52    Ca    7.8<L>      28 Sep 2021 01:56  Phos  3.2     09-28  Mg     2.0     09-28    TPro  6.0  /  Alb  3.9  /  TBili  0.6  /  DBili  x   /  AST  25  /  ALT  8<L>  /  AlkPhos  143<H>  09-26      Meds: cholecalciferol 400 Unit(s) Oral daily  potassium chloride    Tablet ER 40 milliEquivalent(s) Oral once  sodium chloride 0.9%. 1000 milliLiter(s) IV Continuous <Continuous>      HEMATOLOGIC  Meds:   [ ] VTE Prophylaxis - held due to                         7.6    30.37 )-----------( 333      ( 28 Sep 2021 01:56 )             23.4     PT/INR - ( 28 Sep 2021 01:56 )   PT: 13.6 sec;   INR: 1.14 ratio         PTT - ( 28 Sep 2021 01:56 )  PTT:31.2 sec    INFECTIOUS DISEASES  T(C): 36.6 (09-28-21 @ 01:45), Max: 37.3 (09-27-21 @ 07:00)  Wt(kg): --  WBC Count: 30.37 K/uL (09-28 @ 01:56)  WBC Count: 20.66 K/uL (09-27 @ 17:14)  WBC Count: 16.68 K/uL (09-27 @ 04:59)    Recent Cultures:    Meds: influenza   Vaccine 0.5 milliLiter(s) IntraMuscular once      ENDOCRINE  Capillary Blood Glucose    Meds: allopurinol 100 milliGRAM(s) Oral daily      ACCESS DEVICES:  [x] Peripheral IV  [ ] Central Venous Line	[ ] R	[ ] L	[ ] IJ	[ ] Fem	[ ] SC	Placed:   [x] Arterial Line		[ ] R	[ ] L	[ ] Fem	[ ] Rad	[ ] Ax	Placed:   [ ] PICC:					[ ] Mediport  [ ] Urinary Catheter, Date Placed:   [ ] Necessity of urinary, arterial, and venous catheters discussed    OTHER MEDICATIONS:  BACItracin   Ointment 1 Application(s) Topical daily  chlorhexidine 2% Cloths 1 Application(s) Topical <User Schedule>  lidocaine   4% Patch 1 Patch Transdermal every 24 hours

## 2021-09-28 NOTE — ADVANCED PRACTICE NURSE CONSULT - RECOMMEDATIONS
Will recommend the followin. B/l buttocks: continue to monitor, routine pericare with serena  2. Continue with Prima-Fit  3. Continue with turning and positioning  4. Complete CAir boots  5. seat cushion when OOB to chair  6. Nutrition consult  Tx plan discussed with RN

## 2021-09-29 NOTE — DIETITIAN INITIAL EVALUATION ADULT. - OTHER INFO
GASTROINTESTINAL:  Last BM: none noted  Bowel Regimen: Miralax, senna    NUTRITION STATUS:  - Supplementation: vit D3  - Low sodium noted    WEIGHT HISTORY:  - Progressive wt loss per HIE:  2017: ~50 kg  2018: ~52 kg  2019: ~49 --> 47 kg  2020: ~50 --> 47 kg  2021: 49kg (2/24/21), 47.6kg (8/1/21),   - This admission: 45.4 --> 41.8 kg (9/27/21)

## 2021-09-29 NOTE — DIETITIAN INITIAL EVALUATION ADULT. - PERTINENT MEDS FT
MEDICATIONS  (STANDING):  acetaminophen   Tablet .. 975 milliGRAM(s) Oral every 8 hours  allopurinol 100 milliGRAM(s) Oral daily  BACItracin   Ointment 1 Application(s) Topical daily  chlorhexidine 2% Cloths 1 Application(s) Topical <User Schedule>  cholecalciferol 400 Unit(s) Oral daily  donepezil 5 milliGRAM(s) Oral at bedtime  influenza   Vaccine 0.5 milliLiter(s) IntraMuscular once  lacosamide IVPB 100 milliGRAM(s) IV Intermittent every 12 hours  lidocaine   4% Patch 1 Patch Transdermal every 24 hours  polyethylene glycol 3350 17 Gram(s) Oral daily  senna 2 Tablet(s) Oral at bedtime

## 2021-09-29 NOTE — PROGRESS NOTE ADULT - ASSESSMENT
89F s/p fall with traumatic SDH worsening on repeat 5 hr interval scan with 5mm midline shift. SICU consulted for Q1 hour neurological checks. Patient with persistently worsening imaging, however with stable mental status. Now s/p closed reduction and pinning of L hip 9/27 w/ortho.      PLAN  - pulse oximetry continuous  - f/u AM labs, trend H&H, transfuse PRN  - pain management  - regular diet  - monitor I&O's, adequate UOP  - c/w home meds  - appreciate excellent SICU care      TRAUMA  [id number]  89F s/p fall with traumatic SDH worsening on repeat 5 hr interval scan with 5mm midline shift. SICU consulted for Q1 hour neurological checks. Patient with persistently worsening imaging, however with stable mental status. Now s/p closed reduction and pinning of L hip 9/27 w/ortho.      PLAN  - pulse oximetry continuous  - f/u AM labs, trend H&H, transfuse PRN  - consider restarting aspirin/lovenox--> discuss with neurosurgery  - pain management  - regular diet  - monitor I&O's, adequate UOP  - c/w home meds  - appreciate excellent SICU care      TRAUMA  [id number]

## 2021-09-29 NOTE — DIETITIAN INITIAL EVALUATION ADULT. - PERTINENT LABORATORY DATA
09-29 @ 00:21: Sodium 134<L>, Potassium 4.4, Calcium 8.0<L>, Magnesium 2.1, Phosphorus 2.3<L>, BUN 33<H>, Creatinine 0.77, Glucose 141<H>  09-28 @ 17:57: Magnesium 2.1, Phosphorus 2.6  Hemoglobin : 8.5 g/dL  Hematocrit : 26.4 %    A1C with Estimated Average Glucose Result: 5.1 % (09-29-21 @ 04:28)

## 2021-09-29 NOTE — PROGRESS NOTE ADULT - SUBJECTIVE AND OBJECTIVE BOX
TRAUMA SURGERY PROGRESS NOTE  Post-Op Day #1 // Closed reduction and percutaneous pinning of left hip    SUBJECTIVE  Pt seen and examined at bedside. No complaints.  Pain controlled. Denies N/V. Tolerating regular diet. Passing flatus and BM.     INTERVAL HX  - received 1u pRBC   - low UOP & elevated lactate 3.8     PMHx  ·	MDS (myelodysplastic syndrome)  ·	Dementia      OBJECTIVE:    PHYSICAL EXAM   General Appearance: Appears well, NAD  Resp: Patent airway, non-labored breathing  CV: RRR  Abdomen: Soft, Nontender, Nondistended    Vital Signs Last 24 Hrs  T(C): 36.6 (28 Sep 2021 23:00), Max: 37 (28 Sep 2021 15:00)  T(F): 97.8 (28 Sep 2021 23:00), Max: 98.6 (28 Sep 2021 15:00)  HR: 85 (29 Sep 2021 00:00) (73 - 98)  BP: 120/62 (29 Sep 2021 00:00) (97/51 - 158/72)  BP(mean): 83 (29 Sep 2021 00:00) (70 - 103)  RR: 22 (29 Sep 2021 00:00) (16 - 25)  SpO2: 94% (29 Sep 2021 00:00) (92% - 100%)    I's & O's    21 @ 07:01  -  21 @ 03:53  --------------------------------------------------------  IN:    IV PiggyBack: 50 mL    Oral Fluid: 800 mL    PRBCs (Packed Red Blood Cells): 300 mL    Sodium Chloride 0.9% Bolus: 500 mL  Total IN: 1650 mL    OUT:    Incontinent per Collection Bag (mL): 300 mL  Total OUT: 300 mL    Total NET: 1350 mL      LAB/STUDIES:                        8.5    27.47 )-----------( 334      ( 29 Sep 2021 00:21 )             26.4     134<L>  |  103  |  33<H>  ----------------------------<  141<H>  4.4   |  21<L>  |  0.77    Ca    8.0<L>      29 Sep 2021 00:21  Phos  2.3       Mg     2.1           PT/INR - ( 29 Sep 2021 00:21 )   PT: 12.8 sec;   INR: 1.07 ratio    PTT - ( 29 Sep 2021 00:21 )  PTT:28.5 sec    Urinalysis Basic - ( 27 Sep 2021 04:58 )    Color: Light Yellow / Appearance: Clear / S.028 / pH: x  Gluc: x / Ketone: Small  / Bili: Negative / Urobili: Negative   Blood: x / Protein: 30 mg/dL / Nitrite: Negative   Leuk Esterase: Negative / RBC: 3 /hpf / WBC 0 /HPF   Sq Epi: x / Non Sq Epi: 0 /hpf / Bacteria: Negative      IMAGIN/26 L XR femur: Acute impacted subcapital left hip fracture. No evidence of acute dislocations.

## 2021-09-29 NOTE — DIETITIAN INITIAL EVALUATION ADULT. - SIGNS/SYMPTOMS
possible wt loss >10% in 2 months, pt meeting <75% of nutrition needs, mild fluid accumulation pt with stage I pressure injury; s/p closed reduction and pinning of L hip 9/27

## 2021-09-29 NOTE — DIETITIAN INITIAL EVALUATION ADULT. - REASON FOR ADMISSION
LEVEL I TRAUMA    Per chart: "89 year old female s/p fall with Traumatic SDH worsening on repeat 5 hr interval scan with 5mm midline shift. SICU consulted for Q1 hour neurological checks. Patient with persistently worsening imaging, however with stable mental status. Now s/p closed reduction and pinning of L hip 9/27."

## 2021-09-29 NOTE — PROGRESS NOTE ADULT - SUBJECTIVE AND OBJECTIVE BOX
HISTORY  LORRI TEJEDA is a 89y Female s/p fall at assisted living facility. Initially presented [facility] and was found to have subdural hematoma and L hip fracture. Transferred to [facility] for further work-up. Initial scan demonstrated 9mm SDH no MLS, sig atrophy, significantly expanded on 5h repeat, now with 5mm MLS, large post acute component. CT cspine neg. Also found to have left hip fracture. SICU consulted for Q1 hour neurological checks.     24 HOUR EVENTS:  - Received 1 unit of blood on 09/28  - Low UOP and elevated lactate 3.8. 500 mL fluid bolus given.   - NAEON     SUBJECTIVE/ROS:  A ten-point review of systems was otherwise negative except as noted.  Due to altered mental status/intubation, subjective information were not able to be obtained from the patient. History was obtained, to the extent possible, from review of the chart and collateral sources of information.    NEURO  RASS:     GCS: 15    CAM ICU:  Exam: awake, alert, oriented x1, no acute distress, no acute focal deficits, lethargy, follows complex commands, follows simple commands, moving all four extremities  Meds: acetaminophen   Tablet .. 975 milliGRAM(s) Oral every 8 hours  donepezil 5 milliGRAM(s) Oral at bedtime  lacosamide IVPB 100 milliGRAM(s) IV Intermittent every 12 hours      RESPIRATORY  RR: 22 (09-29-21 @ 00:00) (16 - 28)  SpO2: 94% (09-29-21 @ 00:00) (92% - 100%)  Wt(kg): --  Exam: clear to auscultation bilaterally coarse rhonchi in all lung fields  Mechanical Ventilation: none     Meds:     CARDIOVASCULAR  HR: 85 (09-29-21 @ 00:00) (69 - 98)  BP: 120/62 (09-29-21 @ 00:00) (97/51 - 158/72)  BP(mean): 83 (09-29-21 @ 00:00) (70 - 103)  ABP: 132/55 (09-28-21 @ 06:00) (132/55 - 164/71)  ABP(mean): 84 (09-28-21 @ 06:00) (84 - 109)  Wt(kg): --  CVP(cm H2O): --  VBG - ( 29 Sep 2021 00:19 )  pH: 7.40  /  pCO2: 39    /  pO2: 63    / HCO3: 24    / Base Excess: -0.5  /  SaO2: 93.4   Lactate: 0.9                Exam: regular rate and rhythm, regular rhythm, tachycardic, S1S2, irregularly irregular  Cardiac Rhythm: sinus sinus tachycardia atrial fibrillation  Perfusion     [X]Adequate   [ ]Inadequate  Mentation   [X]Normal       [ ]Reduced  Extremities  [X]Warm         [ ]Cool  Volume Status [ ]Hypervolemic [X]Euvolemic [ ]Hypovolemic  Meds:     GI/NUTRITION  Exam: soft, nontender, nondistended, softly distended, saba-incisional tenderness, incision dressing C/D/I, NGT output, drain output  Diet:  Meds: polyethylene glycol 3350 17 Gram(s) Oral daily  senna 2 Tablet(s) Oral at bedtime      GENITOURINARY  I&O's Detail    09-27 @ 07:01  -  09-28 @ 07:00  --------------------------------------------------------  IN:    IV PiggyBack: 110 mL    sodium chloride 0.9%: 1200 mL    sodium chloride 0.9%: 300 mL  Total IN: 1610 mL    OUT:    Incontinent per Collection Bag (mL): 700 mL    Voided (mL): 400 mL  Total OUT: 1100 mL    Total NET: 510 mL      09-28 @ 07:01 - 09-29 @ 01:25  --------------------------------------------------------  IN:    IV PiggyBack: 50 mL    Oral Fluid: 800 mL    PRBCs (Packed Red Blood Cells): 300 mL    Sodium Chloride 0.9% Bolus: 500 mL  Total IN: 1650 mL    OUT:    Incontinent per Collection Bag (mL): 300 mL  Total OUT: 300 mL    Total NET: 1350 mL          09-29    134<L>  |  103  |  33<H>  ----------------------------<  141<H>  4.4   |  21<L>  |  0.77    Ca    8.0<L>      29 Sep 2021 00:21  Phos  2.3     09-29  Mg     2.1     09-29      [X] Julio catheter, indication:   Meds: cholecalciferol 400 Unit(s) Oral daily  sodium phosphate IVPB 15 milliMole(s) IV Intermittent every 1 hour      HEMATOLOGIC  Meds:   [ ] VTE Prophylaxis - held due to low HGB                         8.5    27.47 )-----------( 334      ( 29 Sep 2021 00:21 )             26.4     PT/INR - ( 29 Sep 2021 00:21 )   PT: 12.8 sec;   INR: 1.07 ratio         PTT - ( 29 Sep 2021 00:21 )  PTT:28.5 sec    INFECTIOUS DISEASES  T(C): 36.6 (09-28-21 @ 23:00), Max: 37 (09-28-21 @ 15:00)  Wt(kg): --  WBC Count: 27.47 K/uL (09-29 @ 00:21)  WBC Count: 34.33 K/uL (09-28 @ 17:57)  WBC Count: 23.40 K/uL (09-28 @ 11:52)  WBC Count: 30.37 K/uL (09-28 @ 01:56)    Recent Cultures:    Meds: influenza   Vaccine 0.5 milliLiter(s) IntraMuscular once      ENDOCRINE  Capillary Blood Glucose    Meds: allopurinol 100 milliGRAM(s) Oral daily      ACCESS DEVICES:  [X] Peripheral IV  [ ] Central Venous Line	[ ] R	[ ] L	[ ] IJ	[ ] Fem	[ ] SC	Placed:   [ ] Arterial Line		[ ] R	[ ] L	[ ] Fem	[ ] Rad	[ ] Ax	Placed:   [ ] PICC:					[ ] Mediport  [X] Urinary Catheter, Date Placed:   [ ] Necessity of urinary, arterial, and venous catheters discussed    OTHER MEDICATIONS:  BACItracin   Ointment 1 Application(s) Topical daily  chlorhexidine 2% Cloths 1 Application(s) Topical <User Schedule>  lidocaine   4% Patch 1 Patch Transdermal every 24 hours      IMAGING: HISTORY  LORRI TEJEDA is a 89y Female s/p fall at assisted living facility. Initially presented [facility] and was found to have subdural hematoma and L hip fracture. Transferred to [facility] for further work-up. Initial scan demonstrated 9mm SDH no MLS, sig atrophy, significantly expanded on 5h repeat, now with 5mm MLS, large post acute component. CT cspine neg. Also found to have left hip fracture. SICU consulted for Q1 hour neurological checks.     24 HOUR EVENTS:  - Received 1 unit of blood on 09/28  - Low UOP and elevated lactate 3.8. 500 mL fluid bolus given.   - Additional 500ml LR bolus overnight for oliguria    SUBJECTIVE/ROS:  A ten-point review of systems was otherwise negative except as noted.  Due to altered mental status/intubation, subjective information were not able to be obtained from the patient. History was obtained, to the extent possible, from review of the chart and collateral sources of information.    NEURO  RASS:     GCS: 15    CAM ICU:  Exam: awake, alert, oriented x1, no acute distress, no acute focal deficits, lethargy, follows complex commands, follows simple commands, moving all four extremities  Meds: acetaminophen   Tablet .. 975 milliGRAM(s) Oral every 8 hours  donepezil 5 milliGRAM(s) Oral at bedtime  lacosamide IVPB 100 milliGRAM(s) IV Intermittent every 12 hours      RESPIRATORY  RR: 22 (09-29-21 @ 00:00) (16 - 28)  SpO2: 94% (09-29-21 @ 00:00) (92% - 100%)  Wt(kg): --  Exam: clear to auscultation bilaterally coarse rhonchi in all lung fields  Mechanical Ventilation: none     Meds:     CARDIOVASCULAR  HR: 85 (09-29-21 @ 00:00) (69 - 98)  BP: 120/62 (09-29-21 @ 00:00) (97/51 - 158/72)  BP(mean): 83 (09-29-21 @ 00:00) (70 - 103)  ABP: 132/55 (09-28-21 @ 06:00) (132/55 - 164/71)  ABP(mean): 84 (09-28-21 @ 06:00) (84 - 109)  Wt(kg): --  CVP(cm H2O): --  VBG - ( 29 Sep 2021 00:19 )  pH: 7.40  /  pCO2: 39    /  pO2: 63    / HCO3: 24    / Base Excess: -0.5  /  SaO2: 93.4   Lactate: 0.9                Exam: regular rate and rhythm, regular rhythm, tachycardic, S1S2, irregularly irregular  Cardiac Rhythm: sinus sinus tachycardia atrial fibrillation  Perfusion     [X]Adequate   [ ]Inadequate  Mentation   [X]Normal       [ ]Reduced  Extremities  [X]Warm         [ ]Cool  Volume Status [ ]Hypervolemic [X]Euvolemic [ ]Hypovolemic  Meds:     GI/NUTRITION  Exam: soft, nontender, nondistended, softly distended, saba-incisional tenderness, incision dressing C/D/I, NGT output, drain output  Diet:  Meds: polyethylene glycol 3350 17 Gram(s) Oral daily  senna 2 Tablet(s) Oral at bedtime      GENITOURINARY  I&O's Detail    09-27 @ 07:01  -  09-28 @ 07:00  --------------------------------------------------------  IN:    IV PiggyBack: 110 mL    sodium chloride 0.9%: 1200 mL    sodium chloride 0.9%: 300 mL  Total IN: 1610 mL    OUT:    Incontinent per Collection Bag (mL): 700 mL    Voided (mL): 400 mL  Total OUT: 1100 mL    Total NET: 510 mL      09-28 @ 07:01 - 09-29 @ 01:25  --------------------------------------------------------  IN:    IV PiggyBack: 50 mL    Oral Fluid: 800 mL    PRBCs (Packed Red Blood Cells): 300 mL    Sodium Chloride 0.9% Bolus: 500 mL  Total IN: 1650 mL    OUT:    Incontinent per Collection Bag (mL): 300 mL  Total OUT: 300 mL    Total NET: 1350 mL          09-29    134<L>  |  103  |  33<H>  ----------------------------<  141<H>  4.4   |  21<L>  |  0.77    Ca    8.0<L>      29 Sep 2021 00:21  Phos  2.3     09-29  Mg     2.1     09-29      [X] Julio catheter, indication:   Meds: cholecalciferol 400 Unit(s) Oral daily  sodium phosphate IVPB 15 milliMole(s) IV Intermittent every 1 hour      HEMATOLOGIC  Meds:   [ ] VTE Prophylaxis - held due to low HGB                         8.5    27.47 )-----------( 334      ( 29 Sep 2021 00:21 )             26.4     PT/INR - ( 29 Sep 2021 00:21 )   PT: 12.8 sec;   INR: 1.07 ratio         PTT - ( 29 Sep 2021 00:21 )  PTT:28.5 sec    INFECTIOUS DISEASES  T(C): 36.6 (09-28-21 @ 23:00), Max: 37 (09-28-21 @ 15:00)  Wt(kg): --  WBC Count: 27.47 K/uL (09-29 @ 00:21)  WBC Count: 34.33 K/uL (09-28 @ 17:57)  WBC Count: 23.40 K/uL (09-28 @ 11:52)  WBC Count: 30.37 K/uL (09-28 @ 01:56)    Recent Cultures:    Meds: influenza   Vaccine 0.5 milliLiter(s) IntraMuscular once      ENDOCRINE  Capillary Blood Glucose    Meds: allopurinol 100 milliGRAM(s) Oral daily      ACCESS DEVICES:  [X] Peripheral IV  [ ] Central Venous Line	[ ] R	[ ] L	[ ] IJ	[ ] Fem	[ ] SC	Placed:   [ ] Arterial Line		[ ] R	[ ] L	[ ] Fem	[ ] Rad	[ ] Ax	Placed:   [ ] PICC:					[ ] Mediport  [X] Urinary Catheter, Date Placed:   [ ] Necessity of urinary, arterial, and venous catheters discussed    OTHER MEDICATIONS:  BACItracin   Ointment 1 Application(s) Topical daily  chlorhexidine 2% Cloths 1 Application(s) Topical <User Schedule>  lidocaine   4% Patch 1 Patch Transdermal every 24 hours      IMAGING:

## 2021-09-29 NOTE — PROGRESS NOTE ADULT - ASSESSMENT
A/P:  89y f s/p L Hip CRPP POD 2    -PT/OT -WBAT  -Pain Control  -DVT ppx per SICU team  -Rest, ice, compress and elevate the extremity as we needed  -Incentive Spirometry  -SICU management appreciated

## 2021-09-29 NOTE — PROGRESS NOTE ADULT - ASSESSMENT
Assessment     89 year old female s/p fall with Traumatic SDH worsening on repeat 5 hr interval scan with 5mm midline shift. SICU consulted for Q1 hour neurological checks. Patient with persistently worsening imaging, however with stable mental status. Now s/p closed reduction and pinning of L hip 9/27.    PLAN    Neuro: Hx of Dementia. Left sided 9mm SDH w/ interval increase and 5mm midline shift. AAOx3  - Vimpat 100mg. BID for traumatic seizure prophylaxis as per NSGY  - Q1 hour neurological checks  - Tylenol standing for pain control  - No narcotics in setting of advanced age, dementia, and evolving SDH  - S/p platelets and DDAVP reversal for ASA use   - postop CTH with increase in size of RIGHT sided SDH 2mm --> 5mm    Respiratory: Chronic B/L rib fractures  - Continuous pulse oximetry  - encourage IS    CV: No active issues  - Monitor hemodynamics  - Trend lactate     GI: No active issues  - regular diet  - Bowel regimen     : No active issues  - strict I/Os    Heme: No active issues  - Trend H/H   - Repeat CTH in am   - Restart chemical VTE prophylaxis if CTH and H/H stable     ID: No active issues  - persistent leukocytosis, afebrile  - UA negative    Endo: No active issues  - Monitor serum glucose on BMP    MSK:  - s/p L hip closed reduction and pinning  - AM pelvic xray  - WBAT LLE  - Patient is high risk for any procedure but cannot be medically optimized any further so no objection from SICU for the OR    Dispo: SICU full code

## 2021-09-29 NOTE — DIETITIAN INITIAL EVALUATION ADULT. - ORAL NUTRITION SUPPLEMENTS
Continue 3 servings Ensure Pudding (170kcal, 4gm protein per serving). Mighty Shake supplement (200 kcal, 6 gm protein) added to meal trays.

## 2021-09-29 NOTE — DIETITIAN INITIAL EVALUATION ADULT. - REASON INDICATOR FOR ASSESSMENT
Nutrition Consult for Assessment received and appreciated.   Information obtained from: patient, RN, medical record, communication with team.  Patient with baseline dementia, able to answer simple questions about food preferences.

## 2021-09-29 NOTE — DIETITIAN INITIAL EVALUATION ADULT. - MALNUTRITION
Moderate, in context of Social/Behavioral/Environmental Circumstances Moderate Malnutrition in context of Social/Behavioral/Environmental Circumstances

## 2021-09-29 NOTE — DIETITIAN INITIAL EVALUATION ADULT. - ORAL INTAKE PTA/DIET HISTORY
Diet History: Pt reports she typically eats oatmeal, fruit and vegetables. Patient's son called unit and specified a vegan, "low sugar" diet for his mother. Pt reports she enjoys eggs, milk, fish, chicken, meat, and especially sweets. Diet changed to Regular with Ensure Pudding, as patient has capacity to make her own food choices.  No chewing/swallowing difficulty reported.   Allergies: NKFA  Home Nutrition Supplements: vit D3, potassium chloride

## 2021-09-29 NOTE — DIETITIAN NUTRITION RISK NOTIFICATION - TREATMENT: THE FOLLOWING DIET HAS BEEN RECOMMENDED
Diet, Regular:   Supplement Feeding Modality:  Oral  Ensure Pudding Cans or Servings Per Day:  3       Frequency:  Three Times a day (09-29-21 @ 11:19) [Active]

## 2021-09-29 NOTE — DIETITIAN INITIAL EVALUATION ADULT. - ETIOLOGY
likely advanced age, possible overly restrictive diet in setting of family influence increased physiologic demand of stress factor and wound healing

## 2021-09-29 NOTE — PROGRESS NOTE ADULT - SUBJECTIVE AND OBJECTIVE BOX
ORTHOPAEDICS DAILY PROGRESS NOTE:       SUBJECTIVE/ROS: POD 2. Seen and examined. Pain well controlled. Denies CP/SOB/N/V. Recieved 1 U prbc yest         MEDICATIONS  (STANDING):  acetaminophen   Tablet .. 975 milliGRAM(s) Oral every 8 hours  allopurinol 100 milliGRAM(s) Oral daily  BACItracin   Ointment 1 Application(s) Topical daily  chlorhexidine 2% Cloths 1 Application(s) Topical <User Schedule>  cholecalciferol 400 Unit(s) Oral daily  donepezil 5 milliGRAM(s) Oral at bedtime  influenza   Vaccine 0.5 milliLiter(s) IntraMuscular once  lacosamide IVPB 100 milliGRAM(s) IV Intermittent every 12 hours  lactated ringers Bolus 500 milliLiter(s) IV Bolus once  lidocaine   4% Patch 1 Patch Transdermal every 24 hours  polyethylene glycol 3350 17 Gram(s) Oral daily  senna 2 Tablet(s) Oral at bedtime    MEDICATIONS  (PRN):      OBJECTIVE:    Vital Signs Last 24 Hrs  T(C): 36.7 (29 Sep 2021 03:00), Max: 37 (28 Sep 2021 15:00)  T(F): 98 (29 Sep 2021 03:00), Max: 98.6 (28 Sep 2021 15:00)  HR: 77 (29 Sep 2021 05:00) (76 - 98)  BP: 131/60 (29 Sep 2021 05:00) (97/51 - 158/72)  BP(mean): 87 (29 Sep 2021 05:00) (70 - 107)  RR: 19 (29 Sep 2021 05:00) (18 - 25)  SpO2: 95% (29 Sep 2021 05:00) (92% - 100%)    I&O's Detail    27 Sep 2021 07:01  -  28 Sep 2021 07:00  --------------------------------------------------------  IN:    IV PiggyBack: 110 mL    sodium chloride 0.9%: 1200 mL    sodium chloride 0.9%: 300 mL  Total IN: 1610 mL    OUT:    Incontinent per Collection Bag (mL): 700 mL    Voided (mL): 400 mL  Total OUT: 1100 mL    Total NET: 510 mL      28 Sep 2021 07:01  -  29 Sep 2021 06:23  --------------------------------------------------------  IN:    IV PiggyBack: 550 mL    Oral Fluid: 800 mL    PRBCs (Packed Red Blood Cells): 300 mL    Sodium Chloride 0.9% Bolus: 500 mL  Total IN: 2150 mL    OUT:    Incontinent per Collection Bag (mL): 300 mL  Total OUT: 300 mL    Total NET: 1850 mL          Daily     Daily Weight in k.5 (29 Sep 2021 01:46)    LABS:                        8.5    27.47 )-----------( 334      ( 29 Sep 2021 00:21 )             26.4     09-    134<L>  |  103  |  33<H>  ----------------------------<  141<H>  4.4   |  21<L>  |  0.77    Ca    8.0<L>      29 Sep 2021 00:21  Phos  2.3       Mg     2.1           PT/INR - ( 29 Sep 2021 00:21 )   PT: 12.8 sec;   INR: 1.07 ratio         PTT - ( 29 Sep 2021 00:21 )  PTT:28.5 sec              PHYSICAL EXAM:  NAD  nonlabored resp  LLE   dressing c/d/i  +gastroc/ta/ehl/fhl  silt s/s/sp/dp/t  +DP

## 2021-09-29 NOTE — DIETITIAN INITIAL EVALUATION ADULT. - CURRENT DIET ORDER MEETS ESTIMATED NUTRIENT REQUIREMENTS
TRANSFER - IN REPORT:    Verbal report received from [facility]. RN(name) on Kelvin Lee  being received from 5th floor(unit) for routine progression of care      Report consisted of patients Situation, Background, Assessment and   Recommendations(SBAR). Information from the following report(s) Kardex, Intake/Output, MAR, Recent Results and Med Rec Status was reviewed with the receiving nurse. Opportunity for questions and clarification was provided. Assessment completed upon patients arrival to unit and care assumed. Statement Selected

## 2021-09-29 NOTE — DIETITIAN INITIAL EVALUATION ADULT. - FACTORS AFF FOOD INTAKE
RN reports pt enthusiastically ate oatmeal, eggs, chicken soup, and salmon over past 24-hours./none RN reports pt enthusiastically ate oatmeal, eggs, chicken soup, salmon and cookies over past 24-hours./none

## 2021-09-30 NOTE — PROGRESS NOTE ADULT - ASSESSMENT
A/P:  89y f s/p L Hip CRPP POD 3    -PT/OT -WBAT  -Pain Control  -DVT ppx per primary team  -Rest, ice, compress and elevate the extremity as we needed  -Incentive Spirometry  -Downgraded to medical floor, medical management per primary team

## 2021-09-30 NOTE — PROGRESS NOTE ADULT - SUBJECTIVE AND OBJECTIVE BOX
TRAUMA SURGERY PROGRESS NOTE  Post-Op Day #2 // Closed reduction and percutaneous pinning of left hip    SUBJECTIVE  Pt seen and examined at bedside. No complaints.  Pain controlled. Denies N/V. Tolerating regular diet. Passing flatus and BM.      MEDICATIONS  (STANDING):  allopurinol 100 milliGRAM(s) Oral daily  BACItracin   Ointment 1 Application(s) Topical daily  chlorhexidine 2% Cloths 1 Application(s) Topical <User Schedule>  cholecalciferol 400 Unit(s) Oral daily  donepezil 5 milliGRAM(s) Oral at bedtime  influenza   Vaccine 0.5 milliLiter(s) IntraMuscular once  lacosamide IVPB 100 milliGRAM(s) IV Intermittent every 12 hours  lidocaine   4% Patch 1 Patch Transdermal every 24 hours  polyethylene glycol 3350 17 Gram(s) Oral daily  senna 2 Tablet(s) Oral at bedtime    MEDICATIONS  (PRN):      OBJECTIVE:  PHYSICAL EXAM   General Appearance: Appears well, NAD  Resp: Patent airway, non-labored breathing  CV: RRR  Abdomen: Soft, Nontender, Nondistended    Vital Signs Last 24 Hrs  T(C): 36.7 (30 Sep 2021 05:41), Max: 37.1 (29 Sep 2021 11:00)  T(F): 98 (30 Sep 2021 05:41), Max: 98.8 (29 Sep 2021 11:00)  HR: 89 (30 Sep 2021 05:41) (84 - 96)  BP: 141/76 (30 Sep 2021 05:41) (100/53 - 167/97)  BP(mean): 82 (29 Sep 2021 14:00) (72 - 99)  RR: 18 (30 Sep 2021 05:41) (18 - 27)  SpO2: 98% (30 Sep 2021 05:41) (92% - 98%)        I&O's Detail    29 Sep 2021 07:01  -  30 Sep 2021 07:00  --------------------------------------------------------  IN:    IV PiggyBack: 50 mL    Oral Fluid: 480 mL  Total IN: 530 mL    OUT:    Incontinent per Collection Bag (mL): 700 mL    Voided (mL): 0 mL  Total OUT: 700 mL    Total NET: -170 mL          Daily     Daily     LABS:                        8.6    30.17 )-----------( 361      ( 29 Sep 2021 12:12 )             26.9     09-29    134<L>  |  103  |  33<H>  ----------------------------<  141<H>  4.4   |  21<L>  |  0.77    Ca    8.0<L>      29 Sep 2021 00:21  Phos  2.3     09-29  Mg     2.1     09-29      PT/INR - ( 29 Sep 2021 00:21 )   PT: 12.8 sec;   INR: 1.07 ratio         PTT - ( 29 Sep 2021 00:21 )  PTT:28.5 sec

## 2021-09-30 NOTE — PROGRESS NOTE ADULT - SUBJECTIVE AND OBJECTIVE BOX
ORTHOPAEDICS DAILY PROGRESS NOTE:       SUBJECTIVE/ROS: POD 3. Seen and examined. Pain well controlled. Denies CP/SOB/N/V.        MEDICATIONS  (STANDING):  acetaminophen   Tablet .. 975 milliGRAM(s) Oral every 8 hours  allopurinol 100 milliGRAM(s) Oral daily  BACItracin   Ointment 1 Application(s) Topical daily  chlorhexidine 2% Cloths 1 Application(s) Topical <User Schedule>  cholecalciferol 400 Unit(s) Oral daily  donepezil 5 milliGRAM(s) Oral at bedtime  influenza   Vaccine 0.5 milliLiter(s) IntraMuscular once  lacosamide IVPB 100 milliGRAM(s) IV Intermittent every 12 hours  lactated ringers Bolus 500 milliLiter(s) IV Bolus once  lidocaine   4% Patch 1 Patch Transdermal every 24 hours  polyethylene glycol 3350 17 Gram(s) Oral daily  senna 2 Tablet(s) Oral at bedtime    MEDICATIONS  (PRN):      OBJECTIVE:    Vital Signs Last 24 Hrs  T(C): 36.7 (30 Sep 2021 05:41), Max: 37.1 (29 Sep 2021 11:00)  T(F): 98 (30 Sep 2021 05:41), Max: 98.8 (29 Sep 2021 11:00)  HR: 89 (30 Sep 2021 05:41) (84 - 96)  BP: 141/76 (30 Sep 2021 05:41) (100/53 - 167/97)  BP(mean): 82 (29 Sep 2021 14:00) (72 - 99)  RR: 18 (30 Sep 2021 05:41) (18 - 27)  SpO2: 98% (30 Sep 2021 05:41) (92% - 98%)    Daily     Daily Weight in k.5 (29 Sep 2021 01:46)    LABS:                        8.6    30.17 )-----------( 361      ( 29 Sep 2021 12:12 )             26.9               PHYSICAL EXAM:  NAD  nonlabored resp  LLE   dressing c/d/i  +gastroc/ta/ehl/fhl  silt s/s/sp/dp/t  +DP

## 2021-09-30 NOTE — PROGRESS NOTE ADULT - ASSESSMENT
89F s/p fall with traumatic SDH worsening on repeat 5 hr interval scan with 5mm midline shift. SICU consulted for Q1 hour neurological checks. Patient with persistently worsening imaging, however with stable mental status. Now s/p closed reduction and pinning of L hip 9/27 w/ortho.      PLAN  - pulse oximetry continuous  - f/u AM labs, trend H&H, transfuse PRN  - consider restarting aspirin/lovenox--> discuss with neurosurgery  - pain management  - regular diet  - monitor I&O's, adequate UOP  - c/w home meds        TRAUMA  [phone]

## 2021-10-01 NOTE — DISCHARGE NOTE PROVIDER - NSDCCPTREATMENT_GEN_ALL_CORE_FT
PRINCIPAL PROCEDURE  Procedure: Closed reduction and percutaneous pinning of left hip  Findings and Treatment:

## 2021-10-01 NOTE — PROGRESS NOTE ADULT - SUBJECTIVE AND OBJECTIVE BOX
I measured ,sized and contoured an Aspen TLSO with shoulder straps to accommodate Columba's kyphosis. Brace was labeled and placed on bedside chair for later use OOB. Printed instructions and contact info were provided.   [facility]

## 2021-10-01 NOTE — DISCHARGE NOTE PROVIDER - HOSPITAL COURSE
Patient s/p fall at assisted living facility. Initially presented [facility] and was found to have subdural hematoma and L hip fracture. Transferred to [facility] for further work-up.     Patient admitted on 9/26 to [facility] SICU with SDH, L1 vertebral compression fx, L subcapital femoral neck fx, and L 6-11 rib fx. Ortho consulted for L hip fx and recommended OR for repair. Neuro Sx consulted for SDH. Repeat 5hour CTH showed increase in SDH s/p Vimpat for seizure PPX, DDAVP and platelets for ASA reversal per Neuro sx recs.     On 9/27 patient taken to OR and underwent a closed reduction and percutaneous pinning of L hip. Per Ortho patient can be WBAT. Post-op CTH appears stable. Per Neuro sx repeat CTH 10/8. Patient with acute blood loss anemia and received 1u PRBC 9/28 and additional 500cc bolus for oliguria. PT consult recommended [facility] upon discharge. Patient transferred out of SICU to surgical floor on 9/29. Patient s/p fall at assisted living facility. Initially presented [facility] and was found to have subdural hematoma and L hip fracture. Transferred to [facility] for further work-up.     Patient admitted on 9/26 to [facility] SICU with SDH, L1 vertebral compression fx, L subcapital femoral neck fx, and L 6-11 rib fx. Ortho consulted for L hip fx and recommended OR for repair. Neuro Sx consulted for SDH. Repeat 5hour CTH showed increase in SDH s/p Vimpat for seizure PPX, DDAVP and platelets for ASA reversal per Neuro sx recs.     On 9/27 patient taken to OR and underwent a closed reduction and percutaneous pinning of L hip. Per Ortho patient can be WBAT. Post-op CTH appears stable. Per Neuro sx repeat CTH 10/8. Patient with acute blood loss anemia and received 1u PRBC 9/28 and additional 500cc bolus for oliguria. Patient fitted for a TLSO brace to wear out of bed for comfort. PT consult recommended [facility] upon discharge. Patient transferred out of SICU to surgical floor on 9/29. Infectious disease was consulted on 10/3 for persistent leukocytosis and it was deemed that it's chronic and secondary to her underlying myeloproliferative/MDS. On 10/4 patient was sent for a CT A/P which demonstrated air and fluid filled large and small bowel without evidence of any obstruction, consistent with post-op ileus. Patient received enemas and the patient began to have bowel movements.   After the patient's ileus resolved the patient was cleared for discharge to [facility].

## 2021-10-01 NOTE — PROGRESS NOTE ADULT - SUBJECTIVE AND OBJECTIVE BOX
SURGERY DAILY PROGRESS NOTE:       SUBJECTIVE/ROS: Patient seen and evaluated on AM rounds. Tolerating a diet. Working with PT. Pain controlled. Slept overnight.   Denies nausea, vomiting, chest pain, shortness of breath.        OBJECTIVE:    Vital Signs Last 24 Hrs  T(C): 37.3 (01 Oct 2021 04:38), Max: 37.3 (30 Sep 2021 17:22)  T(F): 99.2 (01 Oct 2021 04:38), Max: 99.2 (30 Sep 2021 17:22)  HR: 84 (01 Oct 2021 04:38) (81 - 100)  BP: 164/76 (01 Oct 2021 04:38) (128/56 - 164/76)  BP(mean): --  RR: 18 (01 Oct 2021 04:38) (18 - 18)  SpO2: 94% (01 Oct 2021 04:38) (93% - 96%)  I&O's Detail    30 Sep 2021 07:01  -  01 Oct 2021 07:00  --------------------------------------------------------  IN:    IV PiggyBack: 200 mL    Oral Fluid: 240 mL  Total IN: 440 mL    OUT:    Voided (mL): 400 mL  Total OUT: 400 mL    Total NET: 40 mL        Daily     Daily   MEDICATIONS  (STANDING):  allopurinol 100 milliGRAM(s) Oral daily  BACItracin   Ointment 1 Application(s) Topical daily  chlorhexidine 2% Cloths 1 Application(s) Topical <User Schedule>  cholecalciferol 400 Unit(s) Oral daily  donepezil 5 milliGRAM(s) Oral at bedtime  influenza   Vaccine 0.5 milliLiter(s) IntraMuscular once  lacosamide IVPB 100 milliGRAM(s) IV Intermittent every 12 hours  lidocaine   4% Patch 1 Patch Transdermal every 24 hours  polyethylene glycol 3350 17 Gram(s) Oral daily  senna 2 Tablet(s) Oral at bedtime    MEDICATIONS  (PRN):      LABS:                        8.5    23.36 )-----------( 362      ( 30 Sep 2021 07:45 )             26.4     09-30    132<L>  |  99  |  23  ----------------------------<  94  4.2   |  21<L>  |  0.54    Ca    7.8<L>      30 Sep 2021 07:45  Phos  2.6     09-30  Mg     2.1     09-30        PHYSICAL EXAM   General Appearance: Appears well, NAD  Resp: Patent airway, non-labored breathing  Abdomen: Soft, Nontender, Nondistended  Hip: dressing c/d/i

## 2021-10-01 NOTE — DISCHARGE NOTE PROVIDER - DETAILS OF MALNUTRITION DIAGNOSIS/DIAGNOSES
This patient has been assessed with a concern for Malnutrition and was treated during this hospitalization for the following Nutrition diagnosis/diagnoses:     -  09/29/2021: Moderate protein-calorie malnutrition

## 2021-10-01 NOTE — DISCHARGE NOTE PROVIDER - NSDCFUADDINST_GEN_ALL_CORE_FT
-You can call the acute care surgery office with any questions related to your hospital stay.     -You can take Tylenol as needed for mild to moderate pain Please be sure not exceed 4000mg of acetaminophen(Tylenol) in a 24 hour period. Lidocaine patch can be applied to chest wall for topical pain relief.     -If you experience fever > 101, pain not controlled with oxycodone, persistent nausea/vomiting please call your surgeon or return to emergency room immediately.      -Use your incentive spirometer every hour for deep breathing exercises.     -ACTIVITY: Weight Bearing as tolerated     ** Do NOT restart your aspirin until cleared by Dr. Webster of Neurosurgery. You will need a repeat CT head on 10/8. Please call office to arrange for CT and office follow up.

## 2021-10-01 NOTE — CHART NOTE - NSCHARTNOTEFT_GEN_A_CORE
Nutrition Follow Up Note  Patient seen for: initial malnutrition follow-up    Chart reviewed, events noted. This is a "89F s/p fall with traumatic SDH worsening on repeat 5 hr interval scan with 5mm midline shift. SICU consulted for Q1 hour neurological checks. Patient with persistently worsening imaging, however with stable mental status. Now s/p closed reduction and pinning of L hip  w/ortho."    Source: [] Patient --sleeping      [x] EMR        [] RN        [] Family at bedside       [] Other:    Diet Order:   Diet, Regular:   Supplement Feeding Modality:  Oral  Ensure Pudding Cans or Servings Per Day:  3       Frequency:  Three Times a day (21)    - Is current order appropriate/adequate? [x] Yes  []  No:     - PO intake :   [] >75%  Adequate    [] 50-75%  Fair       [x] <50%  Poor    - Nutrition-related concerns: pt seen sleeping during visit, breakfast tray observed at bedside, consumed ~ 25% of oatmeal, ~ 75% of ensure pudding and orange juice. No acute GI distress noted.     GI:  Last BM _noted with fecal incontinence__.   Bowel Regimen? [x] Yes   [] No      Weights:   Daily Weight in k.5 (), Weight in k.7 (), Weight in k.8 ()--dosing weight     Nutritionally Pertinent MEDICATIONS  (STANDING):  allopurinol  cholecalciferol  polyethylene glycol 3350  senna    Pertinent Labs: 10-01 @ 07:45: Na 131<L>, BUN 20, Cr 0.61, BG 80, K+ 3.9, Phos 2.5, Mg 2.1, Alk Phos --, ALT/SGPT --, AST/SGOT --, HbA1c --    A1C with Estimated Average Glucose Result: 5.1 % (21 @ 04:28)    Skin per nursing documentation: suspected STI to sacrum  Edema: +1 left/right ankle, +2 left hip, +2 left knee     Estimated Needs:   [x] no change since previous assessment  [] recalculated:     Previous Nutrition Diagnosis:   1) Malnutrition Moderate  2) Increased Nutrient Needs.  Nutrition Diagnosis is: [x ongoing  [] resolved [] not applicable --addressed with nutrition supplements     New Nutrition Diagnosis: [] Not applicable    Nutrition Care Plan:  [] In Progress  [x] Achieved  [] Not applicable    Nutrition Interventions:     Education Provided:       [] Yes:  [x] No:        Recommendations:         [x] Continue current diet order     [x] Continue Ensure pudding TID + Mighty Shakes TID to supplement PO intake as needed.      [x] Encourage intake of protein foods.      [x] RD to remain available and follow-up as medically appropriate.     Monitoring and Evaluation:   Continue to monitor nutritional intake, tolerance to diet prescription, weights, labs, skin integrity      RD remains available upon request and will follow up per protocol  Aimee Grissom RD, CDN, Pager # [phone]

## 2021-10-01 NOTE — DISCHARGE NOTE PROVIDER - NSDCCPCAREPLAN_GEN_ALL_CORE_FT
PRINCIPAL DISCHARGE DIAGNOSIS  Diagnosis: Acute subdural hematoma  Assessment and Plan of Treatment:       SECONDARY DISCHARGE DIAGNOSES  Diagnosis: Hip fracture, left  Assessment and Plan of Treatment:     Diagnosis: Multiple fractures of ribs of left side  Assessment and Plan of Treatment:      PRINCIPAL DISCHARGE DIAGNOSIS  Diagnosis: Acute subdural hematoma  Assessment and Plan of Treatment: You require a repeat CT head on 10/8 to re-evaluate the subdural hematoma. Please follow up outpatient with Dr. Webster.      SECONDARY DISCHARGE DIAGNOSES  Diagnosis: Hip fracture, left  Assessment and Plan of Treatment: Recovering from surgery. Weight bearing as tolerated on left lower extremity. Follow up with Dr. Hansen in 2 weeks.    Diagnosis: Multiple fractures of ribs of left side  Assessment and Plan of Treatment: Please follow up with your Trauma Doctor only if needed at [address] [place] [address].   Phone #[phone].  You may use Salanpas 1% lidocaine patches over the counter for topical pain relief.      Diagnosis: Vertebral compression fracture  Assessment and Plan of Treatment: L1   You may wear the TLSO brace out of bed for comfort.     PRINCIPAL DISCHARGE DIAGNOSIS  Diagnosis: Acute subdural hematoma  Assessment and Plan of Treatment: Please follow up outpatient with Dr. Webster.      SECONDARY DISCHARGE DIAGNOSES  Diagnosis: Hip fracture, left  Assessment and Plan of Treatment: Recovering from surgery. Weight bearing as tolerated on left lower extremity. Follow up with Dr. Hansen in 2 weeks.    Diagnosis: Multiple fractures of ribs of left side  Assessment and Plan of Treatment: Please follow up with your Trauma Doctor only if needed at [address] [place] [address].   Phone #[phone].  You may use Salanpas 1% lidocaine patches over the counter for topical pain relief.      Diagnosis: Vertebral compression fracture  Assessment and Plan of Treatment: L1   You may wear the TLSO brace out of bed for comfort.

## 2021-10-01 NOTE — DISCHARGE NOTE PROVIDER - NSDCMRMEDTOKEN_GEN_ALL_CORE_FT
acetaminophen 325 mg oral tablet: 2 tab(s) orally every 6 hours, As needed, Temp greater or equal to 38C (100.4F), Mild Pain (1 - 3)  allopurinol 100 mg oral tablet: 1 tab(s) orally once a day  Aricept 5 mg oral tablet: 1 tab(s) orally once a day (at bedtime)  bacitracin 500 units/g topical ointment: 1 application topically once a day  hydroxyurea 500 mg oral capsule: 1 cap(s) orally once a day  lidocaine 4% topical film: Apply topically to affected area once a day  polyethylene glycol 3350 oral powder for reconstitution: 17 gram(s) orally once a day, As needed, constipaton  potassium chloride 10 mEq oral capsule, extended release: 1 cap(s) orally 2 times a day  senna oral tablet: 1 tab(s) orally once a day (at bedtime)  Vitamin D3 400 intl units (10 mcg) oral tablet: 1 tab(s) orally once a day   acetaminophen 325 mg oral tablet: 2 tab(s) orally every 6 hours, As needed, Temp greater or equal to 38C (100.4F), Mild Pain (1 - 3)  allopurinol 100 mg oral tablet: 1 tab(s) orally once a day  Aricept 5 mg oral tablet: 1 tab(s) orally once a day (at bedtime)  bacitracin 500 units/g topical ointment: 1 application topically once a day  hydroxyurea 500 mg oral capsule: 1 cap(s) orally once a day  lidocaine 4% topical film: Apply topically to affected area once a day  polyethylene glycol 3350 oral powder for reconstitution: 17 gram(s) orally once a day, As needed, constipaton  potassium chloride 10 mEq oral capsule, extended release: 1 cap(s) orally 2 times a day  senna oral tablet: 1 tab(s) orally once a day (at bedtime)  TLSO Brace :   Vitamin D3 400 intl units (10 mcg) oral tablet: 1 tab(s) orally once a day   acetaminophen 325 mg oral tablet: 2 tab(s) orally every 6 hours, As needed, Temp greater or equal to 38C (100.4F), Mild Pain (1 - 3)  allopurinol 100 mg oral tablet: 1 tab(s) orally once a day  Aricept 5 mg oral tablet: 1 tab(s) orally once a day (at bedtime)  bacitracin 500 units/g topical ointment: 1 application topically once a day  hydroxyurea 500 mg oral capsule: 1 cap(s) orally once a day  lacosamide 200 mg/20 mL intravenous solution: 10 milliliter(s) intravenous every 12 hours  lidocaine 4% topical film: Apply topically to affected area once a day  polyethylene glycol 3350 oral powder for reconstitution: 17 gram(s) orally once a day, As needed, constipaton  potassium chloride 10 mEq oral capsule, extended release: 1 cap(s) orally 2 times a day  senna oral tablet: 1 tab(s) orally once a day (at bedtime)  TLSO Brace :   Vitamin D3 400 intl units (10 mcg) oral tablet: 1 tab(s) orally once a day   acetaminophen 325 mg oral tablet: 2 tab(s) orally every 6 hours, As needed, Temp greater or equal to 38C (100.4F), Mild Pain (1 - 3)  allopurinol 100 mg oral tablet: 1 tab(s) orally once a day  Aricept 5 mg oral tablet: 1 tab(s) orally once a day (at bedtime)  bacitracin 500 units/g topical ointment: 1 application topically once a day  bisacodyl 5 mg oral delayed release tablet: 1 tab(s) orally once a day (at bedtime)  hydroxyurea 500 mg oral capsule: 1 cap(s) orally once a day  lidocaine 4% topical film: Apply topically to affected area once a day  polyethylene glycol 3350 oral powder for reconstitution: 17 gram(s) orally once a day, As needed, constipaton  senna oral tablet: 1 tab(s) orally once a day (at bedtime)  TLSO Brace :   Vitamin D3 400 intl units (10 mcg) oral tablet: 1 tab(s) orally once a day

## 2021-10-01 NOTE — DISCHARGE NOTE PROVIDER - PROVIDER TOKENS
PROVIDER:[TOKEN:[[id number]],FOLLOWUP:[2 weeks]],PROVIDER:[TOKEN:[[id number]],FOLLOWUP:[1 week]] PROVIDER:[TOKEN:[[id number]],FOLLOWUP:[2 weeks]],PROVIDER:[TOKEN:[[id number]],FOLLOWUP:[1 week]],PROVIDER:[TOKEN:[[id number]]]

## 2021-10-01 NOTE — DISCHARGE NOTE PROVIDER - NSDCFUADDAPPT_GEN_ALL_CORE_FT
Please follow up with your primary care physician within 1-2 weeks of discharge to discuss your recent hospitalization, surgery and changes to your medication.  Please follow up with your primary care physician within 1-2 weeks of discharge to discuss your recent hospitalization, surgery and changes to your medication.     INCIDENTAL FINDING:   Soft tissues: The prevertebral soft tissues are unremarkable. A 6 mm left thyroid lobe nodule and adjacent coarse calcification is noted.

## 2021-10-01 NOTE — DISCHARGE NOTE PROVIDER - CARE PROVIDERS DIRECT ADDRESSES
,[email or web address]

## 2021-10-01 NOTE — PROGRESS NOTE ADULT - SUBJECTIVE AND OBJECTIVE BOX
ORTHOPAEDICS DAILY PROGRESS NOTE:       SUBJECTIVE/ROS: POD 4. Seen and examined. Pain well controlled. Has been working with Pt. Awilda CP/SOB/N/V.         MEDICATIONS  (STANDING):  allopurinol 100 milliGRAM(s) Oral daily  BACItracin   Ointment 1 Application(s) Topical daily  chlorhexidine 2% Cloths 1 Application(s) Topical <User Schedule>  cholecalciferol 400 Unit(s) Oral daily  donepezil 5 milliGRAM(s) Oral at bedtime  influenza   Vaccine 0.5 milliLiter(s) IntraMuscular once  lacosamide IVPB 100 milliGRAM(s) IV Intermittent every 12 hours  lidocaine   4% Patch 1 Patch Transdermal every 24 hours  polyethylene glycol 3350 17 Gram(s) Oral daily  senna 2 Tablet(s) Oral at bedtime    MEDICATIONS  (PRN):      OBJECTIVE:    Vital Signs Last 24 Hrs  T(C): 37.3 (01 Oct 2021 04:38), Max: 37.3 (30 Sep 2021 17:22)  T(F): 99.2 (01 Oct 2021 04:38), Max: 99.2 (30 Sep 2021 17:22)  HR: 84 (01 Oct 2021 04:38) (81 - 100)  BP: 164/76 (01 Oct 2021 04:38) (128/56 - 164/76)  BP(mean): --  RR: 18 (01 Oct 2021 04:38) (18 - 18)  SpO2: 94% (01 Oct 2021 04:38) (93% - 96%)    I&O's Detail    29 Sep 2021 07:01  -  30 Sep 2021 07:00  --------------------------------------------------------  IN:    IV PiggyBack: 50 mL    Oral Fluid: 480 mL  Total IN: 530 mL    OUT:    Incontinent per Collection Bag (mL): 700 mL    Voided (mL): 0 mL  Total OUT: 700 mL    Total NET: -170 mL      30 Sep 2021 07:01  -  01 Oct 2021 06:15  --------------------------------------------------------  IN:    IV PiggyBack: 100 mL    Oral Fluid: 240 mL  Total IN: 340 mL    OUT:    Voided (mL): 400 mL  Total OUT: 400 mL    Total NET: -60 mL          Daily     Daily     LABS:                        8.5    23.36 )-----------( 362      ( 30 Sep 2021 07:45 )             26.4     09-30    132<L>  |  99  |  23  ----------------------------<  94  4.2   |  21<L>  |  0.54    Ca    7.8<L>      30 Sep 2021 07:45  Phos  2.6     09-30  Mg     2.1     09-30                    PHYSICAL EXAM:  NAD  nonlabored resp  LLE  dressing c/d/i, 4x4/tegi  +gastroc/ta/ehl/fhl  silt s/s/sp/dp/t  +DP

## 2021-10-01 NOTE — PROGRESS NOTE ADULT - ASSESSMENT
A/P:  89y f s/p L Hip CRPP POD 4    -PT/OT -WBAT  -Pain Control  -DVT ppx per primary team  -Rest, ice, compress and elevate the extremity as we needed  -Incentive Spirometry  -Care per primary team  -Dispo planning A/P:  89y f s/p L Hip CRPP POD 4    -PT/OT -WBAT  -Pain Control  -DVT ppx per primary team  -Rest, ice, compress and elevate the extremity as we needed  -Incentive Spirometry  -Care per primary team  -Dispo planning  -May f/u with Dr. Hansen 2 weeks following discharge  -Orthopaedics to sign off  -Please reach out with any further questions

## 2021-10-01 NOTE — PROGRESS NOTE ADULT - ASSESSMENT
89F s/p fall with traumatic SDH worsening on repeat 5 hr interval scan with 5mm midline shift. SICU consulted for Q1 hour neurological checks. Patient with persistently worsening imaging, however with stable mental status. Now s/p closed reduction and pinning of L hip 9/27 w/ortho.      PLAN  - WBAT- f/u with Dr. Hansen 2 weeks following discharge  - f/u AM labs, trend H&H, transfuse PRN  - consider restarting aspirin/lovenox--> discuss with neurosurgery  - pain management  - regular diet  - monitor I&O's, adequate UOP  - c/w home meds  - [facility] planning         TRAUMA  [phone]    89F s/p fall with traumatic SDH worsening on repeat 5 hr interval scan with 5mm midline shift. SICU consulted for Q1 hour neurological checks. Patient with persistently worsening imaging, however with stable mental status. Now s/p closed reduction and pinning of L hip 9/27 w/ortho.      PLAN  - WBAT- f/u with Dr. Hansen 2 weeks following discharge  - f/u AM labs, trend H&H, transfuse PRN  - consider restarting aspirin/lovenox--> discuss with neurosurgery  - pain management  - regular diet  - monitor I&O's, adequate UOP  - c/w home meds  - [facility] planning   - spoke with Dr. Combs from neurosurgery, can get repeat CT scan outpatient and can get TLSO for comfort when out of bed         TRAUMA  [phone]

## 2021-10-01 NOTE — DISCHARGE NOTE PROVIDER - CARE PROVIDER_API CALL
Buck Hansen)  Orthopaedic Surgery  [address], [address]  [place] [address]  Phone: [phone]  Fax: [phone]  Follow Up Time: 2 weeks    Prasanna Webster)  Neurosurgery  [address], [address]  [place] [address]  Phone: [phone]  Fax: [phone]  Follow Up Time: 1 week   Buck Hansen)  Orthopaedic Surgery  [address], [address]  [place] [address]  Phone: [phone]  Fax: [phone]  Follow Up Time: 2 weeks    Prasanna Webster)  Neurosurgery  [address], [address]  [place] [address]  Phone: [phone]  Fax: [phone]  Follow Up Time: 1 week    Nancy Tyson)  Surgery  [address], [address]  [place] [address]  Phone: [phone]  Fax: [phone]  Follow Up Time:

## 2021-10-02 NOTE — PROGRESS NOTE ADULT - ASSESSMENT
89F s/p fall with traumatic SDH worsening on repeat 5 hr interval scan with 5mm midline shift. SICU consulted for Q1 hour neurological checks. Patient with persistently worsening imaging, however with stable mental status. Now s/p closed reduction and pinning of L hip 9/27 w/ortho. WBC UPTRENDING      PLAN  - WBAT- f/u with Dr. Hansen 2 weeks following discharge  - f/u AM labs, trend H&H, transfuse PRN  - HOLD lovenox permanently.   - pain management  - regular diet  - monitor I&O's, adequate UOP  - c/w home meds  - [facility] planning   - spoke with Dr. Combs from neurosurgery, can get repeat CT scan outpatient and can get TLSO for comfort when out of bed   - Blood cultures  - Xray   - Spoke to ortho will see her today for wound care.     [id number]        TRAUMA  [phone]

## 2021-10-02 NOTE — PROGRESS NOTE ADULT - SUBJECTIVE AND OBJECTIVE BOX
SURGERY DAILY PROGRESS NOTE:       SUBJECTIVE/ROS: Patient seen and evaluated on AM rounds. Tolerating a diet. Working with PT. Pain controlled. Slept overnight.   Denies nausea, vomiting, chest pain, shortness of breath.        OBJECTIVE:    Vital Signs Last 24 Hrs  T(C): 37.1 (02 Oct 2021 13:40), Max: 37.9 (01 Oct 2021 17:24)  T(F): 98.7 (02 Oct 2021 13:40), Max: 100.2 (01 Oct 2021 17:24)  HR: 100 (02 Oct 2021 13:40) (87 - 100)  BP: 141/69 (02 Oct 2021 13:40) (132/72 - 160/88)  BP(mean): --  RR: 18 (02 Oct 2021 13:40) (16 - 18)  SpO2: 96% (02 Oct 2021 13:40) (93% - 98%)30 Sep 2021 07:01  -  01 Oct 2021 07:00  --------------------------------------------------------    I&O's Detail    01 Oct 2021 07:01  -  02 Oct 2021 07:00  --------------------------------------------------------  IN:    IV PiggyBack: 150 mL    Oral Fluid: 420 mL  Total IN: 570 mL    OUT:    Incontinent per Collection Bag (mL): 500 mL    Voided (mL): 0 mL  Total OUT: 500 mL    Total NET: 70 mL      02 Oct 2021 07:01  -  02 Oct 2021 14:31  --------------------------------------------------------  IN:    Oral Fluid: 320 mL  Total IN: 320 mL    OUT:  Total OUT: 0 mL    Total NET: 320 mL          Daily     Daily   MEDICATIONS  (STANDING):  allopurinol 100 milliGRAM(s) Oral daily  BACItracin   Ointment 1 Application(s) Topical daily  chlorhexidine 2% Cloths 1 Application(s) Topical <User Schedule>  cholecalciferol 400 Unit(s) Oral daily  donepezil 5 milliGRAM(s) Oral at bedtime  influenza   Vaccine 0.5 milliLiter(s) IntraMuscular once  lacosamide IVPB 100 milliGRAM(s) IV Intermittent every 12 hours  lidocaine   4% Patch 1 Patch Transdermal every 24 hours  polyethylene glycol 3350 17 Gram(s) Oral daily  senna 2 Tablet(s) Oral at bedtime    MEDICATIONS  (PRN):      LABS:                        8.5    23.36 )-----------( 362      ( 30 Sep 2021 07:45 )             26.4     09-30    132<L>  |  99  |  23  ----------------------------<  94  4.2   |  21<L>  |  0.54    Ca    7.8<L>      30 Sep 2021 07:45  Phos  2.6     09-30  Mg     2.1     09-30        PHYSICAL EXAM   General Appearance: Appears well, NAD  Resp: Patent airway, non-labored breathing  Abdomen: Soft, Nontender, Nondistended  Hip: dressing c/d/i

## 2021-10-03 NOTE — PROGRESS NOTE ADULT - ASSESSMENT
89F s/p fall with traumatic SDH worsening on repeat 5 hr interval scan with 5mm midline shift. SICU consulted for Q1 hour neurological checks. Patient with persistently worsening imaging, however with stable mental status. Now s/p closed reduction and pinning of L hip 9/27 w/ortho. Patient has uptrending WBC's.     Plan:   - c/w holding Lovenox  - ID consult placed for increased WBC count  - Added doucolax   - Regular diet  - c/w home meds  - Blood cultures with no growth so far

## 2021-10-03 NOTE — CONSULT NOTE ADULT - SUBJECTIVE AND OBJECTIVE BOX
INFECTIOUS DISEASE CONSULT NOTE    Patient is a 89y old  Female who presents with a chief complaint of mechanical fall.    HPI:  89 years old female with PMHx dementia and myeloproliferative/myelodysplastic syndrome (diagnosed in 2019) brought in by EMS due to a fall at [facility] living [facility]. She was Initially presented to [facility] and found to have subdural hematoma and L hip fracture. Transferred to [facility] for further work-up. CTH showed 2cm subdural hematoma and CT A/P showed left hip fracture. Neurosux evaluated and offered evacuation of hematoma but family declined. Patient underwent closed reduction and percutanous pinning of left hip on 9/28. The procedure was uneventful. Patient remained afebrile and hemodynamically stable throughout her hospital stay.     ID is consulted for persistent leukocytosis. From previous record, patient has been having chronic leukocytosis since 2019, which prompted the investigation and received a diagnosis of myeloproliferative/myelodysplastic syndrome. Patient is not on treatment and not transfusion dependent. Patient denies chest pain, shortness of breath, joint pain, nausea/vomiting, diarrhea or dysuria. Patient's abdomen has been distended with minimal stool output.          REVIEW OF SYSTEMS:  CONSTITUTIONAL: No fevers or chills  HEENT: No decrease hearing, tinnitus, ear pain, rhinorrhea, congestion, or sore throat  RESPIRATORY: No cough, wheezing, hemoptysis; no shortness of breath/shortness of breath on exertion; no orthopnea  CARDIOVASCULAR: No chest pain or palpitations  GASTROINTESTINAL: Distended abdomen, abdominal pain, constipation, no nausea/vomiting  GENITOURINARY: No dysuria, increased in urinary frequency or hematuria; no urethral discharge  NEUROLOGICAL: No headache/dizziness; no focal numbness or motor weakness  MSK: No joint pain, erythema, or swelling; no back pain  SKIN: Post-surgical wound clean  All other ROS negative except noted above    Prior hospital charts reviewed [Yes]  Primary team notes reviewed [Yes]  Other consultant notes reviewed [Yes]    PAST MEDICAL & SURGICAL HISTORY:  MDS (myelodysplastic syndrome)    Dementia    S/P ORIF (open reduction internal fixation) fracture  elbow fracture 02/2021        SOCIAL HISTORY:  Unable to answer fully due to demantia    FAMILY HISTORY:  FH: HTN (hypertension) (Mother)    Allergies:  No Known Allergies      ANTIMICROBIALS:      ANTIMICROBIALS (past 90 days):  MEDICATIONS  (STANDING):        OTHER MEDS:   MEDICATIONS  (STANDING):  allopurinol 100 daily  bisacodyl 5 at bedtime  donepezil 5 at bedtime  influenza   Vaccine 0.5 once  lacosamide IVPB 100 every 12 hours  polyethylene glycol 3350 17 daily  senna 2 at bedtime      VITALS:  Vital Signs Last 24 Hrs  T(F): 98.6 (10-03-21 @ 17:24), Max: 100.2 (10-01-21 @ 17:24)    Vital Signs Last 24 Hrs  HR: 100 (10-03-21 @ 17:24) (89 - 100)  BP: 124/85 (10-03-21 @ 17:24) (105/67 - 154/80)  RR: 18 (10-03-21 @ 17:24)  SpO2: 95% (10-03-21 @ 17:24) (94% - 96%)  Wt(kg): --    EXAM:  GENERAL: NAD, lying in bed comfortably  HEAD: Atraumatic, Normocephalic  EYES: EOMI, PERRLA, conjunctiva pink and cornea white  ENT: Normal external ears and nose, no discharges; moist mucous membranes  NECK: Supple, nontender to palpation; no JVD  CHEST/LUNG: Unable to fully auscultate lungs sounds due to posture, bibasilar crackles  HEART: Regular rate and rhythm; No murmurs, rubs, or gallops  ABDOMEN: distended abdomen, tender to palpation; no rebound tenderness; hypoactive bowel sounds  EXTREMITIES:  2+ Peripheral Pulses, brisk capillary refill. No clubbing, cyanosis, or petal edema  NERVOUS SYSTEM: Alert and oriented to person. speech is soft and clear. No focal deficits   MSK: No joint erythema, swelling or pain  SKIN: Post surgical wound on left hip looks clean no surrounding erythema    Labs:                        8.9    32.26 )-----------( 620      ( 03 Oct 2021 07:20 )             28.6     10-03    132<L>  |  94<L>  |  26<H>  ----------------------------<  78  3.3<L>   |  23  |  0.59    Ca    8.4      03 Oct 2021 07:20  Phos  2.8     10-03  Mg     2.1     10-03        WBC Trend:  WBC Count: 32.26 (10-03-21 @ 07:20)  WBC Count: 27.85 (10-02-21 @ 07:38)  WBC Count: 24.20 (10-01-21 @ 07:45)  WBC Count: 23.36 (09-30-21 @ 07:45)      Auto Neutrophil #: 13.64 K/uL (09-26-21 @ 01:25)  Auto Neutrophil #: 15.14 K/uL (05-19-21 @ 20:25)  Auto Neutrophil #: 16.21 K/uL (02-25-21 @ 06:31)  Auto Neutrophil #: 11.14 K/uL (02-24-21 @ 10:21)  Auto Neutrophil #: 11.82 K/uL (10-15-20 @ 05:30)      Creatine Trend:  Creatinine, Serum: 0.59 (10-03)  Creatinine, Serum: 0.63 (10-02)  Creatinine, Serum: 0.61 (10-01)  Creatinine, Serum: 0.54 (09-30)      Liver Biochemical Testing Trend:  Alanine Aminotransferase (ALT/SGPT): 8 *L* (09-26)  Alanine Aminotransferase (ALT/SGPT): 23 (09-26)  Alanine Aminotransferase (ALT/SGPT): 31 (05-19)  Alanine Aminotransferase (ALT/SGPT): 22 (02-24)  Alanine Aminotransferase (ALT/SGPT): 39 (10-15)  Aspartate Aminotransferase (AST/SGOT): 25 (09-26-21 @ 06:14)  Aspartate Aminotransferase (AST/SGOT): 46 (09-26-21 @ 01:25)  Aspartate Aminotransferase (AST/SGOT): 37 (05-19-21 @ 20:25)  Aspartate Aminotransferase (AST/SGOT): 36 (02-24-21 @ 10:21)  Aspartate Aminotransferase (AST/SGOT): 26 (10-15-20 @ 05:30)  Bilirubin Total, Serum: 0.6 (09-26)  Bilirubin Total, Serum: 0.6 (09-26)  Bilirubin Total, Serum: 0.3 (05-19)  Bilirubin Direct, Serum: 0.1 (02-27)  Bilirubin Total, Serum: 0.5 (02-27)      Trend LDH  02-27-21 @ 12:50  292<H>    MICROBIOLOGY:  Culture - Blood (collected 02 Oct 2021 16:15)  Source: .Blood Blood  Preliminary Report:    No growth to date.    Culture - Urine (collected 24 Feb 2021 22:03)  Source: .Urine Catheterized  Final Report:    <10,000 CFU/mL Normal Urogenital Shante    Culture - Blood (collected 24 Feb 2021 13:38)  Source: .Blood Blood  Final Report:    No Growth Final    Culture - Blood (collected 24 Feb 2021 13:38)  Source: .Blood Blood  Final Report:    No Growth Final      COVID-19 PCR: NotDetec (09-29-21 @ 19:41)  COVID-19 PCR: NotDetec (09-26-21 @ 01:25)    COVID-19 Gaston Domain AB Interp: Negative (09-27-21 @ 08:54)  COVID-19 Gaston Domain AB Interp: Negative (05-21-21 @ 07:04)  COVID-19 IgG Antibody Interpretation: Negative (02-25-21 @ 04:57)      Procalcitonin, Serum: 0.14 (10-03)  Procalcitonin, Serum: 0.17 (10-01)  Procalcitonin, Serum: 0.14 (09-28)      A1C with Estimated Average Glucose Result: 5.1 % (09-29-21 @ 04:28)      RADIOLOGY:  imaging below personally reviewed    < from: CT Head No Cont (09.28.21 @ 01:37) >  IMPRESSION: Acute subdural hematoma involving the left temporal frontal parietal region is again seen as well as the left tentorial region.    Acute subdural hematoma involving the right frontal region is now seen.      < from: CT Abdomen and Pelvis w/ IV Cont (09.26.21 @ 06:29) >  IMPRESSION:  Acute impacted subcapital left hip fracture. Acute appearing fracture through the L1 vertebral body with moderate compression and mild bony retropulsion.    Indeterminate age moderate to severe compression fractures of T6 and T7 and mild compression fractures of T5 and T8 which are new compared with CT of the chest from 12/8/2017.    No evidence of acute intrathoracic or intra-abdominal/pelvic traumatic injury.    Atrophy of the pancreatic neck, body, and tail with pancreatic ductal dilatation to the level of the pancreatic head similar to but increased compared with prior exam from 1/17/2018.     INFECTIOUS DISEASE CONSULT NOTE    Patient is a 89y old  Female who presents with a chief complaint of mechanical fall.    HPI:  89 years old female with PMHx dementia and myeloproliferative/myelodysplastic syndrome (diagnosed in 2019) brought in by EMS due to a fall at [facility] living [facility]. She was Initially presented to [facility] and found to have subdural hematoma and L hip fracture. Transferred to [facility] for further work-up. CTH showed 2cm subdural hematoma and CT A/P showed left hip fracture. Neurosux evaluated and offered evacuation of hematoma but family declined. Patient underwent closed reduction and percutanous pinning of left hip on 9/28. The procedure was uneventful. Patient remained afebrile and hemodynamically stable throughout her hospital stay.     ID is consulted for persistent leukocytosis. From previous record, patient has been having chronic leukocytosis since 2019, which prompted the investigation and received a diagnosis of myeloproliferative/myelodysplastic syndrome. Patient is not on treatment and not transfusion dependent. Patient denies chest pain, shortness of breath, joint pain, nausea/vomiting, diarrhea or dysuria. Patient's abdomen has been distended with minimal stool output.          REVIEW OF SYSTEMS:  CONSTITUTIONAL: No fevers or chills  HEENT: No decrease hearing, tinnitus, ear pain, rhinorrhea, congestion, or sore throat  RESPIRATORY: No cough, wheezing, hemoptysis; no shortness of breath/shortness of breath on exertion; no orthopnea  CARDIOVASCULAR: No chest pain or palpitations  GASTROINTESTINAL: Distended abdomen, abdominal pain, constipation, no nausea/vomiting  GENITOURINARY: No dysuria, increased in urinary frequency or hematuria; no urethral discharge  NEUROLOGICAL: No headache/dizziness; no focal numbness or motor weakness  MSK: No joint pain, erythema, or swelling; no back pain  SKIN: Post-surgical wound clean  All other ROS negative except noted above    Prior hospital charts reviewed [Yes]  Primary team notes reviewed [Yes]  Other consultant notes reviewed [Yes]    PAST MEDICAL & SURGICAL HISTORY:  MDS (myelodysplastic syndrome)    Dementia    S/P ORIF (open reduction internal fixation) fracture  elbow fracture 02/2021        SOCIAL HISTORY:  Unable to answer fully due to demantia    FAMILY HISTORY:  FH: HTN (hypertension) (Mother)    Allergies:  No Known Allergies      ANTIMICROBIALS:      ANTIMICROBIALS (past 90 days):  MEDICATIONS  (STANDING):        OTHER MEDS:   MEDICATIONS  (STANDING):  allopurinol 100 daily  bisacodyl 5 at bedtime  donepezil 5 at bedtime  influenza   Vaccine 0.5 once  lacosamide IVPB 100 every 12 hours  polyethylene glycol 3350 17 daily  senna 2 at bedtime      VITALS:  Vital Signs Last 24 Hrs  T(F): 98.6 (10-03-21 @ 17:24), Max: 100.2 (10-01-21 @ 17:24)    Vital Signs Last 24 Hrs  HR: 100 (10-03-21 @ 17:24) (89 - 100)  BP: 124/85 (10-03-21 @ 17:24) (105/67 - 154/80)  RR: 18 (10-03-21 @ 17:24)  SpO2: 95% (10-03-21 @ 17:24) (94% - 96%)  Wt(kg): --    EXAM:  GENERAL: NAD, lying in bed comfortably  HEAD: Atraumatic, Normocephalic  EYES: EOMI, PERRLA, conjunctiva pink and cornea white  ENT: Normal external ears and nose, no discharges; moist mucous membranes  NECK: Supple, nontender to palpation; no JVD  CHEST/LUNG: Unable to fully auscultate lungs sounds due to posture, bibasilar crackles  HEART: Regular rate and rhythm; No murmurs, rubs, or gallops  ABDOMEN: distended abdomen, tender to palpation; no rebound tenderness; hypoactive bowel sounds  EXTREMITIES:  2+ Peripheral Pulses, brisk capillary refill. No clubbing, cyanosis, or petal edema  NERVOUS SYSTEM: Alert and oriented to person. speech is soft and clear. No focal deficits   MSK: No joint erythema, swelling or pain  SKIN: Post surgical wound on left hip looks clean no surrounding erythema    Labs:                        8.9    32.26 )-----------( 620      ( 03 Oct 2021 07:20 )             28.6     10-03    132<L>  |  94<L>  |  26<H>  ----------------------------<  78  3.3<L>   |  23  |  0.59    Ca    8.4      03 Oct 2021 07:20  Phos  2.8     10-03  Mg     2.1     10-03        WBC Trend:  WBC Count: 32.26 (10-03-21 @ 07:20)  WBC Count: 27.85 (10-02-21 @ 07:38)  WBC Count: 24.20 (10-01-21 @ 07:45)  WBC Count: 23.36 (09-30-21 @ 07:45)      Auto Neutrophil #: 13.64 K/uL (09-26-21 @ 01:25)  Auto Neutrophil #: 15.14 K/uL (05-19-21 @ 20:25)  Auto Neutrophil #: 16.21 K/uL (02-25-21 @ 06:31)  Auto Neutrophil #: 11.14 K/uL (02-24-21 @ 10:21)  Auto Neutrophil #: 11.82 K/uL (10-15-20 @ 05:30)      Creatine Trend:  Creatinine, Serum: 0.59 (10-03)  Creatinine, Serum: 0.63 (10-02)  Creatinine, Serum: 0.61 (10-01)  Creatinine, Serum: 0.54 (09-30)      Liver Biochemical Testing Trend:  Alanine Aminotransferase (ALT/SGPT): 8 *L* (09-26)  Alanine Aminotransferase (ALT/SGPT): 23 (09-26)  Alanine Aminotransferase (ALT/SGPT): 31 (05-19)  Alanine Aminotransferase (ALT/SGPT): 22 (02-24)  Alanine Aminotransferase (ALT/SGPT): 39 (10-15)  Aspartate Aminotransferase (AST/SGOT): 25 (09-26-21 @ 06:14)  Aspartate Aminotransferase (AST/SGOT): 46 (09-26-21 @ 01:25)  Aspartate Aminotransferase (AST/SGOT): 37 (05-19-21 @ 20:25)  Aspartate Aminotransferase (AST/SGOT): 36 (02-24-21 @ 10:21)  Aspartate Aminotransferase (AST/SGOT): 26 (10-15-20 @ 05:30)  Bilirubin Total, Serum: 0.6 (09-26)  Bilirubin Total, Serum: 0.6 (09-26)  Bilirubin Total, Serum: 0.3 (05-19)  Bilirubin Direct, Serum: 0.1 (02-27)  Bilirubin Total, Serum: 0.5 (02-27)      Trend LDH  02-27-21 @ 12:50  292<H>    MICROBIOLOGY:  Culture - Blood (collected 02 Oct 2021 16:15)  Source: .Blood Blood  Preliminary Report:    No growth to date.    Culture - Urine (collected 24 Feb 2021 22:03)  Source: .Urine Catheterized  Final Report:    <10,000 CFU/mL Normal Urogenital Shante    Culture - Blood (collected 24 Feb 2021 13:38)  Source: .Blood Blood  Final Report:    No Growth Final    Culture - Blood (collected 24 Feb 2021 13:38)  Source: .Blood Blood  Final Report:    No Growth Final      COVID-19 PCR: NotDetec (09-29-21 @ 19:41)  COVID-19 PCR: NotDetec (09-26-21 @ 01:25)    COVID-19 Gaston Domain AB Interp: Negative (09-27-21 @ 08:54)  COVID-19 Gaston Domain AB Interp: Negative (05-21-21 @ 07:04)  COVID-19 IgG Antibody Interpretation: Negative (02-25-21 @ 04:57)      Procalcitonin, Serum: 0.14 (10-03)  Procalcitonin, Serum: 0.17 (10-01)  Procalcitonin, Serum: 0.14 (09-28)      A1C with Estimated Average Glucose Result: 5.1 % (09-29-21 @ 04:28)      RADIOLOGY:    <The imaging below has been reviewed and visualized by me independently. Findings as detailed in report below>    < from: CT Head No Cont (09.28.21 @ 01:37) >  IMPRESSION: Acute subdural hematoma involving the left temporal frontal parietal region is again seen as well as the left tentorial region.    Acute subdural hematoma involving the right frontal region is now seen.      < from: CT Abdomen and Pelvis w/ IV Cont (09.26.21 @ 06:29) >  IMPRESSION:  Acute impacted subcapital left hip fracture. Acute appearing fracture through the L1 vertebral body with moderate compression and mild bony retropulsion.    Indeterminate age moderate to severe compression fractures of T6 and T7 and mild compression fractures of T5 and T8 which are new compared with CT of the chest from 12/8/2017.    No evidence of acute intrathoracic or intra-abdominal/pelvic traumatic injury.    Atrophy of the pancreatic neck, body, and tail with pancreatic ductal dilatation to the level of the pancreatic head similar to but increased compared with prior exam from 1/17/2018.

## 2021-10-03 NOTE — PROGRESS NOTE ADULT - SUBJECTIVE AND OBJECTIVE BOX
Subjective:     Patient seen and examined at bedside. Eating and drinking well. Complains of mild abdominal tenderness and distension.     Objective:   Vital Signs Last 24 Hrs  T(C): 37 (03 Oct 2021 17:24), Max: 37.3 (03 Oct 2021 05:24)  T(F): 98.6 (03 Oct 2021 17:24), Max: 99.2 (03 Oct 2021 05:24)  HR: 100 (03 Oct 2021 17:24) (89 - 100)  BP: 124/85 (03 Oct 2021 17:24) (105/67 - 154/80)  BP(mean): --  RR: 18 (03 Oct 2021 17:24) (18 - 18)  SpO2: 95% (03 Oct 2021 17:24) (94% - 96%)  I&O's Summary    02 Oct 2021 07:01  -  03 Oct 2021 07:00  --------------------------------------------------------  IN: 610 mL / OUT: 450 mL / NET: 160 mL    03 Oct 2021 07:01  -  03 Oct 2021 18:10  --------------------------------------------------------  IN: 450 mL / OUT: 300 mL / NET: 150 mL        Physical Exam:  General: NAD, resting comfortably in bed  Pulmonary: Nonlabored breathing, no respiratory distress  Cardiovascular: NSR  Abdominal: soft, NT/ND  Extremities: WWP            LABS:                        8.9    32.26 )-----------( 620      ( 03 Oct 2021 07:20 )             28.6     10-03    132<L>  |  94<L>  |  26<H>  ----------------------------<  78  3.3<L>   |  23  |  0.59    Ca    8.4      03 Oct 2021 07:20  Phos  2.8     10-03  Mg     2.1     10-03              Radiology and Additional Studies:    Assessment and Plan:

## 2021-10-03 NOTE — CONSULT NOTE ADULT - ASSESSMENT
89 years old female with PMHx dementia and myeloproliferative/myelodysplastic syndrome (diagnosed in 2019) brought in by EMS due to a fall at [place] assisted living [facility]. ID is consulted for leukocytosis.  Patient's surgical site is clean and without signs of infection. CXR showed no focal consolidation. Blood culture on 10/2 is NGTD.   However patient has constipation and distended abdomen, will need further investigation  Leukocytosis is chronic so it could be secondary to her underlying myeloproliferative/MDS      IMPRESSION:  Leukocytosis, possibly from underlying myeloproliferative/MDS vs occult infection  Constipation and distended abdomen, could contribute to leukocytosis  S/p Closed reduction and percutaneous pinning 9/28      RECOMMENDATIONS:  - Monitor off antibiotics for now  - Please collect another set of blood culture  - Follow up on blood culture on 10/2  - KUB STAT, possibly need CT A/P if no improvement  - Can repeat UA if patient becomes febrile      Patient is seen and examined with attending and case is discussed with primary team        Yaima Costello DO  PGY4 ID fellow  Pager: [phone]  YANNI pager ID: [phone]  Please contact me via page or text through Microsoft Teams  If after 5PM or on weekends, please call [phone]     89 years old female with PMHx dementia and myeloproliferative/myelodysplastic syndrome (diagnosed in 2019) brought in by EMS due to a fall at [facility] living [facility]. ID is consulted for leukocytosis.  Patient's surgical site is clean and without signs of infection. CXR showed no focal consolidation. Blood culture on 10/2 is NGTD.   However patient has constipation and distended abdomen, will need further investigation  Leukocytosis is chronic so it could be secondary to her underlying myeloproliferative/MDS    IMPRESSION:  Leukocytosis, possibly from underlying myeloproliferative/MDS vs occult infection  Constipation and distended abdomen, could contribute to leukocytosis  S/p Closed reduction and percutaneous pinning 9/28    RECOMMENDATIONS:  - Monitor off antibiotics for now  - Please collect another set of blood culture  - Follow up on blood culture on 10/2  - KUB STAT, possibly need CT A/P if no improvement  - Can repeat UA if patient becomes febrile, unstable or continued WBC uptrend    Yaima Costello, DO  PGY4 ID fellow  Pager: [phone]  AHSAN pager ID: [phone]  Please contact me via page or text through Microsoft Teams  If after 5PM or on weekends, please call [phone]

## 2021-10-03 NOTE — CONSULT NOTE ADULT - ATTENDING COMMENTS
89 years old female with PMHx dementia and myeloproliferative/myelodysplastic syndrome (diagnosed in 2019) brought in by EMS due to a fall at [facility] living [facility]. Found to have left hip fracture    On 9/28 s/p Closed reduction and percutaneous pinning of left hip    Waxing and waning leukocytosis over course of admission  Has remained afebrile and clinically stable  Noted to have acute subdural hemorrhage which is being conservatively managed (per family's wishes)    At this point suspect leukocytosis is secondary to her underlying hematologic process with some acute rise due to her fracture, recent surgery, subdural hemorrhage and/or constipation    Would send 1 more set of blood cultures as part of workup.  If fever, clinical status changes or continued rise in leukocytosis can also check urinalysis  Would monitor off of antibiotics (lower concern for infection)    I will continue to follow. Please feel free to contact me with any further questions.    Clinton Bobo M.D.  [facility] Division of Infectious Disease  8AM-5PM: Pager Number [phone]  After Hours (or if no response): Please contact the Infectious Diseases Office at [phone]

## 2021-10-04 NOTE — PROGRESS NOTE ADULT - ASSESSMENT
89F s/p fall with traumatic SDH worsening on repeat 5 hr interval scan with 5mm midline shift. SICU consulted for Q1 hour neurological checks. Patient with persistently worsening imaging, however with stable mental status. Now s/p closed reduction and pinning of L hip 9/27 w/ortho. Patient has uptrending WBC's.     Plan:   - c/w holding Lovenox  - ID consult placed for increased WBC count: repeat Bcx sent. repeat UA KUB done, prelim read ileus, f/u final red.   - fleet enema x 1 given overnight  - Regular diet. consider NGT if n/v  - c/w home meds  - Blood cultures with no growth so far     ACS  [id number]

## 2021-10-04 NOTE — PROGRESS NOTE ADULT - SUBJECTIVE AND OBJECTIVE BOX
ACS DAILY PROGRESS NOTE:       SUBJECTIVE/ROS: limited subjective 2/2 dementia      MEDICATIONS  (STANDING):  allopurinol 100 milliGRAM(s) Oral daily  BACItracin   Ointment 1 Application(s) Topical daily  bisacodyl 5 milliGRAM(s) Oral at bedtime  cholecalciferol 400 Unit(s) Oral daily  donepezil 5 milliGRAM(s) Oral at bedtime  influenza   Vaccine 0.5 milliLiter(s) IntraMuscular once  lacosamide IVPB 100 milliGRAM(s) IV Intermittent every 12 hours  lidocaine   4% Patch 1 Patch Transdermal every 24 hours  polyethylene glycol 3350 17 Gram(s) Oral daily  senna 2 Tablet(s) Oral at bedtime    MEDICATIONS  (PRN):      OBJECTIVE:    Vital Signs Last 24 Hrs  T(C): 36.7 (03 Oct 2021 23:45), Max: 37.3 (03 Oct 2021 05:24)  T(F): 98 (03 Oct 2021 23:45), Max: 99.2 (03 Oct 2021 05:24)  HR: 96 (03 Oct 2021 23:45) (89 - 100)  BP: 122/77 (03 Oct 2021 23:45) (105/67 - 137/82)  BP(mean): --  RR: 16 (03 Oct 2021 23:45) (16 - 18)  SpO2: 93% (03 Oct 2021 23:45) (93% - 96%)    Physical Exam   Gen: NAD, alert  Pulm: non-labor breathing   Heart: RRR  Abd: soft, distended, NT  Ext: warm to touch        I&O's Detail    02 Oct 2021 07:01  -  03 Oct 2021 07:00  --------------------------------------------------------  IN:    IV PiggyBack: 110 mL    Oral Fluid: 500 mL  Total IN: 610 mL    OUT:    Incontinent per Collection Bag (mL): 150 mL    Voided (mL): 300 mL  Total OUT: 450 mL    Total NET: 160 mL      03 Oct 2021 07:01  -  04 Oct 2021 01:35  --------------------------------------------------------  IN:    IV PiggyBack: 350 mL    Oral Fluid: 160 mL  Total IN: 510 mL    OUT:    Voided (mL): 510 mL  Total OUT: 510 mL    Total NET: 0 mL          Daily     Daily     LABS:                        8.9    32.26 )-----------( 620      ( 03 Oct 2021 07:20 )             28.6     10-03    132<L>  |  94<L>  |  26<H>  ----------------------------<  78  3.3<L>   |  23  |  0.59    Ca    8.4      03 Oct 2021 07:20  Phos  2.8     10-03  Mg     2.1     10-03

## 2021-10-04 NOTE — PROGRESS NOTE ADULT - SUBJECTIVE AND OBJECTIVE BOX
Follow Up:  leukocytosis    Interval History: had some BM after bowel regimen. afebrile. has no complaints otherwise.     REVIEW OF SYSTEMS  [  ] ROS unobtainable because:    [ x ] All other systems negative except as noted below    Constitutional:  [ ] fever [ ] chills  [ ] weight loss  [ ] weakness  Skin:  [ ] rash [ ] phlebitis	  Eyes: [ ] icterus [ ] pain  [ ] discharge	  ENMT: [ ] sore throat  [ ] thrush [ ] ulcers [ ] exudates  Respiratory: [ ] dyspnea [ ] hemoptysis [ ] cough [ ] sputum	  Cardiovascular:  [ ] chest pain [ ] palpitations [ ] edema	  Gastrointestinal:  [ ] nausea [ ] vomiting [ ] diarrhea [x ] constipation [ ] pain	  Genitourinary:  [ ] dysuria [ ] frequency [ ] hematuria [ ] discharge [ ] flank pain  [ ] incontinence  Musculoskeletal:  [ ] myalgias [ ] arthralgias [ ] arthritis  [ ] back pain  Neurological:  [ ] headache [ ] seizures  [ ] confusion/altered mental status    Allergies  No Known Allergies        ANTIMICROBIALS:      OTHER MEDS:  MEDICATIONS  (STANDING):  allopurinol 100 daily  bisacodyl 5 at bedtime  donepezil 5 at bedtime  influenza   Vaccine 0.5 once  lacosamide IVPB 100 every 12 hours  polyethylene glycol 3350 17 daily  senna 2 at bedtime      Vital Signs Last 24 Hrs  T(C): 36.8 (04 Oct 2021 14:34), Max: 37 (03 Oct 2021 17:24)  T(F): 98.3 (04 Oct 2021 14:34), Max: 98.6 (03 Oct 2021 17:24)  HR: 91 (04 Oct 2021 14:34) (89 - 102)  BP: 157/89 (04 Oct 2021 14:34) (122/77 - 157/89)  BP(mean): --  RR: 18 (04 Oct 2021 14:34) (16 - 18)  SpO2: 92% (04 Oct 2021 14:34) (92% - 95%)    PHYSICAL EXAMINATION:  General: Alert and Awake, NAD  Cardiac: RRR, No M/R/G  Resp: CTAB, No Wh/Rh/Ra  Abdomen: NBS, Distended abdomen, mildly diffuse tenderness, No HSM, No rigidity or guarding  MSK: No LE edema. No Calf tenderness  : No padron  Skin: No rashes or lesions. Skin is warm and dry to the touch.   Neuro: Alert and Awake. CN 2-12 Grossly intact. Moves all four extremities spontaneously.  Psych: Calm, Pleasant, Cooperative                          9.0    33.36 )-----------( 751      ( 04 Oct 2021 07:17 )             28.7       10-04    x   |  x   |  x   ----------------------------<  x   4.0   |  x   |  x     Ca    8.3<L>      04 Oct 2021 07:14  Phos  5.5     10-04  Mg     2.2     10-04        Urinalysis Basic - ( 04 Oct 2021 15:15 )    Color: Yellow / Appearance: Slightly Turbid / SG: >1.050 / pH: x  Gluc: x / Ketone: Negative  / Bili: Negative / Urobili: 3 mg/dL   Blood: x / Protein: 30 mg/dL / Nitrite: Negative   Leuk Esterase: Negative / RBC: 1 /hpf / WBC 7 /HPF   Sq Epi: x / Non Sq Epi: 12 /hpf / Bacteria: Negative    MICROBIOLOGY:    .Blood Blood  10-02-21   No growth to date.  --  --    RADIOLOGY:    <The imaging below has been reviewed and visualized by me independently. Findings as detailed in report below>    < from: CT Abdomen and Pelvis w/ IV Cont (10.04.21 @ 13:29) >  IMPRESSION:  Dilated small bowel loops with apparent transition point in the right lower quadrant, suspicious for small bowel obstruction. Fluid and gas distends the colon. Ileus is not excluded.    < end of copied text >

## 2021-10-04 NOTE — PROGRESS NOTE ADULT - ASSESSMENT
89 years old female with PMHx dementia and myeloproliferative/myelodysplastic syndrome (diagnosed in 2019) brought in by EMS due to a fall at [facility] living [facility]. ID is consulted for leukocytosis.    On 9/28 s/p Closed reduction and percutaneous pinning of left hip    Waxing and waning leukocytosis over course of admission  Has remained afebrile and clinically stable  Noted to have acute subdural hemorrhage which is being conservatively managed (per family's wishes)    At this point suspect leukocytosis is secondary to her underlying hematologic process with some acute rise due to her fracture, recent surgery, subdural hemorrhage and/or constipation    IMPRESSION:  Leukocytosis, possibly from underlying myeloproliferative/MDS vs occult infection  Constipation and distended abdomen, could contribute to leukocytosis  S/p Closed reduction and percutaneous pinning 9/28    RECOMMENDATIONS:  - Monitor off antibiotics for now  - UA sent today; followup on results  - Follow up on prelim blood cultures  --Continue to follow CBC with diff  --Continue to follow temperature curve  --Follow up on preliminary blood cultures    I will continue to follow. Please feel free to contact me with any further questions.    Clinton Bobo M.D.  [facility] Division of Infectious Disease  8AM-5PM: Pager Number [phone]  After Hours (or if no response): Please contact the Infectious Diseases Office at [phone]

## 2021-10-04 NOTE — PROVIDER CONTACT NOTE (MEDICATION) - RECOMMENDATIONS
PA made aware. PA to make assessment at bedside with resident MD. Both recommend to administer Fleet Enema for pt to have BM.

## 2021-10-05 NOTE — PROGRESS NOTE ADULT - SUBJECTIVE AND OBJECTIVE BOX
Follow Up:  Leukocytosis     Interval History:    REVIEW OF SYSTEMS  [  ] ROS unobtainable because:    [  ] All other systems negative except as noted below    Constitutional:  [ ] fever [ ] chills  [ ] weight loss  [ ] weakness  Skin:  [ ] rash [ ] phlebitis	  Eyes: [ ] icterus [ ] pain  [ ] discharge	  ENMT: [ ] sore throat  [ ] thrush [ ] ulcers [ ] exudates  Respiratory: [ ] dyspnea [ ] hemoptysis [ ] cough [ ] sputum	  Cardiovascular:  [ ] chest pain [ ] palpitations [ ] edema	  Gastrointestinal:  [ ] nausea [ ] vomiting [ ] diarrhea [ ] constipation [ ] pain	  Genitourinary:  [ ] dysuria [ ] frequency [ ] hematuria [ ] discharge [ ] flank pain  [ ] incontinence  Musculoskeletal:  [ ] myalgias [ ] arthralgias [ ] arthritis  [ ] back pain  Neurological:  [ ] headache [ ] seizures  [ ] confusion/altered mental status    Allergies  No Known Allergies        ANTIMICROBIALS:      OTHER MEDS:  MEDICATIONS  (STANDING):  allopurinol 100 daily  bisacodyl 5 at bedtime  donepezil 5 at bedtime  influenza   Vaccine 0.5 once  lacosamide IVPB 100 every 12 hours  magnesium hydroxide Suspension 30 daily  polyethylene glycol 3350 17 daily  senna 2 at bedtime      Vital Signs Last 24 Hrs  T(C): 36.4 (05 Oct 2021 17:10), Max: 36.9 (05 Oct 2021 05:24)  T(F): 97.6 (05 Oct 2021 17:10), Max: 98.5 (05 Oct 2021 05:24)  HR: 84 (05 Oct 2021 17:10) (84 - 95)  BP: 144/79 (05 Oct 2021 17:10) (109/71 - 152/80)  BP(mean): --  RR: 18 (05 Oct 2021 17:10) (16 - 18)  SpO2: 99% (05 Oct 2021 17:10) (90% - 99%)    PHYSICAL EXAMINATION:  General: Alert and Awake, NAD  HEENT: PERRL, EOMI  Neck: Supple  Cardiac: RRR, No M/R/G  Resp: CTAB, No Wh/Rh/Ra  Abdomen: NBS, NT/ND, No HSM, No rigidity or guarding  MSK: No LE edema. No Calf tenderness  : No padron  Skin: No rashes or lesions. Skin is warm and dry to the touch.   Neuro: Alert and Awake. CN 2-12 Grossly intact. Moves all four extremities spontaneously.  Psych: Calm, Pleasant, Cooperative                          8.8    37.80 )-----------( 741      ( 05 Oct 2021 07:30 )             28.1       10-05    138  |  103  |  31<H>  ----------------------------<  141<H>  3.2<L>   |  23  |  0.63    Ca    8.0<L>      05 Oct 2021 07:30  Phos  3.3     10-05  Mg     2.4     10-05        Urinalysis Basic - ( 04 Oct 2021 15:15 )    Color: Yellow / Appearance: Slightly Turbid / SG: >1.050 / pH: x  Gluc: x / Ketone: Negative  / Bili: Negative / Urobili: 3 mg/dL   Blood: x / Protein: 30 mg/dL / Nitrite: Negative   Leuk Esterase: Negative / RBC: 1 /hpf / WBC 7 /HPF   Sq Epi: x / Non Sq Epi: 12 /hpf / Bacteria: Negative        MICROBIOLOGY:  v  .Blood Blood  10-04-21   No growth to date.  --  --      .Blood Blood  10-02-21   No growth to date.  --  --    RADIOLOGY:    <The imaging below has been reviewed and visualized by me independently. Findings as detailed in report below>    < from: Xray Chest 1 View- PORTABLE-Urgent (Xray Chest 1 View- PORTABLE-Urgent .) (10.04.21 @ 22:15) >  IMPRESSION:  Image limited by rotation and the chin obscures the right apex.    No focal consolidation seen in the visualized lungs.    Possible trace left pleural effusion.    < end of copied text >   Follow Up:  Leukocytosis     Interval History: constipation is improving. afebrile.     REVIEW OF SYSTEMS  [  ] ROS unobtainable because:    [  x] All other systems negative except as noted below    Constitutional:  [ ] fever [ ] chills  [ ] weight loss  [ ] weakness  Skin:  [ ] rash [ ] phlebitis	  Eyes: [ ] icterus [ ] pain  [ ] discharge	  ENMT: [ ] sore throat  [ ] thrush [ ] ulcers [ ] exudates  Respiratory: [ ] dyspnea [ ] hemoptysis [ ] cough [ ] sputum	  Cardiovascular:  [ ] chest pain [ ] palpitations [ ] edema	  Gastrointestinal:  [ ] nausea [ ] vomiting [ ] diarrhea [ ] constipation [ ] pain	  Genitourinary:  [ ] dysuria [ ] frequency [ ] hematuria [ ] discharge [ ] flank pain  [ ] incontinence  Musculoskeletal:  [ ] myalgias [ ] arthralgias [ ] arthritis  [ ] back pain  Neurological:  [ ] headache [ ] seizures  [ ] confusion/altered mental status    Allergies  No Known Allergies        ANTIMICROBIALS:      OTHER MEDS:  MEDICATIONS  (STANDING):  allopurinol 100 daily  bisacodyl 5 at bedtime  donepezil 5 at bedtime  influenza   Vaccine 0.5 once  lacosamide IVPB 100 every 12 hours  magnesium hydroxide Suspension 30 daily  polyethylene glycol 3350 17 daily  senna 2 at bedtime      Vital Signs Last 24 Hrs  T(C): 36.4 (05 Oct 2021 17:10), Max: 36.9 (05 Oct 2021 05:24)  T(F): 97.6 (05 Oct 2021 17:10), Max: 98.5 (05 Oct 2021 05:24)  HR: 84 (05 Oct 2021 17:10) (84 - 95)  BP: 144/79 (05 Oct 2021 17:10) (109/71 - 152/80)  BP(mean): --  RR: 18 (05 Oct 2021 17:10) (16 - 18)  SpO2: 99% (05 Oct 2021 17:10) (90% - 99%)    PHYSICAL EXAMINATION:  General: Alert and Awake, NAD  Cardiac: RRR, No M/R/G  Resp: CTAB, No Wh/Rh/Ra  Abdomen: NBS, Distended abdomen (improved), mildly diffuse tenderness, No HSM, No rigidity or guarding  MSK: No LE edema. No Calf tenderness  : No padron  Skin: No rashes or lesions. Skin is warm and dry to the touch.   Neuro: Alert and Awake. CN 2-12 Grossly intact. Moves all four extremities spontaneously.  Psych: Calm, Pleasant, Cooperative                          8.8    37.80 )-----------( 741      ( 05 Oct 2021 07:30 )             28.1       10-05    138  |  103  |  31<H>  ----------------------------<  141<H>  3.2<L>   |  23  |  0.63    Ca    8.0<L>      05 Oct 2021 07:30  Phos  3.3     10-05  Mg     2.4     10-05        Urinalysis Basic - ( 04 Oct 2021 15:15 )    Color: Yellow / Appearance: Slightly Turbid / SG: >1.050 / pH: x  Gluc: x / Ketone: Negative  / Bili: Negative / Urobili: 3 mg/dL   Blood: x / Protein: 30 mg/dL / Nitrite: Negative   Leuk Esterase: Negative / RBC: 1 /hpf / WBC 7 /HPF   Sq Epi: x / Non Sq Epi: 12 /hpf / Bacteria: Negative        MICROBIOLOGY:  v  .Blood Blood  10-04-21   No growth to date.  --  --      .Blood Blood  10-02-21   No growth to date.  --  --    RADIOLOGY:    <The imaging below has been reviewed and visualized by me independently. Findings as detailed in report below>    < from: Xray Chest 1 View- PORTABLE-Urgent (Xray Chest 1 View- PORTABLE-Urgent .) (10.04.21 @ 22:15) >  IMPRESSION:  Image limited by rotation and the chin obscures the right apex.    No focal consolidation seen in the visualized lungs.    Possible trace left pleural effusion.    < end of copied text >

## 2021-10-05 NOTE — PROGRESS NOTE ADULT - ASSESSMENT
89 years old female with PMHx dementia and myeloproliferative/myelodysplastic syndrome (diagnosed in 2019) brought in by EMS due to a fall at [facility] living [facility]. ID is consulted for leukocytosis.    On 9/28 s/p Closed reduction and percutaneous pinning of left hip    Waxing and waning leukocytosis over course of admission  Has remained afebrile and clinically stable  Noted to have acute subdural hemorrhage which is being conservatively managed (per family's wishes)    At this point suspect leukocytosis is secondary to her underlying hematologic process with some acute rise due to her fracture, recent surgery, subdural hemorrhage and/or constipation    IMPRESSION:  Leukocytosis, possibly from underlying myeloproliferative/MDS vs occult infection  Constipation and distended abdomen, could contribute to leukocytosis  S/p Closed reduction and percutaneous pinning 9/28    RECOMMENDATIONS:  - Monitor off antibiotics for now  - UA without pyuria - doubt UTI  - Follow up on prelim blood cultures  --Continue to follow CBC with diff  --Continue to follow temperature curve  --Follow up on preliminary blood cultures    I will be away tomorrow. Please contact the Infectious Diseases Office to contact the covering Infectious Diseases Attending.     Clinton Bobo M.D.  [facility] Division of Infectious Disease  8AM-5PM: Pager Number [phone]  After Hours (or if no response): Please contact the Infectious Diseases Office at [phone]     The above assessment and plan were discussed with surgery team

## 2021-10-05 NOTE — PROGRESS NOTE ADULT - SUBJECTIVE AND OBJECTIVE BOX
ACS DAILY PROGRESS NOTE:       SUBJECTIVE/ROS: hypoxic to 89% on RA. limited subjective 2/2 dementia         MEDICATIONS  (STANDING):  allopurinol 100 milliGRAM(s) Oral daily  BACItracin   Ointment 1 Application(s) Topical daily  bisacodyl 5 milliGRAM(s) Oral at bedtime  cholecalciferol 400 Unit(s) Oral daily  dextrose 5% + sodium chloride 0.45% with potassium chloride 20 mEq/L 1000 milliLiter(s) (50 mL/Hr) IV Continuous <Continuous>  donepezil 5 milliGRAM(s) Oral at bedtime  influenza   Vaccine 0.5 milliLiter(s) IntraMuscular once  lacosamide IVPB 100 milliGRAM(s) IV Intermittent every 12 hours  lidocaine   4% Patch 1 Patch Transdermal every 24 hours  polyethylene glycol 3350 17 Gram(s) Oral daily  senna 2 Tablet(s) Oral at bedtime    MEDICATIONS  (PRN):      OBJECTIVE:    Vital Signs Last 24 Hrs  T(C): 36.7 (04 Oct 2021 20:57), Max: 37.1 (04 Oct 2021 16:50)  T(F): 98.1 (04 Oct 2021 20:57), Max: 98.7 (04 Oct 2021 16:50)  HR: 95 (04 Oct 2021 20:57) (90 - 102)  BP: 109/71 (04 Oct 2021 20:57) (109/71 - 157/89)  BP(mean): --  RR: 18 (04 Oct 2021 20:57) (18 - 18)  SpO2: 90% (04 Oct 2021 20:57) (90% - 93%)    Physical Exam  Gen: NAD, Alert  Pulm: non-labor breathing   Heart: RRR  Abd: soft, distended. NT  Skin: warm to touch      I&O's Detail    03 Oct 2021 07:01  -  04 Oct 2021 07:00  --------------------------------------------------------  IN:    IV PiggyBack: 410 mL    Oral Fluid: 160 mL  Total IN: 570 mL    OUT:    Voided (mL): 510 mL  Total OUT: 510 mL    Total NET: 60 mL      04 Oct 2021 07:01  -  05 Oct 2021 00:15  --------------------------------------------------------  IN:    Oral Fluid: 480 mL  Total IN: 480 mL    OUT:    Intermittent Catheterization - Urethral (mL): 300 mL    Voided (mL): 50 mL  Total OUT: 350 mL    Total NET: 130 mL          Daily     Daily     LABS:                        8.4    36.26 )-----------( 753      ( 04 Oct 2021 22:20 )             27.1     10-04    138  |  103  |  32<H>  ----------------------------<  179<H>  3.3<L>   |  23  |  0.59    Ca    8.0<L>      04 Oct 2021 22:20  Phos  4.3     10-04  Mg     2.3     10-04        Urinalysis Basic - ( 04 Oct 2021 15:15 )    Color: Yellow / Appearance: Slightly Turbid / SG: >1.050 / pH: x  Gluc: x / Ketone: Negative  / Bili: Negative / Urobili: 3 mg/dL   Blood: x / Protein: 30 mg/dL / Nitrite: Negative   Leuk Esterase: Negative / RBC: 1 /hpf / WBC 7 /HPF   Sq Epi: x / Non Sq Epi: 12 /hpf / Bacteria: Negative

## 2021-10-05 NOTE — PROGRESS NOTE ADULT - ASSESSMENT
89F with PMHx dementia and myeloproliferative/myelodysplastic syndrome s/p fall with traumatic SDH worsening on repeat 5 hr interval scan with 5mm midline shift. SICU consulted for Q1 hour neurological checks. Patient with persistently worsening imaging, however with stable mental status. Now s/p closed reduction and pinning of L hip 9/27 w/ortho    Plan:   - Leukocytosis possible from MDS. appreciate ID consult.   - Hypoxia: improved to 95% on 2L NC. stat labs sent with ABG. CXR with prelim read: mild pulmonary congestion. decreased IVF to 50cc/h.   - Ileus: NPO with sip/chips, IVF. s/p fleet enema. passing BMs. Continue bowel regimens.   - c/w home meds  - Blood cultures with no growth so far   - continue holding DVT ppx    ACS  [id number]

## 2021-10-06 NOTE — PROGRESS NOTE ADULT - ASSESSMENT
89F with PMHx dementia and myeloproliferative/myelodysplastic syndrome s/p fall with traumatic SDH worsening on repeat 5 hr interval scan with 5mm midline shift. SICU consulted for Q1 hour neurological checks. Patient with persistently worsening imaging, however with stable mental status. Now s/p closed reduction and pinning of L hip 9/27 w/ortho    Plan:   - Leukocytosis possible from MDS. appreciate ID consult.   - Hypoxia: improved to 95% on 2L NC. stat labs sent with ABG. CXR with prelim read: mild pulmonary congestion. decreased IVF to 50cc/h.   - Ileus: NPO with sip/chips, IVF. s/p fleet enema. passing BMs. Continue bowel regimens.   - c/w home meds  - Blood cultures with no growth so far   - continue holding DVT ppx    ACS  [id number] 89F with PMHx dementia and myeloproliferative/myelodysplastic syndrome s/p fall with traumatic SDH worsening on repeat 5 hr interval scan with 5mm midline shift. SICU consulted for Q1 hour neurological checks. Patient with persistently worsening imaging, however with stable mental status. Now s/p closed reduction and pinning of L hip 9/27 w/ortho    Plan:   - Leukocytosis possible from MDS. appreciate ID consult.   - Ileus: NPO with sip/chips, IVF. s/p fleet enema. passing BMs. Continue bowel regimens.    - c/w home meds  - Blood cultures with no growth so far   - continue holding DVT ppx  -CT head Oct 8 per neurosurgery     ACS  [id number]

## 2021-10-06 NOTE — PROGRESS NOTE ADULT - SUBJECTIVE AND OBJECTIVE BOX
ACS DAILY PROGRESS NOTE:       SUBJECTIVE/ROS: No acute events overnight. passing flatus/BM.          MEDICATIONS  (STANDING):  allopurinol 100 milliGRAM(s) Oral daily  BACItracin   Ointment 1 Application(s) Topical daily  bisacodyl 5 milliGRAM(s) Oral at bedtime  cholecalciferol 400 Unit(s) Oral daily  dextrose 5% + sodium chloride 0.45% with potassium chloride 20 mEq/L 1000 milliLiter(s) (50 mL/Hr) IV Continuous <Continuous>  donepezil 5 milliGRAM(s) Oral at bedtime  influenza   Vaccine 0.5 milliLiter(s) IntraMuscular once  lacosamide IVPB 100 milliGRAM(s) IV Intermittent every 12 hours  lidocaine   4% Patch 1 Patch Transdermal every 24 hours  magnesium hydroxide Suspension 30 milliLiter(s) Oral daily  polyethylene glycol 3350 17 Gram(s) Oral daily  senna 2 Tablet(s) Oral at bedtime    MEDICATIONS  (PRN):      OBJECTIVE:    Vital Signs Last 24 Hrs  T(C): 36.9 (05 Oct 2021 21:33), Max: 36.9 (05 Oct 2021 05:24)  T(F): 98.4 (05 Oct 2021 21:33), Max: 98.5 (05 Oct 2021 05:24)  HR: 78 (05 Oct 2021 21:33) (78 - 92)  BP: 113/73 (05 Oct 2021 21:33) (113/73 - 144/79)  BP(mean): --  RR: 18 (05 Oct 2021 21:33) (17 - 18)  SpO2: 99% (05 Oct 2021 21:33) (95% - 99%)    Physical Exam  Gen: NAD, Alert  Pulm: non-labor breathing   Heart: RRR  Abd: soft, distended. NT  Skin: warm to touch        I&O's Detail    04 Oct 2021 07:01  -  05 Oct 2021 07:00  --------------------------------------------------------  IN:    dextrose 5% + sodium chloride 0.45% w/ Additives: 375 mL    dextrose 5% + sodium chloride 0.45% w/ Additives: 300 mL    IV PiggyBack: 60 mL    Oral Fluid: 480 mL  Total IN: 1215 mL    OUT:    Intermittent Catheterization - Urethral (mL): 300 mL    Voided (mL): 50 mL  Total OUT: 350 mL    Total NET: 865 mL      05 Oct 2021 07:01  -  06 Oct 2021 01:17  --------------------------------------------------------  IN:    Oral Fluid: 150 mL  Total IN: 150 mL    OUT:    Voided (mL): 0 mL  Total OUT: 0 mL    Total NET: 150 mL          Daily     Daily     LABS:                        8.8    37.80 )-----------( 741      ( 05 Oct 2021 07:30 )             28.1     10-05    138  |  103  |  31<H>  ----------------------------<  141<H>  3.2<L>   |  23  |  0.63    Ca    8.0<L>      05 Oct 2021 07:30  Phos  3.3     10-05  Mg     2.4     10-05        Urinalysis Basic - ( 04 Oct 2021 15:15 )    Color: Yellow / Appearance: Slightly Turbid / SG: >1.050 / pH: x  Gluc: x / Ketone: Negative  / Bili: Negative / Urobili: 3 mg/dL   Blood: x / Protein: 30 mg/dL / Nitrite: Negative   Leuk Esterase: Negative / RBC: 1 /hpf / WBC 7 /HPF   Sq Epi: x / Non Sq Epi: 12 /hpf / Bacteria: Negative                       ACS DAILY PROGRESS NOTE:       SUBJECTIVE/ROS: No acute events overnight. passing flatus/BM.     Patient was examined at the bedside on AM rounds. She is passing flatus, having BMs and her pain is well controlled.      MEDICATIONS  (STANDING):  allopurinol 100 milliGRAM(s) Oral daily  BACItracin   Ointment 1 Application(s) Topical daily  bisacodyl 5 milliGRAM(s) Oral at bedtime  cholecalciferol 400 Unit(s) Oral daily  dextrose 5% + sodium chloride 0.45% with potassium chloride 20 mEq/L 1000 milliLiter(s) (50 mL/Hr) IV Continuous <Continuous>  donepezil 5 milliGRAM(s) Oral at bedtime  influenza   Vaccine 0.5 milliLiter(s) IntraMuscular once  lacosamide IVPB 100 milliGRAM(s) IV Intermittent every 12 hours  lidocaine   4% Patch 1 Patch Transdermal every 24 hours  magnesium hydroxide Suspension 30 milliLiter(s) Oral daily  polyethylene glycol 3350 17 Gram(s) Oral daily  senna 2 Tablet(s) Oral at bedtime    MEDICATIONS  (PRN):      OBJECTIVE:    Vital Signs Last 24 Hrs  T(C): 37.4 (06 Oct 2021 06:31), Max: 37.4 (06 Oct 2021 06:31)  T(F): 99.3 (06 Oct 2021 06:31), Max: 99.3 (06 Oct 2021 06:31)  HR: 98 (06 Oct 2021 06:31) (75 - 98)  BP: 116/78 (06 Oct 2021 06:31) (113/73 - 144/79)  BP(mean): --  RR: 18 (06 Oct 2021 06:31) (18 - 18)  SpO2: 96% (06 Oct 2021 06:31) (96% - 99%)    Physical Exam  Gen: NAD, Alert  Pulm: non-labor breathing   Abd: soft, distended, non-tender.           I&O's Detail    04 Oct 2021 07:01  -  05 Oct 2021 07:00  --------------------------------------------------------  IN:    dextrose 5% + sodium chloride 0.45% w/ Additives: 375 mL    dextrose 5% + sodium chloride 0.45% w/ Additives: 300 mL    IV PiggyBack: 60 mL    Oral Fluid: 480 mL  Total IN: 1215 mL    OUT:    Intermittent Catheterization - Urethral (mL): 300 mL    Voided (mL): 50 mL  Total OUT: 350 mL    Total NET: 865 mL      05 Oct 2021 07:01  -  06 Oct 2021 01:17  --------------------------------------------------------  IN:    Oral Fluid: 150 mL  Total IN: 150 mL    OUT:    Voided (mL): 0 mL  Total OUT: 0 mL    Total NET: 150 mL          Daily     Daily     LABS:                        7.6    x     )-----------( 616      ( 06 Oct 2021 08:57 )             24.4     10-06    139  |  104  |  27<H>  ----------------------------<  148<H>  3.9   |  25  |  0.65    Ca    8.0<L>      06 Oct 2021 08:57  Phos  1.8     10-06  Mg     2.3     10-06            Urinalysis Basic - ( 04 Oct 2021 15:15 )    Color: Yellow / Appearance: Slightly Turbid / SG: >1.050 / pH: x  Gluc: x / Ketone: Negative  / Bili: Negative / Urobili: 3 mg/dL   Blood: x / Protein: 30 mg/dL / Nitrite: Negative   Leuk Esterase: Negative / RBC: 1 /hpf / WBC 7 /HPF   Sq Epi: x / Non Sq Epi: 12 /hpf / Bacteria: Negative        Culture - Blood (collected 04 Oct 2021 01:23)  Source: .Blood Blood  Preliminary Report (05 Oct 2021 02:02):    No growth to date.

## 2021-10-07 NOTE — PROGRESS NOTE ADULT - SUBJECTIVE AND OBJECTIVE BOX
Follow Up:  leukocytosis    Interval History: afebrile. noting to have uptrending WBC and procalcitonin today. having BM and tolerating food.     REVIEW OF SYSTEMS  [ x ] ROS unobtainable because:  dementia limits accurate ROS  [  ] All other systems negative except as noted below    Constitutional:  [ ] fever [ ] chills  [ ] weight loss  [ ] weakness  Skin:  [ ] rash [ ] phlebitis	  Eyes: [ ] icterus [ ] pain  [ ] discharge	  ENMT: [ ] sore throat  [ ] thrush [ ] ulcers [ ] exudates  Respiratory: [ ] dyspnea [ ] hemoptysis [ ] cough [ ] sputum	  Cardiovascular:  [ ] chest pain [ ] palpitations [ ] edema	  Gastrointestinal:  [ ] nausea [ ] vomiting [ ] diarrhea [ ] constipation [ ] pain	  Genitourinary:  [ ] dysuria [ ] frequency [ ] hematuria [ ] discharge [ ] flank pain  [ ] incontinence  Musculoskeletal:  [ ] myalgias [ ] arthralgias [ ] arthritis  [ ] back pain  Neurological:  [ ] headache [ ] seizures  [ ] confusion/altered mental status    Allergies  No Known Allergies        ANTIMICROBIALS:      OTHER MEDS:  MEDICATIONS  (STANDING):  allopurinol 100 daily  bisacodyl 5 at bedtime  donepezil 5 at bedtime  influenza   Vaccine 0.5 once  lacosamide IVPB 100 every 12 hours  magnesium hydroxide Suspension 30 daily  polyethylene glycol 3350 17 daily  senna 2 at bedtime      Vital Signs Last 24 Hrs  T(C): 37.1 (07 Oct 2021 13:57), Max: 37.1 (07 Oct 2021 13:57)  T(F): 98.7 (07 Oct 2021 13:57), Max: 98.7 (07 Oct 2021 13:57)  HR: 80 (07 Oct 2021 13:57) (71 - 82)  BP: 118/70 (07 Oct 2021 13:57) (109/58 - 125/67)  BP(mean): --  RR: 18 (07 Oct 2021 13:57) (18 - 18)  SpO2: 97% (07 Oct 2021 13:57) (97% - 99%)    PHYSICAL EXAMINATION:  General: Alert and Awake, somewhat dyspneic on exam today  Cardiac: RRR, No M/R/G  Resp: CTAB, No Wh/Rh/Ra  Abdomen: NBS, Mild-Mod Distension but no tenderness to palpation, No HSM, No rigidity or guarding  MSK: No LE edema. No Calf tenderness  : No padron  Skin: No rashes or lesions. Skin is warm and dry to the touch.   Neuro: Alert and Awake. CN 2-12 Grossly intact. Moves all four extremities spontaneously.  Psych: Calm, Pleasant, Cooperative                          8.4    46.48 )-----------( 564      ( 07 Oct 2021 10:55 )             27.6       10-07    138  |  102  |  27<H>  ----------------------------<  87  3.7   |  25  |  0.55    Ca    7.5<L>      07 Oct 2021 08:27  Phos  2.6     10-07  Mg     2.4     10-07    TPro  4.4<L>  /  Alb  2.5<L>  /  TBili  0.5  /  DBili  0.1  /  AST  16  /  ALT  6<L>  /  AlkPhos  145<H>  10-07          MICROBIOLOGY:  v  .Blood Blood  10-04-21   No growth to date.  --  --      .Blood Blood  10-02-21   No growth to date.  --  --    RADIOLOGY:    <The imaging below has been reviewed and visualized by me independently. Findings as detailed in report below>    < from: Xray Abdomen 1 View PORTABLE -Routine (Xray Abdomen 1 View PORTABLE -Routine in AM.) (10.07.21 @ 08:41) >  IMPRESSION:  Unchanged mildly dilated loops of small and large bowel.    < end of copied text >

## 2021-10-07 NOTE — PROGRESS NOTE ADULT - SUBJECTIVE AND OBJECTIVE BOX
TRAUMA SURGERY PROGRESS NOTE  Hospital Day #11    SUBJECTIVE  Pt seen and examined at bedside. No complaints.  Pain controlled. Denies N/V. Tolerating CLD. Passing flatus and BM.   No acute events overnight.     PMHx  ·	MDS (myelodysplastic syndrome)  ·	Dementia    OBJECTIVE:    PHYSICAL EXAM   General Appearance: Appears well, NAD  Resp: Patent airway, non-labored breathing  CV: RRR  Abdomen: Soft, Nontender, Nondistended      Vital Signs Last 24 Hrs  T(C): 36.5 (07 Oct 2021 01:07), Max: 37.4 (06 Oct 2021 06:31)  T(F): 97.7 (07 Oct 2021 01:07), Max: 99.3 (06 Oct 2021 06:31)  HR: 77 (07 Oct 2021 01:07) (74 - 100)  BP: 121/71 (07 Oct 2021 01:07) (106/64 - 125/67)  RR: 18 (07 Oct 2021 01:07) (18 - 18)  SpO2: 99% (07 Oct 2021 01:07) (93% - 99%)        LAB/STUDIES:                        7.6    56.23 )-----------( 616      ( 06 Oct 2021 08:57 )             24.4     139  |  104  |  27<H>  ----------------------------<  148<H>  3.9   |  25  |  0.65    Ca    8.0<L>      06 Oct 2021 08:57  Phos  1.8     10-06  Mg     2.3     10-06       TRAUMA SURGERY PROGRESS NOTE  Hospital Day #11    SUBJECTIVE  Pt seen and examined at bedside. No complaints.  Pain controlled. Denies N/V. Tolerating CLD. Passing flatus and BM.   No acute events overnight.     PMHx  ·	MDS (myelodysplastic syndrome)  ·	Dementia    OBJECTIVE:    PHYSICAL EXAM   General Appearance: Appears well, NAD  Resp: Patent airway, non-labored breathing  CV: RRR  Abdomen: softly distended, Non- tender      Vital Signs Last 24 Hrs  T(C): 36.5 (07 Oct 2021 01:07), Max: 37.4 (06 Oct 2021 06:31)  T(F): 97.7 (07 Oct 2021 01:07), Max: 99.3 (06 Oct 2021 06:31)  HR: 77 (07 Oct 2021 01:07) (74 - 100)  BP: 121/71 (07 Oct 2021 01:07) (106/64 - 125/67)  RR: 18 (07 Oct 2021 01:07) (18 - 18)  SpO2: 99% (07 Oct 2021 01:07) (93% - 99%)        LAB/STUDIES:                        7.6    56.23 )-----------( 616      ( 06 Oct 2021 08:57 )             24.4     139  |  104  |  27<H>  ----------------------------<  148<H>  3.9   |  25  |  0.65    Ca    8.0<L>      06 Oct 2021 08:57  Phos  1.8     10-06  Mg     2.3     10-06

## 2021-10-07 NOTE — PROGRESS NOTE ADULT - ASSESSMENT
89F with PMHx dementia and myeloproliferative/myelodysplastic syndrome s/p fall with traumatic SDH worsening on repeat 5 hr interval scan with 5mm midline shift. SICU consulted for Q1 hour neurological checks. Patient with persistently worsening imaging, however with stable mental status. Now s/p closed reduction and pinning of L hip 9/27 w/ortho    Plan:   - Leukocytosis possible from MDS. appreciate ID consult.   - mIVF. s/p fleet enema, cont bowel regimens  - CLD  - c/w home meds  - BCx NGTD  - continue holding DVT ppx  - CT head Oct 8 per neurosurgery       ACS  [id number]  89F with PMHx dementia and myeloproliferative/myelodysplastic syndrome s/p fall with traumatic SDH worsening on repeat 5 hr interval scan with 5mm midline shift. SICU consulted for Q1 hour neurological checks. Patient with persistently worsening imaging, however with stable mental status. Now s/p closed reduction and pinning of L hip 9/27 w/ortho. Now with ileus, resolving.     Plan:   - Leukocytosis possible from MDS. appreciate ID consult.   - mIVF. s/p fleet enema, cont bowel regimens  - CLD  - c/w home meds  - BCx NGTD  - continue holding DVT ppx  - CT head repeat in am, Oct 8 per neurosurgery     ACS  [phone]

## 2021-10-07 NOTE — PROGRESS NOTE ADULT - ASSESSMENT
89 years old female with PMHx dementia and myeloproliferative/myelodysplastic syndrome (diagnosed in 2019) brought in by EMS due to a fall at [facility]. ID is consulted for leukocytosis.    On 9/28 s/p Closed reduction and percutaneous pinning of left hip    Waxing and waning leukocytosis over course of admission  Has remained afebrile and clinically stable  Noted to have acute subdural hemorrhage which is being conservatively managed (per family's wishes)    CT A/P (10/4) with Dilated small bowel loops with apparent transition point in the right lower quadrant, suspicious for small bowel obstruction.     Increase in procalcitonin today and leukocytosis remains elevated above her baseline  Abdomen appears benign now after bowel and enema regimen,   BCx (10/4) NGTD  U/A (10/4) with 7 WBC  COVID19 PCR (10/6) negative    Somewhat more dyspneic today; would repeat CXR  Would repeat blood cultures  Would start Zosyn if any clinical status changes     IMPRESSION:  Leukocytosis, possibly from underlying myeloproliferative/MDS vs occult infection  Constipation and distended abdomen, could contribute to leukocytosis  S/p Closed reduction and percutaneous pinning 9/28    RECOMMENDATIONS:  - Recommend CXR  - Recommend 2x blood cultures   - Would start Zosyn 3.375 mg IV Q8H if any clinical status changes   - Follow up on prelim blood cultures  --Continue to follow CBC with diff  --Continue to follow temperature curve  --Follow up on preliminary blood cultures    I will continue to follow. Please feel free to contact me with any further questions.    Clinton Bobo M.D.  [facility] Division of Infectious Disease  8AM-5PM: Pager Number [phone]  After Hours (or if no response): Please contact the Infectious Diseases Office at [phone]     The above assessment and plan were discussed with surgery team

## 2021-10-08 NOTE — CONSULT NOTE ADULT - PROBLEM SELECTOR RECOMMENDATION 6
-Likely secondary to blood loss from hip and also expanding SDH.   -s/p PRBC transfusions, plt transfusions, and DDAVP earlier in admission

## 2021-10-08 NOTE — CONSULT NOTE ADULT - PROBLEM SELECTOR RECOMMENDATION 2
-Pt requiring frequent reorientation during interview and unable to describe current medical/surgical issues. Per RN, no waxing/waning consciousness/mental status.  -Please utilize all non-pharmacologic methods for reducing risk of delirium, such as frequent  reorientation and keeping familiar objects at bedside.

## 2021-10-08 NOTE — PROGRESS NOTE ADULT - SUBJECTIVE AND OBJECTIVE BOX
Follow Up:  leukocytosis    Interval History:    REVIEW OF SYSTEMS  [  ] ROS unobtainable because:    [  ] All other systems negative except as noted below    Constitutional:  [ ] fever [ ] chills  [ ] weight loss  [ ] weakness  Skin:  [ ] rash [ ] phlebitis	  Eyes: [ ] icterus [ ] pain  [ ] discharge	  ENMT: [ ] sore throat  [ ] thrush [ ] ulcers [ ] exudates  Respiratory: [ ] dyspnea [ ] hemoptysis [ ] cough [ ] sputum	  Cardiovascular:  [ ] chest pain [ ] palpitations [ ] edema	  Gastrointestinal:  [ ] nausea [ ] vomiting [ ] diarrhea [ ] constipation [ ] pain	  Genitourinary:  [ ] dysuria [ ] frequency [ ] hematuria [ ] discharge [ ] flank pain  [ ] incontinence  Musculoskeletal:  [ ] myalgias [ ] arthralgias [ ] arthritis  [ ] back pain  Neurological:  [ ] headache [ ] seizures  [ ] confusion/altered mental status    Allergies  No Known Allergies        ANTIMICROBIALS:      OTHER MEDS:  MEDICATIONS  (STANDING):  allopurinol 100 daily  bisacodyl 5 at bedtime  donepezil 5 at bedtime  influenza   Vaccine 0.5 once  lacosamide IVPB 100 every 12 hours  magnesium hydroxide Suspension 30 daily  polyethylene glycol 3350 17 daily  senna 2 at bedtime      Vital Signs Last 24 Hrs  T(C): 37.4 (08 Oct 2021 17:01), Max: 37.5 (07 Oct 2021 21:03)  T(F): 99.3 (08 Oct 2021 17:01), Max: 99.5 (07 Oct 2021 21:03)  HR: 102 (08 Oct 2021 17:01) (75 - 102)  BP: 110/72 (08 Oct 2021 17:01) (102/67 - 138/83)  BP(mean): --  RR: 18 (08 Oct 2021 17:01) (18 - 18)  SpO2: 95% (08 Oct 2021 17:01) (94% - 98%)    PHYSICAL EXAMINATION:  General: Alert and Awake, NAD  HEENT: PERRL, EOMI  Neck: Supple  Cardiac: RRR, No M/R/G  Resp: CTAB, No Wh/Rh/Ra  Abdomen: NBS, NT/ND, No HSM, No rigidity or guarding  MSK: No LE edema. No Calf tenderness  : No padron  Skin: No rashes or lesions. Skin is warm and dry to the touch.   Neuro: Alert and Awake. CN 2-12 Grossly intact. Moves all four extremities spontaneously.  Psych: Calm, Pleasant, Cooperative                          6.7    42.01 )-----------( 576      ( 08 Oct 2021 09:05 )             22.1       10-08    136  |  101  |  22  ----------------------------<  124<H>  4.3   |  26  |  0.62    Ca    8.0<L>      08 Oct 2021 09:05  Phos  2.0     10-08  Mg     2.5     10-08    TPro  4.4<L>  /  Alb  2.5<L>  /  TBili  0.5  /  DBili  0.1  /  AST  16  /  ALT  6<L>  /  AlkPhos  145<H>  10-07          MICROBIOLOGY:  v  .Blood Blood  10-04-21   No growth to date.  --  --      .Blood Blood  10-02-21   No Growth Final  --  --    RADIOLOGY:    <The imaging below has been reviewed and visualized by me independently. Findings as detailed in report below>    < from: Xray Chest 1 View- PORTABLE-Urgent (Xray Chest 1 View- PORTABLE-Urgent .) (10.08.21 @ 10:18) >  IMPRESSION:    Clear lungs.    < end of copied text >  < from: Xray Chest 1 View- PORTABLE-Urgent (Xray Chest 1 View- PORTABLE-Urgent .) (10.08.21 @ 10:18) >  IMPRESSION:    Clear lungs.    < end of copied text >   Follow Up:  leukocytosis    Interval History: afebrile. noted to have drop in HgB  today and was pending CT A/P.     REVIEW OF SYSTEMS  [ x ] ROS unobtainable because:  dementia limits accurate ROS  [  ] All other systems negative except as noted below    Constitutional:  [ ] fever [ ] chills  [ ] weight loss  [ ] weakness  Skin:  [ ] rash [ ] phlebitis	  Eyes: [ ] icterus [ ] pain  [ ] discharge	  ENMT: [ ] sore throat  [ ] thrush [ ] ulcers [ ] exudates  Respiratory: [ ] dyspnea [ ] hemoptysis [ ] cough [ ] sputum	  Cardiovascular:  [ ] chest pain [ ] palpitations [ ] edema	  Gastrointestinal:  [ ] nausea [ ] vomiting [ ] diarrhea [ ] constipation [ ] pain	  Genitourinary:  [ ] dysuria [ ] frequency [ ] hematuria [ ] discharge [ ] flank pain  [ ] incontinence  Musculoskeletal:  [ ] myalgias [ ] arthralgias [ ] arthritis  [ ] back pain  Neurological:  [ ] headache [ ] seizures  [ ] confusion/altered mental status    Allergies  No Known Allergies        ANTIMICROBIALS:      OTHER MEDS:  MEDICATIONS  (STANDING):  allopurinol 100 daily  bisacodyl 5 at bedtime  donepezil 5 at bedtime  influenza   Vaccine 0.5 once  lacosamide IVPB 100 every 12 hours  magnesium hydroxide Suspension 30 daily  polyethylene glycol 3350 17 daily  senna 2 at bedtime      Vital Signs Last 24 Hrs  T(C): 37.4 (08 Oct 2021 17:01), Max: 37.5 (07 Oct 2021 21:03)  T(F): 99.3 (08 Oct 2021 17:01), Max: 99.5 (07 Oct 2021 21:03)  HR: 102 (08 Oct 2021 17:01) (75 - 102)  BP: 110/72 (08 Oct 2021 17:01) (102/67 - 138/83)  BP(mean): --  RR: 18 (08 Oct 2021 17:01) (18 - 18)  SpO2: 95% (08 Oct 2021 17:01) (94% - 98%)    PHYSICAL EXAMINATION:  General: Alert and Awake, somewhat dyspneic on exam today  Cardiac: RRR, No M/R/G  Resp: CTAB, No Wh/Rh/Ra  Abdomen: NBS, Mild-Mod Distension but no tenderness to palpation, No HSM, No rigidity or guarding  MSK: L knee with mod-large effusion with no tenderness to palpation and with preserved ROM. L hip surgical site is well healed with no erythema or drainage. No LE edema. No Calf tenderness  : No padron  Skin: No rashes or lesions. Skin is warm and dry to the touch.   Neuro: Alert and Awake. CN 2-12 Grossly intact. Moves all four extremities spontaneously.  Psych: Calm, Pleasant, Cooperative                          6.7    42.01 )-----------( 576      ( 08 Oct 2021 09:05 )             22.1       10-08    136  |  101  |  22  ----------------------------<  124<H>  4.3   |  26  |  0.62    Ca    8.0<L>      08 Oct 2021 09:05  Phos  2.0     10-08  Mg     2.5     10-08    TPro  4.4<L>  /  Alb  2.5<L>  /  TBili  0.5  /  DBili  0.1  /  AST  16  /  ALT  6<L>  /  AlkPhos  145<H>  10-07          MICROBIOLOGY:  v  .Blood Blood  10-04-21   No growth to date.  --  --      .Blood Blood  10-02-21   No Growth Final  --  --    RADIOLOGY:    <The imaging below has been reviewed and visualized by me independently. Findings as detailed in report below>    < from: Xray Chest 1 View- PORTABLE-Urgent (Xray Chest 1 View- PORTABLE-Urgent .) (10.08.21 @ 10:18) >  IMPRESSION:    Clear lungs.    < end of copied text >  < from: Xray Chest 1 View- PORTABLE-Urgent (Xray Chest 1 View- PORTABLE-Urgent .) (10.08.21 @ 10:18) >  IMPRESSION:    Clear lungs.    < end of copied text >

## 2021-10-08 NOTE — CONSULT NOTE ADULT - NSCONSULTADDITIONALINFOA_GEN_ALL_CORE
I have seen and examined the patient.  I have personally reviewed all labs, imaging, and EKG.    Hugo Cole DO  Hospitalist, Department of Medicine  Pager: [phone] (available 8PM-8AM)

## 2021-10-08 NOTE — CONSULT NOTE ADULT - PROBLEM SELECTOR RECOMMENDATION 9
-Unable to reach felisha Nunez to clarify baseline functional/mental status to assist in risk stratification.   -EKG with LAFB, LAD, and a possible age-indeterminate anterior infarct- all of which consistent with previous EKGs.  Pt denies anginal symptoms (chest pain, shortness of breath). However, unable to answer if she experiences EASLEY to characterize baseline METs. If patient with significant EASLEY to METs<4, would obtain TTE prior to OR if case is non-urgent.  -If not an urgent procedure, would recommend discussion with HCP/surrogates regarding code status and palliative care involvement for ACP discussions if complex GOC prior to proceeding with OR.  -The patient maintains an intermediate to high risk (given age, underlying dementia/mental status, and active issues such as bowel obstruction) for an intermediate risk procedure.

## 2021-10-08 NOTE — CONSULT NOTE ADULT - ASSESSMENT
LORRI TEJEDA is a 89y old Female with medical history of MDS and dementia who presents with a chief complaint of fall and was a  LEVEL I TRAUMA (08 Oct 2021 17:13). She was found to have a traumatic SDH, which has been increasing in size with midline shift, and a Left subcapital Femoral Neck fracture for which she underwent CRPP (9/28). Hospital course was complicated by acute blood loss anemia, leukocytosis, bowel obstruction.    Medicine was consulted for pre-operative evaluation for possible [place] Hole procedure.

## 2021-10-08 NOTE — CHART NOTE - NSCHARTNOTEFT_GEN_A_CORE
89F w/ dementia and MDS, presented 9/26 s/p fall. Repeat CT today on Left compared to 9/28 on Right shows chronification of L fronto-parietal SDH w/ MLS increasing to 9mm from 6mm. Hemoglobin currently 6.7.  Exam - Relatively stable: AOx1 (baseline), pupils 2R, EOMI, FCx4, BUE 5/5, BLE 2/5. Leg weakness likely 2/2 deconditioning. Hemoglobin currently 6.7, plt 592.  -Will discuss with family regarding role for surgery (kendall hole) in treating SDH.

## 2021-10-08 NOTE — PROGRESS NOTE ADULT - SUBJECTIVE AND OBJECTIVE BOX
TRAUMA SURGERY PROGRESS NOTE  Hospital Day #12    SUBJECTIVE  Pt seen and examined at bedside. No complaints.  Pain controlled. Denies N/V. Tolerating regular diet. Passing flatus and BM.   No acute events overnight.     PMHx  ·	MDS (myelodysplastic syndrome)  ·	Dementia    OBJECTIVE:    PHYSICAL EXAM   General Appearance: Appears well, NAD  Resp: Patent airway, non-labored breathing  CV: RRR  Abdomen: softly distended, nontender      Vital Signs Last 24 Hrs  T(C): 37.2 (08 Oct 2021 00:20), Max: 37.5 (07 Oct 2021 21:03)  T(F): 98.9 (08 Oct 2021 00:20), Max: 99.5 (07 Oct 2021 21:03)  HR: 75 (08 Oct 2021 00:20) (71 - 97)  BP: 108/72 (08 Oct 2021 00:20) (102/67 - 133/73)  RR: 18 (08 Oct 2021 00:20) (18 - 18)  SpO2: 97% (08 Oct 2021 00:20) (97% - 100%)      LAB/STUDIES:                        8.4    46.48 )-----------( 564      ( 07 Oct 2021 10:55 )             27.6     138  |  102  |  27<H>  ----------------------------<  87  3.7   |  25  |  0.55    Ca    7.5<L>      07 Oct 2021 08:27  Phos  2.6     10-07  Mg     2.4     10-07    TPro  4.4<L>  /  Alb  2.5<L>  /  TBili  0.5  /  DBili  0.1  /  AST  16  /  ALT  6<L>  /  AlkPhos  145<H>  10-07

## 2021-10-08 NOTE — PROVIDER CONTACT NOTE (CRITICAL VALUE NOTIFICATION) - ACTION/TREATMENT ORDERED:
MD aware. no interventions at this time. will continue to monitor. pt safety maintained
no intervention at this time
1 unit PRBC ordered

## 2021-10-08 NOTE — CONSULT NOTE ADULT - SUBJECTIVE AND OBJECTIVE BOX
MRN [id number]    CC: LORRI Menezes is a 89y old  Female who presents with a chief complaint of fall with LEVEL I TRAUMA (08 Oct 2021 17:13)    INTERVAL HPI/OVERNIGHT EVENTS: The patient has dementia and was confused and forgetful. She was unable to describe why she is currently in  the hospital or the events of the hospitalization, including orthopedic surgery. Obtained permission from the patient to speak with her son Enrique. Called Enrique to obtain collateral information regarding functional status and mental status prior to hospitalization as well as details regarding medical history, but he did not  at this time.    HPI: 89-year-old female with medical history of Myelodysplastic Syndrome and Dementia who presented as a transfer from [facility] after being found down at [facility] living [facility]. She complained of severe left hip pain and inability to ambulate.      She was found to have multiple acute traumas-including SDH and a Left Subcapital Femoral Neck Fracture. She was not deemed to be a surgical candidate for Craniotomy given her poor baseline functional status, age, and h/o MDS. She did go to the OR on 9/28 for CRPP of the left hip. Her hospital course was complicated by acute blood loss anemia, leukocytosis, bowel obstruction, and increasing size of the SDH.     Today, the patient denied any acute complaints, such as headaches, chest pain, shortness of breath, abdominal pain, nausea/vomiting, and lower extremity pain. Spoke with RN who stated the patient had BMs yesterday and she did not see any hematochezia or melena.        REVIEW OF SYSTEMS:  Limited due to dementia, however, patient denied headaches, chest pain, shortness of breath, abdominal pain, nausea/vomiting, and lower extremity pain.   Unless indicated above, unable to assess ROS secondary to dementia and confusion.    Social History: unable to obtain as patient with dementia. Called patient's son for collateral information but did not answer.      Family History: unable to obtain as patient with dementia. Called patient's son for collateral information but did not answer.      PMHx/PSHx:  PAST MEDICAL & SURGICAL HISTORY:  MDS (myelodysplastic syndrome)    Dementia    S/P ORIF (open reduction internal fixation) fracture  elbow fracture 02/2021    S/P CRRP (Closed Reduction Percutaneous Pinning) left subcapital femoral neck fracture (9/28/21)    Allergies: No Known Allergies    MEDICATIONS  (STANDING):  allopurinol 100 milliGRAM(s) Oral daily  BACItracin   Ointment 1 Application(s) Topical daily  bisacodyl 5 milliGRAM(s) Oral at bedtime  cholecalciferol 400 Unit(s) Oral daily  dextrose 5% + sodium chloride 0.45% with potassium chloride 20 mEq/L 1000 milliLiter(s) (50 mL/Hr) IV Continuous <Continuous>  donepezil 5 milliGRAM(s) Oral at bedtime  influenza   Vaccine 0.5 milliLiter(s) IntraMuscular once  lacosamide IVPB 100 milliGRAM(s) IV Intermittent every 12 hours  lidocaine   4% Patch 1 Patch Transdermal every 24 hours  magnesium hydroxide Suspension 30 milliLiter(s) Oral daily  polyethylene glycol 3350 17 Gram(s) Oral daily  senna 2 Tablet(s) Oral at bedtime    MEDICATIONS  (PRN):      Vital Signs: T(C): 37.2 (10-08-21 @ 21:07), Max: 37.4 (10-08-21 @ 17:01)  HR: 94 (10-08-21 @ 21:07) (75 - 102)  BP: 128/78 (10-08-21 @ 21:07) (108/72 - 138/83)  RR: 18 (10-08-21 @ 21:07) (18 - 18)  SpO2: 98% (10-08-21 @ 21:07) (94% - 98%)  Wt(kg): --Vital Signs Last 24 Hrs  T(C): 37.2 (08 Oct 2021 21:07), Max: 37.4 (08 Oct 2021 17:01)  T(F): 98.9 (08 Oct 2021 21:07), Max: 99.3 (08 Oct 2021 17:01)  HR: 94 (08 Oct 2021 21:07) (75 - 102)  BP: 128/78 (08 Oct 2021 21:07) (108/72 - 138/83)  BP(mean): --  RR: 18 (08 Oct 2021 21:07) (18 - 18)  SpO2: 98% (08 Oct 2021 21:07) (94% - 98%)  I&O:   10-07 @ 07:01  -  10-08 @ 07:00  --------------------------------------------------------  IN: 1350 mL / OUT: 1200 mL / NET: 150 mL    10-08 @ 07:01  -  10-08 @ 22:11  --------------------------------------------------------  IN: 1300 mL / OUT: 300 mL / NET: 1000 mL        PHYSICAL EXAM:   GENERAL: +confused. Thin. No acute distress.  HEAD:  Normocephalic. No tenderness to palpation of occiput/temporal/frontal/parietal areas or superficial hematomas noted.  EYES: EOMI. PERRLA. Normal conjunctiva/sclera.  ENT: Neck supple. No JVD. Dry oral mucosa.No thrush.  CARDIAC:  Regular rate and rhythm. S1. S2. No murmur. No rub. No distant heart sounds.  LUNG/CHEST: CTAB. BS equal bilaterally. No wheezes. No rales. No rhonchi.  ABDOMEN: +hyperactive mechanical bowel sounds. +distended, non-tender to palpation without guarding/rigidity/rebound  VASCULAR: +2 b/l radial pulses. Cool hands.  EXTREMITIES:  +tender to palpation of left anterior thigh. +edema of left LLE and left knee. +distal motor/sensation of bilateral feet intact  NEUROLOGY: awake, alert, oriented x1. +forgetful, unable to retain information despite frequent re-orientation during interview and exam.  SKIN: No jaundice. No erythema. No rash/lesion.  Vascular Access: PIV    ICU Vital Signs Last 24 Hrs  T(C): 37.2 (08 Oct 2021 21:07), Max: 37.4 (08 Oct 2021 17:01)  T(F): 98.9 (08 Oct 2021 21:07), Max: 99.3 (08 Oct 2021 17:01)  HR: 94 (08 Oct 2021 21:07) (75 - 102)  BP: 128/78 (08 Oct 2021 21:07) (108/72 - 138/83)  BP(mean): --  ABP: --  ABP(mean): --  RR: 18 (08 Oct 2021 21:07) (18 - 18)  SpO2: 98% (08 Oct 2021 21:07) (94% - 98%)      I&O's Summary    07 Oct 2021 07:01  -  08 Oct 2021 07:00  --------------------------------------------------------  IN: 1350 mL / OUT: 1200 mL / NET: 150 mL    08 Oct 2021 07:01  -  08 Oct 2021 22:11  --------------------------------------------------------  IN: 1300 mL / OUT: 300 mL / NET: 1000 mL          Personally reviewed imaging, labs, EKG.    LABS:                        8.4    48.35 )-----------( 592      ( 08 Oct 2021 17:39 )             26.2     10-08    136  |  101  |  22  ----------------------------<  124<H>  4.3   |  26  |  0.62    Ca    8.0<L>      08 Oct 2021 09:05  Phos  2.0     10-08  Mg     2.5     10-08    TPro  4.4<L>  /  Alb  2.5<L>  /  TBili  0.5  /  DBili  0.1  /  AST  16  /  ALT  6<L>  /  AlkPhos  145<H>  10-07        CAPILLARY BLOOD GLUCOSE      POCT Blood Glucose.: 127 mg/dL (08 Oct 2021 08:18)            RADIOLOGY & ADDITIONAL TESTS:    < from: CT Abdomen and Pelvis w/ IV Cont (10.08.21 @ 18:39) >  INTERPRETATION:  Small bilateral pleural effusions with adjacent passive atelectasis.    Redemonstration of pancreatic duct dilatation with distal pancreatic atrophy. No active GI bleed. Similar distended air-filled loops of bowel. There is an intramuscular hematoma in the left anterior thigh which is partially visualized, measuring 5.1 x 8.2 cm. The inferior extent is not fully evaluated in this field-of-view. Small additional fluid collection the lateral thigh which measures 4.9 x 1.8 cm. Decreased subcutaneous emphysema along the left lateral hip.    These findings were discussed with Dr. Warren at 10/8/2021 7:24 PM by Dr. Cornejo with read back confirmation.    < end of copied text >  < from: Xray Chest 1 View- PORTABLE-Urgent (Xray Chest 1 View- PORTABLE-Urgent .) (10.08.21 @ 10:18) >    FINDINGS:    The lungs are clear. No pleural effusion.    Heart size is without change.    Healed left-sided rib fractures.    Degenerative changes of the spine.    Partially imaged air-filled loops of bowel.    < end of copied text >  < from: CT Head No Cont (10.08.21 @ 09:40) >  IMPRESSION: Normal evolution of the left frontal parietal subdural hematoma compared with 9/28/2021 in density although slightly increased mass effect and midline shift to the right. A right-sided frontal temporal subdural hematoma is larger without mass effect.    < end of copied text >  < from: Xray Abdomen 1 View PORTABLE -Routine (Xray Abdomen 1 View PORTABLE -Routine in AM.) (10.08.21 @ 09:04) >  IMPRESSION:  Mild interval increase in previously identified dilated loops of small and large bowel when compared to prior day's study.    < end of copied text >      Imaging Personally Reviewed:  [x] YES  [] NO    Consultant(s) Notes Reviewed:  [x] YES  [] NO  Care Discussed with Consultants/Primary Team [x] YES  [] NO    Assessment:  LORRI TEJEDA is a 89yFemale pw Patient is a 89y old  Female who presents with a chief complaint of fall and was a  LEVEL I TRAUMA (08 Oct 2021 17:13). She was found to have a traumatic SDH, which has been increasing in size with midline shift, and a Left subcapital Femoral Neck fracture for which she underwent CRPP (9/28). Hospital course was complicated by acute blood loss anemia, leukocytosis, bowel obstruction.    Medicine was consulted for pre-operative evaluation for possible [place] procedure.      1. Pre-operative Evaluation  -Unable to reach felisha Nunez to clarify baseline functional/mental status to assist in risk stratification.   -EKG with LAFB, LAD, and a possible age-indeterminate anterior infarct- all of which consistent with previous EKGs. Pt denies anginal symptoms (chest pain, shortness of breath). However, unable to answer if she experiences EASLEY to characterize baseline METs. If patient with significant EASLEY, would obtain TTE prior to OR if case is non-urgent.  -If not an urgent procedure, would recommend discussion with HCP/surrogates regarding code status and palliative care involvement for ACP discussions if complex GOC prior to proceeding with OR.  -The patient maintains an intermediate to high risk (given age, underlying dementia/mental status, and active issues such as bowel obstruction) for an intermediate risk procedure.    Pt signed out to NP.    I have seen and examined the patient.  I have personally reviewed all labs, imaging, and EKG.    Hugo Cole DO  Hospitalist, Department of Medicine  Pager: [phone] (available 8PM-8AM) MRN [id number]    CC: LORRI Menezes is a 89y old  Female who presents with a chief complaint of fall with LEVEL I TRAUMA (08 Oct 2021 17:13)    INTERVAL HPI/OVERNIGHT EVENTS: The patient has dementia and was confused and forgetful. She was unable to describe why she is currently in  the hospital or the events of the hospitalization, including orthopedic surgery. Obtained permission from the patient to speak with her son Enrique. Called Enriuqe to obtain collateral information regarding functional status and mental status prior to hospitalization as well as details regarding medical history, but he did not  at this time.    HPI: 89-year-old female with medical history of Myelodysplastic Syndrome and Dementia who presented as a transfer from [facility] after being found down at [facility] living [facility]. She complained of severe left hip pain and inability to ambulate.      She was found to have multiple acute traumas-including SDH and a Left Subcapital Femoral Neck Fracture. She was not deemed to be a surgical candidate for Craniotomy given her poor baseline functional status, age, and h/o MDS. She did go to the OR on 9/28 for CRPP of the left hip. Her hospital course was complicated by acute blood loss anemia, leukocytosis, bowel obstruction, and increasing size of the SDH.     Today, the patient denied any acute complaints, such as headaches, chest pain, shortness of breath, abdominal pain, nausea/vomiting, and lower extremity pain. Spoke with RN who stated the patient had BMs yesterday and she did not see any hematochezia or melena.        REVIEW OF SYSTEMS:  Limited due to dementia, however, patient denied headaches, chest pain, shortness of breath, abdominal pain, nausea/vomiting, and lower extremity pain.   Unless indicated above, unable to assess ROS secondary to dementia and confusion.    Social History: unable to obtain as patient with dementia. Called patient's son for collateral information but did not answer.      Family History: unable to obtain as patient with dementia. Called patient's son for collateral information but did not answer.      PMHx/PSHx:  PAST MEDICAL & SURGICAL HISTORY:  MDS (myelodysplastic syndrome)    Dementia    S/P ORIF (open reduction internal fixation) fracture  elbow fracture 02/2021    S/P CRRP (Closed Reduction Percutaneous Pinning) left subcapital femoral neck fracture (9/28/21)    Allergies: No Known Allergies    MEDICATIONS  (STANDING):  allopurinol 100 milliGRAM(s) Oral daily  BACItracin   Ointment 1 Application(s) Topical daily  bisacodyl 5 milliGRAM(s) Oral at bedtime  cholecalciferol 400 Unit(s) Oral daily  dextrose 5% + sodium chloride 0.45% with potassium chloride 20 mEq/L 1000 milliLiter(s) (50 mL/Hr) IV Continuous <Continuous>  donepezil 5 milliGRAM(s) Oral at bedtime  influenza   Vaccine 0.5 milliLiter(s) IntraMuscular once  lacosamide IVPB 100 milliGRAM(s) IV Intermittent every 12 hours  lidocaine   4% Patch 1 Patch Transdermal every 24 hours  magnesium hydroxide Suspension 30 milliLiter(s) Oral daily  polyethylene glycol 3350 17 Gram(s) Oral daily  senna 2 Tablet(s) Oral at bedtime    MEDICATIONS  (PRN):      Vital Signs: T(C): 37.2 (10-08-21 @ 21:07), Max: 37.4 (10-08-21 @ 17:01)  HR: 94 (10-08-21 @ 21:07) (75 - 102)  BP: 128/78 (10-08-21 @ 21:07) (108/72 - 138/83)  RR: 18 (10-08-21 @ 21:07) (18 - 18)  SpO2: 98% (10-08-21 @ 21:07) (94% - 98%)  Wt(kg): --Vital Signs Last 24 Hrs  T(C): 37.2 (08 Oct 2021 21:07), Max: 37.4 (08 Oct 2021 17:01)  T(F): 98.9 (08 Oct 2021 21:07), Max: 99.3 (08 Oct 2021 17:01)  HR: 94 (08 Oct 2021 21:07) (75 - 102)  BP: 128/78 (08 Oct 2021 21:07) (108/72 - 138/83)  BP(mean): --  RR: 18 (08 Oct 2021 21:07) (18 - 18)  SpO2: 98% (08 Oct 2021 21:07) (94% - 98%)  I&O:   10-07 @ 07:01  -  10-08 @ 07:00  --------------------------------------------------------  IN: 1350 mL / OUT: 1200 mL / NET: 150 mL    10-08 @ 07:01  -  10-08 @ 22:11  --------------------------------------------------------  IN: 1300 mL / OUT: 300 mL / NET: 1000 mL        PHYSICAL EXAM:   GENERAL: +confused. Thin. No acute distress.  HEAD:  Normocephalic. No tenderness to palpation of occiput/temporal/frontal/parietal areas or superficial hematomas noted.  EYES: EOMI. PERRLA. Normal conjunctiva/sclera.  ENT: Neck supple. No JVD. Dry oral mucosa.No thrush.  CARDIAC:  Regular rate and rhythm. S1. S2. No murmur. No rub. No distant heart sounds.  LUNG/CHEST: CTAB. BS equal bilaterally. No wheezes. No rales. No rhonchi.  ABDOMEN: +hyperactive mechanical bowel sounds. +distended, non-tender to palpation without guarding/rigidity/rebound  VASCULAR: +2 b/l radial pulses. Cool hands.  EXTREMITIES:  +tender to palpation of left anterior thigh. +edema of left LLE and left knee. +distal motor/sensation of bilateral feet intact  NEUROLOGY: awake, alert, oriented x1. +forgetful, unable to retain information despite frequent re-orientation during interview and exam.  SKIN: No jaundice. No erythema. No rash/lesion.  Vascular Access: PIV    ICU Vital Signs Last 24 Hrs  T(C): 37.2 (08 Oct 2021 21:07), Max: 37.4 (08 Oct 2021 17:01)  T(F): 98.9 (08 Oct 2021 21:07), Max: 99.3 (08 Oct 2021 17:01)  HR: 94 (08 Oct 2021 21:07) (75 - 102)  BP: 128/78 (08 Oct 2021 21:07) (108/72 - 138/83)  BP(mean): --  ABP: --  ABP(mean): --  RR: 18 (08 Oct 2021 21:07) (18 - 18)  SpO2: 98% (08 Oct 2021 21:07) (94% - 98%)      I&O's Summary    07 Oct 2021 07:01  -  08 Oct 2021 07:00  --------------------------------------------------------  IN: 1350 mL / OUT: 1200 mL / NET: 150 mL    08 Oct 2021 07:01  -  08 Oct 2021 22:11  --------------------------------------------------------  IN: 1300 mL / OUT: 300 mL / NET: 1000 mL          Personally reviewed imaging, labs, EKG.    LABS:                        8.4    48.35 )-----------( 592      ( 08 Oct 2021 17:39 )             26.2     10-08    136  |  101  |  22  ----------------------------<  124<H>  4.3   |  26  |  0.62    Ca    8.0<L>      08 Oct 2021 09:05  Phos  2.0     10-08  Mg     2.5     10-08    TPro  4.4<L>  /  Alb  2.5<L>  /  TBili  0.5  /  DBili  0.1  /  AST  16  /  ALT  6<L>  /  AlkPhos  145<H>  10-07        CAPILLARY BLOOD GLUCOSE      POCT Blood Glucose.: 127 mg/dL (08 Oct 2021 08:18)            RADIOLOGY & ADDITIONAL TESTS:    < from: CT Abdomen and Pelvis w/ IV Cont (10.08.21 @ 18:39) >  INTERPRETATION:  Small bilateral pleural effusions with adjacent passive atelectasis.    Redemonstration of pancreatic duct dilatation with distal pancreatic atrophy. No active GI bleed. Similar distended air-filled loops of bowel. There is an intramuscular hematoma in the left anterior thigh which is partially visualized, measuring 5.1 x 8.2 cm. The inferior extent is not fully evaluated in this field-of-view. Small additional fluid collection the lateral thigh which measures 4.9 x 1.8 cm. Decreased subcutaneous emphysema along the left lateral hip.    These findings were discussed with Dr. Warren at 10/8/2021 7:24 PM by Dr. Cornejo with read back confirmation.    < end of copied text >  < from: Xray Chest 1 View- PORTABLE-Urgent (Xray Chest 1 View- PORTABLE-Urgent .) (10.08.21 @ 10:18) >    FINDINGS:    The lungs are clear. No pleural effusion.    Heart size is without change.    Healed left-sided rib fractures.    Degenerative changes of the spine.    Partially imaged air-filled loops of bowel.    < end of copied text >  < from: CT Head No Cont (10.08.21 @ 09:40) >  IMPRESSION: Normal evolution of the left frontal parietal subdural hematoma compared with 9/28/2021 in density although slightly increased mass effect and midline shift to the right. A right-sided frontal temporal subdural hematoma is larger without mass effect.    < end of copied text >  < from: Xray Abdomen 1 View PORTABLE -Routine (Xray Abdomen 1 View PORTABLE -Routine in AM.) (10.08.21 @ 09:04) >  IMPRESSION:  Mild interval increase in previously identified dilated loops of small and large bowel when compared to prior day's study.    < end of copied text >      Imaging Personally Reviewed:  [x] YES  [] NO    Consultant(s) Notes Reviewed:  [x] YES  [] NO  Care Discussed with Consultants/Primary Team [x] YES  [] NO

## 2021-10-08 NOTE — PROGRESS NOTE ADULT - ASSESSMENT
89F with PMHx dementia and myeloproliferative/myelodysplastic syndrome s/p fall with traumatic SDH worsening on repeat 5 hr interval scan with 5mm midline shift. SICU consulted for Q1 hour neurological checks. Patient with persistently worsening imaging, however with stable mental status. Now s/p closed reduction and pinning of L hip 9/27 w/ortho. Now with ileus, resolving.     PLAN  - leukocytosis, appreciate ID consult. ?MDS  - mIVF. s/p fleet enema, cont bowel regimens  - regular diet  - c/w home meds  - BCx NGTD  - continue holding DVT ppx  - repeat CT head today, per ortho      ACS  [id number]  89F with PMHx dementia and myeloproliferative/myelodysplastic syndrome s/p fall with traumatic SDH worsening on repeat 5 hr interval scan with 5mm midline shift. SICU consulted for Q1 hour neurological checks. Patient with persistently worsening imaging, however with stable mental status. Now s/p closed reduction and pinning of L hip 9/27 w/ortho. Now with ileus, resolving.     PLAN  - leukocytosis, appreciate ID consult. ?MDS  - mIVF. s/p fleet enema, cont bowel regimens  - regular diet  - c/w home meds  - BCx NGTD  - continue holding DVT ppx  - repeat CT head today, per neurosurgery, f/u recommendations  - drop in H/H this am, ordered for CTA abdomen/pelvis with runoff to hip and CXR, and 1 U PRBC       ACS  [id number]

## 2021-10-08 NOTE — PROGRESS NOTE ADULT - ASSESSMENT
89 years old female with PMHx dementia and myeloproliferative/myelodysplastic syndrome (diagnosed in 2019) brought in by EMS due to a fall at [place] [facility]. ID is consulted for leukocytosis.    On 9/28 s/p Closed reduction and percutaneous pinning of left hip    Waxing and waning leukocytosis over course of admission  Has remained afebrile and clinically stable  Noted to have acute subdural hemorrhage which is being conservatively managed (per family's wishes)    CT A/P (10/4) with Dilated small bowel loops with apparent transition point in the right lower quadrant, suspicious for small bowel obstruction.     Increase in procalcitonin 10/7 and leukocytosis remains elevated above her baseline  Abdomen appears benign now after bowel and enema regimen,   BCx (10/4) NGTD  U/A (10/4) with 7 WBC  COVID19 PCR (10/6) negative  CXR (10/8) with clear lungs    Noted to have progressive anemia today and was pending CT A/P to evaluate for focus of bleeding  ?Hematoma as etiology of leukocytosis and anemia    Also with significant edema around her left knee, no tenderness or limitation in ROM but would monitor and image if rise in WBC or clinical status changes.     IMPRESSION:  Leukocytosis  Constipation/Distension  Anemia  S/p Closed reduction and percutaneous pinning 9/28    RECOMMENDATIONS:  - Follow up on CT A/P   - Significant edema around her left knee, no tenderness or limitation in ROM but would monitor and image if rise in WBC or clinical status changes.  - Would start Zosyn 3.375 mg IV Q8H if any clinical status changes   --Continue to follow CBC with diff  --Continue to follow temperature curve  --Follow up on preliminary blood cultures    I will be away over this upcoming weekend. Please contact the Infectious Diseases Office with any further questions or concerns.     Clinton Bobo M.D.  [facility] Division of Infectious Disease  8AM-5PM: Pager Number [phone]  After Hours (or if no response): Please contact the Infectious Diseases Office at [phone]     The above assessment and plan were discussed with surgery team

## 2021-10-09 NOTE — PROGRESS NOTE ADULT - SUBJECTIVE AND OBJECTIVE BOX
TRAUMA SURGERY PROGRESS NOTE  Hospital Day #13    SUBJECTIVE  Pt seen and examined at bedside. No complaints.  Pain controlled. Denies N/V. Tolerating regular diet. Passing flatus and BM.   No acute events overnight.     PMHx  ·	MDS (myelodysplastic syndrome)  ·	Dementia    OBJECTIVE:    PHYSICAL EXAM   General Appearance: Appears well, NAD, confused/somewhat disoriented  Resp: Patent airway, non-labored breathing  CV: RRR  Abdomen: softly distended, nontender  Neuro: motor/sensory intact  Ext: WWP, L knee swollen compared to R w/limited ROM      Vital Signs Last 24 Hrs  T(C): 37.2 (09 Oct 2021 00:11), Max: 37.4 (08 Oct 2021 17:01)  T(F): 98.9 (09 Oct 2021 00:11), Max: 99.3 (08 Oct 2021 17:01)  HR: 86 (09 Oct 2021 00:11) (86 - 102)  BP: 146/80 (09 Oct 2021 00:11) (110/72 - 146/80)  RR: 18 (09 Oct 2021 00:11) (18 - 18)  SpO2: 99% (09 Oct 2021 00:11) (94% - 99%)      LAB/STUDIES:                        7.4    35.60 )-----------( 450      ( 09 Oct 2021 00:10 )             23.9     136  |  101  |  22  ----------------------------<  124<H>  4.3   |  26  |  0.62    Ca    8.0<L>      08 Oct 2021 09:05  Phos  2.0     10-08  Mg     2.5     10-08    TPro  4.4<L>  /  Alb  2.5<L>  /  TBili  0.5  /  DBili  0.1  /  AST  16  /  ALT  6<L>  /  AlkPhos  145<H>  10-07       TRAUMA SURGERY PROGRESS NOTE  Hospital Day #13    SUBJECTIVE  Pt seen and examined at bedside. No complaints.  Pain controlled. Denies N/V. Tolerating regular diet. Passing flatus and BM.     INTERVAL HX  Patient responded to pRBC transfusion (6.7 --> 8.4) with CT A/P significant for L anterior thigh hematoma, unable to be fully evaluated with abdomen/pelvis scan. Patient taken for repeat CTA of LLE.      PMHx  ·	MDS (myelodysplastic syndrome)  ·	Dementia    OBJECTIVE:    PHYSICAL EXAM   General Appearance: Appears well, NAD, confused/somewhat disoriented  Resp: Patent airway, non-labored breathing  CV: RRR  Abdomen: softly distended, nontender  Neuro: motor/sensory intact  Ext: WWP, L knee swollen compared to R w/limited ROM      Vital Signs Last 24 Hrs  T(C): 37.2 (09 Oct 2021 00:11), Max: 37.4 (08 Oct 2021 17:01)  T(F): 98.9 (09 Oct 2021 00:11), Max: 99.3 (08 Oct 2021 17:01)  HR: 86 (09 Oct 2021 00:11) (86 - 102)  BP: 146/80 (09 Oct 2021 00:11) (110/72 - 146/80)  RR: 18 (09 Oct 2021 00:11) (18 - 18)  SpO2: 99% (09 Oct 2021 00:11) (94% - 99%)      LAB/STUDIES:               7.4    35.60 )-----------( 450      ( 09 Oct 2021 00:10 )             23.9     136  |  101  |  22  ----------------------------<  124<H>  4.3   |  26  |  0.62    Ca    8.0<L>      08 Oct 2021 09:05  Phos  2.0     10-08  Mg     2.5     10-08    TPro  4.4<L>  /  Alb  2.5<L>  /  TBili  0.5  /  DBili  0.1  /  AST  16  /  ALT  6<L>  /  AlkPhos  145<H>  10-07       TRAUMA SURGERY PROGRESS NOTE  Hospital Day #13    SUBJECTIVE  Pt seen and examined at bedside. No complaints.  Pain controlled. Denies N/V. Tolerating regular diet. Passing flatus and BM.     INTERVAL HX  Patient responded to pRBC transfusion (6.7 --> 8.4) with CT A/P significant for L anterior thigh hematoma, unable to be fully evaluated with abdomen/pelvis scan. Patient taken for repeat CTA of LLE, w/consent from HCP, son Enrique.      PMHx  ·	MDS (myelodysplastic syndrome)  ·	Dementia    OBJECTIVE:    PHYSICAL EXAM   General Appearance: Appears well, NAD, confused/somewhat disoriented  Resp: Patent airway, non-labored breathing  CV: RRR  Abdomen: softly distended, nontender  Neuro: motor/sensory intact  Ext: WWP, L knee swollen compared to R w/limited ROM      Vital Signs Last 24 Hrs  T(C): 37.2 (09 Oct 2021 00:11), Max: 37.4 (08 Oct 2021 17:01)  T(F): 98.9 (09 Oct 2021 00:11), Max: 99.3 (08 Oct 2021 17:01)  HR: 86 (09 Oct 2021 00:11) (86 - 102)  BP: 146/80 (09 Oct 2021 00:11) (110/72 - 146/80)  RR: 18 (09 Oct 2021 00:11) (18 - 18)  SpO2: 99% (09 Oct 2021 00:11) (94% - 99%)      LAB/STUDIES:               7.4    35.60 )-----------( 450      ( 09 Oct 2021 00:10 )             23.9     136  |  101  |  22  ----------------------------<  124<H>  4.3   |  26  |  0.62    Ca    8.0<L>      08 Oct 2021 09:05  Phos  2.0     10-08  Mg     2.5     10-08    TPro  4.4<L>  /  Alb  2.5<L>  /  TBili  0.5  /  DBili  0.1  /  AST  16  /  ALT  6<L>  /  AlkPhos  145<H>  10-07

## 2021-10-09 NOTE — PROGRESS NOTE ADULT - SUBJECTIVE AND OBJECTIVE BOX
Pt seen and examined.  pleasant but confused which is her baseline  awake alert follows commands and converses with confusion  PENA well  CT shows chronicity of SDH with some mass effect.    Left a message with her son yesterday to call to discuss CT findings.  I don't think a surgical procedure to drain this SDH will benefit her at this time given her underlying dementia.  Will discuss further with family.

## 2021-10-09 NOTE — PROGRESS NOTE ADULT - ASSESSMENT
89F with PMHx dementia and myeloproliferative/myelodysplastic syndrome s/p fall with traumatic SDH worsening on repeat 5 hr interval scan with 5mm midline shift. SICU consulted for Q1 hour neurological checks. Patient with persistently worsening imaging, however with stable mental status. Now s/p closed reduction and pinning of L hip 9/27 w/ortho. Now with ileus, resolving. 10/8 CT A/P significant for L anterior thigh hematoma 5x8cm, unable to be fully appreciated given scan limit.       PLAN  - leukocytosis, appreciate ID consult. ?MDS  - mIVF. s/p fleet enema, cont bowel regimens  - follow H&H, trend CBC  - regular diet  - c/w home meds  - BCx NGTD  - continue holding DVT ppx  - repeat CT head today, per neurosurgery, f/u recommendations        ACS  [phone]  89F with PMHx dementia and myeloproliferative/myelodysplastic syndrome s/p fall with traumatic SDH worsening on repeat 5 hr interval scan with 5mm midline shift. SICU consulted for Q1 hour neurological checks. Patient with persistently worsening imaging, however with stable mental status. Now s/p closed reduction and pinning of L hip 9/27 w/ortho. Now with ileus, resolving. 10/8 CT A/P significant for L anterior thigh hematoma 5x8cm, unable to be fully appreciated given scan limit.       PLAN  - leukocytosis, appreciate ID consult. ?MDS  - mIVF. s/p fleet enema, cont bowel regimens  - follow H&H, trend CBC  - regular diet  - c/w home meds  - BCx NGTD  - continue holding DVT ppx  - repeat CT head today, per neurosurgery, f/u recommendations  - patient HCP, son Enrique, requesting to be updated by day team ([phone])         ACS  [id number]

## 2021-10-10 NOTE — PROVIDER CONTACT NOTE (OTHER) - REASON
Pt abd distended, Pt satting 90% on RA
Left knee swelling
Pt abdomen is distended and pt c/o of discomfort and unable to have bowel movement
red/pink-tinged color noticed from stool

## 2021-10-10 NOTE — PROGRESS NOTE ADULT - SUBJECTIVE AND OBJECTIVE BOX
INFECTIOUS DISEASES FOLLOW UP--Davy Pressley MD  Pager [phone]    This is a follow up note for this  89y Female with  Traumatic subdural hemorrhage with loss of consciousness of unspecified duration, initial encounter  elevated wbc count.    Further ROS:  CARDIOVASCULAR:  No chest pain or palpitations  RESPIRATORY:  No dyspnea  GASTROINTESTINAL:  No nausea, vomiting, diarrhea, or abdominal pain  GENITOURINARY:  No dysuria  NEUROLOGIC:  No headache,     Allergies  No Known Allergies    ANTIBIOTICS/RELEVANT:  antimicrobials off    immunologic:  influenza   Vaccine 0.5 milliLiter(s) IntraMuscular once    OTHER:  allopurinol 100 milliGRAM(s) Oral daily  BACItracin   Ointment 1 Application(s) Topical daily  bisacodyl 5 milliGRAM(s) Oral at bedtime  cholecalciferol 400 Unit(s) Oral daily  dextrose 5% + sodium chloride 0.45% with potassium chloride 20 mEq/L 1000 milliLiter(s) IV Continuous <Continuous>  donepezil 5 milliGRAM(s) Oral at bedtime  lacosamide IVPB 100 milliGRAM(s) IV Intermittent every 12 hours  lidocaine   4% Patch 1 Patch Transdermal every 24 hours  magnesium hydroxide Suspension 30 milliLiter(s) Oral daily  polyethylene glycol 3350 17 Gram(s) Oral daily  senna 2 Tablet(s) Oral at bedtime  sodium phosphate IVPB 30 milliMole(s) IV Intermittent once    Objective:  Vital Signs Last 24 Hrs  T(C): 36.7 (10 Oct 2021 05:22), Max: 37.2 (09 Oct 2021 20:48)  T(F): 98 (10 Oct 2021 05:22), Max: 98.9 (09 Oct 2021 20:48)  HR: 88 (10 Oct 2021 05:22) (82 - 95)  BP: 132/73 (10 Oct 2021 05:22) (121/74 - 143/85)  BP(mean): --  RR: 18 (10 Oct 2021 05:22) (18 - 18)  SpO2: 97% (10 Oct 2021 05:22) (96% - 99%)    PHYSICAL EXAM:  Constitutional:no acute distress  Eyes:HADLEY, EOMI  Ear/Nose/Throat: no oral lesions, 	  Respiratory: clear BL  Cardiovascular: S1S2  Gastrointestinal:soft, (+) BS, no tenderness  Extremities: L knee swelling  No Lymphadenopathy  IV sites not inflammed.    LABS:                        7.6    38.03 )-----------( 465      ( 10 Oct 2021 07:48 )             24.4     10-10    134<L>  |  98  |  17  ----------------------------<  86  3.6   |  24  |  0.56    Ca    7.9<L>      10 Oct 2021 07:48  Phos  2.9     10-10  Mg     2.3     10-10    MICROBIOLOGY: no pos cx    RADIOLOGY & ADDITIONAL STUDIES:  < from: CT Lower Extremity w/wo IV Cont, Bilateral (10.09.21 @ 02:02) >    IMPRESSION:    Intramuscular hematoma in the left thigh anterior compartment without active extravasation.    < end of copied text >

## 2021-10-10 NOTE — PROGRESS NOTE ADULT - ASSESSMENT
imp/rx:  Elevated wbc count likely driven by underlying myeloproliferative condition.  WBC count elevation may also be secondary to hematoma.  L knee may have fluid and if this increases would  consider tap.  No abx at present.

## 2021-10-10 NOTE — PROVIDER CONTACT NOTE (OTHER) - RECOMMENDATIONS
occult blood?
PA made aware. PA ordered STAT ABGS, LABS, Chest Xray, decrease rate of fluids to 50ml/hr, and pt to be put on 2L NC.

## 2021-10-10 NOTE — PROVIDER CONTACT NOTE (OTHER) - ASSESSMENT
distended abdomen   last BM was 10/2 , small stool
Pt abd distended, abd shiny, firm. VSS other than O2 saturation 90% on RA.
pt remained A&Ox1 (baseline), no s/s of distress, no c/o pain. All VSS. No s/s of bleeding.

## 2021-10-10 NOTE — PROGRESS NOTE ADULT - SUBJECTIVE AND OBJECTIVE BOX
Subjective:       Objective:   Vital Signs Last 24 Hrs  T(C): 36.7 (10 Oct 2021 00:43), Max: 37.2 (09 Oct 2021 20:48)  T(F): 98 (10 Oct 2021 00:43), Max: 98.9 (09 Oct 2021 20:48)  HR: 90 (10 Oct 2021 00:43) (82 - 95)  BP: 133/81 (10 Oct 2021 00:43) (121/74 - 143/85)  BP(mean): --  RR: 18 (10 Oct 2021 00:43) (18 - 18)  SpO2: 99% (10 Oct 2021 00:43) (95% - 99%)  I&O's Summary    08 Oct 2021 07:01  -  09 Oct 2021 07:00  --------------------------------------------------------  IN: 1300 mL / OUT: 650 mL / NET: 650 mL    09 Oct 2021 07:01  -  10 Oct 2021 00:49  --------------------------------------------------------  IN: 1050 mL / OUT: 100 mL / NET: 950 mL        Physical Exam:  General: NAD, resting comfortably in bed  Pulmonary: Nonlabored breathing, no respiratory distress  Cardiovascular: NSR  Abdominal: soft, NT/ND  Extremities: WWP  Pulses:     LABS:                        7.9    39.83 )-----------( 494      ( 09 Oct 2021 15:16 )             24.8     10-09    134<L>  |  98  |  20  ----------------------------<  92  3.8   |  25  |  0.63    Ca    7.8<L>      09 Oct 2021 07:48  Phos  1.9     10-09  Mg     2.3     10-09              Radiology and Additional Studies:    Assessment and Plan:  Subjective: No acute events overnight. Pt seen and examined at bedside this morning. Pt sleeping comfortably in bed.       Objective:   Vital Signs Last 24 Hrs  T(C): 36.4 (10 Oct 2021 09:22), Max: 37.2 (09 Oct 2021 20:48)  T(F): 97.6 (10 Oct 2021 09:22), Max: 98.9 (09 Oct 2021 20:48)  HR: 89 (10 Oct 2021 09:22) (82 - 95)  BP: 151/81 (10 Oct 2021 09:22) (121/74 - 151/81)  BP(mean): --  RR: 18 (10 Oct 2021 09:22) (18 - 18)  SpO2: 100% (10 Oct 2021 09:22) (96% - 100%)    I&O's Detail    09 Oct 2021 07:01  -  10 Oct 2021 07:00  --------------------------------------------------------  IN:    dextrose 5% + sodium chloride 0.45% w/ Additives: 1200 mL    IV PiggyBack: 100 mL    Oral Fluid: 400 mL  Total IN: 1700 mL    OUT:    Voided (mL): 350 mL  Total OUT: 350 mL    Total NET: 1350 mL        Physical Exam:  General: NAD, resting comfortably in bed  Pulmonary: Nonlabored breathing, no respiratory distress  Abdominal: Abd is soft, distended Non tender on palpation.   Extremities: [facility]      LABS:                        7.6    38.03 )-----------( 465      ( 10 Oct 2021 07:48 )             24.4     10-10    134<L>  |  98  |  17  ----------------------------<  86  3.6   |  24  |  0.56    Ca    7.9<L>      10 Oct 2021 07:48  Phos  2.9     10-10  Mg     2.3     10-10                Culture - Blood (collected 07 Oct 2021 17:02)  Source: .Blood Blood-Venous  Preliminary Report (08 Oct 2021 18:01):    No growth to date.    Culture - Blood (collected 07 Oct 2021 17:02)  Source: .Blood Blood-Venous  Preliminary Report (08 Oct 2021 18:01):    No growth to date.

## 2021-10-10 NOTE — PROVIDER CONTACT NOTE (OTHER) - ACTION/TREATMENT ORDERED:
PA made aware. Will put pt on 2L NC and check O2 Sat after, will have respiratory therapist draw ABGs, and STAT labs. Pt awaiting Chest XRay.
MD made aware and at bedside to assess stool color. Occult blood to be ordered. Will continue to monitor. pt safety maintained.
no intervention at this time
Pt is getting Miralax and MD was at bedside to see pt , and will follow up with senior for plan

## 2021-10-10 NOTE — PROGRESS NOTE ADULT - ATTENDING SUPERVISION STATEMENT
Resident
ACP
Resident
ACP
ACP/Resident
Resident

## 2021-10-10 NOTE — PROGRESS NOTE ADULT - ASSESSMENT
Assessment:  89F with PMHx dementia and myeloproliferative/myelodysplastic syndrome s/p fall with traumatic SDH worsening on repeat 5 hr interval scan with 5mm midline shift. SICU consulted for Q1 hour neurological checks. Patient with persistently worsening imaging, however with stable mental status. Now s/p closed reduction and pinning of L hip 9/27 w/ortho. Now with ileus, resolving. 10/8 CT A/P significant for L anterior thigh hematoma 5x8cm, unable to be fully appreciated given scan limit.       Plan:   - follow H&H, trend CBC  - regular diet  - c/w home meds  - continue holding DVT ppx     Assessment:  89F with PMHx dementia and myeloproliferative/myelodysplastic syndrome s/p fall with traumatic SDH worsening on repeat 5 hr interval scan with 5mm midline shift. SICU consulted for Q1 hour neurological checks. Patient with persistently worsening imaging, however with stable mental status. Now s/p closed reduction and pinning of L hip 9/27 w/ortho. Now with ileus, resolving. 10/8 CT A/P significant for L anterior thigh hematoma 5x8cm, unable to be fully appreciated given scan limit.       Plan:   - follow H&H, trend CBC  - regular diet  - c/w home meds  - continue holding DVT ppx  - Dispo planing.      Assessment:  89F with PMHx dementia and myeloproliferative/myelodysplastic syndrome s/p fall with traumatic SDH worsening on repeat 5 hr interval scan with 5mm midline shift. SICU consulted for Q1 hour neurological checks. Patient with persistently worsening imaging, however with stable mental status. Now s/p closed reduction and pinning of L hip 9/27 w/ortho. Now with ileus, resolving. 10/8 CT A/P significant for L anterior thigh hematoma 5x8cm, unable to be fully appreciated given scan limit.       Plan:   - follow H&H, trend CBC  - regular diet  - c/w home meds  - continue holding DVT ppx  - Will order a UA today.   - Dispo planing.

## 2021-10-11 NOTE — PROGRESS NOTE ADULT - ASSESSMENT
89F with PMHx dementia and myeloproliferative/myelodysplastic syndrome s/p fall with traumatic SDH worsening on repeat 5 hr interval scan with 5mm midline shift. SICU consulted for Q1 hour neurological checks. Patient with persistently worsening imaging, however with stable mental status. Now s/p closed reduction and pinning of L hip 9/27 w/ortho. Now with ileus, resolving. 10/8 CT A/P significant for L anterior thigh hematoma 5x8cm, unable to be fully appreciated given scan limit.       Plan:   - regular diet  - c/w home meds  - continue holding DVT ppx  - uptrending WBC: UA 10/10 negative. Continue f/u AM labs. Appreciate ID recs  - Dispo planing    ACS  [id number]

## 2021-10-11 NOTE — PROGRESS NOTE ADULT - SUBJECTIVE AND OBJECTIVE BOX
ACS DAILY PROGRESS NOTE:       SUBJECTIVE/ROS: No acute events overnight      MEDICATIONS  (STANDING):  allopurinol 100 milliGRAM(s) Oral daily  BACItracin   Ointment 1 Application(s) Topical daily  bisacodyl 5 milliGRAM(s) Oral at bedtime  cholecalciferol 400 Unit(s) Oral daily  dextrose 5% + sodium chloride 0.45% with potassium chloride 20 mEq/L 1000 milliLiter(s) (50 mL/Hr) IV Continuous <Continuous>  donepezil 5 milliGRAM(s) Oral at bedtime  influenza   Vaccine 0.5 milliLiter(s) IntraMuscular once  lacosamide IVPB 100 milliGRAM(s) IV Intermittent every 12 hours  lidocaine   4% Patch 1 Patch Transdermal every 24 hours  magnesium hydroxide Suspension 30 milliLiter(s) Oral daily  polyethylene glycol 3350 17 Gram(s) Oral daily  senna 2 Tablet(s) Oral at bedtime    MEDICATIONS  (PRN):      OBJECTIVE:    Vital Signs Last 24 Hrs  T(C): 36.9 (11 Oct 2021 00:00), Max: 37.4 (10 Oct 2021 21:15)  T(F): 98.5 (11 Oct 2021 00:00), Max: 99.3 (10 Oct 2021 21:15)  HR: 89 (11 Oct 2021 00:00) (83 - 89)  BP: 143/77 (11 Oct 2021 00:00) (125/62 - 151/81)  BP(mean): --  RR: 18 (11 Oct 2021 00:00) (18 - 18)  SpO2: 95% (11 Oct 2021 00:00) (95% - 100%)    Gen: NAD, resting comfortably in bed  Pulmonary: Nonlabored breathing, no respiratory distress  Heart: RRR  Abdominal: Abd is soft, distended Non tender on palpation.   Extremities: WWP      I&O's Detail    09 Oct 2021 07:01  -  10 Oct 2021 07:00  --------------------------------------------------------  IN:    dextrose 5% + sodium chloride 0.45% w/ Additives: 1200 mL    IV PiggyBack: 100 mL    Oral Fluid: 400 mL  Total IN: 1700 mL    OUT:    Voided (mL): 350 mL  Total OUT: 350 mL    Total NET: 1350 mL      10 Oct 2021 07:01  -  11 Oct 2021 00:58  --------------------------------------------------------  IN:    Oral Fluid: 640 mL  Total IN: 640 mL    OUT:    Voided (mL): 400 mL  Total OUT: 400 mL    Total NET: 240 mL          Daily     Daily     LABS:                        7.6    38.03 )-----------( 465      ( 10 Oct 2021 07:48 )             24.4     10-10    134<L>  |  98  |  17  ----------------------------<  86  3.6   |  24  |  0.56    Ca    7.9<L>      10 Oct 2021 07:48  Phos  2.9     10-10  Mg     2.3     10-10        Urinalysis Basic - ( 10 Oct 2021 15:47 )    Color: Light Yellow / Appearance: Clear / S.019 / pH: x  Gluc: x / Ketone: Negative  / Bili: Negative / Urobili: Negative   Blood: x / Protein: 30 mg/dL / Nitrite: Negative   Leuk Esterase: Negative / RBC: 3 /hpf / WBC 6 /HPF   Sq Epi: x / Non Sq Epi: 4 /hpf / Bacteria: Negative                   ACS DAILY PROGRESS NOTE:       SUBJECTIVE/ROS: No acute events overnight. patient resting in bed comfortably. Having bowel function.       MEDICATIONS  (STANDING):  allopurinol 100 milliGRAM(s) Oral daily  BACItracin   Ointment 1 Application(s) Topical daily  bisacodyl 5 milliGRAM(s) Oral at bedtime  cholecalciferol 400 Unit(s) Oral daily  dextrose 5% + sodium chloride 0.45% with potassium chloride 20 mEq/L 1000 milliLiter(s) (50 mL/Hr) IV Continuous <Continuous>  donepezil 5 milliGRAM(s) Oral at bedtime  influenza   Vaccine 0.5 milliLiter(s) IntraMuscular once  lacosamide IVPB 100 milliGRAM(s) IV Intermittent every 12 hours  lidocaine   4% Patch 1 Patch Transdermal every 24 hours  magnesium hydroxide Suspension 30 milliLiter(s) Oral daily  polyethylene glycol 3350 17 Gram(s) Oral daily  senna 2 Tablet(s) Oral at bedtime    MEDICATIONS  (PRN):      OBJECTIVE:    Vital Signs Last 24 Hrs  T(C): 36.9 (11 Oct 2021 00:00), Max: 37.4 (10 Oct 2021 21:15)  T(F): 98.5 (11 Oct 2021 00:00), Max: 99.3 (10 Oct 2021 21:15)  HR: 89 (11 Oct 2021 00:00) (83 - 89)  BP: 143/77 (11 Oct 2021 00:00) (125/62 - 151/81)  BP(mean): --  RR: 18 (11 Oct 2021 00:00) (18 - 18)  SpO2: 95% (11 Oct 2021 00:00) (95% - 100%)    Gen: NAD, resting comfortably in bed  Pulmonary: Nonlabored breathing, no respiratory distress  Heart: RRR  Abdominal: Abd is soft, distended Non tender on palpation.   Extremities: [facility]      I&O's Detail    09 Oct 2021 07:01  -  10 Oct 2021 07:00  --------------------------------------------------------  IN:    dextrose 5% + sodium chloride 0.45% w/ Additives: 1200 mL    IV PiggyBack: 100 mL    Oral Fluid: 400 mL  Total IN: 1700 mL    OUT:    Voided (mL): 350 mL  Total OUT: 350 mL    Total NET: 1350 mL      10 Oct 2021 07:01  -  11 Oct 2021 00:58  --------------------------------------------------------  IN:    Oral Fluid: 640 mL  Total IN: 640 mL    OUT:    Voided (mL): 400 mL  Total OUT: 400 mL    Total NET: 240 mL          Daily     Daily     LABS:                        7.6    38.03 )-----------( 465      ( 10 Oct 2021 07:48 )             24.4     10-10    134<L>  |  98  |  17  ----------------------------<  86  3.6   |  24  |  0.56    Ca    7.9<L>      10 Oct 2021 07:48  Phos  2.9     10-10  Mg     2.3     10-10        Urinalysis Basic - ( 10 Oct 2021 15:47 )    Color: Light Yellow / Appearance: Clear / S.019 / pH: x  Gluc: x / Ketone: Negative  / Bili: Negative / Urobili: Negative   Blood: x / Protein: 30 mg/dL / Nitrite: Negative   Leuk Esterase: Negative / RBC: 3 /hpf / WBC 6 /HPF   Sq Epi: x / Non Sq Epi: 4 /hpf / Bacteria: Negative

## 2021-10-11 NOTE — PROGRESS NOTE ADULT - SUBJECTIVE AND OBJECTIVE BOX
Hospitalist- Holden Mckenzie MD  Pager: [phone]  If no response or off-hours, page [phone]  -------------------------------------  Holden Mckenzie MD  Division of Hospital Medicine  Pager: [phone]  If no response or off-hours, page [phone]  -------------------------------------    Patient is a 89y old  Female who presents with a chief complaint of LEVEL I TRAUMA (11 Oct 2021 00:58)      SUBJECTIVE / OVERNIGHT EVENTS: none acute  ADDITIONAL REVIEW OF SYSTEMS: pt ao x 1 this morning, unreliable historian, says she feels like 'nothing is together,' and reports some neck discomfort, no marianna pain, no fevers/chills, no headache, no chest pain/palpitations, no sob/cough, no n/v    MEDICATIONS  (STANDING):  allopurinol 100 milliGRAM(s) Oral daily  BACItracin   Ointment 1 Application(s) Topical daily  bisacodyl 5 milliGRAM(s) Oral at bedtime  cholecalciferol 400 Unit(s) Oral daily  dextrose 5% + sodium chloride 0.45% with potassium chloride 20 mEq/L 1000 milliLiter(s) (50 mL/Hr) IV Continuous <Continuous>  donepezil 5 milliGRAM(s) Oral at bedtime  influenza   Vaccine 0.5 milliLiter(s) IntraMuscular once  lacosamide IVPB 100 milliGRAM(s) IV Intermittent every 12 hours  lidocaine   4% Patch 1 Patch Transdermal every 24 hours  magnesium hydroxide Suspension 30 milliLiter(s) Oral daily  polyethylene glycol 3350 17 Gram(s) Oral daily  senna 2 Tablet(s) Oral at bedtime    MEDICATIONS  (PRN):      CAPILLARY BLOOD GLUCOSE        I&O's Summary    10 Oct 2021 07:01  -  11 Oct 2021 07:00  --------------------------------------------------------  IN: 1340 mL / OUT: 400 mL / NET: 940 mL        PHYSICAL EXAM:  Vital Signs Last 24 Hrs  T(C): 37 (11 Oct 2021 13:02), Max: 37.4 (10 Oct 2021 21:15)  T(F): 98.6 (11 Oct 2021 13:02), Max: 99.3 (10 Oct 2021 21:15)  HR: 99 (11 Oct 2021 13:02) (83 - 99)  BP: 136/86 (11 Oct 2021 13:02) (124/67 - 146/76)  BP(mean): --  RR: 18 (11 Oct 2021 13:02) (18 - 18)  SpO2: 100% (11 Oct 2021 13:02) (93% - 100%)  CONSTITUTIONAL: NAD, well-developed, well-groomed  EYES: PERRLA; conjunctiva and sclera clear  ENMT: Moist oral mucosa, no pharyngeal injection or exudates; normal dentition  NECK: +cervical kyphosis  RESPIRATORY: Normal respiratory effort; lungs are clear to auscultation bilaterally  CARDIOVASCULAR: Regular rate and rhythm, normal S1 and S2, no murmur/rub/gallop; No lower extremity edema; Peripheral pulses are 2+ bilaterally  ABDOMEN: Nontender to palpation, normoactive bowel sounds, no rebound/guarding; No hepatosplenomegaly  MUSCLOSKELETAL:  Normal gait; no clubbing or cyanosis of digits; no joint swelling or tenderness to palpation  PSYCH: A+O to person, affect normal  NEUROLOGY: CN 2-12 are intact and symmetric; +2/5 B/L LE strength 2/2 pain, otherwise nonfocal motor exam, nonfocal sensory exam  SKIN: No rashes; no palpable lesions    LABS:                        8.0    39.34 )-----------( 490      ( 11 Oct 2021 09:05 )             25.9     10-11    132<L>  |  96  |  10  ----------------------------<  110<H>  3.5   |  26  |  0.59    Ca    7.8<L>      11 Oct 2021 09:05  Phos  2.5     10-11  Mg     2.0     10-11            Urinalysis Basic - ( 10 Oct 2021 15:47 )    Color: Light Yellow / Appearance: Clear / S.019 / pH: x  Gluc: x / Ketone: Negative  / Bili: Negative / Urobili: Negative   Blood: x / Protein: 30 mg/dL / Nitrite: Negative   Leuk Esterase: Negative / RBC: 3 /hpf / WBC 6 /HPF   Sq Epi: x / Non Sq Epi: 4 /hpf / Bacteria: Negative          RADIOLOGY & ADDITIONAL TESTS:  Results Reviewed:   Imaging Personally Reviewed:  Electrocardiogram Personally Reviewed:    COORDINATION OF CARE:  Care Discussed with Consultants/Other Providers [Y/N]: trauma surgery team  Prior or Outpatient Records Reviewed [Y/N]:

## 2021-10-11 NOTE — PROGRESS NOTE ADULT - SUBJECTIVE AND OBJECTIVE BOX
Follow Up:  Leukocytosis     Interval History:    REVIEW OF SYSTEMS  [  ] ROS unobtainable because:    [  ] All other systems negative except as noted below    Constitutional:  [ ] fever [ ] chills  [ ] weight loss  [ ] weakness  Skin:  [ ] rash [ ] phlebitis	  Eyes: [ ] icterus [ ] pain  [ ] discharge	  ENMT: [ ] sore throat  [ ] thrush [ ] ulcers [ ] exudates  Respiratory: [ ] dyspnea [ ] hemoptysis [ ] cough [ ] sputum	  Cardiovascular:  [ ] chest pain [ ] palpitations [ ] edema	  Gastrointestinal:  [ ] nausea [ ] vomiting [ ] diarrhea [ ] constipation [ ] pain	  Genitourinary:  [ ] dysuria [ ] frequency [ ] hematuria [ ] discharge [ ] flank pain  [ ] incontinence  Musculoskeletal:  [ ] myalgias [ ] arthralgias [ ] arthritis  [ ] back pain  Neurological:  [ ] headache [ ] seizures  [ ] confusion/altered mental status    Allergies  No Known Allergies        ANTIMICROBIALS:      OTHER MEDS:  MEDICATIONS  (STANDING):  allopurinol 100 daily  bisacodyl 5 at bedtime  donepezil 5 at bedtime  influenza   Vaccine 0.5 once  lacosamide IVPB 100 every 12 hours  magnesium hydroxide Suspension 30 daily  polyethylene glycol 3350 17 daily  senna 2 at bedtime      Vital Signs Last 24 Hrs  T(C): 37 (11 Oct 2021 13:02), Max: 37.4 (10 Oct 2021 21:15)  T(F): 98.6 (11 Oct 2021 13:02), Max: 99.3 (10 Oct 2021 21:15)  HR: 99 (11 Oct 2021 13:02) (83 - 99)  BP: 136/86 (11 Oct 2021 13:02) (124/67 - 146/76)  BP(mean): --  RR: 18 (11 Oct 2021 13:02) (18 - 18)  SpO2: 100% (11 Oct 2021 13:02) (93% - 100%)    PHYSICAL EXAMINATION:  General: Alert and Awake, NAD  HEENT: PERRL, EOMI  Neck: Supple  Cardiac: RRR, No M/R/G  Resp: CTAB, No Wh/Rh/Ra  Abdomen: NBS, NT/ND, No HSM, No rigidity or guarding  MSK: No LE edema. No Calf tenderness  : No padron  Skin: No rashes or lesions. Skin is warm and dry to the touch.   Neuro: Alert and Awake. CN 2-12 Grossly intact. Moves all four extremities spontaneously.  Psych: Calm, Pleasant, Cooperative                          8.0    39.34 )-----------( 490      ( 11 Oct 2021 09:05 )             25.9       10-11    132<L>  |  96  |  10  ----------------------------<  110<H>  3.5   |  26  |  0.59    Ca    7.8<L>      11 Oct 2021 09:05  Phos  2.5     10-11  Mg     2.0     10-11        Urinalysis Basic - ( 10 Oct 2021 15:47 )    Color: Light Yellow / Appearance: Clear / S.019 / pH: x  Gluc: x / Ketone: Negative  / Bili: Negative / Urobili: Negative   Blood: x / Protein: 30 mg/dL / Nitrite: Negative   Leuk Esterase: Negative / RBC: 3 /hpf / WBC 6 /HPF   Sq Epi: x / Non Sq Epi: 4 /hpf / Bacteria: Negative        MICROBIOLOGY:    .Blood Blood-Venous  10-07-21   No growth to date.  --  --    .Blood Blood  10-04-21   No Growth Final  --  --    .Blood Blood  10-02-21   No Growth Final  --  --    RADIOLOGY:    <The imaging below has been reviewed and visualized by me independently. Findings as detailed in report below>    < from: CT Lower Extremity w/wo IV Cont, Bilateral (10.09.21 @ 02:02) >  IMPRESSION:    Intramuscular hematoma in the left thigh anterior compartment without active extravasation.    < end of copied text >   Follow Up:  Leukocytosis     Interval History: afebrile. no acute overnight events.     REVIEW OF SYSTEMS  [ x ] ROS unobtainable because:  dementia limits accurate ROS  [  ] All other systems negative except as noted below    Constitutional:  [ ] fever [ ] chills  [ ] weight loss  [ ] weakness  Skin:  [ ] rash [ ] phlebitis	  Eyes: [ ] icterus [ ] pain  [ ] discharge	  ENMT: [ ] sore throat  [ ] thrush [ ] ulcers [ ] exudates  Respiratory: [ ] dyspnea [ ] hemoptysis [ ] cough [ ] sputum	  Cardiovascular:  [ ] chest pain [ ] palpitations [ ] edema	  Gastrointestinal:  [ ] nausea [ ] vomiting [ ] diarrhea [ ] constipation [ ] pain	  Genitourinary:  [ ] dysuria [ ] frequency [ ] hematuria [ ] discharge [ ] flank pain  [ ] incontinence  Musculoskeletal:  [ ] myalgias [ ] arthralgias [ ] arthritis  [ ] back pain  Neurological:  [ ] headache [ ] seizures  [ ] confusion/altered mental status    Allergies  No Known Allergies        ANTIMICROBIALS:      OTHER MEDS:  MEDICATIONS  (STANDING):  allopurinol 100 daily  bisacodyl 5 at bedtime  donepezil 5 at bedtime  influenza   Vaccine 0.5 once  lacosamide IVPB 100 every 12 hours  magnesium hydroxide Suspension 30 daily  polyethylene glycol 3350 17 daily  senna 2 at bedtime      Vital Signs Last 24 Hrs  T(C): 37 (11 Oct 2021 13:02), Max: 37.4 (10 Oct 2021 21:15)  T(F): 98.6 (11 Oct 2021 13:02), Max: 99.3 (10 Oct 2021 21:15)  HR: 99 (11 Oct 2021 13:02) (83 - 99)  BP: 136/86 (11 Oct 2021 13:02) (124/67 - 146/76)  BP(mean): --  RR: 18 (11 Oct 2021 13:02) (18 - 18)  SpO2: 100% (11 Oct 2021 13:02) (93% - 100%)    PHYSICAL EXAMINATION:  General: Alert and Awake, somewhat dyspneic on exam today  Cardiac: RRR, No M/R/G  Resp: CTAB, No Wh/Rh/Ra  Abdomen: NBS, Mild-Mod Distension but no tenderness to palpation, No HSM, No rigidity or guarding  MSK: L knee with mod-large effusion with no tenderness to palpation and with preserved ROM. L hip surgical site is well healed with no erythema or drainage. No LE edema. No Calf tenderness  : No padron  Skin: No rashes or lesions. Skin is warm and dry to the touch.   Neuro: Alert and Awake. CN 2-12 Grossly intact. Moves all four extremities spontaneously.  Psych: Calm, Pleasant, Cooperative                            8.0    39.34 )-----------( 490      ( 11 Oct 2021 09:05 )             25.9       10-11    132<L>  |  96  |  10  ----------------------------<  110<H>  3.5   |  26  |  0.59    Ca    7.8<L>      11 Oct 2021 09:05  Phos  2.5     10-11  Mg     2.0     10-11        Urinalysis Basic - ( 10 Oct 2021 15:47 )    Color: Light Yellow / Appearance: Clear / S.019 / pH: x  Gluc: x / Ketone: Negative  / Bili: Negative / Urobili: Negative   Blood: x / Protein: 30 mg/dL / Nitrite: Negative   Leuk Esterase: Negative / RBC: 3 /hpf / WBC 6 /HPF   Sq Epi: x / Non Sq Epi: 4 /hpf / Bacteria: Negative        MICROBIOLOGY:    .Blood Blood-Venous  10-07-21   No growth to date.  --  --    .Blood Blood  10-04-21   No Growth Final  --  --    .Blood Blood  10-02-21   No Growth Final  --  --    RADIOLOGY:    <The imaging below has been reviewed and visualized by me independently. Findings as detailed in report below>    < from: CT Lower Extremity w/wo IV Cont, Bilateral (10.09.21 @ 02:02) >  IMPRESSION:    Intramuscular hematoma in the left thigh anterior compartment without active extravasation.    < end of copied text >

## 2021-10-11 NOTE — PROGRESS NOTE ADULT - ASSESSMENT
89 years old female with PMHx dementia and myeloproliferative/myelodysplastic syndrome (diagnosed in 2019) brought in by EMS due to a fall at [place] assisted [facility]. ID is consulted for leukocytosis.    On 9/28 s/p Closed reduction and percutaneous pinning of left hip    Waxing and waning leukocytosis over course of admission  Has remained afebrile and clinically stable  Noted to have acute subdural hemorrhage which is being conservatively managed (per family's wishes)    CT A/P (10/4) with Dilated small bowel loops with apparent transition point in the right lower quadrant, suspicious for small bowel obstruction.     Increase in procalcitonin 10/7 and leukocytosis remains elevated above her baseline  Abdomen appears benign now after bowel and enema regimen,   BCx (10/4) NGTD  U/A (10/4) with 7 WBC  COVID19 PCR (10/6) negative  CXR (10/8) with clear lungs    Noted to have progressive anemia   CT A/P and CT LLE with large left thigh hematoma  ?Leukocytosis from leukemoid reaction secondary to the hematoma    Also with significant edema around her left knee, no tenderness or limitation in ROM but would monitor if rise in WBC or clinical status changes would pursue tap    IMPRESSION:  Leukocytosis  Constipation/Distension  Anemia  S/p Closed reduction and percutaneous pinning 9/28    RECOMMENDATIONS:  - Would monitor L Knee and if rise in WBC or clinical status changes would pursue tap  - Monitor off of antibiotics  --Continue to follow CBC with diff  --Continue to follow temperature curve  --Follow up on preliminary blood cultures    I will continue to follow. Please feel free to contact me with any further questions.    Clinton Bobo M.D.  [facility] Division of Infectious Disease  8AM-5PM: Pager Number [phone]  After Hours (or if no response): Please contact the Infectious Diseases Office at [phone]

## 2021-10-11 NOTE — PROGRESS NOTE ADULT - ASSESSMENT
LORRI TEJEDA is a 89y old Female with medical history of MDS and dementia who presents with a chief complaint of fall and was a  LEVEL I TRAUMA (08 Oct 2021 17:13). She was found to have a traumatic SDH, which has been increasing in size with midline shift, and a Left subcapital Femoral Neck fracture for which she underwent CRPP (9/28). Hospital course was complicated by acute blood loss anemia 2/2 LLE hematoma and ileus, now resolving

## 2021-10-12 NOTE — PROGRESS NOTE ADULT - ASSESSMENT
89F with PMHx dementia and myeloproliferative/myelodysplastic syndrome s/p fall with traumatic SDH worsening on repeat 5 hr interval scan with 5mm midline shift. SICU consulted for Q1 hour neurological checks. Patient with persistently worsening imaging, however with stable mental status. Now s/p closed reduction and pinning of L hip 9/27 w/ortho. Now with ileus, resolving. 10/8 CT A/P significant for L anterior thigh hematoma 5x8cm, unable to be fully appreciated given scan limit.       Plan:   - regular diet  - c/w home meds  - continue holding DVT ppx  - uptrending WBC: UA 10/10 negative. Continue f/u AM labs. Appreciate ID recs  - Dispo planing    ACS  [id number]

## 2021-10-12 NOTE — PROGRESS NOTE ADULT - PROBLEM SELECTOR PLAN 3
s/p CRPP 9/28 with orthopedics.  - cont multimodal pain control per surgery  - PT rec [facility]
s/p CRPP 9/28 with orthopedics.  - cont multimodal pain control per surgery  - PT rec [facility]

## 2021-10-12 NOTE — PROGRESS NOTE ADULT - PROVIDER SPECIALTY LIST ADULT
Hospitalist
Infectious Disease
Orthopedics
Orthopedics
Trauma Surgery
Infectious Disease
Neurosurgery
Orthopedics
Orthopedics
Trauma Surgery
Infectious Disease
Neurosurgery
Orthopedics
Orthopedics
SICU
SICU
Trauma Surgery
SICU
Trauma Surgery
Hospitalist

## 2021-10-12 NOTE — PROGRESS NOTE ADULT - SUBJECTIVE AND OBJECTIVE BOX
ACS DAILY PROGRESS NOTE:       SUBJECTIVE/ROS: No acute events overnight      MEDICATIONS  (STANDING):  allopurinol 100 milliGRAM(s) Oral daily  BACItracin   Ointment 1 Application(s) Topical daily  bisacodyl 5 milliGRAM(s) Oral at bedtime  cholecalciferol 400 Unit(s) Oral daily  donepezil 5 milliGRAM(s) Oral at bedtime  influenza   Vaccine 0.5 milliLiter(s) IntraMuscular once  lacosamide IVPB 100 milliGRAM(s) IV Intermittent every 12 hours  lidocaine   4% Patch 1 Patch Transdermal every 24 hours  magnesium hydroxide Suspension 30 milliLiter(s) Oral daily  polyethylene glycol 3350 17 Gram(s) Oral daily  senna 2 Tablet(s) Oral at bedtime    MEDICATIONS  (PRN):      OBJECTIVE:    Vital Signs Last 24 Hrs  T(C): 37.1 (11 Oct 2021 21:36), Max: 37.2 (11 Oct 2021 18:13)  T(F): 98.7 (11 Oct 2021 21:36), Max: 99 (11 Oct 2021 18:13)  HR: 90 (11 Oct 2021 21:36) (83 - 99)  BP: 133/77 (11 Oct 2021 21:36) (124/67 - 146/76)  BP(mean): --  RR: 18 (11 Oct 2021 21:36) (18 - 18)  SpO2: 94% (11 Oct 2021 21:36) (93% - 100%)    Gen: NAD, resting comfortably in bed  Pulmonary: Nonlabored breathing, no respiratory distress  Heart: RRR  Abdominal: Abd is soft, distended Non tender on palpation.   Extremities: WWP    I&O's Detail    10 Oct 2021 07:01  -  11 Oct 2021 07:00  --------------------------------------------------------  IN:    dextrose 5% + sodium chloride 0.45% w/ Additives: 600 mL    IV PiggyBack: 100 mL    Oral Fluid: 640 mL  Total IN: 1340 mL    OUT:    Voided (mL): 400 mL  Total OUT: 400 mL    Total NET: 940 mL      11 Oct 2021 07:01  -  12 Oct 2021 00:27  --------------------------------------------------------  IN:    dextrose 5% + sodium chloride 0.45% w/ Additives: 400 mL    IV PiggyBack: 100 mL    IV PiggyBack: 499.8 mL    Oral Fluid: 560 mL  Total IN: 1559.8 mL    OUT:    Incontinent per Collection Bag (mL): 100 mL  Total OUT: 100 mL    Total NET: 1459.8 mL          Daily     Daily     LABS:                        8.0    39.34 )-----------( 490      ( 11 Oct 2021 09:05 )             25.9     10-11    132<L>  |  96  |  10  ----------------------------<  110<H>  3.5   |  26  |  0.59    Ca    7.8<L>      11 Oct 2021 09:05  Phos  2.5     10-11  Mg     2.0     10-11        Urinalysis Basic - ( 10 Oct 2021 15:47 )    Color: Light Yellow / Appearance: Clear / S.019 / pH: x  Gluc: x / Ketone: Negative  / Bili: Negative / Urobili: Negative   Blood: x / Protein: 30 mg/dL / Nitrite: Negative   Leuk Esterase: Negative / RBC: 3 /hpf / WBC 6 /HPF   Sq Epi: x / Non Sq Epi: 4 /hpf / Bacteria: Negative

## 2021-10-12 NOTE — PROGRESS NOTE ADULT - PROBLEM SELECTOR PLAN 5
found to have L hip and expanding SDH hematoma. Hb stable at 8  -s/p PRBC transfusions, plt transfusions, and DDAVP earlier in admission.  -cont monitoring h/h    dispo: planned for [facility], awaiting placement
found to have L hip and expanding SDH hematoma. Hb stable at 8  -s/p PRBC transfusions, plt transfusions, and DDAVP earlier in admission.  -cont monitoring h/h    dispo: planned for [facility]

## 2021-10-12 NOTE — PROGRESS NOTE ADULT - REASON FOR ADMISSION
LEVEL I TRAUMA
fall
LEVEL I TRAUMA

## 2021-10-12 NOTE — PROGRESS NOTE ADULT - SUBJECTIVE AND OBJECTIVE BOX
Holden Mckenzie MD  Division of Hospital Medicine  Pager: [phone]  If no response or off-hours, page [phone]  -------------------------------------    Patient is a 89y old  Female who presents with a chief complaint of LEVEL I TRAUMA (12 Oct 2021 00:27)    SUBJECTIVE / OVERNIGHT EVENTS: none acute  ADDITIONAL REVIEW OF SYSTEMS: pt comfortable, AO x 1, reports feeling comfortable, no new aches/pains, no CP/palpitations, no sob/cough, no n/v, no abd pain, no rashes, no diarrhea.    MEDICATIONS  (STANDING):  allopurinol 100 milliGRAM(s) Oral daily  BACItracin   Ointment 1 Application(s) Topical daily  bisacodyl 5 milliGRAM(s) Oral at bedtime  cholecalciferol 400 Unit(s) Oral daily  donepezil 5 milliGRAM(s) Oral at bedtime  influenza   Vaccine 0.5 milliLiter(s) IntraMuscular once  lacosamide IVPB 100 milliGRAM(s) IV Intermittent every 12 hours  lidocaine   4% Patch 1 Patch Transdermal every 24 hours  magnesium hydroxide Suspension 30 milliLiter(s) Oral daily  polyethylene glycol 3350 17 Gram(s) Oral daily  senna 2 Tablet(s) Oral at bedtime    MEDICATIONS  (PRN):      CAPILLARY BLOOD GLUCOSE        I&O's Summary    11 Oct 2021 07:  -  12 Oct 2021 07:00  --------------------------------------------------------  IN: 1609.8 mL / OUT: 250 mL / NET: 1359.8 mL    12 Oct 2021 07:  -  12 Oct 2021 10:04  --------------------------------------------------------  IN: 0 mL / OUT: 600 mL / NET: -600 mL        PHYSICAL EXAM:  Vital Signs Last 24 Hrs  T(C): 36.4 (12 Oct 2021 10:00), Max: 37.5 (12 Oct 2021 01:13)  T(F): 97.5 (12 Oct 2021 10:00), Max: 99.5 (12 Oct 2021 01:13)  HR: 77 (12 Oct 2021 10:00) (77 - 99)  BP: 125/67 (12 Oct 2021 10:00) (125/67 - 137/83)  BP(mean): --  RR: 18 (12 Oct 2021 10:00) (18 - 20)  SpO2: 97% (12 Oct 2021 10:00) (94% - 100%)  CONSTITUTIONAL: NAD, well-developed, well-groomed  EYES: PERRLA; conjunctiva and sclera clear  ENMT: dry oral mucosa  NECK: Supple, no palpable masses; no thyromegaly  RESPIRATORY: Normal respiratory effort; lungs are clear to auscultation bilaterally  CARDIOVASCULAR: Regular rate and rhythm, normal S1 and S2, no murmur/rub/gallop; No lower extremity edema; Peripheral pulses are 2+ bilaterally  ABDOMEN: Nontender to palpation, normoactive bowel sounds, no rebound/guarding; No hepatosplenomegaly  MUSCLOSKELETAL:  Normal gait; no clubbing or cyanosis of digits; no joint swelling or tenderness to palpation  PSYCH: A+O to person, affect appropriate  NEUROLOGY: CN 2-12 are intact and symmetric; +2/5 B/L LE strength limited 2/2 pain, otherwise nonfocal motor and sensory exams  SKIN: No rashes; no palpable lesions    LABS:                        8.4    35.32 )-----------( 513      ( 12 Oct 2021 07:30 )             25.5     10-12    131<L>  |  95<L>  |  10  ----------------------------<  78  3.5   |  24  |  0.59    Ca    8.0<L>      12 Oct 2021 07:27  Phos  3.2     10-12  Mg     2.1     10-12      PT/INR - ( 12 Oct 2021 07:30 )   PT: 11.9 sec;   INR: 0.99 ratio         PTT - ( 12 Oct 2021 07:30 )  PTT:28.8 sec      Urinalysis Basic - ( 10 Oct 2021 15:47 )    Color: Light Yellow / Appearance: Clear / S.019 / pH: x  Gluc: x / Ketone: Negative  / Bili: Negative / Urobili: Negative   Blood: x / Protein: 30 mg/dL / Nitrite: Negative   Leuk Esterase: Negative / RBC: 3 /hpf / WBC 6 /HPF   Sq Epi: x / Non Sq Epi: 4 /hpf / Bacteria: Negative          RADIOLOGY & ADDITIONAL TESTS:  Results Reviewed:   Imaging Personally Reviewed:  Electrocardiogram Personally Reviewed:    COORDINATION OF CARE:  Care Discussed with Consultants/Other Providers [Y/N]: surgery team  Prior or Outpatient Records Reviewed [Y/N]:

## 2021-10-12 NOTE — PROGRESS NOTE ADULT - PROBLEM SELECTOR PLAN 1
Appears to be at baseline  -Pt requiring frequent reorientation during interview and unable to describe current medical/surgical issues. Per RN, no waxing/waning consciousness/mental status.  -Please utilize all non-pharmacologic methods for reducing risk of delirium, such as frequent  reorientation and keeping familiar objects at bedside.    #hyponatremia- downtrending Na over the past few days. Likely hpovolemic hypotonic hyponatremia 2/2 poor PO intake in setting of ileus  - diet has been restarted, recommend assistance with all meals to encourage PO intake  - continue trending Na at least daily  - will order serum osm, urine osm/urine na for the morning  - possible trial of IVF if continues to worsen
-Pt requiring frequent reorientation during interview and unable to describe current medical/surgical issues. Per RN, no waxing/waning consciousness/mental status.  -Please utilize all non-pharmacologic methods for reducing risk of delirium, such as frequent  reorientation and keeping familiar objects at bedside.

## 2021-10-12 NOTE — PROGRESS NOTE ADULT - PROBLEM SELECTOR PLAN 4
Pt with ileus that appears to be resolving with return of bowel function  - cont bowel regimen as needed
Pt with ileus that appears to be resolving with return of bowel function  - cont bowel regimen as needed

## 2021-10-12 NOTE — DISCHARGE NOTE NURSING/CASE MANAGEMENT/SOCIAL WORK - NSDCFUADDAPPT_GEN_ALL_CORE_FT
Please follow up with your primary care physician within 1-2 weeks of discharge to discuss your recent hospitalization, surgery and changes to your medication.     INCIDENTAL FINDING:   Soft tissues: The prevertebral soft tissues are unremarkable. A 6 mm left thyroid lobe nodule and adjacent coarse calcification is noted.

## 2021-10-12 NOTE — CHART NOTE - NSCHARTNOTEFT_GEN_A_CORE
Incidental findings are findings on history, physical examination, lab work, and imaging that are not directly related to the patient's chief complaint and cause for hospital admission.     1. The incidental findings for this patient are (please list):  6 mm L lobe thyroid nodule    2. Were these findings explained to the patient and/or their family (in the event that either the patient requests communication with their family or the patient is unable to understand or remember the findings and plan)?  yes with son    3. Was a copy of the lab work/ imaging report given to the patient and/or their family?  discussed verbally and written in discharge note for rehab      4. Was the patient asked whether they can follow up with their PMD regarding this finding? If the patient says no, or does not have a PMD, you must identify a provider (i.e. Medicine Clinic or specialist) with whom the patient will follow up.    5. Was the finding documented on the discharge summary?  yes    ACS [id number]

## 2021-10-12 NOTE — PROGRESS NOTE ADULT - PROBLEM SELECTOR PLAN 2
Neurosurgery on board however risk/benefit profile makes pt a suboptimal surgical candidate given her underlying dementia
Neurosurgery on board however risk/benefit profile makes pt a suboptimal surgical candidate given her underlying dementia

## 2021-10-12 NOTE — DISCHARGE NOTE NURSING/CASE MANAGEMENT/SOCIAL WORK - PATIENT PORTAL LINK FT
You can access the FollowMyHealth Patient Portal offered by [facility] by registering at the following website: [email or web address]. By joining [employer]’s FollowMyHealth portal, you will also be able to view your health information using other applications (apps) compatible with our system.

## 2021-10-12 NOTE — PROGRESS NOTE ADULT - NUTRITIONAL ASSESSMENT
This patient has been assessed with a concern for Malnutrition and has been determined to have a diagnosis/diagnoses of Moderate protein-calorie malnutrition.    This patient is being managed with:   Diet NPO-  Except Medications  Entered: Oct  5 2021  2:14PM      This patient has been assessed with a concern for Malnutrition and has been determined to have a diagnosis/diagnoses of Moderate protein-calorie malnutrition.    This patient is being managed with:   Diet NPO-  Except Medications  Entered: Oct  5 2021  2:14PM    
This patient has been assessed with a concern for Malnutrition and has been determined to have a diagnosis/diagnoses of Moderate protein-calorie malnutrition.    This patient is being managed with:   Diet Regular-  Supplement Feeding Modality:  Oral  Ensure Pudding Cans or Servings Per Day:  3       Frequency:  Three Times a day  Entered: Sep 29 2021 11:15AM    
This patient has been assessed with a concern for Malnutrition and has been determined to have a diagnosis/diagnoses of Moderate protein-calorie malnutrition.    This patient is being managed with:   Diet NPO-  Except Medications  Entered: Oct  4 2021  8:18AM    
This patient has been assessed with a concern for Malnutrition and has been determined to have a diagnosis/diagnoses of Moderate protein-calorie malnutrition.    This patient is being managed with:   Diet Regular-  Supplement Feeding Modality:  Oral  Ensure Pudding Cans or Servings Per Day:  3       Frequency:  Three Times a day  Entered: Sep 29 2021 11:15AM    
This patient has been assessed with a concern for Malnutrition and has been determined to have a diagnosis/diagnoses of Moderate protein-calorie malnutrition.    This patient is being managed with:   Diet Regular-  Entered: Oct  7 2021  8:07AM    

## 2021-10-28 NOTE — CONSULT NOTE ADULT - SUBJECTIVE AND OBJECTIVE BOX
[facility]  [phone]  NEPHROLOGY      INITIAL CONSULT NOTE  --------------------------------------------------------------------------------  HPI:    89 year old Female with pmhx dementia, MDS, was Level 1 Trauma s/p fall a month ago and found to have SDH, no intervention done at that time. Now transferred from [facility] from nursing home due to AMS. 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 Appears to be in septic shock, with multiple electrolyte imbalance and shock liver. Received 5L IVF, Renal consult called for decreased urine output.     PAST HISTORY  --------------------------------------------------------------------------------  PAST MEDICAL & SURGICAL HISTORY:  MDS (myelodysplastic syndrome)    Dementia    S/P ORIF (open reduction internal fixation) fracture  elbow fracture 02/2021      FAMILY HISTORY:  FH: HTN (hypertension) (Mother)      PAST SOCIAL HISTORY:  from [facility]    ALLERGIES & MEDICATIONS  --------------------------------------------------------------------------------  Allergies    No Known Allergies    Intolerances      Standing Inpatient Medications  cefepime   IVPB      cefepime   IVPB 1000 milliGRAM(s) IV Intermittent every 12 hours  chlorhexidine 4% Liquid 1 Application(s) Topical every 24 hours  lacosamide IVPB 100 milliGRAM(s) IV Intermittent every 12 hours  lactated ringers. 1000 milliLiter(s) IV Continuous <Continuous>  norepinephrine Infusion 0.05 MICROgram(s)/kG/Min IV Continuous <Continuous>  potassium chloride  10 mEq/100 mL IVPB 10 milliEquivalent(s) IV Intermittent once  saline laxative (FLEET) Rectal Enema 1 Enema Rectal once  senna Syrup 10 milliLiter(s) Oral daily  vancomycin  IVPB 1000 milliGRAM(s) IV Intermittent every 24 hours    PRN Inpatient Medications  bisacodyl 5 milliGRAM(s) Oral daily PRN      REVIEW OF SYSTEMS  --------------------------------------------------------------------------------    patient is unable to give ROS    VITALS/PHYSICAL EXAM  --------------------------------------------------------------------------------  T(C): 36.5 (10-28-21 @ 15:00), Max: 36.8 (10-28-21 @ 01:27)  HR: 78 (10-28-21 @ 16:00) (71 - 129)  BP: 131/57 (10-28-21 @ 16:00) (53/39 - 152/58)  RR: 18 (10-28-21 @ 16:00) (13 - 32)  SpO2: 100% (10-28-21 @ 16:00) (89% - 100%)  Wt(kg): --  Height (cm): 147.3 (10-28-21 @ 08:47)  Weight (kg): 49.9 (10-28-21 @ 08:47)  BMI (kg/m2): 23 (10-28-21 @ 08:47)  BSA (m2): 1.41 (10-28-21 @ 08:47)      10-28-21 @ 07:01  -  10-28-21 @ 17:22  --------------------------------------------------------  IN: 2071.2 mL / OUT: 35 mL / NET: 2036.2 mL      Physical Exam:  	Gen: elderly, pale appearing female   	Pulm: decrease breath sounds  no rales or ronchi or wheezing  	CV: No JVD. RRR, S1S2; no rub  	Back: no sacral edema  	Abd: +BS, soft, nontender/nondistended  	: No suprapubic tenderness, +padron catheter  	UE: Warm, no cyanosis  no clubbing,  no edema;  	LE: Warm, no cyanosis  no clubbing, no edema  	Neuro: obtunded  	Skin: Warm, no decrease skin turgor, RLE mottled     LABS/STUDIES  --------------------------------------------------------------------------------              8.1    39.34 >-----------<  445      [10-28-21 @ 10:03]              27.9     148  |  116  |  48  ----------------------------<  120      [10-28-21 @ 15:40]  3.1   |  20  |  1.01        Ca     7.8     [10-28-21 @ 15:40]      Mg     2.1     [10-28-21 @ 10:03]      Phos  2.7     [10-28-21 @ 10:03]    TPro  4.8  /  Alb  2.9  /  TBili  0.8  /  DBili  x   /  AST  558  /  ALT  259  /  AlkPhos  242  [10-28-21 @ 15:40]    PT/INR: PT 11.8 , INR 0.98       [10-28-21 @ 15:40]  PTT: 30.5       [10-28-21 @ 10:03]      Creatinine Trend:  SCr 1.01 [10-28 @ 15:40]  SCr 1.13 [10-28 @ 10:03]  SCr 1.14 [10-28 @ 07:01]  SCr 1.42 [10-28 @ 01:11]  SCr 0.59 [10-12 @ 07:27]    Urinalysis - [10-28-21 @ 10:03]      Color Orange / Appearance Turbid / SG 1.023 / pH 6.0      Gluc 100 mg/dL / Ketone Trace  / Bili Negative / Urobili 2 mg/dL       Blood Large / Protein 300 mg/dL / Leuk Est Small / Nitrite Negative      RBC 1177 /  / Hyaline 5 / Gran 3 / Sq Epi  / Non Sq Epi 13 / Bacteria Negative      Iron 66, TIBC 245, %sat 27      [02-27-21 @ 12:50]  Ferritin 111      [02-27-21 @ 12:56]       [facility]  [phone]  NEPHROLOGY      INITIAL CONSULT NOTE  --------------------------------------------------------------------------------  HPI:    89 year old Female with pmhx dementia, MDS, was Level 1 Trauma s/p fall a month ago and found to have SDH, no intervention done at that time. Now transferred from [facility] from nursing home due to AMS. 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 Appears to be in septic shock, with multiple electrolyte imbalance and shock liver. Received 5L IVF, Renal consult called for decreased urine output.     PAST HISTORY  --------------------------------------------------------------------------------  PAST MEDICAL & SURGICAL HISTORY:  MDS (myelodysplastic syndrome)    Dementia    S/P ORIF (open reduction internal fixation) fracture  elbow fracture 02/2021      FAMILY HISTORY:  FH: HTN (hypertension) (Mother)      PAST SOCIAL HISTORY:  from [facility]    ALLERGIES & MEDICATIONS  --------------------------------------------------------------------------------  Allergies    No Known Allergies    Intolerances      Standing Inpatient Medications  cefepime   IVPB      cefepime   IVPB 1000 milliGRAM(s) IV Intermittent every 12 hours  chlorhexidine 4% Liquid 1 Application(s) Topical every 24 hours  lacosamide IVPB 100 milliGRAM(s) IV Intermittent every 12 hours  lactated ringers. 1000 milliLiter(s) IV Continuous <Continuous>  norepinephrine Infusion 0.05 MICROgram(s)/kG/Min IV Continuous <Continuous>  potassium chloride  10 mEq/100 mL IVPB 10 milliEquivalent(s) IV Intermittent once  saline laxative (FLEET) Rectal Enema 1 Enema Rectal once  senna Syrup 10 milliLiter(s) Oral daily  vancomycin  IVPB 1000 milliGRAM(s) IV Intermittent every 24 hours    PRN Inpatient Medications  bisacodyl 5 milliGRAM(s) Oral daily PRN      REVIEW OF SYSTEMS  --------------------------------------------------------------------------------    patient is unable to give ROS    VITALS/PHYSICAL EXAM  --------------------------------------------------------------------------------  T(C): 36.5 (10-28-21 @ 15:00), Max: 36.8 (10-28-21 @ 01:27)  HR: 78 (10-28-21 @ 16:00) (71 - 129)  BP: 131/57 (10-28-21 @ 16:00) (53/39 - 152/58)  RR: 18 (10-28-21 @ 16:00) (13 - 32)  SpO2: 100% (10-28-21 @ 16:00) (89% - 100%)  Wt(kg): --  Height (cm): 147.3 (10-28-21 @ 08:47)  Weight (kg): 49.9 (10-28-21 @ 08:47)  BMI (kg/m2): 23 (10-28-21 @ 08:47)  BSA (m2): 1.41 (10-28-21 @ 08:47)      10-28-21 @ 07:01  -  10-28-21 @ 17:22  --------------------------------------------------------  IN: 2071.2 mL / OUT: 35 mL / NET: 2036.2 mL      Physical Exam:  	Gen: elderly, pale appearing female   	Pulm: decrease breath sounds  no rales or ronchi or wheezing  	CV: No JVD. RRR, S1S2; no rub  	Back: no sacral edema  	Abd: +BS, soft, nontender/+distended  	: No suprapubic tenderness, +padron catheter  	UE: Warm, no cyanosis  no clubbing,  no edema;  	LE: Warm, no cyanosis  no clubbing, no edema  	Neuro: obtunded  	Skin: Warm, no decrease skin turgor, RLE mottled     LABS/STUDIES  --------------------------------------------------------------------------------              8.1    39.34 >-----------<  445      [10-28-21 @ 10:03]              27.9     148  |  116  |  48  ----------------------------<  120      [10-28-21 @ 15:40]  3.1   |  20  |  1.01        Ca     7.8     [10-28-21 @ 15:40]      Mg     2.1     [10-28-21 @ 10:03]      Phos  2.7     [10-28-21 @ 10:03]    TPro  4.8  /  Alb  2.9  /  TBili  0.8  /  DBili  x   /  AST  558  /  ALT  259  /  AlkPhos  242  [10-28-21 @ 15:40]    PT/INR: PT 11.8 , INR 0.98       [10-28-21 @ 15:40]  PTT: 30.5       [10-28-21 @ 10:03]      Creatinine Trend:  SCr 1.01 [10-28 @ 15:40]  SCr 1.13 [10-28 @ 10:03]  SCr 1.14 [10-28 @ 07:01]  SCr 1.42 [10-28 @ 01:11]  SCr 0.59 [10-12 @ 07:27]    Urinalysis - [10-28-21 @ 10:03]      Color Orange / Appearance Turbid / SG 1.023 / pH 6.0      Gluc 100 mg/dL / Ketone Trace  / Bili Negative / Urobili 2 mg/dL       Blood Large / Protein 300 mg/dL / Leuk Est Small / Nitrite Negative      RBC 1177 /  / Hyaline 5 / Gran 3 / Sq Epi  / Non Sq Epi 13 / Bacteria Negative      Iron 66, TIBC 245, %sat 27      [02-27-21 @ 12:50]  Ferritin 111      [02-27-21 @ 12:56]      < from: US Renal (10.28.21 @ 14:34) >  EXAM:  US KIDNEY(S)                            PROCEDURE DATE:  10/28/2021            INTERPRETATION:  CLINICAL INFORMATION: Acute tubular necrosis creatinine of 1.14    COMPARISON: None available.    TECHNIQUE: Sonography of the kidneys and bladder.    FINDINGS:    Right kidney: 9.4 cm. No renal mass, hydronephrosis or calculi. Echogenic kidney. Mild pelvic fullness.    Left kidney: 11.4 cm. No renal mass, hydronephrosis or calculi. There is a parapelvic cyst with a septation measuring 1.4 x 1.4 x 1.2 cm. There is a 5 mm nonobstructing stone in the midpole. Echogenic kidney    Urinary bladder: Collapsed around a Padron catheter  Other: Incidental note is made of sludge within the gallbladder. There are small bilateral pleural effusions.    IMPRESSION:    Increased echogenicity in the bilateral kidneys, compatible with medical renal disease. No hydronephrosis.  Sludge within the gallbladder.  Small bilateral pleural effusions.        --- End of Report ---        MARIELA MCCLELLAND MD; Fellow Radiology  This document has been electronically signed.  JAY CLARK MD; Attending Radiologist  This document has been electronically signed. Oct 28 2021  3:53PM    < end of copied text >      < from: CT Abdomen and Pelvis No Cont (10.28.21 @ 04:56) >  EXAM:  CT ABDOMEN AND PELVIS      PROCEDURE DATE:  10/28/2021        INTERPRETATION:  CLINICAL INFORMATION: Sepsis    COMPARISON: 9/26/2021 and 10/8/2021.    CONTRAST/COMPLICATIONS:  IV Contrast: NONE  Oral Contrast: NONE  Complications: None reported at time of study completion    PROCEDURE:  CT of the Chest, Abdomen and Pelvis was performed.  Sagittal and coronal reformats were performed.    FINDINGS:    Please note that evaluation of the chest/abdominal organs and vascular structures is limited by lack of intravenous contrast.    CHEST:  LUNGS AND LARGE AIRWAYS: Patent central airways. Stable 6 mm right middle lobe nodule. Bilateral regions of linear atelectasis. Otherwise no lung consolidations.  PLEURA: Small bilateral pleural effusions.  VESSELS: Atherosclerotic calcifications of thoracic aorta and coronary arteries. Ectatic ascending thoracic aorta measuring 4 cm.  HEART: Cardiomegaly. Aortic valve and mitral annulus calcifications. No pericardial effusion.  MEDIASTINUM AND FRIDA:No lymphadenopathy.  CHEST WALL AND LOWER NECK: Left thyroid calcification. Subcutaneous soft tissue edema.    ABDOMEN AND PELVIS:  LIVER: Within normal limits.  BILE DUCTS: Normal caliber.  GALLBLADDER: Within normal limits.  SPLEEN: Within normal limits.  PANCREAS: Atrophic. Stable pancreatic ductal dilatation.  ADRENALS: Within normal limits.  KIDNEYS/URETERS: Left parapelvic renal cysts. Small calculi or layering calcium within left kidney. No hydronephrosis.    BLADDER: Padron catheter.  REPRODUCTIVE ORGANS: Calcified uterine fibroid.    BOWEL: No bowel obstruction. Appendix is not visualized. Rectum distended by stool. Rectal wall thickening and inflammation. Diffuse colon wall thickening.  PERITONEUM: Trace ascites.  VESSELS: Atherosclerotic calcifications.  RETROPERITONEUM/LYMPH NODES: Prominent retroperitoneal lymph nodes.  ABDOMINAL WALL: Subcutaneous soft tissue edema.  BONES: Degenerative changes. Chronic fracture deformities of sternum and left clavicular head. Bilateral subacute and chronic rib deformities. Bilateral hip ORIF. Compression fracture deformities of T5, T6, T7, T8, T9, and L1.    IMPRESSION:    1. Small bilateral pleural effusions.  2. No lung consolidations.  3. Rectum distended by stool with findings suspicious for stercoral colitis.  4. Pancolitis.          --- End of Report ---    REYES GOLDSTEIN MD; Attending Radiologist  This document has been electronically signed. Oct 28 2021 10:21AM    < end of copied text >   [facility]  [phone]  NEPHROLOGY      INITIAL CONSULT NOTE  --------------------------------------------------------------------------------  HPI:    89 year old Female with pmhx dementia, MDS, was Level 1 Trauma s/p fall a month ago and found to have SDH, no intervention done at that time. Now transferred from [facility] from nursing home due to AMS. 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 Appears to be in septic shock, with multiple electrolyte imbalance and shock liver. Received 5L IVF, Renal consult called for decreased urine output.     PAST HISTORY  --------------------------------------------------------------------------------  PAST MEDICAL & SURGICAL HISTORY:  MDS (myelodysplastic syndrome)    Dementia    S/P ORIF (open reduction internal fixation) fracture  elbow fracture 02/2021      FAMILY HISTORY:  FH: HTN (hypertension) (Mother)      PAST SOCIAL HISTORY:  from [facility]    ALLERGIES & MEDICATIONS  --------------------------------------------------------------------------------  Allergies    No Known Allergies    Intolerances      Standing Inpatient Medications  cefepime   IVPB      cefepime   IVPB 1000 milliGRAM(s) IV Intermittent every 12 hours  chlorhexidine 4% Liquid 1 Application(s) Topical every 24 hours  lacosamide IVPB 100 milliGRAM(s) IV Intermittent every 12 hours  lactated ringers. 1000 milliLiter(s) IV Continuous <Continuous>  norepinephrine Infusion 0.05 MICROgram(s)/kG/Min IV Continuous <Continuous>  potassium chloride  10 mEq/100 mL IVPB 10 milliEquivalent(s) IV Intermittent once  saline laxative (FLEET) Rectal Enema 1 Enema Rectal once  senna Syrup 10 milliLiter(s) Oral daily  vancomycin  IVPB 1000 milliGRAM(s) IV Intermittent every 24 hours    PRN Inpatient Medications  bisacodyl 5 milliGRAM(s) Oral daily PRN      REVIEW OF SYSTEMS  --------------------------------------------------------------------------------    patient is unable to give ROS    VITALS/PHYSICAL EXAM  --------------------------------------------------------------------------------  T(C): 36.5 (10-28-21 @ 15:00), Max: 36.8 (10-28-21 @ 01:27)  HR: 78 (10-28-21 @ 16:00) (71 - 129)  BP: 131/57 (10-28-21 @ 16:00) (53/39 - 152/58)  RR: 18 (10-28-21 @ 16:00) (13 - 32)  SpO2: 100% (10-28-21 @ 16:00) (89% - 100%)  Wt(kg): --  Height (cm): 147.3 (10-28-21 @ 08:47)  Weight (kg): 49.9 (10-28-21 @ 08:47)  BMI (kg/m2): 23 (10-28-21 @ 08:47)  BSA (m2): 1.41 (10-28-21 @ 08:47)      10-28-21 @ 07:01  -  10-28-21 @ 17:22  --------------------------------------------------------  IN: 2071.2 mL / OUT: 35 mL / NET: 2036.2 mL      Physical Exam:  	Gen: elderly, pale appearing female   	Pulm: decrease breath sounds  no rales or ronchi or wheezing  	CV: No JVD. RRR, S1S2; no rub  	Back: no sacral edema  	Abd: + very hypoactive BS, soft nontender/+distended  	: No suprapubic tenderness, +padron catheter  	UE: Warm, no cyanosis  no clubbing,  no edema;  	LE: Warm, no cyanosis  no clubbing, no edema  	Neuro: obtunded  	Skin: Warm, no decrease skin turgor, RLE mottled     LABS/STUDIES  --------------------------------------------------------------------------------              8.1    39.34 >-----------<  445      [10-28-21 @ 10:03]              27.9     148  |  116  |  48  ----------------------------<  120      [10-28-21 @ 15:40]  3.1   |  20  |  1.01        Ca     7.8     [10-28-21 @ 15:40]      Mg     2.1     [10-28-21 @ 10:03]      Phos  2.7     [10-28-21 @ 10:03]    TPro  4.8  /  Alb  2.9  /  TBili  0.8  /  DBili  x   /  AST  558  /  ALT  259  /  AlkPhos  242  [10-28-21 @ 15:40]    PT/INR: PT 11.8 , INR 0.98       [10-28-21 @ 15:40]  PTT: 30.5       [10-28-21 @ 10:03]      Creatinine Trend:  SCr 1.01 [10-28 @ 15:40]  SCr 1.13 [10-28 @ 10:03]  SCr 1.14 [10-28 @ 07:01]  SCr 1.42 [10-28 @ 01:11]  SCr 0.59 [10-12 @ 07:27]    Urinalysis - [10-28-21 @ 10:03]      Color Orange / Appearance Turbid / SG 1.023 / pH 6.0      Gluc 100 mg/dL / Ketone Trace  / Bili Negative / Urobili 2 mg/dL       Blood Large / Protein 300 mg/dL / Leuk Est Small / Nitrite Negative      RBC 1177 /  / Hyaline 5 / Gran 3 / Sq Epi  / Non Sq Epi 13 / Bacteria Negative      Iron 66, TIBC 245, %sat 27      [02-27-21 @ 12:50]  Ferritin 111      [02-27-21 @ 12:56]      < from: US Renal (10.28.21 @ 14:34) >  EXAM:  US KIDNEY(S)                            PROCEDURE DATE:  10/28/2021            INTERPRETATION:  CLINICAL INFORMATION: Acute tubular necrosis creatinine of 1.14    COMPARISON: None available.    TECHNIQUE: Sonography of the kidneys and bladder.    FINDINGS:    Right kidney: 9.4 cm. No renal mass, hydronephrosis or calculi. Echogenic kidney. Mild pelvic fullness.    Left kidney: 11.4 cm. No renal mass, hydronephrosis or calculi. There is a parapelvic cyst with a septation measuring 1.4 x 1.4 x 1.2 cm. There is a 5 mm nonobstructing stone in the midpole. Echogenic kidney    Urinary bladder: Collapsed around a Padron catheter  Other: Incidental note is made of sludge within the gallbladder. There are small bilateral pleural effusions.    IMPRESSION:    Increased echogenicity in the bilateral kidneys, compatible with medical renal disease. No hydronephrosis.  Sludge within the gallbladder.  Small bilateral pleural effusions.        --- End of Report ---        MARIELA MCCLELLAND MD; Fellow Radiology  This document has been electronically signed.  JAY CLARK MD; Attending Radiologist  This document has been electronically signed. Oct 28 2021  3:53PM    < end of copied text >      < from: CT Abdomen and Pelvis No Cont (10.28.21 @ 04:56) >  EXAM:  CT ABDOMEN AND PELVIS      PROCEDURE DATE:  10/28/2021        INTERPRETATION:  CLINICAL INFORMATION: Sepsis    COMPARISON: 9/26/2021 and 10/8/2021.    CONTRAST/COMPLICATIONS:  IV Contrast: NONE  Oral Contrast: NONE  Complications: None reported at time of study completion    PROCEDURE:  CT of the Chest, Abdomen and Pelvis was performed.  Sagittal and coronal reformats were performed.    FINDINGS:    Please note that evaluation of the chest/abdominal organs and vascular structures is limited by lack of intravenous contrast.    CHEST:  LUNGS AND LARGE AIRWAYS: Patent central airways. Stable 6 mm right middle lobe nodule. Bilateral regions of linear atelectasis. Otherwise no lung consolidations.  PLEURA: Small bilateral pleural effusions.  VESSELS: Atherosclerotic calcifications of thoracic aorta and coronary arteries. Ectatic ascending thoracic aorta measuring 4 cm.  HEART: Cardiomegaly. Aortic valve and mitral annulus calcifications. No pericardial effusion.  MEDIASTINUM AND FRIDA:No lymphadenopathy.  CHEST WALL AND LOWER NECK: Left thyroid calcification. Subcutaneous soft tissue edema.    ABDOMEN AND PELVIS:  LIVER: Within normal limits.  BILE DUCTS: Normal caliber.  GALLBLADDER: Within normal limits.  SPLEEN: Within normal limits.  PANCREAS: Atrophic. Stable pancreatic ductal dilatation.  ADRENALS: Within normal limits.  KIDNEYS/URETERS: Left parapelvic renal cysts. Small calculi or layering calcium within left kidney. No hydronephrosis.    BLADDER: Padron catheter.  REPRODUCTIVE ORGANS: Calcified uterine fibroid.    BOWEL: No bowel obstruction. Appendix is not visualized. Rectum distended by stool. Rectal wall thickening and inflammation. Diffuse colon wall thickening.  PERITONEUM: Trace ascites.  VESSELS: Atherosclerotic calcifications.  RETROPERITONEUM/LYMPH NODES: Prominent retroperitoneal lymph nodes.  ABDOMINAL WALL: Subcutaneous soft tissue edema.  BONES: Degenerative changes. Chronic fracture deformities of sternum and left clavicular head. Bilateral subacute and chronic rib deformities. Bilateral hip ORIF. Compression fracture deformities of T5, T6, T7, T8, T9, and L1.    IMPRESSION:    1. Small bilateral pleural effusions.  2. No lung consolidations.  3. Rectum distended by stool with findings suspicious for stercoral colitis.  4. Pancolitis.          --- End of Report ---    REYES GOLDSTEIN MD; Attending Radiologist  This document has been electronically signed. Oct 28 2021 10:21AM    < end of copied text >    < from: CT Head No Cont (10.28.21 @ 09:42) >    EXAM:  CT BRAIN                            PROCEDURE DATE:  10/28/2021            INTERPRETATION:  INDICATIONS:  f/u SDH    TECHNIQUE:  Serial axial images were obtained from the skull base to the vertex without intravenous contrast. Study is done on the portable scanner    COMPARISON EXAMINATION: 10/28/2021    FINDINGS:  VENTRICLES AND SULCI: Continued evidence of significant mass effect on the left lateral ventricle with midline shift to the right of 1.4 cm . Evidence of subfalcine herniationand signs of transtentorial and uncal herniation are apparent.  INTRA-AXIAL:  Left subdural hematoma with predominantly fluid and small amount of acute hemorrhage identified.  EXTRA-AXIAL:  No mass or collection is seen.  VISUALIZED SINUSES:  Clear.  VISUALIZED MASTOIDS:  Clear.  CALVARIUM:  Normal.  MISCELLANEOUS:  None.    IMPRESSION:  Interval stability compared with the prior 10/28/2021 at 4:25 AM. Large left predominantly fluid attenuation subdural hematoma with small amount of acute hemorrhage noted within. Mass effect on the left lateral ventricle with midline shift to the right roughly 1.4 cm.with evidence of subfalcine uncal and transtentorial herniation    --- End of Report ---    < end of copied text >

## 2021-10-28 NOTE — PROGRESS NOTE ADULT - ASSESSMENT
ASSESSMENT/PLAN:    89F hx of MDS w/ recent hospitalization (9/28/21) for fall, found to have tSDH w/ MLS and femoral fx s/p CRPP, now transferred from  for AMS [facility] w/ R AoC SDH w/ MLS and subfalcine herniation, non-surgical candidate d/t poor functional status.    NEURO:  s/p Kcentra, vit. K in NSICU  - neuro checks q1h  - vimpat for sz ppx  - per NSG, non-surgical candidate for poor functional status  - Bed rest   - delirium precautions  - GOC    CVS:  increased pressor requirements c/f septic vs. hemorrhagic shock, though no s/s active bleed, however has hx of acute blood loss anemia; started empirically on abx though baseline elevated leuks 2/2 MDS, no fever, however cannot entirely rule of infectious etiology, though would favor dehydration as patient unable to tolerate PO for few days and responsive to few liters of IVF  - SBP goal 100-160  - trend lactate  - titrate uop>0.5cc/kg/hr   -cardiology for elevated trop  - EKG, TTE    PULM:  PIOTR opacity  - 4L NC, wean as tolerated  - IS as tolerated  - maintain sats>92    RENAL:  Oliguria with 40cc since admission s/p 5L IVF and maintenance fluids, likely secondary prolonged hypotension concerning for ATN in the s/o sepsis, though cannot rule out obstruction  - Fluids: 75cc/hr LR  - daily IOs  - renal US to r/o obstruction  - Ulytes  -  for padron placement    GI:  Elevated INR/LFTs c/f MESSI vs. shock liver   Patient with CTAP on 10/20 with pancolitis, large stool burden vs. obstruction & stercol colitis, though was treated with enemas with improvement; also showed small bowel compression at the time  s/p kcentra 10/28  - Diet: NPO  - Bowel regimen  - KUB for abd distention  - fleet enema  - GI consult for obstruction  - Tylenol level, hepatitis panel, utox  - trend coags, LFTs q6h  - consider GI consult  - consider NAC gtt      ENDO:   - FS goal 120-180    HEME/ONC:  s/p Kcentra, vit K in NSICU  - SCDs  - Chemoppx: hold given heme  - Onc for MDS  - coags as above under GI  - Received 2U PRBC @ OSH    ID:  elevated leuks, with hx of MDS, no focal infectious source though imaging suggestive of sterco colitis   - monitor for fevers  - empiric coverage Vanc/cefepime (10/28 - )  - consider flagyl vs. zosyn though prefer to avoid zosyn given renal fxn  - trough  - f/u panculture  - ID consult      Patient is at high risk of neurologic deterioration/death due to: poor prognosis, heme expansion, herniation   ASSESSMENT/PLAN:    89F hx of MDS w/ recent hospitalization (9/28/21) for fall, found to have tSDH w/ MLS and femoral fx s/p CRPP, now transferred from  for [facility] w/ R AoC SDH w/ MLS and subfalcine herniation, non-surgical candidate d/t poor functional status.    NEURO:  s/p Kcentra, vit. K in NSICU  - neuro checks q1h  - vimpat for sz ppx  - per NSG, non-surgical candidate for poor functional status  - Bed rest   - delirium precautions  - GOC  - CT head in AM    CVS:  hypotension insetting of sepsis currently off pressors   - SBP goal 100-160  - down trending lactate   -troponin elevated likely type 2 NSTEMI   - EKG afib w/ non specific ST changes   -cardiology following not candidate for cath/intervention     PULM:  PIOTR opacity  - 4L NC, wean as tolerated  - IS as tolerated  - maintain sats>92    RENAL:  SHANI in setting of sepsis/hypoperfusion resolving   - Fluids: 75cc/hr LR  - daily IOs  - renal US to r/o obstruction  - urine output increasing     GI:  Elevated INR/LFTs c/f MESSI vs. shock liver   Patient with CTAP on 10/20 with pancolitis, large stool burden vs. obstruction & stercol colitis, though was treated with enemas with improvement; also showed small bowel compression at the time  s/p kcentra 10/28  - Diet: NPO  - Bowel regimen  - GI following ?moises syndrome now w/ rectal tube for decompression, send C.diff ?source of sepsis   - trend coags, LFTs q6h    ENDO:   - FS goal 120-180    HEME/ONC:  s/p Kcentra, vit K in NSICU  - SCDs  - Chemoppx: hold given heme  - Onc for MDS  - Received 2U PRBC @ OSH  trend cbc q8 for now   Thrombocytosis is likely reactive in the setting of subdural hematoma.  Anemia: transfuse prbc for Hb less than 8g/dl.   Coagulopathy: mixing studies and DIC work up.     ID:  elevated leuks, with hx of MDS, no focal infectious source though imaging suggestive of sterco colitis   - monitor for fevers  d/c vancomycin c/w cefepime & metronidazole   - trough  - f/u panculture  - ID consult in AM       Patient is at high risk of neurologic deterioration/death due to: poor prognosis, heme expansion, herniation   ASSESSMENT/PLAN:    89F hx of MDS w/ recent hospitalization (9/28/21) for fall, found to have tSDH w/ MLS and femoral fx s/p CRPP, now transferred from  for [facility] w/ R AoC SDH w/ MLS and subfalcine herniation, non-surgical candidate d/t poor functional status.    NEURO:  s/p Kcentra, vit. K in NSICU  - neuro checks q1h  - vimpat for sz ppx  - per NSG, non-surgical candidate for poor functional status  - Bed rest   - delirium precautions  - GOC  - CT head in AM    CVS:  hypotension insetting of sepsis currently off pressors   - SBP goal 100-160  - down trending lactate   -troponin elevated likely type 2 NSTEMI   - EKG afib w/ non specific ST changes   -cardiology following not candidate for cath/intervention     PULM:  PIOTR opacity  - 4L NC, wean as tolerated  - IS as tolerated  - maintain sats>92    RENAL:  SHANI in setting of sepsis/hypoperfusion resolving   - Fluids: 75cc/hr LR  - daily IOs  - renal US to r/o obstruction  - urine output increasing     GI:  Elevated INR/LFTs c/f MESSI vs. shock liver   Patient with CTAP on 10/20 with pancolitis, large stool burden vs. obstruction & stercol colitis, though was treated with enemas with improvement; also showed small bowel compression at the time  s/p kcentra 10/28  - Diet: NPO  - Bowel regimen  - GI following ?moises syndrome now w/ rectal tube for decompression, send C.diff ?source of sepsis   - trend coags, LFTs q6h  -add folic acid and multivitamins     ENDO:   - FS goal 120-180    HEME/ONC:  s/p Kcentra, vit K in NSICU  - SCDs  - Chemoppx: hold given heme  - Onc for MDS  - Received 2U PRBC @ OSH  trend cbc q8 for now   Thrombocytosis is likely reactive in the setting of subdural hematoma.  Anemia: transfuse prbc for Hb less than 8g/dl.   Coagulopathy: mixing studies and DIC work up.     ID:  elevated leuks, with hx of MDS, no focal infectious source though imaging suggestive of sterco colitis   - monitor for fevers  d/c vancomycin c/w cefepime & metronidazole   - trough  - f/u panculture  - ID consult in AM       Patient is at high risk of neurologic deterioration/death due to: poor prognosis, heme expansion, herniation   ASSESSMENT/PLAN:    89F hx of MDS w/ recent hospitalization (9/28/21) for fall, found to have tSDH w/ MLS and femoral fx s/p CRPP, now transferred from  for [facility] w/ R AoC SDH w/ MLS and subfalcine herniation, non-surgical candidate d/t poor functional status.    NEURO:  s/p Kcentra, vit. K in NSICU  - neuro checks q1h  - vimpat for sz ppx  - per NSG, non-surgical candidate for poor functional status  - Bed rest   - delirium precautions  - GOC  - CT head in AM    CVS:  hypotension insetting of presumed sepsis currently weaned off pressors   - SBP goal 100-160  - down trending lactate   -troponin elevated likely type 2 NSTEMI   - EKG afib w/ non specific ST changes   -cardiology following not candidate for cath/intervention     PULM:  PIOTR opacity  - 4L NC, wean as tolerated  - IS as tolerated  - maintain sats>92    RENAL:  SHANI in setting of sepsis/hypoperfusion resolving   - Fluids: 75cc/hr LR  - daily IOs  - renal US to r/o obstruction  - urine output increasing     GI:  Elevated INR/LFTs c/f MESSI vs. shock liver   Patient with CTAP on 10/20 with pancolitis, large stool burden vs. obstruction & stercol colitis, though was treated with enemas with improvement; also showed small bowel compression at the time  s/p kcentra 10/28  - Diet: NPO  - Bowel regimen  - GI following ?moises syndrome now w/ rectal tube for decompression, send C.diff ?source of sepsis   - trend coags, LFTs q6h  -add folic acid and multivitamins     ENDO:   - FS goal 120-180    HEME/ONC:  s/p Kcentra, vit K in NSICU  - SCDs  - Chemoppx: hold given heme  - Onc for MDS  - Received 2U PRBC @ OSH  trend cbc q8 for now   Thrombocytosis is likely reactive in the setting of subdural hematoma.  Anemia: transfuse prbc for Hb less than 8g/dl.   Coagulopathy: mixing studies and DIC work up.     ID:  elevated leuks, with hx of MDS, no focal infectious source though imaging suggestive of sterco colitis   - monitor for fevers  d/c vancomycin c/w cefepime & metronidazole   - trough  - f/u panculture  - ID consult in AM       Patient is at high risk of neurologic deterioration/death due to: heme expansion, herniation, shock from infection

## 2021-10-28 NOTE — PROGRESS NOTE ADULT - ASSESSMENT
ASSESSMENT/PLAN:    89F hx of MDS w/ recent hospitalization (9/28/21) for fall, found to have tSDH w/ MLS and femoral fx s/p CRPP, now transferred from  for [facility] w/ R AoC SDH w/ MLS and subfalcine herniation, non-surgical candidate d/t poor functional status.    NEURO:  s/p Kcentra, vit. K in NSICU  - neuro checks q1h  - vimpat for sz ppx  - per NSG, non-surgical candidate for poor functional status  - Bed rest   - delirium precautions  - GOC    CVS:  increased pressor requirements c/f septic vs. hemorrhagic shock, though no s/s active bleed, however has hx of acute blood loss anemia; started empirically on abx though baseline elevated leuks 2/2 MDS, no fever, however cannot entirely rule of infectious etiology, though would favor dehydration as patient unable to tolerate PO for few days and responsive to few liters of IVF  - SBP goal 100-160  - trend lactate  - titrate uop>0.5cc/kg/hr   -cardiology for elevated trop  - EKG, TTE    PULM:  PIOTR opacity  - 4L NC, wean as tolerated  - IS as tolerated  - maintain sats>92    RENAL:  Oliguria with 40cc since admission s/p 5L IVF and maintenance fluids, likely secondary prolonged hypotension concerning for ATN in the s/o sepsis, though cannot rule out obstruction  - Fluids: 75cc/hr LR  - daily IOs  - renal US to r/o obstruction  - Ulytes  -  for padron placement    GI:  Elevated INR/LFTs c/f MESSI vs. shock liver  - Diet: NPO  - Bowel regimen  - KUB for abd distention  - Tylenol level, hepatitis panel, utox  - trend coags, LFTs q6h  - consider GI consult      ENDO:   - FS goal 120-180    HEME/ONC:  s/p Kcentra, vit K in NSICU  - SCDs  - Chemoppx: hold given heme  - Onc for MDS  - coags as above under GI    ID:  elevated leuks, with hx of MDS, no focal infectious source  - monitor for fevers  - empiric coverage Vanc/cefepime (10/28 - )  - trough  - f/u panculture      Patient is at high risk of neurologic deterioration/death due to: poor prognosis, heme expansion, herniation   ASSESSMENT/PLAN:    89F hx of MDS w/ recent hospitalization (9/28/21) for fall, found to have tSDH w/ MLS and femoral fx s/p CRPP, now transferred from  for [facility] w/ R AoC SDH w/ MLS and subfalcine herniation, non-surgical candidate d/t poor functional status.    NEURO:  s/p Kcentra, vit. K in NSICU  - neuro checks q1h  - vimpat for sz ppx  - per NSG, non-surgical candidate for poor functional status  - Bed rest   - delirium precautions  - GOC    CVS:  increased pressor requirements c/f septic vs. hemorrhagic shock, though no s/s active bleed, however has hx of acute blood loss anemia; started empirically on abx though baseline elevated leuks 2/2 MDS, no fever, however cannot entirely rule of infectious etiology, though would favor dehydration as patient unable to tolerate PO for few days and responsive to few liters of IVF  - SBP goal 100-160  - trend lactate  - titrate uop>0.5cc/kg/hr   -cardiology for elevated trop  - EKG, TTE    PULM:  PIOTR opacity  - 4L NC, wean as tolerated  - IS as tolerated  - maintain sats>92    RENAL:  Oliguria with 40cc since admission s/p 5L IVF and maintenance fluids, likely secondary prolonged hypotension concerning for ATN in the s/o sepsis, though cannot rule out obstruction  - Fluids: 75cc/hr LR  - daily IOs  - renal US to r/o obstruction  - Ulytes  -  for padron placement    GI:  Elevated INR/LFTs c/f MESSI vs. shock liver   s/p kcentra 10/28  - Diet: NPO  - Bowel regimen  - KUB for abd distention  - Tylenol level, hepatitis panel, utox  - trend coags, LFTs q6h  - consider GI consult      ENDO:   - FS goal 120-180    HEME/ONC:  s/p Kcentra, vit K in NSICU  - SCDs  - Chemoppx: hold given heme  - Onc for MDS  - coags as above under GI    ID:  elevated leuks, with hx of MDS, no focal infectious source  - monitor for fevers  - empiric coverage Vanc/cefepime (10/28 - )  - trough  - f/u panculture      Patient is at high risk of neurologic deterioration/death due to: poor prognosis, heme expansion, herniation   ASSESSMENT/PLAN:    89F hx of MDS w/ recent hospitalization (9/28/21) for fall, found to have tSDH w/ MLS and femoral fx s/p CRPP, now transferred from  for [facility] w/ R AoC SDH w/ MLS and subfalcine herniation, non-surgical candidate d/t poor functional status.    NEURO:  s/p Kcentra, vit. K in NSICU  - neuro checks q1h  - vimpat for sz ppx  - per NSG, non-surgical candidate for poor functional status  - Bed rest   - delirium precautions  - GOC    CVS:  increased pressor requirements c/f septic vs. hemorrhagic shock, though no s/s active bleed, however has hx of acute blood loss anemia; started empirically on abx though baseline elevated leuks 2/2 MDS, no fever, however cannot entirely rule of infectious etiology, though would favor dehydration as patient unable to tolerate PO for few days and responsive to few liters of IVF  - SBP goal 100-160  - trend lactate  - titrate uop>0.5cc/kg/hr   -cardiology for elevated trop  - EKG, TTE    PULM:  PIOTR opacity  - 4L NC, wean as tolerated  - IS as tolerated  - maintain sats>92    RENAL:  Oliguria with 40cc since admission s/p 5L IVF and maintenance fluids, likely secondary prolonged hypotension concerning for ATN in the s/o sepsis, though cannot rule out obstruction  - Fluids: 75cc/hr LR  - daily IOs  - renal US to r/o obstruction  - Ulytes  -  for padron placement    GI:  Elevated INR/LFTs c/f MESSI vs. shock liver   s/p kcentra 10/28  - Diet: NPO  - Bowel regimen  - KUB for abd distention  - Tylenol level, hepatitis panel, utox  - trend coags, LFTs q6h  - consider GI consult  - consider NAC gtt      ENDO:   - FS goal 120-180    HEME/ONC:  s/p Kcentra, vit K in NSICU  - SCDs  - Chemoppx: hold given heme  - Onc for MDS  - coags as above under GI    ID:  elevated leuks, with hx of MDS, no focal infectious source  - monitor for fevers  - empiric coverage Vanc/cefepime (10/28 - )  - trough  - f/u panculture      Patient is at high risk of neurologic deterioration/death due to: poor prognosis, heme expansion, herniation   ASSESSMENT/PLAN:    89F hx of MDS w/ recent hospitalization (9/28/21) for fall, found to have tSDH w/ MLS and femoral fx s/p CRPP, now transferred from  for [facility] w/ R AoC SDH w/ MLS and subfalcine herniation, non-surgical candidate d/t poor functional status.    NEURO:  s/p Kcentra, vit. K in NSICU  - neuro checks q1h  - vimpat for sz ppx  - per NSG, non-surgical candidate for poor functional status  - Bed rest   - delirium precautions  - GOC    CVS:  increased pressor requirements c/f septic vs. hemorrhagic shock, though no s/s active bleed, however has hx of acute blood loss anemia; started empirically on abx though baseline elevated leuks 2/2 MDS, no fever, however cannot entirely rule of infectious etiology, though would favor dehydration as patient unable to tolerate PO for few days and responsive to few liters of IVF  - SBP goal 100-160  - trend lactate  - titrate uop>0.5cc/kg/hr   -cardiology for elevated trop  - EKG, TTE    PULM:  PIOTR opacity  - 4L NC, wean as tolerated  - IS as tolerated  - maintain sats>92    RENAL:  Oliguria with 40cc since admission s/p 5L IVF and maintenance fluids, likely secondary prolonged hypotension concerning for ATN in the s/o sepsis, though cannot rule out obstruction  - Fluids: 75cc/hr LR  - daily IOs  - renal US to r/o obstruction  - Ulytes  -  for padron placement    GI:  Elevated INR/LFTs c/f MESSI vs. shock liver   Patient with CTAP on 102/0 with pancolitis, large stool burden vs. obstruction & stercol colitis, though was treated with enemas with improvement  s/p kcentra 10/28  - Diet: NPO  - Bowel regimen  - KUB for abd distention  - fleet enema  - GI consult for obstruction  - Tylenol level, hepatitis panel, utox  - trend coags, LFTs q6h  - consider GI consult  - consider NAC gtt      ENDO:   - FS goal 120-180    HEME/ONC:  s/p Kcentra, vit K in NSICU  - SCDs  - Chemoppx: hold given heme  - Onc for MDS  - coags as above under GI    ID:  elevated leuks, with hx of MDS, no focal infectious source though imaging suggestive of sterco colitis   - monitor for fevers  - empiric coverage Vanc/cefepime (10/28 - )  - consider flagyl vs. zosyn though prefer to avoid zosyn given renal fxn  - trough  - f/u panculture  - ID consult      Patient is at high risk of neurologic deterioration/death due to: poor prognosis, heme expansion, herniation   ASSESSMENT/PLAN:    89F hx of MDS w/ recent hospitalization (9/28/21) for fall, found to have tSDH w/ MLS and femoral fx s/p CRPP, now transferred from  for [facility] w/ R AoC SDH w/ MLS and subfalcine herniation, non-surgical candidate d/t poor functional status.    NEURO:  s/p Kcentra, vit. K in NSICU  - neuro checks q1h  - vimpat for sz ppx  - per NSG, non-surgical candidate for poor functional status  - Bed rest   - delirium precautions  - GOC    CVS:  increased pressor requirements c/f septic vs. hemorrhagic shock, though no s/s active bleed, however has hx of acute blood loss anemia; started empirically on abx though baseline elevated leuks 2/2 MDS, no fever, however cannot entirely rule of infectious etiology, though would favor dehydration as patient unable to tolerate PO for few days and responsive to few liters of IVF  - SBP goal 100-160  - trend lactate  - titrate uop>0.5cc/kg/hr   -cardiology for elevated trop  - EKG, TTE    PULM:  PIOTR opacity  - 4L NC, wean as tolerated  - IS as tolerated  - maintain sats>92    RENAL:  Oliguria with 40cc since admission s/p 5L IVF and maintenance fluids, likely secondary prolonged hypotension concerning for ATN in the s/o sepsis, though cannot rule out obstruction  - Fluids: 75cc/hr LR  - daily IOs  - renal US to r/o obstruction  - Ulytes  -  for padron placement    GI:  Elevated INR/LFTs c/f MESSI vs. shock liver   Patient with CTAP on 10/20 with pancolitis, large stool burden vs. obstruction & stercol colitis, though was treated with enemas with improvement; also showed small bowel compression at the time  s/p kcentra 10/28  - Diet: NPO  - Bowel regimen  - KUB for abd distention  - fleet enema  - GI consult for obstruction  - Tylenol level, hepatitis panel, utox  - trend coags, LFTs q6h  - consider GI consult  - consider NAC gtt      ENDO:   - FS goal 120-180    HEME/ONC:  s/p Kcentra, vit K in NSICU  - SCDs  - Chemoppx: hold given heme  - Onc for MDS  - coags as above under GI    ID:  elevated leuks, with hx of MDS, no focal infectious source though imaging suggestive of sterco colitis   - monitor for fevers  - empiric coverage Vanc/cefepime (10/28 - )  - consider flagyl vs. zosyn though prefer to avoid zosyn given renal fxn  - trough  - f/u panculture  - ID consult      Patient is at high risk of neurologic deterioration/death due to: poor prognosis, heme expansion, herniation

## 2021-10-28 NOTE — CONSULT NOTE ADULT - ASSESSMENT
89 year old Female with pmhx dementia, MDS, was Level 1 Trauma s/p fall a month ago and found to have SDH, no intervention done at that time. Now transferred from [facility] from nursing home due to AMS. 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 Appears to be in septic shock, with multiple electrolyte imbalance and shock liver. Received 5L IVF, Renal consult called for decreased urine output.       1- SHANI  2- AMS  3- Sepsis  4- hypernatremia  5- hypokalemia  6- elevated LFTs      likely severe ATN in setting of septic shock  check urine lytes  hypernatremia improving  supplement KCL to goal potassium 3.0 range, given decreased urine output.   elevated LFTs in setting of shock liver  continue LR @ 75 ml/hr  strict I/O  monitor creatinine and electrolytes Q6h  89 year old Female with pmhx dementia, MDS, was Level 1 Trauma s/p fall a month ago and found to have SDH, no intervention done at that time. Now transferred from [facility] from nursing home due to AMS. 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 Appears to be in septic shock, with multiple electrolyte imbalance and shock liver. Received 5L IVF, Renal consult called for decreased urine output.       1- SHANI  2- AMS  3- Sepsis  4- hypernatremia  5- hypokalemia  6- elevated LFTs      likely severe ATN in setting of septic shock leading to oliguria/anuria at present   check urine lytes  hypernatremia improving  supplement KCL to goal potassium 3.0 range, given decreased urine output.   elevated LFTs in setting of shock liver  continue LR @ 75 ml/hr  strict I/O  monitor creatinine and electrolytes Q6h

## 2021-10-28 NOTE — CONSULT NOTE ADULT - PROBLEM SELECTOR RECOMMENDATION 9
Likely type 2 demand mediated ischemia due to sepsis/vasodilation and anemia/hypovolemia  Check TTE and monitor on tele   would not trend trops as it will not    cont with hemodynamic support
in setting of acute on chronic subdural hematom and possible infection  management per NSCU

## 2021-10-28 NOTE — DISCHARGE NOTE PROVIDER - NSDCMRMEDTOKEN_GEN_ALL_CORE_FT
acetaminophen 325 mg oral tablet: 2 tab(s) orally every 6 hours, As needed, Temp greater or equal to 38C (100.4F), Mild Pain (1 - 3)  allopurinol 100 mg oral tablet: 1 tab(s) orally once a day  Aricept 5 mg oral tablet: 1 tab(s) orally once a day (at bedtime)  bacitracin 500 units/g topical ointment: 1 application topically once a day  bisacodyl 5 mg oral delayed release tablet: 1 tab(s) orally once a day (at bedtime)  hydroxyurea 500 mg oral capsule: 1 cap(s) orally once a day  Lactated Ringers Injection intravenous solution:  intravenous   lidocaine 4% topical film: Apply topically to affected area once a day  norepinephrine:   piperacillin-tazobactam:   polyethylene glycol 3350 oral powder for reconstitution: 17 gram(s) orally once a day, As needed, constipaton  potassium chloride 2 mEq/mL intravenous solution: 5 milliliter(s) intravenous every hour  senna oral tablet: 1 tab(s) orally once a day (at bedtime)  TLSO Brace :   Vitamin D3 400 intl units (10 mcg) oral tablet: 1 tab(s) orally once a day

## 2021-10-28 NOTE — PROGRESS NOTE ADULT - SUBJECTIVE AND OBJECTIVE BOX
INTERVAL HISTORY: HPI:  89F w/ hx of MDS, dementia with recent hospitalization s/p fall, found to have tSDH with MLS and L subcapital femoral nexk fx s/p CRPP (21) with course c/b acute blood loss anemia 2/2 hematoma/ileus, now presented from  for [facility] w/ AoC SDH and R 1.4cm MLS , also with c/f sepsis.    24 Hour Events/Subjective:  - admitted  - given additional 1L IVF on arrival with minimal pressor requirements to maintain SBP goal  - elevated INR, LFTs c/f MESSI vs. shock liver, ordered additional labs, given Kcentra  - started empirically on vanc/cef though patient with hx of elevated leuks 2/2 MDS, pending onc  - trending h/h s/p 2uprbc prior to arrival         REVIEW OF SYSTEMS:  - negative except as above    VITALS:   - Reviewed    IMAGING/DATA:   - Reviewed     ALLERGIES:   - No Known Allergies        PHYSICAL EXAM:    General: NAD  CVS: RR  Pulm: CTAB, no wheezing  GI: Soft, distended, nt  Extremities: No LE Edema  Neuro: AOx1 (baseline), FC (thumbs up wiggles toes), no facial, PENA AG/WD to pain     (28 Oct 2021 08:53)      MEDICATIONS  (STANDING):  cefepime   IVPB      cefepime   IVPB 1000 milliGRAM(s) IV Intermittent every 12 hours  chlorhexidine 4% Liquid 1 Application(s) Topical every 24 hours  folic acid 1 milliGRAM(s) Enteral Tube daily  lacosamide IVPB 100 milliGRAM(s) IV Intermittent every 12 hours  lactated ringers. 1000 milliLiter(s) (75 mL/Hr) IV Continuous <Continuous>  metroNIDAZOLE  IVPB      metroNIDAZOLE  IVPB 500 milliGRAM(s) IV Intermittent every 8 hours  senna Syrup 10 milliLiter(s) Oral daily  thiamine 100 milliGRAM(s) Enteral Tube daily    MEDICATIONS  (PRN):  bisacodyl 5 milliGRAM(s) Oral daily PRN Constipation      Drug Dosing Weight  Height (cm): 147.3 (28 Oct 2021 08:47)  Weight (kg): 49.9 (28 Oct 2021 08:47)  BMI (kg/m2): 23 (28 Oct 2021 08:47)  BSA (m2): 1.41 (28 Oct 2021 08:47)    PAST MEDICAL & SURGICAL HISTORY:  MDS (myelodysplastic syndrome)    Dementia    S/P ORIF (open reduction internal fixation) fracture  elbow fracture 2021        REVIEW OF SYSTEMS: [ ] Unable to Assess due to neurologic exam   [ ] All ROS addressed below are non-contributory, except:  Neuro: [ ] Headache [ ] Back pain [ ] Numbness [ ] Weakness [ ] Ataxia [ ] Dizziness [ ] Aphasia [ ] Dysarthria [ ] Visual disturbance  Resp: [ ] Shortness of breath/dyspnea, [ ] Orthopnea [ ] Cough  CV: [ ] Chest pain [ ] Palpitation [ ] Lightheadedness [ ] Syncope  Renal: [ ] Thirst [ ] Edema  GI: [ ] Nausea [ ] Emesis [ ] Abdominal pain [ ] Constipation [ ] Diarrhea  Hem: [ ] Hematemesis [ ] bright red blood per rectum  ID: [ ] Fever [ ] Chills [ ] Dysuria  ENT: [ ] Rhinorrhea    PHYSICAL EXAM:    General: No Acute Distress     Neurological: Awake, alert oriented to person, place and time, Following Commands, PERRL, EOMI, Face Symmetrical, Speech Fluent, Moving all extremities, Muscle Strength normal in all four extremities, No Drift, Sensation to Light Touch Intact    Pulmonary: Clear to Auscultation, No Rales, No Rhonchi, No Wheezes     Cardiovascular: S1, S2, Regular Rate and Rhythm     Gastrointestinal: Soft, Nontender, Nondistended     Extremities: No calf tenderness     Incision:     ICU Vital Signs Last 24 Hrs  T(C): 36.6 (28 Oct 2021 19:00), Max: 36.8 (28 Oct 2021 01:27)  T(F): 97.8 (28 Oct 2021 19:00), Max: 98.3 (28 Oct 2021 01:27)  HR: 84 (28 Oct 2021 22:00) (71 - 129)  BP: 135/77 (28 Oct 2021 22:00) (53/39 - 152/58)  BP(mean): 92 (28 Oct 2021 22:00) (46 - 134)  RR: 19 (28 Oct 2021 22:00) (13 - 32)  SpO2: 98% (28 Oct 2021 22:00) (89% - 100%)    ABG - ( 28 Oct 2021 03:50 )  pH, Arterial: 7.42  pH, Blood: x     /  pCO2: 39    /  pO2: 359   / HCO3: 25    / Base Excess: 0.8   /  SaO2: 99.7              I&O's Detail    28 Oct 2021 07:01  -  28 Oct 2021 22:38  --------------------------------------------------------  IN:    IV PiggyBack: 450 mL    Lactated Ringers: 975 mL    Lactated Ringers Bolus: 1000 mL    Norepinephrine: 21.2 mL    sodium chloride 0.9%: 150 mL  Total IN: 2596.2 mL    OUT:    Indwelling Catheter - Urethral (mL): 205 mL  Total OUT: 205 mL    Total NET: 2391.2 mL              LABS:  CBC Full  -  ( 28 Oct 2021 10:03 )  WBC Count : 39.34 K/uL  RBC Count : 2.59 M/uL  Hemoglobin : 8.1 g/dL  Hematocrit : 27.9 %  Platelet Count - Automated : 445 K/uL  Mean Cell Volume : 107.7 fl  Mean Cell Hemoglobin : 31.3 pg  Mean Cell Hemoglobin Concentration : 29.0 gm/dL  Auto Neutrophil # : x  Auto Lymphocyte # : x  Auto Monocyte # : x  Auto Eosinophil # : x  Auto Basophil # : x  Auto Neutrophil % : x  Auto Lymphocyte % : x  Auto Monocyte % : x  Auto Eosinophil % : x  Auto Basophil % : x    10-28    148<H>  |  116<H>  |  48<H>  ----------------------------<  120<H>  3.1<L>   |  20<L>  |  1.01    Ca    7.8<L>      28 Oct 2021 15:40  Phos  2.7     10-28  Mg     2.1     10-28    TPro  4.8<L>  /  Alb  2.9<L>  /  TBili  0.8  /  DBili  x   /  AST  558<H>  /  ALT  259<H>  /  AlkPhos  242<H>  10-28    PT/INR - ( 28 Oct 2021 15:40 )   PT: 11.8 sec;   INR: 0.98 ratio         PTT - ( 28 Oct 2021 10:03 )  PTT:30.5 sec  Urinalysis Basic - ( 28 Oct 2021 10:03 )    Color: Orange / Appearance: Turbid / S.023 / pH: x  Gluc: x / Ketone: Trace  / Bili: Negative / Urobili: 2 mg/dL   Blood: x / Protein: 300 mg/dL / Nitrite: Negative   Leuk Esterase: Small / RBC: 1177 /hpf /  /HPF   Sq Epi: x / Non Sq Epi: 13 / Bacteria: Negative        RADIOLOGY & ADDITIONAL STUDIES:   INTERVAL HISTORY: HPI:  89F w/ hx of MDS, dementia with recent hospitalization s/p fall, found to have tSDH with MLS and L subcapital femoral nexk fx s/p CRPP (21) with course c/b acute blood loss anemia 2/2 hematoma/ileus, now presented from  for [facility] w/ AoC SDH and R 1.4cm MLS , also with c/f sepsis.    24 Hour Events/Subjective:  - admitted  - given additional 1L IVF on arrival with minimal pressor requirements to maintain SBP goal  - elevated INR, LFTs c/f MESSI vs. shock liver, ordered additional labs, given Kcentra  - started empirically on vanc/cef though patient with hx of elevated leuks 2/2 MDS, pending onc  - trending h/h s/p 2uprbc prior to arrival         REVIEW OF SYSTEMS:  - negative except as above    VITALS:   - Reviewed    IMAGING/DATA:   - Reviewed     ALLERGIES:   - No Known Allergies    MEDICATIONS  (STANDING):  cefepime   IVPB      cefepime   IVPB 1000 milliGRAM(s) IV Intermittent every 12 hours  chlorhexidine 4% Liquid 1 Application(s) Topical every 24 hours  folic acid 1 milliGRAM(s) Enteral Tube daily  lacosamide IVPB 100 milliGRAM(s) IV Intermittent every 12 hours  lactated ringers. 1000 milliLiter(s) (75 mL/Hr) IV Continuous <Continuous>  metroNIDAZOLE  IVPB      metroNIDAZOLE  IVPB 500 milliGRAM(s) IV Intermittent every 8 hours  senna Syrup 10 milliLiter(s) Oral daily  thiamine 100 milliGRAM(s) Enteral Tube daily    MEDICATIONS  (PRN):  bisacodyl 5 milliGRAM(s) Oral daily PRN Constipation      Drug Dosing Weight  Height (cm): 147.3 (28 Oct 2021 08:47)  Weight (kg): 49.9 (28 Oct 2021 08:47)  BMI (kg/m2): 23 (28 Oct 2021 08:47)  BSA (m2): 1.41 (28 Oct 2021 08:47)    PAST MEDICAL & SURGICAL HISTORY:  MDS (myelodysplastic syndrome)    Dementia    S/P ORIF (open reduction internal fixation) fracture  elbow fracture 2021        REVIEW OF SYSTEMS: [ ] Unable to Assess due to neurologic exam   [x ] All ROS addressed below are non-contributory, except:  Neuro: [ ] Headache [ ] Back pain [ ] Numbness [ ] Weakness [ ] Ataxia [ ] Dizziness [ ] Aphasia [ ] Dysarthria [ ] Visual disturbance  Resp: [ ] Shortness of breath/dyspnea, [ ] Orthopnea [ ] Cough  CV: [ ] Chest pain [ ] Palpitation [ ] Lightheadedness [ ] Syncope  Renal: [ ] Thirst [ ] Edema  GI: [ ] Nausea [ ] Emesis [ ] Abdominal pain [ ] Constipation [ ] Diarrhea  Hem: [ ] Hematemesis [ ] bright red blood per rectum  ID: [ ] Fever [ ] Chills [ ] Dysuria  ENT: [ ] Rhinorrhea    PHYSICAL EXAM:    General: No Acute Distress   Neurological: AOx1 (baseline), follow simple commands, LLE 2/5, RLE wiggles toes, upper extremities AG, pupils 3mm reactive   Pulmonary: Clear to Auscultation, No Rales, No Rhonchi, No Wheezes   Cardiovascular: S1, S2, Regular Rate and Rhythm   Gastrointestinal: Soft, Nontender, Nondistended   Extremities: No calf tenderness, right leg internally rotated      ICU Vital Signs Last 24 Hrs  T(C): 36.6 (28 Oct 2021 19:00), Max: 36.8 (28 Oct 2021 01:27)  T(F): 97.8 (28 Oct 2021 19:00), Max: 98.3 (28 Oct 2021 01:27)  HR: 84 (28 Oct 2021 22:00) (71 - 129)  BP: 135/77 (28 Oct 2021 22:00) (53/39 - 152/58)  BP(mean): 92 (28 Oct 2021 22:00) (46 - 134)  RR: 19 (28 Oct 2021 22:00) (13 - 32)  SpO2: 98% (28 Oct 2021 22:00) (89% - 100%)    ABG - ( 28 Oct 2021 03:50 )  pH, Arterial: 7.42  pH, Blood: x     /  pCO2: 39    /  pO2: 359   / HCO3: 25    / Base Excess: 0.8   /  SaO2: 99.7              I&O's Detail    28 Oct 2021 07:01  -  28 Oct 2021 22:38  --------------------------------------------------------  IN:    IV PiggyBack: 450 mL    Lactated Ringers: 975 mL    Lactated Ringers Bolus: 1000 mL    Norepinephrine: 21.2 mL    sodium chloride 0.9%: 150 mL  Total IN: 2596.2 mL    OUT:    Indwelling Catheter - Urethral (mL): 205 mL  Total OUT: 205 mL    Total NET: 2391.2 mL              LABS:  CBC Full  -  ( 28 Oct 2021 10:03 )  WBC Count : 39.34 K/uL  RBC Count : 2.59 M/uL  Hemoglobin : 8.1 g/dL  Hematocrit : 27.9 %  Platelet Count - Automated : 445 K/uL  Mean Cell Volume : 107.7 fl  Mean Cell Hemoglobin : 31.3 pg  Mean Cell Hemoglobin Concentration : 29.0 gm/dL  Auto Neutrophil # : x  Auto Lymphocyte # : x  Auto Monocyte # : x  Auto Eosinophil # : x  Auto Basophil # : x  Auto Neutrophil % : x  Auto Lymphocyte % : x  Auto Monocyte % : x  Auto Eosinophil % : x  Auto Basophil % : x    10-28    148<H>  |  116<H>  |  48<H>  ----------------------------<  120<H>  3.1<L>   |  20<L>  |  1.01    Ca    7.8<L>      28 Oct 2021 15:40  Phos  2.7     10-28  Mg     2.1     10-28    TPro  4.8<L>  /  Alb  2.9<L>  /  TBili  0.8  /  DBili  x   /  AST  558<H>  /  ALT  259<H>  /  AlkPhos  242<H>  10-28    PT/INR - ( 28 Oct 2021 15:40 )   PT: 11.8 sec;   INR: 0.98 ratio         PTT - ( 28 Oct 2021 10:03 )  PTT:30.5 sec  Urinalysis Basic - ( 28 Oct 2021 10:03 )    Color: Orange / Appearance: Turbid / S.023 / pH: x  Gluc: x / Ketone: Trace  / Bili: Negative / Urobili: 2 mg/dL   Blood: x / Protein: 300 mg/dL / Nitrite: Negative   Leuk Esterase: Small / RBC: 1177 /hpf /  /HPF   Sq Epi: x / Non Sq Epi: 13 / Bacteria: Negative        RADIOLOGY & ADDITIONAL STUDIES:   INTERVAL HISTORY: HPI:  89F w/ hx of MDS, dementia with recent hospitalization s/p fall, found to have tSDH with MLS and L subcapital femoral nexk fx s/p CRPP (21) with course c/b acute blood loss anemia 2/2 hematoma/ileus, now presented from  for [facility] w/ AoC SDH and R 1.4cm MLS , also with c/f sepsis.    24 Hour Events/Subjective:  - admitted  - given additional 1L IVF on arrival with minimal pressor requirements to maintain SBP goal  - elevated INR, LFTs c/f MESSI vs. shock liver, ordered additional labs, given Kcentra  - started empirically on vanc/cef though patient with hx of elevated leuks 2/2 MDS, pending onc  - trending h/h s/p 2uprbc prior to arrival         REVIEW OF SYSTEMS:  - negative except as above    VITALS:   - Reviewed    IMAGING/DATA:   - Reviewed     ALLERGIES:   - No Known Allergies    MEDICATIONS  (STANDING):  cefepime   IVPB      cefepime   IVPB 1000 milliGRAM(s) IV Intermittent every 12 hours  chlorhexidine 4% Liquid 1 Application(s) Topical every 24 hours  folic acid 1 milliGRAM(s) Enteral Tube daily  lacosamide IVPB 100 milliGRAM(s) IV Intermittent every 12 hours  lactated ringers. 1000 milliLiter(s) (75 mL/Hr) IV Continuous <Continuous>  metroNIDAZOLE  IVPB      metroNIDAZOLE  IVPB 500 milliGRAM(s) IV Intermittent every 8 hours  senna Syrup 10 milliLiter(s) Oral daily  thiamine 100 milliGRAM(s) Enteral Tube daily    MEDICATIONS  (PRN):  bisacodyl 5 milliGRAM(s) Oral daily PRN Constipation      Drug Dosing Weight  Height (cm): 147.3 (28 Oct 2021 08:47)  Weight (kg): 49.9 (28 Oct 2021 08:47)  BMI (kg/m2): 23 (28 Oct 2021 08:47)  BSA (m2): 1.41 (28 Oct 2021 08:47)    PAST MEDICAL & SURGICAL HISTORY:  MDS (myelodysplastic syndrome)    Dementia    S/P ORIF (open reduction internal fixation) fracture  elbow fracture 2021        REVIEW OF SYSTEMS: [ ] Unable to Assess due to neurologic exam   [x ] All ROS addressed below are non-contributory, except:  Neuro: [ ] Headache [ ] Back pain [ ] Numbness [ ] Weakness [ ] Ataxia [ ] Dizziness [ ] Aphasia [ ] Dysarthria [ ] Visual disturbance  Resp: [ ] Shortness of breath/dyspnea, [ ] Orthopnea [ ] Cough  CV: [ ] Chest pain [ ] Palpitation [ ] Lightheadedness [ ] Syncope  Renal: [ ] Thirst [ ] Edema  GI: [ ] Nausea [ ] Emesis [ ] Abdominal pain [ ] Constipation [ ] Diarrhea  Hem: [ ] Hematemesis [ ] bright red blood per rectum  ID: [ ] Fever [ ] Chills [ ] Dysuria  ENT: [ ] Rhinorrhea    PHYSICAL EXAM:    General: Anxious but cooperative  Neurological: AOx1 (baseline), follow simple commands, LLE 2/5, RLE wiggles toes, upper extremities AG, pupils 3mm reactive   Pulmonary: Clear to Auscultation, No Rales, No Rhonchi, No Wheezes   Cardiovascular: S1, S2, Regular Rate and Rhythm   Gastrointestinal: Soft, Nontender, Nondistended   Extremities: No calf tenderness, right leg internally rotated      ICU Vital Signs Last 24 Hrs  T(C): 36.6 (28 Oct 2021 19:00), Max: 36.8 (28 Oct 2021 01:27)  T(F): 97.8 (28 Oct 2021 19:00), Max: 98.3 (28 Oct 2021 01:27)  HR: 84 (28 Oct 2021 22:00) (71 - 129)  BP: 135/77 (28 Oct 2021 22:00) (53/39 - 152/58)  BP(mean): 92 (28 Oct 2021 22:00) (46 - 134)  RR: 19 (28 Oct 2021 22:00) (13 - 32)  SpO2: 98% (28 Oct 2021 22:00) (89% - 100%)    ABG - ( 28 Oct 2021 03:50 )  pH, Arterial: 7.42  pH, Blood: x     /  pCO2: 39    /  pO2: 359   / HCO3: 25    / Base Excess: 0.8   /  SaO2: 99.7              I&O's Detail    28 Oct 2021 07:01  -  28 Oct 2021 22:38  --------------------------------------------------------  IN:    IV PiggyBack: 450 mL    Lactated Ringers: 975 mL    Lactated Ringers Bolus: 1000 mL    Norepinephrine: 21.2 mL    sodium chloride 0.9%: 150 mL  Total IN: 2596.2 mL    OUT:    Indwelling Catheter - Urethral (mL): 205 mL  Total OUT: 205 mL    Total NET: 2391.2 mL              LABS:  CBC Full  -  ( 28 Oct 2021 10:03 )  WBC Count : 39.34 K/uL  RBC Count : 2.59 M/uL  Hemoglobin : 8.1 g/dL  Hematocrit : 27.9 %  Platelet Count - Automated : 445 K/uL  Mean Cell Volume : 107.7 fl  Mean Cell Hemoglobin : 31.3 pg  Mean Cell Hemoglobin Concentration : 29.0 gm/dL  Auto Neutrophil # : x  Auto Lymphocyte # : x  Auto Monocyte # : x  Auto Eosinophil # : x  Auto Basophil # : x  Auto Neutrophil % : x  Auto Lymphocyte % : x  Auto Monocyte % : x  Auto Eosinophil % : x  Auto Basophil % : x    10-28    148<H>  |  116<H>  |  48<H>  ----------------------------<  120<H>  3.1<L>   |  20<L>  |  1.01    Ca    7.8<L>      28 Oct 2021 15:40  Phos  2.7     10-28  Mg     2.1     10-28    TPro  4.8<L>  /  Alb  2.9<L>  /  TBili  0.8  /  DBili  x   /  AST  558<H>  /  ALT  259<H>  /  AlkPhos  242<H>  10-28    PT/INR - ( 28 Oct 2021 15:40 )   PT: 11.8 sec;   INR: 0.98 ratio         PTT - ( 28 Oct 2021 10:03 )  PTT:30.5 sec  Urinalysis Basic - ( 28 Oct 2021 10:03 )    Color: Orange / Appearance: Turbid / S.023 / pH: x  Gluc: x / Ketone: Trace  / Bili: Negative / Urobili: 2 mg/dL   Blood: x / Protein: 300 mg/dL / Nitrite: Negative   Leuk Esterase: Small / RBC: 1177 /hpf /  /HPF   Sq Epi: x / Non Sq Epi: 13 / Bacteria: Negative        RADIOLOGY & ADDITIONAL STUDIES:

## 2021-10-28 NOTE — CONSULT NOTE ADULT - SUBJECTIVE AND OBJECTIVE BOX
Chief Complaint:  Patient is a 89y old  Female who presents with a chief complaint of SDH (28 Oct 2021 22:34)      Date of service: 10-28-21 @ 23:29    HPI:    The patient is a 89 F w MDS, dementia transferred to [facility] for large subdural hematoma. The patient does not participate in the history.     The patient was given an enema, but this did not produce a BM.    GI consulted for abdominal distention.    Allergies:  No Known Allergies      Home Medications:    Hospital Medications:  bisacodyl 5 milliGRAM(s) Oral daily PRN  cefepime   IVPB      cefepime   IVPB 1000 milliGRAM(s) IV Intermittent every 12 hours  chlorhexidine 4% Liquid 1 Application(s) Topical every 24 hours  folic acid 1 milliGRAM(s) Enteral Tube daily  lacosamide IVPB 100 milliGRAM(s) IV Intermittent every 12 hours  lactated ringers. 1000 milliLiter(s) IV Continuous <Continuous>  metroNIDAZOLE  IVPB      metroNIDAZOLE  IVPB 500 milliGRAM(s) IV Intermittent every 8 hours  senna Syrup 10 milliLiter(s) Oral daily  thiamine 100 milliGRAM(s) Enteral Tube daily      PMHX/PSHX:  No pertinent past medical history    MDS (myelodysplastic syndrome)    Dementia    No significant past surgical history    S/P ORIF (open reduction internal fixation) fracture        Family history:  FH: HTN (hypertension) (Mother)        Social History:   Denies ethanol use.  Denies illicit drug use.    ROS:     General:  No wt loss, fevers, chills, night sweats, fatigue,   Eyes:  Good vision, no reported pain  ENT:  No sore throat, pain, runny nose, dysphagia  CV:  No pain, palpitations, hypo/hypertension  Resp:  No dyspnea, cough, tachypnea, wheezing  GI:  See HPI  :  No pain, bleeding, incontinence, nocturia  Muscle:  No pain, weakness  Neuro:  No weakness, tingling, memory problems  Psych:  No fatigue, insomnia, mood problems, depression  Endocrine:  No polyuria, polydipsia, cold/heat intolerance  Heme:  No petechiae, ecchymosis, easy bruisability  Integumentary:  No rash, edema      PHYSICAL EXAM:     GENERAL:  Appears stated age, well-groomed, well-nourished, no distress  HEENT:  NC/AT,  conjunctivae anicteric, clear and pink,   NECK: supple, trachea midline  CHEST:  Full & symmetric excursion, no increased effort, breath sounds clear  HEART:  Regular rhythm, no JVD  ABDOMEN:  Soft, tender, distended, normoactive bowel sounds,  no masses , no hepatosplenomegaly  EXTREMITIES:  no cyanosis,clubbing or edema  SKIN:  No rash, erythema, or, ecchymoses, no jaundice  NEURO:  Alert, non-focal, no asterixis  PSYCH: Appropriate affect, oriented to place and time  RECTAL: Deferred      Vital Signs:  Vital Signs Last 24 Hrs  T(C): 36.4 (28 Oct 2021 23:00), Max: 36.8 (28 Oct 2021 01:27)  T(F): 97.6 (28 Oct 2021 23:00), Max: 98.3 (28 Oct 2021 01:27)  HR: 83 (28 Oct 2021 23:00) (71 - 129)  BP: 135/78 (28 Oct 2021 23:00) (53/39 - 152/58)  BP(mean): 92 (28 Oct 2021 23:00) (46 - 134)  RR: 24 (28 Oct 2021 23:00) (13 - 32)  SpO2: 95% (28 Oct 2021 23:00) (89% - 100%)  Daily     Daily     LABS: Labs personally reviewed by me:                        7.6    x     )-----------( [phone]      ( 28 Oct 2021 22:47 )             26.7     10-28    148<H>  |  116<H>  |  48<H>  ----------------------------<  120<H>  3.1<L>   |  20<L>  |  1.01    Ca    7.8<L>      28 Oct 2021 15:40  Phos  2.7     10-28  Mg     2.1     10-28    TPro  4.8<L>  /  Alb  2.9<L>  /  TBili  0.8  /  DBili  x   /  AST  558<H>  /  ALT  259<H>  /  AlkPhos  242<H>  10-28    LIVER FUNCTIONS - ( 28 Oct 2021 15:40 )  Alb: 2.9 g/dL / Pro: 4.8 g/dL / ALK PHOS: 242 U/L / ALT: 259 U/L / AST: 558 U/L / GGT: x           PT/INR - ( 28 Oct 2021 15:40 )   PT: 11.8 sec;   INR: 0.98 ratio         PTT - ( 28 Oct 2021 10:03 )  PTT:30.5 sec  Urinalysis Basic - ( 28 Oct 2021 10:03 )    Color: Orange / Appearance: Turbid / S.023 / pH: x  Gluc: x / Ketone: Trace  / Bili: Negative / Urobili: 2 mg/dL   Blood: x / Protein: 300 mg/dL / Nitrite: Negative   Leuk Esterase: Small / RBC: 1177 /hpf /  /HPF   Sq Epi: x / Non Sq Epi: 13 / Bacteria: Negative          Imaging personally reviewed by me:

## 2021-10-28 NOTE — CONSULT NOTE ADULT - PROBLEM SELECTOR RECOMMENDATION 2
IV abx   follow up cultures   IVF   Cont with hemodynamic support
acute on chronic subdural  neurosurgery note appreciated  -pt not a candidate for any type of drainage procedure at this time due to her metabolic problems and coagulapathy  -possible bedside subdural evacuation if above improves

## 2021-10-28 NOTE — ED PROVIDER NOTE - OBJECTIVE STATEMENT
pt sent from SNF for AMS, difficulty breathing and hypotension.  pt is unresponsive and critically ill.

## 2021-10-28 NOTE — CONSULT NOTE ADULT - ASSESSMENT
Txfered from  from nursing home due to AMS. Baseline has dementia, forgetting things like where she lives. Has been in a nursing home since 2/2021. 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 5hr repeat CT on Left stable. Exam: Somnolent, Ox1, FC, pupils 2R, EOMI, R facial, LUE 4+/5, RUE 4-/5, BLE minimal movement likely 2/2 deconditioning but wiggles toes  -Admitted to NSCU under intensivist  -WBC 31, Na 155, troponin 1800, on levophed  -Plt wnl, INR 1.6, will repeat coags  -No surgery given poor functional status  -Will discuss with attending

## 2021-10-28 NOTE — DISCHARGE NOTE PROVIDER - HOSPITAL COURSE
89 yo F with PMHx of dementia and ICH presented for eval of hypotension and respiratory distress. Patient was found to have worsening ICH by CT scan. ln consult with Neurosurg patient was accepted for transfer for further management. Patient received 4L IVF and vasopressors initiated for hypotensions.    This is a brief summary of hospital course. Please review chart for fruther detail. 89 yo F PMH dementia, recent fall with hip fx and ICH (non-operable), no reported deficits sent from SNF for AMS, difficulty breathing and hypotension.    Pt was noted to have SBP of 50 in ER. Pt was given 4 L IVF and Bp started to respond, mental status started to improve, pt now accusable, but still lethargic   Pt seemingly severely dehydrated, anemic with hbg 6.7, hypokalemic with potassium 2.0. Pt seemly severely protein malnourished     Pt admitted to the ICU for hypovolemic shock-requiring pressors, anemia, SHANI, AMS, hypokalemia, AMS, severe protein malnutrition.   Patient was found to have worsening of previous ICH by CT scan. ln consult with Neurosurg patient was accepted for transfer for further management.      This is a brief summary of hospital course. Please review chart for fruther detail.

## 2021-10-28 NOTE — CHART NOTE - NSCHARTNOTEFT_GEN_A_CORE
CAPRINI SCORE [CLOT] Score on Admission for     AGE RELATED RISK FACTORS                                                       MOBILITY RELATED FACTORS  [ ] Age 41-60 years                                            (1 Point)                  [X] Bed rest                                                        (1 Point)  [ ] Age: 61-74 years                                           (2 Points)                [ ] Plaster cast                                                   (2 Points)  [X] Age= 75 years                                              (3 Points)                  [ ] Bed bound for more than 72 hours         (2 Points)    DISEASE RELATED RISK FACTORS                                               GENDER SPECIFIC FACTORS  [ ] Edema in the lower extremities                       (1 Point)           [ ] Pregnancy                                                          (1 Point)  [ ] Varicose veins                                               (1 Point)                  [ ] Post-partum < 6 weeks                                   (1 Point)             [ ] BMI > 25 Kg/m2                                            (1 Point)                  [ ] Hormonal therapy  or oral contraception   (1 Point)                 [ ] Sepsis (in the previous month)                        (1 Point)            [ ] History of pregnancy complications             (1 point)  [ ] Pneumonia or serious lung disease                                            [ ] Unexplained or recurrent               (1 Point)           (in the previous month)                               (1 Point)  [ ] Abnormal pulmonary function test                     (1 Point)                 SURGERY RELATED RISK FACTORS (include planned surgeries)  [ ] Acute myocardial infarction                              (1 Point)                 [ ]  Section                                             (1 Point)  [ ] Congestive heart failure (in the previous month)  (1 Point)        [ ] Minor surgery                                                  (1 Point)   [ ] Inflammatory bowel disease                             (1 Point)                 [ ] Arthroscopic surgery                                        (2 Points)  [ ] Central venous access                                      (2 Points)  [ ] History / presence of malignancy                   (2 points)   [ ] General surgery lasting more than 45 minutes   (2 Points)       [ ] Stroke (in the previous month)                          (5 Points)               [ ] Elective arthroplasty                                         (5 Points)                                                                                                                                               HEMATOLOGY RELATED FACTORS                                                 TRAUMA RELATED RISK FACTORS  [ ] Prior episodes of VTE                                     (3 Points)                [ ] Fracture of the hip, pelvis, or leg                       (5 Points)  [ ] Positive family history for VTE                         (3 Points)             [ ] Acute spinal cord injury (in the previous month)  (5 Points)  [ ] Prothrombin 30619 A                                     (3 Points)                [ ] Paralysis  (less than 1 month)                             (5 Points)  [ ] Factor V Leiden                                             (3 Points)                   [ ] Multiple Trauma within 1 month                        (5 Points)  [ ] Lupus anticoagulants                                     (3 Points)                                                           [ ] Anticardiolipin antibodies                               (3 Points)                                                       [ ] High homocysteine in the blood                      (3 Points)                                             [ ] Other congenital or acquired thrombophilia      (3 Points)                                                [ ] Heparin induced thrombocytopenia                  (3 Points)                                          Total Score [   4       ]    Risk:  Very low 0   Low 1 to 2   Moderate 3 to 4   High =5       VTE Prophylaxis Recommendations:  [X] mechanical pneumatic compression devices                                      [ ] contraindicated: _____________________  [ ] chemoprophylaxis                                                                                    [X] contraindicated ___SDH__________________    **** HIGH LIKELIHOOD DVT PRESENT ON ADMISSION  [x] (please order LE dopplers within 24 hours of admission)

## 2021-10-28 NOTE — H&P ADULT - NSHPPHYSICALEXAM_GEN_ALL_CORE
GENERAL: altered elderly demented female, cachetic   HEAD:  Atraumatic, Normocephalic  EYES: EOMI, PERRLA, conjunctiva and sclera clear  ENMT: Dry mucous membranes, Good dentition, No lesions  NECK: Supple, No JVD, Normal thyroid  NERVOUS SYSTEM:  Alert & Oriented X3, Good concentration; Motor Strength 5/5 B/L upper and lower extremities; DTRs 2+ intact and symmetric  CHEST/LUNG: CTA diminished at bases   HEART: Regular rate and rhythm; No murmurs, rubs, or gallops  ABDOMEN: Soft, Nontender, Nondistended; Bowel sounds present  EXTREMITIES:  2+ Peripheral Pulses, No clubbing, cyanosis, or edema  SKIN: No rashes or lesions

## 2021-10-28 NOTE — H&P ADULT - NSHPLABSRESULTS_GEN_ALL_CORE
Lab Results:  CBC  CBC Full  -  ( 28 Oct 2021 01:11 )  WBC Count : 26.01 K/uL  RBC Count : 2.02 M/uL  Hemoglobin : 6.3 g/dL  Hematocrit : 21.8 %  Platelet Count - Automated : 349 K/uL  Mean Cell Volume : 107.9 fl  Mean Cell Hemoglobin : 31.2 pg  Mean Cell Hemoglobin Concentration : 28.9 gm/dL  Auto Neutrophil # : x  Auto Lymphocyte # : x  Auto Monocyte # : x  Auto Eosinophil # : x  Auto Basophil # : x  Auto Neutrophil % : x  Auto Lymphocyte % : x  Auto Monocyte % : x  Auto Eosinophil % : x  Auto Basophil % : x    .		Differential:	[] Automated		[] Manual  Chemistry                        6.3    . )-----------( 349      ( 28 Oct 2021 01:11 )             21.8     10-    156<H>  |  119<H>  |  51<H>  ----------------------------<  145<H>  2.0<LL>   |  27  |  1.42<H>    Ca    6.9<L>      28 Oct 2021 01:11    TPro  4.2<L>  /  Alb  2.2<L>  /  TBili  0.9  /  DBili  x   /  AST  1177<H>  /  ALT  366<H>  /  AlkPhos  253<H>  10-28    LIVER FUNCTIONS - ( 28 Oct 2021 01:11 )  Alb: 2.2 g/dL / Pro: 4.2 g/dL / ALK PHOS: 253 U/L / ALT: 366 U/L / AST: 1177 U/L / GGT: x           PT/INR - ( 28 Oct 2021 01:11 )   PT: 18.7 sec;   INR: 1.58 ratio         PTT - ( 28 Oct 2021 01:11 )  PTT:32.0 sec  Urinalysis Basic - ( 28 Oct 2021 01:11 )    Color: Barb / Appearance: Slightly Turbid / S.015 / pH: x  Gluc: x / Ketone: Negative  / Bili: Negative / Urobili: 1   Blood: x / Protein: 100 / Nitrite: Negative   Leuk Esterase: Trace / RBC: 5-10 /HPF / WBC 3-5 /HPF   Sq Epi: x / Non Sq Epi: Neg.-Few / Bacteria: Many /H          MICROBIOLOGY/CULTURES:  pending Bcx uCx   UA (-)    RADIOLOGY RESULTS:  pendinf CT head/Chest and abdomen Lab Results:  CBC  CBC Full  -  ( 28 Oct 2021 01:11 )  WBC Count : 26.01 K/uL  RBC Count : 2.02 M/uL  Hemoglobin : 6.3 g/dL  Hematocrit : 21.8 %  Platelet Count - Automated : 349 K/uL  Mean Cell Volume : 107.9 fl  Mean Cell Hemoglobin : 31.2 pg  Mean Cell Hemoglobin Concentration : 28.9 gm/dL  Auto Neutrophil # : x  Auto Lymphocyte # : x  Auto Monocyte # : x  Auto Eosinophil # : x  Auto Basophil # : x  Auto Neutrophil % : x  Auto Lymphocyte % : x  Auto Monocyte % : x  Auto Eosinophil % : x  Auto Basophil % : x    .		Differential:	[] Automated		[] Manual  Chemistry                        6.3    . )-----------( 349      ( 28 Oct 2021 01:11 )             21.8     10-    156<H>  |  119<H>  |  51<H>  ----------------------------<  145<H>  2.0<LL>   |  27  |  1.42<H>    Ca    6.9<L>      28 Oct 2021 01:11    TPro  4.2<L>  /  Alb  2.2<L>  /  TBili  0.9  /  DBili  x   /  AST  1177<H>  /  ALT  366<H>  /  AlkPhos  253<H>  10-28    LIVER FUNCTIONS - ( 28 Oct 2021 01:11 )  Alb: 2.2 g/dL / Pro: 4.2 g/dL / ALK PHOS: 253 U/L / ALT: 366 U/L / AST: 1177 U/L / GGT: x           PT/INR - ( 28 Oct 2021 01:11 )   PT: 18.7 sec;   INR: 1.58 ratio         PTT - ( 28 Oct 2021 01:11 )  PTT:32.0 sec  Urinalysis Basic - ( 28 Oct 2021 01:11 )    Color: Barb / Appearance: Slightly Turbid / S.015 / pH: x  Gluc: x / Ketone: Negative  / Bili: Negative / Urobili: 1   Blood: x / Protein: 100 / Nitrite: Negative   Leuk Esterase: Trace / RBC: 5-10 /HPF / WBC 3-5 /HPF   Sq Epi: x / Non Sq Epi: Neg.-Few / Bacteria: Many /H          MICROBIOLOGY/CULTURES:  pending Bcx uCx   UA (many bacteria)    RADIOLOGY RESULTS:  pending CT head/Chest and abdomen

## 2021-10-28 NOTE — CONSULT NOTE ADULT - CONVERSATION DETAILS
Patient unable to participate in goals of care discussion due to lack of mental status. Called patient's son, Enrique, to discuss goals of care. Medical update provided by Dr. Wang. Son understands patient is in critical condition and in multiorgan failure. Son stated he does not know what patient would want for her care this situation as they never discussed it before. CPR and intubation explained. Discussed potential for more harm than benefit given patient's frailty. Son understands and would like all interventions to be done. His priority is prolonging patient's life at this time. He is open for further goals of care discussions based on patient's clinical course. All questions answered and emotional support provided. Pt remains full code.

## 2021-10-28 NOTE — H&P ADULT - ASSESSMENT
ASSESSMENT/PLAN:    89F hx of MDS w/ recent hospitalization (9/28/21) for fall, found to have tSDH w/ MLS and femoral fx s/p CRPP, now transferred from  for AMS CTH w/ R AoC SDH w/ MLS and subfalcine herniation, non-surgical candidate d/t poor functional status.    NEURO:  s/p Kcentra, vit. K in NSICU  - neuro/vital checks q1h  - vimpat for sz ppx  - per NSG, non-surgical candidate for poor functional status  - Bed rest   - delirium precautions  - GOC    CVS:  increased pressor requirements c/f septic vs. hemorrhagic shock, though no s/s active bleed, however has hx of acute blood loss anemia; started empirically on abx though baseline elevated leuks 2/2 MDS, no fever, however cannot entirely rule of infectious etiology, though would favor dehydration as patient unable to tolerate PO for few days and responsive to few liters of IVF  - SBP goal 100-160  - trend lactate  - titrate uop>0.5cc/kg/hr   -cardiology for elevated trop  - EKG, TTE    PULM:  PIOTR opacity  - 4L NC, wean as tolerated  - IS as tolerated  - maintain sats>92    RENAL:  Oliguria with 40cc since admission s/p 5L IVF and maintenance fluids, likely secondary prolonged hypotension concerning for ATN in the s/o sepsis, though cannot rule out obstruction  - Fluids: 75cc/hr LR  - daily IOs  - renal US to r/o obstruction  - Ulytes  -  for padron placement    GI:  Elevated INR/LFTs c/f MESSI vs. shock liver  - Diet: NPO  - Bowel regimen  - KUB for abd distention  - Tylenol level, hepatitis panel, utox  - trend coags, LFTs q6h  - consider GI consult      ENDO:   - FS goal 120-180    HEME/ONC:  s/p Kcentra, vit K in NSICU  - SCDs  - Chemoppx: hold given heme  - Onc for MDS  - coags as above under GI    ID:  elevated leuks, with hx of MDS, no focal infectious source  - monitor for fevers  - empiric coverage Vanc/cefepime (10/28 - )  - trough  - f/u panculture      Patient is at high risk of neurologic deterioration/death due to: poor prognosis, heme expansion, herniation

## 2021-10-28 NOTE — CONSULT NOTE ADULT - PROBLEM SELECTOR RECOMMENDATION 3
Orders per NSCU team   follow up CT head   aspiration precautions
suspected infection  on empiric abx and requiring pressor support in setting of hypotension  management per NSCU

## 2021-10-28 NOTE — H&P ADULT - HISTORY OF PRESENT ILLNESS
89 yo F with pmhx of **** 89 yo F with pmhx of Dementia, MDS, presents to [facility] due to worsening Left SDH with significant mass effect and midline shift. Patient  89 yo F with pmhx of Dementia, MDS, presents to [facility] due to worsening Left SDH with significant mass effect and midline shift. Patient   89 yo F PMH dementia, recent fall with hip fx and ICH (non-operable), no reported deficits sent from SNF for AMS, difficulty breathing and hypotension.    Pt was noted to have SBP of 50 in ER. Pt was given 4 L IVF and Bp started to respond, mental status started to improve, pt now arousable, but still lethargic   Pt seemingly severely dehydrated, anemic with hbg 6.7, hypokalemic with potassium 2.0. Pt seemly severely protein malnourished     Pt admitted to the ICU for hypovolemic shock-requiring pressors, anemia, SHANI, AMS, hypokalemia, AMS, severe protein malnutrition. 89F w/ hx of MDS, dementia with recent hospitalization s/p fall, found to have tSDH with MLS and L subcapital femoral nexk fx s/p CRPP (9/28/21) with course c/b acute blood loss anemia 2/2 hematoma/ileus, now presented from  for AMS CTH w/ AoC SDH and R 1.4cm MLS , also with c/f sepsis.    24 Hour Events/Subjective:  - admitted  - given additional 1L IVF on arrival with minimal pressor requirements to maintain SBP goal  - elevated INR, LFTs c/f MESSI vs. shock liver, ordered additional labs, given Kcentra  - started empirically on vanc/cef though patient with hx of elevated leuks 2/2 MDS, pending onc  - trending h/h s/p 2uprbc prior to arrival         REVIEW OF SYSTEMS:  - negative except as above    VITALS:   - Reviewed    IMAGING/DATA:   - Reviewed     ALLERGIES:   - No Known Allergies        PHYSICAL EXAM:    General: NAD  CVS: RR  Pulm: CTAB, no wheezing  GI: Soft, distended, nt  Extremities: No LE Edema  Neuro: AOx1 (baseline), FC (thumbs up wiggles toes), no facial, PENA AG/WD to pain

## 2021-10-28 NOTE — ED ADULT NURSE REASSESSMENT NOTE - NS ED NURSE REASSESS COMMENT FT1
A-stick done by MD sanabria to right femoral at this time. Labs and blood cultures sent at this time as per MD orders.

## 2021-10-28 NOTE — ED ADULT NURSE NOTE - OBJECTIVE STATEMENT
BIB EMS unresponsive from [facility] for difficulty breathing and lethargy. pt hypotensive on arrival to ED. pt BIB EMS on nonrebreather, O2 sat 100% on arrival. As per EMS pt is full code. pt with rectal temp 98.3 on arrival to ED. pt presents to ED with pressure ulcer to sacrum on arrival to ED. pt with PMH of dementia and MDS.

## 2021-10-28 NOTE — CONSULT NOTE ADULT - ASSESSMENT
Txfered from  from nursing home due to AMS. Baseline has dementia, forgetting things like where she lives. Has been in a nursing home since 2/2021. 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 5hr repeat CT on Left stable. Exam: Somnolent, Ox1, FC, pupils 2R, EOMI, R facial, LUE 4+/5, RUE 4-/5, BLE minimal movement likely 2/2 deconditioning but wiggles toes  +Trops   Septic

## 2021-10-28 NOTE — H&P ADULT - ASSESSMENT
====================ASSESSMENT ==============  1. AMS/Metabolic encephalopathy   2. Hypokalemia   3. Hypernatremia   4. Hypovolemic shock   5. Afib RVR   6. SHANI ATN   7. Shock liver - transaminitis   8. Anemia - MDS     Plan:  ====================== NEUROLOGY=====================  -Will obtain CT head in the setting of AMS     ==================== RESPIRATORY======================  -Pt protecting airway at this time, remains full code, does not need intubation at this time, but is high risk, will discuss intubation with family given age   -Supplemental O2 as needed keep SpO2 >92%  -pending CT chest   -CXR not impressive   ====================CARDIOVASCULAR==================    -Pt s/p 4L IVF bolus in ER, pt remains hypotensive with SBP in 80's   -LA neg, will reepeat given hypotension   -pt was started on Levophed gtt in ER, will switch to Chucho given HR and for peripheral IV pressors  -Pt seemingly clinically dehydrated and will start on IVF hydration with LR     norepinephrine Infusion 0.05 MICROgram(s)/kG/Min (4.68 mL/Hr) IV Continuous <Continuous>    ===================HEMATOLOGIC/ONC ===================  -Pt with anemia and hbg <7   -T and Screen and ordered for 1 unit PRBC   -DVt ppx with SCD   ===================== RENAL =========================  -Check repeat labs and monitor renal function trend  -Pt seemingly dehydrated with Na 156 and Cl 119 and SHANI Cr 1.42 last Cr 0.59  -Avoid hypotension and optimize BP with IVF and pressor support if needed.   -Avoid nephrotoxic meds as possible. Avoid ACEI, ARB and NSAIDS  -Monitor input and output, padron in place   -Pt with potassium of 2.0 will replace aggressively and f/u with BMP in AM and afternoon     ==================== GASTROINTESTINAL===================  -NPO while altered   -pt w transaminitis 2/2  shock liver, will obtain abdominal CT and U/S     =======================    ENDOCRINE  =====================  -Aggressive glycemic control to limit FS glucose to < 180mg/dl, BS stable.    ========================INFECTIOUS DISEASE================  -Pt with elevated WBC 26  -AMS   -tachycardia   -Will start on empiric ABX, pending Bcx, UCx UA (-)   -PCT pending   cefepime   IVPB 2000 milliGRAM(s) IV Intermittent every 12 hours      Patient requires continuous monitoring with bedside rhythm monitoring, pulse oximetry, ventilator/ bipap  monitoring and intermittent blood gas analysis.    Care plan discussed with ICU care team.    Patient remained critical; required more than usual care; I have spent 35 minutes providing non-routine care, revaluated multiple times during the day.         ====================ASSESSMENT ==============  1. AMS/Metabolic encephalopathy   2. Hypokalemia   3. Hypernatremia   4. Hypovolemic shock -dehydration   5. Afib RVR   6. SHANI ATN   7. Shock liver - transaminitis   8. Anemia - MDS   9. Severe protein malnourished     Plan:  ====================== NEUROLOGY=====================  -Will obtain CT head in the setting of AMS     ==================== RESPIRATORY======================  -Pt protecting airway at this time, remains full code, does not need intubation at this time, but is high risk, will discuss intubation with family given age   -Supplemental O2 as needed keep SpO2 >92%  -pending CT chest   -CXR not impressive   ====================CARDIOVASCULAR==================  -Pt s/p 4L IVF bolus in ER, pt remains hypotensive with SBP in 80's   -LA neg, will repeat given hypotension   -pt was started on Levophed gtt in ER, will switch to Chucho given HR and for peripheral IV pressors  -Pt seemingly clinically dehydrated and will start on IVF hydration with LR   -Afib with RVR resolved with volume resuscitation   norepinephrine Infusion 0.05 MICROgram(s)/kG/Min (4.68 mL/Hr) IV Continuous <Continuous>    ===================HEMATOLOGIC/ONC ===================  -Pt with anemia and hbg <7   -T and Screen and ordered for 1 unit PRBC   -DVt ppx with SCD no chemical VTE, pt with h/o of head bleed     ===================== RENAL =========================  -Check repeat labs and monitor renal function trend  -Pt seemingly dehydrated with Na 156 and Cl 119 and SHANI Cr 1.42 last Cr 0.59  -Avoid hypotension and optimize BP with IVF and pressor support if needed.   -Avoid nephrotoxic meds as possible. Avoid ACEI, ARB and NSAIDS  -Monitor input and output, padron in place   -Pt with potassium of 2.0 will replace aggressively and f/u with BMP in AM and afternoon     ==================== GASTROINTESTINAL===================  -NPO while altered   -pt w transaminitis 2/2  shock liver, will obtain abdominal CT and U/S   -Will likely need NGT if mental status persists     =======================    ENDOCRINE  =====================  -Aggressive glycemic control to limit FS glucose to < 180mg/dl, BS stable.    ========================INFECTIOUS DISEASE================  -Pt with elevated WBC 26  -AMS   -tachycardia   -Will start on empiric ABX, pending Bcx, UCx UA (-)   -PCT pending   cefepime   IVPB 2000 milliGRAM(s) IV Intermittent every 12 hours    Spoke with family regarding GOC: pt's son wants the pt to be full code at this time     Patient requires continuous monitoring with bedside rhythm monitoring, pulse oximetry, ventilator/ bipap  monitoring and intermittent blood gas analysis.    Care plan discussed with ICU care team.    Patient remained critical; required more than usual care; I have spent 35 minutes providing non-routine care, revaluated multiple times during the day.         ====================ASSESSMENT ==============  1. AMS/Metabolic encephalopathy   2. Hypokalemia   3. Hypernatremia   4. Hypovolemic shock -dehydration   5. Afib RVR   6. SHANI ATN   7. Shock liver - transaminitis   8. Anemia - MDS   9. Severe protein malnourished   10. UTI     Plan:  ====================== NEUROLOGY=====================  -Will obtain CT head in the setting of AMS     ==================== RESPIRATORY======================  -Pt protecting airway at this time, remains full code, does not need intubation at this time, but is high risk, will discuss intubation with family given age   -Supplemental O2 as needed keep SpO2 >92%  -pending CT chest   -CXR not impressive   ====================CARDIOVASCULAR==================  -Pt s/p 4L IVF bolus in ER, pt remains hypotensive with SBP in 80's   -LA neg, will repeat given hypotension   -pt was started on Levophed gtt in ER, will switch to Chucho given HR and for peripheral IV pressors  -Pt seemingly clinically dehydrated and will start on IVF hydration with LR   -Afib with RVR resolved with volume resuscitation   norepinephrine Infusion 0.05 MICROgram(s)/kG/Min (4.68 mL/Hr) IV Continuous <Continuous>    ===================HEMATOLOGIC/ONC ===================  -Pt with anemia and hbg <7   -T and Screen and ordered for 1 unit PRBC   -DVt ppx with SCD no chemical VTE, pt with h/o of head bleed     ===================== RENAL =========================  -Check repeat labs and monitor renal function trend  -Pt seemingly dehydrated with Na 156 and Cl 119 and SHANI Cr 1.42 last Cr 0.59  -Avoid hypotension and optimize BP with IVF and pressor support if needed.   -Avoid nephrotoxic meds as possible. Avoid ACEI, ARB and NSAIDS  -Monitor input and output, padron in place   -Pt with potassium of 2.0 will replace aggressively and f/u with BMP in AM and afternoon     ==================== GASTROINTESTINAL===================  -NPO while altered   -pt w transaminitis 2/2  shock liver, will obtain abdominal CT and U/S   -Will likely need NGT if mental status persists     =======================    ENDOCRINE  =====================  -Aggressive glycemic control to limit FS glucose to < 180mg/dl, BS stable.    ========================INFECTIOUS DISEASE================  -Pt with elevated WBC 26  -AMS   -tachycardia   -Will start on empiric ABX, pending Bcx, UCx UA (many bacteria)   -PCT pending   -empiric zosyn     Spoke with family regarding GOC: pt's son wants the pt to be full code at this time     Patient requires continuous monitoring with bedside rhythm monitoring, pulse oximetry, ventilator/ bipap  monitoring and intermittent blood gas analysis.    Care plan discussed with ICU care team.    Patient remained critical; required more than usual care; I have spent 35 minutes providing non-routine care, revaluated multiple times during the day.         ====================ASSESSMENT ==============  1. AMS/Metabolic encephalopathy   2. Hypokalemia   3. Hypernatremia   4. Hypovolemic shock -dehydration   5. Afib RVR   6. SHANI ATN   7. Shock liver - transaminitis   8. Anemia - MDS   9. Severe protein malnourished   10. UTI   11. SDH with worsening midline shift     Plan:  ====================== NEUROLOGY=====================  -Will obtain CT head in the setting of AMS     ==================== RESPIRATORY======================  -Pt protecting airway at this time, remains full code, does not need intubation at this time, but is high risk, will discuss intubation with family given age   -Supplemental O2 as needed keep SpO2 >92%  -pending CT chest   -CXR not impressive   ====================CARDIOVASCULAR==================  -Pt s/p 4L IVF bolus in ER, pt remains hypotensive with SBP in 80's   -LA neg, will repeat given hypotension   -pt was started on Levophed gtt in ER maintain MAP>65  -Pt seemingly clinically dehydrated and will start on IVF hydration with LR   -Afib with RVR resolved with volume resuscitation   norepinephrine Infusion 0.05 MICROgram(s)/kG/Min (4.68 mL/Hr) IV Continuous <Continuous>    ===================HEMATOLOGIC/ONC ===================  -Pt with anemia and hbg <7   -T and Screen and ordered for 1 unit PRBC   -DVt ppx with SCD no chemical VTE, pt with h/o of head bleed     ===================== RENAL =========================  -Check repeat labs and monitor renal function trend  -Pt seemingly dehydrated with Na 156 and Cl 119 and SHANI Cr 1.42 last Cr 0.59  -Avoid hypotension and optimize BP with IVF and pressor support if needed.   -Avoid nephrotoxic meds as possible. Avoid ACEI, ARB and NSAIDS  -Monitor input and output, padron in place   -Pt with potassium of 2.0 will replace aggressively and f/u with BMP in AM and afternoon     ==================== GASTROINTESTINAL===================  -NPO while altered   -pt w transaminitis 2/2  shock liver, will obtain abdominal CT and U/S   -Will likely need NGT if mental status persists     =======================    ENDOCRINE  =====================  -Aggressive glycemic control to limit FS glucose to < 180mg/dl, BS stable.    ========================INFECTIOUS DISEASE================  -Pt with elevated WBC 26  -AMS   -tachycardia   -Will start on empiric ABX, pending Bcx, UCx UA (many bacteria)   -PCT pending   -empiric zosyn     Spoke with family in detail about pt condition and plan, and had conversation regarding GOC: pt's son Devang wants the pt to be full code at this time     Patient requires continuous monitoring with bedside rhythm monitoring, pulse oximetry, ventilator/ bipap  monitoring and intermittent blood gas analysis.  Care plan discussed with ICU care team.  Patient remained critical; required more than usual care; I have spent 100 minutes providing non-routine care, revaluated multiple times during the day.      Critical Care time: 100 mins assessing presenting problems of acute illness that poses high probability of life threatening deterioration or end organ damage/dysfunction.  Medical decision making including Initiating plan of care, reviewing data, reviewing radiology, direct patient bedside evaluation and interpretation of vital signs, any necessary ventilator management , discussion with multidisciplinary team, discussing goals of care with patient/family, all non inclusive of procedures   ADDENDUM     pt was CT head Chest and Abdomen up to ICU and was noted to have   < from: CT Head No Cont (10.28.21 @ 04:42) >    IMPRESSION:  1. 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    Critical results relayed to ABISAI Quinones at 4:56 AM.    < end of copied text >    Call was made out to transfer center, and spoke with [facility] NS team spoke with Raúl Harris, discussed findings and clinical presentation     Pt was accepted to [facility] pending ED vs NICU     Given head bleed will keep      Called pt's family and updated on transfer to [facility], son devang was made aware

## 2021-10-28 NOTE — ED ADULT TRIAGE NOTE - CHIEF COMPLAINT QUOTE
BIB EMS from [facility] for difficulty breathing and lethargy. pt BIB EMS on nonrebreather, O2 sat 100% on arrival. BIB EMS from [facility] for difficulty breathing and lethargy. pt BIB EMS on nonrebreather, O2 sat 100% on arrival. MD sanabria called to triage for eval. BIB EMS unresponsive from [facility] for difficulty breathing and lethargy. pt BIB EMS on nonrebreather, O2 sat 100% on arrival. MD sanabria called to triage for eval. BIB EMS unresponsive from [facility] for difficulty breathing and lethargy. pt BIB EMS on nonrebreather, O2 sat 100% on arrival. pt hypotensive in triage, MD sanabria called to triage for eval.

## 2021-10-28 NOTE — DISCHARGE NOTE PROVIDER - NSDCDCMDCOMP_GEN_ALL_CORE
Sarai Steel [address]  [facility], [address] [facility]    Consent for Operation  Date: __________________                                Time: _______________    1.  I authorize the performance upon Candi Otto the following operation:  Proced procedure has been videotaped, the surgeon will obtain the original videotape. The hospital will not be responsible for storage or maintenance of this tape.   7. For the purpose of advancing medical education, I consent to the admittance of observers to the STATEMENTS REQUIRING INSERTION OR COMPLETION WERE FILLED IN.     Signature of Patient:   ___________________________    When the patient is a minor or mentally incompetent to give consent:  Signature of person authorized to consent for patient: ____________ supplements, and pills I can buy without a prescription (including street drugs/illegal medications). Failure to inform my anesthesiologist about these medicines may increase my risk of anesthetic complications. iv.  If I am allergic to anything or have ha Anesthesiologist Signature     Date   Time  I have discussed the procedure and information above with the patient (or patient’s representative) and answered their questions. The patient or their representative has agreed to have anesthesia services.     ___ This document is complete and the patient is ready for discharge.

## 2021-10-28 NOTE — CONSULT NOTE ADULT - SUBJECTIVE AND OBJECTIVE BOX
p ([id number])     HPI:  89F w/ dementia, MDS, was Level 1 Trauma s/p fall a month ago. Txfered from  from nursing home due to AMS. Baseline has dementia, forgetting things like where she lives. Has been in a nursing home since 2/2021.     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 5hr repeat CT on Left stable.     Exam: Somnolent, Ox1, FC, pupils 2R, EOMI, R facial, LUE 4+/5, RUE 4-/5, BLE minimal movement likely 2/2 deconditioning but wiggles toes    --Anticoagulation:    =====================  PAST MEDICAL HISTORY   MDS (myelodysplastic syndrome)    Dementia      PAST SURGICAL HISTORY   No significant past surgical history    S/P ORIF (open reduction internal fixation) fracture          MEDICATIONS:  Antibiotics:  cefepime   IVPB      cefepime   IVPB 1000 milliGRAM(s) IV Intermittent every 12 hours  vancomycin  IVPB 1000 milliGRAM(s) IV Intermittent every 24 hours    Neuro:  lacosamide IVPB 100 milliGRAM(s) IV Intermittent every 12 hours    Other:  bisacodyl 5 milliGRAM(s) Oral daily PRN  calcium gluconate IVPB 2 Gram(s) IV Intermittent once  lactated ringers. 1000 milliLiter(s) IV Continuous <Continuous>  norepinephrine Infusion 0.05 MICROgram(s)/kG/Min IV Continuous <Continuous>  potassium chloride  10 mEq/100 mL IVPB 10 milliEquivalent(s) IV Intermittent every 1 hour  senna Syrup 10 milliLiter(s) Oral daily      SOCIAL HISTORY:   Occupation:   Marital Status:     FAMILY HISTORY:  FH: HTN (hypertension) (Mother)        ROS: Negative except per HPI    LABS:  PT/INR - ( 28 Oct 2021 01:11 )   PT: 18.7 sec;   INR: 1.58 ratio         PTT - ( 28 Oct 2021 01:11 )  PTT:32.0 sec                        8.1    39.34 )-----------( 445      ( 28 Oct 2021 10:03 )             27.9     10-28    151<H>  |  115<H>  |  49<H>  ----------------------------<  151<H>  2.8<LL>   |  22  |  1.13    Ca    7.6<L>      28 Oct 2021 10:03  Phos  2.7     10-28  Mg     2.1     10-28    TPro  5.3<L>  /  Alb  3.4  /  TBili  1.0  /  DBili  0.4<H>  /  AST  815<H>  /  ALT  330<H>  /  AlkPhos  298<H>  10-28

## 2021-10-28 NOTE — CONSULT NOTE ADULT - ASSESSMENT
89 year old female with subdural hematoma and abdominal distention    1. Subdural hematoma  -per NSX    2. Abdoinal distention. Suspect [place] syndrome in the setting of SDH, critical illness.   -check cdiff to r/o megacolon  -Rectal tube inserted at bedside, hopefully this will help decompress  -if no improvement, consider giving neostigmine  -trend lactate    3. Leukocytosis    4. Anemia, MDS, no overt GI bleeding     Advanced care planning forms were discussed. Code status including forceful chest compressions, defibrillation and intubation were discussed. The risks benefits and alternatives to pertinent gastrointestinal procedures and interventions were discussed in detail and all questions were answered. Duration: 15 Minutes.      Roni Duenas M.D.   Gastroenterology and Hepatology  [address] [place], [place]  Office: [phone]  Cell: [phone] 89 year old female with subdural hematoma and abdominal distention    1. Subdural hematoma  -per NSX    2. Abdoinal distention. Suspect [place] syndrome in the setting of SDH, critical illness.   -check cdiff to r/o megacolon  -Rectal tube inserted at bedside, hopefully this will help decompress  -if no improvement, consider giving neostigmine  -trend lactate  -general surgery evaluation    3. Leukocytosis    4. Anemia, MDS, no overt GI bleeding     Advanced care planning forms were discussed. Code status including forceful chest compressions, defibrillation and intubation were discussed. The risks benefits and alternatives to pertinent gastrointestinal procedures and interventions were discussed in detail and all questions were answered. Duration: 15 Minutes.      Roni Duenas M.D.   Gastroenterology and Hepatology  [address] [place], [place]  Office: [phone]  Cell: [phone]

## 2021-10-28 NOTE — CONSULT NOTE ADULT - SUBJECTIVE AND OBJECTIVE BOX
HPI:  89F w/ hx of MDS, dementia with recent hospitalization s/p fall, found to have tSDH with MLS and L subcapital femoral nexk fx s/p CRPP (21) with course c/b acute blood loss anemia 2/2 hematoma/ileus, now presented from  for [facility] w/ AoC SDH and R 1.4cm MLS , also with c/f sepsis.    24 Hour Events/Subjective:  - admitted  - given additional 1L IVF on arrival with minimal pressor requirements to maintain SBP goal  - elevated INR, LFTs c/f MESSI vs. shock liver, ordered additional labs, given Kcentra  - started empirically on vanc/cef though patient with hx of elevated leuks 2/2 MDS, pending onc  - trending h/h s/p 2u prbc prior to arrival       PERTINENT PM/SXH:   No pertinent past medical history    MDS (myelodysplastic syndrome)    Dementia    No significant past surgical history    S/P ORIF (open reduction internal fixation) fracture      FAMILY HISTORY:  FH: HTN (hypertension) (Mother)      ITEMS NOT CHECKED ARE NOT PRESENT    SOCIAL HISTORY:   Significant other/partner[ ]  Children[x]  Restorationism/Spirituality:  Substance hx:  [ ]   Tobacco hx:  [ ]   Alcohol hx: [ ]   Home Opioid hx:  [ ] I-Stop Reference No:  Living Situation: [ ]Home  [ ]Long term care  [x]Rehab [ ]Other    ADVANCE DIRECTIVES:    DNR  MOLST  [ ]  Living Will  [ ]   DECISION MAKER(s):  [ ] Health Care Proxy(s)  [x] Surrogate(s)  [ ] Guardian           Name(s): Enrique Muir Phone Number(s): [phone]  No HCP on file, pt , son Enrique is only child/next of kin     BASELINE (I)ADL(s) (prior to admission):  Purling: [ ]Total  [x] Moderate [ ]Dependent    Allergies    No Known Allergies    Intolerances    MEDICATIONS  (STANDING):  cefepime   IVPB      cefepime   IVPB 1000 milliGRAM(s) IV Intermittent every 12 hours  chlorhexidine 4% Liquid 1 Application(s) Topical every 24 hours  lacosamide IVPB 100 milliGRAM(s) IV Intermittent every 12 hours  lactated ringers. 1000 milliLiter(s) (75 mL/Hr) IV Continuous <Continuous>  norepinephrine Infusion 0.05 MICROgram(s)/kG/Min (4.68 mL/Hr) IV Continuous <Continuous>  senna Syrup 10 milliLiter(s) Oral daily  vancomycin  IVPB 1000 milliGRAM(s) IV Intermittent every 24 hours    MEDICATIONS  (PRN):  bisacodyl 5 milliGRAM(s) Oral daily PRN Constipation    PRESENT SYMPTOMS: [x]Unable to obtain due to poor mentation   Source if other than patient:  [ ]Family   [x]Team     Pain: [ ]yes [x]no see PAINAD score  QOL impact -   Location -                    Aggravating factors -  Quality -  Radiation -  Timing-  Severity (0-10 scale):  Minimal acceptable level (0-10 scale):     CPOT:    https://www.[facility].org/getattachment/[email or web address]/Critical-Care-Pain-Observation-Tool-(CPOT)      PAIN AD Score: 0    [email or web address].[email or web address]/painad.pdf (press ctrl +  left click to view)    Dyspnea:                           [ ]Mild [ ]Moderate [ ]Severe  Anxiety:                             [ ]Mild [ ]Moderate [ ]Severe  Fatigue:                             [ ]Mild [ ]Moderate [ ]Severe  Nausea:                             [ ]Mild [ ]Moderate [ ]Severe  Loss of appetite:              [ ]Mild [ ]Moderate [ ]Severe  Constipation:                    [ ]Mild [ ]Moderate [ ]Severe    Other Symptoms:  [ ]All other review of systems negative- unable to assess due to mental status     Palliative Performance Status Version 2:     10    %    http://npcrc.org/files/news/palliative_performance_scale_ppsv2.pdf  PHYSICAL EXAM:  Vital Signs Last 24 Hrs  T(C): 36.3 (28 Oct 2021 11:00), Max: 36.8 (28 Oct 2021 01:27)  T(F): 97.3 (28 Oct 2021 11:00), Max: 98.3 (28 Oct 2021 01:27)  HR: 78 (28 Oct 2021 14:00) (71 - 129)  BP: 136/71 (28 Oct 2021 14:00) (53/39 - 152/58)  BP(mean): 89 (28 Oct 2021 14:00) (46 - 134)  RR: 20 (28 Oct 2021 14:00) (13 - 32)  SpO2: 94% (28 Oct 2021 14:00) (89% - 100%) I&O's Summary    28 Oct 2021 07:01  -  28 Oct 2021 14:44  --------------------------------------------------------  IN: 1921.2 mL / OUT: 35 mL / NET: 1886.2 mL      GENERAL:  [x] attempts to verbally respond but incoherent  [ ]Alert  [ ]Oriented x   [ ]Lethargic  [ ]Cachexia  [ ]Unarousable  [ ]Verbal  [ ]Non-Verbal  Behavioral:   [ ] Anxiety  [ ] Delirium [ ] Agitation [ ] Other  HEENT:  [x]Normal   [ ]Dry mouth   [ ]ET Tube/Trach  [ ]Oral lesions  PULMONARY:   [x]Clear [ ]Tachypnea  [ ]Audible excessive secretions   [ ]Rhonchi        [ ]Right [ ]Left [ ]Bilateral  [ ]Crackles        [ ]Right [ ]Left [ ]Bilateral  [ ]Wheezing     [ ]Right [ ]Left [ ]Bilateral  [ ]Diminished breath sounds [ ]right [ ]left [ ]bilateral  CARDIOVASCULAR:    [x]Regular [ ]Irregular [ ]Tachy  [ ]Tj [ ]Murmur [ ]Other  GASTROINTESTINAL:  [x]Soft  [ ]Distended   [ ]+BS  [x]Non tender [ ]Tender  [ ]PEG [ ]OGT/ NGT  Last BM:   GENITOURINARY:  [ ]Normal [ ] Incontinent   [x]Oliguria/Anuria   [x]Julio  MUSCULOSKELETAL:   [ ]Normal   [x]Weakness  [ ]Bed/Wheelchair bound [ ]Edema  NEUROLOGIC:   [ ]No focal deficits  [x]Cognitive impairment in setting of dementia and SDH [ ]Dysphagia [ ]Dysarthria [ ]Paresis [ ]Other   SKIN:   [x]Normal    [ ]Rash  [ ]Pressure ulcer(s)       Present on admission [ ]y [ ]n    CRITICAL CARE:  [x] Shock Present  [ ]Septic [ ]Cardiogenic [ ]Neurologic [x]Hypovolemic  [x]  Vasopressors [ ]  Inotropes   [ ]Respiratory failure present [ ]Mechanical ventilation [ ]Non-invasive ventilatory support [ ]High flow  [ ]Acute  [ ]Chronic [ ]Hypoxic  [ ]Hypercarbic [ ]Other  [ ]Other organ failure  SHANI and acute liver dysfunction    LABS:                        8.1    39.34 )-----------( 445      ( 28 Oct 2021 10:03 )             27.9   10-28    151<H>  |  115<H>  |  49<H>  ----------------------------<  151<H>  2.8<LL>   |  22  |  1.13    Ca    7.6<L>      28 Oct 2021 10:03  Phos  2.7     10-28  Mg     2.1     10-28    TPro  5.3<L>  /  Alb  3.4  /  TBili  1.0  /  DBili  0.4<H>  /  AST  815<H>  /  ALT  330<H>  /  AlkPhos  298<H>  10-28  PT/INR - ( 28 Oct 2021 10:03 )   PT: 22.1 sec;   INR: 1.90 ratio         PTT - ( 28 Oct 2021 10:03 )  PTT:30.5 sec    Urinalysis Basic - ( 28 Oct 2021 10:03 )    Color: Orange / Appearance: Turbid / S.023 / pH: x  Gluc: x / Ketone: Trace  / Bili: Negative / Urobili: 2 mg/dL   Blood: x / Protein: 300 mg/dL / Nitrite: Negative   Leuk Esterase: Small / RBC: 1177 /hpf /  /HPF   Sq Epi: x / Non Sq Epi: 13 / Bacteria: Negative      RADIOLOGY & ADDITIONAL STUDIES:  < from: CT Head No Cont (10.28.21 @ 09:42) >  EXAM:  CT BRAIN                            PROCEDURE DATE:  10/28/2021            INTERPRETATION:  INDICATIONS:  f/u SDH    TECHNIQUE:  Serial axial images were obtained from the skull base to the vertex without intravenous contrast. Study is done on the portable scanner    COMPARISON EXAMINATION: 10/28/2021    FINDINGS:  VENTRICLES AND SULCI: Continued evidence of significant mass effect on the left lateral ventricle with midline shift to the right of 1.4 cm . Evidence of subfalcine herniationand signs of transtentorial and uncal herniation are apparent.  INTRA-AXIAL:  Left subdural hematoma with predominantly fluid and small amount of acute hemorrhage identified.  EXTRA-AXIAL:  No mass or collection is seen.  VISUALIZED SINUSES:  Clear.  VISUALIZED MASTOIDS:  Clear.  CALVARIUM:  Normal.  MISCELLANEOUS:  None.    IMPRESSION:  Interval stability compared with the prior 10/28/2021 at 4:25 AM. Large left predominantly fluid attenuation subdural hematoma with small amount of acute hemorrhage noted within. Mass effect on the left lateral ventricle with midline shift to the right roughly 1.4 cm.with evidence of subfalcine uncal and transtentorial herniation    --- End of Report ---                BRUNA MALLOY MD; Attending Radiologist  This document has been electronically signed. Oct 28 2021 10:14AM    < end of copied text >      PROTEIN CALORIE MALNUTRITION PRESENT: [ ]mild [ ]moderate [ ]severe [ ]underweight [ ]morbid obesity  https://www.andeal.org/vault/2440/web/files/ONC/Table_Clinical%20Characteristics%20to%20Document%20Malnutrition-White%20JV%20et%20al%287361.pdf    Height (cm): 147.3 (10-28-21 @ 08:47), 147.3 (10-28-21 @ 07:00), 147.3 (21 @ 22:20)  Weight (kg): 49.9 (10-28-21 @ 08:47), 49.9 (10-28-21 @ 07:00), 41.8 (21 @ 22:20)  BMI (kg/m2): 23 (10-28-21 @ 08:47), 23 (10-28-21 @ 08:47), 23 (10-28-21 @ 07:00)    [ ]PPSV2 < or = to 30% [ ]significant weight loss  [ ]poor nutritional intake  [ ]anasarca      [ ]Artificial Nutrition      REFERRALS:   [ ]Chaplaincy  [ ]Hospice  [ ]Child Life  [ ]Social Work  [ ]Case management [ ]Holistic Therapy     Goals of Care Document:

## 2021-10-28 NOTE — CONSULT NOTE ADULT - SUBJECTIVE AND OBJECTIVE BOX
Oncology Consult Note    HPI:  89 yo F with pmhx of Dementia, MDS, presents to [facility] due to worsening Left SDH with significant mass effect and midline shift. Patient has been living in nursing home since 2/2021 due to dementia. (28 Oct 2021 08:53)  Patient found to have subdural hematoma with mass effect, septic shock requiring pressors and coagulopathy.  Hematology was consulted due to history of MDS.   At time of encounter, patient is somnolent.       Allergies    No Known Allergies    Intolerances        MEDICATIONS  (STANDING):  calcium gluconate IVPB 2 Gram(s) IV Intermittent once  cefepime   IVPB      cefepime   IVPB 1000 milliGRAM(s) IV Intermittent every 12 hours  chlorhexidine 4% Liquid 1 Application(s) Topical every 24 hours  lacosamide IVPB 100 milliGRAM(s) IV Intermittent every 12 hours  lactated ringers. 1000 milliLiter(s) (75 mL/Hr) IV Continuous <Continuous>  norepinephrine Infusion 0.05 MICROgram(s)/kG/Min (4.68 mL/Hr) IV Continuous <Continuous>  phytonadione   Solution 5 milliGRAM(s) Oral daily  potassium chloride  10 mEq/100 mL IVPB 10 milliEquivalent(s) IV Intermittent every 1 hour  prothrombin complex concentrate IVPB (KCENTRA) 1500 International Unit(s) IV Intermittent once  senna Syrup 10 milliLiter(s) Oral daily  vancomycin  IVPB 1000 milliGRAM(s) IV Intermittent every 24 hours    MEDICATIONS  (PRN):  bisacodyl 5 milliGRAM(s) Oral daily PRN Constipation      PAST MEDICAL & SURGICAL HISTORY:  MDS (myelodysplastic syndrome)    Dementia    S/P ORIF (open reduction internal fixation) fracture  elbow fracture 02/2021        FAMILY HISTORY:  FH: HTN (hypertension) (Mother)        SOCIAL HISTORY: No EtOH, no tobacco    REVIEW OF SYSTEMS:  unable to obtain due to patient condition     Height (cm): 147.3 (10-28 @ 08:47), 147.3 (10-28 @ 07:00)  Weight (kg): 49.9 (10-28 @ 08:47), 49.9 (10-28 @ 07:00)  BMI (kg/m2): 23 (10-28 @ 08:47), 23 (10-28 @ 08:47), 23 (10-28 @ 07:00)  BSA (m2): 1.41 (10-28 @ 08:47), 1.41 (10-28 @ 08:47), 1.41 (10-28 @ 07:00)    T(F): 97.3 (10-28-21 @ 11:00), Max: 98.3 (10-28-21 @ 01:27)  HR: 74 (10-28-21 @ 13:00)  BP: 132/76 (10-28-21 @ 13:00)  RR: 17 (10-28-21 @ 13:00)  SpO2: 100% (10-28-21 @ 13:00)  Wt(kg): --    GENERAL: NAD,  HEAD:  Atraumatic, Normocephalic  EYES: EOMI, PERRLA, conjunctiva and sclera clear  NECK: Supple, No JVD  CHEST/LUNG: Clear to auscultation bilaterally; No wheeze  HEART: Regular rate and rhythm; No murmurs, rubs, or gallops  ABDOMEN: Soft, Nontender, Nondistended; Bowel sounds present  EXTREMITIES:  2+ Peripheral Pulses, No clubbing, cyanosis, or edema  NEUROLOGY: non-focal  SKIN: No rashes or lesions                          8.1    39.34 )-----------( 445      ( 28 Oct 2021 10:03 )             27.9       10-28    151<H>  |  115<H>  |  49<H>  ----------------------------<  151<H>  2.8<LL>   |  22  |  1.13    Ca    7.6<L>      28 Oct 2021 10:03  Phos  2.7     10-28  Mg     2.1     10-28    TPro  5.3<L>  /  Alb  3.4  /  TBili  1.0  /  DBili  0.4<H>  /  AST  815<H>  /  ALT  330<H>  /  AlkPhos  298<H>  10-28      Magnesium, Serum: 2.1 mg/dL (10-28 @ 10:03)  Phosphorus Level, Serum: 2.7 mg/dL (10-28 @ 10:03)  Magnesium, Serum: 1.9 mg/dL (10-28 @ 07:01)  Phosphorus Level, Serum: 3.1 mg/dL (10-28 @ 07:01)       Oncology Consult Note    HPI:  89 yo F with pmhx of Dementia, MDS, presents to [facility] due to worsening Left SDH with significant mass effect and midline shift. Patient has been living in nursing home since 2/2021 due to dementia. (28 Oct 2021 08:53)  Patient found to have subdural hematoma with mass effect, septic shock requiring pressors and coagulopathy.  Hematology was consulted due to history of MDS.   At time of encounter, patient is somnolent.       Allergies    No Known Allergies    Intolerances        MEDICATIONS  (STANDING):  calcium gluconate IVPB 2 Gram(s) IV Intermittent once  cefepime   IVPB      cefepime   IVPB 1000 milliGRAM(s) IV Intermittent every 12 hours  chlorhexidine 4% Liquid 1 Application(s) Topical every 24 hours  lacosamide IVPB 100 milliGRAM(s) IV Intermittent every 12 hours  lactated ringers. 1000 milliLiter(s) (75 mL/Hr) IV Continuous <Continuous>  norepinephrine Infusion 0.05 MICROgram(s)/kG/Min (4.68 mL/Hr) IV Continuous <Continuous>  phytonadione   Solution 5 milliGRAM(s) Oral daily  potassium chloride  10 mEq/100 mL IVPB 10 milliEquivalent(s) IV Intermittent every 1 hour  prothrombin complex concentrate IVPB (KCENTRA) 1500 International Unit(s) IV Intermittent once  senna Syrup 10 milliLiter(s) Oral daily  vancomycin  IVPB 1000 milliGRAM(s) IV Intermittent every 24 hours    MEDICATIONS  (PRN):  bisacodyl 5 milliGRAM(s) Oral daily PRN Constipation      PAST MEDICAL & SURGICAL HISTORY:  MDS (myelodysplastic syndrome)    Dementia    S/P ORIF (open reduction internal fixation) fracture  elbow fracture 02/2021        FAMILY HISTORY:  FH: HTN (hypertension) (Mother)        SOCIAL HISTORY: No EtOH, no tobacco    REVIEW OF SYSTEMS:  unable to obtain due to patient condition     Height (cm): 147.3 (10-28 @ 08:47), 147.3 (10-28 @ 07:00)  Weight (kg): 49.9 (10-28 @ 08:47), 49.9 (10-28 @ 07:00)  BMI (kg/m2): 23 (10-28 @ 08:47), 23 (10-28 @ 08:47), 23 (10-28 @ 07:00)  BSA (m2): 1.41 (10-28 @ 08:47), 1.41 (10-28 @ 08:47), 1.41 (10-28 @ 07:00)    T(F): 97.3 (10-28-21 @ 11:00), Max: 98.3 (10-28-21 @ 01:27)  HR: 74 (10-28-21 @ 13:00)  BP: 132/76 (10-28-21 @ 13:00)  RR: 17 (10-28-21 @ 13:00)  SpO2: 100% (10-28-21 @ 13:00)  Wt(kg): --    GENERAL: NAD,  HEAD:  Atraumatic, Normocephalic; Only moans to pain, eyes opened but does not respond verbally to questions   EYES: EOMI, PERRLA, conjunctiva and sclera clear  NECK: Supple, No JVD  HEART: Regular rate and rhythm; No murmurs, rubs, or gallops  ABDOMEN: No hepatosplenomegally   EXTREMITIES:  No edema   NEUROLOGY: unable to assess                          8.1    39.34 )-----------( 445      ( 28 Oct 2021 10:03 )             27.9       10-28    151<H>  |  115<H>  |  49<H>  ----------------------------<  151<H>  2.8<LL>   |  22  |  1.13    Ca    7.6<L>      28 Oct 2021 10:03  Phos  2.7     10-28  Mg     2.1     10-28    TPro  5.3<L>  /  Alb  3.4  /  TBili  1.0  /  DBili  0.4<H>  /  AST  815<H>  /  ALT  330<H>  /  AlkPhos  298<H>  10-28      Magnesium, Serum: 2.1 mg/dL (10-28 @ 10:03)  Phosphorus Level, Serum: 2.7 mg/dL (10-28 @ 10:03)  Magnesium, Serum: 1.9 mg/dL (10-28 @ 07:01)  Phosphorus Level, Serum: 3.1 mg/dL (10-28 @ 07:01)

## 2021-10-28 NOTE — DISCHARGE NOTE PROVIDER - NSDCCPCAREPLAN_GEN_ALL_CORE_FT
PRINCIPAL DISCHARGE DIAGNOSIS  Diagnosis: ICH (intracerebral hemorrhage)  Assessment and Plan of Treatment:       SECONDARY DISCHARGE DIAGNOSES  Diagnosis: Hypotension  Assessment and Plan of Treatment:

## 2021-10-28 NOTE — CONSULT NOTE ADULT - ATTENDING COMMENTS
89 yo female with known medical history of dementia, MDS, presented from nursing home for was found to have subdural hematoma with mass effect. Hematology consulted for coagulopathy and history of MDS     Reviewed of peripheral smear c/w normochromic normocytic anemia, paired neutrophils, few Pelger-Huet, no evidence of hemolysis, increased PLts.    Thrombocytosis is likely reactive in the setting of subdural hematoma.  Anemia: transfuse prbc for Hb less than 8g/dl. PLTs parameter as per Neurosurgical Team.  Coagulopathy: mixing studies and DIC work up.     Will continue to follow.
Pt seen and examined.  I had a lengthy discussio with the son by phone.  At this point we are unable to offer any type of drainage procedure due to her metabolic problems and coagulapathy.  If that situation changes we can offer a bedside subdural evacuation port system via a twist drill approach.  I disucssed the risk, benefits, indications ans alternative with him and we also discussed his goals of care.
Seen, examined, and  agree with above as scribed by NP Zenaida    pt with severe ATN likely in setting of hypotension/sepsis as pt also has been with transaminitis as well   in addition recent sdh now with herniation on ct scan brain as well.   pt also has b/l effusions as well.   for now judicious use of ivf   trend k and lytes  urine lytes   supplement kcl to k > 3   iv abx  prognosis are guarded.   given significant co morbidities pt is not a good dialysis candidate.   at present lytes are in range and allowing time for her ATN to recover   d/w NSCU team at bedside

## 2021-10-28 NOTE — CONSULT NOTE ADULT - SUBJECTIVE AND OBJECTIVE BOX
CHIEF COMPLAINT:    HISTORY OF PRESENT ILLNESS:  89F w/ dementia, MDS, was Level 1 Trauma s/p fall a month ago. Txfered from  from nursing home due to AMS. Baseline has dementia, forgetting things like where she lives. Has been in a nursing home since 2/2021.     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 5hr repeat CT on Left stable.     Exam: Somnolent, Ox1, FC, pupils 2R, EOMI, R facial, LUE 4+/5, RUE 4-/5, BLE minimal movement likely 2/2 deconditioning but wiggles toes  Found to have elevated trops.   Unable to communicate any information  Does not appear to have chest pain     Severely elevated WBC       PAST MEDICAL & SURGICAL HISTORY:  MDS (myelodysplastic syndrome)    Dementia    S/P ORIF (open reduction internal fixation) fracture  elbow fracture 02/2021            MEDICATIONS:  norepinephrine Infusion 0.05 MICROgram(s)/kG/Min IV Continuous <Continuous>    cefepime   IVPB      cefepime   IVPB 1000 milliGRAM(s) IV Intermittent every 12 hours  vancomycin  IVPB 1000 milliGRAM(s) IV Intermittent every 24 hours      lacosamide IVPB 100 milliGRAM(s) IV Intermittent every 12 hours    bisacodyl 5 milliGRAM(s) Oral daily PRN  senna Syrup 10 milliLiter(s) Oral daily      calcium gluconate IVPB 2 Gram(s) IV Intermittent once  chlorhexidine 4% Liquid 1 Application(s) Topical every 24 hours  lactated ringers. 1000 milliLiter(s) IV Continuous <Continuous>  potassium chloride  10 mEq/100 mL IVPB 10 milliEquivalent(s) IV Intermittent every 1 hour      FAMILY HISTORY:  FH: HTN (hypertension) (Mother)        SOCIAL HISTORY:    [ ] Non-smoker  [ ] Smoker  [ ] Alcohol    Allergies    No Known Allergies    Intolerances    	    REVIEW OF SYSTEMS:	    [ ] All others negative	  [ XX] Unable to obtain    PHYSICAL EXAM:  T(C): 36.3 (10-28-21 @ 11:00), Max: 36.8 (10-28-21 @ 01:27)  HR: 76 (10-28-21 @ 11:15) (71 - 129)  BP: 130/76 (10-28-21 @ 11:15) (53/39 - 152/58)  RR: 15 (10-28-21 @ 11:15) (13 - 32)  SpO2: 100% (10-28-21 @ 11:15) (89% - 100%)  Wt(kg): --  I&O's Summary    28 Oct 2021 07:01  -  28 Oct 2021 12:27  --------------------------------------------------------  IN: 1293.4 mL / OUT: 10 mL / NET: 1283.4 mL        Appearance: NAD cervical collar agitated 	  HEENT:   dry oral mucosa, PERRL, EOMI	  Lymphatic: No lymphadenopathy  Cardiovascular: Normal S1 S2, No JVD, No murmurs, No edema  Respiratory: Lungs clear to auscultation	  Psychiatry: A & O x 1  Gastrointestinal:  Soft, Non-tender, + BS	  Skin: No rashes, No ecchymoses, No cyanosis	  Neurologic: Somnolent, Ox1, FC, pupils 2R, EOMI, R facial, LUE 4+/5, RUE 4-/5, BLE minimal movement likely 2/2 deconditioning but wiggles toes    Extremities: Normal range of motion, No clubbing, cyanosis or edema  Vascular: Peripheral pulses palpable 2+ bilaterally  +padron    TELEMETRY: 	SR    ECG:  	Afib RVR non specific stt change   RADIOLOGY:  < from: CT Head No Cont (10.28.21 @ 09:42) >  EXAM:  CT BRAIN                            PROCEDURE DATE:  10/28/2021            INTERPRETATION:  INDICATIONS:  f/u SDH    TECHNIQUE:  Serial axial images were obtained from the skull base to the vertex without intravenous contrast. Study is done on the portable scanner    COMPARISON EXAMINATION: 10/28/2021    FINDINGS:  VENTRICLES AND SULCI: Continued evidence of significant mass effect on the left lateral ventricle with midline shift to the right of 1.4 cm . Evidence of subfalcine herniationand signs of transtentorial and uncal herniation are apparent.  INTRA-AXIAL:  Left subdural hematoma with predominantly fluid and small amount of acute hemorrhage identified.  EXTRA-AXIAL:  No mass or collection is seen.  VISUALIZED SINUSES:  Clear.  VISUALIZED MASTOIDS:  Clear.  CALVARIUM:  Normal.  MISCELLANEOUS:  None.    IMPRESSION:  Interval stability compared with the prior 10/28/2021 at 4:25 AM. Large left predominantly fluid attenuation subdural hematoma with small amount of acute hemorrhage noted within. Mass effect on the left lateral ventricle with midline shift to the right roughly 1.4 cm.with evidence of subfalcine uncal and transtentorial herniation      < end of copied text >  < from: CT Abdomen and Pelvis No Cont (10.28.21 @ 04:56) >  EXAM:  CT ABDOMEN AND PELVIS      PROCEDURE DATE:  10/28/2021        INTERPRETATION:  CLINICAL INFORMATION: Sepsis    COMPARISON: 9/26/2021 and 10/8/2021.    CONTRAST/COMPLICATIONS:  IV Contrast: NONE  Oral Contrast: NONE  Complications: None reported at time of study completion    PROCEDURE:  CT of the Chest, Abdomen and Pelvis was performed.  Sagittal and coronal reformats were performed.    FINDINGS:    Please note that evaluation of the chest/abdominal organs and vascular structures is limited by lack of intravenous contrast.    CHEST:  LUNGS AND LARGE AIRWAYS: Patent central airways. Stable 6 mm right middle lobe nodule. Bilateral regions of linear atelectasis. Otherwise no lung consolidations.  PLEURA: Small bilateral pleural effusions.  VESSELS: Atherosclerotic calcifications of thoracic aorta and coronary arteries. Ectatic ascending thoracic aorta measuring 4 cm.  HEART: Cardiomegaly. Aortic valve and mitral annulus calcifications. No pericardial effusion.  MEDIASTINUM AND FRIDA:No lymphadenopathy.  CHEST WALL AND LOWER NECK: Left thyroid calcification. Subcutaneous soft tissue edema.    ABDOMEN AND PELVIS:  LIVER: Within normal limits.  BILE DUCTS: Normal caliber.  GALLBLADDER: Within normal limits.  SPLEEN: Within normal limits.  PANCREAS: Atrophic. Stable pancreatic ductal dilatation.  ADRENALS: Within normal limits.  KIDNEYS/URETERS: Left parapelvic renal cysts. Small calculi or layering calcium within left kidney. No hydronephrosis.    BLADDER: Padron catheter.  REPRODUCTIVE ORGANS: Calcified uterine fibroid.    BOWEL: No bowel obstruction. Appendix is not visualized. Rectum distended by stool. Rectal wall thickening and inflammation. Diffuse colon wall thickening.  PERITONEUM: Trace ascites.  VESSELS: Atherosclerotic calcifications.  RETROPERITONEUM/LYMPH NODES: Prominent retroperitoneal lymph nodes.  ABDOMINAL WALL: Subcutaneous soft tissue edema.  BONES: Degenerative changes. Chronic fracture deformities of sternum and left clavicular head. Bilateral subacute and chronic rib deformities. Bilateral hip ORIF. Compression fracture deformities of T5, T6, T7, T8, T9, and L1.    IMPRESSION:    1. Small bilateral pleural effusions.  2. No lung consolidations.  3. Rectum distended by stool with findings suspicious for stercoral colitis.  4. Pancolitis.      < end of copied text >  < from: US Transthoracic Echocardiogram w/Doppler Complete (02.24.21 @ 14:06) >    EXAM:  US TTE W DOPPLER COMPLETE                                  PROCEDURE DATE:  02/24/2021          INTERPRETATION:  Indication: Abnormal EKG preop evaluation    Technician: Zay Torres    Study Quality: Technical difficult study      Height 5 feet 4 inches, weight 108 pounds, blood pressure 145/81 mmHg    Measurements: Aortic root size 3.1 cm, left atrial size 2.7 cm, right atrial size 2.5 cm, right ventricular size 1.7 cm, left ventricular end-diastolic diameter 3.8 cm, left ventricular end-systolic diameter 2.0 cm, septal wall thickness 0.7 cm, posterior thickness 0.7 cm, aortic velocity 1.6 m/s, mitral E velocity 0.7 m/s, ejection fraction more than 65%.    Mitral Valve: Mitral annular consultation, thickened mitral valve , normal opening with mild mitral regurgitation  Aortic Valve: Fibrocalcific aortic valve changes with normal opening  Left Atrium: Normal size  Left Ventricle: Normal size, normal wall thickness with hyperdynamic left ventricular systolic function, ejection fraction more than 65%. Stage I diastolic dysfunction noted  Pericardium: No pericardial effusion  Tricuspid Valve: Appears to be normal . Mild to moderate tricuspid regurgitation with  estimated right ventricular systolic pressure 35 mmHg  PulmonicValve: Appears normal but not well-visualized  Right Ventricle: Grossly normal size with normal right ventricular systolic function  Right Atrium: Normal size    SUMMARY:  1. hyperdynamic left ventricular systolic function with mild diastolic dysfunction  2. mild mitral regurgitation  3. mild to moderate tricuspid regurgitation with estimated right ventricular systolic pressure 35 mm hg.      < end of copied text >    OTHER: 	  	  LABS:	 	    CARDIAC MARKERS:                                  8.1    39.34 )-----------( 445      ( 28 Oct 2021 10:03 )             27.9     10-28    151<H>  |  115<H>  |  49<H>  ----------------------------<  151<H>  2.8<LL>   |  22  |  1.13    Ca    7.6<L>      28 Oct 2021 10:03  Phos  2.7     10-28  Mg     2.1     10-28    TPro  5.3<L>  /  Alb  3.4  /  TBili  1.0  /  DBili  0.4<H>  /  AST  815<H>  /  ALT  330<H>  /  AlkPhos  298<H>  10-28    proBNP:   Lipid Profile:   HgA1c:   TSH:     Lactate, Blood: 2.3: Elevated lactate. Consider ordering follow-up lactate to trend. mmol/L (10.28.21 @ 10:03) Lactate, Blood: 0.7 mmol/L (10.28.21 @ 07:01)

## 2021-10-28 NOTE — ED PROVIDER NOTE - PHYSICAL EXAMINATION
Gen:  unresponsive, respiratory distress, cachectic   HEENT:  atraumatic head, airway clear, pupils equal and round  CV:  rrr, nl S1, S2, no m/r/g  Pulm:  lungs CTA b/l  Abd: s/nt/nd, +BS  Ext:  no spontaneous movement of extremities  Neuro:  minimally responsive to pain  Skin:  dry mm, emaciated

## 2021-10-28 NOTE — ED ADULT NURSE NOTE - CHIEF COMPLAINT QUOTE
BIB EMS unresponsive from [facility] for difficulty breathing and lethargy. pt BIB EMS on nonrebreather, O2 sat 100% on arrival. MD sanabria called to triage for eval.

## 2021-10-28 NOTE — ED ADULT NURSE REASSESSMENT NOTE - NS ED NURSE REASSESS COMMENT FT1
Iv access obtained but Unable to obtain blood work at this time due to low BP. MD sanabria aware and at bedside. pt receiving IV fluids at this time. Will continue to monitor closely.

## 2021-10-28 NOTE — PROVIDER CONTACT NOTE (CRITICAL VALUE NOTIFICATION) - ASSESSMENT
Pt received on 100% NRB, on Levophed gtt.  Neuro status obtunded, withdraws from pain, and incoherent speech.
Pt left [place] at 0800

## 2021-10-28 NOTE — CONSULT NOTE ADULT - PROBLEM SELECTOR RECOMMENDATION 6
see above GOC note  Per discussion with son he would like to continue all interventions. Pt remains full code.

## 2021-10-28 NOTE — ED PROVIDER NOTE - CLINICAL SUMMARY MEDICAL DECISION MAKING FREE TEXT BOX
pt with septic shock pt with likely septic shock, remains hypotensive after 4L , starting levo, admit to ICU

## 2021-10-28 NOTE — ED PROVIDER NOTE - CARE PLAN
1 Principal Discharge DX:	Septic shock   Principal Discharge DX:	ICH (intracerebral hemorrhage)  Secondary Diagnosis:	Hypotension

## 2021-10-28 NOTE — H&P ADULT - HISTORY OF PRESENT ILLNESS
89 yo F PMH dementia, recent fall with hip fx and ICH (non-operable), no reported deficits sent from SNF for AMS, difficulty breathing and hypotension.    Pt was noted to have SBP of 50 in ER. Pt was given 4 L IVF and Bp started to respond, mental status started to improve, pt now arousable, but still lethargic   Pt seemingly severely dehydrated, anemic with hbg 6.7, hypokalemic with potassium 2.0. Pt seemly severely protein malnourished     Pt admitted to the ICU for hypovolemic shock-requiring pressors, anemia, SHANI, AMS, hypokalemia, AMS, severe protein malnutrition.

## 2021-10-28 NOTE — CONSULT NOTE ADULT - ASSESSMENT
89 y/o F with hx of dementia and MDS w/ recent hospitalization (9/28/21) for fall, found to have tSDH w/ MLS and femoral fx s/p CRPP. Pt was d/c to rehab now transferred from [facility] for AMS in setting of CTH w/ R acute on chronic SDH w/ MLS and subfalcine herniation. non-surgical candidate d/t poor functional status. Palliative consulted for goals of care.

## 2021-10-28 NOTE — PROVIDER CONTACT NOTE (CRITICAL VALUE NOTIFICATION) - ACTION/TREATMENT ORDERED:
Pt left [facility] at 0800.  Dr Toussaint to relay critical values to [facility]/neuro/surgical attending MD.  Pt had Krider infusing when transported, sec to prior low K.
Pt transferred to [facility] at 0800, so pt not here at time of critical report.  Dr headley notified and relayed that he will contact [facility]/neuro/surgical ICU with result.  When undersigned gave report to Loretta [facility] RN, last Hgb of 6.3, 10/28 at 01:11 relayed, as well as pt had not received unit of PRBC, as per LEATHA Wild.

## 2021-10-28 NOTE — H&P ADULT - SUPERVISING ATTENDING
Patient was discharged prior for me seeing or evaluating this patient, as was transferred prior to my arrival

## 2021-10-28 NOTE — PROGRESS NOTE ADULT - SUBJECTIVE AND OBJECTIVE BOX
NSCU Progress Note    Interval/Summary:  89F w/ hx of MDS, dementia with recent hospitalization s/p fall, found to have tSDH with MLS and L subcapital femoral nexk fx s/p CRPP (9/28/21) with course c/b acute blood loss anemia 2/2 hematoma/ileus, now presented from  for lethargy, no FC concerning for AoC SDH, also with c/f sepsis.    24 Hour Events/Subjective:  - admitted  - given additional 1L IVF on arrival with minimal pressor requirements to maintain SBP goal  - elevated INR, LFTs c/f MESSI vs. shock liver, ordered additional labs, given Kcentra  - started empirically on vanc/cef though patient with hx of elevated leuks 2/2 MDS, pending onc  - trending h/h s/p 2uprbc prior to arrival         REVIEW OF SYSTEMS:  - negative except as above    VITALS:   - Reviewed    IMAGING/DATA:   - Reviewed     ALLERGIES:   - No Known Allergies        PHYSICAL EXAM:    General: NAD  CVS: RRR  Pulm: CTAB  GI: Soft, NTND  Extremities: No LE Edema  Neuro: AOx1 (baseline), FC (thumbs up wiggles toes), no facial, PENA AG/WD to pain   NSCU Progress Note    Interval/Summary:  89F w/ hx of MDS, dementia with recent hospitalization s/p fall, found to have tSDH with MLS and L subcapital femoral nexk fx s/p CRPP (9/28/21) with course c/b acute blood loss anemia 2/2 hematoma/ileus, now presented from  for AMS CTH w/ AoC SDH and R 1.4cm MLS , also with c/f sepsis.    24 Hour Events/Subjective:  - admitted  - given additional 1L IVF on arrival with minimal pressor requirements to maintain SBP goal  - elevated INR, LFTs c/f MESSI vs. shock liver, ordered additional labs, given Kcentra  - started empirically on vanc/cef though patient with hx of elevated leuks 2/2 MDS, pending onc  - trending h/h s/p 2uprbc prior to arrival         REVIEW OF SYSTEMS:  - negative except as above    VITALS:   - Reviewed    IMAGING/DATA:   - Reviewed     ALLERGIES:   - No Known Allergies        PHYSICAL EXAM:    General: NAD  CVS: RR  Pulm: CTAB, no wheezing  GI: Soft, distended, nt  Extremities: No LE Edema  Neuro: AOx1 (baseline), FC (thumbs up wiggles toes), no facial, PENA AG/WD to pain   NSCU Progress Note    Interval/Summary:  89F w/ hx of MDS, dementia with recent hospitalization s/p fall, found to have tSDH with MLS and L subcapital femoral nexk fx s/p CRPP (9/28/21) with course c/b acute blood loss anemia 2/2 hematoma/ileus, now presented from  for AMS CTH w/ AoC SDH and R 1.4cm MLS , also with c/f sepsis.    24 Hour Events/Subjective:  - admitted  - given additional 1L IVF on arrival with minimal pressor requirements to maintain SBP goal  - elevated INR, LFTs c/f MESSI vs. shock liver, ordered additional labs, given Kcentra  - started empirically on vanc/cef though patient with hx of elevated leuks 2/2 MDS, pending onc  - trending h/h s/p 2uprbc prior to arrival     - had lengthy discussion with son dejon updated him on patients clinical status, he understands the patient is in multiorgan failure with extensive SDH and is at risk of rapid decompensation with poor prognosis and wishes to continue with aggressive management and full code      REVIEW OF SYSTEMS:  - negative except as above    VITALS:   - Reviewed    IMAGING/DATA:   - Reviewed     ALLERGIES:   - No Known Allergies        PHYSICAL EXAM:    General: NAD  CVS: RR  Pulm: CTAB, no wheezing  GI: Soft, distended, nt  Extremities: No LE Edema  Neuro: AOx1 (baseline), FC (thumbs up wiggles toes), no facial, PENA AG/WD to pain

## 2021-10-28 NOTE — ED ADULT NURSE REASSESSMENT NOTE - NS ED NURSE REASSESS COMMENT FT1
pt straight cathed for urine as per MD orders. Tolerated well. Pt with 200cc of urine output. UA and urine culture sent to lab as per MD orders.

## 2021-10-28 NOTE — CONSULT NOTE ADULT - ASSESSMENT
89 yo female with known medical history of dementia, MDS, presented from nursing home for was found to have subdural hematoma with mass effect. Hematology consulted for coagulopathy and history of MDS     # Coagulopathy   - INR at admission of 1.5, worsening to 1.9 same day. PT increased to 22.2 but PTT normal. (all normal in sept 2021)  - Recommend Mixing studies, fibrinogen, hapto, LDH,    - Peripheral smear showing:   - Likely multifactorial in setting of acidosis, septic shock, acute liver injury (hepatic coagulopathy).     # Hx of MDS (?)   - does not follow with hematologist per nursing home notes   - Not currently on any treatment   - Appears to be baseline anemic around 8; requiring intermittent transfusions; with thrombocytosis and leukocytosis (20k-50k)   - No BM bx on file   - Would need more information as to workup, treatment, follow up; will attempt to contact family regarding this.  89 yo female with known medical history of dementia, MDS, presented from nursing home for was found to have subdural hematoma with mass effect. Hematology consulted for coagulopathy and history of MDS     # Coagulopathy   - INR at admission of 1.5, worsening to 1.9 same day. PT increased to 22.2 but PTT normal. (all normal in sept 2021)  - Recommend Mixing studies, fibrinogen, hapto, LDH,    - Likely multifactorial in setting of acidosis, septic shock, acute liver injury (hepatic coagulopathy).     # Hx of MDS and MPN   - does not follow with hematologist per nursing home notes; however found prior BM BX 2019 on all scripts describing both an MDS and MPN picture   - Not currently on any treatment   - Appears to be baseline anemic around 8; requiring intermittent transfusions; with thrombocytosis and leukocytosis (20k-50k)   - Peripheral smear showing: minimal schistocytes non nucleated rbcs; clumped platelets with large platelets, Hyperactivity of neutrophils; no blasts. Consistent with BM from 2019 showing of mixed MDS MPN picture.   - continue with supportive care; pRBC above 8; plts above 100K given subdural hematoma.     Please call with any questions     Opal Jones   PGY4 heme onc fellow   [phone]: please contact on call fellow between 5pm-8am.

## 2021-10-29 NOTE — PROGRESS NOTE ADULT - ASSESSMENT
ASSESSMENT/PLAN:    89F hx of MDS w/ recent hospitalization (9/28/21) for fall, found to have tSDH w/ MLS and femoral fx s/p CRPP, now transferred from  for [facility] w/ R [facility] SDH w/ MLS and subfalcine herniation, non-surgical candidate d/t poor functional status.    NEURO:  s/p Kcentra, vit. K in NSICU  s/p SEPS (10/29)  - neuro checks q1h  - vimpat 100 for sz ppx  - delirium precautions  - poor prognosis, per family, would like to pursue aggressive care    CVS:  type II NSTEMI   pAfib rate controlled  - SBP goal 100-160  - cardiology following, not candidate for C; no need to trend trops  - c/w amiodarone 200mg qd PO when tolerated for pAF    PULM:  PIOTR opacity  - RA  - IS as tolerated  - maintain sats>92    RENAL:  - Fluids: 75cc/hr Plasmalyte  - daily IOs  - apprecaite nephro    GI:  Elevated INR/LFTs c/f nursing home vs. shock liver   Patient with CTAP on 10/20 with pancolitis, large stool burden vs. obstruction & stercol colitis, though was treated with enemas with improvement; also showed small bowel compression at the time  s/p kcentra 10/28  - Diet: NPO  - Bowel regimen  - GI following for ?ogilive syndrome, now with rectal tube for decompression  - Tylenol level, hepatitis panel, utox  - trend coags, LFTs qd    ENDO:   - FS goal 120-180    HEME/ONC:  s/p Kcentra, vit K in NSICU  s/p 2uprbc at OSH  - SCDs  - Chemoppx: hold given heme  - Onc following for MDS  - cbc q12  - transfuse to Hgb goal>8    ID:  elevated leuks, with hx of MDS, no focal infectious source though imaging suggestive of sterco colitis   Cdiff negative (10/29), ordered to r/o toxic megacolon  - monitor for fevers  - c/w cefepime, flagyl (10/28 - ) for pancolitis  - f/u panculture  - ID consult      Patient is at high risk of neurologic deterioration/death due to: poor prognosis, heme expansion, herniation   ASSESSMENT/PLAN:    89F hx of MDS w/ recent hospitalization (9/28/21) for fall, found to have tSDH w/ MLS and femoral fx s/p CRPP, now transferred from  for [facility] w/ R [facility] SDH w/ MLS and subfalcine herniation, non-surgical candidate d/t poor functional status.    NEURO:  s/p Kcentra, vit. K in NSICU  s/p SEPS (10/29)  - neuro checks q1h  - vimpat 100 for sz ppx  - delirium precautions  - poor prognosis, per family, would like to pursue aggressive care    CVS:  type II NSTEMI   pAfib rate controlled  - SBP goal 100-160  - cardiology following, not candidate for LHC; no need to trend trops  - c/w amiodarone 200mg qd PO when tolerated for pAF    PULM:  PIOTR opacity  - RA  - IS as tolerated  - maintain sats>92    RENAL:  - Fluids: 75cc/hr Plasmalyte  - daily IOs  - apprecaite nephro    GI:  Elevated INR/LFTs [facility] vs. shock liver   Patient with CTAP on 10/20 with pancolitis, large stool burden vs. obstruction & stercol colitis, though was treated with enemas with improvement; also showed small bowel compression at the time  s/p kcentra 10/28  - Diet: NPO  - Bowel regimen  - GI following for ?ogilive syndrome, now with rectal tube for decompression  - Tylenol level, hepatitis panel, utox  - trend coags, LFTs qd    ENDO:   - FS goal 120-180    HEME/ONC:  s/p Kcentra, vit K in NSICU  s/p 2uprbc at OSH  - SCDs  - Chemoppx: hold given heme  - Onc following for MDS  - cbc q12  - transfuse to Hgb goal>8  obtain daniela test, LDH, haptoglobin, reticulocyte count,  repeat CBC, obtain consent for 2U PRBC transfusion.     ID:  elevated leuks, with hx of MDS, no focal infectious source though imaging suggestive of sterco colitis   Cdiff negative (10/29), ordered to r/o toxic megacolon  - monitor for fevers  - c/w cefepime, flagyl (10/28 - ) for pancolitis  - f/u panculture  - ID consult      Patient is at high risk of neurologic deterioration/death due to: poor prognosis, heme expansion, herniation   ASSESSMENT/PLAN:    89F hx of MDS w/ recent hospitalization (9/28/21) for fall, found to have tSDH w/ MLS and femoral fx s/p CRPP, now transferred from  for [facility] w/ R [facility] SDH w/ MLS and subfalcine herniation, non-surgical candidate d/t poor functional status.    NEURO:  s/p Kcentra, vit. K in NSICU  s/p SEPS (10/29)  - neuro checks q1h  - vimpat 100 for sz ppx  - delirium precautions  - poor prognosis, goals of care  - Per Neurosurgery (Dr. Chavez), no further neurosurgical intervention - he relayed this to family    CVS:  type II NSTEMI   pAfib rate controlled  - SBP goal 100-160  - cardiology following, not candidate for C; no need to trend trops  - c/w amiodarone 200mg qd PO when tolerated for pAF    PULM:  PIOTR opacity  - RA  - IS as tolerated  - maintain sats>92    RENAL:  - Fluids: 75cc/hr Plasmalyte  - daily IOs  - apprecaite nephro    GI:  Elevated INR/LFTs c/f [facility] vs. shock liver   Patient with CTAP on 10/20 with pancolitis, large stool burden vs. obstruction & stercol colitis, though was treated with enemas with improvement; also showed small bowel compression at the time  s/p kcentra 10/28  - Diet: NPO  - Bowel regimen  - GI following for ?ogilive syndrome, now with rectal tube for decompression  - Tylenol level, hepatitis panel, utox  - trend coags, LFTs qd    ENDO:   - FS goal 120-180    HEME/ONC:  s/p Kcentra, vit K in NSICU  s/p 2uprbc at OSH  - SCDs  - Chemoppx: hold given heme  - Onc following for MDS  - cbc q12  - transfuse to Hgb goal>8  obtain daniela test, LDH, haptoglobin, reticulocyte count,  repeat CBC, obtain consent for 2U PRBC transfusion.     ID:  elevated leuks, with hx of MDS, no focal infectious source though imaging suggestive of sterco colitis   Cdiff negative (10/29), ordered to r/o toxic megacolon  - monitor for fevers  - c/w cefepime, flagyl (10/28 - ) for pancolitis  - f/u panculture  - ID consult      Patient is at high risk of neurologic deterioration/death due to: poor prognosis, heme expansion, herniation

## 2021-10-29 NOTE — PROGRESS NOTE ADULT - SUBJECTIVE AND OBJECTIVE BOX
Pt seen and examined.  sedated from SEPS placement.  post CT shows SEPS in place but it is a bit posterior.  Spoke to the son and would like to reposition it more anteriorly and a bit more inferiorly.  He understands and agrees.

## 2021-10-29 NOTE — PROGRESS NOTE ADULT - SUBJECTIVE AND OBJECTIVE BOX
Subjective: Patient seen and examined. No new events except as noted.   remains in NSCU   off pressor   AT and PAF on tele     REVIEW OF SYSTEMS:  Unable to obtain       MEDICATIONS:  MEDICATIONS  (STANDING):  aMIOdarone    Tablet 200 milliGRAM(s) Oral daily  cefepime   IVPB      cefepime   IVPB 1000 milliGRAM(s) IV Intermittent every 12 hours  chlorhexidine 4% Liquid 1 Application(s) Topical every 24 hours  fentaNYL    Injectable 50 MICROGram(s) IV Push once  folic acid 1 milliGRAM(s) Enteral Tube daily  lacosamide IVPB 100 milliGRAM(s) IV Intermittent every 12 hours  metroNIDAZOLE  IVPB      metroNIDAZOLE  IVPB 500 milliGRAM(s) IV Intermittent every 8 hours  multiple electrolytes Injection Type 1 1000 milliLiter(s) (75 mL/Hr) IV Continuous <Continuous>  thiamine 100 milliGRAM(s) Enteral Tube daily      PHYSICAL EXAM:  T(C): 36.4 (10-29-21 @ 03:00), Max: 36.6 (10-28-21 @ 19:00)  HR: 93 (10-29-21 @ 10:40) (71 - 123)  BP: 145/121 (10-29-21 @ 10:40) (100/42 - 148/91)  RR: 19 (10-29-21 @ 10:40) (15 - 25)  SpO2: 100% (10-29-21 @ 10:40) (92% - 100%)  Wt(kg): --  I&O's Summary    28 Oct 2021 07:01  -  29 Oct 2021 07:00  --------------------------------------------------------  IN: 4358.7 mL / OUT: 510 mL / NET: 3848.7 mL            Appearance: NAD	  HEENT:   dry oral mucosa, PERRL, EOMI	  Lymphatic: No lymphadenopathy  Cardiovascular: Normal S1 S2, No JVD, No murmurs, No edema  Respiratory: decreased bs   Psychiatry: A & O x 1  Gastrointestinal:  Soft, Non-tender, + BS	  Skin: No rashes, No ecchymoses, No cyanosis	  Neurologic: Somnolent, Ox1, FC, pupils 2R, EOMI, R facial, LUE 4+/5, RUE 4-/5, BLE minimal movement likely 2/2 deconditioning but wiggles toes    Extremities: Normal range of motion, No clubbing, cyanosis or edema  Vascular: Peripheral pulses palpable 2+ bilaterally        LABS:    CARDIAC MARKERS:  CARDIAC MARKERS ( 28 Oct 2021 22:51 )  x     / x     / 327 U/L / x     / 26.9 ng/mL                                7.6    40.41 )-----------( 374      ( 28 Oct 2021 22:47 )             26.7     10-29    152<H>  |  119<H>  |  48<H>  ----------------------------<  105<H>  3.3<L>   |  19<L>  |  0.80    Ca    7.6<L>      29 Oct 2021 07:28  Phos  3.4     10-29  Mg     1.8     10-29    TPro  4.6<L>  /  Alb  2.8<L>  /  TBili  0.9  /  DBili  x   /  AST  305<H>  /  ALT  196<H>  /  AlkPhos  208<H>  10-29    proBNP:   Lipid Profile:   HgA1c:   TSH:         TELEMETRY: 	    ECG:  	  RADIOLOGY: < from: Xray Abdomen 1 View PORTABLE -Urgent (Xray Abdomen 1 View PORTABLE -Urgent .) (10.28.21 @ 12:31) >    EXAM:  XR ABDOMEN PORTABLE URGENT 1V                            PROCEDURE DATE:  10/28/2021            INTERPRETATION:  CLINICAL INDICATION: Abdominal distention. Septic shock    TECHNIQUE: Single view of the abdomen    COMPARISON: CT abdomen and pelvis from 10/20/2021 at 4:36 AM    FINDINGS:    Multiple dilated loops of large bowel up to 9 cm.  No evidence of intraperitoneal free air.  The visualized osseous structures demonstrate no acute pathology.  Bilateral femoral hardware in place. Largecalcified fibroid.    IMPRESSION:    Multiple dilated loops of large bowel are again seen. Findings may reflect a  large bowel obstruction versus ileus.    --- End of Report ---    < end of copied text >  < from: Xray Chest 1 View-PORTABLE IMMEDIATE (Xray Chest 1 View-PORTABLE IMMEDIATE .) (10.28.21 @ 09:19) >  EXAM:  XR CHEST PORTABLE IMMED 1V                            PROCEDURE DATE:  10/28/2021            INTERPRETATION:  TIME OF EXAM: October 28, 2021 at 9:15 AM.    CLINICAL INFORMATION: Status post subdural hematoma. Abnormal chest sounds.    COMPARISON:  CT scan of the chest obtained earlier the same day.    TECHNIQUE:   AP Portable chest x-ray.    INTERPRETATION:    Heart size and the mediastinum cannot be accurately evaluated on this projection. Calcified tortuous thoracic aorta.  Skin folds project over the right lung.  There is linear atelectasis in the periphery of right midlung and in the left midlung.  There is new ill-defined focal hazy left upper lung opacity.  There is a trace right pleural effusion. No left pleural effusion seen.  No pneumothorax noted.  There is scoliosis of the spine with osteoarthritic degenerative change. An old left rib fracture is seen.        IMPRESSION:  Right midlung and left midlung linear atelectasis.    New ill-defined focal hazy left upper lung opacity that can be due to subsegmental atelectasis or developing pneumonia.    --- End of Report ---      < end of copied text >    DIAGNOSTIC TESTING:  [ ] Echocardiogram:  < from: Transthoracic Echocardiogram (10.28.21 @ 15:22) >    Patient name: LORRI TEJEDA  YOB: 1930   Age: 89 (F)   MR#: [id number]  Study Date: 10/28/2021  [facility] David Nú[facility]ez RDCS  Study quality: Technically good  Referring Physician: Raúl Rizo MD  Blood Pressure: 138/69 mmHg  Height: 147 cm  Weight: 50 kg  BSA: 1.4 m2  Heart Rate: 79 mmHg  ------------------------------------------------------------------------  PROCEDURE: Transthoracic echocardiogram with 2-D, M-Mode  and complete spectral and colorflow Doppler.  INDICATION: Abnormal electrocardiogram (ECG) (EKG) (R94.31)  ------------------------------------------------------------------------  Dimensions:    Normal Values:  LA:     3.4    2.0 - 4.0 cm  Ao:     3.1    2.0 - 3.8 cm  SEPTUM: 0.6   0.6 - 1.2 cm  PWT:    0.8    0.6 - 1.1 cm  LVIDd:  3.6    3.0 - 5.6 cm  LVIDs:  2.1    1.8 - 4.0 cm  Derived variables:  LVMI: 47 g/m2  RWT: 0.44  Fractional short: 42 %  EF (Visual Estimate): >70 %  EF (Block Rule): 74 %Doppler Peak Velocity (m/sec):  AoV=1.6  ------------------------------------------------------------------------  Observations:  Mitral Valve: Mitral annular calcification and heavily  calcified mitral leaflets with decreased  diastolic  opening.  Mild mitral regurgitation. Peak mitral valve  gradient equals 4 mm Hg, mean transmitral valve gradient  equals 2 mm Hg, consistent with mild mitral stenosis.  Aortic Valve/Aorta: Heavily Calcified trileaflet aortic  valve with decreased opening. Calcified fixed protrusion  into the LVOT possibly from the mitral annular  calcification measuring 0.5 cm x 0.5 cm without increased  gradients.  Peak transaortic valve gradient equals 10 mm  Hg, mean transaortic valve gradient equals 6 mm Hg,  estimated aortic valve area equals 2 sqcm (by continuity  equation), aortic valve velocity time integral equals 32  cm, consistent with mild aortic stenosis. Peak left  ventricular outflow tract gradient equals 6 mm Hg, mean  gradient is equal to 2 mm Hg, LVOT velocity time integral  equals 22 cm.  Aortic Root: 3.1 cm.  Left Atrium: Normal left atrium.  LA volume index = 33  cc/m2.  Left Ventricle: Hyperdynamic left ventricular systolic  function. Normal left ventricular internal dimensions and  wall thicknesses. Normal diastolic function  Right Heart: Normal right atrium. Normal right ventricular  size and function. Tricuspid valve not well visualized.  Moderate-severe tricuspid regurgitation. Pulmonic valve not  well visualized.  Pericardium/Pleura: Normal pericardium with nopericardial  effusion.  Left pleural effusion.  Hemodynamic: Estimated right atrial pressure is 8 mm Hg.  Estimated right ventricular systolic pressure equals 57 mm  Hg, assuming right atrial pressure equals 8 mm Hg,  consistent with moderate pulmonary hypertension.  ------------------------------------------------------------------------  Conclusions:  1. Mitral annular calcification and heavily calcified  mitral leaflets with decreased  diastolic opening.  Peak  mitral valve gradient equals 4 mm Hg, mean transmitral  valve gradient equals 2 mm Hg, consistent with mild mitral  stenosis.  2. Heavily Calcified trileaflet aortic valve with decreased  opening. Calcified fixed protrusion into the LVOT possibly  from the mitral annular calcification measuring 0.5 cm x  0.5 cm without increased gradients.  Peak transaortic valve  gradient equals 10 mm Hg, mean transaortic valve gradient  equals 6 mm Hg, estimated aortic valve area equals 2 sqcm  (by continuity equation), aortic valve velocity time  integral equals 32 cm, consistent with mild aortic  stenosis.  3. Hyperdynamic left ventricular systolic function.  4. Normal diastolic function  5. Normal right ventricular size and function.  6. Tricuspid valve not well visualized. Moderate-severe  tricuspid regurgitation.  7. Estimated pulmonary artery systolic pressure equals 57  mm Hg, assuming right atrial pressure equals 8 mm Hg,  consistent with moderate pulmonary pressures.  8. Normal pericardium with no pericardial effusion.  *** Noprevious Echo exam.  ------------------------------------------------------------------------  Confirmed on  10/29/2021 - 09:58:05 by Navneet Toth M.D.  ------------------------------------------------------------------------    < end of copied text >    [ ]  Catheterization:  [ ] Stress Test:    OTHER:

## 2021-10-29 NOTE — CONSULT NOTE ADULT - SUBJECTIVE AND OBJECTIVE BOX
Patient is a 89y old  Female who presents with a chief complaint of SDH (29 Oct 2021 10:43)      HPI:  89F w/ hx of MDS, dementia with recent hospitalization s/p fall, found to have tSDH with MLS and L subcapital femoral nexk fx s/p CRPP (21) with course c/b acute blood loss anemia 2/2 hematoma/ileus, now presented from  for AMS CTH w/ AoC SDH and R 1.4cm MLS , also with c/f sepsis.    On presentation WBC of 26.01 with 61% PMN. U/A (10/28) with 3-5 WBC. BCx (10/28) with no growth. RVP (10/28) negative. CT Chest (10/28) with no consolidations. CT A/P (10/28) with stercoral colitis and pancolitis. CT Brain (10/29) with Left-sided acute on chronic subdural hematoma is again seen as well as a right frontal subdural collection. C diff toxin assay (10/29) negative.    REVIEW OF SYSTEMS:  - negative except as above    VITALS:   - Reviewed    IMAGING/DATA:   - Reviewed     ALLERGIES:   - No Known Allergies        PHYSICAL EXAM:    General: NAD  CVS: RR  Pulm: CTAB, no wheezing  GI: Soft, distended, nt  Extremities: No LE Edema  Neuro: AOx1 (baseline), FC (thumbs up wiggles toes), no facial, PNEA AG/WD to pain     (28 Oct 2021 08:53)     prior hospital charts reviewed [  ]  primary team notes reviewed [  ]  other consultant notes reviewed [  ]    PAST MEDICAL & SURGICAL HISTORY:  MDS (myelodysplastic syndrome)    Dementia    S/P ORIF (open reduction internal fixation) fracture  elbow fracture 2021        Allergies  No Known Allergies    ANTIMICROBIALS (past 90 days)  MEDICATIONS  (STANDING):    ceFAZolin   IVPB   100 mL/Hr IV Intermittent (10-29-21 @ 10:45)    cefepime   IVPB   100 mL/Hr IV Intermittent (10-28-21 @ 09:35)    cefepime   IVPB   100 mL/Hr IV Intermittent (10-29-21 @ 05:18)   100 mL/Hr IV Intermittent (10-28-21 @ 17:26)    metroNIDAZOLE  IVPB   100 mL/Hr IV Intermittent (10-28-21 @ 18:33)    metroNIDAZOLE  IVPB   100 mL/Hr IV Intermittent (10-29-21 @ 06:52)   100 mL/Hr IV Intermittent (10-28-21 @ 21:27)    vancomycin  IVPB   250 mL/Hr IV Intermittent (10-28-21 @ 09:35)      ANTIMICROBIALS:    cefepime   IVPB    cefepime   IVPB 1000 every 12 hours  metroNIDAZOLE  IVPB    metroNIDAZOLE  IVPB 500 every 8 hours    OTHER MEDS: MEDICATIONS  (STANDING):  aMIOdarone    Tablet 200 daily  bisacodyl 5 daily PRN  lacosamide IVPB 100 every 12 hours    SOCIAL HISTORY:   hx smoking  non-smoker    FAMILY HISTORY:  FH: HTN (hypertension) (Mother)      REVIEW OF SYSTEMS  [  ] ROS unobtainable because:    [  ] All other systems negative except as noted below:	    Constitutional:  [ ] fever [ ] chills  [ ] weight loss  [ ] weakness  Skin:  [ ] rash [ ] phlebitis	  Eyes: [ ] icterus [ ] pain  [ ] discharge	  ENMT: [ ] sore throat  [ ] thrush [ ] ulcers [ ] exudates  Respiratory: [ ] dyspnea [ ] hemoptysis [ ] cough [ ] sputum	  Cardiovascular:  [ ] chest pain [ ] palpitations [ ] edema	  Gastrointestinal:  [ ] nausea [ ] vomiting [ ] diarrhea [ ] constipation [ ] pain	  Genitourinary:  [ ] dysuria [ ] frequency [ ] hematuria [ ] discharge [ ] flank pain  [ ] incontinence  Musculoskeletal:  [ ] myalgias [ ] arthralgias [ ] arthritis  [ ] back pain  Neurological:  [ ] headache [ ] seizures  [ ] confusion/altered mental status  Psychiatric:  [ ] anxiety [ ] depression	  Hematology/Lymphatics:  [ ] lymphadenopathy  Endocrine:  [ ] adrenal [ ] thyroid  Allergic/Immunologic:	 [ ] transplant [ ] seasonal    Vital Signs Last 24 Hrs  T(F): 97.5 (10-29-21 @ 03:00), Max: 98.3 (10-28-21 @ 01:27)  Vital Signs Last 24 Hrs  HR: 77 (10-29-21 @ 12:00) (74 - 123)  BP: 80/45 (10-29-21 @ 12:00) (80/45 - 145/121)  RR: 15 (10-29-21 @ 12:00)  SpO2: 99% (10-29-21 @ 12:00) (92% - 100%)  Wt(kg): --    PHYSICAL EXAMINATION:  General: Alert and Awake, NAD  HEENT: PERRL, EOMI, No subconjunctival hemorrhages, Oropharynx Clear, MMM  Neck: Supple, No GI  Cardiac: RRR, No M/R/G  Resp: CTAB, No Wh/Rh/Ra  Abdomen: NBS, NT/ND, No HSM, No rigidity or guarding  MSK: No LE edema. No stigmata of IE. No evidence of phlebitis. No evidence of synovitis.  : No padron  Skin: No rashes or lesions. Skin is warm and dry to the touch.   Neuro: Alert and Awake. CN 2-12 Grossly intact. Moves all four extremities spontaneously.  Psych: Calm, Pleasant, Cooperative                          7.6    40.41 )-----------( 374      ( 28 Oct 2021 22:47 )             26.7     10-29    152<H>  |  119<H>  |  48<H>  ----------------------------<  105<H>  3.3<L>   |  19<L>  |  0.80    Ca    7.6<L>      29 Oct 2021 07:28  Phos  3.4     10-29  Mg     1.8     10-29    TPro  4.6<L>  /  Alb  2.8<L>  /  TBili  0.9  /  DBili  x   /  AST  305<H>  /  ALT  196<H>  /  AlkPhos  208<H>  10-29    Urinalysis Basic - ( 28 Oct 2021 10:03 )    Color: Orange / Appearance: Turbid / S.023 / pH: x  Gluc: x / Ketone: Trace  / Bili: Negative / Urobili: 2 mg/dL   Blood: x / Protein: 300 mg/dL / Nitrite: Negative   Leuk Esterase: Small / RBC: 1177 /hpf /  /HPF   Sq Epi: x / Non Sq Epi: 13 / Bacteria: Negative    MICROBIOLOGY:  Culture - Blood (collected 28 Oct 2021 01:11)  Source: .Blood Blood-Peripheral  Preliminary Report (29 Oct 2021 09:01):    No growth to date.    Culture - Blood (collected 28 Oct 2021 01:11)  Source: .Blood Blood-Peripheral  Preliminary Report (29 Oct 2021 09:01):    No growth to date.              Rapid RVP Result: NotDetec (10-28 @ 01:11)    Clostridium difficile GDH Toxins A&amp;B, EIA:   Negative (10-29-21 @ 08:09)  Clostridium difficile GDH Interpretation: Negative for toxigenic C. Difficile.  This specimen is negative for C.  Difficile glutamate dehydrogenase (GDH) antigen and negative for C.  Difficile Toxins A & B, by EIA.  GDH is a highly sensitive screening  marker for C. Difficile that is produced in large amounts by all C.  Difficile strains, both toxigenic and nontoxigenic.  This assay has not  been validated as a test of cure.  Repeat testing during the same episode  of diarrhea is of limited value and is discouraged.  The results of this  assay should always be interpreted in conjunction with pateint's clinical  history. (10-29-21 @ 08:09)      RADIOLOGY:    <The imaging below has been reviewed and visualized by me independently. Findings as detailed in report below>      EXAM:  CT CHEST    PROCEDURE DATE:  10/28/2021    1. Small bilateral pleural effusions.  2. No lung consolidations.  3. Rectum distended by stool with findings suspicious for stercoral colitis.  4. Pancolitis.    EXAM:  CT ABDOMEN AND PELVIS    PROCEDURE DATE:  10/28/2021    1. Small bilateral pleural effusions.  2. No lung consolidations.  3. Rectum distended by stool with findings suspicious for stercoral colitis.  4. Pancolitis.    EXAM:  CT BRAIN                        PROCEDURE DATE:  10/29/2021    Left-sided acute on chronic subdural hematoma is again seen as well as a right frontal subdural collection. These findings appear stable.   Patient is a 89y old  Female who presents with a chief complaint of SDH (29 Oct 2021 10:43)    HPI:    89 year old female PMH MDS, dementia with recent hospitalization s/p fall, found to have tSDH with MLS and L subcapital femoral nexk fx s/p CRPP (21) with course c/b acute blood loss anemia 2/2 hematoma/ileus, now presented from  for AMS CTH w/ AoC SDH and R 1.4cm MLS , also with c/f sepsis.    On presentation WBC of 26.01 with 61% PMN. U/A (10/28) with 3-5 WBC. BCx (10/28) with no growth. RVP (10/28) negative. CT Chest (10/28) with no consolidations. CT A/P (10/28) with stercoral colitis and pancolitis. CT Brain (10/29) with Left-sided acute on chronic subdural hematoma is again seen as well as a right frontal subdural collection. C diff toxin assay (10/29) negative.    prior hospital charts reviewed [  ]  primary team notes reviewed [ x ]  other consultant notes reviewed [ x ]    PAST MEDICAL & SURGICAL HISTORY:  MDS (myelodysplastic syndrome)    Dementia    S/P ORIF (open reduction internal fixation) fracture  elbow fracture 2021        Allergies  No Known Allergies    ANTIMICROBIALS (past 90 days)  MEDICATIONS  (STANDING):    ceFAZolin   IVPB   100 mL/Hr IV Intermittent (10-29-21 @ 10:45)    cefepime   IVPB   100 mL/Hr IV Intermittent (10-28-21 @ 09:35)    cefepime   IVPB   100 mL/Hr IV Intermittent (10-29-21 @ 05:18)   100 mL/Hr IV Intermittent (10-28-21 @ 17:26)    metroNIDAZOLE  IVPB   100 mL/Hr IV Intermittent (10-28-21 @ 18:33)    metroNIDAZOLE  IVPB   100 mL/Hr IV Intermittent (10-29-21 @ 06:52)   100 mL/Hr IV Intermittent (10-28-21 @ 21:27)    vancomycin  IVPB   250 mL/Hr IV Intermittent (10-28-21 @ 09:35)      ANTIMICROBIALS:    cefepime   IVPB    cefepime   IVPB 1000 every 12 hours  metroNIDAZOLE  IVPB    metroNIDAZOLE  IVPB 500 every 8 hours    OTHER MEDS: MEDICATIONS  (STANDING):  aMIOdarone    Tablet 200 daily  bisacodyl 5 daily PRN  lacosamide IVPB 100 every 12 hours    SOCIAL HISTORY: Unable to obtain, encephalopathic    FAMILY HISTORY:  FH: HTN (hypertension) (Mother)    REVIEW OF SYSTEMS  [ x ] ROS unobtainable because:  Unable to obtain, encephalopathic  [  ] All other systems negative except as noted below:	    Constitutional:  [ ] fever [ ] chills  [ ] weight loss  [ ] weakness  Skin:  [ ] rash [ ] phlebitis	  Eyes: [ ] icterus [ ] pain  [ ] discharge	  ENMT: [ ] sore throat  [ ] thrush [ ] ulcers [ ] exudates  Respiratory: [ ] dyspnea [ ] hemoptysis [ ] cough [ ] sputum	  Cardiovascular:  [ ] chest pain [ ] palpitations [ ] edema	  Gastrointestinal:  [ ] nausea [ ] vomiting [ ] diarrhea [ ] constipation [ ] pain	  Genitourinary:  [ ] dysuria [ ] frequency [ ] hematuria [ ] discharge [ ] flank pain  [ ] incontinence  Musculoskeletal:  [ ] myalgias [ ] arthralgias [ ] arthritis  [ ] back pain  Neurological:  [ ] headache [ ] seizures  [ ] confusion/altered mental status  Psychiatric:  [ ] anxiety [ ] depression	  Hematology/Lymphatics:  [ ] lymphadenopathy  Endocrine:  [ ] adrenal [ ] thyroid  Allergic/Immunologic:	 [ ] transplant [ ] seasonal    Vital Signs Last 24 Hrs  T(F): 97.5 (10-29-21 @ 03:00), Max: 98.3 (10-28-21 @ 01:27)  Vital Signs Last 24 Hrs  HR: 77 (10-29-21 @ 12:00) (74 - 123)  BP: 80/45 (10-29-21 @ 12:00) (80/45 - 145/121)  RR: 15 (10-29-21 @ 12:00)  SpO2: 99% (10-29-21 @ 12:00) (92% - 100%)  Wt(kg): --    PHYSICAL EXAMINATION:  General: Awake but not alert  HEENT: Two SEPS drains with serosanguinous output on the left side.   Neck: Supple  Cardiac: RRR, No M/R/G  Resp: CTAB, No Wh/Rh/Ra  Abdomen: NBS, NT/ND, No HSM, No rigidity or guarding  MSK: L Hip ORIF site well healed, without erythema or drainage. No LE edema. No stigmata of IE. No evidence of phlebitis. No evidence of synovitis.  Skin: No rashes or lesions. Skin is warm and dry to the touch.   Neuro: Awake but not alert.   Psych: Encephalopathic - unable to assess                          7.6    40.41 )-----------( 374      ( 28 Oct 2021 22:47 )             26.7     10    152<H>  |  119<H>  |  48<H>  ----------------------------<  105<H>  3.3<L>   |  19<L>  |  0.80    Ca    7.6<L>      29 Oct 2021 07:28  Phos  3.4     10-29  Mg     1.8     10-29    TPro  4.6<L>  /  Alb  2.8<L>  /  TBili  0.9  /  DBili  x   /  AST  305<H>  /  ALT  196<H>  /  AlkPhos  208<H>  10-29    Urinalysis Basic - ( 28 Oct 2021 10:03 )    Color: Orange / Appearance: Turbid / S.023 / pH: x  Gluc: x / Ketone: Trace  / Bili: Negative / Urobili: 2 mg/dL   Blood: x / Protein: 300 mg/dL / Nitrite: Negative   Leuk Esterase: Small / RBC: 1177 /hpf /  /HPF   Sq Epi: x / Non Sq Epi: 13 / Bacteria: Negative    MICROBIOLOGY:  Culture - Blood (collected 28 Oct 2021 01:11)  Source: .Blood Blood-Peripheral  Preliminary Report (29 Oct 2021 09:01):    No growth to date.    Culture - Blood (collected 28 Oct 2021 01:11)  Source: .Blood Blood-Peripheral  Preliminary Report (29 Oct 2021 09:01):    No growth to date.              Rapid RVP Result: NotDetec (10-28 @ 01:11)    Clostridium difficile GDH Toxins A&amp;B, EIA:   Negative (10-29-21 @ 08:09)  Clostridium difficile GDH Interpretation: Negative for toxigenic C. Difficile.  This specimen is negative for C.  Difficile glutamate dehydrogenase (GDH) antigen and negative for C.  Difficile Toxins A & B, by EIA.  GDH is a highly sensitive screening  marker for C. Difficile that is produced in large amounts by all C.  Difficile strains, both toxigenic and nontoxigenic.  This assay has not  been validated as a test of cure.  Repeat testing during the same episode  of diarrhea is of limited value and is discouraged.  The results of this  assay should always be interpreted in conjunction with pateint's clinical  history. (10-29-21 @ 08:09)      RADIOLOGY:    <The imaging below has been reviewed and visualized by me independently. Findings as detailed in report below>      EXAM:  CT CHEST    PROCEDURE DATE:  10/28/2021    1. Small bilateral pleural effusions.  2. No lung consolidations.  3. Rectum distended by stool with findings suspicious for stercoral colitis.  4. Pancolitis.    EXAM:  CT ABDOMEN AND PELVIS    PROCEDURE DATE:  10/28/2021    1. Small bilateral pleural effusions.  2. No lung consolidations.  3. Rectum distended by stool with findings suspicious for stercoral colitis.  4. Pancolitis.    EXAM:  CT BRAIN                        PROCEDURE DATE:  10/29/2021    Left-sided acute on chronic subdural hematoma is again seen as well as a right frontal subdural collection. These findings appear stable.

## 2021-10-29 NOTE — PROGRESS NOTE ADULT - SUBJECTIVE AND OBJECTIVE BOX
Chief Complaint:  Patient is a 89y old  Female who presents with a chief complaint of SDH (29 Oct 2021 14:20)          Interval Events:   1L liquid stool via rectal tube  Hospital Medications:  aMIOdarone    Tablet 200 milliGRAM(s) Oral daily  bisacodyl 5 milliGRAM(s) Oral daily PRN  cefepime   IVPB      cefepime   IVPB 1000 milliGRAM(s) IV Intermittent every 12 hours  chlorhexidine 4% Liquid 1 Application(s) Topical every 24 hours  folic acid 1 milliGRAM(s) Enteral Tube daily  lacosamide IVPB 100 milliGRAM(s) IV Intermittent every 12 hours  metroNIDAZOLE  IVPB      metroNIDAZOLE  IVPB 500 milliGRAM(s) IV Intermittent every 8 hours  multiple electrolytes Injection Type 1 1000 milliLiter(s) IV Continuous <Continuous>  potassium chloride   Solution 40 milliEquivalent(s) Oral every 4 hours  thiamine 100 milliGRAM(s) Enteral Tube daily        Review of Systems:  General:  No wt loss, fevers, chills, night sweats, fatigue,   Eyes:  Good vision, no reported pain  ENT:  No sore throat, pain, runny nose, dysphagia  CV:  No pain, palpitations, hypo/hypertension  Resp:  No dyspnea, cough, tachypnea, wheezing  GI:  See HPI  :  No pain, bleeding, incontinence, nocturia  Muscle:  No pain, weakness  Neuro:  No weakness, tingling, memory problems  Psych:  No fatigue, insomnia, mood problems, depression  Endocrine:  No polyuria, polydipsia, cold/heat intolerance  Heme:  No petechiae, ecchymosis, easy bruisability  Integumentary:  No rash, edema    PHYSICAL EXAM:   Vital Signs:  Vital Signs Last 24 Hrs  T(C): 36.4 (29 Oct 2021 03:00), Max: 36.6 (28 Oct 2021 19:00)  T(F): 97.5 (29 Oct 2021 03:00), Max: 97.8 (28 Oct 2021 19:00)  HR: 90 (29 Oct 2021 16:00) (76 - 123)  BP: 136/68 (29 Oct 2021 16:00) (80/45 - 145/121)  BP(mean): 88 (29 Oct 2021 16:00) (54 - 131)  RR: 16 (29 Oct 2021 16:00) (13 - 25)  SpO2: 98% (29 Oct 2021 16:00) (92% - 100%)  Daily     Daily       PHYSICAL EXAM:     GENERAL:  Appears stated age, well-groomed, well-nourished, no distress  HEENT:  NC/AT,  conjunctivae anicteric, clear and pink,   NECK: supple, trachea midline  CHEST:  Full & symmetric excursion, no increased effort, breath sounds clear  HEART:  Regular rhythm, no JVD  ABDOMEN:  Soft, non-tender, non-distended, normoactive bowel sounds,  no masses , no hepatosplenomegaly  EXTREMITIES:  no cyanosis,clubbing or edema  SKIN:  No rash, erythema, or, ecchymoses, no jaundice  NEURO:  alert, confused    RECTAL: Deferred      LABS Personally reviewed by me:                        7.6    40.41 )-----------( 374      ( 28 Oct 2021 22:47 )             26.7     Mean Cell Volume: 109.9 fl (10-28-21 @ 22:47)    10-29    152<H>  |  119<H>  |  48<H>  ----------------------------<  105<H>  3.3<L>   |  19<L>  |  0.80    Ca    7.6<L>      29 Oct 2021 07:28  Phos  3.4     10-29  Mg     1.8     10-29    TPro  4.6<L>  /  Alb  2.8<L>  /  TBili  0.9  /  DBili  x   /  AST  305<H>  /  ALT  196<H>  /  AlkPhos  208<H>  10-29    LIVER FUNCTIONS - ( 29 Oct 2021 07:28 )  Alb: 2.8 g/dL / Pro: 4.6 g/dL / ALK PHOS: 208 U/L / ALT: 196 U/L / AST: 305 U/L / GGT: x           PT/INR - ( 28 Oct 2021 23:13 )   PT: 13.3 sec;   INR: 1.11 ratio         PTT - ( 28 Oct 2021 23:13 )  PTT:28.0 sec  Urinalysis Basic - ( 28 Oct 2021 10:03 )    Color: Orange / Appearance: Turbid / S.023 / pH: x  Gluc: x / Ketone: Trace  / Bili: Negative / Urobili: 2 mg/dL   Blood: x / Protein: 300 mg/dL / Nitrite: Negative   Leuk Esterase: Small / RBC: 1177 /hpf /  /HPF   Sq Epi: x / Non Sq Epi: 13 / Bacteria: Negative                              7.6    40.41 )-----------( 374      ( 28 Oct 2021 22:47 )             26.7                         8.1    39.34 )-----------( 445      ( 28 Oct 2021 10:03 )             27.9                         6.8    30.92 )-----------( 320      ( 28 Oct 2021 07:01 )             22.6                         6.3    26.01 )-----------( 349      ( 28 Oct 2021 01:11 )             21.8       Imaging personally reviewed by me:

## 2021-10-29 NOTE — PROGRESS NOTE ADULT - ASSESSMENT
89 year old female with subdural hematoma and abdominal distention    1. Subdural hematoma  -per NSX    2. Abdoinal distention. Suspect [place] syndrome in the setting of SDH, critical illness, now s/p successful decompression with rectal tube, cdiff negative.    3. Leukocytosis    4. Anemia, MDS, no overt GI bleeding

## 2021-10-29 NOTE — PROGRESS NOTE ADULT - ASSESSMENT
89 year old Female with pmhx dementia, MDS, was Level 1 Trauma s/p fall a month ago and found to have SDH, no intervention done at that time. Now transferred from [facility] from nursing home due to AMS. 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 Appears to be in septic shock, with multiple electrolyte imbalance and shock liver. Received 5L IVF, Renal consult called for decreased urine output.       1- SHANI resolved   2- AMS  3- Sepsis  4- hypernatremia  5- hypokalemia  6- elevated LFTs        ATN improved   pt with + uo   supplement kcl   ivf 1/2 ns   strict I/O  trend transaminitis

## 2021-10-29 NOTE — ADVANCED PRACTICE NURSE CONSULT - ASSESSMENT
The pt was encountered in the NSCU- Mrs Muir is on a Total CAre sport support surface and is being assisted with turning and positioning. will recommend Complete Cair boots to off-load the heels.  The pt has a thin, frail appearance- a Swallow evaluation is pending.  The pt is on contact Isolation for C-diff, as per provider, the c-diff is negative. The pt has a Fecal Management system in place to divert liquid stool from the skin.  Upon assessment, the pt presents with an evolving deep  tissue injury to the sacrum and b/l buttocks. there are 3 areas of breakdown that together measure 4cmx 10cmx 0.3cm. there is intact skin noted in between these areas. On the sacrum, soft black eschar was noted with surrounding blanchable erythema. on the b/l  buttocks the skin was intact with deep maroon discoloration of the skin.  Suggest that this wound may continue to evolve- will recommend an Allevyn foam to cushion.

## 2021-10-29 NOTE — PROGRESS NOTE ADULT - SUBJECTIVE AND OBJECTIVE BOX
[facility]   [phone]  RENAL FOLLOW UP NOTE  --------------------------------------------------------------------------------  Chief Complaint:    24 hour events/subjective:    seen earlier   still lethargic but communicative   not able to give ROS     PAST HISTORY  --------------------------------------------------------------------------------  No significant changes to PMH, PSH, FHx, SHx, unless otherwise noted    ALLERGIES & MEDICATIONS  --------------------------------------------------------------------------------  Allergies    No Known Allergies    Intolerances      Standing Inpatient Medications  aMIOdarone    Tablet 200 milliGRAM(s) Oral daily  cefepime   IVPB      cefepime   IVPB 1000 milliGRAM(s) IV Intermittent every 12 hours  chlorhexidine 4% Liquid 1 Application(s) Topical every 24 hours  folic acid 1 milliGRAM(s) Enteral Tube daily  lacosamide IVPB 100 milliGRAM(s) IV Intermittent every 12 hours  metroNIDAZOLE  IVPB      metroNIDAZOLE  IVPB 500 milliGRAM(s) IV Intermittent every 8 hours  multiple electrolytes Injection Type 1 1000 milliLiter(s) IV Continuous <Continuous>  thiamine 100 milliGRAM(s) Enteral Tube daily    PRN Inpatient Medications  bisacodyl 5 milliGRAM(s) Oral daily PRN      REVIEW OF SYSTEMS  --------------------------------------------------------------------------------        VITALS/PHYSICAL EXAM  --------------------------------------------------------------------------------  T(C): 36.2 (10-29-21 @ 15:00), Max: 36.4 (10-28-21 @ 23:00)  HR: 76 (10-29-21 @ 20:00) (76 - 123)  BP: 103/62 (10-29-21 @ 20:00) (80/45 - 145/121)  RR: 13 (10-29-21 @ 20:00) (12 - 25)  SpO2: 98% (10-29-21 @ 20:00) (94% - 100%)  Wt(kg): --  Height (cm): 147.3 (10-28-21 @ 08:47)  Weight (kg): 49.9 (10-28-21 @ 08:47)  BMI (kg/m2): 23 (10-28-21 @ 08:47)  BSA (m2): 1.41 (10-28-21 @ 08:47)      10-28-21 @ 07:01  -  10-29-21 @ 07:00  --------------------------------------------------------  IN: 4358.7 mL / OUT: 510 mL / NET: 3848.7 mL    10-29-21 @ 07:01  -  10-29-21 @ 21:02  --------------------------------------------------------  IN: 1475 mL / OUT: 1715 mL / NET: -240 mL      Physical Exam:  	    	Gen: elderly, pale appearing female   	Pulm: decrease breath sounds  no rales or ronchi or wheezing  	CV: No JVD. RRR, S1S2; no rub  	Abd: +  hypoactive BS, soft +distended  	: No suprapubic tenderness, +padron catheter  	UE: Warm, no cyanosis  no clubbing,  no edema;  	LE: Warm, no cyanosis  no clubbing, no edema  	Neuro: responsive   	Skin: Warm, no decrease skin turgor, RLE mottled       LABS/STUDIES  --------------------------------------------------------------------------------              7.6    40.41 >-----------<  374      [10-28-21 @ 22:47]              26.7     152  |  119  |  48  ----------------------------<  105      [10-29-21 @ 07:28]  3.3   |  19  |  0.80        Ca     7.6     [10-29-21 @ 07:28]      Mg     1.8     [10-29-21 @ 07:28]      Phos  3.4     [10-29-21 @ 07:28]    TPro  4.6  /  Alb  2.8  /  TBili  0.9  /  DBili  x   /  AST  305  /  ALT  196  /  AlkPhos  208  [10-29-21 @ 07:28]    PT/INR: PT 13.3 , INR 1.11       [10-28-21 @ 23:13]  PTT: 28.0       [10-28-21 @ 23:13]          [10-28-21 @ 22:51]        [10-28-21 @ 22:51]    Creatinine Trend:  SCr 0.80 [10-29 @ 07:28]  SCr 0.86 [10-28 @ 22:51]  SCr 1.01 [10-28 @ 15:40]  SCr 1.13 [10-28 @ 10:03]  SCr 1.14 [10-28 @ 07:01]              Urinalysis - [10-28-21 @ 10:03]      Color Orange / Appearance Turbid / SG 1.023 / pH 6.0      Gluc 100 mg/dL / Ketone Trace  / Bili Negative / Urobili 2 mg/dL       Blood Large / Protein 300 mg/dL / Leuk Est Small / Nitrite Negative      RBC 1177 /  / Hyaline 5 / Gran 3 / Sq Epi  / Non Sq Epi 13 / Bacteria Negative    Urine Creatinine 43      [10-29-21 @ 07:28]  Urine Sodium 65      [10-29-21 @ 07:28]  Urine Potassium 57      [10-29-21 @ 07:28]  Urine Chloride 77      [10-29-21 @ 07:28]    Iron 66, TIBC 245, %sat 27      [02-27-21 @ 12:50]  Ferritin 111      [02-27-21 @ 12:56]

## 2021-10-29 NOTE — SWALLOW BEDSIDE ASSESSMENT ADULT - COMMENTS
24 Hour Events/Subjective:  - admitted  - given additional 1L IVF on arrival with minimal pressor requirements to maintain SBP goal  - elevated INR, LFTs c/f MESSI vs. shock liver, ordered additional labs, given Kcentra  - started empirically on vanc/cef though patient with hx of elevated leuks 2/2 MDS, pending onc  - trending h/h s/p 2uprbc prior to arrival

## 2021-10-29 NOTE — PROGRESS NOTE ADULT - SUBJECTIVE AND OBJECTIVE BOX
24 Hour Events/Subjective:  - s/p SEPS, CT head in AM worsening subdural post SEPS, no further operative procedures offered   -started on amiodarone for PAF       MEDICATIONS  (STANDING):  aMIOdarone    Tablet 200 milliGRAM(s) Oral daily  cefepime   IVPB      cefepime   IVPB 1000 milliGRAM(s) IV Intermittent every 12 hours  chlorhexidine 4% Liquid 1 Application(s) Topical every 24 hours  folic acid 1 milliGRAM(s) Enteral Tube daily  lacosamide IVPB 100 milliGRAM(s) IV Intermittent every 12 hours  metroNIDAZOLE  IVPB      metroNIDAZOLE  IVPB 500 milliGRAM(s) IV Intermittent every 8 hours  multiple electrolytes Injection Type 1 1000 milliLiter(s) (75 mL/Hr) IV Continuous <Continuous>  thiamine 100 milliGRAM(s) Enteral Tube daily    MEDICATIONS  (PRN):  bisacodyl 5 milliGRAM(s) Oral daily PRN Constipation      Drug Dosing Weight  Height (cm): 147.3 (28 Oct 2021 08:47)  Weight (kg): 49.9 (28 Oct 2021 08:47)  BMI (kg/m2): 23 (28 Oct 2021 08:47)  BSA (m2): 1.41 (28 Oct 2021 08:47)    PAST MEDICAL & SURGICAL HISTORY:  MDS (myelodysplastic syndrome)    Dementia    S/P ORIF (open reduction internal fixation) fracture  elbow fracture 2021        REVIEW OF SYSTEMS: [ x] Unable to Assess due to neurologic exam   [ ] All ROS addressed below are non-contributory, except:  Neuro: [ ] Headache [ ] Back pain [ ] Numbness [ ] Weakness [ ] Ataxia [ ] Dizziness [ ] Aphasia [ ] Dysarthria [ ] Visual disturbance  Resp: [ ] Shortness of breath/dyspnea, [ ] Orthopnea [ ] Cough  CV: [ ] Chest pain [ ] Palpitation [ ] Lightheadedness [ ] Syncope  Renal: [ ] Thirst [ ] Edema  GI: [ ] Nausea [ ] Emesis [ ] Abdominal pain [ ] Constipation [ ] Diarrhea  Hem: [ ] Hematemesis [ ] bright red blood per rectum  ID: [ ] Fever [ ] Chills [ ] Dysuria  ENT: [ ] Rhinorrhea    PHYSICAL EXAM:    General: No Acute Distress, frail   Neurological: eyes open to nox, not following commands, non verbal, pupils 2mm reactive, left side spontaneous, right upper localizes, b/l LE tremulous 0/5 LE   Pulmonary: Clear to Auscultation, No Rales, No Rhonchi, No Wheezes   Cardiovascular: S1, S2, irregularly irregular   Gastrointestinal: Soft, Nontender, Nondistended   Extremities: No calf tenderness   Incision: right cranial seps drain in place   SKIN: evolving deep  tissue injury to the sacrum and b/l buttocks. there are 3 areas of breakdown that together measure 4cmx 10cmx 0.3cm. there is intact skin noted in between these areas. On the sacrum, soft black eschar was noted with surrounding blanchable erythema. on the b/l  buttocks the skin was intact with deep maroon discoloration of the skin.      ICU Vital Signs Last 24 Hrs  T(C): 36.2 (29 Oct 2021 15:00), Max: 36.4 (28 Oct 2021 23:00)  T(F): 97.2 (29 Oct 2021 15:00), Max: 97.6 (28 Oct 2021 23:00)  HR: 76 (29 Oct 2021 20:00) (76 - 123)  BP: 103/62 (29 Oct 2021 20:00) (80/45 - 145/121)  BP(mean): 76 (29 Oct 2021 20:00) (56 - 131)  ABP: --  ABP(mean): --  RR: 13 (29 Oct 2021 20:00) (12 - 25)  SpO2: 98% (29 Oct 2021 20:00) (94% - 100%)    ABG - ( 28 Oct 2021 03:50 )  pH, Arterial: 7.42  pH, Blood: x     /  pCO2: 39    /  pO2: 359   / HCO3: 25    / Base Excess: 0.8   /  SaO2: 99.7              I&O's Detail    28 Oct 2021 07:01  -  29 Oct 2021 07:00  --------------------------------------------------------  IN:    IV PiggyBack: 1050 mL    IV PiggyBack: 312.5 mL    Lactated Ringers: 1275 mL    Lactated Ringers Bolus: 1000 mL    multiple electrolytes Injection Type 1 Bolus: 250 mL    multiple electrolytes Injection Type 1.: 300 mL    Norepinephrine: 21.2 mL    sodium chloride 0.9%: 150 mL  Total IN: 4358.7 mL    OUT:    Indwelling Catheter - Urethral (mL): 510 mL  Total OUT: 510 mL    Total NET: 3848.7 mL      29 Oct 2021 07:01  -  29 Oct 2021 21:00  --------------------------------------------------------  IN:    IV PiggyBack: 250 mL    multiple electrolytes Injection Type 1.: 975 mL    Sodium Chloride 0.9% Bolus: 250 mL  Total IN: 1475 mL    OUT:    Drain (mL): 5 mL    Drain (mL): 15 mL    Indwelling Catheter - Urethral (mL): 875 mL    Rectal Tube (mL): 820 mL  Total OUT: 1715 mL    Total NET: -240 mL              LABS:  CBC Full  -  ( 28 Oct 2021 22:47 )  WBC Count : 40.41 K/uL  RBC Count : 2.43 M/uL  Hemoglobin : 7.6 g/dL  Hematocrit : 26.7 %  Platelet Count - Automated : 374 K/uL  Mean Cell Volume : 109.9 fl  Mean Cell Hemoglobin : 31.3 pg  Mean Cell Hemoglobin Concentration : 28.5 gm/dL  Auto Neutrophil # : 32.65 K/uL  Auto Lymphocyte # : 0.65 K/uL  Auto Monocyte # : 0.69 K/uL  Auto Eosinophil # : 0.00 K/uL  Auto Basophil # : 0.00 K/uL  Auto Neutrophil % : 78.3 %  Auto Lymphocyte % : 1.6 %  Auto Monocyte % : 1.7 %  Auto Eosinophil % : 0.0 %  Auto Basophil % : 0.0 %    10-29    152<H>  |  119<H>  |  48<H>  ----------------------------<  105<H>  3.3<L>   |  19<L>  |  0.80    Ca    7.6<L>      29 Oct 2021 07:28  Phos  3.4     10-29  Mg     1.8     10-29    TPro  4.6<L>  /  Alb  2.8<L>  /  TBili  0.9  /  DBili  x   /  AST  305<H>  /  ALT  196<H>  /  AlkPhos  208<H>  10    PT/INR - ( 28 Oct 2021 23:13 )   PT: 13.3 sec;   INR: 1.11 ratio         PTT - ( 28 Oct 2021 23:13 )  PTT:28.0 sec  Urinalysis Basic - ( 28 Oct 2021 10:03 )    Color: Orange / Appearance: Turbid / S.023 / pH: x  Gluc: x / Ketone: Trace  / Bili: Negative / Urobili: 2 mg/dL   Blood: x / Protein: 300 mg/dL / Nitrite: Negative   Leuk Esterase: Small / RBC: 1177 /hpf /  /HPF   Sq Epi: x / Non Sq Epi: 13 / Bacteria: Negative        RADIOLOGY & ADDITIONAL STUDIES:  < from: CT Head No Cont (10.29.21 @ 15:01) >  COMPARISON: Most recent prior CT examination of the head dated 10/29/2021 at 12:30 PM.    FINDINGS: There has been interval placement of an additional new left-sided SEPS device in addition to the previously seen higher left-sided parietal SEPS device when compared with the most recent prior CT study from 10/29/2021 at 12:30 PM.    A subjacent left-sided holohemispheric mixed attenuation subdural collection withareas of acute hemorrhage are again seen intermixed with foci of extra-axial gas. The overall size of the collection appears grossly unchanged with marked mass effect upon the left cerebral hemisphere. There is unchanged shift of the midline structures from left to right measuring approximately 2.1 cm. Left-sided uncal herniation appears grossly unchanged. Left to rightward subfalcine herniation also appears unchanged.    Small right-sided low-attenuation subdural collections along the right frontal convexity and right lateral temporal convexity appear unchanged.    No new areas of acute intracranial hemorrhage are seen.    Ventricular size and configuration remains unchanged with dilatation of the right temporal horn.    The paranasal sinuses and mastoid air cells remain clear. Again seen is that the patient status post metallic plating and fixation of the right orbital rim and right lateral orbital wall.    IMPRESSION: Interval placement of a new left-sided SEPS device in addition to the previously seen left sided SEPS device.    Otherwise, no other interval changes when compared to the most recent prior CT study.    < end of copied text >   24 Hour Events/Subjective:  - s/p SEPS x2, CT head in AM worsening subdural post SEPS, no further operative procedures offered   -started on amiodarone for PAF       MEDICATIONS  (STANDING):  aMIOdarone    Tablet 200 milliGRAM(s) Oral daily  cefepime   IVPB      cefepime   IVPB 1000 milliGRAM(s) IV Intermittent every 12 hours  chlorhexidine 4% Liquid 1 Application(s) Topical every 24 hours  folic acid 1 milliGRAM(s) Enteral Tube daily  lacosamide IVPB 100 milliGRAM(s) IV Intermittent every 12 hours  metroNIDAZOLE  IVPB      metroNIDAZOLE  IVPB 500 milliGRAM(s) IV Intermittent every 8 hours  multiple electrolytes Injection Type 1 1000 milliLiter(s) (75 mL/Hr) IV Continuous <Continuous>  thiamine 100 milliGRAM(s) Enteral Tube daily    MEDICATIONS  (PRN):  bisacodyl 5 milliGRAM(s) Oral daily PRN Constipation      Drug Dosing Weight  Height (cm): 147.3 (28 Oct 2021 08:47)  Weight (kg): 49.9 (28 Oct 2021 08:47)  BMI (kg/m2): 23 (28 Oct 2021 08:47)  BSA (m2): 1.41 (28 Oct 2021 08:47)    PAST MEDICAL & SURGICAL HISTORY:  MDS (myelodysplastic syndrome)    Dementia    S/P ORIF (open reduction internal fixation) fracture  elbow fracture 2021        REVIEW OF SYSTEMS: [ x] Unable to Assess due to neurologic exam   [ ] All ROS addressed below are non-contributory, except:  Neuro: [ ] Headache [ ] Back pain [ ] Numbness [ ] Weakness [ ] Ataxia [ ] Dizziness [ ] Aphasia [ ] Dysarthria [ ] Visual disturbance  Resp: [ ] Shortness of breath/dyspnea, [ ] Orthopnea [ ] Cough  CV: [ ] Chest pain [ ] Palpitation [ ] Lightheadedness [ ] Syncope  Renal: [ ] Thirst [ ] Edema  GI: [ ] Nausea [ ] Emesis [ ] Abdominal pain [ ] Constipation [ ] Diarrhea  Hem: [ ] Hematemesis [ ] bright red blood per rectum  ID: [ ] Fever [ ] Chills [ ] Dysuria  ENT: [ ] Rhinorrhea    PHYSICAL EXAM:    General: No Acute Distress, frail   Neurological: eyes open to nox, not following commands, non verbal, pupils 2mm reactive, left side spontaneous, right upper localizes, b/l LE tremulous 0/5 LE   Pulmonary: Clear to Auscultation, No Rales, No Rhonchi, No Wheezes   Cardiovascular: S1, S2, irregularly irregular   Gastrointestinal: Soft, Nontender, Nondistended   Extremities: No calf tenderness   Incision: right cranial seps drain in place   SKIN: evolving deep  tissue injury to the sacrum and b/l buttocks. there are 3 areas of breakdown that together measure 4cmx 10cmx 0.3cm. there is intact skin noted in between these areas. On the sacrum, soft black eschar was noted with surrounding blanchable erythema. on the b/l  buttocks the skin was intact with deep maroon discoloration of the skin.      ICU Vital Signs Last 24 Hrs  T(C): 36.2 (29 Oct 2021 15:00), Max: 36.4 (28 Oct 2021 23:00)  T(F): 97.2 (29 Oct 2021 15:00), Max: 97.6 (28 Oct 2021 23:00)  HR: 76 (29 Oct 2021 20:00) (76 - 123)  BP: 103/62 (29 Oct 2021 20:00) (80/45 - 145/121)  BP(mean): 76 (29 Oct 2021 20:00) (56 - 131)  RR: 13 (29 Oct 2021 20:00) (12 - 25)  SpO2: 98% (29 Oct 2021 20:00) (94% - 100%)    ABG - ( 28 Oct 2021 03:50 )  pH, Arterial: 7.42  pH, Blood: x     /  pCO2: 39    /  pO2: 359   / HCO3: 25    / Base Excess: 0.8   /  SaO2: 99.7              I&O's Detail    28 Oct 2021 07:01  -  29 Oct 2021 07:00  --------------------------------------------------------  IN:    IV PiggyBack: 1050 mL    IV PiggyBack: 312.5 mL    Lactated Ringers: 1275 mL    Lactated Ringers Bolus: 1000 mL    multiple electrolytes Injection Type 1 Bolus: 250 mL    multiple electrolytes Injection Type 1.: 300 mL    Norepinephrine: 21.2 mL    sodium chloride 0.9%: 150 mL  Total IN: 4358.7 mL    OUT:    Indwelling Catheter - Urethral (mL): 510 mL  Total OUT: 510 mL    Total NET: 3848.7 mL      29 Oct 2021 07:01  -  29 Oct 2021 21:00  --------------------------------------------------------  IN:    IV PiggyBack: 250 mL    multiple electrolytes Injection Type 1.: 975 mL    Sodium Chloride 0.9% Bolus: 250 mL  Total IN: 1475 mL    OUT:    Drain (mL): 5 mL    Drain (mL): 15 mL    Indwelling Catheter - Urethral (mL): 875 mL    Rectal Tube (mL): 820 mL  Total OUT: 1715 mL    Total NET: -240 mL              LABS:  CBC Full  -  ( 28 Oct 2021 22:47 )  WBC Count : 40.41 K/uL  RBC Count : 2.43 M/uL  Hemoglobin : 7.6 g/dL  Hematocrit : 26.7 %  Platelet Count - Automated : 374 K/uL  Mean Cell Volume : 109.9 fl  Mean Cell Hemoglobin : 31.3 pg  Mean Cell Hemoglobin Concentration : 28.5 gm/dL  Auto Neutrophil # : 32.65 K/uL  Auto Lymphocyte # : 0.65 K/uL  Auto Monocyte # : 0.69 K/uL  Auto Eosinophil # : 0.00 K/uL  Auto Basophil # : 0.00 K/uL  Auto Neutrophil % : 78.3 %  Auto Lymphocyte % : 1.6 %  Auto Monocyte % : 1.7 %  Auto Eosinophil % : 0.0 %  Auto Basophil % : 0.0 %    10-29    152<H>  |  119<H>  |  48<H>  ----------------------------<  105<H>  3.3<L>   |  19<L>  |  0.80    Ca    7.6<L>      29 Oct 2021 07:28  Phos  3.4     10-29  Mg     1.8     10-29    TPro  4.6<L>  /  Alb  2.8<L>  /  TBili  0.9  /  DBili  x   /  AST  305<H>  /  ALT  196<H>  /  AlkPhos  208<H>  10-29    PT/INR - ( 28 Oct 2021 23:13 )   PT: 13.3 sec;   INR: 1.11 ratio         PTT - ( 28 Oct 2021 23:13 )  PTT:28.0 sec  Urinalysis Basic - ( 28 Oct 2021 10:03 )    Color: Orange / Appearance: Turbid / S.023 / pH: x  Gluc: x / Ketone: Trace  / Bili: Negative / Urobili: 2 mg/dL   Blood: x / Protein: 300 mg/dL / Nitrite: Negative   Leuk Esterase: Small / RBC: 1177 /hpf /  /HPF   Sq Epi: x / Non Sq Epi: 13 / Bacteria: Negative        RADIOLOGY & ADDITIONAL STUDIES:  < from: CT Head No Cont (10.29.21 @ 15:01) >  COMPARISON: Most recent prior CT examination of the head dated 10/29/2021 at 12:30 PM.    FINDINGS: There has been interval placement of an additional new left-sided SEPS device in addition to the previously seen higher left-sided parietal SEPS device when compared with the most recent prior CT study from 10/29/2021 at 12:30 PM.    A subjacent left-sided holohemispheric mixed attenuation subdural collection withareas of acute hemorrhage are again seen intermixed with foci of extra-axial gas. The overall size of the collection appears grossly unchanged with marked mass effect upon the left cerebral hemisphere. There is unchanged shift of the midline structures from left to right measuring approximately 2.1 cm. Left-sided uncal herniation appears grossly unchanged. Left to rightward subfalcine herniation also appears unchanged.    Small right-sided low-attenuation subdural collections along the right frontal convexity and right lateral temporal convexity appear unchanged.    No new areas of acute intracranial hemorrhage are seen.    Ventricular size and configuration remains unchanged with dilatation of the right temporal horn.    The paranasal sinuses and mastoid air cells remain clear. Again seen is that the patient status post metallic plating and fixation of the right orbital rim and right lateral orbital wall.    IMPRESSION: Interval placement of a new left-sided SEPS device in addition to the previously seen left sided SEPS device.    Otherwise, no other interval changes when compared to the most recent prior CT study.    < end of copied text >   24 Hour Events/Subjective:  - s/p SEPS x2, CT head in AM worsening subdural post SEPS, no further operative procedures offered   -started on amiodarone for PAF       MEDICATIONS  (STANDING):  aMIOdarone    Tablet 200 milliGRAM(s) Oral daily  cefepime   IVPB      cefepime   IVPB 1000 milliGRAM(s) IV Intermittent every 12 hours  chlorhexidine 4% Liquid 1 Application(s) Topical every 24 hours  folic acid 1 milliGRAM(s) Enteral Tube daily  lacosamide IVPB 100 milliGRAM(s) IV Intermittent every 12 hours  metroNIDAZOLE  IVPB      metroNIDAZOLE  IVPB 500 milliGRAM(s) IV Intermittent every 8 hours  multiple electrolytes Injection Type 1 1000 milliLiter(s) (75 mL/Hr) IV Continuous <Continuous>  thiamine 100 milliGRAM(s) Enteral Tube daily    MEDICATIONS  (PRN):  bisacodyl 5 milliGRAM(s) Oral daily PRN Constipation      Drug Dosing Weight  Height (cm): 147.3 (28 Oct 2021 08:47)  Weight (kg): 49.9 (28 Oct 2021 08:47)  BMI (kg/m2): 23 (28 Oct 2021 08:47)  BSA (m2): 1.41 (28 Oct 2021 08:47)    PAST MEDICAL & SURGICAL HISTORY:  MDS (myelodysplastic syndrome)    Dementia    S/P ORIF (open reduction internal fixation) fracture  elbow fracture 2021        REVIEW OF SYSTEMS: [ x] Unable to Assess due to neurologic exam   [ ] All ROS addressed below are non-contributory, except:  Neuro: [ ] Headache [ ] Back pain [ ] Numbness [ ] Weakness [ ] Ataxia [ ] Dizziness [ ] Aphasia [ ] Dysarthria [ ] Visual disturbance  Resp: [ ] Shortness of breath/dyspnea, [ ] Orthopnea [ ] Cough  CV: [ ] Chest pain [ ] Palpitation [ ] Lightheadedness [ ] Syncope  Renal: [ ] Thirst [ ] Edema  GI: [ ] Nausea [ ] Emesis [ ] Abdominal pain [ ] Constipation [ ] Diarrhea  Hem: [ ] Hematemesis [ ] bright red blood per rectum  ID: [ ] Fever [ ] Chills [ ] Dysuria  ENT: [ ] Rhinorrhea    PHYSICAL EXAM:    General: frail   Neurological: eyes open to nox, not following commands, non verbal, pupils 2mm reactive, left side spontaneous, right upper localizes, b/l LE tremulous 0/5 LE   Pulmonary: Clear to Auscultation, No Rales, No Rhonchi, No Wheezes   Cardiovascular: S1, S2, irregularly irregular   Gastrointestinal: Soft, Nontender, Nondistended   Extremities: No calf tenderness   Incision: right cranial seps drain in place   SKIN: evolving deep  tissue injury to the sacrum and b/l buttocks. there are 3 areas of breakdown that together measure 4cmx 10cmx 0.3cm. there is intact skin noted in between these areas. On the sacrum, soft black eschar was noted with surrounding blanchable erythema. on the b/l  buttocks the skin was intact with deep maroon discoloration of the skin.      ICU Vital Signs Last 24 Hrs  T(C): 36.2 (29 Oct 2021 15:00), Max: 36.4 (28 Oct 2021 23:00)  T(F): 97.2 (29 Oct 2021 15:00), Max: 97.6 (28 Oct 2021 23:00)  HR: 76 (29 Oct 2021 20:00) (76 - 123)  BP: 103/62 (29 Oct 2021 20:00) (80/45 - 145/121)  BP(mean): 76 (29 Oct 2021 20:00) (56 - 131)  RR: 13 (29 Oct 2021 20:00) (12 - 25)  SpO2: 98% (29 Oct 2021 20:00) (94% - 100%)    ABG - ( 28 Oct 2021 03:50 )  pH, Arterial: 7.42  pH, Blood: x     /  pCO2: 39    /  pO2: 359   / HCO3: 25    / Base Excess: 0.8   /  SaO2: 99.7              I&O's Detail    28 Oct 2021 07:01  -  29 Oct 2021 07:00  --------------------------------------------------------  IN:    IV PiggyBack: 1050 mL    IV PiggyBack: 312.5 mL    Lactated Ringers: 1275 mL    Lactated Ringers Bolus: 1000 mL    multiple electrolytes Injection Type 1 Bolus: 250 mL    multiple electrolytes Injection Type 1.: 300 mL    Norepinephrine: 21.2 mL    sodium chloride 0.9%: 150 mL  Total IN: 4358.7 mL    OUT:    Indwelling Catheter - Urethral (mL): 510 mL  Total OUT: 510 mL    Total NET: 3848.7 mL      29 Oct 2021 07:01  -  29 Oct 2021 21:00  --------------------------------------------------------  IN:    IV PiggyBack: 250 mL    multiple electrolytes Injection Type 1.: 975 mL    Sodium Chloride 0.9% Bolus: 250 mL  Total IN: 1475 mL    OUT:    Drain (mL): 5 mL    Drain (mL): 15 mL    Indwelling Catheter - Urethral (mL): 875 mL    Rectal Tube (mL): 820 mL  Total OUT: 1715 mL    Total NET: -240 mL              LABS:  CBC Full  -  ( 28 Oct 2021 22:47 )  WBC Count : 40.41 K/uL  RBC Count : 2.43 M/uL  Hemoglobin : 7.6 g/dL  Hematocrit : 26.7 %  Platelet Count - Automated : 374 K/uL  Mean Cell Volume : 109.9 fl  Mean Cell Hemoglobin : 31.3 pg  Mean Cell Hemoglobin Concentration : 28.5 gm/dL  Auto Neutrophil # : 32.65 K/uL  Auto Lymphocyte # : 0.65 K/uL  Auto Monocyte # : 0.69 K/uL  Auto Eosinophil # : 0.00 K/uL  Auto Basophil # : 0.00 K/uL  Auto Neutrophil % : 78.3 %  Auto Lymphocyte % : 1.6 %  Auto Monocyte % : 1.7 %  Auto Eosinophil % : 0.0 %  Auto Basophil % : 0.0 %    10-29    152<H>  |  119<H>  |  48<H>  ----------------------------<  105<H>  3.3<L>   |  19<L>  |  0.80    Ca    7.6<L>      29 Oct 2021 07:28  Phos  3.4     10-29  Mg     1.8     10-29    TPro  4.6<L>  /  Alb  2.8<L>  /  TBili  0.9  /  DBili  x   /  AST  305<H>  /  ALT  196<H>  /  AlkPhos  208<H>  10-29    PT/INR - ( 28 Oct 2021 23:13 )   PT: 13.3 sec;   INR: 1.11 ratio         PTT - ( 28 Oct 2021 23:13 )  PTT:28.0 sec  Urinalysis Basic - ( 28 Oct 2021 10:03 )    Color: Orange / Appearance: Turbid / S.023 / pH: x  Gluc: x / Ketone: Trace  / Bili: Negative / Urobili: 2 mg/dL   Blood: x / Protein: 300 mg/dL / Nitrite: Negative   Leuk Esterase: Small / RBC: 1177 /hpf /  /HPF   Sq Epi: x / Non Sq Epi: 13 / Bacteria: Negative        RADIOLOGY & ADDITIONAL STUDIES:  < from: CT Head No Cont (10.29.21 @ 15:01) >  COMPARISON: Most recent prior CT examination of the head dated 10/29/2021 at 12:30 PM.    FINDINGS: There has been interval placement of an additional new left-sided SEPS device in addition to the previously seen higher left-sided parietal SEPS device when compared with the most recent prior CT study from 10/29/2021 at 12:30 PM.    A subjacent left-sided holohemispheric mixed attenuation subdural collection withareas of acute hemorrhage are again seen intermixed with foci of extra-axial gas. The overall size of the collection appears grossly unchanged with marked mass effect upon the left cerebral hemisphere. There is unchanged shift of the midline structures from left to right measuring approximately 2.1 cm. Left-sided uncal herniation appears grossly unchanged. Left to rightward subfalcine herniation also appears unchanged.    Small right-sided low-attenuation subdural collections along the right frontal convexity and right lateral temporal convexity appear unchanged.    No new areas of acute intracranial hemorrhage are seen.    Ventricular size and configuration remains unchanged with dilatation of the right temporal horn.    The paranasal sinuses and mastoid air cells remain clear. Again seen is that the patient status post metallic plating and fixation of the right orbital rim and right lateral orbital wall.    IMPRESSION: Interval placement of a new left-sided SEPS device in addition to the previously seen left sided SEPS device.    Otherwise, no other interval changes when compared to the most recent prior CT study.    < end of copied text >

## 2021-10-29 NOTE — PROGRESS NOTE ADULT - ASSESSMENT
ASSESSMENT/PLAN:    89F hx of MDS w/ recent hospitalization (9/28/21) for fall, found to have tSDH w/ MLS and femoral fx s/p CRPP, now transferred from  for [facility] w/ R AoC SDH w/ MLS and subfalcine herniation, non-surgical candidate d/t poor functional status.    NEURO:  s/p Kcentra, vit. K in NSICU  - neuro checks q1h  - vimpat 100 for sz ppx  - CTH, SEPS today  - delirium precautions  - poor prognosis, per family, would like to pursue aggressive care    CVS:  type II NSTEMI  ?new onset Afib rate controlled  - SBP goal 100-160  - titrate uop>0.5cc/kg/hr   -cardiology following, not candidate for [facility]    PULM:  PIOTR opacity  - 4L NC, wean as tolerated  - IS as tolerated  - maintain sats>92    RENAL:  - Fluids: 75cc/hr LR  - daily IOs  -  for padron placement    GI:  Elevated INR/LFTs c/f nursing home vs. shock liver   Patient with CTAP on 10/20 with pancolitis, large stool burden vs. obstruction & stercol colitis, though was treated with enemas with improvement; also showed small bowel compression at the time  s/p kcentra 10/28  - Diet: NPO  - Bowel regimen  - GI following for ?ogilive syndrome, now with rectal tube for decompression  - Tylenol level, hepatitis panel, utox  - trend coags, LFTs q6h  - consider RUQ US    ENDO:   - FS goal 120-180    HEME/ONC:  s/p Kcentra, vit K in NSICU  s/p 2uprbc at OSH  - SCDs  - Chemoppx: hold given heme  - Onc for MDS  - cbc q8h  - transfuse to Hgb goal>8  - pending DIC labs      ID:  elevated leuks, with hx of MDS, no focal infectious source though imaging suggestive of sterco colitis   - monitor for fevers  - c/w cefepime, flagyl (10/28 - )  - consider flagyl vs. zosyn though prefer to avoid zosyn given renal fxn  - trough  - f/u panculture  - ID consult      Patient is at high risk of neurologic deterioration/death due to: poor prognosis, heme expansion, herniation   ASSESSMENT/PLAN:    89F hx of MDS w/ recent hospitalization (9/28/21) for fall, found to have tSDH w/ MLS and femoral fx s/p CRPP, now transferred from  for [facility] w/ R AoC SDH w/ MLS and subfalcine herniation, non-surgical candidate d/t poor functional status.    NEURO:  s/p Kcentra, vit. K in NSICU  s/p SEPS (10/29)  - neuro checks q1h  - vimpat 100 for sz ppx  - CTH today  - SEPS today  - delirium precautions  - poor prognosis, per family, would like to pursue aggressive care    CVS:  type II NSTEMI  ?new onset pAfib rate controlled  - SBP goal 100-160  - titrate uop>0.5cc/kg/hr  - cardiology following, not candidate for [facility]; no need to trend trops  - consider amiodarone 200mg qd PO when tolerated for pAF    PULM:  PIOTR opacity  - RA  - IS as tolerated  - maintain sats>92    RENAL:  - Fluids: 75cc/hr Plasmalyte  - daily IOs  - apprecaite nephro    GI:  Elevated INR/LFTs c/f MESSI vs. shock liver   Patient with CTAP on 10/20 with pancolitis, large stool burden vs. obstruction & stercol colitis, though was treated with enemas with improvement; also showed small bowel compression at the time  s/p kcentra 10/28  - Diet: NPO  - Bowel regimen  - GI following for ?ogilive syndrome, now with rectal tube for decompression  - Tylenol level, hepatitis panel, utox  - trend coags, LFTs qd  - consider RUQ US    ENDO:   - FS goal 120-180    HEME/ONC:  s/p Kcentra, vit K in NSICU  s/p 2uprbc at OSH  - SCDs  - Chemoppx: hold given heme  - Onc for MDS  - cbc q12  - transfuse to Hgb goal>8  - pending DIC labs    ID:  elevated leuks, with hx of MDS, no focal infectious source though imaging suggestive of sterco colitis   Cdiff negative (10/29), ordered to r/o toxic megacolon  - monitor for fevers  - c/w cefepime, flagyl (10/28 - ) for pancolitis  - f/u panculture  - ID consult      Patient is at high risk of neurologic deterioration/death due to: poor prognosis, heme expansion, herniation

## 2021-10-29 NOTE — CONSULT NOTE ADULT - ASSESSMENT
89 year old female PMH MDS, dementia with recent hospitalization s/p fall, found to have tSDH with MLS and L subcapital femoral nexk fx s/p CRPP (9/28/21) with course c/b acute blood loss anemia 2/2 hematoma/ileus, now presented from  for [facility] w/ AoC SDH and R 1.4cm MLS , also with c/f sepsis. On presentation WBC of 26.01 with 61% PMN.     RVP (10/28) negative.   U/A (10/28) with 3-5 WBC.   BCx (10/28) with no growth.   C diff toxin assay (10/29) negative.    CT Chest (10/28) with no consolidations.   CT A/P (10/28) with stercoral colitis and pancolitis.   CT Brain (10/29) with Left-sided acute on chronic subdural hematoma is again seen as well as a right frontal subdural collection.     s/p 2x SEPS placement via left skull    Given sepsis/hypotensive presentation reasonable to cover for the pancolitis finding, unclear if truly infectious however,  Patient with baseline high-level leukocytosis, so unlikely to be reliable marker or underlying infectious process  Doubt need for Vancomycin, MRSA Swab also negative  Can continue Cefepime / Metronidazole     Overall, Pancolitis, Leukocytosis, Hypotension    --Recommend GI PCR  --Can decrease Metronidazole to 500 mg IV Q12H  --Continue Cefepime 1g IV Q8H  --Continue to follow CBC with diff  --Continue to follow renal function (Cr/BUN)  --Continue to follow transaminases  --Continue to follow temperature curve  --Follow up on preliminary blood cultures    I will continue to follow. Please feel free to contact me with any further questions.    Clinton Bobo M.D.  [facility] Division of Infectious Disease  8AM-5PM: Pager Number [phone]  After Hours (or if no response): Please contact the Infectious Diseases Office at [phone]     The above assessment and plan were discussed with NSCU Team

## 2021-10-29 NOTE — PROGRESS NOTE ADULT - SUBJECTIVE AND OBJECTIVE BOX
NSCU Progress Note    Interval/Summary:  89F w/ hx of MDS, dementia with recent hospitalization s/p fall, found to have tSDH with MLS and L subcapital femoral nexk fx s/p CRPP (9/28/21) with course c/b acute blood loss anemia 2/2 hematoma/ileus, now presented from  for [facility] w/ AoC SDH and R 1.4cm MLS , also with c/f sepsis.    10/28: Admitted, started empirically on abx, minimal pressor requirements for septic shock, improved; oliguria 2/2 ATN, colonic obstruction. s/p 2uprbc prior to presentation, s/p 5L IVF with minimal UOP    24 Hour Events/Subjective:  - H/H slowly downtrending  - perssitent leukocytosis 2/2 MDS vs. infectious etiology  - d2 cefepime/flagyl  - rectal tube placed for colonic decompression  - downtrending LFTs        REVIEW OF SYSTEMS:  - negative except as above    VITALS:   - Reviewed    IMAGING/DATA:   - Reviewed     ALLERGIES:   - No Known Allergies        PHYSICAL EXAM:    General: NAD  CVS: RR  Pulm: CTAB, no wheezing  GI: Soft, distended, nt  Extremities: No LE Edema  Neuro: AOx1 (baseline), FC (thumbs up wiggles toes), no facial, PENA AG/WD to pain   NSCU Progress Note    Interval/Summary:  89F w/ hx of MDS, dementia with recent hospitalization s/p fall, found to have tSDH with MLS and L subcapital femoral nexk fx s/p CRPP (9/28/21) with course c/b acute blood loss anemia 2/2 hematoma/ileus, now presented from  for [facility] w/ AoC SDH and R 1.4cm MLS , also with c/f sepsis.    10/28: Admitted, started empirically on abx, minimal pressor requirements for septic shock, improved; oliguria 2/2 ATN, colonic obstruction. s/p 2uprbc prior to presentation, s/p 5L IVF with minimal UOP    24 Hour Events/Subjective:  - H/H slowly downtrending  - persistent leukocytosis 2/2 MDS vs. infectious etiology; no pressor requirements overnight  - d2 cefepime/flagyl for colitis  - rectal tube placed for colonic decompression  - downtrending LFTs likely 2/2 shock liver  - s/p SEPS      REVIEW OF SYSTEMS:  - negative except as above    VITALS:   - Reviewed    IMAGING/DATA:   - Reviewed     ALLERGIES:   - No Known Allergies        PHYSICAL EXAM:    General: NAD  CVS: RR  Pulm: CTAB, no wheezing  GI: Soft, distended, nt, negative murphys  Extremities: No LE Edema  Neuro: AOx1 (baseline), FC (thumbs up wiggles toes), no facial, PENA AG/WD to pain

## 2021-10-29 NOTE — PROGRESS NOTE ADULT - PROBLEM SELECTOR PLAN 1
Likely type 2 demand mediated ischemia due to sepsis/vasodilation and anemia/hypovolemia  Hyperdynamic LV on TTE   monitor on tele   would not trend trops as it will not    Add amiodarone 200 daily when has PO access for PAF

## 2021-10-30 NOTE — PROGRESS NOTE ADULT - PROBLEM SELECTOR PLAN 1
Likely type 2 demand mediated ischemia due to sepsis/vasodilation and anemia/hypovolemia  Hyperdynamic LV on TTE   monitor on tele   would not trend trops as it will not    amiodarone 200 daily

## 2021-10-30 NOTE — PROGRESS NOTE ADULT - SUBJECTIVE AND OBJECTIVE BOX
Chief Complaint:  Patient is a 89y old  Female who presents with a chief complaint of SDH (29 Oct 2021 14:20)          Interval Events: no acute events    Hospital Medications:  aMIOdarone    Tablet 200 milliGRAM(s) Oral daily  bisacodyl 5 milliGRAM(s) Oral daily PRN  cefepime   IVPB      cefepime   IVPB 1000 milliGRAM(s) IV Intermittent every 12 hours  chlorhexidine 4% Liquid 1 Application(s) Topical every 24 hours  folic acid 1 milliGRAM(s) Enteral Tube daily  lacosamide IVPB 100 milliGRAM(s) IV Intermittent every 12 hours  metroNIDAZOLE  IVPB      metroNIDAZOLE  IVPB 500 milliGRAM(s) IV Intermittent every 8 hours  multiple electrolytes Injection Type 1 1000 milliLiter(s) IV Continuous <Continuous>  potassium chloride   Solution 40 milliEquivalent(s) Oral every 4 hours  thiamine 100 milliGRAM(s) Enteral Tube daily        Review of Systems:  General:  No wt loss, fevers, chills, night sweats, fatigue,   Eyes:  Good vision, no reported pain  ENT:  No sore throat, pain, runny nose, dysphagia  CV:  No pain, palpitations, hypo/hypertension  Resp:  No dyspnea, cough, tachypnea, wheezing  GI:  See HPI  :  No pain, bleeding, incontinence, nocturia  Muscle:  No pain, weakness  Neuro:  No weakness, tingling, memory problems  Psych:  No fatigue, insomnia, mood problems, depression  Endocrine:  No polyuria, polydipsia, cold/heat intolerance  Heme:  No petechiae, ecchymosis, easy bruisability  Integumentary:  No rash, edema    PHYSICAL EXAM:   Vital Signs:  Vital Signs Last 24 Hrs  T(C): 36.4 (29 Oct 2021 03:00), Max: 36.6 (28 Oct 2021 19:00)  T(F): 97.5 (29 Oct 2021 03:00), Max: 97.8 (28 Oct 2021 19:00)  HR: 90 (29 Oct 2021 16:00) (76 - 123)  BP: 136/68 (29 Oct 2021 16:00) (80/45 - 145/121)  BP(mean): 88 (29 Oct 2021 16:00) (54 - 131)  RR: 16 (29 Oct 2021 16:00) (13 - 25)  SpO2: 98% (29 Oct 2021 16:00) (92% - 100%)  Daily     Daily       PHYSICAL EXAM:     GENERAL:  Appears stated age, well-groomed, well-nourished, no distress  HEENT:  NC/AT,  conjunctivae anicteric, clear and pink,   NECK: supple, trachea midline  CHEST:  Full & symmetric excursion, no increased effort, breath sounds clear  HEART:  Regular rhythm, no JVD  ABDOMEN:  Soft, non-tender, non-distended, normoactive bowel sounds,  no masses , no hepatosplenomegaly  EXTREMITIES:  no cyanosis,clubbing or edema  SKIN:  No rash, erythema, or, ecchymoses, no jaundice  NEURO:  alert, confused    RECTAL: Deferred      LABS Personally reviewed by me:                        7.6    40.41 )-----------( 374      ( 28 Oct 2021 22:47 )             26.7     Mean Cell Volume: 109.9 fl (10-28-21 @ 22:47)    10-29    152<H>  |  119<H>  |  48<H>  ----------------------------<  105<H>  3.3<L>   |  19<L>  |  0.80    Ca    7.6<L>      29 Oct 2021 07:28  Phos  3.4     10-29  Mg     1.8     10-29    TPro  4.6<L>  /  Alb  2.8<L>  /  TBili  0.9  /  DBili  x   /  AST  305<H>  /  ALT  196<H>  /  AlkPhos  208<H>  10-29    LIVER FUNCTIONS - ( 29 Oct 2021 07:28 )  Alb: 2.8 g/dL / Pro: 4.6 g/dL / ALK PHOS: 208 U/L / ALT: 196 U/L / AST: 305 U/L / GGT: x           PT/INR - ( 28 Oct 2021 23:13 )   PT: 13.3 sec;   INR: 1.11 ratio         PTT - ( 28 Oct 2021 23:13 )  PTT:28.0 sec  Urinalysis Basic - ( 28 Oct 2021 10:03 )    Color: Orange / Appearance: Turbid / S.023 / pH: x  Gluc: x / Ketone: Trace  / Bili: Negative / Urobili: 2 mg/dL   Blood: x / Protein: 300 mg/dL / Nitrite: Negative   Leuk Esterase: Small / RBC: 1177 /hpf /  /HPF   Sq Epi: x / Non Sq Epi: 13 / Bacteria: Negative                              7.6    40.41 )-----------( 374      ( 28 Oct 2021 22:47 )             26.7                         8.1    39.34 )-----------( 445      ( 28 Oct 2021 10:03 )             27.9                         6.8    30.92 )-----------( 320      ( 28 Oct 2021 07:01 )             22.6                         6.3    26.01 )-----------( 349      ( 28 Oct 2021 01:11 )             21.8       Imaging personally reviewed by me:

## 2021-10-30 NOTE — CHART NOTE - NSCHARTNOTEFT_GEN_A_CORE
[facility] COMPREHENSIVE EPILEPSY CENTER    ** PRELIMINARY EEG reviewed until  15:00    - Severe voltage attenuation over left hemisphere likely related to overlying fluid collection.  - Generalized sharp waves, better formed over R hemisphere.  - Continuous polymorphic delta slowing over L > R hemispheres  - No seizures seen so far.    Final report to be completed at the completion of the study tomorrow morning.    -----------------------------  Daniel Garza MD, ANKUR  Epilepsy Fellow  [facility]  --------------------------------  EEG Reading Room: [phone]  (weekdays)  On Call Service After Hours: [phone]

## 2021-10-30 NOTE — PROGRESS NOTE ADULT - ASSESSMENT
ASSESSMENT/PLAN:    89F hx of MDS w/ recent hospitalization (9/28/21) for fall, found to have tSDH w/ MLS and femoral fx s/p CRPP, now transferred from  for [facility] w/ R AoC SDH w/ MLS and subfalcine herniation, non-surgical candidate d/t poor functional status.    NEURO:  s/p Kcentra, vit. K in NSICU  s/p SEPS (10/29)  - neuro checks q4h  - vimpat 200 for sz ppx  c/w vEEG showing:  - Severe voltage attenuation over left hemisphere likely related to overlying fluid collection. Generalized sharp waves, better formed over R hemisphere. Continuous polymorphic delta slowing over L > R hemispheres    - delirium precautions  - poor prognosis, per family, would like to pursue aggressive care; though at risk of rapid decompensation, given acute change in clinical exam will discuss GOC with family    CT head in AM w/o significant change in hematoma collection or MLS    CVS:  type II NSTEMI  ?new onset pAfib rate controlled  - MAP>65  - cardiology following, not candidate for LHC; no need to trend trops  - amiodarone 200mg qd PO     PULM:  PIOTR opacity  - RA  - IS as tolerated  - maintain sats>92    RENAL:  - Fluids: 75cc/hr Plasmalyte  - daily IOs  - apprecaite nephro    GI:  Elevated INR/LFTs c/f MESSI vs. shock liver   Patient with CTAP on 10/20 with pancolitis, large stool burden vs. obstruction & stercol colitis, though was treated with enemas with improvement; also showed small bowel compression at the time  s/p kcentra 10/28  - Diet: NPO  - Bowel regimen  - GI following for ?ogilive syndrome, now with rectal tube for decompression  - Tylenol level, hepatitis panel, utox  - trend coags, LFTs qd    ENDO:   - FS goal 120-180    HEME/ONC:  s/p Kcentra, vit K in NSICU  s/p 2uprbc at OSH  s/p 2uprbc 10/29 overnight  - SCDs  - Chemoppx: hold given heme  - Onc for MDS  - cbc q12  - transfuse to Hgb goal>8  - hemolytic labs negative     ID:  elevated leuks, with hx of MDS, no focal infectious source though imaging suggestive of sterco colitis   Cdiff negative (10/29), ordered to r/o toxic megacolon  - monitor for fevers  - c/w cefepime, flagyl (10/28 - ) for pancolitis  10/29 blood culture growing coag neg staph repeat blood culture tomorrow for clearance surveillance   - ID following      Patient is at high risk of neurologic deterioration/death due to: poor prognosis, heme expansion, herniation   ASSESSMENT/PLAN:    89F hx of MDS w/ recent hospitalization (9/28/21) for fall, found to have tSDH w/ MLS and femoral fx s/p CRPP, now transferred from  for [facility] C[facility] w/ R AoC SDH w/ MLS and subfalcine herniation, non-surgical candidate d/t poor functional status.    NEURO:  s/p Kcentra, vit. K in NSICU  s/p SEPS (10/29)  - neuro checks q4h  - vimpat 200 for sz ppx  c/w vEEG showing:  - Severe voltage attenuation over left hemisphere likely related to overlying fluid collection. Generalized sharp waves, better formed over R hemisphere. Continuous polymorphic delta slowing over L > R hemispheres    - delirium precautions  - poor prognosis, per family, would like to pursue aggressive care; on-going GOC with family    CT head in AM w/o significant change in hematoma collection or MLS    CVS:  type II NSTEMI  ?new onset pAfib rate controlled  - MAP>65  - cardiology following, not candidate for C; no need to trend trops  - amiodarone 200mg qd PO     PULM:  PIOTR opacity  - RA  - IS as tolerated  - maintain sats>92    RENAL:  - Fluids: 75cc/hr Plasmalyte  - daily IOs  - apprecaite nephro    GI:  Elevated INR/LFTs c/f [facility] vs. shock liver   Patient with CTAP on 10/20 with pancolitis, large stool burden vs. obstruction & stercol colitis, though was treated with enemas with improvement; also showed small bowel compression at the time  s/p kcentra 10/28  - Diet: NPO  - Bowel regimen  - GI following for ?ogilive syndrome, now with rectal tube for decompression  - Tylenol level, hepatitis panel, utox  - trend coags, LFTs qd    ENDO:   - FS goal 120-180    HEME/ONC:  s/p Kcentra, vit K in NSICU  s/p 2uprbc at OSH  s/p 2uprbc 10/29 overnight  - SCDs  - Chemoppx: hold given heme  - Onc for MDS  - cbc q12  - transfuse to Hgb goal>8  - hemolytic labs negative     ID:  elevated leuks, with hx of MDS, no focal infectious source though imaging suggestive of sterco colitis   Cdiff negative (10/29), ordered to r/o toxic megacolon  - monitor for fevers  - c/w cefepime, flagyl (10/28 - ) for pancolitis  10/29 blood culture growing coag neg staph repeat blood culture tomorrow for clearance surveillance   - ID following      Patient is at high risk of neurologic deterioration/death due to: poor prognosis, heme expansion, herniation

## 2021-10-30 NOTE — PROGRESS NOTE ADULT - ASSESSMENT
ASSESSMENT/PLAN:    89F hx of MDS w/ recent hospitalization (9/28/21) for fall, found to have tSDH w/ MLS and femoral fx s/p CRPP, now transferred from  for [facility] C[facility] w/ R AoC SDH w/ MLS and subfalcine herniation, non-surgical candidate d/t poor functional status.    NEURO:  s/p Kcentra, vit. K in NSICU  s/p SEPS (10/29)  - neuro checks q1h  - vimpat 100 for sz ppx  - veeg  - delirium precautions  - poor prognosis, per family, would like to pursue aggressive care; though at risk of rapid decompensation, given acute changein clinical exam will discuss GOC with family    CVS:  type II NSTEMI  ?new onset pAfib rate controlled  - MAP>65  - cardiology following, not candidate for LHC; no need to trend trops  - consider amiodarone 200mg qd PO when tolerated for pAF    PULM:  PIOTR opacity  - RA  - IS as tolerated  - maintain sats>92    RENAL:  - Fluids: 75cc/hr Plasmalyte  - daily IOs  - apprecaite nephro    GI:  Elevated INR/LFTs c/f senior care vs. shock liver   Patient with CTAP on 10/20 with pancolitis, large stool burden vs. obstruction & stercol colitis, though was treated with enemas with improvement; also showed small bowel compression at the time  s/p kcentra 10/28  - Diet: NPO  - Bowel regimen  - GI following for ?ogilive syndrome, now with rectal tube for decompression  - Tylenol level, hepatitis panel, utox  - trend coags, LFTs qd    ENDO:   - FS goal 120-180    HEME/ONC:  s/p Kcentra, vit K in NSICU  s/p 2uprbc at OSH  s/p 2uprbc 10/29 overnight  - SCDs  - Chemoppx: hold given heme  - Onc for MDS  - cbc q12  - transfuse to Hgb goal>8  - hemolytic labs    ID:  elevated leuks, with hx of MDS, no focal infectious source though imaging suggestive of sterco colitis   Cdiff negative (10/29), ordered to r/o toxic megacolon  - monitor for fevers  - c/w cefepime, flagyl (10/28 - ) for pancolitis  - f/u panculture  - ID consult      Patient is at high risk of neurologic deterioration/death due to: poor prognosis, heme expansion, herniation   ASSESSMENT/PLAN:    89F hx of MDS w/ recent hospitalization (9/28/21) for fall, found to have tSDH w/ MLS and femoral fx s/p CRPP, now transferred from  for [facility] w/ R AoC SDH w/ MLS and subfalcine herniation, non-surgical candidate d/t poor functional status.    NEURO:  s/p Kcentra, vit. K in NSICU  s/p SEPS (10/29)  - neuro checks q1h  - vimpat 100 for sz ppx  - veeg  - delirium precautions  - poor prognosis, per family, would like to pursue aggressive care; though at risk of rapid decompensation, given acute change in clinical exam will discuss GOC with family    CVS:  type II NSTEMI  ?new onset pAfib rate controlled  - MAP>65  - cardiology following, not candidate for LHC; no need to trend trops  - consider amiodarone 200mg qd PO when tolerated for pAF    PULM:  PIOTR opacity  - RA  - IS as tolerated  - maintain sats>92    RENAL:  - Fluids: 75cc/hr Plasmalyte  - daily IOs  - apprecaite nephro    GI:  Elevated INR/LFTs c/f correction vs. shock liver   Patient with CTAP on 10/20 with pancolitis, large stool burden vs. obstruction & stercol colitis, though was treated with enemas with improvement; also showed small bowel compression at the time  s/p kcentra 10/28  - Diet: NPO  - Bowel regimen  - GI following for ?ogilive syndrome, now with rectal tube for decompression  - Tylenol level, hepatitis panel, utox  - trend coags, LFTs qd    ENDO:   - FS goal 120-180    HEME/ONC:  s/p Kcentra, vit K in NSICU  s/p 2uprbc at OSH  s/p 2uprbc 10/29 overnight  - SCDs  - Chemoppx: hold given heme  - Onc for MDS  - cbc q12  - transfuse to Hgb goal>8  - hemolytic labs    ID:  elevated leuks, with hx of MDS, no focal infectious source though imaging suggestive of sterco colitis   Cdiff negative (10/29), ordered to r/o toxic megacolon  - monitor for fevers  - c/w cefepime, flagyl (10/28 - ) for pancolitis  - f/u panculture  - ID consult      Patient is at high risk of neurologic deterioration/death due to: poor prognosis, heme expansion, herniation

## 2021-10-30 NOTE — PROGRESS NOTE ADULT - SUBJECTIVE AND OBJECTIVE BOX
Subjective: Patient seen and examined. No new events except as noted.   remains in NSCU   s/p Creation kendall hole  24 Hour Events/Subjective:  - d3 cefepime/flagyl  - s/p SEPSx2 overnight with increased heme, mass effect and worsening MLS, overnight with worsening exam  - downtrended h/h requiring 2uprbc which responded appropriately; no overt s/s bleed    REVIEW OF SYSTEMS:  Unable to obtain      MEDICATIONS:  MEDICATIONS  (STANDING):  aMIOdarone    Tablet 200 milliGRAM(s) Oral daily  cefepime   IVPB      cefepime   IVPB 1000 milliGRAM(s) IV Intermittent every 12 hours  chlorhexidine 4% Liquid 1 Application(s) Topical every 24 hours  folic acid 1 milliGRAM(s) Enteral Tube daily  lacosamide IVPB 200 milliGRAM(s) IV Intermittent every 12 hours  metroNIDAZOLE  IVPB      metroNIDAZOLE  IVPB 500 milliGRAM(s) IV Intermittent every 8 hours  multiple electrolytes Injection Type 1 1000 milliLiter(s) (75 mL/Hr) IV Continuous <Continuous>  pantoprazole  Injectable 40 milliGRAM(s) IV Push daily  thiamine 100 milliGRAM(s) Enteral Tube daily      PHYSICAL EXAM:  T(C): 37.2 (10-30-21 @ 15:00), Max: 37.2 (10-30-21 @ 15:00)  HR: 91 (10-30-21 @ 19:00) (84 - 114)  BP: 132/72 (10-30-21 @ 19:00) (103/61 - 149/87)  RR: 27 (10-30-21 @ 19:00) (18 - 34)  SpO2: 98% (10-30-21 @ 19:00) (97% - 100%)  Wt(kg): --  I&O's Summary    29 Oct 2021 07:01  -  30 Oct 2021 07:00  --------------------------------------------------------  IN: 4330 mL / OUT: 2030 mL / NET: 2300 mL    30 Oct 2021 07:01  -  30 Oct 2021 20:07  --------------------------------------------------------  IN: 1990 mL / OUT: 1916 mL / NET: 74 mL                Appearance: NAD	  HEENT:   dry oral mucosa, +NGT   Lymphatic: No lymphadenopathy  Cardiovascular: Normal S1 S2, No JVD, No murmurs, No edema  Respiratory: decreased bs   Psychiatry: A & O x 0  Gastrointestinal:  Soft, Non-tender, + BS	  Skin: No rashes, No ecchymoses, No cyanosis	  Ext: No edema  Neuro: AOx0, EO to nox, roving eye movements, pinpoint pupils, no FC, LUE WD, RUE nothing, BLE TF      LABS:    CARDIAC MARKERS:  CARDIAC MARKERS ( 28 Oct 2021 22:51 )  x     / x     / 327 U/L / x     / 26.9 ng/mL                                10.7   35.30 )-----------( 202      ( 30 Oct 2021 15:16 )             32.4     10-30    151<H>  |  116<H>  |  34<H>  ----------------------------<  103<H>  3.6   |  18<L>  |  0.54    Ca    7.6<L>      30 Oct 2021 06:31  Phos  6.9     10-30  Mg     2.0     10-30    TPro  4.6<L>  /  Alb  2.8<L>  /  TBili  0.9  /  DBili  x   /  AST  173<H>  /  ALT  134<H>  /  AlkPhos  177<H>  10-30          TELEMETRY: 	  SR   ECG:  	  RADIOLOGY: < from: CT Head No Cont (10.30.21 @ 09:09) >    EXAM:  CT BRAIN                            PROCEDURE DATE:  10/30/2021            INTERPRETATION:  CLINICAL INFORMATION: Chronic left subdural hemorrhage. Status post SEPS x2. Follow-up.    TECHNIQUE: Portable noncontrast axial CT images were acquired through the head.    COMPARISON STUDY: CT head 10/29/2021 at 3:01 PM    FINDINGS:    There are 2 left parietal SEPS devices in unchanged position when compared with prior dated 10/29/2021 at 3:01 PM. Mild pneumocephalus. Redemonstrated mixed density left subdural collection with areas of acute hemorrhage is not significantly changed in size or appearance when compared with prior. There is significant mass effect on the left cerebral hemisphere with approximately 2.0 cm rightward midline shift also unchanged.    Unchanged right frontal and lateral convexity small subdural hemorrhage.    No new areas of hemorrhage.    Unchanged ventricular size with dilated right temporal horn.    Interval placement of nasogastric tube.    IMPRESSION:    No interval change when compared with CT head 10/29/2021 at 3:01 PM.    --- End of Report ---              SHANNA TUTTLE MD; Resident Radiology  This document has been electronically signed.  NELIA ESCOBAR MD; Attending Radiologist  This document has been electronically signed. Oct 30 2021 11:53AM    < end of copied text >    DIAGNOSTIC TESTING:  [ ] Echocardiogram:  [ ]  Catheterization:  [ ] Stress Test:    OTHER:

## 2021-10-30 NOTE — PROGRESS NOTE ADULT - SUBJECTIVE AND OBJECTIVE BOX
Patient seen and examined at bedside.    --Anticoagulation--    T(C): 37.1 (10-30-21 @ 00:27), Max: 37.1 (10-30-21 @ 00:27)  HR: 91 (10-30-21 @ 03:00) (76 - 106)  BP: 140/73 (10-30-21 @ 03:00) (80/45 - 149/87)  RR: 21 (10-30-21 @ 03:00) (12 - 26)  SpO2: 97% (10-30-21 @ 03:00) (97% - 100%)  Wt(kg): --    Exam:  EO to nox, nonverbal, pupils pinpoint R, roving eye movements, Rt gaze preference, no FC, LUE wds, RUE loc, BLE TF    Labs:                        6.2    39.19 )-----------( 276      ( 30 Oct 2021 00:13 )             22.2     10-30    149<H>  |  120<H>  |  40<H>  ----------------------------<  122<H>  4.0   |  18<L>  |  0.57    Ca    7.4<L>      30 Oct 2021 00:13  Phos  1.2     10-29  Mg     2.6     10-29    TPro  4.4<L>  /  Alb  2.5<L>  /  TBili  0.8  /  DBili  x   /  AST  200<H>  /  ALT  148<H>  /  AlkPhos  182<H>  10-30

## 2021-10-30 NOTE — PROGRESS NOTE ADULT - SUBJECTIVE AND OBJECTIVE BOX
NSCU Progress Note    Interval/Summary:  89F w/ hx of MDS, dementia with recent hospitalization s/p fall, found to have tSDH with MLS and L subcapital femoral nexk fx s/p CRPP (9/28/21) with course c/b acute blood loss anemia 2/2 hematoma/ileus, now presented from  for [facility] w/ AoC SDH and R 1.4cm MLS , also with c/f sepsis.    10/28: Admitted, started empirically on abx, minimal pressor requirements for septic shock, improved; oliguria 2/2 ATN, colonic obstruction. s/p 2uprbc prior to presentation, s/p 5L IVF with minimal UOP    24 Hour Events/Subjective:  - d3 cefepime/flagyl  - s/p SEPSx2 overnight with increased heme, mass effect and worsening MLS, overnight with worsening exam  - downtrended h/h requiring 2uprbc which responded appropriately; no overt s/s bleed      REVIEW OF SYSTEMS:  - negative except as above    VITALS:   - Reviewed    IMAGING/DATA:   - Reviewed     ALLERGIES:   - No Known Allergies        PHYSICAL EXAM:    General: NAD  CVS: RR  Pulm: CTAB, no wheezing  GI: Soft, distended, nt, negative murphys  Extremities: No LE Edema  Neuro: AOx0, EO to nox, roving eye movements, pinpoint pupils, no FC, LUE WD, RUE nothing, BLE TF   NSCU Progress Note    Interval/Summary:  89F w/ hx of MDS, dementia with recent hospitalization s/p fall, found to have tSDH with MLS and L subcapital femoral nexk fx s/p CRPP (9/28/21) with course c/b acute blood loss anemia 2/2 hematoma/ileus, now presented from  for [facility] w/ AoC SDH and R 1.4cm MLS , also with c/f sepsis.    10/28: Admitted, started empirically on abx, minimal pressor requirements for septic shock, improved; oliguria 2/2 ATN, colonic obstruction. s/p 2uprbc prior to presentation, s/p 5L IVF with minimal UOP    24 Hour Events/Subjective:  - d3 cefepime/flagyl  - s/p SEPSx2 overnight with increased heme, mass effect and worsening MLS, overnight with worsening exam  - downtrended h/h requiring 2uprbc which responded appropriately; no overt s/s bleed      REVIEW OF SYSTEMS:  - negative except as above    VITALS:   - Reviewed    IMAGING/DATA:   - Reviewed     ALLERGIES:   - No Known Allergies    PHYSICAL EXAM:    General: NAD  CVS: RR  Pulm: CTAB, no wheezing  GI: Soft, distended, nt, negative murphys  Extremities: No LE Edema  Neuro: AOx0, EO to nox, roving eye movements, pinpoint pupils, no FC, LUE WD, RUE nothing, BLE TF   NSCU Progress Note    Interval/Summary:  89F w/ hx of MDS, dementia with recent hospitalization s/p fall, found to have tSDH with MLS and L subcapital femoral nexk fx s/p CRPP (9/28/21) with course c/b acute blood loss anemia 2/2 hematoma/ileus, now presented from  for [facility] w/ AoC SDH and R 1.4cm MLS , also with c/f sepsis.    10/28: Admitted, started empirically on abx, minimal pressor requirements for septic shock, improved; oliguria 2/2 ATN, colonic obstruction. s/p 2uprbc prior to presentation, s/p 5L IVF with minimal UOP    24 Hour Events/Subjective:  - d3 cefepime/flagyl  - s/p SEPSx2 overnight with increased heme, mass effect and worsening MLS, overnight with worsening exam  - downtrended h/h requiring 2uprbc which responded appropriately; no overt s/s bleed  - attempted to contact son multiple times at [phone] and number in chart, left voicemail to call back to update him on the patients clinical status; no answer on multiple attempts      REVIEW OF SYSTEMS:  - negative except as above    VITALS:   - Reviewed    IMAGING/DATA:   - Reviewed     ALLERGIES:   - No Known Allergies    PHYSICAL EXAM:    General: NAD  CVS: RR  Pulm: CTAB, no wheezing  GI: Soft, distended, nt, negative murphys  Extremities: No LE Edema  Neuro: AOx0, EO to nox, roving eye movements, pinpoint pupils, no FC, LUE WD, RUE nothing, BLE TF

## 2021-10-30 NOTE — PROGRESS NOTE ADULT - SUBJECTIVE AND OBJECTIVE BOX
pt seen and examiend.  More obtunded.  Pinpoint pupils  CTs show ongoing SDH with mass effect despite SEPS draiange x 2    Pt with poor overall prognosis.  Not a candidate for OR crani drainage of subdural hematoma.  ICU to speak with son/family regarding GOC of possible DNR/DNI

## 2021-10-30 NOTE — PROGRESS NOTE ADULT - ASSESSMENT
89F w/ dementia, MDS, came in as LVL1 trauma 1 mo ago w/ L SDH no plan for sx given stable exam, representing for inc somnolence now s/p seps x2 with increase in sdh collection. Ongoing goc disc w/ family , currently no plan for OR given poor functional status and mult med issues.   - q1h neuro checks  - cont seps drainage fu outputs  - no plan for OR, reassess in Am  - Exam ON c/f sz, loaded w/ Vimpat, VEEG-p  - fu labs, H/H dropped s/p 2u PRBC  - bcx pending   - care per nscu primary

## 2021-10-30 NOTE — PROGRESS NOTE ADULT - ASSESSMENT
89 year old female with subdural hematoma and abdominal distention    1. Subdural hematoma  -per NSX    2. Abdoinal distention. Suspect [place] syndrome in the setting of SDH, critical illness, now s/p successful decompression with rectal tube, cdiff negative.    3. Leukocytosis    4. Anemia, MDS, no overt GI bleeding, labs consistent w hemolysis, consider heme consult

## 2021-10-30 NOTE — PROGRESS NOTE ADULT - SUBJECTIVE AND OBJECTIVE BOX
INTERVAL HISTORY: HPI:  89F w/ hx of MDS, dementia with recent hospitalization s/p fall, found to have tSDH with MLS and L subcapital femoral nexk fx s/p CRPP (9/28/21) with course c/b acute blood loss anemia 2/2 hematoma/ileus, now presented from  for [facility] w/ AoC SDH and R 1.4cm MLS , also with c/f sepsis.    24 Hour Events/Subjective:  - admitted  - given additional 1L IVF on arrival with minimal pressor requirements to maintain SBP goal  - elevated INR, LFTs c/f MESSI vs. shock liver, ordered additional labs, given Kcentra  - started empirically on vanc/cef though patient with hx of elevated leuks 2/2 MDS, pending onc  - trending h/h s/p 2uprbc prior to arrival         REVIEW OF SYSTEMS:  - negative except as above    VITALS:   - Reviewed    IMAGING/DATA:   - Reviewed     ALLERGIES:   - No Known Allergies        PHYSICAL EXAM:    General: NAD  CVS: RR  Pulm: CTAB, no wheezing  GI: Soft, distended, nt  Extremities: No LE Edema  Neuro: AOx1 (baseline), FC (thumbs up wiggles toes), no facial, PENA AG/WD to pain     (28 Oct 2021 08:53)      MEDICATIONS  (STANDING):  aMIOdarone    Tablet 200 milliGRAM(s) Oral daily  calcium gluconate IVPB 2 Gram(s) IV Intermittent once  cefepime   IVPB      cefepime   IVPB 1000 milliGRAM(s) IV Intermittent every 12 hours  chlorhexidine 4% Liquid 1 Application(s) Topical every 24 hours  folic acid 1 milliGRAM(s) Enteral Tube daily  lacosamide IVPB 200 milliGRAM(s) IV Intermittent every 12 hours  magnesium sulfate  IVPB 1 Gram(s) IV Intermittent once  metroNIDAZOLE  IVPB      metroNIDAZOLE  IVPB 500 milliGRAM(s) IV Intermittent every 8 hours  multiple electrolytes Injection Type 1 1000 milliLiter(s) (75 mL/Hr) IV Continuous <Continuous>  pantoprazole  Injectable 40 milliGRAM(s) IV Push daily  potassium chloride   Solution 30 milliEquivalent(s) Oral every 2 hours  thiamine 100 milliGRAM(s) Enteral Tube daily    MEDICATIONS  (PRN):  bisacodyl 5 milliGRAM(s) Oral daily PRN Constipation      Drug Dosing Weight  Height (cm): 147.3 (28 Oct 2021 08:47)  Weight (kg): 49.9 (28 Oct 2021 08:47)  BMI (kg/m2): 23 (28 Oct 2021 08:47)  BSA (m2): 1.41 (28 Oct 2021 08:47)    PAST MEDICAL & SURGICAL HISTORY:  MDS (myelodysplastic syndrome)    Dementia    S/P ORIF (open reduction internal fixation) fracture  elbow fracture 02/2021        REVIEW OF SYSTEMS: [ ] Unable to Assess due to neurologic exam   [ ] All ROS addressed below are non-contributory, except:  Neuro: [ ] Headache [ ] Back pain [ ] Numbness [ ] Weakness [ ] Ataxia [ ] Dizziness [ ] Aphasia [ ] Dysarthria [ ] Visual disturbance  Resp: [ ] Shortness of breath/dyspnea, [ ] Orthopnea [ ] Cough  CV: [ ] Chest pain [ ] Palpitation [ ] Lightheadedness [ ] Syncope  Renal: [ ] Thirst [ ] Edema  GI: [ ] Nausea [ ] Emesis [ ] Abdominal pain [ ] Constipation [ ] Diarrhea  Hem: [ ] Hematemesis [ ] bright red blood per rectum  ID: [ ] Fever [ ] Chills [ ] Dysuria  ENT: [ ] Rhinorrhea    PHYSICAL EXAM:    General: frail, cachetic   Neurological: eyes open to nox, not following commands, non verbal, pupils 2mm reactive, b/l UE 0/5, b/l LE tremulous 0/5 LE   Pulmonary: Clear to Auscultation, No Rales, No Rhonchi, No Wheezes   Cardiovascular: S1, S2, irregularly irregular   Gastrointestinal: Soft, Nontender, Nondistended   Extremities: No calf tenderness   Incision: right cranial seps drain in place   SKIN: evolving deep  tissue injury to the sacrum and b/l buttocks. there are 3 areas of breakdown that together measure 4cmx 10cmx 0.3cm. there is intact skin noted in between these areas. On the sacrum, soft black eschar was noted with surrounding blanchable erythema. on the b/l  buttocks the skin was intact with deep maroon discoloration of the skin.    ICU Vital Signs Last 24 Hrs  T(C): 37.2 (30 Oct 2021 15:00), Max: 37.2 (30 Oct 2021 15:00)  T(F): 99 (30 Oct 2021 15:00), Max: 99 (30 Oct 2021 15:00)  HR: 85 (30 Oct 2021 21:00) (85 - 114)  BP: 121/64 (30 Oct 2021 21:00) (103/61 - 149/87)  BP(mean): 80 (30 Oct 2021 21:00) (72 - 104)  RR: 26 (30 Oct 2021 21:00) (11 - 34)  SpO2: 98% (30 Oct 2021 21:00) (97% - 100%)      I&O's Detail    29 Oct 2021 07:01  -  30 Oct 2021 07:00  --------------------------------------------------------  IN:    Enteral Tube Flush: 30 mL    Free Water: 500 mL    IV PiggyBack: 900 mL    IV PiggyBack: 250 mL    multiple electrolytes Injection Type 1.: 1800 mL    PRBCs (Packed Red Blood Cells): 600 mL    Sodium Chloride 0.9% Bolus: 250 mL  Total IN: 4330 mL    OUT:    Drain (mL): 15 mL    Drain (mL): 20 mL    Indwelling Catheter - Urethral (mL): 1175 mL    Rectal Tube (mL): 820 mL  Total OUT: 2030 mL    Total NET: 2300 mL      30 Oct 2021 07:01  -  30 Oct 2021 22:44  --------------------------------------------------------  IN:    Enteral Tube Flush: 240 mL    Free Water: 500 mL    IV PiggyBack: 50 mL    IV PiggyBack: 50 mL    IV PiggyBack: 200 mL    multiple electrolytes Injection Type 1.: 1200 mL  Total IN: 2240 mL    OUT:    Drain (mL): 1 mL    Drain (mL): 0 mL    Indwelling Catheter - Urethral (mL): 1570 mL    Rectal Tube (mL): 375 mL  Total OUT: 1946 mL    Total NET: 294 mL              LABS:  CBC Full  -  ( 30 Oct 2021 21:33 )  WBC Count : 31.65 K/uL  RBC Count : 3.27 M/uL  Hemoglobin : 9.9 g/dL  Hematocrit : 30.3 %  Platelet Count - Automated : 190 K/uL  Mean Cell Volume : 92.7 fl  Mean Cell Hemoglobin : 30.3 pg  Mean Cell Hemoglobin Concentration : 32.7 gm/dL  Auto Neutrophil # : x  Auto Lymphocyte # : x  Auto Monocyte # : x  Auto Eosinophil # : x  Auto Basophil # : x  Auto Neutrophil % : x  Auto Lymphocyte % : x  Auto Monocyte % : x  Auto Eosinophil % : x  Auto Basophil % : x    10-30    150<H>  |  118<H>  |  30<H>  ----------------------------<  118<H>  3.0<L>   |  19<L>  |  0.62    Ca    7.4<L>      30 Oct 2021 21:33  Phos  2.7     10-30  Mg     1.9     10-30    TPro  4.4<L>  /  Alb  2.6<L>  /  TBili  0.7  /  DBili  x   /  AST  148<H>  /  ALT  107<H>  /  AlkPhos  165<H>  10-30    PT/INR - ( 30 Oct 2021 00:57 )   PT: 13.3 sec;   INR: 1.11 ratio         PTT - ( 30 Oct 2021 00:57 )  PTT:29.7 sec      RADIOLOGY & ADDITIONAL STUDIES:   24 Hour Events/Subjective:  -continues to have poor exam   -blood culture + continued on broad spectrum abx   -attempted to reach family no response until PM, still continue w/ full medical management     REVIEW OF SYSTEMS:  unable to obtain 2/2 neurologic condition     PHYSICAL EXAM:    General: frail, cachetic   Neurological: eyes open to nox, not following commands, non verbal, pupils 2mm reactive, b/l UE 0/5, b/l LE tremulous 0/5 LE   Pulmonary: Clear to Auscultation, No Rales, No Rhonchi, No Wheezes   Cardiovascular: S1, S2, irregularly irregular   Gastrointestinal: Soft, Nontender, Nondistended   Extremities: No calf tenderness   Incision: right cranial seps drain in place   SKIN: evolving deep  tissue injury to the sacrum and b/l buttocks. there are 3 areas of breakdown that together measure 4cmx 10cmx 0.3cm. there is intact skin noted in between these areas. On the sacrum, soft black eschar was noted with surrounding blanchable erythema. on the b/l  buttocks the skin was intact with deep maroon discoloration of the skin.    ICU Vital Signs Last 24 Hrs  T(C): 37.2 (30 Oct 2021 15:00), Max: 37.2 (30 Oct 2021 15:00)  T(F): 99 (30 Oct 2021 15:00), Max: 99 (30 Oct 2021 15:00)  HR: 85 (30 Oct 2021 21:00) (85 - 114)  BP: 121/64 (30 Oct 2021 21:00) (103/61 - 149/87)  BP(mean): 80 (30 Oct 2021 21:00) (72 - 104)  RR: 26 (30 Oct 2021 21:00) (11 - 34)  SpO2: 98% (30 Oct 2021 21:00) (97% - 100%)      I&O's Detail    29 Oct 2021 07:01  -  30 Oct 2021 07:00  --------------------------------------------------------  IN:    Enteral Tube Flush: 30 mL    Free Water: 500 mL    IV PiggyBack: 900 mL    IV PiggyBack: 250 mL    multiple electrolytes Injection Type 1.: 1800 mL    PRBCs (Packed Red Blood Cells): 600 mL    Sodium Chloride 0.9% Bolus: 250 mL  Total IN: 4330 mL    OUT:    Drain (mL): 15 mL    Drain (mL): 20 mL    Indwelling Catheter - Urethral (mL): 1175 mL    Rectal Tube (mL): 820 mL  Total OUT: 2030 mL    Total NET: 2300 mL      30 Oct 2021 07:01  -  30 Oct 2021 22:44  --------------------------------------------------------  IN:    Enteral Tube Flush: 240 mL    Free Water: 500 mL    IV PiggyBack: 50 mL    IV PiggyBack: 50 mL    IV PiggyBack: 200 mL    multiple electrolytes Injection Type 1.: 1200 mL  Total IN: 2240 mL    OUT:    Drain (mL): 1 mL    Drain (mL): 0 mL    Indwelling Catheter - Urethral (mL): 1570 mL    Rectal Tube (mL): 375 mL  Total OUT: 1946 mL    Total NET: 294 mL              LABS:  CBC Full  -  ( 30 Oct 2021 21:33 )  WBC Count : 31.65 K/uL  RBC Count : 3.27 M/uL  Hemoglobin : 9.9 g/dL  Hematocrit : 30.3 %  Platelet Count - Automated : 190 K/uL  Mean Cell Volume : 92.7 fl  Mean Cell Hemoglobin : 30.3 pg  Mean Cell Hemoglobin Concentration : 32.7 gm/dL  Auto Neutrophil # : x  Auto Lymphocyte # : x  Auto Monocyte # : x  Auto Eosinophil # : x  Auto Basophil # : x  Auto Neutrophil % : x  Auto Lymphocyte % : x  Auto Monocyte % : x  Auto Eosinophil % : x  Auto Basophil % : x    10-30    150<H>  |  118<H>  |  30<H>  ----------------------------<  118<H>  3.0<L>   |  19<L>  |  0.62    Ca    7.4<L>      30 Oct 2021 21:33  Phos  2.7     10-30  Mg     1.9     10-30    TPro  4.4<L>  /  Alb  2.6<L>  /  TBili  0.7  /  DBili  x   /  AST  148<H>  /  ALT  107<H>  /  AlkPhos  165<H>  10-30    PT/INR - ( 30 Oct 2021 00:57 )   PT: 13.3 sec;   INR: 1.11 ratio         PTT - ( 30 Oct 2021 00:57 )  PTT:29.7 sec      RADIOLOGY & ADDITIONAL STUDIES:   24 Hour Events/Subjective:  -continues to have poor exam   -blood culture + continued on broad spectrum abx   -attempted to reach family no response until PM, still continue w/ full medical management     REVIEW OF SYSTEMS:  unable to obtain 2/2 neurologic condition     PHYSICAL EXAM:    General: frail, cachetic   Neurological: eyes open to deep nox, not following commands, non verbal, pupils 2mm reactive, attempts to localize LUE, w/d right UE, trace TF b/l LE  Pulmonary: Clear to Auscultation, No Rales, No Rhonchi, No Wheezes   Cardiovascular: S1, S2, irregularly irregular   Gastrointestinal: Soft, Nontender, Nondistended   Extremities: No calf tenderness   Incision: right cranial seps drains x 2 in place     ICU Vital Signs Last 24 Hrs  T(C): 37.2 (30 Oct 2021 15:00), Max: 37.2 (30 Oct 2021 15:00)  T(F): 99 (30 Oct 2021 15:00), Max: 99 (30 Oct 2021 15:00)  HR: 85 (30 Oct 2021 21:00) (85 - 114)  BP: 121/64 (30 Oct 2021 21:00) (103/61 - 149/87)  BP(mean): 80 (30 Oct 2021 21:00) (72 - 104)  RR: 26 (30 Oct 2021 21:00) (11 - 34)  SpO2: 98% (30 Oct 2021 21:00) (97% - 100%)      I&O's Detail    29 Oct 2021 07:01  -  30 Oct 2021 07:00  --------------------------------------------------------  IN:    Enteral Tube Flush: 30 mL    Free Water: 500 mL    IV PiggyBack: 900 mL    IV PiggyBack: 250 mL    multiple electrolytes Injection Type 1.: 1800 mL    PRBCs (Packed Red Blood Cells): 600 mL    Sodium Chloride 0.9% Bolus: 250 mL  Total IN: 4330 mL    OUT:    Drain (mL): 15 mL    Drain (mL): 20 mL    Indwelling Catheter - Urethral (mL): 1175 mL    Rectal Tube (mL): 820 mL  Total OUT: 2030 mL    Total NET: 2300 mL      30 Oct 2021 07:01  -  30 Oct 2021 22:44  --------------------------------------------------------  IN:    Enteral Tube Flush: 240 mL    Free Water: 500 mL    IV PiggyBack: 50 mL    IV PiggyBack: 50 mL    IV PiggyBack: 200 mL    multiple electrolytes Injection Type 1.: 1200 mL  Total IN: 2240 mL    OUT:    Drain (mL): 1 mL    Drain (mL): 0 mL    Indwelling Catheter - Urethral (mL): 1570 mL    Rectal Tube (mL): 375 mL  Total OUT: 1946 mL    Total NET: 294 mL              LABS:  CBC Full  -  ( 30 Oct 2021 21:33 )  WBC Count : 31.65 K/uL  RBC Count : 3.27 M/uL  Hemoglobin : 9.9 g/dL  Hematocrit : 30.3 %  Platelet Count - Automated : 190 K/uL  Mean Cell Volume : 92.7 fl  Mean Cell Hemoglobin : 30.3 pg  Mean Cell Hemoglobin Concentration : 32.7 gm/dL  Auto Neutrophil # : x  Auto Lymphocyte # : x  Auto Monocyte # : x  Auto Eosinophil # : x  Auto Basophil # : x  Auto Neutrophil % : x  Auto Lymphocyte % : x  Auto Monocyte % : x  Auto Eosinophil % : x  Auto Basophil % : x    10-30    150<H>  |  118<H>  |  30<H>  ----------------------------<  118<H>  3.0<L>   |  19<L>  |  0.62    Ca    7.4<L>      30 Oct 2021 21:33  Phos  2.7     10-30  Mg     1.9     10-30    TPro  4.4<L>  /  Alb  2.6<L>  /  TBili  0.7  /  DBili  x   /  AST  148<H>  /  ALT  107<H>  /  AlkPhos  165<H>  10-30    PT/INR - ( 30 Oct 2021 00:57 )   PT: 13.3 sec;   INR: 1.11 ratio         PTT - ( 30 Oct 2021 00:57 )  PTT:29.7 sec      RADIOLOGY & ADDITIONAL STUDIES:

## 2021-10-31 NOTE — EEG REPORT - NS EEG TEXT BOX
[facility]   COMPREHENSIVE EPILEPSY CENTER   REPORT OF CONTINUOUS VIDEO EEG     [facility]: [address], [place] [address], Ph#: [phone]  [facility]: [id number] [place] [address], Ph#: [phone]  [facility]: [address] [place] [address], Ph#: [phone]    Patient Name: LORRI TEJEDA  Age and : 89y ([birth year])  MRN #: [id number]  Location: [facility] 14  Referring Physician: Raúl Rizo    Start Time/Date: 08:00 on 10-31-21  End Time/Date: 13:59 on 10-31-21  Duration: 05 Hours 59 Mins    _____________________________________________________________  STUDY INFORMATION    EEG Recording Technique:  The patient underwent continuous Video-EEG monitoring, using Telemetry System hardware on the XLTek Digital System. EEG and video data were stored on a computer hard drive with important events saved in digital archive files. The material was reviewed by a physician (electroencephalographer / epileptologist) on a daily basis. [place] and seizure detection algorithms were utilized and reviewed. An EEG Technician attended to the patient, and was available throughout daytime work hours.  The epilepsy center neurologist was available in person or on call 24-hours per day.    EEG Placement and Labeling of Electrodes:  The EEG was performed utilizing 20 channel referential EEG connections (coronal over temporal over parasagittal montage) using all standard 10-20 electrode placements with EKG, with additional electrodes placed in the inferior temporal region using the modified 10-10 montage electrode placements for elective admissions, or if deemed necessary. Recording was at a sampling rate of 256 samples per second per channel. Time synchronized digital video recording was done simultaneously with EEG recording. A low light infrared camera was used for low light recording.   _____________________________________________________________  HISTORY    Patient is a 89y old  Female who presents with a chief complaint of SDH (31 Oct 2021 06:02)    PERTINENT MEDICATION:  lacosamide IVPB 200 milliGRAM(s) IV Intermittent every 12 hours    _____________________________________________________________  STUDY INTERPRETATION    Findings: The background was continuous and poorly reactive. No posterior dominant rhythm seen. Marked voltage attenuation over left hemisphere, likely due to overlying fluid effect.    Background Slowing:  Excess diffuse polymorphic delta slowing.    Focal Slowing:   Continuous delta slowing over the left hemisphere, more prominent over temporal chain.    Sleep Background:  Drowsiness and stage II sleep transients were not recorded.    Other Non-Epileptiform Findings:  None were present.    Interictal Epileptiform Activity:   Near-continuous generalized periodic discharges (GPDs) at 1-1.5 Hz, better formed over the right hemisphere, but at times with shifting asymmetries, without clear evolution into an ictal pattern.    Events:  Clinical events: None recorded.  Seizures: None recorded.    Activation Procedures:   Hyperventilation was not performed.    Photic stimulation was not performed.     Artifacts:  Intermittent myogenic and movement artifacts were noted.    ECG:  The heart rate on single channel ECG was predominantly between  BPM.    _____________________________________________________________  EEG SUMMARY/CLASSIFICATION    Abnormal EEG in an comatose patient.    - Near-continuous generalized periodic discharges (GPDs) at 1-1.5 Hz, better formed over the right hemisphere without clear evolution into an ictal pattern.  - Background generalized slowing, including absent PDR.  - Marked voltage attenuation over left hemisphere, likely due to overlying fluid effect.    _____________________________________________________________  EEG IMPRESSION/CLINICAL CORRELATE    Abnormal EEG study.    - Diffuse cortical hyperexcitability with GPDs.   - Moderate to severe nonspecific diffuse or multifocal cerebral dysfunction.   - No seizure seen.  - Tachycardia noted, correlate clinically and with 12-lead EKG    _____________________________________________________________    Daniel Garza MD, ANKUR  Fellow, [facility]  [place] [facility]

## 2021-10-31 NOTE — PROGRESS NOTE ADULT - SUBJECTIVE AND OBJECTIVE BOX
24 Hour Events/Subjective:  -continues to have poor exam   -blood culture + continued on broad spectrum abx   -attempted to reach family no response until PM, still continue w/ full medical management     REVIEW OF SYSTEMS:  unable to obtain 2/2 neurologic condition     PHYSICAL EXAM:    General: frail, cachetic   Neurological: eyes open to deep nox, not following commands, non verbal, pupils 2mm reactive, attempts to localize LUE, w/d right UE, trace TF b/l LE  Pulmonary: Clear to Auscultation, No Rales, No Rhonchi, No Wheezes   Cardiovascular: S1, S2, irregularly irregular   Gastrointestinal: Soft, Nontender, Nondistended   Extremities: No calf tenderness   Incision: right cranial seps drains x 2 in place     ICU Vital Signs Last 24 Hrs  T(C): 37.2 (30 Oct 2021 15:00), Max: 37.2 (30 Oct 2021 15:00)  T(F): 99 (30 Oct 2021 15:00), Max: 99 (30 Oct 2021 15:00)  HR: 85 (30 Oct 2021 21:00) (85 - 114)  BP: 121/64 (30 Oct 2021 21:00) (103/61 - 149/87)  BP(mean): 80 (30 Oct 2021 21:00) (72 - 104)  RR: 26 (30 Oct 2021 21:00) (11 - 34)  SpO2: 98% (30 Oct 2021 21:00) (97% - 100%)      I&O's Detail    29 Oct 2021 07:01  -  30 Oct 2021 07:00  --------------------------------------------------------  IN:    Enteral Tube Flush: 30 mL    Free Water: 500 mL    IV PiggyBack: 900 mL    IV PiggyBack: 250 mL    multiple electrolytes Injection Type 1.: 1800 mL    PRBCs (Packed Red Blood Cells): 600 mL    Sodium Chloride 0.9% Bolus: 250 mL  Total IN: 4330 mL    OUT:    Drain (mL): 15 mL    Drain (mL): 20 mL    Indwelling Catheter - Urethral (mL): 1175 mL    Rectal Tube (mL): 820 mL  Total OUT: 2030 mL    Total NET: 2300 mL      30 Oct 2021 07:01  -  30 Oct 2021 22:44  --------------------------------------------------------  IN:    Enteral Tube Flush: 240 mL    Free Water: 500 mL    IV PiggyBack: 50 mL    IV PiggyBack: 50 mL    IV PiggyBack: 200 mL    multiple electrolytes Injection Type 1.: 1200 mL  Total IN: 2240 mL    OUT:    Drain (mL): 1 mL    Drain (mL): 0 mL    Indwelling Catheter - Urethral (mL): 1570 mL    Rectal Tube (mL): 375 mL  Total OUT: 1946 mL    Total NET: 294 mL              LABS:  CBC Full  -  ( 30 Oct 2021 21:33 )  WBC Count : 31.65 K/uL  RBC Count : 3.27 M/uL  Hemoglobin : 9.9 g/dL  Hematocrit : 30.3 %  Platelet Count - Automated : 190 K/uL  Mean Cell Volume : 92.7 fl  Mean Cell Hemoglobin : 30.3 pg  Mean Cell Hemoglobin Concentration : 32.7 gm/dL  Auto Neutrophil # : x  Auto Lymphocyte # : x  Auto Monocyte # : x  Auto Eosinophil # : x  Auto Basophil # : x  Auto Neutrophil % : x  Auto Lymphocyte % : x  Auto Monocyte % : x  Auto Eosinophil % : x  Auto Basophil % : x    10-30    150<H>  |  118<H>  |  30<H>  ----------------------------<  118<H>  3.0<L>   |  19<L>  |  0.62    Ca    7.4<L>      30 Oct 2021 21:33  Phos  2.7     10-30  Mg     1.9     10-30    TPro  4.4<L>  /  Alb  2.6<L>  /  TBili  0.7  /  DBili  x   /  AST  148<H>  /  ALT  107<H>  /  AlkPhos  165<H>  10-30    PT/INR - ( 30 Oct 2021 00:57 )   PT: 13.3 sec;   INR: 1.11 ratio         PTT - ( 30 Oct 2021 00:57 )  PTT:29.7 sec      RADIOLOGY & ADDITIONAL STUDIES:   24 Hour Events/Subjective:  -continues to have poor exam   -blood culture + continued on broad spectrum abx       REVIEW OF SYSTEMS:  unable to obtain 2/2 neurologic condition     PHYSICAL EXAM:    General: frail, cachetic   Neurological: eyes open to deep nox, not following commands, non verbal, pupils 2mm reactive, attempts to localize LUE, w/d right UE, trace TF b/l LE  Pulmonary: Clear to Auscultation, No Rales, No Rhonchi, No Wheezes   Cardiovascular: S1, S2, irregularly irregular   Gastrointestinal: Soft, Nontender, Nondistended   Extremities: No calf tenderness   Incision: right cranial seps drains x 2 in place     ICU Vital Signs Last 24 Hrs  T(C): 37.2 (30 Oct 2021 15:00), Max: 37.2 (30 Oct 2021 15:00)  T(F): 99 (30 Oct 2021 15:00), Max: 99 (30 Oct 2021 15:00)  HR: 85 (30 Oct 2021 21:00) (85 - 114)  BP: 121/64 (30 Oct 2021 21:00) (103/61 - 149/87)  BP(mean): 80 (30 Oct 2021 21:00) (72 - 104)  RR: 26 (30 Oct 2021 21:00) (11 - 34)  SpO2: 98% (30 Oct 2021 21:00) (97% - 100%)      I&O's Detail    29 Oct 2021 07:01  -  30 Oct 2021 07:00  --------------------------------------------------------  IN:    Enteral Tube Flush: 30 mL    Free Water: 500 mL    IV PiggyBack: 900 mL    IV PiggyBack: 250 mL    multiple electrolytes Injection Type 1.: 1800 mL    PRBCs (Packed Red Blood Cells): 600 mL    Sodium Chloride 0.9% Bolus: 250 mL  Total IN: 4330 mL    OUT:    Drain (mL): 15 mL    Drain (mL): 20 mL    Indwelling Catheter - Urethral (mL): 1175 mL    Rectal Tube (mL): 820 mL  Total OUT: 2030 mL    Total NET: 2300 mL      30 Oct 2021 07:01  -  30 Oct 2021 22:44  --------------------------------------------------------  IN:    Enteral Tube Flush: 240 mL    Free Water: 500 mL    IV PiggyBack: 50 mL    IV PiggyBack: 50 mL    IV PiggyBack: 200 mL    multiple electrolytes Injection Type 1.: 1200 mL  Total IN: 2240 mL    OUT:    Drain (mL): 1 mL    Drain (mL): 0 mL    Indwelling Catheter - Urethral (mL): 1570 mL    Rectal Tube (mL): 375 mL  Total OUT: 1946 mL    Total NET: 294 mL              LABS:  CBC Full  -  ( 30 Oct 2021 21:33 )  WBC Count : 31.65 K/uL  RBC Count : 3.27 M/uL  Hemoglobin : 9.9 g/dL  Hematocrit : 30.3 %  Platelet Count - Automated : 190 K/uL  Mean Cell Volume : 92.7 fl  Mean Cell Hemoglobin : 30.3 pg  Mean Cell Hemoglobin Concentration : 32.7 gm/dL  Auto Neutrophil # : x  Auto Lymphocyte # : x  Auto Monocyte # : x  Auto Eosinophil # : x  Auto Basophil # : x  Auto Neutrophil % : x  Auto Lymphocyte % : x  Auto Monocyte % : x  Auto Eosinophil % : x  Auto Basophil % : x    10-30    150<H>  |  118<H>  |  30<H>  ----------------------------<  118<H>  3.0<L>   |  19<L>  |  0.62    Ca    7.4<L>      30 Oct 2021 21:33  Phos  2.7     10-30  Mg     1.9     10-30    TPro  4.4<L>  /  Alb  2.6<L>  /  TBili  0.7  /  DBili  x   /  AST  148<H>  /  ALT  107<H>  /  AlkPhos  165<H>  10-30    PT/INR - ( 30 Oct 2021 00:57 )   PT: 13.3 sec;   INR: 1.11 ratio         PTT - ( 30 Oct 2021 00:57 )  PTT:29.7 sec      RADIOLOGY & ADDITIONAL STUDIES:

## 2021-10-31 NOTE — PROGRESS NOTE ADULT - SUBJECTIVE AND OBJECTIVE BOX
Subjective: Patient seen and examined. No new events except as noted.   remains in NSCU   continues to have poor exam   blood culture + continued on broad spectrum abx       REVIEW OF SYSTEMS:  Unable to obtain     MEDICATIONS:  MEDICATIONS  (STANDING):  aMIOdarone    Tablet 200 milliGRAM(s) Oral daily  cefepime   IVPB      cefepime   IVPB 1000 milliGRAM(s) IV Intermittent every 12 hours  chlorhexidine 4% Liquid 1 Application(s) Topical every 24 hours  folic acid 1 milliGRAM(s) Enteral Tube daily  lacosamide IVPB 200 milliGRAM(s) IV Intermittent every 12 hours  metroNIDAZOLE  IVPB      metroNIDAZOLE  IVPB 500 milliGRAM(s) IV Intermittent every 8 hours  multiple electrolytes Injection Type 1 1000 milliLiter(s) (75 mL/Hr) IV Continuous <Continuous>  pantoprazole  Injectable 40 milliGRAM(s) IV Push daily  thiamine 100 milliGRAM(s) Enteral Tube daily      PHYSICAL EXAM:  T(C): 37.5 (10-31-21 @ 07:00), Max: 37.5 (10-31-21 @ 07:00)  HR: 82 (10-31-21 @ 09:00) (82 - 114)  BP: 113/64 (10-31-21 @ 09:00) (93/72 - 139/84)  RR: 22 (10-31-21 @ 09:00) (11 - 33)  SpO2: 96% (10-31-21 @ 08:00) (94% - 100%)  Wt(kg): --  I&O's Summary    30 Oct 2021 07:01  -  31 Oct 2021 07:00  --------------------------------------------------------  IN: 3925 mL / OUT: 2448 mL / NET: 1477 mL        Appearance: NAD +drain	  HEENT:   dry oral mucosa, +NGT   Lymphatic: No lymphadenopathy  Cardiovascular: Normal S1 S2, No JVD, No murmurs, No edema  Respiratory: decreased bs   Psychiatry: A & O x 0  Gastrointestinal:  Soft, Non-tender, + BS	  Skin: No rashes, No ecchymoses, No cyanosis	  Ext: No edema  Neuro: AOx0, EO to nox, roving eye movements, pinpoint pupils, no FC, LUE WD, RUE nothing, BLE TF          LABS:    CARDIAC MARKERS:  CARDIAC MARKERS ( 28 Oct 2021 22:51 )  x     / x     / 327 U/L / x     / 26.9 ng/mL                                9.9    31.65 )-----------( 190      ( 30 Oct 2021 21:33 )             30.3     10-30    150<H>  |  118<H>  |  30<H>  ----------------------------<  118<H>  3.0<L>   |  19<L>  |  0.62    Ca    7.4<L>      30 Oct 2021 21:33  Phos  2.7     10-30  Mg     1.9     10-30    TPro  4.4<L>  /  Alb  2.6<L>  /  TBili  0.7  /  DBili  x   /  AST  148<H>  /  ALT  107<H>  /  AlkPhos  165<H>  10-30        TELEMETRY: 	SR     ECG:  	  RADIOLOGY:   DIAGNOSTIC TESTING:  [ ] Echocardiogram:  [ ]  Catheterization:  [ ] Stress Test:    OTHER: 	    EEG REPORT:   EEG Report:  · EEG Report	  [facility] EPILEPSY CENTER   REPORT OF CONTINUOUS VIDEO EEG     [facility]: [address] [place] [address], [place] [address], Ph#: [phone]  [facility]: [id number] [place] [address], Ph#: [phone]  [facility]: [address] [place] [address], Ph#: [phone]    Patient Name: LORRI TEJEDA  Age and : 89y (30)  MRN #: [id number]  Location: [facility] 14  Referring Physician: Raúl Rizo    Start Time/Date: 14:05 on 10-30-21  End Time/Date: 08:00 on 10-31-21  Duration: 17 Hours 51 Mins    _____________________________________________________________  STUDY INFORMATION    EEG Recording Technique:  The patient underwent continuous Video-EEG monitoring, using Telemetry System hardware on the XLTek Digital System. EEG and video data were stored on a computer hard drive with important events saved in digital archive files. The material was reviewed by a physician (electroencephalographer / epileptologist) on a daily basis. [place] and seizure detection algorithms were utilized and reviewed. An EEG Technician attended to the patient, and was available throughout daytime work hours.  The epilepsy center neurologist was available in person or on call 24-hours per day.    EEG Placement and Labeling of Electrodes:  The EEG was performed utilizing 20 channel referential EEG connections (coronal over temporal over parasagittal montage) using all standard 10-20 electrode placements with EKG, with additional electrodes placed in the inferior temporal region using the modified 10-10 montage electrode placements for elective admissions, or if deemed necessary. Recording was at a sampling rate of 256 samples per second per channel. Time synchronized digital video recording was done simultaneously with EEG recording. A low light infrared camera was used for low light recording.   _____________________________________________________________  HISTORY    Patient is a 89y old  Female who presents with a chief complaint of SDH (31 Oct 2021 06:02)    PERTINENT MEDICATION:  lacosamide IVPB 200 milliGRAM(s) IV Intermittent every 12 hours    _____________________________________________________________  STUDY INTERPRETATION    Findings: The background was continuous and poorly reactive. No posterior dominant rhythm seen. Marked voltage attenuation over left hemisphere, likely due to overlying fluid effect.    Background Slowing:  Excess diffuse polymorphic delta slowing.    Focal Slowing:   Continuous delta slowing over the left hemisphere, more prominent over temporal chain.    Sleep Background:  Drowsiness and stage II sleep transients were not recorded.    Other Non-Epileptiform Findings:  None were present.    Interictal Epileptiform Activity:   Near-continuous generalized periodic discharges (GPDs) at 1-1.5 Hz, better formed over the right hemisphere, but at times with shifting asymmetries, without clear evolution into an ictal pattern.    Events:  Clinical events: None recorded.  Seizures: None recorded.    Activation Procedures:   Hyperventilation was not performed.    Photic stimulation was not performed.     Artifacts:  Intermittent myogenic and movement artifacts were noted.    ECG:  The heart rate on single channel ECG was predominantly between  BPM.    _____________________________________________________________  EEG SUMMARY/CLASSIFICATION    Abnormal EEG in an comatose patient.    - Near-continuous generalized periodic discharges (GPDs) at 1-1.5 Hz, better formed over the right hemisphere without clear evolution into an ictal pattern.  - Background generalized slowing, including absent PDR.  - Marked voltage attenuation over left hemisphere, likely due to overlying fluid effect.    _____________________________________________________________  EEG IMPRESSION/CLINICAL CORRELATE    Abnormal EEG study.    - Diffuse cortical hyperexcitability with GPDs.   - Moderate to severe nonspecific diffuse or multifocal cerebral dysfunction.   - No seizure seen.  - Tachycardia noted, correlate clinically and with 12-lead EKG    *** PRELIMINARY REPORT - PENDING EPILEPSY ATTENDING REVIEW ***  _____________________________________________________________    Daniel Garza MD, ANKUR  Fellow, [facility]  [place] [facility]          Electronic Signatures:  Daniel Garza)  (Signed 31-Oct-2021 10:01)  	Authored: EEG REPORT  Oksana Agosto)  (Signature Pending)  	Co-Signer: EEG REPORT      Last Updated: 31-Oct-2021 10:01 by Daniel Garza)

## 2021-10-31 NOTE — EEG REPORT - NS EEG TEXT BOX
[facility]   COMPREHENSIVE EPILEPSY CENTER   REPORT OF CONTINUOUS VIDEO EEG     [facility]: [address], [place] [address], Ph#: [phone]  [facility]: [address] [place] [address], Ph#: [phone]  [facility]: [address] [place] [address], Ph#: [phone]    Patient Name: LORRI TEJEDA  Age and : 89y (30)  MRN #: [id number]  Location: [facility] 14  Referring Physician: Raúl Rizo    Start Time/Date: 14:05 on 10-30-21  End Time/Date: 08:00 on 10-31-21  Duration: 17 Hours 51 Mins    _____________________________________________________________  STUDY INFORMATION    EEG Recording Technique:  The patient underwent continuous Video-EEG monitoring, using Telemetry System hardware on the XLTek Digital System. EEG and video data were stored on a computer hard drive with important events saved in digital archive files. The material was reviewed by a physician (electroencephalographer / epileptologist) on a daily basis. Gaston and seizure detection algorithms were utilized and reviewed. An EEG Technician attended to the patient, and was available throughout daytime work hours.  The epilepsy center neurologist was available in person or on call 24-hours per day.    EEG Placement and Labeling of Electrodes:  The EEG was performed utilizing 20 channel referential EEG connections (coronal over temporal over parasagittal montage) using all standard 10-20 electrode placements with EKG, with additional electrodes placed in the inferior temporal region using the modified 10-10 montage electrode placements for elective admissions, or if deemed necessary. Recording was at a sampling rate of 256 samples per second per channel. Time synchronized digital video recording was done simultaneously with EEG recording. A low light infrared camera was used for low light recording.   _____________________________________________________________  HISTORY    Patient is a 89y old  Female who presents with a chief complaint of SDH (31 Oct 2021 06:02)    PERTINENT MEDICATION:  lacosamide IVPB 200 milliGRAM(s) IV Intermittent every 12 hours    _____________________________________________________________  STUDY INTERPRETATION    Findings: The background was continuous and poorly reactive. No posterior dominant rhythm seen. Marked voltage attenuation over left hemisphere, likely due to overlying fluid effect.    Background Slowing:  Excess diffuse polymorphic delta slowing.    Focal Slowing:   Continuous delta slowing over the left hemisphere, more prominent over temporal chain.    Sleep Background:  Drowsiness and stage II sleep transients were not recorded.    Other Non-Epileptiform Findings:  None were present.    Interictal Epileptiform Activity:   Near-continuous generalized periodic discharges (GPDs) at 1-1.5 Hz, better formed over the right hemisphere, but at times with shifting asymmetries, without clear evolution into an ictal pattern.    Events:  Clinical events: None recorded.  Seizures: None recorded.    Activation Procedures:   Hyperventilation was not performed.    Photic stimulation was not performed.     Artifacts:  Intermittent myogenic and movement artifacts were noted.    ECG:  The heart rate on single channel ECG was predominantly between  BPM.    _____________________________________________________________  EEG SUMMARY/CLASSIFICATION    Abnormal EEG in an comatose patient.    - Near-continuous generalized periodic discharges (GPDs) at 1-1.5 Hz, better formed over the right hemisphere without clear evolution into an ictal pattern.  - Background generalized slowing, including absent PDR.  - Marked voltage attenuation over left hemisphere, likely due to overlying fluid effect.    _____________________________________________________________  EEG IMPRESSION/CLINICAL CORRELATE    Abnormal EEG study.    - Diffuse cortical hyperexcitability with GPDs.   - Moderate to severe nonspecific diffuse or multifocal cerebral dysfunction.   - No seizure seen.  - Tachycardia noted, correlate clinically and with 12-lead EKG    *** PRELIMINARY REPORT - PENDING EPILEPSY ATTENDING REVIEW ***  _____________________________________________________________    Daniel Garza MD, ANKUR  Fellow, [facility]  [place] [facility]     [facility]   COMPREHENSIVE EPILEPSY CENTER   REPORT OF CONTINUOUS VIDEO EEG     [facility]: [address], [place] [address], Ph#: [phone]  [facility]: [address] [place] [address], Ph#: [phone]  [facility]: [address] [place] [address], Ph#: [phone]    Patient Name: LORRI TEJEDA  Age and : 89y (30)  MRN #: [id number]  Location: [facility] 14  Referring Physician: Raúl Rizo    Start Time/Date: 14:05 on 10-30-21  End Time/Date: 08:00 on 10-31-21  Duration: 17 Hours 51 Mins    _____________________________________________________________  STUDY INFORMATION    EEG Recording Technique:  The patient underwent continuous Video-EEG monitoring, using Telemetry System hardware on the XLTek Digital System. EEG and video data were stored on a computer hard drive with important events saved in digital archive files. The material was reviewed by a physician (electroencephalographer / epileptologist) on a daily basis. Gaston and seizure detection algorithms were utilized and reviewed. An EEG Technician attended to the patient, and was available throughout daytime work hours.  The epilepsy center neurologist was available in person or on call 24-hours per day.    EEG Placement and Labeling of Electrodes:  The EEG was performed utilizing 20 channel referential EEG connections (coronal over temporal over parasagittal montage) using all standard 10-20 electrode placements with EKG, with additional electrodes placed in the inferior temporal region using the modified 10-10 montage electrode placements for elective admissions, or if deemed necessary. Recording was at a sampling rate of 256 samples per second per channel. Time synchronized digital video recording was done simultaneously with EEG recording. A low light infrared camera was used for low light recording.   _____________________________________________________________  HISTORY    Patient is a 89y old  Female who presents with a chief complaint of SDH (31 Oct 2021 06:02)    PERTINENT MEDICATION:  lacosamide IVPB 200 milliGRAM(s) IV Intermittent every 12 hours    _____________________________________________________________  STUDY INTERPRETATION    Findings: The background was continuous and poorly reactive. No posterior dominant rhythm seen. Marked voltage attenuation over left hemisphere, likely due to overlying fluid effect.    Background Slowing:  Excess diffuse polymorphic delta slowing.    Focal Slowing:   Continuous delta slowing over the left hemisphere, more prominent over temporal chain.    Sleep Background:  Drowsiness and stage II sleep transients were not recorded.    Other Non-Epileptiform Findings:  None were present.    Interictal Epileptiform Activity:   Near-continuous generalized periodic discharges (GPDs) at 1-1.5 Hz, better formed over the right hemisphere, but at times with shifting asymmetries, without clear evolution into an ictal pattern.    Events:  Clinical events: None recorded.  Seizures: None recorded.    Activation Procedures:   Hyperventilation was not performed.    Photic stimulation was not performed.     Artifacts:  Intermittent myogenic and movement artifacts were noted.    ECG:  The heart rate on single channel ECG was predominantly between  BPM.    _____________________________________________________________  EEG SUMMARY/CLASSIFICATION    Abnormal EEG in an comatose patient.    - Near-continuous generalized periodic discharges (GPDs) at 1-1.5 Hz, better formed over the right hemisphere without clear evolution into an ictal pattern.  - Background generalized slowing, including absent PDR.  - Marked voltage attenuation over left hemisphere, likely due to overlying fluid effect.    _____________________________________________________________  EEG IMPRESSION/CLINICAL CORRELATE    Abnormal EEG study.    - Diffuse cortical hyperexcitability with GPDs.   - Moderate to severe nonspecific diffuse or multifocal cerebral dysfunction.   - No seizure seen.  - Tachycardia noted, correlate clinically and with 12-lead EKG    _____________________________________________________________    Daniel Garza MD, ANKUR  Fellow, [facility]  [place] [facility]

## 2021-10-31 NOTE — PROGRESS NOTE ADULT - ASSESSMENT
89 year old female PMH MDS, dementia with recent hospitalization s/p fall, found to have tSDH with MLS and L subcapital femoral nexk fx s/p CRPP (9/28/21) with course c/b acute blood loss anemia 2/2 hematoma/ileus, now presented from  for [facility] C[facility] w/ AoC SDH and R 1.4cm MLS , also with c/f sepsis. On presentation WBC of 26.01 with 61% PMN.     RVP (10/28) negative.   U/A (10/28) with 3-5 WBC.   BCx (10/28) with no growth.   C diff toxin assay (10/29) negative.    CT Chest (10/28) with no consolidations.   CT A/P (10/28) with stercoral colitis and pancolitis.   CT Brain (10/29) with Left-sided acute on chronic subdural hematoma is again seen as well as a right frontal subdural collection.     s/p 2x SEPS placement via left skull    Given sepsis/hypotensive presentation reasonable to cover for the pancolitis finding, unclear if truly infectious however,  Patient with baseline high-level leukocytosis, so unlikely to be reliable marker or underlying infectious process  C diff Negative  GI PCR Negative  Can continue Cefepime / Metronidazole     RECOMMENDATIONS:    #Pancolitis, Leukocytosis, Hypotension  --Can decrease Metronidazole to 500 mg IV Q12H  --Continue Cefepime 1g IV Q12H (Plan for 7 day course, 10/28 -->)  --Continue to follow CBC with diff  --Continue to follow renal function (Cr/BUN)  --Continue to follow transaminases  --Continue to follow temperature curve    #Positive BCx (CoNS - likely procurement contaminant)  --No need for Vancomycin at present  --Follow up on preliminary repeat blood cultures    I will continue to follow. Please feel free to contact me with any further questions.    Clinton Bobo M.D.  [facility] Division of Infectious Disease  8AM-5PM: Pager Number [phone]  After Hours (or if no response): Please contact the Infectious Diseases Office at [phone]

## 2021-10-31 NOTE — PROGRESS NOTE ADULT - ASSESSMENT
ASSESSMENT/PLAN:    89F hx of MDS w/ recent hospitalization (9/28/21) for fall, found to have tSDH w/ MLS and femoral fx s/p CRPP, now transferred from  for [facility] w/ R AoC SDH w/ MLS and subfalcine herniation, non-surgical candidate d/t poor functional status.    NEURO:  s/p Kcentra, vit. K in NSICU  s/p SEPS (10/29)  - neuro checks q4h  - vimpat 200 for sz ppx  c/w vEEG showing:  - Severe voltage attenuation over left hemisphere likely related to overlying fluid collection. Generalized sharp waves, better formed over R hemisphere. Continuous polymorphic delta slowing over L > R hemispheres    - delirium precautions  - poor prognosis, per family, would like to pursue aggressive care; on-going GOC with family    CT head in AM w/o significant change in hematoma collection or MLS    CVS:  type II NSTEMI  ?new onset pAfib rate controlled  - MAP>65  - cardiology following, not candidate for C; no need to trend trops  - amiodarone 200mg qd PO     PULM:  PIOTR opacity  - RA  - IS as tolerated  - maintain sats>92    RENAL:  - Fluids: 75cc/hr Plasmalyte  - daily IOs  - apprecaite nephro    GI:  Elevated INR/LFTs c/f assisted vs. shock liver   Patient with CTAP on 10/20 with pancolitis, large stool burden vs. obstruction & stercol colitis, though was treated with enemas with improvement; also showed small bowel compression at the time  s/p kcentra 10/28  - Diet: NPO  - Bowel regimen  - GI following for ?ogilive syndrome, now with rectal tube for decompression  - Tylenol level, hepatitis panel, utox  - trend coags, LFTs qd    ENDO:   - FS goal 120-180    HEME/ONC:  s/p Kcentra, vit K in NSICU  s/p 2uprbc at OSH  s/p 2uprbc 10/29 overnight  - SCDs  - Chemoppx: hold given heme  - Onc for MDS  - cbc q12  - transfuse to Hgb goal>8  - hemolytic labs negative     ID:  elevated leuks, with hx of MDS, no focal infectious source though imaging suggestive of sterco colitis   Cdiff negative (10/29), ordered to r/o toxic megacolon  - monitor for fevers  - c/w cefepime, flagyl (10/28 - ) for pancolitis  10/29 blood culture growing coag neg staph repeat blood culture tomorrow for clearance surveillance   - ID following      Patient is at high risk of neurologic deterioration/death due to: poor prognosis, heme expansion, herniation   ASSESSMENT/PLAN:    89F hx of MDS w/ recent hospitalization (9/28/21) for fall, found to have tSDH w/ MLS and femoral fx s/p CRPP, now transferred from  for [facility] C[facility] w/ R AoC SDH w/ MLS and subfalcine herniation, non-surgical candidate d/t poor functional status.    NEURO:  s/p Kcentra, vit. K in NSICU  s/p SEPS (10/29)  - neuro checks q4h  - vimpat 200 for sz ppx  c/w vEEG showing:  - Severe voltage attenuation over left hemisphere likely related to overlying fluid collection. Generalized sharp waves, better formed over R hemisphere. Continuous polymorphic delta slowing over L > R hemispheres  - delirium precautions  - poor prognosis, per family, would like to pursue aggressive care; on-going GOC with family  -seps drain to be removed per nsgy    CVS:  type II NSTEMI  ?new onset pAfib rate controlled  - MAP>65  - cardiology following, not candidate for LHC; no need to trend trops  - amiodarone 200mg qd PO     PULM:  PIOTR opacity  - RA  - IS as tolerated  - maintain sats>92  aspiration precautions     RENAL:  - Fluids: 75cc/hr Plasmalyte  - daily IOs  - apprecaite nephro  -remove padron    GI:  Elevated INR/LFTs c/f MESSI vs. shock liver   Patient with CTAP on 10/20 with pancolitis, large stool burden vs. obstruction & stercol colitis, though was treated with enemas with improvement; also showed small bowel compression at the time  s/p kcentra 10/28  - Diet: start  feeds   - Bowel regimen  - GI following for ?ogilive syndrome, now with rectal tube for decompression  - Tylenol level, hepatitis panel, utox    ENDO:   - FS goal 120-180    HEME/ONC:  s/p Kcentra, vit K in NSICU  s/p 2uprbc at OSH  s/p 2uprbc 10/29 overnight  - SCDs  - Chemoppx: hold given heme  - Onc for MDS  - cbc q24  - transfuse to Hgb goal>8  - hemolytic labs negative     ID:  elevated leuks, with hx of MDS, no focal infectious source though imaging suggestive of sterco colitis   Cdiff negative (10/29), ordered to r/o toxic megacolon  - monitor for fevers  - c/w cefepime, flagyl (10/28 - ) for pancolitis  -10/29 blood culture growing coag neg staph repeat blood culture tomorrow for clearance surveillance   - ID following      Patient is at high risk of neurologic deterioration/death due to: poor prognosis, heme expansion, herniation

## 2021-10-31 NOTE — PROGRESS NOTE ADULT - ASSESSMENT
89 year old female with subdural hematoma and abdominal distention    1. Subdural hematoma  -per NSX    2. Abdoinal distention. Suspect [place] syndrome in the setting of SDH, critical illness, now s/p successful decompression with rectal tube, cdiff negative.    3. Leukocytosis    4. Anemia, MDS, no overt GI bleeding, labs consistent w hemolysis, consider heme consult      89 year old female with subdural hematoma and abdominal distention    1. Subdural hematoma  -per NSX    2. Abdoinal distention. Suspect [place] syndrome in the setting of SDH, critical illness, now s/p successful decompression with rectal tube, cdiff negative.    3. Leukocytosis    4. Anemia, MDS, no overt GI bleeding, labs consistent w hemolysis, consider heme consult    5. Shock liver improvinga

## 2021-10-31 NOTE — CHART NOTE - NSCHARTNOTEFT_GEN_A_CORE
CT after SEPS drain removal much worse with subfalcine and uncal herniation. I contacted family via telephone to discuss pts worsening condition and CT findings. I spoke with pts son Francisco Javier and his wife Rianna. I discussed that given pt age, size of SDH and clinical condition, pt will not recover and will likely need to be placed on ventilator. I discussed the options of comfort care and palliation. after answering all questions Francisco Javier decided to make DNR and DNI at this time.

## 2021-10-31 NOTE — PROGRESS NOTE ADULT - SUBJECTIVE AND OBJECTIVE BOX
Follow Up:  leukocytosis, pancolitis    Interval History: afebrile. no acute overnight events.     REVIEW OF SYSTEMS  [  ] ROS unobtainable because:    [ x ] All other systems negative except as noted below    Constitutional:  [ ] fever [ ] chills  [ ] weight loss  [ ] weakness  Skin:  [ ] rash [ ] phlebitis	  Eyes: [ ] icterus [ ] pain  [ ] discharge	  ENMT: [ ] sore throat  [ ] thrush [ ] ulcers [ ] exudates  Respiratory: [ ] dyspnea [ ] hemoptysis [ ] cough [ ] sputum	  Cardiovascular:  [ ] chest pain [ ] palpitations [ ] edema	  Gastrointestinal:  [ ] nausea [ ] vomiting [ ] diarrhea [ ] constipation [ ] pain	  Genitourinary:  [ ] dysuria [ ] frequency [ ] hematuria [ ] discharge [ ] flank pain  [ ] incontinence  Musculoskeletal:  [ ] myalgias [ ] arthralgias [ ] arthritis  [ ] back pain  Neurological:  [ ] headache [ ] seizures  [ ] confusion/altered mental status    Allergies  No Known Allergies        ANTIMICROBIALS:  cefepime   IVPB    cefepime   IVPB 1000 every 12 hours  metroNIDAZOLE  IVPB    metroNIDAZOLE  IVPB 500 every 8 hours      OTHER MEDS:  MEDICATIONS  (STANDING):  aMIOdarone    Tablet 200 daily  bisacodyl 5 daily PRN  lacosamide IVPB 200 every 12 hours  pantoprazole  Injectable 40 daily      Vital Signs Last 24 Hrs  T(C): 37.4 (31 Oct 2021 11:00), Max: 37.5 (31 Oct 2021 07:00)  T(F): 99.3 (31 Oct 2021 11:00), Max: 99.5 (31 Oct 2021 07:00)  HR: 92 (31 Oct 2021 14:00) (80 - 114)  BP: 138/80 (31 Oct 2021 14:00) (93/72 - 138/86)  BP(mean): 97 (31 Oct 2021 14:00) (72 - 102)  RR: 25 (31 Oct 2021 14:00) (11 - 33)  SpO2: 95% (31 Oct 2021 14:00) (94% - 100%)    PHYSICAL EXAMINATION:  General: Awake but not alert  Cardiac: RRR, No M/R/G  Resp: CTAB, No Wh/Rh/Ra  Abdomen: NBS, NT/ND, No HSM, No rigidity or guarding  MSK: No LE edema. No Calf tenderness  Skin: No rashes or lesions. Skin is warm and dry to the touch.   Neuro: Alert and Awake. CN 2-12 Grossly intact. Moves all four extremities spontaneously.  Psych: Encephalopathic - unable to assess                          9.9    31.65 )-----------( 190      ( 30 Oct 2021 21:33 )             30.3       10-30    150<H>  |  118<H>  |  30<H>  ----------------------------<  118<H>  3.0<L>   |  19<L>  |  0.62    Ca    7.4<L>      30 Oct 2021 21:33  Phos  2.7     10-30  Mg     1.9     10-30    TPro  4.4<L>  /  Alb  2.6<L>  /  TBili  0.7  /  DBili  x   /  AST  148<H>  /  ALT  107<H>  /  AlkPhos  165<H>  10-30    MICROBIOLOGY:    .Stool Feces  10-29-21   GI PCR Results: NOT detected  *******Please Note:*******  GI panel PCR evaluates for:  Campylobacter, Plesiomonas shigelloides, Salmonella,  Vibrio, Yersinia enterocolitica, Enteroaggregative  Escherichia coli (EAEC), Enteropathogenic E.coli (EPEC),  Enterotoxigenic E. coli (ETEC) lt/st, Shiga-like  toxin-producing E. coli (STEC) stx1/stx2,  Shigella/ Enteroinvasive E. coli (EIEC), Cryptosporidium,  Cyclospora cayetanensis, Entamoeba histolytica,  Giardia lamblia, Adenovirus F 40/41, Astrovirus,  Norovirus GI/GII, Rotavirus A, Sapovirus  --  --      .Blood Blood  10-29-21   Growth in aerobic bottle: Staphylococcus pettenkoferi Coag Negative  Staphylococcus  Single set isolate, possible contaminant. Contact  Microbiology if susceptibility testing clinically  indicated.  ***Blood Panel PCR results on this specimen are available  approximately 3 hours after the Gram stain result.***  Gram stain, PCR, and/or culture results may not always  correspond due to difference in methodologies.  ************************************************************  This PCR assay was performed by multiplex PCR. This  Assay tests for 66 bacterial and resistance gene targets.  Please refer to the [facility] Labs test directory  at [email or web address] for details.  --  Blood Culture PCR      .Blood Blood  10-28-21   No growth to date.  --  --      .Blood Blood-Peripheral  10-28-21   No growth to date.  --  --      .Blood Blood-Venous  10-07-21   No Growth Final  --  --      .Blood Blood  10-04-21   No Growth Final  --  --      .Blood Blood  10-02-21   No Growth Final  --  --          Rapid RVP Result: NotDetec (10-28 @ 01:11)    Clostridium difficile GDH Toxins A&amp;B, EIA:   Negative (10-29-21 @ 08:09)  Clostridium difficile GDH Interpretation: Negative for toxigenic C. Difficile.  This specimen is negative for C.  Difficile glutamate dehydrogenase (GDH) antigen and negative for C.  Difficile Toxins A & B, by EIA.  GDH is a highly sensitive screening  marker for C. Difficile that is produced in large amounts by all C.  Difficile strains, both toxigenic and nontoxigenic.  This assay has not  been validated as a test of cure.  Repeat testing during the same episode  of diarrhea is of limited value and is discouraged.  The results of this  assay should always be interpreted in conjunction with pateint's clinical  history. (10-29-21 @ 08:09)      RADIOLOGY:    <The imaging below has been reviewed and visualized by me independently. Findings as detailed in report below>    < from: Xray Chest 1 View- PORTABLE-Urgent (Xray Chest 1 View- PORTABLE-Urgent .) (10.31.21 @ 06:07) >  IMPRESSION:    The heart is slightly enlarged. The right hemithorax is not included in this study. The left lung appears to be clear. Nasogastric tube was placed and it appears to be in good position.    < end of copied text >

## 2021-10-31 NOTE — PROGRESS NOTE ADULT - SUBJECTIVE AND OBJECTIVE BOX
EVENTS:   No acute events.    VITALS:  T(C): , Max: 37.6 (10-31-21 @ 19:00)  HR:  (80 - 104)  BP:  (93/72 - 138/80)  ABP: --  RR:  (11 - 33)  SpO2:  (94% - 99%)  Wt(kg): --      10-30-21 @ 07:01  -  10-31-21 @ 07:00  --------------------------------------------------------  IN: 3925 mL / OUT: 2448 mL / NET: 1477 mL    10-31-21 @ 07:01  -  10-31-21 @ 19:58  --------------------------------------------------------  IN: 1710 mL / OUT: 350 mL / NET: 1360 mL      LABS:  Na: 150 (10-30 @ 21:33), 151 (10-30 @ 06:31), 149 (10-30 @ 00:13), 145 (10-29 @ 23:21), 144 (10-29 @ 21:52), 152 (10-29 @ 07:28), 151 (10-28 @ 22:51)  K: 3.0 (10-30 @ 21:33), 3.6 (10-30 @ 06:31), 4.0 (10-30 @ 00:13), 4.3 (10-29 @ 23:21), 3.1 (10-29 @ 21:52), 3.3 (10-29 @ 07:28), 3.2 (10-28 @ 22:51)  Cl: 118 (10-30 @ 21:33), 116 (10-30 @ 06:31), 120 (10-30 @ 00:13), 107 (10-29 @ 23:21), 112 (10-29 @ 21:52), 119 (10-29 @ 07:28), 116 (10-28 @ 22:51)  CO2: 19 (10-30 @ 21:33), 18 (10-30 @ 06:31), 18 (10-30 @ 00:13), 13 (10-29 @ 23:21), 10 (10-29 @ 21:52), 19 (10-29 @ 07:28), 17 (10-28 @ 22:51)  BUN: 30 (10-30 @ 21:33), 34 (10-30 @ 06:31), 40 (10-30 @ 00:13), 26 (10-29 @ 23:21), 21 (10-29 @ 21:52), 48 (10-29 @ 07:28), 48 (10-28 @ 22:51)  Cr: 0.62 (10-30 @ 21:33), 0.54 (10-30 @ 06:31), 0.57 (10-30 @ 00:13), 0.35 (10-29 @ 23:21), <0.30 (10-29 @ 21:52), 0.80 (10-29 @ 07:28), 0.86 (10-28 @ 22:51)  Glu: 118(10-30 @ 21:33), 103(10-30 @ 06:31), 122(10-30 @ 00:13), 78(10-29 @ 23:21), 62(10-29 @ 21:52), 105(10-29 @ 07:28), 101(10-28 @ 22:51)    Hgb: 9.9 (10-30 @ 21:33), 10.7 (10-30 @ 15:16), 10.8 (10-30 @ 06:31), 6.2 (10-30 @ 00:13), 4.8 (10-29 @ 23:21), 3.3 (10-29 @ 21:52), 7.6 (10-28 @ 22:47)  Hct: 30.3 (10-30 @ 21:33), 32.4 (10-30 @ 15:16), 33.5 (10-30 @ 06:31), 22.2 (10-30 @ 00:13), 16.4 (10-29 @ 23:21), 11.8 (10-29 @ 21:52), 26.7 (10-28 @ 22:47)  WBC: 31.65 (10-30 @ 21:33), 35.30 (10-30 @ 15:16), 40.50 (10-30 @ 06:31), 39.19 (10-30 @ 00:13), 31.80 (10-29 @ 23:21), 20.86 (10-29 @ 21:52), 40.41 (10-28 @ 22:47)  Plt: 190 (10-30 @ 21:33), 202 (10-30 @ 15:16), 258 (10-30 @ 06:31), 276 (10-30 @ 00:13), 230 (10-29 @ 23:21), 151 (10-29 @ 21:52), 374 (10-28 @ 22:47)    INR: 1.11 10-30-21 @ 00:57, 1.11 10-28-21 @ 23:13  PTT: 29.7 10-30-21 @ 00:57, 28.0 10-28-21 @ 23:13    MEDICATIONS:  aMIOdarone    Tablet 200 milliGRAM(s) Oral daily  bisacodyl 5 milliGRAM(s) Oral daily PRN  cefepime   IVPB 1000 milliGRAM(s) IV Intermittent every 12 hours  cefepime   IVPB      chlorhexidine 4% Liquid 1 Application(s) Topical every 24 hours  folic acid 1 milliGRAM(s) Enteral Tube daily  lacosamide IVPB 200 milliGRAM(s) IV Intermittent every 12 hours  metroNIDAZOLE  IVPB 500 milliGRAM(s) IV Intermittent every 8 hours  metroNIDAZOLE  IVPB      multiple electrolytes Injection Type 1 1000 milliLiter(s) IV Continuous <Continuous>  pantoprazole  Injectable 40 milliGRAM(s) IV Push daily  thiamine 100 milliGRAM(s) Enteral Tube daily    EXAMINATION:  General:  calm  HEENT:  MMM  Neuro:  awake, alert, oriented x 3, follows commands, moves all extremities  Cards:  RRR  Respiratory:  no respiratory distress  Abdomen:  soft  Extremities:  no edema    Assessment/Plan:   89yo woman hx of MDS who is admitted since 10/28 with AMS /2 large L sided SDH s/p SD drain with progression of size of SDH now almost 4cm with 2cm MLS, deemed a poor surgical candidate by neurosurgery. Now DNR/DNI with ongoing GOC discussions.       EVENTS:   No acute events.    VITALS:  T(C): , Max: 37.6 (10-31-21 @ 19:00)  HR:  (80 - 104)  BP:  (93/72 - 138/80)  ABP: --  RR:  (11 - 33)  SpO2:  (94% - 99%)  Wt(kg): --      10-30-21 @ 07:01  -  10-31-21 @ 07:00  --------------------------------------------------------  IN: 3925 mL / OUT: 2448 mL / NET: 1477 mL    10-31-21 @ 07:01  -  10-31-21 @ 19:58  --------------------------------------------------------  IN: 1710 mL / OUT: 350 mL / NET: 1360 mL      LABS:  Na: 150 (10-30 @ 21:33), 151 (10-30 @ 06:31), 149 (10-30 @ 00:13), 145 (10-29 @ 23:21), 144 (10-29 @ 21:52), 152 (10-29 @ 07:28), 151 (10-28 @ 22:51)  K: 3.0 (10-30 @ 21:33), 3.6 (10-30 @ 06:31), 4.0 (10-30 @ 00:13), 4.3 (10-29 @ 23:21), 3.1 (10-29 @ 21:52), 3.3 (10-29 @ 07:28), 3.2 (10-28 @ 22:51)  Cl: 118 (10-30 @ 21:33), 116 (10-30 @ 06:31), 120 (10-30 @ 00:13), 107 (10-29 @ 23:21), 112 (10-29 @ 21:52), 119 (10-29 @ 07:28), 116 (10-28 @ 22:51)  CO2: 19 (10-30 @ 21:33), 18 (10-30 @ 06:31), 18 (10-30 @ 00:13), 13 (10-29 @ 23:21), 10 (10-29 @ 21:52), 19 (10-29 @ 07:28), 17 (10-28 @ 22:51)  BUN: 30 (10-30 @ 21:33), 34 (10-30 @ 06:31), 40 (10-30 @ 00:13), 26 (10-29 @ 23:21), 21 (10-29 @ 21:52), 48 (10-29 @ 07:28), 48 (10-28 @ 22:51)  Cr: 0.62 (10-30 @ 21:33), 0.54 (10-30 @ 06:31), 0.57 (10-30 @ 00:13), 0.35 (10-29 @ 23:21), <0.30 (10-29 @ 21:52), 0.80 (10-29 @ 07:28), 0.86 (10-28 @ 22:51)  Glu: 118(10-30 @ 21:33), 103(10-30 @ 06:31), 122(10-30 @ 00:13), 78(10-29 @ 23:21), 62(10-29 @ 21:52), 105(10-29 @ 07:28), 101(10-28 @ 22:51)    Hgb: 9.9 (10-30 @ 21:33), 10.7 (10-30 @ 15:16), 10.8 (10-30 @ 06:31), 6.2 (10-30 @ 00:13), 4.8 (10-29 @ 23:21), 3.3 (10-29 @ 21:52), 7.6 (10-28 @ 22:47)  Hct: 30.3 (10-30 @ 21:33), 32.4 (10-30 @ 15:16), 33.5 (10-30 @ 06:31), 22.2 (10-30 @ 00:13), 16.4 (10-29 @ 23:21), 11.8 (10-29 @ 21:52), 26.7 (10-28 @ 22:47)  WBC: 31.65 (10-30 @ 21:33), 35.30 (10-30 @ 15:16), 40.50 (10-30 @ 06:31), 39.19 (10-30 @ 00:13), 31.80 (10-29 @ 23:21), 20.86 (10-29 @ 21:52), 40.41 (10-28 @ 22:47)  Plt: 190 (10-30 @ 21:33), 202 (10-30 @ 15:16), 258 (10-30 @ 06:31), 276 (10-30 @ 00:13), 230 (10-29 @ 23:21), 151 (10-29 @ 21:52), 374 (10-28 @ 22:47)    INR: 1.11 10-30-21 @ 00:57, 1.11 10-28-21 @ 23:13  PTT: 29.7 10-30-21 @ 00:57, 28.0 10-28-21 @ 23:13    MEDICATIONS:  aMIOdarone    Tablet 200 milliGRAM(s) Oral daily  bisacodyl 5 milliGRAM(s) Oral daily PRN  cefepime   IVPB 1000 milliGRAM(s) IV Intermittent every 12 hours  cefepime   IVPB      chlorhexidine 4% Liquid 1 Application(s) Topical every 24 hours  folic acid 1 milliGRAM(s) Enteral Tube daily  lacosamide IVPB 200 milliGRAM(s) IV Intermittent every 12 hours  metroNIDAZOLE  IVPB 500 milliGRAM(s) IV Intermittent every 8 hours  metroNIDAZOLE  IVPB      multiple electrolytes Injection Type 1 1000 milliLiter(s) IV Continuous <Continuous>  pantoprazole  Injectable 40 milliGRAM(s) IV Push daily  thiamine 100 milliGRAM(s) Enteral Tube daily    EXAMINATION:  General:  in NAD  HEENT:  dry MM  Neuro: eyes closed does to open eyes to voice, does nto follow commands, pinpoin pupils b/l with R leg triple flexion   Cards:  RRR  Respiratory:  no respiratory distress  Abdomen:  distended but soft  Extremities:  with edema of all 4 extremities     Assessment/Plan:   89yo woman hx of MDS who is admitted since 10/28 with AMS /2 large L sided SDH s/p SD drain with progression of size of SDH now almost 4cm with 2cm MLS, deemed a poor surgical candidate by neurosurgery. Now DNR/DNI with ongoing GOC discussions.     No change to plan from daytime.  on tube feeds, FWB 300cc q6h   on vimpat [id number]  holding DVT ppx  stop PPI  c/w cefepime and flagyl for pancolitis    plasmalyte 75cc/hr  EVENTS:   Patient made DNR/DNI by family during daytime.  VEEG without seizures.     VITALS:  T(C): , Max: 37.6 (10-31-21 @ 19:00)  HR:  (80 - 104)  BP:  (93/72 - 138/80)  ABP: --  RR:  (11 - 33)  SpO2:  (94% - 99%)  Wt(kg): --      10-30-21 @ 07:01  -  10-31-21 @ 07:00  --------------------------------------------------------  IN: 3925 mL / OUT: 2448 mL / NET: 1477 mL    10-31-21 @ 07:01  -  10-31-21 @ 19:58  --------------------------------------------------------  IN: 1710 mL / OUT: 350 mL / NET: 1360 mL      LABS:  Na: 150 (10-30 @ 21:33), 151 (10-30 @ 06:31), 149 (10-30 @ 00:13), 145 (10-29 @ 23:21), 144 (10-29 @ 21:52), 152 (10-29 @ 07:28), 151 (10-28 @ 22:51)  K: 3.0 (10-30 @ 21:33), 3.6 (10-30 @ 06:31), 4.0 (10-30 @ 00:13), 4.3 (10-29 @ 23:21), 3.1 (10-29 @ 21:52), 3.3 (10-29 @ 07:28), 3.2 (10-28 @ 22:51)  Cl: 118 (10-30 @ 21:33), 116 (10-30 @ 06:31), 120 (10-30 @ 00:13), 107 (10-29 @ 23:21), 112 (10-29 @ 21:52), 119 (10-29 @ 07:28), 116 (10-28 @ 22:51)  CO2: 19 (10-30 @ 21:33), 18 (10-30 @ 06:31), 18 (10-30 @ 00:13), 13 (10-29 @ 23:21), 10 (10-29 @ 21:52), 19 (10-29 @ 07:28), 17 (10-28 @ 22:51)  BUN: 30 (10-30 @ 21:33), 34 (10-30 @ 06:31), 40 (10-30 @ 00:13), 26 (10-29 @ 23:21), 21 (10-29 @ 21:52), 48 (10-29 @ 07:28), 48 (10-28 @ 22:51)  Cr: 0.62 (10-30 @ 21:33), 0.54 (10-30 @ 06:31), 0.57 (10-30 @ 00:13), 0.35 (10-29 @ 23:21), <0.30 (10-29 @ 21:52), 0.80 (10-29 @ 07:28), 0.86 (10-28 @ 22:51)  Glu: 118(10-30 @ 21:33), 103(10-30 @ 06:31), 122(10-30 @ 00:13), 78(10-29 @ 23:21), 62(10-29 @ 21:52), 105(10-29 @ 07:28), 101(10-28 @ 22:51)    Hgb: 9.9 (10-30 @ 21:33), 10.7 (10-30 @ 15:16), 10.8 (10-30 @ 06:31), 6.2 (10-30 @ 00:13), 4.8 (10-29 @ 23:21), 3.3 (10-29 @ 21:52), 7.6 (10-28 @ 22:47)  Hct: 30.3 (10-30 @ 21:33), 32.4 (10-30 @ 15:16), 33.5 (10-30 @ 06:31), 22.2 (10-30 @ 00:13), 16.4 (10-29 @ 23:21), 11.8 (10-29 @ 21:52), 26.7 (10-28 @ 22:47)  WBC: 31.65 (10-30 @ 21:33), 35.30 (10-30 @ 15:16), 40.50 (10-30 @ 06:31), 39.19 (10-30 @ 00:13), 31.80 (10-29 @ 23:21), 20.86 (10-29 @ 21:52), 40.41 (10-28 @ 22:47)  Plt: 190 (10-30 @ 21:33), 202 (10-30 @ 15:16), 258 (10-30 @ 06:31), 276 (10-30 @ 00:13), 230 (10-29 @ 23:21), 151 (10-29 @ 21:52), 374 (10-28 @ 22:47)    INR: 1.11 10-30-21 @ 00:57, 1.11 10-28-21 @ 23:13  PTT: 29.7 10-30-21 @ 00:57, 28.0 10-28-21 @ 23:13    MEDICATIONS:  aMIOdarone    Tablet 200 milliGRAM(s) Oral daily  bisacodyl 5 milliGRAM(s) Oral daily PRN  cefepime   IVPB 1000 milliGRAM(s) IV Intermittent every 12 hours  cefepime   IVPB      chlorhexidine 4% Liquid 1 Application(s) Topical every 24 hours  folic acid 1 milliGRAM(s) Enteral Tube daily  lacosamide IVPB 200 milliGRAM(s) IV Intermittent every 12 hours  metroNIDAZOLE  IVPB 500 milliGRAM(s) IV Intermittent every 8 hours  metroNIDAZOLE  IVPB      multiple electrolytes Injection Type 1 1000 milliLiter(s) IV Continuous <Continuous>  pantoprazole  Injectable 40 milliGRAM(s) IV Push daily  thiamine 100 milliGRAM(s) Enteral Tube daily    EXAMINATION:  General:  in NAD  HEENT:  dry MM, with C-collar  Neuro: eyes closed, does to open eyes to voice, does not follow commands, pinpoint pupils, no movement in UEs, with R leg triple flexion   Cards:  RRR  Respiratory:  no respiratory distress  Abdomen:  distended but soft  Extremities:  with edema of all 4 extremities     Assessment/Plan:   89yo woman hx of MDS who is admitted since 10/28 with AMS 2/2 large L sided SDH s/p SD drain with progression of size of SDH now almost 4cm with 2cm MLS, deemed a poor surgical candidate by neurosurgery. Now DNR/DNI with ongoing GOC discussions.     No change to plan from daytime.  on tube feeds, FWB 300cc q6h   on vimpat [id number]  holding DVT ppx  stop PPI  c/w cefepime and flagyl for pancolitis   plasmalyte 75cc/hr     Arlette Miles  Neurocritical Care Attending

## 2021-11-01 NOTE — PROGRESS NOTE ADULT - PROBLEM SELECTOR PLAN 1
Likely type 2 demand mediated ischemia due to sepsis/vasodilation and anemia/hypovolemia  Hyperdynamic LV on TTE   monitor on tele   would not trend trops as it will not    Increase amiodarone 200 bid

## 2021-11-01 NOTE — PROGRESS NOTE ADULT - SUBJECTIVE AND OBJECTIVE BOX
Subjective: Patient seen and examined. No new events except as noted.   remains in NSCU   trigeminy on tele  remains with poor exam      REVIEW OF SYSTEMS:  Unable to obtain       MEDICATIONS:  MEDICATIONS  (STANDING):  aMIOdarone    Tablet 200 milliGRAM(s) Oral every 12 hours  cefepime   IVPB 1000 milliGRAM(s) IV Intermittent every 12 hours  cefepime   IVPB      chlorhexidine 4% Liquid 1 Application(s) Topical every 24 hours  folic acid 1 milliGRAM(s) Enteral Tube daily  lacosamide IVPB 200 milliGRAM(s) IV Intermittent every 12 hours  metroNIDAZOLE  IVPB 500 milliGRAM(s) IV Intermittent every 12 hours  thiamine 100 milliGRAM(s) Enteral Tube daily      PHYSICAL EXAM:  T(C): 36.7 (21 @ 07:00), Max: 37.6 (10-31-21 @ 19:00)  HR: 93 (21 @ 08:00) (79 - 103)  BP: 123/65 (21 @ 08:00) (90/54 - 144/73)  RR: 25 (21 @ 08:00) (14 - 29)  SpO2: 98% (21 @ 08:00) (94% - 99%)  Wt(kg): --  I&O's Summary    31 Oct 2021 07:  -  2021 07:00  --------------------------------------------------------  IN: 3875 mL / OUT: 1475 mL / NET: 2400 mL    2021 07:  -  2021 09:51  --------------------------------------------------------  IN: 100 mL / OUT: 60 mL / NET: 40 mL              Appearance: NAD +drain	  HEENT:   dry oral mucosa, +NGT   Lymphatic: No lymphadenopathy  Cardiovascular: Normal S1 S2, No JVD, No murmurs, No edema  Respiratory: decreased bs   Psychiatry: A & O x 0  Gastrointestinal:  Soft, Non-tender, + BS	  Skin: No rashes, No ecchymoses, No cyanosis	  Ext: No edema  Neuro: AOx0, EO to nox, roving eye movements, pinpoint pupils, no FC, LUE WD, RUE nothing, BLE TF    LABS:    CARDIAC MARKERS:                        9.9    31.65 )-----------( 190      ( 30 Oct 2021 21:33 )             30.3     10-31    145  |  116<H>  |  23  ----------------------------<  142<H>  3.3<L>   |  19<L>  |  0.56    Ca    7.4<L>      31 Oct 2021 23:46  Phos  2.0     10-31  Mg     2.0     10-31    TPro  4.4<L>  /  Alb  2.6<L>  /  TBili  0.7  /  DBili  x   /  AST  148<H>  /  ALT  107<H>  /  AlkPhos  165<H>  10-30      TELEMETRY: 	SR Trigeminy     ECG:  	  RADIOLOGY: < from: CT Head No Cont (10.31.21 @ 14:44) >    EXAM:  CT BRAIN                            PROCEDURE DATE:  10/31/2021            INTERPRETATION:  INDICATIONS:  SDH s/p SEPs now removed    TECHNIQUE:  Serial axial images were obtained from the skull base to the vertex without intravenous contrast. Sagittal and Coronal reformats were performed    COMPARISON EXAMINATION: 10/30/2021    FINDINGS:  VENTRICLES AND SULCI:  Mass effect on the left lateral ventricle now with greater than 2 cm midline shift to the right with progressive dilatation of the right lateral ventricle likely on the basis of herniation and compromise at the level of the right foramen of Rajni. Subfalcine herniation and uncal herniation apparent.  INTRA-AXIAL:  No mass, blood or abnormal attenuation is seen.  EXTRA-AXIAL: Very large mixed attenuation left subdural collection with hemorrhage and fluid and trace air measuring 3.6 cm in greatest width may be slightly decreased in size compared with the prior previously measuring 3.9 cm at roughly the same level. The priordid have the SEPs device in place. Small right frontal subdural fluid collection  VISUALIZED SINUSES: Right maxillary sinus osseous thickening  VISUALIZED MASTOIDS:  Clear.  CALVARIUM: Small calvarial defect on the left for placement of the SEPS device  MISCELLANEOUS:  None.    IMPRESSION:  Very large mixed attenuation left subdural hematoma with air fluid and acute hemorrhage recognized. Midline shift to the right now greater than 2 cm. Evidence of subfalcine and uncal herniation. Previously seen SEPS device has been removed. Small right frontal subdural fluid collection    --- End of Report ---                BRUNA MALLOY MD; Attending Radiologist  This document has been electronically signed. Oct 31 2021  3:14PM    < end of copied text >    TECH INFORMATION:   This is a Continuous Video EEG.     EEG REPORT:   EEG Report:  · EEG Report	  [facility] EPILEPS[facility] [place]   REPORT OF CONTINUOUS VIDEO EEG     [facility]: [address] [place] [address], [address], [place] [address], Ph#: [phone]  [facility]: [address] [place] [address], Ph#: [phone]  [facility]: [address] [place] [address], Ph#: [phone]    Patient Name: LORRI TEJEDA  Age and : 89y (30)  MRN #: [id number]  Location: [facility] 14  Referring Physician: Raúl Rizo    Start Time/Date: 08:00 on 10-31-21  End Time/Date: 13:59 on 10-31-21  Duration: 05 Hours 59 Mins    _____________________________________________________________  STUDY INFORMATION    EEG Recording Technique:  The patient underwent continuous Video-EEG monitoring, using Telemetry System hardware on the XLTek Digital System. EEG and video data were stored on a computer hard drive with important events saved in digital archive files. The material was reviewed by a physician (electroencephalographer / epileptologist) on a daily basis. [place] and seizure detection algorithms were utilized and reviewed. An EEG Technician attended to the patient, and was available throughout daytime work hours.  The epilepsy center neurologist was available in person or on call 24-hours per day.    EEG Placement and Labeling of Electrodes:  The EEG was performed utilizing 20 channel referential EEG connections (coronal over temporal over parasagittal montage) using all standard 10-20 electrode placements with EKG, with additional electrodes placed in the inferior temporal region using the modified 10-10 montage electrode placements for elective admissions, or if deemed necessary. Recording was at a sampling rate of 256 samples per second per channel. Time synchronized digital video recording was done simultaneously with EEG recording. A low light infrared camera was used for low light recording.   _____________________________________________________________  HISTORY    Patient is a 89y old  Female who presents with a chief complaint of SDH (31 Oct 2021 06:02)    PERTINENT MEDICATION:  lacosamide IVPB 200 milliGRAM(s) IV Intermittent every 12 hours    _____________________________________________________________  STUDY INTERPRETATION    Findings: The background was continuous and poorly reactive. No posterior dominant rhythm seen. Marked voltage attenuation over left hemisphere, likely due to overlying fluid effect.    Background Slowing:  Excess diffuse polymorphic delta slowing.    Focal Slowing:   Continuous delta slowing over the left hemisphere, more prominent over temporal chain.    Sleep Background:  Drowsiness and stage II sleep transients were not recorded.    Other Non-Epileptiform Findings:  None were present.    Interictal Epileptiform Activity:   Near-continuous generalized periodic discharges (GPDs) at 1-1.5 Hz, better formed over the right hemisphere, but at times with shifting asymmetries, without clear evolution into an ictal pattern.    Events:  Clinical events: None recorded.  Seizures: None recorded.    Activation Procedures:   Hyperventilation was not performed.    Photic stimulation was not performed.     Artifacts:  Intermittent myogenic and movement artifacts were noted.    ECG:  The heart rate on single channel ECG was predominantly between  BPM.    _____________________________________________________________  EEG SUMMARY/CLASSIFICATION    Abnormal EEG in an comatose patient.    - Near-continuous generalized periodic discharges (GPDs) at 1-1.5 Hz, better formed over the right hemisphere without clear evolution into an ictal pattern.  - Background generalized slowing, including absent PDR.  - Marked voltage attenuation over left hemisphere, likely due to overlying fluid effect.    _____________________________________________________________  EEG IMPRESSION/CLINICAL CORRELATE    Abnormal EEG study.    - Diffuse cortical hyperexcitability with GPDs.   - Moderate to severe nonspecific diffuse or multifocal cerebral dysfunction.   - No seizure seen.  - Tachycardia noted, correlate clinically and with 12-lead EKG    _____________________________________________________________    Daniel Garza MD, ANKUR  Fellow, [facility] [place]  [place] [facility] Neuro[facility]          Electronic Signatures:  Daniel Garza)  (Signed 31-Oct-2021 22:11)  	Authored: TECH INFORMATION, EEG REPORT  Oksana Agosto)  (Signed 31-Oct-2021 23:01)  	Authored: TECH INFORMATION  	Co-Signer: TECH INFORMATION, EEG REPORT      Last Updated: 31-Oct-2021 23:01 by Oksana Agosto)DIAGNOSTIC TESTING:  [ ] Echocardiogram:  [ ]  Catheterization:  [ ] Stress Test:    OTHER:

## 2021-11-01 NOTE — SWALLOW BEDSIDE ASSESSMENT ADULT - COMMENTS
ID following for leukocytosis->Given sepsis/hypotensive presentation reasonable to cover for the pancolitis finding, unclear if truly infectious however,  Patient with baseline high-level leukocytosis, so unlikely to be reliable marker or underlying infectious process  GI f/u: Abdoinal distention. 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  10/30: Continued poor exam and continued on broad spectrum abx. Poor prognosis, per family, would like to pursue aggressive care; on-going GOC with family. seps drain to be removed per nsgy 10/31  Per attending event note on 10/31: CT after SEPS drain removal much worse with subfalcine and uncal herniation. I contacted family via telephone to discuss pts worsening condition and CT findings. I spoke with pts son Francisco Javier and his wife Rianna. I discussed that given pt age, size of SDH and clinical condition, pt will not recover and will likely need to be placed on ventilator. I discussed the options of comfort care and palliation. after answering all questions Francisco Javier decided to make DNR and DNI at this time.

## 2021-11-01 NOTE — DIETITIAN INITIAL EVALUATION ADULT. - CHIEF COMPLAINT
Pt is a 89 yo F with PMH: MDS with recent hospitalization (9/28) for fall. Found to have a tSDH with MLS and femoral fracture, s/p CRPP. Now transferred from [place] for AMS, CTH with R AoC SDH with MLS and subfalcine herniation, non-surgical candidate d/t poor functional status. Goals of care discussions ongoing with family. Pt with CTAP on 10/20 with pancolitis, large stool burden vs. obstruction and stercol colitis; treated with enemas with improvement. Also showed small bowel compression at the time. GI following. Continues on antibiotics as ordered.

## 2021-11-01 NOTE — DIETITIAN INITIAL EVALUATION ADULT. - ADD RECOMMEND
1. Obtain further subjective wt/diet history from pt/family PRN. 2. Monitor GI tolerance. RD to remain available to adjust EN formulary, volume/rate PRN. Consider changing feeds to Vital 1.5 at goal rate 40ml/hr x 24 hrs if s/s of GI distress. 3. Monitor wt trends/labs/skin integrity/hydration status/bowel regularity. 4. Continue thiamine and folic acid. Consider addition of multivitamin to aid in prevention of micronutrient deficiencies and aid in healing of pressure injuries (if no medication contraindications noted).

## 2021-11-01 NOTE — SWALLOW BEDSIDE ASSESSMENT ADULT - ADDITIONAL RECOMMENDATIONS
Maintain good oral hygiene Maintain good oral hygiene  This service will no longer actively follow given current clinical status. Please reconsult if improvement in neuro exam is noted.

## 2021-11-01 NOTE — DIETITIAN INITIAL EVALUATION ADULT. - PERTINENT LABORATORY DATA
(9/29): A1c 5.1%  (10/30): Hgb 9.9, Hct 30.3, Total Protein 4.4, Alb 2.6, Alk Phos 165, ,   (10/31): K 3.3, Cl 116, Glu 142, Ca 7.4, Phos 2.0

## 2021-11-01 NOTE — DIETITIAN INITIAL EVALUATION ADULT. - ORAL INTAKE PTA/DIET HISTORY
82 y/o F w/ hx of HTN, DM, HTN, dementia, p/w acute, nontraumatic, upper back pain. Started 1 hr prior to arrival. Has had similar back pain before. In b/l paraspinal region. No weakness, numbness, or tingling. Was sitting down when pain started. Denies fever, HA, n/v. Some history provided by .
Pt unable to speak, nor participate in nutrition evaluation at this time. Pt seen by RD during previous hospitalization. Per RD note 9/27/21, "pt reports she typically eats oatmeal, fruit and vegetables. Patient's son called unit and specified a vegan, "low sugar" diet for his mother. Pt reports she enjoys eggs, milk, fish, chicken, meat, and especially sweets". Had been prescribed a regular consistency diet. Interim diet history unknown. No documented food allergies.

## 2021-11-01 NOTE — PROGRESS NOTE ADULT - SUBJECTIVE AND OBJECTIVE BOX
24 Hour Events/Subjective:  - continues to have poor exam        REVIEW OF SYSTEMS:  unable to obtain 2/2 neurologic condition     PHYSICAL EXAM:    General: frail, cachetic   Neurological: eyes open to deep nox, not following commands, non verbal, pupils 2mm reactive, attempts to localize LUE, w/d right UE, trace TF b/l LE  Pulmonary: Clear to Auscultation, No Rales, No Rhonchi, No Wheezes   Cardiovascular: S1, S2, irregularly irregular   Gastrointestinal: Soft, Nontender, Nondistended   Extremities: No calf tenderness   Incision: right cranial seps drains x 2 in place     ICU Vital Signs Last 24 Hrs  Reviewed    LABS/Radiology:  Reviewed     24 Hour Events/Subjective:  - continues to have poor exam        REVIEW OF SYSTEMS:  unable to obtain 2/2 neurologic condition          ICU Vital Signs Last 24 Hrs  Reviewed    LABS/Radiology:  Reviewed      PHYSICAL EXAM:    General: frail, cachetic   Pulmonary: Clear to Auscultation, No Rales, No Rhonchi, No Wheezes   Cardiovascular: S1, S2, irregularly irregular   Gastrointestinal: Soft, Nontender, Nondistended   Extremities: No calf tenderness   Incision: dressing in place, no drainiage from x2 seps s/p removal  Neurological: No EO, pinpoint pupils, dysconjugate gaaze, does not FC, UE WD, BLE TF 24 Hour Events/Subjective:  - continues to have poor exam        REVIEW OF SYSTEMS:  unable to obtain 2/2 neurologic condition          ICU Vital Signs Last 24 Hrs  Reviewed    LABS/Radiology:  Reviewed      PHYSICAL EXAM:    General: frail, cachetic   Pulmonary: Clear to Auscultation, No Rales, No Rhonchi, No Wheezes   Cardiovascular: S1, S2, irregularly irregular   Gastrointestinal: Soft, Nontender, Nondistended   Extremities: No calf tenderness   Incision: dressing in place, no drainiage from x2 seps s/p removal  Neurological: No EO, pinpoint pupils, dysconjugate gaze, does not FC, UE WD, BLE TF

## 2021-11-01 NOTE — SWALLOW BEDSIDE ASSESSMENT ADULT - SLP GENERAL OBSERVATIONS
Pt asleep in bed, unable to maintain head neutral posture. Unarousable. Unable to achieve or maintain appropriate level of arousal for exam despite tactile, verbal, and noxious stimulation. KFT in place. + Elevated RR and ? increased WOB (PAs alerted).

## 2021-11-01 NOTE — DIETITIAN INITIAL EVALUATION ADULT. - OTHER INFO
Pt currently prescribed EN feeds via NGT of Jevity 1.2 at 50ml/hr x 24 hrs. To provide 1200ml, 1440kcal and 67g protein. Feeds initiated on 10/31; infusing at 50ml/hr (goal) at time of RD visit. Rectal trumpet in place. Output: (11/1): 250ml; (10/31): 225ml; (10/30): 375ml. On bowel regimen (Dulcolax). GI following for abdominal distention; suspected [place] syndrome in the setting of SDH, critical illness and successful decompression with rectal tube. C. Diff negative.     Dosing wt (10/28): 110 lbs   Wt history (per [facility]): (9/26): 100.1 lbs; (8/1): 104 lbs; (7/14): 125 lbs (possibly erroneous); (5/20): 95 lbs; (2/24): 108 lbs; (10/30/20): 103 lbs. Apparent fluctuations in wt trend. Will continue to monitor. Pt unable to report UBW. Apparent +10 lbs wt gain in ~1 mo may be r/t fluids/edema (see below).     Skin: skin tear L forearm, suspected DTI to sacrum, suspected DTI to upper back  Edema: 3+ left ankle; right ankle, 4+ left hand; right hand

## 2021-11-01 NOTE — SWALLOW BEDSIDE ASSESSMENT ADULT - SLP PERTINENT HISTORY OF CURRENT PROBLEM
24 Hour Events/Subjective: - admitted - given additional 1L IVF on arrival with minimal pressor requirements to maintain SBP goal - elevated INR, LFTs c/f group home vs. shock liver, ordered additional labs, given Kcentra - started empirically on vanc/cef though patient with hx of elevated leuks 2/2 MDS, pending onc - trending h/h s/p 2uprbc prior to arrival. 10/28 Palliative on board for GOC. 10/29 pt s/p bedside placement of SEPS drain

## 2021-11-01 NOTE — SWALLOW BEDSIDE ASSESSMENT ADULT - H & P REVIEW
89F w/ hx of MDS, dementia with recent hospitalization s/p fall, found to have tSDH with MLS and L subcapital femoral nexk fx s/p CRPP (9/28/21) with course c/b acute blood loss anemia 2/2 hematoma/ileus, now presented from  for [facility] w/ AoC SDH and R 1.4cm MLS , also with c/f sepsis./yes
89F w/ hx of MDS, dementia with recent hospitalization s/p fall, found to have tSDH with MLS and L subcapital femoral nexk fx s/p CRPP (9/28/21) with course c/b acute blood loss anemia 2/2 hematoma/ileus, now presented from  for [facility] w/ AoC SDH and R 1.4cm MLS , also with c/f sepsis./yes

## 2021-11-01 NOTE — DIETITIAN INITIAL EVALUATION ADULT. - ENTERAL
Continue Jevity 1.2 at 50ml/hr x 24 hrs. To provide 1200ml, 1440kcal and 67g protein. Based on dosing wt (49.9kg), provides: 28.9kcal/kg and 1.3g protein/kg.

## 2021-11-01 NOTE — PROGRESS NOTE ADULT - THIS PATIENT HAS THE FOLLOWING CONDITION(S)/DIAGNOSES ON THIS ADMISSION:
Brain Compression / Herniation
None
Brain Compression / Herniation
None
Brain Compression / Herniation
None

## 2021-11-01 NOTE — CHART NOTE - NSCHARTNOTEFT_GEN_A_CORE
89F hx of MDS w/ recent hospitalization (9/28/21) for fall, found to have tSDH w/ MLS and femoral fx s/p CRPP, now transferred from  for [facility] w/ R AoC SDH w/ MLS and subfalcine herniation, non-surgical candidate d/t poor functional status.    s/p Kcentra, vit. K in NSICU  s/p SEPS (10/29)x2  vimpat 200 for sz ppx  poor prognosis, per family, DNR/DNI; goals of care discussions on-going  Onc for MDS  - cbc q24  - transfuse to Hgb goal>8  - hemolytic labs negative   c/w cefepime, flagyl (10/28 - ) for pancolitis  -10/29 blood culture growing coag neg staph repeat blood culture tomorrow for clearance surveillance   - ID following    Signed out verbally to MAR overnight

## 2021-11-01 NOTE — PROGRESS NOTE ADULT - ASSESSMENT
ASSESSMENT/PLAN:    89F hx of MDS w/ recent hospitalization (9/28/21) for fall, found to have tSDH w/ MLS and femoral fx s/p CRPP, now transferred from  for [facility] w/ R AoC SDH w/ MLS and subfalcine herniation, non-surgical candidate d/t poor functional status.    NEURO:  s/p Kcentra, vit. K in NSICU  s/p SEPS (10/29)x2  - neuro checks q4h  - vimpat 200 for sz ppx  - delirium precautions  - poor prognosis, per family, DNR/DNI    CVS:  type II NSTEMI  ?new onset pAfib rate controlled  - MAP>65  - cardiology following, not candidate for LHC; no need to trend trops  - amiodarone 200mg BID    PULM:  PIOTR opacity  - RA  - IS as tolerated  - maintain sats>92  - aspiration precautions     RENAL:  - Fluids: IVL  - daily IOs  - apprecaite nephro    GI:  Elevated INR/LFTs c/f shelter vs. shock liver   Patient with CTAP on 10/20 with pancolitis, large stool burden vs. obstruction & stercol colitis, though was treated with enemas with improvement; also showed small bowel compression at the time  s/p kcentra 10/28  - Diet: TF  - Bowel regimen  - GI following for ?ogilive syndrome, now with rectal tube for decompression    ENDO:   - FS goal 120-180    HEME/ONC:  s/p Kcentra, vit K in NSICU  s/p 2uprbc at OSH  s/p 2uprbc 10/29 overnight  - SCDs  - Chemoppx: hold   - Onc for MDS  - cbc q24  - transfuse to Hgb goal>8  - hemolytic labs negative     ID:  elevated leuks, with hx of MDS, no focal infectious source though imaging suggestive of sterco colitis   Cdiff negative (10/29), ordered to r/o toxic megacolon  - monitor for fevers  - c/w cefepime, flagyl (10/28 - ) for pancolitis  -10/29 blood culture growing coag neg staph repeat blood culture tomorrow for clearance surveillance   - ID following      Patient is at high risk of neurologic deterioration/death due to: poor prognosis, heme expansion, herniation   ASSESSMENT/PLAN:    89F hx of MDS w/ recent hospitalization (9/28/21) for fall, found to have tSDH w/ MLS and femoral fx s/p CRPP, now transferred from  for [facility] w/ R AoC SDH w/ MLS and subfalcine herniation, non-surgical candidate d/t poor functional status.    NEURO:  s/p Kcentra, vit. K in NSICU  s/p SEPS (10/29)x2  - neuro checks q4h  - vimpat 200 for sz ppx  - delirium precautions  - poor prognosis, per family, DNR/DNI; goals of care discussions on-going  - no further neurosurgical intervention per NSGY    CVS:  type II NSTEMI  ?new onset pAfib rate controlled  - MAP>65  - cardiology following, not candidate for C; no need to trend trops  - amiodarone 200mg BID    PULM:  PIOTR opacity  - RA  - IS as tolerated  - maintain sats>92  - aspiration precautions     RENAL:  - Fluids: IVL  - daily IOs  - apprecaite nephro    GI:  Elevated INR/LFTs c/f MESSI vs. shock liver   Patient with CTAP on 10/20 with pancolitis, large stool burden vs. obstruction & stercol colitis, though was treated with enemas with improvement; also showed small bowel compression at the time  s/p kcentra 10/28  - Diet: TF  - Bowel regimen  - GI following for colonic distension, now with rectal tube for decompression    ENDO:   - FS goal 120-180    HEME/ONC:  s/p Kcentra, vit K in NSICU  s/p 2uprbc at OSH  s/p 2uprbc 10/29 overnight  - SCDs  - Chemoppx: hold   - Onc for MDS  - cbc q24  - transfuse to Hgb goal>8  - hemolytic labs negative     ID:  elevated leuks, with hx of MDS, no focal infectious source though imaging suggestive of sterco colitis   Cdiff negative (10/29), ordered to r/o toxic megacolon  - monitor for fevers  - c/w cefepime, flagyl (10/28 - ) for pancolitis  -10/29 blood culture growing coag neg staph repeat blood culture tomorrow for clearance surveillance   - ID following      Patient is at high risk of neurologic deterioration/death due to: poor prognosis, heme expansion, herniation, high aspiration risk

## 2021-11-01 NOTE — PROGRESS NOTE ADULT - ASSESSMENT
89 year old female with subdural hematoma and abdominal distention    1. Subdural hematoma  -per NSX    2. Abdoinal distention. Suspect [place] syndrome in the setting of SDH, critical illness, now s/p successful decompression with rectal tube, cdiff negative.  -consider removing rectal tube     3. Leukocytosis    4. Anemia, MDS, no overt GI bleeding, labs consistent w hemolysis, consider heme consult    5. Shock liver improvinga

## 2021-11-01 NOTE — SWALLOW BEDSIDE ASSESSMENT ADULT - SWALLOW EVAL: DIAGNOSIS
Order for bedside swallow evaluation received and appreciated. Per interdisciplinary rounds, pt is not appropriate for evaluation today. Service will f/u when clinically indicated.
Pt is 89 y/o F admitted for R AoC SDH w/ MLS and subfalcine herniation s/p SEPS x2. Now presenting with mental status that does not support PO intake. Pt unarousable to verbal, tactile, and noxious stimulation, also noted with elevated RR and ? increased WOB. PA: Artemio and Bella alerted.

## 2021-11-01 NOTE — SWALLOW BEDSIDE ASSESSMENT ADULT - SWALLOW EVAL: RECOMMENDED DIET
Continue NPO, with non-oral nutrition/hydration/medications as mental status does not support PO intake

## 2021-11-01 NOTE — CHART NOTE - NSCHARTNOTEFT_GEN_A_CORE
Pt seen at bedside. Chart reviewed and cased discussed with NSCU fellow. MOLST completed by primary team yesterday DNR/DNI. Attempted to call patient's son Enrique for further goals of care discussion, no answer. Message left and awaiting call back.    For acute issues please page palliative team.    Smiley Padilla MD  Palliative Medicine Attending   [facility] Pager [phone]

## 2021-11-02 NOTE — PROGRESS NOTE ADULT - SUBJECTIVE AND OBJECTIVE BOX
Subjective: Patient seen and examined. No new events except as noted.   moved out of NSCU       REVIEW OF SYSTEMS:    Unable to obtain     MEDICATIONS:  MEDICATIONS  (STANDING):  aMIOdarone    Tablet 200 milliGRAM(s) Oral every 12 hours  cefepime   IVPB 1000 milliGRAM(s) IV Intermittent every 12 hours  cefepime   IVPB      folic acid 1 milliGRAM(s) Enteral Tube daily  lacosamide IVPB 200 milliGRAM(s) IV Intermittent every 12 hours  metroNIDAZOLE  IVPB 500 milliGRAM(s) IV Intermittent every 12 hours  thiamine 100 milliGRAM(s) Enteral Tube daily      PHYSICAL EXAM:  T(C): 36.8 (11-02-21 @ 05:00), Max: 37.1 (11-01-21 @ 23:45)  HR: 102 (11-02-21 @ 05:00) (90 - 109)  BP: 117/77 (11-02-21 @ 05:00) (98/56 - 126/67)  RR: 18 (11-02-21 @ 05:00) (18 - 32)  SpO2: 93% (11-02-21 @ 05:00) (87% - 100%)  Wt(kg): --  I&O's Summary    01 Nov 2021 07:01  -  02 Nov 2021 07:00  --------------------------------------------------------  IN: 1080 mL / OUT: 515 mL / NET: 565 mL          Appearance: NAD +drain	  HEENT:   dry oral mucosa, +NGT   Lymphatic: No lymphadenopathy  Cardiovascular: Normal S1 S2, No JVD, No murmurs, No edema  Respiratory: decreased bs   Psychiatry: A & O x 0  Gastrointestinal:  Soft, Non-tender, + BS	  Skin: No rashes, No ecchymoses, No cyanosis	  Ext: No edema  Neuro: AOx0, EO to nox, roving eye movements, pinpoint pupils, no FC, LUE WD, RUE nothing, BLE TF    LABS:    CARDIAC MARKERS:            11-01    149<H>  |  118<H>  |  26<H>  ----------------------------<  237<H>  3.8   |  21<L>  |  0.55    Ca    7.8<L>      01 Nov 2021 20:58  Phos  2.2     11-01  Mg     1.9     11-01      proBNP:   Lipid Profile:   HgA1c:   TSH:             TELEMETRY: 	    ECG:  	  RADIOLOGY:   DIAGNOSTIC TESTING:  [ ] Echocardiogram:  [ ]  Catheterization:  [ ] Stress Test:    OTHER:

## 2021-11-02 NOTE — PROGRESS NOTE ADULT - PROBLEM SELECTOR PLAN 1
Likely type 2 demand mediated ischemia due to sepsis/vasodilation and anemia/hypovolemia  Hyperdynamic LV on TTE   would not trend trops as it will not     amiodarone 200 bid

## 2021-11-02 NOTE — PROGRESS NOTE ADULT - ASSESSMENT
89 year old female PMH MDS, dementia with recent hospitalization s/p fall, found to have tSDH with MLS and L subcapital femoral nexk fx s/p CRPP (9/28/21) with course c/b acute blood loss anemia 2/2 hematoma/ileus, now presented from  for [facility] w/ AoC SDH and R 1.4cm MLS , also with c/f sepsis. On presentation WBC of 26.01 with 61% PMN.     RVP (10/28) negative.   U/A (10/28) with 3-5 WBC.   BCx (10/28) with no growth.   C diff toxin assay (10/29) negative.    CT Chest (10/28) with no consolidations.   CT A/P (10/28) with stercoral colitis and pancolitis.   CT Brain (10/29) with Left-sided acute on chronic subdural hematoma is again seen as well as a right frontal subdural collection.     s/p 2x SEPS placement via left skull    Given sepsis/hypotensive presentation reasonable to cover for the pancolitis finding, unclear if truly infectious  Patient with baseline high-level leukocytosis, so unlikely to be reliable marker of underlying infectious process  C diff Negative  GI PCR Negative    RECOMMENDATIONS:    #Pancolitis, Leukocytosis, Hypotension  --Continue Cefepime + Metronidazole (Plan for 7 day course, 10/28 -->11/3)  --Continue to follow CBC with diff  --Continue to follow renal function (Cr/BUN)  --Continue to follow transaminases  --Continue to follow temperature curve    #Positive BCx (CoNS - likely procurement contaminant)  --No need for Vancomycin at present  --Follow up on preliminary repeat blood cultures    I will follow the patient as needed. Please feel free to contact me with any further questions or concerns.    Clinton Bobo M.D.  [facility] Division of Infectious Disease  8AM-5PM: Pager Number [phone]  After Hours (or if no response): Please contact the Infectious Diseases Office at [phone]

## 2021-11-02 NOTE — HISTORY OF PRESENT ILLNESS
[de-identified] : s/p ORIF of left femoral neck [de-identified] : The patient is a 89 year female who returns for the 1st postoperative visit after undergoing ORIF of the left femoral neck at [facility]. The surgery was on 09/27/2021. The patient is recovering at home. She reports mild postoperative pain.  [de-identified] : Patient is WDWN, alert, and in no acute distress. Breathing is unlabored. She is grossly oriented to person, place, and time.\par \par Patient presents with dissolvable sutures with overlying steri strips in place. \par Incision site is healing well, without signs of postoperative infection.\par  [de-identified] : AP and lateral views of the left hip and pelvis were obtained and revealed\par

## 2021-11-02 NOTE — ADDENDUM
[FreeTextEntry1] : I, Lelo Miramontes wrote this note acting as a scribe for Dr. Buck Hansen on Nov 02, 2021.

## 2021-11-02 NOTE — CHART NOTE - NSCHARTNOTEFT_GEN_A_CORE
Left message for patient's son, Enrique, yesterday for ongoing GOC discussion. Did not receive call back. Attempted to call Enrique again at [phone]. Another voicemail left. Awaiting call back. Discussed with primary team ACP.     Smiley Padilla MD  Palliative Medicine Attending   [facility] Pager [phone]. Left message for patient's son, Enrique, yesterday for ongoing GOC discussion. Did not receive call back. Attempted to call Enrique again at [phone]. Another voicemail left. Awaiting call back. Discussed with primary team ACP.     ADDENDUM: 4:20PM Received call back from Enrique. He stated he would like to continue abx and tube feeds for now. We discussed NGT being a temporary intervention to provide nutritional support and would need to have ongoing discussion regarding nutritional goals. Enrique shared that his wife is having a  tomorrow so he may be difficult to reach over next couple of days but will f/u regarding goals of care when his wife is out of the hospital. Aware that team will call with any acute changes. Primary team attending update on conversation.    Smiley Padilla MD  Palliative Medicine Attending   [facility] Pager [phone].

## 2021-11-02 NOTE — PROGRESS NOTE ADULT - ASSESSMENT
89 year old female with subdural hematoma and abdominal distention    1. Subdural hematoma  -per NSX    2. Abdoinal distention. Suspect [place] syndrome in the setting of SDH, critical illness, now s/p successful decompression but appears to be recurring. Still abdomen is soft. Prognosis seems poor, would hold off on further interventions, clarify goals of care. Check residuals when giving NG feeds.    3. Leukocytosis    4. Anemia, MDS, no overt GI bleeding, labs consistent w hemolysis, consider heme consult    5. Shock liver improvinga

## 2021-11-02 NOTE — PROGRESS NOTE ADULT - PROBLEM SELECTOR PLAN 2
-s/p decompression w/ rectal tube  -cont BM regimen  -on empiric cefepime/flagyl, plan total 7 day course

## 2021-11-02 NOTE — PROGRESS NOTE ADULT - SUBJECTIVE AND OBJECTIVE BOX
[facility] Division of Hospital Medicine  Chito Medellin MD  Pager (M-F, 3F-5P): [phone]  Other Times:  [phone]    Patient is a 89y old  Female who presents with a chief complaint of SDH (02 Nov 2021 11:46)      SUBJECTIVE / OVERNIGHT EVENTS: No acute events. Minimal response to noxious.  ADDITIONAL REVIEW OF SYSTEMS: unable to obtain    MEDICATIONS  (STANDING):  aMIOdarone    Tablet 200 milliGRAM(s) Oral every 12 hours  cefepime   IVPB 1000 milliGRAM(s) IV Intermittent every 12 hours  cefepime   IVPB      folic acid 1 milliGRAM(s) Enteral Tube daily  lacosamide IVPB 200 milliGRAM(s) IV Intermittent every 12 hours  metroNIDAZOLE  IVPB 500 milliGRAM(s) IV Intermittent every 12 hours  thiamine 100 milliGRAM(s) Enteral Tube daily    MEDICATIONS  (PRN):      CAPILLARY BLOOD GLUCOSE        I&O's Summary    01 Nov 2021 07:01  -  02 Nov 2021 07:00  --------------------------------------------------------  IN: 1080 mL / OUT: 515 mL / NET: 565 mL        PHYSICAL EXAM:  Vital Signs Last 24 Hrs  T(C): 36.6 (02 Nov 2021 13:00), Max: 37.1 (01 Nov 2021 23:45)  T(F): 97.8 (02 Nov 2021 13:00), Max: 98.7 (01 Nov 2021 23:45)  HR: 77 (02 Nov 2021 13:00) (77 - 109)  BP: 102/62 (02 Nov 2021 13:00) (98/56 - 124/72)  BP(mean): 93 (01 Nov 2021 23:45) (69 - 93)  RR: 18 (02 Nov 2021 13:00) (18 - 32)  SpO2: 94% (02 Nov 2021 13:00) (92% - 100%)  CONSTITUTIONAL: NAD, obtunded, elderly  EYES: EOMI; conjunctiva and sclera clear  ENMT: Moist oral mucosa, no pharyngeal injection or exudates  RESPIRATORY: Normal respiratory effort; lungs are clear to auscultation bilaterally  CARDIOVASCULAR: irreuglar rate and rhythm, normal S1 and S2, no murmur/rub/gallop; No lower extremity edema  ABDOMEN: Nontender to palpation, distended, no rebound/guarding;   PSYCH: A+Ox0; obtunded  NEUROLOGY: obtunded, minimal withdrawal to noxious  SKIN: No rashes; no palpable lesions    LABS:    11-01    149<H>  |  118<H>  |  26<H>  ----------------------------<  237<H>  3.8   |  21<L>  |  0.55    Ca    7.8<L>      01 Nov 2021 20:58  Phos  2.2     11-01  Mg     1.9     11-01                Culture - Blood (collected 30 Oct 2021 18:02)  Source: .Blood Blood  Preliminary Report (31 Oct 2021 19:01):    No growth to date.    Culture - Blood (collected 30 Oct 2021 18:01)  Source: .Blood Blood  Preliminary Report (31 Oct 2021 19:01):    No growth to date.        RADIOLOGY & ADDITIONAL TESTS:  Results Reviewed:   Imaging Personally Reviewed:  Electrocardiogram Personally Reviewed:    COORDINATION OF CARE:  Care Discussed with Consultants/Other Providers [Y/N]:  Prior or Outpatient Records Reviewed [Y/N]:

## 2021-11-02 NOTE — PROGRESS NOTE ADULT - PROBLEM SELECTOR PLAN 1
-no operative  -minimal mental status  -herniation noted  -defer further imaging as not candidate for further intervention  -called son at number in chart to attempt to cont GOC, no answer on multiple attempts, left VM to call back

## 2021-11-02 NOTE — PROGRESS NOTE ADULT - PROBLEM SELECTOR PLAN 7
-no pharm dvt ppx due ICH    #GOC  attempted to reach family as  above  feel she would most benefit from pure comfort driven approach  palliative following

## 2021-11-02 NOTE — PROGRESS NOTE ADULT - SUBJECTIVE AND OBJECTIVE BOX
Chief Complaint:  Patient is a 89y old  Female who presents with a chief complaint of SDH (02 Nov 2021 18:29)      Date of service 11-02-21 @ 19:42      Interval Events:   no events    Hospital Medications:  aMIOdarone    Tablet 200 milliGRAM(s) Oral every 12 hours  cefepime   IVPB 1000 milliGRAM(s) IV Intermittent every 12 hours  cefepime   IVPB      folic acid 1 milliGRAM(s) Enteral Tube daily  lacosamide IVPB 200 milliGRAM(s) IV Intermittent every 12 hours  metroNIDAZOLE  IVPB 500 milliGRAM(s) IV Intermittent every 12 hours  thiamine 100 milliGRAM(s) Enteral Tube daily        Review of Systems:  somulent    PHYSICAL EXAM:   Vital Signs:  Vital Signs Last 24 Hrs  T(C): 37.1 (02 Nov 2021 17:00), Max: 37.1 (01 Nov 2021 23:45)  T(F): 98.8 (02 Nov 2021 17:00), Max: 98.8 (02 Nov 2021 17:00)  HR: 94 (02 Nov 2021 17:00) (77 - 102)  BP: 119/72 (02 Nov 2021 17:00) (102/60 - 124/69)  BP(mean): 93 (01 Nov 2021 23:45) (74 - 93)  RR: 18 (02 Nov 2021 17:00) (18 - 32)  SpO2: 100% (02 Nov 2021 17:00) (93% - 100%)  Daily     Daily       PHYSICAL EXAM:     GENERAL:  Appears ill  HEENT:  NC/AT,  conjunctivae anicteric, clear and pink,   NECK: supple, trachea midline  CHEST:  Full & symmetric excursion, no increased effort, breath sounds clear  HEART:  Regular rhythm, no JVD  ABDOMEN:  Soft, non-tender, distended, normoactive bowel sounds,  no masses , no hepatosplenomegaly  EXTREMITIES:  no cyanosis,clubbing or edema  SKIN:  No rash, erythema, or, ecchymoses, no jaundice  NEURO:  Alert, non-focal, no asterixis  PSYCH: Appropriate affect, oriented to place and time  RECTAL: Deferred      LABS Personally reviewed by me:      11-01    149<H>  |  118<H>  |  26<H>  ----------------------------<  237<H>  3.8   |  21<L>  |  0.55    Ca    7.8<L>      01 Nov 2021 20:58  Phos  2.2     11-01  Mg     1.9     11-01                                    9.9    31.65 )-----------( 190      ( 30 Oct 2021 21:33 )             30.3       Imaging personally reviewed by me:

## 2021-11-02 NOTE — END OF VISIT
[FreeTextEntry3] : All medical record entries made by the Scribe were at my,  Dr. Buck Hansen MD., direction and personally dictated by me on 11/02/2021. I have personally reviewed the chart and agree that the record accurately reflects my personal performance of the history, physical exam, assessment and plan.

## 2021-11-02 NOTE — CHART NOTE - NSCHARTNOTEFT_GEN_A_CORE
MAR Accept Note  Transfer to: (X) Medicine    () Telemetry  Accepting Physician: RODNEY  Assigned Room: [address] [place] [address]  PATIENT SEEN AND EXAMINED  NO PHYSICAL EXAM FINDINGS PRECLUDING TRANSFER OF SERVICE  Patient was obtunded at baseline, w/ NGT in place. Suction required for oral secretions. Appeared to be alternating between shallow and deep breathing, with intermittent moaning. Per primary RN, patient is at baseline and was in similar state for the past a few days. DNR/DNI, and continue with all other interventions as clinically indicated.      HPI/NSICU COURSE:  89F hx of MDS w/ recent hospitalization (9/28/21) for fall, found to have tSDH w/ MLS and femoral fx s/p CRPP, now transferred from  for AMS CTH w/ R AoC SDH w/ MLS and subfalcine herniation, non-surgical candidate d/t poor functional status.  s/p Kcentra, vit. K in NSICU  s/p SEPS (10/29)x2  vimpat 200 for sz ppx  poor prognosis, per family, DNR/DNI; goals of care discussions on-going  hemolytic labs negative    Follow up:  - F/u Onc for MDS  - cbc q24; transfuse to Hgb goal>8  - c/w cefepime, flagyl (10/28 - ) for pancolitis  - 10/29 blood culture growing coag neg staph repeat blood culture tomorrow for clearance surveillance   - ID following; f/u rec          OBJECTIVE DATA:  Vital Signs Last 24 Hrs  T(C): 37.1 (01 Nov 2021 23:45), Max: 37.1 (01 Nov 2021 23:45)  T(F): 98.7 (01 Nov 2021 23:45), Max: 98.7 (01 Nov 2021 23:45)  HR: 97 (01 Nov 2021 23:45) (83 - 109)  BP: 108/71 (01 Nov 2021 23:45) (90/54 - 126/67)  BP(mean): 93 (01 Nov 2021 23:45) (66 - 93)  RR: 29 (01 Nov 2021 23:45) (20 - 32)  SpO2: 95% (01 Nov 2021 23:00) (87% - 100%)    LABS                              149    |  118    |  26                  Calcium: 7.8   / iCa: x      (11-01 @ 20:58)    ----------------------------<  237       Magnesium: 1.9                              3.8     |  21     |  0.55             Phosphorous: 2.2

## 2021-11-02 NOTE — PROGRESS NOTE ADULT - ASSESSMENT
89F w/ PMHx of dementia, MDS, falls, prior SDH presents w/ acute on chronic SDH c/b midline shift and herniation. Non operative per NSGx. Poor prognosis. Palliative following. Currently DNR/DNI.

## 2021-11-02 NOTE — PROGRESS NOTE ADULT - SUBJECTIVE AND OBJECTIVE BOX
Follow Up:  leukocytosis, pancolitis    Interval History: afebrile. minimally responsive to stimuli.     REVIEW OF SYSTEMS  [  ] ROS unobtainable because:    [ x ] All other systems negative except as noted below    Constitutional:  [ ] fever [ ] chills  [ ] weight loss  [ ] weakness  Skin:  [ ] rash [ ] phlebitis	  Eyes: [ ] icterus [ ] pain  [ ] discharge	  ENMT: [ ] sore throat  [ ] thrush [ ] ulcers [ ] exudates  Respiratory: [ ] dyspnea [ ] hemoptysis [ ] cough [ ] sputum	  Cardiovascular:  [ ] chest pain [ ] palpitations [ ] edema	  Gastrointestinal:  [ ] nausea [ ] vomiting [ ] diarrhea [ ] constipation [ ] pain	  Genitourinary:  [ ] dysuria [ ] frequency [ ] hematuria [ ] discharge [ ] flank pain  [ ] incontinence  Musculoskeletal:  [ ] myalgias [ ] arthralgias [ ] arthritis  [ ] back pain  Neurological:  [ ] headache [ ] seizures  [ ] confusion/altered mental status      Allergies  No Known Allergies        ANTIMICROBIALS:  cefepime   IVPB 1000 every 12 hours  cefepime   IVPB    metroNIDAZOLE  IVPB 500 every 12 hours      OTHER MEDS:  MEDICATIONS  (STANDING):  aMIOdarone    Tablet 200 every 12 hours  lacosamide IVPB 200 every 12 hours      Vital Signs Last 24 Hrs  T(C): 36.6 (02 Nov 2021 13:00), Max: 37.1 (01 Nov 2021 23:45)  T(F): 97.8 (02 Nov 2021 13:00), Max: 98.7 (01 Nov 2021 23:45)  HR: 77 (02 Nov 2021 13:00) (77 - 109)  BP: 102/62 (02 Nov 2021 13:00) (102/60 - 124/72)  BP(mean): 93 (01 Nov 2021 23:45) (74 - 93)  RR: 18 (02 Nov 2021 13:00) (18 - 32)  SpO2: 94% (02 Nov 2021 13:00) (93% - 97%)      PHYSICAL EXAMINATION:  General: Awake but not alert  Cardiac: RRR, No M/R/G  Resp: CTAB, No Wh/Rh/Ra  Abdomen: NBS, NT/ND, No HSM, No rigidity or guarding  MSK: No LE edema. No Calf tenderness  Skin: No rashes or lesions. Skin is warm and dry to the touch.   Neuro: Alert and Awake. CN 2-12 Grossly intact. Moves all four extremities spontaneously.  Psych: Encephalopathic - unable to assess      11-01    149<H>  |  118<H>  |  26<H>  ----------------------------<  237<H>  3.8   |  21<L>  |  0.55    Ca    7.8<L>      01 Nov 2021 20:58  Phos  2.2     11-01  Mg     1.9     11-01            MICROBIOLOGY:  v  .Blood Blood  10-30-21   No growth to date.  --  --      .Blood Blood  10-30-21   No growth to date.  --  --      .Stool Feces  10-29-21   GI PCR Results: NOT detected  *******Please Note:*******  GI panel PCR evaluates for:  Campylobacter, Plesiomonas shigelloides, Salmonella,  Vibrio, Yersinia enterocolitica, Enteroaggregative  Escherichia coli (EAEC), Enteropathogenic E.coli (EPEC),  Enterotoxigenic E. coli (ETEC) lt/st, Shiga-like  toxin-producing E. coli (STEC) stx1/stx2,  Shigella/ Enteroinvasive E. coli (EIEC), Cryptosporidium,  Cyclospora cayetanensis, Entamoeba histolytica,  Giardia lamblia, Adenovirus F 40/41, Astrovirus,  Norovirus GI/GII, Rotavirus A, Sapovirus  --  --      .Blood Blood  10-29-21   Growth in aerobic bottle: Staphylococcus pettenkoferi Coag Negative  Staphylococcus  Single set isolate, possible contaminant. Contact  Microbiology if susceptibility testing clinically  indicated.  ***Blood Panel PCR results on this specimen are available  approximately 3 hours after the Gram stain result.***  Gram stain, PCR, and/or culture results may not always  correspond due to difference in methodologies.  ************************************************************  This PCR assay was performed by multiplex PCR. This  Assay tests for 66 bacterial and resistance gene targets.  Please refer to the [facility] test directory  at [email or web address] for details.  --  Blood Culture PCR      .Blood Blood  10-28-21   No Growth Final  --  --      .Blood Blood-Peripheral  10-28-21   No Growth Final  --  --      .Blood Blood-Venous  10-07-21   No Growth Final  --  --      .Blood Blood  10-04-21   No Growth Final  --  --          Rapid RVP Result: NotDetec (10-28 @ 01:11)    Clostridium difficile GDH Toxins A&amp;B, EIA:   Negative (10-29-21 @ 08:09)  Clostridium difficile GDH Interpretation: Negative for toxigenic C. Difficile.  This specimen is negative for C.  Difficile glutamate dehydrogenase (GDH) antigen and negative for C.  Difficile Toxins A & B, by EIA.  GDH is a highly sensitive screening  marker for C. Difficile that is produced in large amounts by all C.  Difficile strains, both toxigenic and nontoxigenic.  This assay has not  been validated as a test of cure.  Repeat testing during the same episode  of diarrhea is of limited value and is discouraged.  The results of this  assay should always be interpreted in conjunction with pateint's clinical  history. (10-29-21 @ 08:09)      RADIOLOGY:    <The imaging below has been reviewed and visualized by me independently. Findings as detailed in report below>    < from: Xray Chest 1 View- PORTABLE-Urgent (Xray Chest 1 View- PORTABLE-Urgent .) (10.31.21 @ 06:07) >  IMPRESSION:    The heart is slightly enlarged. The right hemithorax is not included in this study. The left lung appears to be clear. Nasogastric tube was placed and it appears to be in good position.    < end of copied text >

## 2021-11-03 NOTE — PROGRESS NOTE ADULT - ASSESSMENT
89 year old female with subdural hematoma and abdominal distention    1. Subdural hematoma  -per NSX    2. Abdoinal distention. Suspect [place] syndrome in the setting of SDH, critical illness, now s/p successful decompression but appears to be recurring. Still abdomen is soft. Prognosis seems poor, would hold off on further interventions, clarify goals of care. Check residuals when giving NG feeds, at risk for aspiration    3. Leukocytosis    4. Anemia, MDS, no overt GI bleeding, labs consistent w hemolysis, consider heme consult    5. Shock liver improved

## 2021-11-03 NOTE — PROGRESS NOTE ADULT - PROBLEM SELECTOR PLAN 2
-s/p decompression w/ rectal tube  -add BM regimen as no BM for last few days  -on empiric cefepime/flagyl, will continue x48hrs pending repeat cxs given fever

## 2021-11-03 NOTE — PROGRESS NOTE ADULT - SUBJECTIVE AND OBJECTIVE BOX
Chief Complaint:  Patient is a 89y old  Female who presents with a chief complaint of SDH (02 Nov 2021 18:29)      Date of service 11-03-21 @ 19:42      Interval Events:   no events    Hospital Medications:  aMIOdarone    Tablet 200 milliGRAM(s) Oral every 12 hours  cefepime   IVPB 1000 milliGRAM(s) IV Intermittent every 12 hours  cefepime   IVPB      folic acid 1 milliGRAM(s) Enteral Tube daily  lacosamide IVPB 200 milliGRAM(s) IV Intermittent every 12 hours  metroNIDAZOLE  IVPB 500 milliGRAM(s) IV Intermittent every 12 hours  thiamine 100 milliGRAM(s) Enteral Tube daily        Review of Systems:  somulent    PHYSICAL EXAM:   Vital Signs:  Vital Signs Last 24 Hrs  T(C): 37.1 (02 Nov 2021 17:00), Max: 37.1 (01 Nov 2021 23:45)  T(F): 98.8 (02 Nov 2021 17:00), Max: 98.8 (02 Nov 2021 17:00)  HR: 94 (02 Nov 2021 17:00) (77 - 102)  BP: 119/72 (02 Nov 2021 17:00) (102/60 - 124/69)  BP(mean): 93 (01 Nov 2021 23:45) (74 - 93)  RR: 18 (02 Nov 2021 17:00) (18 - 32)  SpO2: 100% (02 Nov 2021 17:00) (93% - 100%)  Daily     Daily       PHYSICAL EXAM:     GENERAL:  Appears ill  HEENT:  NC/AT,  conjunctivae anicteric, clear and pink,   NECK: supple, trachea midline  CHEST:  Full & symmetric excursion, no increased effort, breath sounds clear  HEART:  Regular rhythm, no JVD  ABDOMEN:  Soft, non-tender, distended, normoactive bowel sounds,  no masses , no hepatosplenomegaly  EXTREMITIES:  no cyanosis,clubbing or edema  SKIN:  No rash, erythema, or, ecchymoses, no jaundice  NEURO:  Alert, non-focal, no asterixis  PSYCH: Appropriate affect, oriented to place and time  RECTAL: Deferred      LABS Personally reviewed by me:      11-01    149<H>  |  118<H>  |  26<H>  ----------------------------<  237<H>  3.8   |  21<L>  |  0.55    Ca    7.8<L>      01 Nov 2021 20:58  Phos  2.2     11-01  Mg     1.9     11-01                                    9.9    31.65 )-----------( 190      ( 30 Oct 2021 21:33 )             30.3       Imaging personally reviewed by me:

## 2021-11-03 NOTE — PROGRESS NOTE ADULT - SUBJECTIVE AND OBJECTIVE BOX
[facility] Division of Hospital Medicine  Chito Medellin MD  Pager (M-F, 8Y-1B): [phone]  Other Times:  [phone]    Patient is a 89y old  Female who presents with a chief complaint of SDH (2021 19:41)      SUBJECTIVE / OVERNIGHT EVENTS: Had fever overnight. Remains obtunded.  ADDITIONAL REVIEW OF SYSTEMS: unable to obtain    MEDICATIONS  (STANDING):  aMIOdarone    Tablet 200 milliGRAM(s) Oral every 12 hours  cefepime   IVPB 1000 milliGRAM(s) IV Intermittent every 12 hours  cefepime   IVPB      folic acid 1 milliGRAM(s) Enteral Tube daily  lacosamide IVPB 200 milliGRAM(s) IV Intermittent every 12 hours  metroNIDAZOLE    Tablet 500 milliGRAM(s) Oral once  thiamine 100 milliGRAM(s) Enteral Tube daily    MEDICATIONS  (PRN):      CAPILLARY BLOOD GLUCOSE        I&O's Summary    2021 07:01  -  2021 07:00  --------------------------------------------------------  IN: 1250 mL / OUT: 1375 mL / NET: -125 mL        PHYSICAL EXAM:  Vital Signs Last 24 Hrs  T(C): 36.8 (2021 13:00), Max: 38.5 (2021 01:19)  T(F): 98.2 (2021 13:00), Max: 101.3 (2021 01:19)  HR: 108 (2021 13:00) (89 - 108)  BP: 117/75 (2021 13:00) (106/64 - 136/83)  BP(mean): --  RR: 20 (2021 13:00) (18 - 24)  SpO2: 97% (2021 13:00) (97% - 100%)  CONSTITUTIONAL: NAD, obtunded, elderly  EYES: EOMI; conjunctiva and sclera clear  ENMT: Moist oral mucosa, no pharyngeal injection or exudates  RESPIRATORY: Normal respiratory effort; lungs are clear to auscultation bilaterally  CARDIOVASCULAR: irreuglar rate and rhythm, normal S1 and S2, no murmur/rub/gallop; No lower extremity edema  ABDOMEN: Nontender to palpation, distended, no rebound/guarding;   PSYCH: A+Ox0; obtunded  NEUROLOGY: obtunded, minimal withdrawal to noxious  SKIN: No rashes; no palpable lesions    LABS:                        10.3   33.87 )-----------( 206      ( 2021 07:25 )             33.8     11-03    147<H>  |  115<H>  |  29<H>  ----------------------------<  173<H>  4.0   |  23  |  0.56    Ca    7.9<L>      2021 07:24  Phos  2.7     11-03  Mg     1.9     11-03            Urinalysis Basic - ( 2021 07:26 )    Color: Yellow / Appearance: Turbid / S.039 / pH: x  Gluc: x / Ketone: Trace  / Bili: Negative / Urobili: Negative   Blood: x / Protein: 100 mg/dL / Nitrite: Negative   Leuk Esterase: Negative / RBC: 5 /hpf / WBC 8 /HPF   Sq Epi: x / Non Sq Epi: 2 /hpf / Bacteria: Negative          RADIOLOGY & ADDITIONAL TESTS:  Results Reviewed:   Imaging Personally Reviewed:  Electrocardiogram Personally Reviewed:    COORDINATION OF CARE:  Care Discussed with Consultants/Other Providers [Y/N]:  Prior or Outpatient Records Reviewed [Y/N]:

## 2021-11-03 NOTE — PROGRESS NOTE ADULT - SUBJECTIVE AND OBJECTIVE BOX
Subjective: Patient seen and examined. No new events except as noted.     REVIEW OF SYSTEMS:  Unable to obtain     MEDICATIONS:  MEDICATIONS  (STANDING):  aMIOdarone    Tablet 200 milliGRAM(s) Oral every 12 hours  cefepime   IVPB 1000 milliGRAM(s) IV Intermittent every 12 hours  cefepime   IVPB      folic acid 1 milliGRAM(s) Enteral Tube daily  lacosamide IVPB 200 milliGRAM(s) IV Intermittent every 12 hours  thiamine 100 milliGRAM(s) Enteral Tube daily      PHYSICAL EXAM:  T(C): 37.2 (11-03-21 @ 17:00), Max: 38.5 (11-03-21 @ 01:19)  HR: 96 (11-03-21 @ 17:00) (89 - 108)  BP: 122/76 (11-03-21 @ 17:00) (106/64 - 136/83)  RR: 20 (11-03-21 @ 17:00) (18 - 24)  SpO2: 96% (11-03-21 @ 17:00) (96% - 100%)  Wt(kg): --  I&O's Summary    02 Nov 2021 07:01  -  03 Nov 2021 07:00  --------------------------------------------------------  IN: 1250 mL / OUT: 1375 mL / NET: -125 mL    03 Nov 2021 07:01  -  03 Nov 2021 19:54  --------------------------------------------------------  IN: 350 mL / OUT: 25 mL / NET: 325 mL          Appearance: NAD  HEENT:   dry oral mucosa, +NGT   Lymphatic: No lymphadenopathy  Cardiovascular: Normal S1 S2, No JVD, No murmurs, No edema  Respiratory: decreased bs   Psychiatry: A & O x 0  Gastrointestinal:  Soft, Non-tender, + BS	  Skin: No rashes, No ecchymoses, No cyanosis	  Ext: No edema  Neuro: AOx0, EO to nox, roving eye movements, pinpoint pupils, no FC, LUE WD, RUE nothing, BLE TF    LABS:    CARDIAC MARKERS:                                10.3   33.87 )-----------( 206      ( 03 Nov 2021 07:25 )             33.8     11-03    147<H>  |  115<H>  |  29<H>  ----------------------------<  173<H>  4.0   |  23  |  0.56    Ca    7.9<L>      03 Nov 2021 07:24  Phos  2.7     11-03  Mg     1.9     11-03      proBNP:   Lipid Profile:   HgA1c:   TSH:     0          TELEMETRY: 	    ECG:  	  RADIOLOGY:   DIAGNOSTIC TESTING:  [ ] Echocardiogram:  [ ]  Catheterization:  [ ] Stress Test:    OTHER:

## 2021-11-03 NOTE — PROGRESS NOTE ADULT - SUBJECTIVE AND OBJECTIVE BOX
Follow Up:  colitis    Interval History: febrile overnight. Per RN no BM over last 24H. No noted cough.     REVIEW OF SYSTEMS  [ x ] ROS unobtainable because:  demented/encephalopathic  [  ] All other systems negative except as noted below    Constitutional:  [ ] fever [ ] chills  [ ] weight loss  [ ] weakness  Skin:  [ ] rash [ ] phlebitis	  Eyes: [ ] icterus [ ] pain  [ ] discharge	  ENMT: [ ] sore throat  [ ] thrush [ ] ulcers [ ] exudates  Respiratory: [ ] dyspnea [ ] hemoptysis [ ] cough [ ] sputum	  Cardiovascular:  [ ] chest pain [ ] palpitations [ ] edema	  Gastrointestinal:  [ ] nausea [ ] vomiting [ ] diarrhea [ ] constipation [ ] pain	  Genitourinary:  [ ] dysuria [ ] frequency [ ] hematuria [ ] discharge [ ] flank pain  [ ] incontinence  Musculoskeletal:  [ ] myalgias [ ] arthralgias [ ] arthritis  [ ] back pain  Neurological:  [ ] headache [ ] seizures  [ ] confusion/altered mental status    Allergies  No Known Allergies        ANTIMICROBIALS:  cefepime   IVPB 1000 every 12 hours  cefepime   IVPB        OTHER MEDS:  MEDICATIONS  (STANDING):  aMIOdarone    Tablet 200 every 12 hours  lacosamide IVPB 200 every 12 hours      Vital Signs Last 24 Hrs  T(C): 37.2 (2021 17:00), Max: 38.5 (2021 01:19)  T(F): 98.9 (2021 17:00), Max: 101.3 (2021 01:19)  HR: 96 (2021 17:00) (89 - 108)  BP: 122/76 (2021 17:00) (106/64 - 136/83)  BP(mean): --  RR: 20 (2021 17:00) (18 - 24)  SpO2: 96% (2021 17:00) (96% - 100%)    PHYSICAL EXAMINATION:  General: Awake but not alert  HEENT: L sided 2 previous SEPS placement sites with staples which are clean, without surrounding erythema or drainage.  Cardiac: RRR, No M/R/G  Resp: CTAB, No Wh/Rh/Ra  Abdomen: NBS, NT/ND, No HSM, No rigidity or guarding  MSK: LUE hand edema. No LE edema. No Calf tenderness  Skin: No rashes or lesions. Skin is warm and dry to the touch.   Neuro: Alert and Awake. CN 2-12 Grossly intact. Moves all four extremities spontaneously.  Psych: Encephalopathic - unable to assess                          10.3   33.87 )-----------( 206      ( 2021 07:25 )             33.8       11-03    147<H>  |  115<H>  |  29<H>  ----------------------------<  173<H>  4.0   |  23  |  0.56    Ca    7.9<L>      2021 07:24  Phos  2.7       Mg     1.9             Urinalysis Basic - ( 2021 07:26 )    Color: Yellow / Appearance: Turbid / S.039 / pH: x  Gluc: x / Ketone: Trace  / Bili: Negative / Urobili: Negative   Blood: x / Protein: 100 mg/dL / Nitrite: Negative   Leuk Esterase: Negative / RBC: 5 /hpf / WBC 8 /HPF   Sq Epi: x / Non Sq Epi: 2 /hpf / Bacteria: Negative        MICROBIOLOGY:  v  .Blood Blood  10-30-21   No growth to date.  --  --      .Blood Blood  10-30-21   No growth to date.  --  --      .Stool Feces  10-29-21   GI PCR Results: NOT detected  *******Please Note:*******  GI panel PCR evaluates for:  Campylobacter, Plesiomonas shigelloides, Salmonella,  Vibrio, Yersinia enterocolitica, Enteroaggregative  Escherichia coli (EAEC), Enteropathogenic E.coli (EPEC),  Enterotoxigenic E. coli (ETEC) lt/st, Shiga-like  toxin-producing E. coli (STEC) stx1/stx2,  Shigella/ Enteroinvasive E. coli (EIEC), Cryptosporidium,  Cyclospora cayetanensis, Entamoeba histolytica,  Giardia lamblia, Adenovirus F 40/41, Astrovirus,  Norovirus GI/GII, Rotavirus A, Sapovirus  --  --      .Blood Blood  10-29-21   Growth in aerobic bottle: Staphylococcus pettenkoferi Coag Negative  Staphylococcus  Single set isolate, possible contaminant. Contact  Microbiology if susceptibility testing clinically  indicated.  ***Blood Panel PCR results on this specimen are available  approximately 3 hours after the Gram stain result.***  Gram stain, PCR, and/or culture results may not always  correspond due to difference in methodologies.  ************************************************************  This PCR assay was performed by multiplex PCR. This  Assay tests for 66 bacterial and resistance gene targets.  Please refer to the [facility] test directory  at [email or web address] for details.  --  Blood Culture PCR      .Blood Blood  10-28-21   No Growth Final  --  --      .Blood Blood-Peripheral  10-28-21   No Growth Final  --  --      .Blood Blood-Venous  10-07-21   No Growth Final  --  --          Rapid RVP Result: NotDetec (10-28 @ 01:11)    Clostridium difficile GDH Toxins A&amp;B, EIA:   Negative (10-29-21 @ 08:09)  Clostridium difficile GDH Interpretation: Negative for toxigenic C. Difficile.  This specimen is negative for C.  Difficile glutamate dehydrogenase (GDH) antigen and negative for C.  Difficile Toxins A & B, by EIA.  GDH is a highly sensitive screening  marker for C. Difficile that is produced in large amounts by all C.  Difficile strains, both toxigenic and nontoxigenic.  This assay has not  been validated as a test of cure.  Repeat testing during the same episode  of diarrhea is of limited value and is discouraged.  The results of this  assay should always be interpreted in conjunction with pateint's clinical  history. (10-29-21 @ 08:09)      RADIOLOGY:    <The imaging below has been reviewed and visualized by me independently. Findings as detailed in report below>    < from: CT Head No Cont (10.31.21 @ 14:44) >  IMPRESSION:  Very large mixed attenuation left subdural hematoma with air fluid and acute hemorrhage recognized. Midline shift to the right now greater than 2 cm. Evidence of subfalcine and uncal herniation. Previously seen SEPS device has been removed. Small right frontal subdural fluid collection    < end of copied text >

## 2021-11-03 NOTE — PROGRESS NOTE ADULT - ASSESSMENT
89 year old female PMH MDS, dementia with recent hospitalization s/p fall, found to have tSDH with MLS and L subcapital femoral nexk fx s/p CRPP (9/28/21) with course c/b acute blood loss anemia 2/2 hematoma/ileus, now presented from  for AMS CTH w/ AoC SDH and R 1.4cm MLS , also with c/f sepsis. On presentation WBC of 26.01 with 61% PMN.     RVP (10/28) negative.   U/A (10/28) with 3-5 WBC.   BCx (10/28) with no growth.   C diff toxin assay (10/29) negative.    CT Chest (10/28) with no consolidations.   CT A/P (10/28) with stercoral colitis and pancolitis.   CT Brain (10/29) with Left-sided acute on chronic subdural hematoma is again seen as well as a right frontal subdural collection.     s/p 2x SEPS placement via left skull    Given sepsis/hypotensive presentation reasonable to cover for the pancolitis finding, unclear if truly infectious  Patient with baseline high-level leukocytosis, so unlikely to be reliable marker of underlying infectious process  C diff Negative  GI PCR Negative    Febrile on 11/3  No other clinical status     RECOMMENDATIONS:    #Fever - ?central fever ?secondary to hematoma  --Recommend COVID19 PCR  --Consider LUE DVT US (edema of the left hand)  --Consider Lower Extremity DVT US (edema of LE)  --Follow repeat BCx     #Pancolitis, Leukocytosis, Hypotension  --Stop Cefepime + Metronidazole after today's doses.   --Continue to follow CBC with diff  --Continue to follow renal function (Cr/BUN)  --Continue to follow transaminases  --Continue to follow temperature curve    #Positive BCx (CoNS - likely procurement contaminant)  --No need for Vancomycin at present  --Follow up on preliminary repeat blood cultures    I will continue to follow. Please feel free to contact me with any further questions.    Clinton Bobo M.D.  [facility] Division of Infectious Disease  8AM-5PM: Pager Number [phone]  After Hours (or if no response): Please contact the Infectious Diseases Office at [phone]     The above assessment and plan were discussed with medicine NP

## 2021-11-03 NOTE — PROGRESS NOTE ADULT - PROBLEM SELECTOR PLAN 7
-no pharm dvt ppx due ICH    #GOC  -ongoing, fam requesting cont current interventions for now. Would favor transition to PCU/hospice once abx are complete.

## 2021-11-03 NOTE — PROGRESS NOTE ADULT - PROBLEM SELECTOR PLAN 1
-no operative  -minimal mental status  -herniation noted  -defer further imaging as not candidate for further intervention  -called son at number in chart, wishes to cont current limited medical interventions for now

## 2021-11-04 NOTE — PROGRESS NOTE ADULT - ASSESSMENT
89 yo female with known medical history of dementia, MDS, presented from nursing home for was found to have subdural hematoma with mass effect. Hematology consulted for coagulopathy and history of MDS     # Hx of MDS and MPN   - does not follow with hematologist per nursing home notes; however found prior BM BX 2019 on all scripts describing both an MDS and MPN picture   - Not currently on any treatment   - Appears to be baseline anemic around 8; requiring intermittent transfusions; with thrombocytosis and leukocytosis (20k-50k)   - Peripheral smear showing: minimal schistocytes non nucleated rbcs; clumped platelets with large platelets, Hyperactivity of neutrophils; no blasts. Consistent with BM from 2019 showing of mixed MDS MPN picture.   - continue with supportive care; pRBC above 8; plts above 100K given subdural hematoma.     # Coagulopathy   - INR at admission of 1.5, worsening to 1.9 same day. PT increased to 22.2 but PTT normal. (all normal in sept 2021)  - Recommend Mixing studies, fibrinogen, hapto, LDH,    - Likely multifactorial in setting of acidosis, septic shock, acute liver injury (hepatic coagulopathy).   - mixing studies not sent due to normalized coags  - d-dimer and fibrinogen not consistent with DIC     #GOC  -primary team/palliative to continue to discuss goals fo care  - hospice appropriate     Please call with any questions     Allen Stevenson MD   Hematology/Oncology Fellow    pager [phone]   After 5pm and on weekends page on call fellow  89 yo female with known medical history of dementia, MDS, presented from nursing home for was found to have subdural hematoma with mass effect. Hematology consulted for coagulopathy and history of MDS     # Hx of MDS and MPN   - does not follow with hematologist per nursing home notes; however found prior BM BX 2019 on all scripts describing both an MDS and MPN picture   - Not currently on any treatment   - Appears to be baseline anemic around 8; requiring intermittent transfusions; with thrombocytosis and leukocytosis (20k-50k)   - Peripheral smear showing: minimal schistocytes non nucleated rbcs; clumped platelets with large platelets, Hyperactivity of neutrophils; no blasts. Consistent with BM from 2019 showing of mixed MDS MPN picture.   - continue with supportive care; pRBC above 8; plts above 100K given subdural hematoma.     # Coagulopathy   - INR at admission of 1.5, worsening to 1.9 same day. PT increased to 22.2 but PTT normal. (all normal in sept 2021)  - Recommend Mixing studies, fibrinogen, hapto, LDH,    - Likely multifactorial in setting of acidosis, septic shock, acute liver injury (hepatic coagulopathy).   - mixing studies not sent due to normalized coags  - d-dimer and fibrinogen not consistent with DIC     #GOC  -primary team/palliative to continue to discuss goals fo care  - hospice appropriate     Hematology to sign off at this time. Please call with any questions/concerns.     Allen Stevenson MD   Hematology/Oncology Fellow    pager [phone]   After 5pm and on weekends page on call fellow

## 2021-11-04 NOTE — PROGRESS NOTE ADULT - SUBJECTIVE AND OBJECTIVE BOX
INTERVAL HPI/OVERNIGHT EVENTS:  Patient S&E at bedside. No o/n events,     VITAL SIGNS:  T(F): 98.1 (21 @ 05:00)  HR: 108 (21 @ 05:00)  BP: 102/58 (21 @ 05:00)  RR: 18 (21 @ 05:00)  SpO2: 96% (21 @ 05:00)  Wt(kg): --    PHYSICAL EXAM:    GENERAL: NAD,  HEAD:  Atraumatic, Normocephalic; Only moans to pain, eyes opened but does not respond verbally to questions   EYES: EOMI, PERRLA, conjunctiva and sclera clear  NECK: Supple, No JVD  HEART: Regular rate and rhythm; No murmurs, rubs, or gallops  ABDOMEN: No hepatosplenomegally   EXTREMITIES:  No edema   NEUROLOGY: unable to assess    MEDICATIONS  (STANDING):  aMIOdarone    Tablet 200 milliGRAM(s) Oral every 12 hours  folic acid 1 milliGRAM(s) Enteral Tube daily  lacosamide IVPB 200 milliGRAM(s) IV Intermittent every 12 hours  polyethylene glycol 3350 17 Gram(s) Oral two times a day  senna 2 Tablet(s) Oral at bedtime  thiamine 100 milliGRAM(s) Enteral Tube daily    MEDICATIONS  (PRN):      Allergies    No Known Allergies    Intolerances        LABS:                        10.6   34.70 )-----------( 243      ( 2021 06:21 )             34.5     11-04    144  |  114<H>  |  34<H>  ----------------------------<  142<H>  4.4   |  22  |  0.54    Ca    8.0<L>      2021 06:20  Phos  2.7     11-  Mg     1.9             Urinalysis Basic - ( 2021 07:26 )    Color: Yellow / Appearance: Turbid / S.039 / pH: x  Gluc: x / Ketone: Trace  / Bili: Negative / Urobili: Negative   Blood: x / Protein: 100 mg/dL / Nitrite: Negative   Leuk Esterase: Negative / RBC: 5 /hpf / WBC 8 /HPF   Sq Epi: x / Non Sq Epi: 2 /hpf / Bacteria: Negative        RADIOLOGY & ADDITIONAL TESTS:  Studies reviewed.

## 2021-11-04 NOTE — PROGRESS NOTE ADULT - SUBJECTIVE AND OBJECTIVE BOX
Follow Up:  fever    Interval History: afebrile overnight. remains nonverbal.     REVIEW OF SYSTEMS  [ x ] ROS unobtainable because:  demented/encephalopathic  [  ] All other systems negative except as noted below    Constitutional:  [ ] fever [ ] chills  [ ] weight loss  [ ] weakness  Skin:  [ ] rash [ ] phlebitis	  Eyes: [ ] icterus [ ] pain  [ ] discharge	  ENMT: [ ] sore throat  [ ] thrush [ ] ulcers [ ] exudates  Respiratory: [ ] dyspnea [ ] hemoptysis [ ] cough [ ] sputum	  Cardiovascular:  [ ] chest pain [ ] palpitations [ ] edema	  Gastrointestinal:  [ ] nausea [ ] vomiting [ ] diarrhea [ ] constipation [ ] pain	  Genitourinary:  [ ] dysuria [ ] frequency [ ] hematuria [ ] discharge [ ] flank pain  [ ] incontinence  Musculoskeletal:  [ ] myalgias [ ] arthralgias [ ] arthritis  [ ] back pain  Neurological:  [ ] headache [ ] seizures  [ ] confusion/altered mental status    Allergies  No Known Allergies        ANTIMICROBIALS:      OTHER MEDS:  MEDICATIONS  (STANDING):  aMIOdarone    Tablet 200 every 12 hours  lacosamide IVPB 200 every 12 hours  polyethylene glycol 3350 17 two times a day  senna 2 at bedtime      Vital Signs Last 24 Hrs  T(C): 36.9 (2021 17:00), Max: 37.8 (2021 21:23)  T(F): 98.4 (2021 17:00), Max: 100 (2021 21:23)  HR: 94 (2021 17:00) (94 - 108)  BP: 109/61 (2021 17:00) (101/61 - 120/70)  BP(mean): --  RR: 18 (2021 17:00) (18 - 20)  SpO2: 95% (2021 17:00) (95% - 100%)    PHYSICAL EXAMINATION:  General: Awake but not alert  HEENT: L sided 2 previous SEPS placement sites with staples which are clean, without surrounding erythema or drainage.  Cardiac: RRR, No M/R/G  Resp: CTAB, No Wh/Rh/Ra  Abdomen: NBS, NT/ND, No HSM, No rigidity or guarding  MSK: LUE hand edema. No LE edema. No Calf tenderness  Skin: No rashes or lesions. Skin is warm and dry to the touch.   Neuro: Alert and Awake. CN 2-12 Grossly intact. Moves all four extremities spontaneously.  Psych: Encephalopathic - unable to assess                          10.6   34.70 )-----------( 243      ( 2021 06:21 )             34.5           144  |  114<H>  |  34<H>  ----------------------------<  142<H>  4.4   |  22  |  0.54    Ca    8.0<L>      2021 06:20  Phos  2.7       Mg     1.9             Urinalysis Basic - ( 2021 07:26 )    Color: Yellow / Appearance: Turbid / S.039 / pH: x  Gluc: x / Ketone: Trace  / Bili: Negative / Urobili: Negative   Blood: x / Protein: 100 mg/dL / Nitrite: Negative   Leuk Esterase: Negative / RBC: 5 /hpf / WBC 8 /HPF   Sq Epi: x / Non Sq Epi: 2 /hpf / Bacteria: Negative        MICROBIOLOGY:  v  .Blood Blood-Peripheral  21   No growth to date.  --  --      .Blood Blood  10-30-21   No growth to date.  --  --      .Blood Blood  10-30-21   No growth to date.  --  --      .Stool Feces  10-29-21   GI PCR Results: NOT detected  *******Please Note:*******  GI panel PCR evaluates for:  Campylobacter, Plesiomonas shigelloides, Salmonella,  Vibrio, Yersinia enterocolitica, Enteroaggregative  Escherichia coli (EAEC), Enteropathogenic E.coli (EPEC),  Enterotoxigenic E. coli (ETEC) lt/st, Shiga-like  toxin-producing E. coli (STEC) stx1/stx2,  Shigella/ Enteroinvasive E. coli (EIEC), Cryptosporidium,  Cyclospora cayetanensis, Entamoeba histolytica,  Giardia lamblia, Adenovirus F 40/41, Astrovirus,  Norovirus GI/GII, Rotavirus A, Sapovirus  --  --      .Blood Blood  10-29-21   Growth in aerobic bottle: Staphylococcus pettenkoferi Coag Negative  Staphylococcus  Single set isolate, possible contaminant. Contact  Microbiology if susceptibility testing clinically  indicated.  ***Blood Panel PCR results on this specimen are available  approximately 3 hours after the Gram stain result.***  Gram stain, PCR, and/or culture results may not always  correspond due to difference in methodologies.  ************************************************************  This PCR assay was performed by multiplex PCR. This  Assay tests for 66 bacterial and resistance gene targets.  Please refer to the [facility] test directory  at [email or web address] for details.  --  Blood Culture PCR      .Blood Blood  10-28-21   No Growth Final  --  --      .Blood Blood-Peripheral  10-28-21   No Growth Final  --  --      .Blood Blood-Venous  10-07-21   No Growth Final  --  --            Clostridium difficile GDH Toxins A&amp;B, EIA:   Negative (10-29-21 @ 08:09)  Clostridium difficile GDH Interpretation: Negative for toxigenic C. Difficile.  This specimen is negative for C.  Difficile glutamate dehydrogenase (GDH) antigen and negative for C.  Difficile Toxins A & B, by EIA.  GDH is a highly sensitive screening  marker for C. Difficile that is produced in large amounts by all C.  Difficile strains, both toxigenic and nontoxigenic.  This assay has not  been validated as a test of cure.  Repeat testing during the same episode  of diarrhea is of limited value and is discouraged.  The results of this  assay should always be interpreted in conjunction with pateint's clinical  history. (10-29-21 @ 08:09)      RADIOLOGY:    <The imaging below has been reviewed and visualized by me independently. Findings as detailed in report below>    < from: Xray Chest 1 View- PORTABLE-Urgent (Xray Chest 1 View- PORTABLE-Urgent .) (21 @ 11:14) >  IMPRESSION:  Clear lungs.    < end of copied text >

## 2021-11-04 NOTE — PROGRESS NOTE ADULT - SUBJECTIVE AND OBJECTIVE BOX
SUBJECTIVE AND OBJECTIVE: Pt seen and examined at bedside. Remains unresponsive.  INTERVAL HPI/OVERNIGHT EVENTS: tube feeds present in oral cavity, tf held and chest xr obtained, xr unremarkable.    DNR on chart:   Allergies    No Known Allergies    Intolerances    MEDICATIONS  (STANDING):  aMIOdarone    Tablet 200 milliGRAM(s) Oral every 12 hours  folic acid 1 milliGRAM(s) Enteral Tube daily  lacosamide IVPB 200 milliGRAM(s) IV Intermittent every 12 hours  polyethylene glycol 3350 17 Gram(s) Oral two times a day  senna 2 Tablet(s) Oral at bedtime  thiamine 100 milliGRAM(s) Enteral Tube daily    MEDICATIONS  (PRN):      ITEMS UNCHECKED ARE NOT PRESENT    PRESENT SYMPTOMS: [x]Unable to obtain due to poor mentation   Source if other than patient:  [ ]Family   [x]Team     Pain:  [ ]yes [x]no see PAINAD  QOL impact -   Location -                    Aggravating factors -  Quality -  Radiation -  Timing-  Severity (0-10 scale):  Minimal acceptable level (0-10 scale):     Dyspnea:                           [ ]Mild [ ]Moderate [ ]Severe  Anxiety:                             [ ]Mild [ ]Moderate [ ]Severe  Fatigue:                             [ ]Mild [ ]Moderate [ ]Severe  Nausea:                             [ ]Mild [ ]Moderate [ ]Severe  Loss of appetite:              [ ]Mild [ ]Moderate [ ]Severe  Constipation:                    [ ]Mild [ ]Moderate [ ]Severe    CPOT:    https://www.[facility].org/getattachment/[email or web address]-[email or web address]/Critical-Care-Pain-Observation-Tool-(CPOT)    PAIN AD Score:	0  http://geriatrictoolkit.[email or web address]/cog/painad.pdf (Ctrl + left click to view)    Other Symptoms:  [ ]All other review of systems negative -unable to assess due to neurologic injury    Palliative Performance Status Version 2:    10     %      http://npcrc.org/files/news/palliative_performance_scale_ppsv2.pdf  PHYSICAL EXAM:  Vital Signs Last 24 Hrs  T(C): 36.4 (2021 13:00), Max: 37.8 (2021 21:23)  T(F): 97.6 (2021 13:00), Max: 100 (2021 21:23)  HR: 96 (2021 13:00) (96 - 108)  BP: 101/61 (2021 13:00) (101/61 - 122/76)  BP(mean): --  RR: 20 (2021 13:00) (18 - 20)  SpO2: 99% (2021 13:00) (95% - 100%) I&O's Summary    2021 07:01  -  2021 07:00  --------------------------------------------------------  IN: 975 mL / OUT: 265 mL / NET: 710 mL    2021 07:01  -  2021 16:57  --------------------------------------------------------  IN: 0 mL / OUT: 125 mL / NET: -125 mL       GENERAL:  [ ]Alert  [ ]Oriented x   [ ]Lethargic  [ ]Cachexia  [x]Unarousable  [ ]Verbal  [x]Non-Verbal  Behavioral:   [ ] Anxiety  [ ] Delirium [ ] Agitation [ ] Other  HEENT:  [x]Normal   [ ]Dry mouth   [ ]ET Tube/Trach  [ ]Oral lesions  PULMONARY:   [x]Clear [ ]Tachypnea  [ ]Audible excessive secretions   [ ]Rhonchi        [ ]Right [ ]Left [ ]Bilateral  [ ]Crackles        [ ]Right [ ]Left [ ]Bilateral  [ ]Wheezing     [ ]Right [ ]Left [ ]Bilateral  [ ]Diminished breath sounds [ ]right [ ]left [ ]bilateral  CARDIOVASCULAR:    [x]Regular [ ]Irregular [ ]Tachy  [ ]Tj [ ]Murmur [ ]Other  GASTROINTESTINAL:  [x]Soft  [ ]Distended   [ ]+BS  [x]Non tender [ ]Tender  [ ]PEG [x]OGT/ NGT  Last BM:   GENITOURINARY:  [ ]Normal [x] Incontinent   [ ]Oliguria/Anuria   [ ]Julio  MUSCULOSKELETAL:   [ ]Normal   [x]Weakness  [ ]Bed/Wheelchair bound [ ]Edema  NEUROLOGIC:   unresponsive to verbal and tacile stimuli  [ ]No focal deficits  [] cognitive impairement  [ ]Dysphagia [ ]Dysarthria [ ]Paresis [ ]Other   SKIN:   [x]Normal    [ ]Rash  [ ]Pressure ulcer(s)       Present on admission [ ]y [ ]n        CRITICAL CARE:  [ ]Shock Present  [ ]Septic [ ]Cardiogenic [ ]Neurologic [ ]Hypovolemic  [ ]Vasopressors [ ]Inotropes  [ ]Respiratory failure present [ ]Mechanical Ventilation [ ]Non-invasive ventilatory support [ ]High-Flow  [ ]Acute  [ ]Chronic [ ]Hypoxic  [ ]Hypercarbic [ ]Other  [x]Other organ failure -brain     LABS:                        10.6   34.70 )-----------( 243      ( 2021 06:21 )             34.5   11    144  |  114<H>  |  34<H>  ----------------------------<  142<H>  4.4   |  22  |  0.54    Ca    8.0<L>      2021 06:20  Phos  2.7     11-  Mg     1.9     11-03        Urinalysis Basic - ( 2021 07:26 )    Color: Yellow / Appearance: Turbid / S.039 / pH: x  Gluc: x / Ketone: Trace  / Bili: Negative / Urobili: Negative   Blood: x / Protein: 100 mg/dL / Nitrite: Negative   Leuk Esterase: Negative / RBC: 5 /hpf / WBC 8 /HPF   Sq Epi: x / Non Sq Epi: 2 /hpf / Bacteria: Negative      RADIOLOGY & ADDITIONAL STUDIES: x< from: Xray Chest 1 View- PORTABLE-Urgent (Xray Chest 1 View- PORTABLE-Urgent .) (21 @ 11:14) >    EXAM:  XR CHEST PORTABLE URGENT 1V                            PROCEDURE DATE:  2021            INTERPRETATION:  CLINICAL INFORMATION: NG tube position.    EXAM: 1 view of the chest.    COMPARISON: Chest radiograph 10/31/2021    FINDINGS:  NG tube with tip in stomach and side port below the diaphragm.  Clear lungs. No pleural effusion or pneumothorax.  The cardiomediastinal silhouette is poorly evaluated on this projection.  No acute osseous findings.    IMPRESSION:  Clear lungs.    --- End of Report ---              SD JOSEPH MD; Resident Radiologist  This document has been electronically signed.  BRIGETTE ORTIZ MD; Attending Radiologist  This document has been electronically signed. 2021  2:36PM    < end of copied text >      Protein Calorie Malnutrition Present: [ ]mild [ ]moderate [ ]severe [ ]underweight [ ]morbid obesity  https://www.andeal.org/vault/2440/web/files/ONC/Table_Clinical%20Characteristics%20to%20Document%20Malnutrition-White%20JV%20et%20al%2020.pdf    Height (cm): 147.3 (10-28-21 @ 08:47), 147.3 (10-28-21 @ 07:00), 147.3 (21 @ 22:20)  Weight (kg): 49.9 (10-28-21 @ 08:47), 49.9 (10-28-21 @ 07:00), 41.8 (21 @ 22:20)  BMI (kg/m2): 23 (10-28-21 @ 08:47), 23 (10-28-21 @ 08:47), 23 (10-28-21 @ 07:00)    [ ]PPSV2 < or = 30%  [ ]significant weight loss [ ]poor nutritional intake [ ]anasarca    [ ]Artificial Nutrition    REFERRALS:   [ ]Chaplaincy  [ ]Hospice  [ ]Child Life  [ ]Social Work  [ ]Case management [ ]Holistic Therapy     Goals of Care Document:

## 2021-11-04 NOTE — PROGRESS NOTE ADULT - ASSESSMENT
89 year old female with subdural hematoma and abdominal distention    1. Subdural hematoma  -per NSX  -in my opinion, the patient is not a candidate for PEG placement at this time.    2. Abdoinal distention. 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 Still abdomen is soft. Prognosis seems poor, would hold off on further interventions, clarify goals of care. Check residuals when giving NG feeds.    3. Leukocytosis    4. Anemia, MDS, no overt GI bleeding, labs consistent w hemolysis, consider heme consult    5. Shock liver improvinga

## 2021-11-04 NOTE — PROGRESS NOTE ADULT - PROBLEM SELECTOR PLAN 1
-no operative  -minimal mental status  -herniation noted  -defer further imaging as not candidate for further intervention  -called son at number in chart, wishes to cont current limited medical interventions for now  -currently getting NGT feeds,  if stable will need to address GOC re: ongoing nutrition w/ family as NGT is a temporary measure

## 2021-11-04 NOTE — PHYSICAL THERAPY INITIAL EVALUATION ADULT - RANGE OF MOTION EXAMINATION, REHAB EVAL
PROM WFL/bilateral upper extremity ROM was WFL (within functional limits)/bilateral lower extremity ROM was WFL (within functional limits)

## 2021-11-04 NOTE — PROGRESS NOTE ADULT - SUBJECTIVE AND OBJECTIVE BOX
[facility] Division of Hospital Medicine  Chito Medellin MD  Pager (M-F, 8A-5P): [phone]  Other Times:  [phone]    Patient is a 89y old  Female who presents with a chief complaint of SDH (2021 11:07)      SUBJECTIVE / OVERNIGHT EVENTS: No acute events. Remains obtunded.  ADDITIONAL REVIEW OF SYSTEMS: unable to obtain    MEDICATIONS  (STANDING):  aMIOdarone    Tablet 200 milliGRAM(s) Oral every 12 hours  folic acid 1 milliGRAM(s) Enteral Tube daily  lacosamide IVPB 200 milliGRAM(s) IV Intermittent every 12 hours  polyethylene glycol 3350 17 Gram(s) Oral two times a day  senna 2 Tablet(s) Oral at bedtime  thiamine 100 milliGRAM(s) Enteral Tube daily    MEDICATIONS  (PRN):      CAPILLARY BLOOD GLUCOSE        I&O's Summary    2021 07:01  -  2021 07:00  --------------------------------------------------------  IN: 975 mL / OUT: 265 mL / NET: 710 mL        PHYSICAL EXAM:  Vital Signs Last 24 Hrs  T(C): 36.9 (2021 09:00), Max: 37.8 (2021 21:23)  T(F): 98.4 (2021 09:00), Max: 100 (2021 21:23)  HR: 105 (2021 09:00) (96 - 108)  BP: 120/70 (2021 09:00) (102/58 - 122/76)  BP(mean): --  RR: 18 (2021 09:00) (18 - 20)  SpO2: 100% (2021 09:00) (95% - 100%)  CONSTITUTIONAL: NAD, obtunded, elderly  EYES: EOMI; conjunctiva and sclera clear  ENMT: Moist oral mucosa, no pharyngeal injection or exudates  RESPIRATORY: Normal respiratory effort; lungs are clear to auscultation bilaterally  CARDIOVASCULAR: irreuglar rate and rhythm, normal S1 and S2, no murmur/rub/gallop; No lower extremity edema  ABDOMEN: Nontender to palpation, distended, no rebound/guarding;   PSYCH: A+Ox0; obtunded  NEUROLOGY: obtunded, minimal withdrawal to noxious  SKIN: No rashes; no palpable lesions    LABS:                        10.6   34.70 )-----------( 243      ( 2021 06:21 )             34.5     11-04    144  |  114<H>  |  34<H>  ----------------------------<  142<H>  4.4   |  22  |  0.54    Ca    8.0<L>      2021 06:20  Phos  2.7     11-  Mg     1.9     11-03            Urinalysis Basic - ( 2021 07:26 )    Color: Yellow / Appearance: Turbid / S.039 / pH: x  Gluc: x / Ketone: Trace  / Bili: Negative / Urobili: Negative   Blood: x / Protein: 100 mg/dL / Nitrite: Negative   Leuk Esterase: Negative / RBC: 5 /hpf / WBC 8 /HPF   Sq Epi: x / Non Sq Epi: 2 /hpf / Bacteria: Negative        Culture - Blood (collected 2021 06:37)  Source: .Blood Blood-Peripheral  Preliminary Report (2021 07:01):    No growth to date.    Culture - Blood (collected 2021 06:37)  Source: .Blood Blood-Peripheral  Preliminary Report (2021 07:01):    No growth to date.        RADIOLOGY & ADDITIONAL TESTS:  Results Reviewed:   Imaging Personally Reviewed:  Electrocardiogram Personally Reviewed:    COORDINATION OF CARE:  Care Discussed with Consultants/Other Providers [Y/N]:  Prior or Outpatient Records Reviewed [Y/N]:

## 2021-11-04 NOTE — PROGRESS NOTE ADULT - ASSESSMENT
89 year old female PMH MDS, dementia with recent hospitalization s/p fall, found to have tSDH with MLS and L subcapital femoral nexk fx s/p CRPP (9/28/21) with course c/b acute blood loss anemia 2/2 hematoma/ileus, now presented from  for AMS CTH w/ AoC SDH and R 1.4cm MLS , also with c/f sepsis. On presentation WBC of 26.01 with 61% PMN.     RVP (10/28) negative.   U/A (10/28) with 3-5 WBC.   BCx (10/28) with no growth.   C diff toxin assay (10/29) negative.    CT Chest (10/28) with no consolidations.   CT A/P (10/28) with stercoral colitis and pancolitis.   CT Brain (10/29) with Left-sided acute on chronic subdural hematoma is again seen as well as a right frontal subdural collection.     s/p 2x SEPS placement via left skull    Given sepsis/hypotensive presentation reasonable to cover for the pancolitis finding, unclear if truly infectious  Patient with baseline high-level leukocytosis, so unlikely to be reliable marker of underlying infectious process  C diff Negative  GI PCR Negative    Febrile on 11/3  U/A (11/3) 8 WBC  CXR (11/4) Clear Lungs  No other clinical status     RECOMMENDATIONS:    #Fever - ?central fever ?secondary to subdural hematoma  --Monitor off of antibiotics   --Consider LUE DVT US (edema of the left hand)  --Consider Lower Extremity DVT US (edema of LE)  --Follow repeat BCx     #Pancolitis, Leukocytosis, Hypotension  --s/p 7 days of Cefepime/Metronidazole  --Continue to follow CBC with diff  --Continue to follow renal function (Cr/BUN)  --Continue to follow transaminases  --Continue to follow temperature curve    #Positive BCx (CoNS - likely procurement contaminant)    I will continue to follow. Please feel free to contact me with any further questions.    Clinton Bobo M.D.  [facility] Division of Infectious Disease  8AM-5PM: Pager Number [phone]  After Hours (or if no response): Please contact the Infectious Diseases Office at [phone]

## 2021-11-04 NOTE — PROGRESS NOTE ADULT - TIME BILLING
Review of vitals/data; examination; coordination or care with ICU team, Palliative Care and NSGY.
discussing goals of care with patient's son and educating on diagnosis/prognosis
coordinating care

## 2021-11-04 NOTE — PROGRESS NOTE ADULT - ASSESSMENT
89 y/o F with hx of dementia and MDS w/ recent hospitalization (9/28/21) for fall, found to have tSDH w/ MLS and femoral fx s/p CRPP. Pt was d/c to rehab now transferred from [place] for AMS in setting of CTH w/ R acute on chronic SDH w/ MLS and subfalcine herniation. non-surgical candidate d/t poor functional status. Palliative consulted for goals of care.

## 2021-11-04 NOTE — PROGRESS NOTE ADULT - PROBLEM SELECTOR PLAN 7
-no pharm dvt ppx due ICH    #GOC  -ongoing, fam requesting cont current interventions for now (eg TFs or abx if indicated). Family okay w/ low dose narcotics if needed for discomfort. Feel hospice is most appropriate and that pt is essentially actively dying from catastrophic neurologic event.

## 2021-11-04 NOTE — PHYSICAL THERAPY INITIAL EVALUATION ADULT - PRECAUTIONS/LIMITATIONS, REHAB EVAL
CONTINUED: CTH /c SDH and R 1.4cm MLS also with c/f sepsis. Pt s/p 2U PRBC prior to arrival. Pt underwent SEPS drain placement (10/29-10/31) for chronic SDH. colitis being decompressed with rectal tube. Pt with severe septic shock due to colitis. 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 GOC discussed and pt likely to transfer to hospice/PCU once abx are complete. CONTINUED: CTH /c SDH and R 1.4cm MLS also with c/f sepsis. Pt s/p 2U PRBC prior to arrival. Pt underwent SEPS drain placement (10/29-10/31) for chronic SDH. colitis being decompressed with rectal tube. Pt with severe septic shock due to colitis. 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 GOC discussed and pt likely to transfer to hospice/PCU once abx are complete./fall precautions

## 2021-11-04 NOTE — PHYSICAL THERAPY INITIAL EVALUATION ADULT - ADDITIONAL COMMENTS
Per chart review, Pt lives at [facility]. PTA the patient requires assistance with ADLs and uses a RW for ambulation.

## 2021-11-04 NOTE — PROGRESS NOTE ADULT - SUBJECTIVE AND OBJECTIVE BOX
Subjective: Patient seen and examined. No new events except as noted.     REVIEW OF SYSTEMS:  Unable to obtain        MEDICATIONS:  MEDICATIONS  (STANDING):  aMIOdarone    Tablet 200 milliGRAM(s) Oral every 12 hours  folic acid 1 milliGRAM(s) Enteral Tube daily  lacosamide IVPB 200 milliGRAM(s) IV Intermittent every 12 hours  polyethylene glycol 3350 17 Gram(s) Oral two times a day  senna 2 Tablet(s) Oral at bedtime  thiamine 100 milliGRAM(s) Enteral Tube daily      PHYSICAL EXAM:  T(C): 36.9 (11-04-21 @ 09:00), Max: 37.8 (11-03-21 @ 21:23)  HR: 105 (11-04-21 @ 09:00) (96 - 108)  BP: 120/70 (11-04-21 @ 09:00) (102/58 - 122/76)  RR: 18 (11-04-21 @ 09:00) (18 - 20)  SpO2: 100% (11-04-21 @ 09:00) (95% - 100%)  Wt(kg): --  I&O's Summary    03 Nov 2021 07:01  -  04 Nov 2021 07:00  --------------------------------------------------------  IN: 975 mL / OUT: 265 mL / NET: 710 mL        Appearance: NAD  HEENT:   dry oral mucosa, +NGT   Lymphatic: No lymphadenopathy  Cardiovascular: Normal S1 S2, No JVD, No murmurs, No edema  Respiratory: decreased bs   Psychiatry: A & O x 0  Gastrointestinal:  Soft, Non-tender, + BS	  Skin: No rashes, No ecchymoses, No cyanosis	  Ext: No edema  Neuro: AOx0, EO to nox, roving eye movements, pinpoint pupils, no FC, LUE WD, RUE nothing, BLE TF      LABS:    CARDIAC MARKERS:                                10.6   34.70 )-----------( 243      ( 04 Nov 2021 06:21 )             34.5     11-04    144  |  114<H>  |  34<H>  ----------------------------<  142<H>  4.4   |  22  |  0.54    Ca    8.0<L>      04 Nov 2021 06:20  Phos  2.7     11-03  Mg     1.9     11-03        TELEMETRY: 	    ECG:  	  RADIOLOGY:   DIAGNOSTIC TESTING:  [ ] Echocardiogram:  [ ]  Catheterization:  [ ] Stress Test:    OTHER:

## 2021-11-04 NOTE — PROGRESS NOTE ADULT - PROBLEM SELECTOR PLAN 1
acute on chronic SDH  s/p SEPS intervention   CT head with evidence of large hematoma midline shift and herniation  no further neurosurgical intervention being offered

## 2021-11-04 NOTE — PROGRESS NOTE ADULT - SUBJECTIVE AND OBJECTIVE BOX
Chief Complaint:  Patient is a 89y old  Female who presents with a chief complaint of SDH (02 Nov 2021 18:29)      Date of service 11-04-21 @ 19:42      Interval Events:   no events    Hospital Medications:  aMIOdarone    Tablet 200 milliGRAM(s) Oral every 12 hours  cefepime   IVPB 1000 milliGRAM(s) IV Intermittent every 12 hours  cefepime   IVPB      folic acid 1 milliGRAM(s) Enteral Tube daily  lacosamide IVPB 200 milliGRAM(s) IV Intermittent every 12 hours  metroNIDAZOLE  IVPB 500 milliGRAM(s) IV Intermittent every 12 hours  thiamine 100 milliGRAM(s) Enteral Tube daily        Review of Systems:  somulent    PHYSICAL EXAM:   Vital Signs:  Vital Signs Last 24 Hrs  T(C): 37.1 (02 Nov 2021 17:00), Max: 37.1 (01 Nov 2021 23:45)  T(F): 98.8 (02 Nov 2021 17:00), Max: 98.8 (02 Nov 2021 17:00)  HR: 94 (02 Nov 2021 17:00) (77 - 102)  BP: 119/72 (02 Nov 2021 17:00) (102/60 - 124/69)  BP(mean): 93 (01 Nov 2021 23:45) (74 - 93)  RR: 18 (02 Nov 2021 17:00) (18 - 32)  SpO2: 100% (02 Nov 2021 17:00) (93% - 100%)  Daily     Daily       PHYSICAL EXAM:     GENERAL:  Appears ill  HEENT:  NC/AT,  conjunctivae anicteric, clear and pink,   NECK: supple, trachea midline  CHEST:  Full & symmetric excursion, no increased effort, breath sounds clear  HEART:  Regular rhythm, no JVD  ABDOMEN:  Soft, non-tender, distended, normoactive bowel sounds,  no masses , no hepatosplenomegaly  EXTREMITIES:  no cyanosis,clubbing or edema  SKIN:  No rash, erythema, or, ecchymoses, no jaundice  NEURO:  nonverbal  RECTAL: Deferred      LABS Personally reviewed by me:      11-01    149<H>  |  118<H>  |  26<H>  ----------------------------<  237<H>  3.8   |  21<L>  |  0.55    Ca    7.8<L>      01 Nov 2021 20:58  Phos  2.2     11-01  Mg     1.9     11-01                                    9.9    31.65 )-----------( 190      ( 30 Oct 2021 21:33 )             30.3       Imaging personally reviewed by me:

## 2021-11-04 NOTE — PHYSICAL THERAPY INITIAL EVALUATION ADULT - PERTINENT HX OF CURRENT PROBLEM, REHAB EVAL
Pt is a 89 y/o female admitted with SDH. PMH: MDS, dementia with recent hospitalization s/p fall. Pt found to have tSDh with MLS and L subcapital femoral neck fx s/p CRPP (9/28/21) with course c/b acute blood loss anemia 2/2 hematoma/ileus. Pt presented from  for AMS

## 2021-11-05 NOTE — PROGRESS NOTE ADULT - PROBLEM SELECTOR PLAN 1
Likely type 2 demand mediated ischemia due to sepsis/vasodilation and anemia/hypovolemia  Hyperdynamic LV on TTE   would not trend trops as it will not    DC amiodarone 200 bid   start Metoprolol 25 bid

## 2021-11-05 NOTE — PROGRESS NOTE ADULT - PROBLEM SELECTOR PLAN 7
-no pharm dvt ppx due ICH    #GOC  -ongoing, fam requesting cont current interventions for now (eg TFs or abx if indicated). Family okay w/ low dose narcotics if needed for discomfort. Feel hospice is most appropriate and that pt is essentially dying from catastrophic neurologic event.

## 2021-11-05 NOTE — CHART NOTE - NSCHARTNOTEFT_GEN_A_CORE
GI evaluation appreciated. PEG placement not appropriate at this time. Will f/u with patient's son Enrique regarding nutritional goals of care on Monday as his wife is currently hospitalized s/p .    For acute issues or uncontrolled symptoms please page palliative team.    Smiley Padilla MD  Palliative Medicine Attending   [facility] Pager [phone]

## 2021-11-05 NOTE — PROGRESS NOTE ADULT - SUBJECTIVE AND OBJECTIVE BOX
Subjective: Patient seen and examined. No new events except as noted.     REVIEW OF SYSTEMS:  Unable to obtain     MEDICATIONS:  MEDICATIONS  (STANDING):  aMIOdarone    Tablet 200 milliGRAM(s) Oral every 12 hours  folic acid 1 milliGRAM(s) Enteral Tube daily  lacosamide IVPB 200 milliGRAM(s) IV Intermittent every 12 hours  polyethylene glycol 3350 17 Gram(s) Oral two times a day  senna 2 Tablet(s) Oral at bedtime  thiamine 100 milliGRAM(s) Enteral Tube daily      PHYSICAL EXAM:  T(C): 36.6 (11-05-21 @ 09:00), Max: 37.1 (11-04-21 @ 21:00)  HR: 90 (11-05-21 @ 09:00) (89 - 99)  BP: 118/65 (11-05-21 @ 09:00) (101/61 - 120/74)  RR: 18 (11-05-21 @ 09:00) (18 - 20)  SpO2: 98% (11-05-21 @ 09:00) (95% - 100%)  Wt(kg): --  I&O's Summary    04 Nov 2021 07:01  -  05 Nov 2021 07:00  --------------------------------------------------------  IN: 1010 mL / OUT: 525 mL / NET: 485 mL    05 Nov 2021 07:01 - 05 Nov 2021 11:29  --------------------------------------------------------  IN: 130 mL / OUT: 0 mL / NET: 130 mL            Appearance: NAD  HEENT:   dry oral mucosa, +NGT   Lymphatic: No lymphadenopathy  Cardiovascular: Normal S1 S2, No JVD, No murmurs, No edema  Respiratory: decreased bs   Psychiatry: A & O x 0  Gastrointestinal:  Soft, Non-tender, + BS	  Skin: No rashes, No ecchymoses, No cyanosis	  Ext: No edema  Neuro: AOx0, EO to nox, roving eye movements, pinpoint pupils, no FC, LUE WD, RUE nothing, BLE TF        LABS:    CARDIAC MARKERS:                                10.6   34.70 )-----------( 243      ( 04 Nov 2021 06:21 )             34.5     11-04    144  |  114<H>  |  34<H>  ----------------------------<  142<H>  4.4   |  22  |  0.54    Ca    8.0<L>      04 Nov 2021 06:20          TELEMETRY: 	    ECG:  	  RADIOLOGY:   DIAGNOSTIC TESTING:  [ ] Echocardiogram:  [ ]  Catheterization:  [ ] Stress Test:    OTHER:

## 2021-11-05 NOTE — PROGRESS NOTE ADULT - SUBJECTIVE AND OBJECTIVE BOX
INTERVAL HPI/OVERNIGHT EVENTS:    pt seen and examined  remains somnolent, tolerating CHIN feeds at 50 cc/hour  + soft bm this morning as per RN     MEDICATIONS  (STANDING):  aMIOdarone    Tablet 200 milliGRAM(s) Oral every 12 hours  folic acid 1 milliGRAM(s) Enteral Tube daily  lacosamide IVPB 200 milliGRAM(s) IV Intermittent every 12 hours  polyethylene glycol 3350 17 Gram(s) Oral two times a day  senna 2 Tablet(s) Oral at bedtime  thiamine 100 milliGRAM(s) Enteral Tube daily    MEDICATIONS  (PRN):      Allergies    No Known Allergies    Intolerances        Review of Systems:  somulent    Vital Signs Last 24 Hrs  T(C): 36.6 (05 Nov 2021 09:00), Max: 37.1 (04 Nov 2021 21:00)  T(F): 97.8 (05 Nov 2021 09:00), Max: 98.8 (04 Nov 2021 21:00)  HR: 90 (05 Nov 2021 09:00) (89 - 99)  BP: 118/65 (05 Nov 2021 09:00) (101/61 - 120/74)  BP(mean): --  RR: 18 (05 Nov 2021 09:00) (18 - 20)  SpO2: 98% (05 Nov 2021 09:00) (95% - 100%)    PHYSICAL EXAM:    GENERAL:  Appears ill  HEENT:  NC/AT,  conjunctivae anicteric, clear and pink, + CHIN tube in place   NECK: supple, trachea midline  CHEST:  Full & symmetric excursion, no increased effort, breath sounds clear  HEART:  Regular rhythm, no JVD  ABDOMEN:  Soft, non-tender, distended, normoactive bowel sounds,  no masses , no hepatosplenomegaly  EXTREMITIES:  no cyanosis,clubbing or edema  SKIN:  No rash, erythema, or, ecchymoses, no jaundice  NEURO:  nonverbal  RECTAL: Deferred        LABS:                        10.6   34.70 )-----------( 243      ( 04 Nov 2021 06:21 )             34.5     11-04    144  |  114<H>  |  34<H>  ----------------------------<  142<H>  4.4   |  22  |  0.54    Ca    8.0<L>      04 Nov 2021 06:20            RADIOLOGY & ADDITIONAL TESTS:

## 2021-11-05 NOTE — PROGRESS NOTE ADULT - ASSESSMENT
89 year old female PMH MDS, dementia with recent hospitalization s/p fall, found to have tSDH with MLS and L subcapital femoral nexk fx s/p CRPP (9/28/21) with course c/b acute blood loss anemia 2/2 hematoma/ileus, now presented from  for AMS CTH w/ AoC SDH and R 1.4cm MLS , also with c/f sepsis. On presentation WBC of 26.01 with 61% PMN.     RVP (10/28) negative.   U/A (10/28) with 3-5 WBC.   BCx (10/28) with no growth.   C diff toxin assay (10/29) negative.    CT Chest (10/28) with no consolidations.   CT A/P (10/28) with stercoral colitis and pancolitis.   CT Brain (10/29) with Left-sided acute on chronic subdural hematoma is again seen as well as a right frontal subdural collection.     s/p 2x SEPS placement via left skull    Given sepsis/hypotensive presentation reasonable to cover for the pancolitis finding, unclear if truly infectious  Patient with baseline high-level leukocytosis, so unlikely to be reliable marker of underlying infectious process  C diff Negative  GI PCR Negative    Febrile on 11/3  U/A (11/3) 8 WBC  CXR (11/4) Clear Lungs  No other clinical status     RECOMMENDATIONS:    #Fever - ?central fever ?secondary to subdural hematoma  --Monitor off of antibiotics   --Consider LUE DVT US (edema of the left hand)  --Consider Lower Extremity DVT US (edema of LE)  --Follow repeat BCx     #Pancolitis, Leukocytosis, Hypotension  --s/p 7 days of Cefepime/Metronidazole  --Continue to follow CBC with diff  --Continue to follow renal function (Cr/BUN)  --Continue to follow transaminases  --Continue to follow temperature curve    #Positive BCx (CoNS - likely procurement contaminant)    I will be away over this upcoming weekend. Please contact the Infectious Diseases Office with any further questions or concerns.     Clinton Bobo M.D.  [facility] Division of Infectious Disease  8AM-5PM: Pager Number [phone]  After Hours (or if no response): Please contact the Infectious Diseases Office at [phone]

## 2021-11-05 NOTE — PROGRESS NOTE ADULT - PROBLEM SELECTOR PLAN 1
-no operative  -minimal mental status  -herniation noted  -defer further imaging as not candidate for further intervention  -called son at number in chart, wishes to cont current limited medical interventions for now  -currently getting NGT feeds,  if stable will need to address GOC re: ongoing nutrition w/ family as NGT is a temporary measure and pt may not be candidate for PEG at this time

## 2021-11-05 NOTE — PROGRESS NOTE ADULT - SUBJECTIVE AND OBJECTIVE BOX
[facility] Division of Hospital Medicine  Chito Medellin MD  Pager (M-F, 8A-5P): [phone]  Other Times:  [phone]    Patient is a 89y old  Female who presents with a chief complaint of SDH (05 Nov 2021 11:29)      SUBJECTIVE / OVERNIGHT EVENTS: No acute events. Had BM this AM.  ADDITIONAL REVIEW OF SYSTEMS:  unable to obtain    MEDICATIONS  (STANDING):  aMIOdarone    Tablet 200 milliGRAM(s) Oral every 12 hours  folic acid 1 milliGRAM(s) Enteral Tube daily  lacosamide IVPB 200 milliGRAM(s) IV Intermittent every 12 hours  polyethylene glycol 3350 17 Gram(s) Oral two times a day  senna 2 Tablet(s) Oral at bedtime  thiamine 100 milliGRAM(s) Enteral Tube daily    MEDICATIONS  (PRN):      CAPILLARY BLOOD GLUCOSE        I&O's Summary    04 Nov 2021 07:01  -  05 Nov 2021 07:00  --------------------------------------------------------  IN: 1010 mL / OUT: 525 mL / NET: 485 mL    05 Nov 2021 07:01  -  05 Nov 2021 15:16  --------------------------------------------------------  IN: 580 mL / OUT: 250 mL / NET: 330 mL        PHYSICAL EXAM:  Vital Signs Last 24 Hrs  T(C): 36.7 (05 Nov 2021 12:18), Max: 37.1 (04 Nov 2021 21:00)  T(F): 98 (05 Nov 2021 12:18), Max: 98.8 (04 Nov 2021 21:00)  HR: 88 (05 Nov 2021 12:18) (88 - 94)  BP: 115/72 (05 Nov 2021 12:18) (109/61 - 120/74)  BP(mean): --  RR: 18 (05 Nov 2021 12:18) (18 - 18)  SpO2: 97% (05 Nov 2021 12:18) (95% - 98%)  CONSTITUTIONAL: NAD, obtunded, elderly  EYES: EOMI; conjunctiva and sclera clear  ENMT: Moist oral mucosa, no pharyngeal injection or exudates  RESPIRATORY: Normal respiratory effort; lungs are clear to auscultation bilaterally  CARDIOVASCULAR: irreuglar rate and rhythm, normal S1 and S2, no murmur/rub/gallop; No lower extremity edema  ABDOMEN: Nontender to palpation, distended, no rebound/guarding;   PSYCH: A+Ox0; obtunded  NEUROLOGY: obtunded, minimal withdrawal to noxious  SKIN: No rashes; no palpable lesions    LABS:                        10.6   34.70 )-----------( 243      ( 04 Nov 2021 06:21 )             34.5     11-04    144  |  114<H>  |  34<H>  ----------------------------<  142<H>  4.4   |  22  |  0.54    Ca    8.0<L>      04 Nov 2021 06:20                Culture - Blood (collected 03 Nov 2021 06:37)  Source: .Blood Blood-Peripheral  Preliminary Report (04 Nov 2021 07:01):    No growth to date.    Culture - Blood (collected 03 Nov 2021 06:37)  Source: .Blood Blood-Peripheral  Preliminary Report (04 Nov 2021 07:01):    No growth to date.        RADIOLOGY & ADDITIONAL TESTS:  Results Reviewed:   Imaging Personally Reviewed:  Electrocardiogram Personally Reviewed:    COORDINATION OF CARE:  Care Discussed with Consultants/Other Providers [Y/N]:  Prior or Outpatient Records Reviewed [Y/N]:

## 2021-11-05 NOTE — PROGRESS NOTE ADULT - ASSESSMENT
89 year old female with subdural hematoma and abdominal distention    1. Subdural hematoma  -per NSX  -in my opinion, the patient is not a candidate for PEG placement at this time.    2. Abdominal distention. 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 Still abdomen is soft. Prognosis seems poor, would hold off on further interventions, clarify goals of care. Check residuals when giving NG feeds.  - appears to be improving      3. Leukocytosis    4. Anemia, MDS, no overt GI bleeding, labs consistent w hemolysis, consider heme consult    5. Shock liver improving    The plan of care was discussed with the physician assistant and modifications were made to the notation where appropriate.   Differential diagnosis and plan of care discussed with patient after the evaluation  35 minutes spent on total encounter of which more than fifty percent of the encounter was spent counseling and/or coordinating care by the attending physician.    [facility]  Gastroenterology and Hepatology  [address] [place], [place]  Office: [phone]  Cell: [phone]

## 2021-11-05 NOTE — PROGRESS NOTE ADULT - SUBJECTIVE AND OBJECTIVE BOX
Follow Up:  fever    Interval History: afebrile. no acute overnight events.     REVIEW OF SYSTEMS  [ x ] ROS unobtainable because:  demented/encephalopathic  [  ] All other systems negative except as noted below    Constitutional:  [ ] fever [ ] chills  [ ] weight loss  [ ] weakness  Skin:  [ ] rash [ ] phlebitis	  Eyes: [ ] icterus [ ] pain  [ ] discharge	  ENMT: [ ] sore throat  [ ] thrush [ ] ulcers [ ] exudates  Respiratory: [ ] dyspnea [ ] hemoptysis [ ] cough [ ] sputum	  Cardiovascular:  [ ] chest pain [ ] palpitations [ ] edema	  Gastrointestinal:  [ ] nausea [ ] vomiting [ ] diarrhea [ ] constipation [ ] pain	  Genitourinary:  [ ] dysuria [ ] frequency [ ] hematuria [ ] discharge [ ] flank pain  [ ] incontinence  Musculoskeletal:  [ ] myalgias [ ] arthralgias [ ] arthritis  [ ] back pain  Neurological:  [ ] headache [ ] seizures  [ ] confusion/altered mental status    Allergies  No Known Allergies        ANTIMICROBIALS:      OTHER MEDS:  MEDICATIONS  (STANDING):  lacosamide IVPB 200 every 12 hours  metoprolol tartrate 25 two times a day  polyethylene glycol 3350 17 two times a day  senna 2 at bedtime      Vital Signs Last 24 Hrs  T(C): 36.7 (05 Nov 2021 12:18), Max: 37.1 (04 Nov 2021 21:00)  T(F): 98 (05 Nov 2021 12:18), Max: 98.8 (04 Nov 2021 21:00)  HR: 88 (05 Nov 2021 12:18) (88 - 93)  BP: 115/72 (05 Nov 2021 12:18) (113/68 - 120/74)  BP(mean): --  RR: 18 (05 Nov 2021 12:18) (18 - 18)  SpO2: 97% (05 Nov 2021 12:18) (97% - 98%)    PHYSICAL EXAMINATION:  General: Awake but not alert  HEENT: L sided 2 previous SEPS placement sites with staples which are clean, without surrounding erythema or drainage.  Cardiac: RRR, No M/R/G  Resp: CTAB, No Wh/Rh/Ra  Abdomen: NBS, NT/ND, No HSM, No rigidity or guarding  MSK: LUE hand edema. No LE edema. No Calf tenderness  Skin: No rashes or lesions. Skin is warm and dry to the touch.   Neuro: Alert and Awake. CN 2-12 Grossly intact. Moves all four extremities spontaneously.  Psych: Encephalopathic - unable to assess                          10.6   34.70 )-----------( 243      ( 04 Nov 2021 06:21 )             34.5       11-04    144  |  114<H>  |  34<H>  ----------------------------<  142<H>  4.4   |  22  |  0.54    Ca    8.0<L>      04 Nov 2021 06:20            MICROBIOLOGY:  v  .Blood Blood-Peripheral  11-03-21   No growth to date.  --  --      .Blood Blood  10-30-21   No Growth Final  --  --      .Blood Blood  10-30-21   No Growth Final  --  --      .Stool Feces  10-29-21   GI PCR Results: NOT detected  *******Please Note:*******  GI panel PCR evaluates for:  Campylobacter, Plesiomonas shigelloides, Salmonella,  Vibrio, Yersinia enterocolitica, Enteroaggregative  Escherichia coli (EAEC), Enteropathogenic E.coli (EPEC),  Enterotoxigenic E. coli (ETEC) lt/st, Shiga-like  toxin-producing E. coli (STEC) stx1/stx2,  Shigella/ Enteroinvasive E. coli (EIEC), Cryptosporidium,  Cyclospora cayetanensis, Entamoeba histolytica,  Giardia lamblia, Adenovirus F 40/41, Astrovirus,  Norovirus GI/GII, Rotavirus A, Sapovirus  --  --      .Blood Blood  10-29-21   Growth in aerobic bottle: Staphylococcus pettenkoferi Coag Negative  Staphylococcus  Single set isolate, possible contaminant. Contact  Microbiology if susceptibility testing clinically  indicated.  ***Blood Panel PCR results on this specimen are available  approximately 3 hours after the Gram stain result.***  Gram stain, PCR, and/or culture results may not always  correspond due to difference in methodologies.  ************************************************************  This PCR assay was performed by multiplex PCR. This  Assay tests for 66 bacterial and resistance gene targets.  Please refer to the [facility] test directory  at [email or web address] for details.  --  Blood Culture PCR      .Blood Blood  10-28-21   No Growth Final  --  --      .Blood Blood-Peripheral  10-28-21   No Growth Final  --  --      .Blood Blood-Venous  10-07-21   No Growth Final  --  --    RADIOLOGY:    <The imaging below has been reviewed and visualized by me independently. Findings as detailed in report below>    < from: Xray Chest 1 View- PORTABLE-Urgent (Xray Chest 1 View- PORTABLE-Urgent .) (11.04.21 @ 11:14) >  IMPRESSION:  Clear lungs.    < end of copied text >

## 2021-11-06 NOTE — PROGRESS NOTE ADULT - PROBLEM SELECTOR PLAN 2
-s/p decompression w/ rectal tube  -add BM regimen and monitor  -dc abx per ID  -abdomen is more distended today, repeat AXR and consider holding TFs if no BM -s/p decompression w/ rectal tube  -add BM regimen and monitor  -dc abx per ID  -abdomen is more distended today, repeat AXR and holding TFs until BM  -add maintenance fluids

## 2021-11-06 NOTE — CHART NOTE - NSCHARTNOTEFT_GEN_A_CORE
Spoke with RN who noted that patient's abdomen is more distended than yesterday.  Hypoactive/distant bowel sounds noted on exam.  Prelim AXR results revealed dilated loop of bowel in the mid abdomen.  Will hold feeds today and start IVF.  Patient will need [place] for decompression if vomits or stops having bowel movements.  d/w Dr. Medellin.

## 2021-11-06 NOTE — PROGRESS NOTE ADULT - SUBJECTIVE AND OBJECTIVE BOX
[facility] Division of Hospital Medicine  Chito Medellin MD  Pager (M-F, 8A-5P): [phone]  Other Times:  [phone]    Patient is a 89y old  Female who presents with a chief complaint of SDH (05 Nov 2021 17:52)      SUBJECTIVE / OVERNIGHT EVENTS: No acute events. Remains obtunded.  ADDITIONAL REVIEW OF SYSTEMS: unable to obtain    MEDICATIONS  (STANDING):  folic acid 1 milliGRAM(s) Enteral Tube daily  lacosamide IVPB 200 milliGRAM(s) IV Intermittent every 12 hours  metoprolol tartrate 25 milliGRAM(s) Oral two times a day  polyethylene glycol 3350 17 Gram(s) Oral two times a day  senna 2 Tablet(s) Oral at bedtime  thiamine 100 milliGRAM(s) Enteral Tube daily    MEDICATIONS  (PRN):      CAPILLARY BLOOD GLUCOSE        I&O's Summary    05 Nov 2021 07:01  -  06 Nov 2021 07:00  --------------------------------------------------------  IN: 2400 mL / OUT: 1000 mL / NET: 1400 mL    06 Nov 2021 08:01  -  06 Nov 2021 15:50  --------------------------------------------------------  IN: 700 mL / OUT: 0 mL / NET: 700 mL        PHYSICAL EXAM:  Vital Signs Last 24 Hrs  T(C): 36.6 (06 Nov 2021 13:11), Max: 36.7 (06 Nov 2021 05:43)  T(F): 97.8 (06 Nov 2021 13:11), Max: 98.1 (06 Nov 2021 05:43)  HR: 85 (06 Nov 2021 13:11) (83 - 87)  BP: 118/62 (06 Nov 2021 13:11) (114/67 - 131/69)  BP(mean): --  RR: 18 (06 Nov 2021 13:11) (18 - 18)  SpO2: 96% (06 Nov 2021 13:11) (96% - 98%)  CONSTITUTIONAL: NAD, obtunded, elderly  EYES: EOMI; conjunctiva and sclera clear  ENMT: Moist oral mucosa, no pharyngeal injection or exudates  RESPIRATORY: Normal respiratory effort; lungs are clear to auscultation bilaterally  CARDIOVASCULAR: irreuglar rate and rhythm, normal S1 and S2, no murmur/rub/gallop; No lower extremity edema  ABDOMEN: distended, less soft today  PSYCH: A+Ox0; obtunded  NEUROLOGY: obtunded, minimal withdrawal to noxious  SKIN: No rashes; no palpable lesions  LABS:                        8.8    27.26 )-----------( 268      ( 06 Nov 2021 06:27 )             29.9     11-06    141  |  113<H>  |  31<H>  ----------------------------<  136<H>  4.0   |  20<L>  |  0.49<L>    Ca    8.0<L>      06 Nov 2021 06:37                  RADIOLOGY & ADDITIONAL TESTS:  Results Reviewed:   Imaging Personally Reviewed:  Electrocardiogram Personally Reviewed:    COORDINATION OF CARE:  Care Discussed with Consultants/Other Providers [Y/N]:  Prior or Outpatient Records Reviewed [Y/N]:

## 2021-11-06 NOTE — PROGRESS NOTE ADULT - SUBJECTIVE AND OBJECTIVE BOX
Subjective: Patient seen and examined. No new events except as noted.     REVIEW OF SYSTEMS:  Unable to obtain       MEDICATIONS:  MEDICATIONS  (STANDING):  folic acid 1 milliGRAM(s) Enteral Tube daily  lacosamide IVPB 200 milliGRAM(s) IV Intermittent every 12 hours  metoprolol tartrate 25 milliGRAM(s) Oral two times a day  polyethylene glycol 3350 17 Gram(s) Oral two times a day  senna 2 Tablet(s) Oral at bedtime  sodium chloride 0.45%. 1000 milliLiter(s) (75 mL/Hr) IV Continuous <Continuous>  thiamine 100 milliGRAM(s) Enteral Tube daily      PHYSICAL EXAM:  T(C): 36.4 (11-06-21 @ 16:58), Max: 36.7 (11-06-21 @ 05:43)  HR: 88 (11-06-21 @ 16:58) (83 - 88)  BP: 115/65 (11-06-21 @ 16:58) (114/67 - 131/69)  RR: 18 (11-06-21 @ 16:58) (18 - 18)  SpO2: 95% (11-06-21 @ 16:58) (95% - 98%)  Wt(kg): --  I&O's Summary    05 Nov 2021 07:01  -  06 Nov 2021 07:00  --------------------------------------------------------  IN: 2400 mL / OUT: 1000 mL / NET: 1400 mL    06 Nov 2021 08:01  -  06 Nov 2021 20:12  --------------------------------------------------------  IN: 900 mL / OUT: 550 mL / NET: 350 mL            Appearance: NAD  HEENT:   dry oral mucosa, +NGT   Lymphatic: No lymphadenopathy  Cardiovascular: Normal S1 S2, No JVD, No murmurs, No edema  Respiratory: decreased bs   Psychiatry: A & O x 0  Gastrointestinal:  Soft, Non-tender, + BS	  Skin: No rashes, No ecchymoses, No cyanosis	  Ext: No edema  Neuro: AOx0, EO to nox, roving eye movements, pinpoint pupils, no FC, LUE WD, RUE nothing, BLE TF      LABS:    CARDIAC MARKERS:                                8.8    27.26 )-----------( 268      ( 06 Nov 2021 06:27 )             29.9     11-06    141  |  113<H>  |  31<H>  ----------------------------<  136<H>  4.0   |  20<L>  |  0.49<L>    Ca    8.0<L>      06 Nov 2021 06:37      proBNP:   Lipid Profile:   HgA1c:   TSH:             TELEMETRY: 	    ECG:  	  RADIOLOGY:   DIAGNOSTIC TESTING:  [ ] Echocardiogram:  [ ]  Catheterization:  [ ] Stress Test:    OTHER:

## 2021-11-07 NOTE — PROGRESS NOTE ADULT - SUBJECTIVE AND OBJECTIVE BOX
[facility] Division of Hospital Medicine  Chito Medellin MD  Pager (M-F, 2G-7D): [phone]  Other Times:  [phone]    Patient is a 89y old  Female who presents with a chief complaint of SDH (07 Nov 2021 09:33)      SUBJECTIVE / OVERNIGHT EVENTS: No acute events. Remains obtunded. Abdomen softer today  ADDITIONAL REVIEW OF SYSTEMS: unable to obtain    MEDICATIONS  (STANDING):  dextrose 5% + sodium chloride 0.45%. 1000 milliLiter(s) (75 mL/Hr) IV Continuous <Continuous>  folic acid 1 milliGRAM(s) Enteral Tube daily  lacosamide IVPB 200 milliGRAM(s) IV Intermittent every 12 hours  metoprolol tartrate 25 milliGRAM(s) Oral two times a day  polyethylene glycol 3350 17 Gram(s) Oral two times a day  senna 2 Tablet(s) Oral at bedtime  thiamine 100 milliGRAM(s) Enteral Tube daily    MEDICATIONS  (PRN):      CAPILLARY BLOOD GLUCOSE      POCT Blood Glucose.: 107 mg/dL (07 Nov 2021 05:32)  POCT Blood Glucose.: 109 mg/dL (07 Nov 2021 04:48)  POCT Blood Glucose.: 110 mg/dL (07 Nov 2021 01:03)    I&O's Summary    06 Nov 2021 08:01  -  07 Nov 2021 07:00  --------------------------------------------------------  IN: 1950 mL / OUT: 850 mL / NET: 1100 mL    07 Nov 2021 07:01  -  07 Nov 2021 14:36  --------------------------------------------------------  IN: 0 mL / OUT: 250 mL / NET: -250 mL        PHYSICAL EXAM:  Vital Signs Last 24 Hrs  T(C): 36.4 (07 Nov 2021 12:28), Max: 37.8 (06 Nov 2021 21:48)  T(F): 97.5 (07 Nov 2021 12:28), Max: 100 (06 Nov 2021 21:48)  HR: 69 (07 Nov 2021 12:28) (69 - 88)  BP: 113/68 (07 Nov 2021 12:28) (103/60 - 115/65)  BP(mean): --  RR: 20 (07 Nov 2021 12:28) (18 - 20)  SpO2: 99% (07 Nov 2021 12:28) (95% - 99%)  CONSTITUTIONAL: NAD, obtunded, elderly  EYES: EOMI; conjunctiva and sclera clear  ENMT: Moist oral mucosa, no pharyngeal injection or exudates  RESPIRATORY: Normal respiratory effort; lungs are clear to auscultation bilaterally  CARDIOVASCULAR: irreuglar rate and rhythm, normal S1 and S2, no murmur/rub/gallop; No lower extremity edema  ABDOMEN: distended, hypoactive/quiet sounds, softer today  PSYCH: A+Ox0; obtunded  NEUROLOGY: obtunded, minimal withdrawal to noxious  SKIN: No rashes; no palpable lesions    LABS:                        8.6    26.84 )-----------( 321      ( 07 Nov 2021 06:51 )             28.6     11-06    141  |  113<H>  |  31<H>  ----------------------------<  136<H>  4.0   |  20<L>  |  0.49<L>    Ca    8.0<L>      06 Nov 2021 06:37                  RADIOLOGY & ADDITIONAL TESTS:  Results Reviewed:   Imaging Personally Reviewed:  Electrocardiogram Personally Reviewed:    COORDINATION OF CARE:  Care Discussed with Consultants/Other Providers [Y/N]:  Prior or Outpatient Records Reviewed [Y/N]:

## 2021-11-07 NOTE — PROGRESS NOTE ADULT - ASSESSMENT
89F w/ PMHx of dementia, MDS, falls, prior SDH presents w/ acute on chronic SDH c/b midline shift and herniation. Non operative per NSGx. Course c/b failing bowel. Poor prognosis. Palliative following. Currently DNR/DNI.

## 2021-11-07 NOTE — PROGRESS NOTE ADULT - PROBLEM SELECTOR PLAN 2
-s/p decompression w/ rectal tube  -add BM regimen and monitor  -bowel function is worsening, would not resume TFs for now  -dc abx per ID  -add maintenance fluids

## 2021-11-07 NOTE — PROGRESS NOTE ADULT - PROBLEM SELECTOR PLAN 7
-no pharm dvt ppx due ICH    #GOC  -ongoing, fam requesting cont current interventions for now (eg TFs or abx if indicated), however patient is now no longer tolerating NGT feeds. Family okay w/ low dose narcotics if needed for discomfort. Feel hospice is most appropriate and that pt is essentially dying from catastrophic neurologic event and failure of the gut.

## 2021-11-07 NOTE — PROGRESS NOTE ADULT - SUBJECTIVE AND OBJECTIVE BOX
Subjective: Patient seen and examined. No new events except as noted.     REVIEW OF SYSTEMS:  Unable to obtain      MEDICATIONS:  MEDICATIONS  (STANDING):  folic acid 1 milliGRAM(s) Enteral Tube daily  lacosamide IVPB 200 milliGRAM(s) IV Intermittent every 12 hours  metoprolol tartrate 25 milliGRAM(s) Oral two times a day  polyethylene glycol 3350 17 Gram(s) Oral two times a day  senna 2 Tablet(s) Oral at bedtime  sodium chloride 0.45%. 1000 milliLiter(s) (75 mL/Hr) IV Continuous <Continuous>  thiamine 100 milliGRAM(s) Enteral Tube daily      PHYSICAL EXAM:  T(C): 36.4 (11-07-21 @ 04:38), Max: 37.8 (11-06-21 @ 21:48)  HR: 76 (11-07-21 @ 04:38) (76 - 88)  BP: 115/64 (11-07-21 @ 04:38) (103/60 - 118/62)  RR: 18 (11-07-21 @ 04:38) (18 - 18)  SpO2: 97% (11-07-21 @ 04:38) (95% - 97%)  Wt(kg): --  I&O's Summary    06 Nov 2021 08:01  -  07 Nov 2021 07:00  --------------------------------------------------------  IN: 1950 mL / OUT: 850 mL / NET: 1100 mL          Appearance: NAD  HEENT:   dry oral mucosa, +NGT   Lymphatic: No lymphadenopathy  Cardiovascular: Normal S1 S2, No JVD, No murmurs, No edema  Respiratory: decreased bs   Psychiatry: A & O x 0  Gastrointestinal:  Soft, Non-tender, + BS	  Skin: No rashes, No ecchymoses, No cyanosis	  Ext: No edema  Neuro: AOx0, EO to nox, roving eye movements, pinpoint pupils, no FC, LUE WD, RUE nothing, BLE TF      LABS:    CARDIAC MARKERS:                                8.6    26.84 )-----------( 321      ( 07 Nov 2021 06:51 )             28.6     11-06    141  |  113<H>  |  31<H>  ----------------------------<  136<H>  4.0   |  20<L>  |  0.49<L>    Ca    8.0<L>      06 Nov 2021 06:37      proBNP:   Lipid Profile:   HgA1c:   TSH:             TELEMETRY: 	    ECG:  	  RADIOLOGY:   DIAGNOSTIC TESTING:  [ ] Echocardiogram:  [ ]  Catheterization:  [ ] Stress Test:    OTHER:

## 2021-11-07 NOTE — PROGRESS NOTE ADULT - PROBLEM SELECTOR PLAN 1
Likely type 2 demand mediated ischemia due to sepsis/vasodilation and anemia/hypovolemia  Hyperdynamic LV on TTE   would not trend trops as it will not    Metoprolol 25 bid

## 2021-11-07 NOTE — PROGRESS NOTE ADULT - PROBLEM SELECTOR PLAN 1
-no operative  -minimal mental status  -herniation noted  -defer further imaging as not candidate for further intervention  -called son at number in chart, wishes to cont current limited medical interventions for now  -not candidate for PEG at this time

## 2021-11-08 NOTE — CHART NOTE - NSCHARTNOTEFT_GEN_A_CORE
Patient is a 89y old  Female who presents with a chief complaint of SDH (08 Nov 2021 18:06)  Notified by RN this evening of pt's abdomen appears to be distended.  Pt's tube feedings was held yesterday also   secondary to abdominal distention.      HPI:    Vital Signs Last 24 Hrs  T(C): 36.4 (08 Nov 2021 18:36), Max: 36.9 (08 Nov 2021 13:38)  T(F): 97.5 (08 Nov 2021 18:36), Max: 98.4 (08 Nov 2021 13:38)  HR: 70 (08 Nov 2021 18:36) (70 - 89)  BP: 93/49 (08 Nov 2021 18:36) (93/49 - 133/71)  BP(mean): --  RR: 12 (08 Nov 2021 18:36) (12 - 20)  SpO2: 95% (08 Nov 2021 18:36) (95% - 98%)    Gen: 89 y/o female very thin and cachexic in appearance.  Pt not responding verbally but moves her   head to deep tactile stimuli.  Pt found to be in a fetal position with her head positioned to her chin.   Skin: Acyanotic, cool and dry.   Resp:  Coarse rhonchi noted throughout bilateral lung fields.    CV: S1S2  Chest: Equal lung expansion however compromised 2/2 pt's fetal position (kyphosis of her spine).   Abdomen: Moderate, enlarged; hypoactive bowel sounds.  Abdominal xray: Dilated loops noted in colon.  Discussed findings with radiologist  who compared her abdominal xray with previous film of which appears to be the same.   Pt with NGT --clamped.   IVF infusing via right lower arm IV. No phlebitis noted.       Laboratory:                            9.0    33.73 )-----------( 382      ( 08 Nov 2021 07:17 )             29.2       Impression:  SHD  Septic Shock due to colitis  Hypernatremia    Plan:  Continue to hold tube feeds.   Convert medications to IV.   Palliative Care and Medical Attending attempted to reach out to pt's son multiple times today   with no success.  Pt's prognosis is poor.  Pt is a DNR.  Will continue to provide supportive care.   Pt is utilizing supplemental oxygen at 2 L/NC.  On room air pt desaturates to 87%.  Discussed findings with Medical Attending.   Will continue to monitor closely.      Aracelis Pride DNP,ANP-BC  Spectralink #[id number]

## 2021-11-08 NOTE — PROGRESS NOTE ADULT - PROBLEM SELECTOR PLAN 2
-s/p decompression w/ rectal tube  - BM regimen and monitor  -bowel function is worsening given abd distention, nurse needing to suction a lot  - would not resume NG feeds  at this time, high risk for aspiration  -dc abx per ID, being monitored off  -c/w maintenance fluids, lower to 50cc/hr, add 20meq kcl

## 2021-11-08 NOTE — PROGRESS NOTE ADULT - SUBJECTIVE AND OBJECTIVE BOX
Patient is a 89y old  Female who presents with a chief complaint of SDH (08 Nov 2021 11:42)      SUBJECTIVE / OVERNIGHT EVENTS: No ON events. Lethargic, cannot get ROS        ADDITIONAL REVIEW OF SYSTEMS: Negative except for above    MEDICATIONS  (STANDING):  dextrose 5% + sodium chloride 0.45%. 1000 milliLiter(s) (75 mL/Hr) IV Continuous <Continuous>  folic acid 1 milliGRAM(s) Enteral Tube daily  lacosamide IVPB 200 milliGRAM(s) IV Intermittent every 12 hours  metoprolol tartrate 25 milliGRAM(s) Oral two times a day  polyethylene glycol 3350 17 Gram(s) Oral two times a day  senna 2 Tablet(s) Oral at bedtime  thiamine 100 milliGRAM(s) Enteral Tube daily    MEDICATIONS  (PRN):      CAPILLARY BLOOD GLUCOSE        I&O's Summary    07 Nov 2021 07:01  -  08 Nov 2021 07:00  --------------------------------------------------------  IN: 1775 mL / OUT: 550 mL / NET: 1225 mL        PHYSICAL EXAM:  Vital Signs Last 24 Hrs  T(C): 36.9 (08 Nov 2021 13:38), Max: 36.9 (08 Nov 2021 13:38)  T(F): 98.4 (08 Nov 2021 13:38), Max: 98.4 (08 Nov 2021 13:38)  HR: 89 (08 Nov 2021 13:38) (71 - 89)  BP: 127/71 (08 Nov 2021 13:38) (103/72 - 133/71)  BP(mean): --  RR: 18 (08 Nov 2021 13:38) (18 - 20)  SpO2: 98% (08 Nov 2021 13:38) (97% - 98%)    PHYSICAL EXAM:  GENERAL: elderly lethargic NAD  HEAD:  Atraumatic, Normocephalic  NECK: Supple, No JVD  CHEST/LUNG: Clear to auscultation bilaterally; poor effort  HEART: Regular rate and rhythm; N  ABDOMEN: Soft, Nontender, +distended; Bowel sounds present  EXTREMITIES:  2+ Peripheral Pulses,   PSYCH: AAOx0, not following commands  NEUROLOGY: non-focal        LABS:                        9.0    33.73 )-----------( 382      ( 08 Nov 2021 07:17 )             29.2     11-08    143  |  111<H>  |  24<H>  ----------------------------<  136<H>  3.3<L>   |  26  |  0.44<L>    Ca    6.8<L>      08 Nov 2021 06:09  Phos  2.3     11-08  Mg     1.8     11-08                  RADIOLOGY & ADDITIONAL TESTS:    Imaging Personally Reviewed:    Electrocardiogram Personally Reviewed:    COORDINATION OF CARE:  Care Discussed with Consultants/Other Providers [Y/N]:  Prior or Outpatient Records Reviewed [Y/N]:

## 2021-11-08 NOTE — PROVIDER CONTACT NOTE (CHANGE IN STATUS NOTIFICATION) - BACKGROUND
Pt nonverbal/unresponsive. 9/28/21 fall, pt has SDH and femoral fx. Pt. NPO except for medications through NGT. Tube feeds held on 11/6 for abd distension.

## 2021-11-08 NOTE — PROGRESS NOTE ADULT - PROBLEM SELECTOR PLAN 1
-nn operative  -minimal mental status  -herniation noted  -defer further imaging as not candidate for further intervention  -called son at number in chart, wishes to cont current limited medical interventions for now  -not candidate for PEG at this time per GI  -c/w Vimpat seizure ppx

## 2021-11-08 NOTE — PROGRESS NOTE ADULT - ASSESSMENT
89 year old female PMH MDS, dementia with recent hospitalization s/p fall, found to have tSDH with MLS and L subcapital femoral nexk fx s/p CRPP (9/28/21) with course c/b acute blood loss anemia 2/2 hematoma/ileus, now presented from  for [facility] w/ AoC SDH and R 1.4cm MLS , also with c/f sepsis. On presentation WBC of 26.01 with 61% PMN.     RVP (10/28) negative.   U/A (10/28) with 3-5 WBC.   BCx (10/28) with no growth.   C diff toxin assay (10/29) negative.    CT Chest (10/28) with no consolidations.   CT A/P (10/28) with stercoral colitis and pancolitis.   CT Brain (10/29) with Left-sided acute on chronic subdural hematoma is again seen as well as a right frontal subdural collection.     s/p 2x SEPS placement via left skull    Given sepsis/hypotensive presentation reasonable to cover for the pancolitis finding, unclear if truly infectious  Patient with baseline high-level leukocytosis, so unlikely to be reliable marker of underlying infectious process  C diff Negative  GI PCR Negative    Febrile on 11/3  U/A (11/3) 8 WBC  CXR (11/4) Clear Lungs  No other clinical status changes  BCx with NGTD    RECOMMENDATIONS:    #Fever - ?central fever ?secondary to subdural hematoma  --Monitor off of antibiotics   --Consider LUE DVT US (edema of the left hand)  --Consider Lower Extremity DVT US (edema of LE)    #Pancolitis, Leukocytosis, Hypotension  --s/p 7 days of Cefepime/Metronidazole    #Positive BCx (CoNS - likely procurement contaminant)    I will sign off at this time. Please feel free to contact me with any further questions or concerns.    Clinton Bobo M.D.  [facility] Division of Infectious Disease  8AM-5PM: Pager Number [phone]  After Hours (or if no response): Please contact the Infectious Diseases Office at [phone]     The above assessment and plan were discussed with medicine NP

## 2021-11-08 NOTE — PROGRESS NOTE ADULT - SUBJECTIVE AND OBJECTIVE BOX
Follow Up:  fever    Interval History: afebrile. no acute events.     REVIEW OF SYSTEMS  [  ] ROS unobtainable because:    [x  ] All other systems negative except as noted below    Constitutional:  [ ] fever [ ] chills  [ ] weight loss  [ ] weakness  Skin:  [ ] rash [ ] phlebitis	  Eyes: [ ] icterus [ ] pain  [ ] discharge	  ENMT: [ ] sore throat  [ ] thrush [ ] ulcers [ ] exudates  Respiratory: [ ] dyspnea [ ] hemoptysis [ ] cough [ ] sputum	  Cardiovascular:  [ ] chest pain [ ] palpitations [ ] edema	  Gastrointestinal:  [ ] nausea [ ] vomiting [ ] diarrhea [ ] constipation [ ] pain	  Genitourinary:  [ ] dysuria [ ] frequency [ ] hematuria [ ] discharge [ ] flank pain  [ ] incontinence  Musculoskeletal:  [ ] myalgias [ ] arthralgias [ ] arthritis  [ ] back pain  Neurological:  [ ] headache [ ] seizures  [ ] confusion/altered mental status    Allergies  No Known Allergies        ANTIMICROBIALS:      OTHER MEDS:  MEDICATIONS  (STANDING):  lacosamide IVPB 200 every 12 hours  metoprolol tartrate 25 two times a day      Vital Signs Last 24 Hrs  T(C): 36.9 (08 Nov 2021 13:38), Max: 36.9 (08 Nov 2021 13:38)  T(F): 98.4 (08 Nov 2021 13:38), Max: 98.4 (08 Nov 2021 13:38)  HR: 89 (08 Nov 2021 13:38) (75 - 89)  BP: 127/71 (08 Nov 2021 13:38) (103/72 - 133/71)  BP(mean): --  RR: 18 (08 Nov 2021 13:38) (18 - 20)  SpO2: 98% (08 Nov 2021 13:38) (97% - 98%)    PHYSICAL EXAMINATION:  General: Awake but not alert  HEENT: L sided 2 previous SEPS placement sites with staples which are clean, without surrounding erythema or drainage.  Cardiac: RRR, No M/R/G  Resp: CTAB, No Wh/Rh/Ra  Abdomen: NBS, NT/ND, No HSM, No rigidity or guarding  MSK: LUE hand edema. No LE edema. No Calf tenderness  Skin: No rashes or lesions. Skin is warm and dry to the touch.   Neuro: Alert and Awake. CN 2-12 Grossly intact. Moves all four extremities spontaneously.  Psych: Encephalopathic - unable to assess                            9.0    33.73 )-----------( 382      ( 08 Nov 2021 07:17 )             29.2       11-08    143  |  111<H>  |  24<H>  ----------------------------<  136<H>  3.3<L>   |  26  |  0.44<L>    Ca    6.8<L>      08 Nov 2021 06:09  Phos  2.3     11-08  Mg     1.8     11-08            MICROBIOLOGY:  v  .Blood Blood-Peripheral  11-03-21   No Growth Final  --  --      .Blood Blood  10-30-21   No Growth Final  --  --      .Blood Blood  10-30-21   No Growth Final  --  --      .Stool Feces  10-29-21   GI PCR Results: NOT detected  *******Please Note:*******  GI panel PCR evaluates for:  Campylobacter, Plesiomonas shigelloides, Salmonella,  Vibrio, Yersinia enterocolitica, Enteroaggregative  Escherichia coli (EAEC), Enteropathogenic E.coli (EPEC),  Enterotoxigenic E. coli (ETEC) lt/st, Shiga-like  toxin-producing E. coli (STEC) stx1/stx2,  Shigella/ Enteroinvasive E. coli (EIEC), Cryptosporidium,  Cyclospora cayetanensis, Entamoeba histolytica,  Giardia lamblia, Adenovirus F 40/41, Astrovirus,  Norovirus GI/GII, Rotavirus A, Sapovirus  --  --      .Blood Blood  10-29-21   Growth in aerobic bottle: Staphylococcus pettenkoferi Coag Negative  Staphylococcus  Single set isolate, possible contaminant. Contact  Microbiology if susceptibility testing clinically  indicated.  ***Blood Panel PCR results on this specimen are available  approximately 3 hours after the Gram stain result.***  Gram stain, PCR, and/or culture results may not always  correspond due to difference in methodologies.  ************************************************************  This PCR assay was performed by multiplex PCR. This  Assay tests for 66 bacterial and resistance gene targets.  Please refer to the [facility] test directory  at [email or web address] for details.  --  Blood Culture PCR      .Blood Blood  10-28-21   No Growth Final  --  --      .Blood Blood-Peripheral  10-28-21   No Growth Final  --  --                RADIOLOGY:    <The imaging below has been reviewed and visualized by me independently. Findings as detailed in report below>    < from: Xray Chest 1 View- PORTABLE-Urgent (Xray Chest 1 View- PORTABLE-Urgent .) (11.04.21 @ 11:14) >  IMPRESSION:  Clear lungs.    < end of copied text >

## 2021-11-08 NOTE — PROGRESS NOTE ADULT - PROBLEM SELECTOR PLAN 7
-no pharm dvt ppx due ICH    dnr/dni    #GOC  -ongoing, fam requesting cont current interventions for now (eg TFs or abx if indicated), however patient is no longer tolerating NGT feeds.   Family okay w/ low dose narcotics if needed for discomfort. Feel hospice is most appropriate and that pt is essentially dying from catastrophic neurologic event and failure of the gut.    very poor prognosis  palliative consulted: awaiting final recs  called felisha Nunez several times today, no answer, left  for call back

## 2021-11-08 NOTE — PROGRESS NOTE ADULT - SUBJECTIVE AND OBJECTIVE BOX
Subjective: Patient seen and examined. No new events except as noted.     REVIEW OF SYSTEMS:  Unable to obtain       MEDICATIONS:  MEDICATIONS  (STANDING):  dextrose 5% + sodium chloride 0.45%. 1000 milliLiter(s) (75 mL/Hr) IV Continuous <Continuous>  folic acid 1 milliGRAM(s) Enteral Tube daily  lacosamide IVPB 200 milliGRAM(s) IV Intermittent every 12 hours  metoprolol tartrate 25 milliGRAM(s) Oral two times a day  polyethylene glycol 3350 17 Gram(s) Oral two times a day  senna 2 Tablet(s) Oral at bedtime  thiamine 100 milliGRAM(s) Enteral Tube daily      PHYSICAL EXAM:  T(C): 36.5 (11-08-21 @ 05:16), Max: 36.6 (11-07-21 @ 21:00)  HR: 87 (11-08-21 @ 05:16) (69 - 87)  BP: 103/72 (11-08-21 @ 05:16) (103/72 - 133/71)  RR: 18 (11-08-21 @ 05:16) (18 - 20)  SpO2: 98% (11-08-21 @ 05:16) (97% - 99%)  Wt(kg): --  I&O's Summary    07 Nov 2021 07:01  -  08 Nov 2021 07:00  --------------------------------------------------------  IN: 1775 mL / OUT: 550 mL / NET: 1225 mL            Appearance: NAD  HEENT:   dry oral mucosa, +NGT   Lymphatic: No lymphadenopathy  Cardiovascular: Normal S1 S2, No JVD, No murmurs, No edema  Respiratory: decreased bs   Psychiatry: A & O x 0  Gastrointestinal:  Soft, Non-tender, + BS	  Skin: No rashes, No ecchymoses, No cyanosis	  Ext: No edema  Neuro: AOx0, EO to nox, roving eye movements, pinpoint pupils, no FC, LUE WD, RUE nothing, BLE TF      LABS:    CARDIAC MARKERS:                                9.0    33.73 )-----------( 382      ( 08 Nov 2021 07:17 )             29.2     11-08    143  |  111<H>  |  24<H>  ----------------------------<  136<H>  3.3<L>   |  26  |  0.44<L>    Ca    6.8<L>      08 Nov 2021 06:09  Phos  2.3     11-08  Mg     1.8     11-08      proBNP:   Lipid Profile:   HgA1c:   TSH:             TELEMETRY: 	    ECG:  	  RADIOLOGY:   DIAGNOSTIC TESTING:  [ ] Echocardiogram:  [ ]  Catheterization:  [ ] Stress Test:    OTHER:

## 2021-11-08 NOTE — PROVIDER CONTACT NOTE (CHANGE IN STATUS NOTIFICATION) - ASSESSMENT
Observed pt's stomach overly distended compared to earlier today. Pt nonverbal/unresponsive. 9/28/21 fall, pt has SDH and femoral fx. Pt. NPO except for medications through NGT. Tube feeds held on 11/6 for abd distension.

## 2021-11-09 NOTE — PROGRESS NOTE ADULT - SUBJECTIVE AND OBJECTIVE BOX
Subjective: Patient seen and examined. No new events except as noted.     REVIEW OF SYSTEMS:  Unable to obtain     MEDICATIONS:  MEDICATIONS  (STANDING):  dextrose 5% + sodium chloride 0.45% 1000 milliLiter(s) (50 mL/Hr) IV Continuous <Continuous>  folic acid Injectable 1 milliGRAM(s) IV Push daily  lacosamide IVPB 200 milliGRAM(s) IV Intermittent every 12 hours  metoprolol tartrate Injectable 2.5 milliGRAM(s) IV Push every 6 hours  potassium chloride  10 mEq/100 mL IVPB 10 milliEquivalent(s) IV Intermittent every 1 hour  thiamine Injectable 100 milliGRAM(s) IV Push daily      PHYSICAL EXAM:  T(C): 36.4 (11-09-21 @ 05:47), Max: 37 (11-09-21 @ 00:26)  HR: 65 (11-09-21 @ 05:47) (65 - 89)  BP: 107/65 (11-09-21 @ 05:47) (93/49 - 127/71)  RR: 18 (11-09-21 @ 05:47) (12 - 18)  SpO2: 98% (11-09-21 @ 05:47) (95% - 98%)  Wt(kg): --  I&O's Summary    08 Nov 2021 07:01  -  09 Nov 2021 07:00  --------------------------------------------------------  IN: 1525 mL / OUT: 800 mL / NET: 725 mL          Appearance: NAD  HEENT:   dry oral mucosa, +NGT   Lymphatic: No lymphadenopathy  Cardiovascular: Normal S1 S2, No JVD, No murmurs, No edema  Respiratory: decreased bs   Psychiatry: A & O x 0  Gastrointestinal:  Soft, Non-tender, + BS	  Skin: No rashes, No ecchymoses, No cyanosis	  Ext: No edema  Neuro: AOx0, EO to nox, roving eye movements, pinpoint pupils, no FC, LUE WD, RUE nothing, BLE TF        LABS:    CARDIAC MARKERS:                                9.0    33.73 )-----------( 382      ( 08 Nov 2021 07:17 )             29.2     11-09    139  |  106  |  16  ----------------------------<  123<H>  2.6<LL>   |  26  |  0.40<L>    Ca    7.5<L>      09 Nov 2021 06:21  Phos  2.3     11-08  Mg     1.8     11-08    TPro  4.2<L>  /  Alb  1.9<L>  /  TBili  0.4  /  DBili  x   /  AST  24  /  ALT  12  /  AlkPhos  126<H>  11-09    proBNP:   Lipid Profile:   HgA1c:   TSH:             TELEMETRY: 	    ECG:  	  RADIOLOGY:   DIAGNOSTIC TESTING:  [ ] Echocardiogram:  [ ]  Catheterization:  [ ] Stress Test:    OTHER:

## 2021-11-09 NOTE — ADVANCED PRACTICE NURSE CONSULT - ASSESSMENT
The pt was encountered in a Versa[employer] P 500 support surface for low air-loss and pressure redistribution features. she is being assisted with turning and positioning. Staff have applied complete Cair boots to off-load the heels.  The pt was seen by nutrition with a  dx of severe protein calorie malnutrition noted.  Mrs Muir has  a Prima-fit in place to divert urine from the skin.  Upon assessment, the deep tissue injury previously noted to the sacrum has continued to evolve- there is a wound measuring 6cmx 5cm x 0.3cm with wound bed of non-viable tissue, will classify as unstageable at this time, there is + drainage, + odor. the periwound skin was denuded with deep tissue injury noted at the proximal edges of the wound. The pts MD was at the bedside- we discussed tx options for the wound and determined a Palliative approach would be best. staff have been trying to reach family for further discussion of GOC. Pt was unresponsive during my visit.  Will recommend Medihoney for its antimicrobial and debridement properties and a foam to cushion

## 2021-11-09 NOTE — CHART NOTE - NSCHARTNOTEFT_GEN_A_CORE
Attempted to reach patient's son, Enrique, again today for ongoing goals of care. No answer, left message. Awaiting call back. Pt with very poor prognosis in setting of devastating neurologic injury. If no call back from Enrique should consider ethics consult in setting of unreliable surrogate decision maker and no other known surrogate. Discussed with primary team attending.    If patient develops symptoms recommend following:    For pain: IV dilaudid 0.2mg q4h PRN   For dyspnea: IV dilaudid 0.2mg q4h PRN  For secretions: IV robinul 0.2mg q6h PRN  For anxiety: IV ativan 0.25 mg q6h prn   For agitation: IV haldol 0.5mg q6h PRN (if QTc <500ms)  For nausea/vomiting: IV zofran 4mg q8h PRN    For acute issues or uncontrolled symptoms please page palliative team.    Smiley Padilla MD  Palliative Medicine Attending   [facility] Pager [phone]

## 2021-11-09 NOTE — PROGRESS NOTE ADULT - SUBJECTIVE AND OBJECTIVE BOX
Patient is a 89y old  Female who presents with a chief complaint of SDH (09 Nov 2021 10:53)      SUBJECTIVE / OVERNIGHT EVENTS: No On events. cannot get history given obtunded nonresponsive state.         ADDITIONAL REVIEW OF SYSTEMS: Negative except for above    MEDICATIONS  (STANDING):  dextrose 5% + sodium chloride 0.45% 1000 milliLiter(s) (50 mL/Hr) IV Continuous <Continuous>  folic acid Injectable 1 milliGRAM(s) IV Push daily  lacosamide IVPB 200 milliGRAM(s) IV Intermittent every 12 hours  metoprolol tartrate Injectable 2.5 milliGRAM(s) IV Push every 6 hours  potassium chloride  10 mEq/100 mL IVPB 10 milliEquivalent(s) IV Intermittent every 1 hour  thiamine Injectable 100 milliGRAM(s) IV Push daily    MEDICATIONS  (PRN):      CAPILLARY BLOOD GLUCOSE        I&O's Summary    08 Nov 2021 07:01  -  09 Nov 2021 07:00  --------------------------------------------------------  IN: 1525 mL / OUT: 800 mL / NET: 725 mL        PHYSICAL EXAM:  Vital Signs Last 24 Hrs  T(C): 36.4 (09 Nov 2021 05:47), Max: 37 (09 Nov 2021 00:26)  T(F): 97.5 (09 Nov 2021 05:47), Max: 98.6 (09 Nov 2021 00:26)  HR: 65 (09 Nov 2021 05:47) (65 - 89)  BP: 107/65 (09 Nov 2021 05:47) (93/49 - 127/71)  BP(mean): --  RR: 18 (09 Nov 2021 05:47) (12 - 18)  SpO2: 98% (09 Nov 2021 05:47) (95% - 98%)    PHYSICAL EXAM:  PHYSICAL EXAM:  GENERAL: elderly obtunded NAD  HEAD:  Atraumatic, Normocephalic  NECK: Supple, No JVD  CHEST/LUNG: Clear to auscultation bilaterally; poor effort  HEART: Regular rate and rhythm;   ABDOMEN: Soft, Nontender, +distended; Bowel sounds present  EXTREMITIES:  2+ Peripheral Pulses,   PSYCH: AAOx0, not following commands  NEUROLOGY: non-focal        LABS:                        9.0    33.73 )-----------( 382      ( 08 Nov 2021 07:17 )             29.2     11-09    139  |  106  |  16  ----------------------------<  123<H>  2.6<LL>   |  26  |  0.40<L>    Ca    7.5<L>      09 Nov 2021 06:21  Phos  2.3     11-08  Mg     1.8     11-08    TPro  4.2<L>  /  Alb  1.9<L>  /  TBili  0.4  /  DBili  x   /  AST  24  /  ALT  12  /  AlkPhos  126<H>  11-09                RADIOLOGY & ADDITIONAL TESTS:    Imaging Personally Reviewed:    Electrocardiogram Personally Reviewed:    COORDINATION OF CARE:  Care Discussed with Consultants/Other Providers [Y/N]:  Prior or Outpatient Records Reviewed [Y/N]:

## 2021-11-09 NOTE — ADVANCED PRACTICE NURSE CONSULT - REASON FOR CONSULT
Requested by staff to assess pt a/w a pressure injury. PMH is noted:  89F w/ hx of MDS, dementia with recent hospitalization s/p fall, found to have tSDH with MLS and L subcapital femoral neck fx s/p CRPP (9/28/21) with course c/b acute blood loss anemia 2/2 hematoma/ileus, now presented from  for AMS CTH w/ AoC SDH and R 1.4cm MLS , also with c/f sepsis.  The pt is s/p SEPS placement this am
Requested by staff to re-evaluate skin status of pt a/w a deep tissue injury. PMH is noted:  89F w/ PMHx of dementia, MDS, falls, prior SDH presents w/ acute on chronic SDH c/b midline shift and herniation. Non operative per NSGx. Course c/b failing bowel. Poor prognosis. Palliative following. Currently DNR/DNI.  Since last assessment by the [facility] on 10/29, the pt was transferred from [facility] to [facility].

## 2021-11-09 NOTE — PROGRESS NOTE ADULT - PROBLEM SELECTOR PLAN 1
-non operative  -herniation noted  -defer further imaging as not candidate for further intervention  -son wishes to cont current limited medical interventions for now per previous provider  - neurosurgery signed off  -not candidate for PEG at this time per GI  -c/w Vimpat seizure ppx

## 2021-11-09 NOTE — ADVANCED PRACTICE NURSE CONSULT - RECOMMEDATIONS
Will recommend the followin. Sacrum, b/l buttocks; Cavilon to periwound skin, Allevyn foam, change every 3 days and prn for drainage/soiling. continue to monitor for changes   2. Continue with turning and positioning  3. complete Cair boots  4. nutrition support as pt condition allows  Tx plan discussed with RN
Will recommend the followin. Sacrum: Cavilon to periwound skin, Medihoney paste to the wound- follow with foam -change every other day and prn for drainage/soiling  2. continue with Prima-Fit  3. continue with turning and positioning, continue with boots  4. Nutrition support as pt condition allows  Tx plan discussed with RN

## 2021-11-09 NOTE — PROGRESS NOTE ADULT - PROBLEM SELECTOR PLAN 2
-s/p decompression w/ rectal tube  - would not resume NG feeds  at this time, high risk for aspiration given abdominal distention and need for suctioning  -dc abx per ID, being monitored off  -c/w maintenance fluids,

## 2021-11-09 NOTE — PROGRESS NOTE ADULT - PROBLEM SELECTOR PLAN 8
Likely type 2 demand mediated ischemia due to sepsis/vasodilation and anemia/hypovolemia per cards  Hyperdynamic LV on TTE   would not trend trops as it will not    change to IV metoprolol 2.5mg q6 with holding parametersd

## 2021-11-09 NOTE — CHART NOTE - NSCHARTNOTEFT_GEN_A_CORE
Nutrition Follow Up Note  Patient seen for follow up and NPO x 4 days     Chart reviewed, events noted. As per chart: pt previously on tube feedings until (11/06) no longer tolerating; would not resume NG feeds at this time, high risk for aspiration, not candidate for PEG at this time per GI, poor prognosis.     Source: [] Patient       [x] EMR        [x] RN        [] Family at bedside       [] Other:    -If unable to interview patient: [] Trach/Vent/BiPAP  [x] Disoriented/confused/inappropriate to interview as per chart, sleeping     Pt lethargic, unable to speak as per chart. Pt NPO x 4 days. RN denies pt with nausea, vomiting, diarrhea, or constipation, last BM (11/06).     Diet Order:   Diet, NPO:   Except Medications (11-06-21)    Weights: (10/28) 49.9 kg -> (11/09) obtained bed weight 60.4 kg -?accuracy due to fluid accumulation.     Noted visual signs of severe muscle loss in temporales and clavicles; unable to visually assess other covered areas.     Nutritionally Pertinent MEDICATIONS  (STANDING):  dextrose 5% + sodium chloride 0.45%  folic acid Injectable  metoprolol tartrate Injectable  potassium chloride  10 mEq/100 mL IVPB  thiamine Injectable    Pertinent Labs: (11-09 @ 06:21): Na 139, BUN 16, Cr 0.40<L>, <H>, K+ 2.6<LL>, Phos --, Mg --, Alk Phos 126<H>, ALT/SGPT 12, AST/SGOT 24  (11/08) Phos 2.3 <L>    A1C with Estimated Average Glucose Result: 5.1 % (09-29-21 @ 04:28)    Skin per nursing documentation: pressure ulcers in sacrum unstageable and upper back stage 1.   Edema as per flow sheets: +2 periorbital, +3 generalized, and +4 cortney. leg (admitted with no edema).     Estimated Needs:   [x] no change since previous assessment (meets estimated needs for pressure ulcer healing).   [] recalculated:     Previous Nutrition Diagnosis: Increased Nutrient Needs     Nutrition Diagnosis is: [x] ongoing - previously addressed with TF, pending feeding plans.     New Nutrition Diagnosis: [x] Malnutrition (severe; acute on chronic) related to AMS, lethargy, and older age, as evidenced by visual signs of muscle loss, fluid accumulation (NPO x ~4 days so far).   Nutrition Goal: Pt to meet >75% of estimated nutrition needs via best tolerated route.    Nutrition Care Plan:  [x] In Progress     Nutrition Interventions:     Education Provided:       [] Yes  [x] No    Recommendations:      1. Defer feeding plans and diet/fluid consistencies if any to medical team/SLP recommendations - no therapeutic restrictions recommended at this time.  2. Recommend Multivitamin and Vitamin C ONLY if medically feasible and within GOC to optimize nutrient intake and further aid with pressure ulcer healing.    3. Team to monitor and replete electrolytes as able/needed before starting any type of feedings to help prevent refeeding syndrome.   4. Malnutrition notification placed in chart.  5. Continue to obtain weights as able to identify changes if any.     Monitoring and Evaluation:   Continue to monitor feeding plans, weights, labs, skin integrity    RD remains available upon request and will follow up per protocol  Bell Guzman MS RDN CDN #[phone]

## 2021-11-10 NOTE — PROGRESS NOTE ADULT - SUBJECTIVE AND OBJECTIVE BOX
Patient is a 89y old  Female who presents with a chief complaint of SDH (10 Nov 2021 13:46)      SUBJECTIVE / OVERNIGHT EVENTS: No On events. Obtunded, cannot get history, non responsive. Per Rn, requiring suctioning        ADDITIONAL REVIEW OF SYSTEMS: Negative except for above    MEDICATIONS  (STANDING):  dextrose 5% + sodium chloride 0.45% 1000 milliLiter(s) (40 mL/Hr) IV Continuous <Continuous>  folic acid Injectable 1 milliGRAM(s) IV Push daily  lacosamide IVPB 200 milliGRAM(s) IV Intermittent every 12 hours  potassium chloride  10 mEq/100 mL IVPB 10 milliEquivalent(s) IV Intermittent every 1 hour  thiamine Injectable 100 milliGRAM(s) IV Push daily    MEDICATIONS  (PRN):  HYDROmorphone  Injectable 0.2 milliGRAM(s) IV Push every 4 hours PRN dyspnea  HYDROmorphone  Injectable 0.2 milliGRAM(s) IV Push every 4 hours PRN Severe Pain (7 - 10)      CAPILLARY BLOOD GLUCOSE        I&O's Summary    09 Nov 2021 07:01  -  10 Nov 2021 07:00  --------------------------------------------------------  IN: 0 mL / OUT: 400 mL / NET: -400 mL        PHYSICAL EXAM:  Vital Signs Last 24 Hrs  T(C): 36.7 (10 Nov 2021 08:41), Max: 36.7 (09 Nov 2021 17:30)  T(F): 98 (10 Nov 2021 08:41), Max: 98.1 (09 Nov 2021 17:30)  HR: 75 (10 Nov 2021 08:41) (75 - 84)  BP: 114/67 (10 Nov 2021 08:41) (114/67 - 132/73)  BP(mean): --  RR: 18 (10 Nov 2021 08:41) (16 - 18)  SpO2: 100% (10 Nov 2021 08:41) (98% - 100%)      PHYSICAL EXAM:  GENERAL: elderly obtunded  HEAD:  Atraumatic, Normocephalic, NG in place, NC in place  CHEST/LUNG: Clear ; poor effort  HEART: Regular rate and rhythm;   ABDOMEN: Soft, Nontender, +distended; Bowel sounds present  EXTREMITIES:  2+ Peripheral Pulses, gross ansarca   PSYCH: AAOx0, not following commands  Skin: multiple chronic pressure ulcers            LABS:                        9.9    29.48 )-----------( 322      ( 10 Nov 2021 11:36 )             31.8     11-10    141  |  104  |  13  ----------------------------<  128<H>  2.9<LL>   |  27  |  0.34<L>    Ca    7.6<L>      10 Nov 2021 11:36    TPro  3.5<L>  /  Alb  1.6<L>  /  TBili  0.3  /  DBili  x   /  AST  17  /  ALT  12  /  AlkPhos  107  11-10                RADIOLOGY & ADDITIONAL TESTS:    Imaging Personally Reviewed: abdominal xray:     11/8:  IMPRESSION:    Nonspecific gas pattern. Multiple loops of small and large bowel appear to be distended and changes compatible with ileus. NG tube is in the stomach. If clinically warranted CT scan should be obtained. Orthopedic hardware is seen in both hips. Calcified fibroid is seen in the pelvis    Electrocardiogram Personally Reviewed:    COORDINATION OF CARE:  Care Discussed with Consultants/Other Providers [Y/N]:  Prior or Outpatient Records Reviewed [Y/N]:

## 2021-11-10 NOTE — PROGRESS NOTE ADULT - PROBLEM SELECTOR PLAN 1
Ct head: very large mixed attenuation left subdural hematoma with air fluid and acute hemorrhage recognized. Midline shift to the right now greater than 2 cm. Evidence of subfalcine and uncal herniation  - per neurosurgery: no further imaging as not candidate for further intervention  - neurosurgery signed off  -not candidate for PEG at this time per GI  -c/w Vimpat for seizure ppx

## 2021-11-10 NOTE — PROGRESS NOTE ADULT - PROBLEM SELECTOR PLAN 7
Primary team called and spoke with the patient's son this morning.  As per primary team, the patient's son wants to continue with current medical interventions at this time.   Will continue to follow up with symptom management

## 2021-11-10 NOTE — PROGRESS NOTE ADULT - PROBLEM SELECTOR PLAN 1
Patient using accessory muscles to breath.  Dilaudid 0.2mg IV q4hr PRN ordered Patient using accessory muscles to breathe.  Dilaudid 0.2mg IV q4hr PRN ordered

## 2021-11-10 NOTE — PROGRESS NOTE ADULT - PROBLEM SELECTOR PLAN 7
-leukocytosis likely reactive to acute critical illness, has known MPN and elevated wbc 20-50k at baseline  -not a candidate for chemo  -defer heme consult for now as unlikely to

## 2021-11-10 NOTE — PROGRESS NOTE ADULT - ASSESSMENT
89 y/o F with hx of dementia and MDS w/ recent hospitalization (9/28/21) for fall, found to have tSDH w/ MLS and femoral fx s/p CRPP. Pt was d/c to rehab now transferred from [place] for AMS in setting of CTH w/ R acute on chronic SDH w/ MLS and subfalcine herniation. non-surgical candidate d/t poor functional status. Palliative consulted for goals of care.

## 2021-11-10 NOTE — PROGRESS NOTE ADULT - PROBLEM SELECTOR PLAN 10
-no pharm dvt ppx due ICH      #GOC  called son Enrique several times again today and eventually he picked up. I told him that his mother is likely dying in hours to days. He confirmed her DNR/DNI status but insisting that we feed his mom. See above for current tentative feeding plan pending ct abdomen pelvis. Son was ok with opoids to keep his mother comfortable if needed  I told him to come to hospital to visit her but he said he cannot.      confirmed dnr/dni status  very poor prognosis, son informed    Case also discussed with SW who reached out to patient and reviewed old outpatient notes in which son has been fairly nonresponsive and unhelpful with his mothers care in past  ethics consulted, rec pending  palliative will also attempting to reach son today to further clarify GOC    discussed with son Enrique, ELENA, Dr Gray, Dr Padilla, Dr Duenas -no pharm dvt ppx due ICH      #GOC  called son Enrique several times again today and eventually he picked up. I told him that his mother is likely dying in hours to days. He confirmed her DNR/DNI status but insisting that we feed his mom. See above for current tentative feeding plan pending ct abdomen pelvis. Son was ok with opioids to keep his mother comfortable if needed. Further GOC needed.   I told him to come to hospital to visit her but he said he cannot.      confirmed dnr/dni status  very poor prognosis, son informed    Case also discussed with SW who reached out to patient and reviewed old outpatient notes in which son has been fairly nonresponsive and unhelpful with his mothers care in past  ethics consulted, rec pending  palliative will also attempting to reach son today to further clarify GOC    discussed with son Enrique, ELENA, Dr Gray, Dr Padilla, Dr Duenas, GARFIELD Fox

## 2021-11-10 NOTE — PROGRESS NOTE ADULT - PROBLEM SELECTOR PLAN 2
xray 11/8: Multiple loops of small and large bowel appear to be distended and changes compatible with ileus  -NG feeds stopped over weekend given increasing abdominal distention and frequent need for suction with high concern for recurrent aspiration risk   -aspiration precautions  -discussed with Gi Gabby Duenas today, pt is not a candidate for PEG tube  -alb 1.9, severe protein calorie malnutrition   -called son multiple times who finally answered today, discussed with son SRIKANTH Nunez, requesting for patient to be fed, explained to him high risk of aspiration especially in setting of ileus, per GI can consider trickle feeds, son fully aware of aspiration risk including but not limited to aspiration pna, resp failure, choking and death  -also explained to son that patient has no mental status and cannot eat on her own thus NG feeds are temporary measure and there is no reasonable long term solution to provide her nutrition.  --c/w d5 1/2 NS but monitor for overload,   -will do noncon ct abd/pelvis to reassess abdomen prior to starting trickle feeds  -palliative will also attempt to call son today to further express GOC

## 2021-11-10 NOTE — PROGRESS NOTE ADULT - SUBJECTIVE AND OBJECTIVE BOX
GAP TEAM PALLIATIVE CARE UNIT PROGRESS NOTE:      [  ] Patient on hospice program.    INDICATION FOR PALLIATIVE CARE UNIT SERVICES:     INTERVAL HPI/OVERNIGHT EVENTS: Chart reviewed. The patient is seen and examined at the bedside.     DNR on chart:   Allergies    No Known Allergies    Intolerances    MEDICATIONS  (STANDING):  dextrose 5% + sodium chloride 0.45% 1000 milliLiter(s) (40 mL/Hr) IV Continuous <Continuous>  folic acid Injectable 1 milliGRAM(s) IV Push daily  lacosamide IVPB 200 milliGRAM(s) IV Intermittent every 12 hours  potassium chloride  10 mEq/100 mL IVPB 10 milliEquivalent(s) IV Intermittent every 1 hour  thiamine Injectable 100 milliGRAM(s) IV Push daily    MEDICATIONS  (PRN):    ITEMS UNCHECKED ARE NOT PRESENT    PRESENT SYMPTOMS: [ X]Unable to obtain due to poor mentation   Source if other than patient:  [ ]Family   [ X]Team     Pain: [ ] yes [ X] no- see pain ad score  QOL impact -   Location -                    Aggravating factors -  Quality -  Radiation -  Timing-  Severity (0-10 scale):  Minimal acceptable level (0-10 scale):     Dyspnea:                           [ ]Mild [ ]Moderate [ ]Severe  Anxiety:                             [ ]Mild [ ]Moderate [ ]Severe  Fatigue:                             [ ]Mild [ ]Moderate [ ]Severe  Nausea:                             [ ]Mild [ ]Moderate [ ]Severe  Loss of appetite:              [ ]Mild [ ]Moderate [ ]Severe  Constipation:                    [ ]Mild [ ]Moderate [ ]Severe    PAINAD Score: 0    [email or web address] (Ctrl +  left click to view)  		  Other Symptoms:  [X ]All other review of systems negative     Palliative Performance Status Version 2:  10%         http://npcrc.org/files/news/palliative_performance_scale_ppsv2.pdf  PHYSICAL EXAM:  Vital Signs Last 24 Hrs  T(C): 36.7 (10 Nov 2021 08:41), Max: 36.7 (09 Nov 2021 17:30)  T(F): 98 (10 Nov 2021 08:41), Max: 98.1 (09 Nov 2021 17:30)  HR: 75 (10 Nov 2021 08:41) (75 - 84)  BP: 114/67 (10 Nov 2021 08:41) (114/67 - 132/73)  BP(mean): --  RR: 18 (10 Nov 2021 08:41) (16 - 18)  SpO2: 100% (10 Nov 2021 08:41) (98% - 100%) I&O's Summary    09 Nov 2021 07:01  -  10 Nov 2021 07:00  --------------------------------------------------------  IN: 0 mL / OUT: 400 mL / NET: -400 mL    GENERAL:  [ ]Alert  [ ]Oriented x   [ ]Lethargic  [ ]Cachexia  [ X]Unarousable  [ ]Verbal  [X ]Non-Verbal  Behavioral:   [ ] Anxiety  [ ] Delirium [ ] Agitation [ ] Other  HEENT:  [ ]Normal   [ ]Dry mouth   [ ]ET Tube/Trach  [ ]Oral lesions  PULMONARY:   [ ]Clear [ ]Tachypnea  [ ]Audible excessive secretions   [ ]Rhonchi        [ ]Right [ ]Left [ ]Bilateral  [ ]Crackles        [ ]Right [ ]Left [ ]Bilateral  [ ]Wheezing     [ ]Right [ ]Left [ ]Bilateral  [X ]Diminshed BS [ ]Right [ ]Left [X ]Bilateral    CARDIOVASCULAR:    [ X]Regular [ ]Irregular [ ]Tachy  [ ]Tj [ ]Murmur [ ]Other  GASTROINTESTINAL:  [ X]Soft  [ ]Distended   [X ]+BS  [ ]Non tender [ ]Tender  [ ]PEG [ X]OGT/ NGT   Last BM:  11/05/21  GENITOURINARY:  [ ]Normal [X ] Incontinent   [ ]Oliguria/Anuria   [ ]Julio  MUSCULOSKELETAL:   [ ]Normal   [X ]Weakness  [ X]Bed/Wheelchair bound [ ]Edema  NEUROLOGIC:   [ ]No focal deficits  [ X] Cognitive impairment  [ ] Dysphagia [ ]Dysarthria [ ] Paresis [ ]Other   SKIN:   [ ]Normal  [ ]Rash     [X ]Pressure ulcer(s)  [ ]y [ ]n  Present on admission  unstageable on sacrum and chin. Please see nursing assessment for further details.    CRITICAL CARE:  [ ] Shock Present  [ ]Septic [ ]Cardiogenic [ ]Neurologic [ ]Hypovolemic  [ ]  Vasopressors [ ]  Inotropes   [ ] Respiratory failure present [ ] Mechanical Ventilation [ ] Non-invasive ventilatory support [ ] High-Flow  [ ] Acute  [ ] Chronic [ ] Hypoxic  [ ] Hypercarbic [ ] Other  [X ] Other organ failure- Skin    LABS:                        9.9    29.48 )-----------( 322      ( 10 Nov 2021 11:36 )             31.8   11-10    141  |  104  |  13  ----------------------------<  128<H>  2.9<LL>   |  27  |  0.34<L>    Ca    7.6<L>      10 Nov 2021 11:36    TPro  3.5<L>  /  Alb  1.6<L>  /  TBili  0.3  /  DBili  x   /  AST  17  /  ALT  12  /  AlkPhos  107  11-10        RADIOLOGY & ADDITIONAL STUDIES: Reviewed    PROTEIN CALORIE MALNUTRITION: [ ] mild [ ] moderate [ ] severe  [ ] underweight [ ] morbid obesity    https://www.andeal.org/vault/2440/web/files/ONC/Table_Clinical%20Characteristics%20to%20Document%20Malnutrition-White%20JV%20et%20al%202012.pdf    Height (cm): 147.3 (10-28-21 @ 08:47), 147.3 (10-28-21 @ 07:00), 147.3 (09-27-21 @ 22:20)  Weight (kg): 49.9 (10-28-21 @ 08:47), 49.9 (10-28-21 @ 07:00), 41.8 (09-27-21 @ 22:20)  BMI (kg/m2): 23 (10-28-21 @ 08:47), 23 (10-28-21 @ 08:47), 23 (10-28-21 @ 07:00)    [X ] PPSV2 < or = 30% [ ] significant weight loss [ ] poor nutritional intake [ ] anasarca   Artificial Nutrition [ ]     REFERRALS:   [ ]Chaplaincy  [ ] Hospice  [ ]Child Life  [ X]Social Work  [ ]Case management [ ]Holistic Therapy [ ] Physical Therapy [ ] Dietary   Goals of Care Document:

## 2021-11-11 NOTE — CHART NOTE - NSCHARTNOTEFT_GEN_A_CORE
Ethics consult initiated.    89 year old female without capacity, unavailable surrogate, poor prognosis, regarding goals of care.    Discussions ongoing. Attempted to reach son, no response.    Discussed with Denise Contreras, Ethicist.    Full consult note to follow.    Adina Schwab  Ethics Fellow  [phone]

## 2021-11-11 NOTE — PROGRESS NOTE ADULT - ASSESSMENT
89 y/o F with hx of dementia and MDS w/ recent hospitalization (9/28/21) for fall, found to have tSDH w/ MLS and femoral fx s/p CRPP. 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 non-surgical candidate d/t poor functional status. Palliative consulted for goals of care.

## 2021-11-11 NOTE — PROGRESS NOTE ADULT - PROBLEM SELECTOR PLAN 2
-Xray 11/8: Multiple loops of small and large bowel appear to be distended and changes compatible with ileus  -CT 11/10: Multiple air and fluid distended loops of large bowel suggestive of ileus. No evidence of small bowel obstruction. Rectum is distended with fluid.  -NG feeds stopped over weekend given increasing abdominal distention and frequent need for suction with high concern for recurrent aspiration risk   -aspiration precautions  -discussed with Gi Gabby Duenas , he will review CT and examine patient to see if trickle feeds can be started  -alb 1.9, severe protein calorie malnutrition   -called son multiple times again today to further discuss GOC etc,, no answer, left VM for callback,   -discussed with son SRIKANTH Nunez on 11/10, explained to him high risk of aspiration especially in setting of ileus, aspiration pna, resp failure, choking and death, he requested to have trickle feeds, will defer to GI  -also explained to son that patient has no mental status and cannot eat on her own thus NG feeds are temporary measure and there is no reasonable long term solution to provide her nutrition.  -lower d5 1/2 NS to 35cc/hr, CT with moderate b/l pleural effusions  -stop fluids if any sign of resp distress  -discussed with ethics and palliative who are also attempting to reach son but cannot

## 2021-11-11 NOTE — PROGRESS NOTE ADULT - PROBLEM SELECTOR PLAN 3
-s/p decompression w/ rectal tube  - would not resume NG feeds  at this time, high risk for aspiration given abdominal distention and need for suctioning  -dc abx being monitored off  -c/w maintenance fluids,

## 2021-11-11 NOTE — PROGRESS NOTE ADULT - SUBJECTIVE AND OBJECTIVE BOX
Patient is a 89y old  Female who presents with a chief complaint of SDH (11 Nov 2021 13:00)      SUBJECTIVE / OVERNIGHT EVENTS: No ON events. Patient nonresponsive, cannot get ROS      ADDITIONAL REVIEW OF SYSTEMS: Negative except for above    MEDICATIONS  (STANDING):  dextrose 5% + sodium chloride 0.45% 1000 milliLiter(s) (35 mL/Hr) IV Continuous <Continuous>  folic acid Injectable 1 milliGRAM(s) IV Push daily  lacosamide IVPB 200 milliGRAM(s) IV Intermittent every 12 hours  thiamine Injectable 100 milliGRAM(s) IV Push daily    MEDICATIONS  (PRN):  HYDROmorphone  Injectable 0.2 milliGRAM(s) IV Push every 4 hours PRN dyspnea  HYDROmorphone  Injectable 0.2 milliGRAM(s) IV Push every 4 hours PRN Severe Pain (7 - 10)      CAPILLARY BLOOD GLUCOSE        I&O's Summary    10 Nov 2021 07:01  -  11 Nov 2021 07:00  --------------------------------------------------------  IN: 350 mL / OUT: 900 mL / NET: -550 mL        PHYSICAL EXAM:  Vital Signs Last 24 Hrs  T(C): 36.4 (11 Nov 2021 08:49), Max: 36.7 (10 Nov 2021 16:35)  T(F): 97.5 (11 Nov 2021 08:49), Max: 98 (10 Nov 2021 16:35)  HR: 71 (11 Nov 2021 08:49) (71 - 86)  BP: 113/74 (11 Nov 2021 08:49) (113/74 - 145/84)  BP(mean): --  RR: 18 (11 Nov 2021 08:49) (18 - 18)  SpO2: 97% (11 Nov 2021 08:49) (97% - 100%)      PHYSICAL EXAM:  GENERAL: elderly obtunded, eyes closed  HEAD:  Atraumatic, Normocephalic, NG in place, NC in place,   CHEST/LUNG: decrease bases, poor effort  HEART: Regular rate and rhythm;   ABDOMEN: Soft, Nontender, +distended; Bowel sounds present  EXTREMITIES:  2+ Peripheral Pulses, gross anasarca   PSYCH: AAOx0, not following commands  Skin: multiple chronic pressure ulcers including her chin      LABS:                        8.4    35.09 )-----------( 441      ( 11 Nov 2021 08:42 )             27.2     11-11    137  |  103  |  10  ----------------------------<  110<H>  3.9   |  27  |  <0.30<L>    Ca    7.3<L>      11 Nov 2021 08:40    TPro  3.5<L>  /  Alb  1.6<L>  /  TBili  0.3  /  DBili  x   /  AST  17  /  ALT  12  /  AlkPhos  107  11-10                RADIOLOGY & ADDITIONAL TESTS:    Imaging Personally Reviewed: ct a/p:   COMPARISON: X-ray abdomen 11/8/2021 and CT chest, abdomen and pelvis 10/28/2021. MRI 2/15/2018.    CONTRAST/COMPLICATIONS:  IV Contrast: NONE  Oral Contrast: NONE  Complications: None reported at time of study completion    PROCEDURE:  CT of the Abdomen and Pelvis was performed.  Sagittal and coronal reformats were performed.    FINDINGS:  LOWER CHEST: Moderate bilateral pleural effusions with associated atelectasis. Cardiomegaly and coronary artery calcifications.    LIVER: Within normal limits.  BILE DUCTS: Normal caliber.  GALLBLADDER: Sludge.  SPLEEN: Within normal limits.  PANCREAS: Pancreatic ductal dilatation again noted.  ADRENALS: Within normal limits.  KIDNEYS/URETERS: Limited evaluation on this noncontrast exam. Bilateral parapelvic cysts.    BLADDER: Limited evaluation due to lack of contrast and streak artifact from bilateral hip orthopedic hardware.  REPRODUCTIVE ORGANS: Fibroid uterus containing calcification.    BOWEL: Enteric tube tip is in the stomach. No bowel obstruction. Fluid is noted within the rectum. Multiple air and fluid distended loops of large bowel, suggestive of ileus. Appendix is not visualized. No evidence of inflammation in the pericecal region. The previously seen colonic wall thickening is no longer appreciated.  PERITONEUM: Small volume ascites.  VESSELS: Atherosclerotic changes.  RETROPERITONEUM/LYMPH NODES: No lymphadenopathy. Presacral edema.  ABDOMINAL WALL: Anasarca.  BONES: Status post bilateral hip ORIF. Degenerative changes. L1 compression fracture. Multiple chronic rib and sternal fracture deformities. Tarlov cysts.    IMPRESSION:    Moderate bilateral pleural effusions with associated atelectasis.  Multiple air and fluid distended loops of large bowel suggestive of ileus. No evidence of small bowel obstruction.  Rectum is distended with fluid.    Electrocardiogram Personally Reviewed:    COORDINATION OF CARE:  Care Discussed with Consultants/Other Providers [Y/N]:  Prior or Outpatient Records Reviewed [Y/N]:

## 2021-11-11 NOTE — PROGRESS NOTE ADULT - SUBJECTIVE AND OBJECTIVE BOX
Chief Complaint:  Patient is a 89y old  Female who presents with a chief complaint of SDH (11 Nov 2021 13:30)      Date of service 11-11-21 @ 22:13      Interval Events:   no events    Hospital Medications:  dextrose 5% + sodium chloride 0.45% 1000 milliLiter(s) IV Continuous <Continuous>  folic acid Injectable 1 milliGRAM(s) IV Push daily  HYDROmorphone  Injectable 0.2 milliGRAM(s) IV Push every 4 hours PRN  HYDROmorphone  Injectable 0.2 milliGRAM(s) IV Push every 4 hours PRN  lacosamide IVPB 200 milliGRAM(s) IV Intermittent every 12 hours  thiamine Injectable 100 milliGRAM(s) IV Push daily          PHYSICAL EXAM:   Vital Signs:  Vital Signs Last 24 Hrs  T(C): 36.4 (11 Nov 2021 15:23), Max: 36.4 (11 Nov 2021 05:54)  T(F): 97.6 (11 Nov 2021 15:23), Max: 97.6 (11 Nov 2021 05:54)  HR: 80 (11 Nov 2021 15:23) (71 - 86)  BP: 134/73 (11 Nov 2021 15:23) (113/74 - 134/73)  BP(mean): --  RR: 18 (11 Nov 2021 15:23) (18 - 18)  SpO2: 97% (11 Nov 2021 15:23) (97% - 99%)  Daily     Daily       PHYSICAL EXAM:     GENERAL:  Appears stated age, well-groomed, well-nourished, no distress  HEENT:  NC/AT,  conjunctivae anicteric, clear and pink,   NECK: supple, trachea midline  CHEST:  Full & symmetric excursion, no increased effort, breath sounds clear  HEART:  Regular rhythm, no JVD  ABDOMEN:  Softly distended, non-tender, non-distended, normoactive bowel sounds,  no masses , no hepatosplenomegaly  EXTREMITIES:  no cyanosis,clubbing or edema  SKIN:  No rash, erythema, or, ecchymoses, no jaundice  NEURO: non-focal, no asterixis  PSYCH: Appropriate affect, oriented to place and time  RECTAL: Deferred      LABS Personally reviewed by me:                        8.4    35.09 )-----------( 441      ( 11 Nov 2021 08:42 )             27.2     Mean Cell Volume: 97.1 fl (11-11-21 @ 08:42)    11-11    137  |  103  |  10  ----------------------------<  110<H>  3.9   |  27  |  <0.30<L>    Ca    7.3<L>      11 Nov 2021 08:40    TPro  3.5<L>  /  Alb  1.6<L>  /  TBili  0.3  /  DBili  x   /  AST  17  /  ALT  12  /  AlkPhos  107  11-10    LIVER FUNCTIONS - ( 10 Nov 2021 10:27 )  Alb: 1.6 g/dL / Pro: 3.5 g/dL / ALK PHOS: 107 U/L / ALT: 12 U/L / AST: 17 U/L / GGT: x                                       8.4    35.09 )-----------( 441      ( 11 Nov 2021 08:42 )             27.2                         6.3    29.07 )-----------( 208      ( 11 Nov 2021 05:58 )             19.5                         9.9    29.48 )-----------( 322      ( 10 Nov 2021 11:36 )             31.8                         7.8    29.54 )-----------( 462      ( 10 Nov 2021 10:27 )             25.7       Imaging personally reviewed by me:

## 2021-11-11 NOTE — PROGRESS NOTE ADULT - PROBLEM SELECTOR PLAN 7
Will continue to follow up with symptom management Called the patient son Enrique with the . He did not answer. Left a brief voicemail for call back.  Will continue to follow up with symptom management

## 2021-11-11 NOTE — PROGRESS NOTE ADULT - ASSESSMENT
89F w/ PMHx of dementia, MDS, falls, prior SDH presents w/ acute on chronic SDH c/b midline shift and herniation, Non operative per NSGx. Course c/b ileus, Poor prognosis. Palliative following. Currently DNR/DNI.

## 2021-11-11 NOTE — CHART NOTE - NSCHARTNOTEFT_GEN_A_CORE
CT Abd Pelvis shows Multiple air and fluid distended loops of large bowel suggestive of ileus. No evidence of small bowel obstruction.  Per Dr. Hoffmann who discussed with GI started pt on trickle feeds via NGT - Jevity at 10cc/hr - monitor for tolerance and aspiration precaution    [id number]

## 2021-11-11 NOTE — CHART NOTE - NSCHARTNOTEFT_GEN_A_CORE
Medicine NP note    CC: Critical Hb/hct                          6.3    29.07 )-----------( 208      ( 11 Nov 2021 05:58 )             19.5     Patient asymptomatic, no occult source of bleeding  VSS  ? Likely hemo dilutional/contaminated sample  Repeat Hb/hct, T&S ordered  Will f/u repaet CBC  Will sign out to day team    Moon Galdamez [facility]  [phone]

## 2021-11-11 NOTE — PROGRESS NOTE ADULT - SUBJECTIVE AND OBJECTIVE BOX
GAP TEAM PALLIATIVE CARE UNIT PROGRESS NOTE:      [  ] Patient on hospice program.    INDICATION FOR PALLIATIVE CARE UNIT SERVICES:      INTERVAL HPI/OVERNIGHT EVENTS: Chart reviewed. The patient is seen and examined at the bedside. The patient required PRN Dilaudid 0.2mg IV X2 within a 24hr period 8am-8am.      DNR on chart:   Allergies    No Known Allergies    Intolerances    MEDICATIONS  (STANDING):  dextrose 5% + sodium chloride 0.45% 1000 milliLiter(s) (35 mL/Hr) IV Continuous <Continuous>  folic acid Injectable 1 milliGRAM(s) IV Push daily  lacosamide IVPB 200 milliGRAM(s) IV Intermittent every 12 hours  thiamine Injectable 100 milliGRAM(s) IV Push daily    MEDICATIONS  (PRN):  HYDROmorphone  Injectable 0.2 milliGRAM(s) IV Push every 4 hours PRN dyspnea  HYDROmorphone  Injectable 0.2 milliGRAM(s) IV Push every 4 hours PRN Severe Pain (7 - 10)    ITEMS UNCHECKED ARE NOT PRESENT    PRESENT SYMPTOMS: [ X]Unable to obtain due to poor mentation   Source if other than patient:  [ ]Family   [X ]Team     Pain: [ ] yes [ X] no see pain ad score  QOL impact -   Location -                    Aggravating factors -  Quality -  Radiation -  Timing-  Severity (0-10 scale):  Minimal acceptable level (0-10 scale):     Dyspnea:                           [ ]Mild [ ]Moderate [ ]Severe  Anxiety:                             [ ]Mild [ ]Moderate [ ]Severe  Fatigue:                             [ ]Mild [ ]Moderate [ ]Severe  Nausea:                             [ ]Mild [ ]Moderate [ ]Severe  Loss of appetite:              [ ]Mild [ ]Moderate [ ]Severe  Constipation:                    [ ]Mild [ ]Moderate [ ]Severe    PAINAD Score: 0 within a 24hr period 8am-8am    [email or web address] (Ctrl +  left click to view)  		  Other Symptoms:  [ X]All other review of systems negative     Palliative Performance Status Version 2:   10  %         http://npcrc.org/files/news/palliative_performance_scale_ppsv2.pdf  PHYSICAL EXAM:  Vital Signs Last 24 Hrs  T(C): 36.4 (11 Nov 2021 08:49), Max: 36.7 (10 Nov 2021 16:35)  T(F): 97.5 (11 Nov 2021 08:49), Max: 98 (10 Nov 2021 16:35)  HR: 71 (11 Nov 2021 08:49) (71 - 86)  BP: 113/74 (11 Nov 2021 08:49) (113/74 - 145/84)  BP(mean): --  RR: 18 (11 Nov 2021 08:49) (18 - 18)  SpO2: 97% (11 Nov 2021 08:49) (97% - 100%) I&O's Summary    10 Nov 2021 07:01  -  11 Nov 2021 07:00  --------------------------------------------------------  IN: 350 mL / OUT: 900 mL / NET: -550 mL    GENERAL:  [ ]Alert  [ ]Oriented x   [ ]Lethargic  [ ]Cachexia  [ X]Unarousable  [ ]Verbal  [X ]Non-Verbal  Behavioral:   [ ] Anxiety  [ ] Delirium [ ] Agitation [ ] Other  HEENT:  [ ]Normal   [ ]Dry mouth   [ ]ET Tube/Trach  [ ]Oral lesions  PULMONARY:   [ ]Clear [ ]Tachypnea  [ ]Audible excessive secretions   [ ]Rhonchi        [ ]Right [ ]Left [ ]Bilateral  [X ]Crackles        [ ]Right [ ]Left [ X]Bilateral  [ ]Wheezing     [ ]Right [ ]Left [ ]Bilateral  [ ]Diminshed BS [ ]Right [ ]Left [ ]Bilateral    CARDIOVASCULAR:    [ X]Regular [ ]Irregular [ ]Tachy  [ ]Tj [ ]Murmur [ ]Other  GASTROINTESTINAL:  [ X]Soft  [ ]Distended   [X ]+BS  [ ]Non tender [ ]Tender  [ ]PEG [ X]OGT/ NGT   Last BM:  11/05/21  GENITOURINARY:  [ ]Normal [X ] Incontinent   [ ]Oliguria/Anuria   [ ]Julio  MUSCULOSKELETAL:   [ ]Normal   [X ]Weakness  [ X]Bed/Wheelchair bound [ ]Edema  NEUROLOGIC:   [ ]No focal deficits  [ X] Cognitive impairment  [ ] Dysphagia [ ]Dysarthria [ ] Paresis [ ]Other   SKIN:   [ ]Normal  [ ]Rash     [X ]Pressure ulcer(s)  [ ]y [ ]n  Present on admission  unstageable on sacrum and chin. Please see nursing assessment for further details.    CRITICAL CARE:  [ ] Shock Present  [ ]Septic [ ]Cardiogenic [ ]Neurologic [ ]Hypovolemic  [ ]  Vasopressors [ ]  Inotropes   [ ] Respiratory failure present [ ] Mechanical Ventilation [ ] Non-invasive ventilatory support [ ] High-Flow  [ ] Acute  [ ] Chronic [ ] Hypoxic  [ ] Hypercarbic [ ] Other  [X ] Other organ failure- Skin      LABS:                        8.4    35.09 )-----------( 441      ( 11 Nov 2021 08:42 )             27.2   11-11    137  |  103  |  10  ----------------------------<  110<H>  3.9   |  27  |  <0.30<L>    Ca    7.3<L>      11 Nov 2021 08:40    TPro  3.5<L>  /  Alb  1.6<L>  /  TBili  0.3  /  DBili  x   /  AST  17  /  ALT  12  /  AlkPhos  107  11-10        RADIOLOGY & ADDITIONAL STUDIES:    PROTEIN CALORIE MALNUTRITION: [ ] mild [ ] moderate [ ] severe  [ ] underweight [ ] morbid obesity    https://www.andeal.org/vault/2440/web/files/ONC/Table_Clinical%20Characteristics%20to%20Document%20Malnutrition-White%20JV%20et%20al%202012.pdf    Height (cm): 147.3 (10-28-21 @ 08:47), 147.3 (10-28-21 @ 07:00), 147.3 (09-27-21 @ 22:20)  Weight (kg): 49.9 (10-28-21 @ 08:47), 49.9 (10-28-21 @ 07:00), 41.8 (09-27-21 @ 22:20)  BMI (kg/m2): 23 (10-28-21 @ 08:47), 23 (10-28-21 @ 08:47), 23 (10-28-21 @ 07:00)    [X ] PPSV2 < or = 30% [ ] significant weight loss [ ] poor nutritional intake [ ] anasarca   Artificial Nutrition [ ]     REFERRALS:   [ ]Chaplaincy  [ ] Hospice  [ ]Child Life  [ X]Social Work  [ ]Case management [ ]Holistic Therapy [ ] Physical Therapy [ ] Dietary   Goals of Care Document:

## 2021-11-11 NOTE — PROGRESS NOTE ADULT - PROBLEM SELECTOR PLAN 1
Patient using accessory muscles to breathe.  Continue with Dilaudid 0.2mg IV q4hr PRN (2 required within a 24hr period)  Bowel regimen while on opiods

## 2021-11-11 NOTE — PROGRESS NOTE ADULT - ASSESSMENT
Pt w mild ileus, abd is soft, mildly distended, favor partial ileus. No emesis. As per pt's family's wishes can attempt trickle feeds and monitor for signs of aspiration.

## 2021-11-11 NOTE — PROGRESS NOTE ADULT - PROBLEM SELECTOR PLAN 10
-no pharm dvt ppx due ICH    #GOC: called son Enrique several times this week each day and left voicemails for CB.   Had 1 short conversation on 11/10 and I informed him that his mother is dying in hours to days.  DNR/DNI   Son was ok with opioids to keep his mother comfortable if needed.   I told him to come to hospital to visit her but he said he cannot.    Defer trickle feeds to GI given multiple risks which son is aware of   Family meeting would be optimal but son not communicating despite multiple efforts    confirmed dnr/dni status, hiram in chart  very poor prognosis, son informed  SW, ethics, palliative all attemping to reach son to assist with case    discussed with ethics, Dr Duenas, GARFIELD Fox
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
## 2021-11-12 NOTE — CONSULT NOTE ADULT - PROVIDER SPECIALTY LIST ADULT
Heme/Onc
Nephrology
Infectious Disease
Neurosurgery
Gastroenterology
Ethics
Palliative Care
Cardiology

## 2021-11-12 NOTE — PROGRESS NOTE ADULT - SUBJECTIVE AND OBJECTIVE BOX
GAP TEAM PALLIATIVE CARE UNIT PROGRESS NOTE:      [  ] Patient on hospice program.    INDICATION FOR PALLIATIVE CARE UNIT SERVICES: symptom management    INTERVAL HPI/OVERNIGHT EVENTS: Patient examined at bedside. No distress noted. LBM 11/5/21. Required 3 doses of dilaudid IV for dyspnea in 24 hrs. No adverse effects from opiates noted.     DNR on chart: yes  Allergies    No Known Allergies    Intolerances    MEDICATIONS  (STANDING):  dextrose 5% + sodium chloride 0.45% 1000 milliLiter(s) (35 mL/Hr) IV Continuous <Continuous>  folic acid Injectable 1 milliGRAM(s) IV Push daily  lacosamide IVPB 200 milliGRAM(s) IV Intermittent every 12 hours  thiamine Injectable 100 milliGRAM(s) IV Push daily    MEDICATIONS  (PRN):  bisacodyl Suppository 10 milliGRAM(s) Rectal daily PRN Constipation  HYDROmorphone  Injectable 0.2 milliGRAM(s) IV Push every 4 hours PRN dyspnea  HYDROmorphone  Injectable 0.2 milliGRAM(s) IV Push every 4 hours PRN Severe Pain (7 - 10)    ITEMS UNCHECKED ARE NOT PRESENT    PRESENT SYMPTOMS: [x ]Unable to obtain due to poor mentation   Source if other than patient:  [ ]Family   [ ]Team     Pain: [ ] yes [ ] no  QOL impact -   Location -                    Aggravating factors -  Quality -  Radiation -  Timing-  Severity (0-10 scale):  Minimal acceptable level (0-10 scale):     Dyspnea:                           [ ]Mild [ ]Moderate [ ]Severe  Anxiety:                             [ ]Mild [ ]Moderate [ ]Severe  Fatigue:                             [ ]Mild [ ]Moderate [ ]Severe  Nausea:                             [ ]Mild [ ]Moderate [ ]Severe  Loss of appetite:              [ ]Mild [ ]Moderate [ ]Severe  Constipation:                    [ ]Mild [ ]Moderate [ ]Severe    PAINAD Score: 0    [email or web address] (Ctrl +  left click to view)  		  Other Symptoms:  [ ]All other review of systems negative     Palliative Performance Status Version 2:   10      %         http://npcrc.org/files/news/palliative_performance_scale_ppsv2.pdf  PHYSICAL EXAM:  Vital Signs Last 24 Hrs  T(C): 37.1 (12 Nov 2021 08:47), Max: 37.1 (12 Nov 2021 08:47)  T(F): 98.8 (12 Nov 2021 08:47), Max: 98.8 (12 Nov 2021 08:47)  HR: 83 (12 Nov 2021 08:47) (83 - 101)  BP: 92/59 (12 Nov 2021 08:47) (92/59 - 118/71)  BP(mean): --  RR: 16 (12 Nov 2021 08:47) (16 - 16)  SpO2: 98% (12 Nov 2021 08:47) (98% - 99%) I&O's Summary    11 Nov 2021 07:01  -  12 Nov 2021 07:00  --------------------------------------------------------  IN: 610 mL / OUT: 600 mL / NET: 10 mL    GENERAL:  [ ]Alert  [ ]Oriented x   [ ]Lethargic  [ ]Cachexia  [x ]Unarousable  [ ]Verbal  [ ]Non-Verbal  Behavioral:   [ ] Anxiety  [ ] Delirium [ ] Agitation [ ] Other  HEENT:  [ ]Normal   [ ]Dry mouth   [ ]ET Tube/Trach  [ ]Oral lesions  PULMONARY:   [ ]Clear [ ]Tachypnea  [ ]Audible excessive secretions   [ ]Rhonchi        [ ]Right [ ]Left [ ]Bilateral  [ ]Crackles        [ ]Right [ ]Left [ ]Bilateral  [ ]Wheezing     [ ]Right [ ]Left [ ]Bilateral  [x ]Diminished BS [ ]Right [ ]Left [x ]Bilateral    CARDIOVASCULAR:    [x ]Regular [ ]Irregular [ ]Tachy  [ ]Tj [ ]Murmur [ ]Other  GASTROINTESTINAL:  [x ]Soft  [ ]Distended   [ ]+BS  [ ]Non tender [ ]Tender  [ ]PEG [ ]OGT/ NGT   Last BM:     GENITOURINARY:  [ ]Normal [ ] Incontinent   [ ]Oliguria/Anuria   [ ]Julio  MUSCULOSKELETAL:   [ ]Normal   [x ]Weakness  [ ]Bed/Wheelchair bound [ ]Edema  NEUROLOGIC:   [ ]No focal deficits  [ ] Cognitive impairment  [ ] Dysphagia [ ]Dysarthria [ ] Paresis [ ]Other   SKIN:   [ ]Normal  [ ]Rash     [ ]Pressure ulcer(s)  [ ]y [ ]n  Present on admission      CRITICAL CARE:  [ ] Shock Present  [ ]Septic [ ]Cardiogenic [ ]Neurologic [ ]Hypovolemic  [ ]  Vasopressors [ ]  Inotropes   [ ] Respiratory failure present [ ] Mechanical Ventilation [ ] Non-invasive ventilatory support [ ] High-Flow  [ ] Acute  [ ] Chronic [ ] Hypoxic  [ ] Hypercarbic [ ] Other  [ ] Other organ failure     LABS:                        9.3    40.93 )-----------( 570      ( 12 Nov 2021 12:41 )             30.5   11-12    135  |  101  |  11  ----------------------------<  134<H>  3.7   |  25  |  0.36<L>    Ca    7.8<L>      12 Nov 2021 12:41          RADIOLOGY & ADDITIONAL STUDIES: none new    PROTEIN CALORIE MALNUTRITION: [ ] mild [ ] moderate [ ] severe  [ ] underweight [ ] morbid obesity    https://www.andeal.org/vault/2440/web/files/ONC/Table_Clinical%20Characteristics%20to%20Document%20Malnutrition-White%20JV%20et%20al%202012.pdf    Height (cm): 147.3 (10-28-21 @ 08:47), 147.3 (10-28-21 @ 07:00), 147.3 (09-27-21 @ 22:20)  Weight (kg): 49.9 (10-28-21 @ 08:47), 49.9 (10-28-21 @ 07:00), 41.8 (09-27-21 @ 22:20)  BMI (kg/m2): 23 (10-28-21 @ 08:47), 23 (10-28-21 @ 08:47), 23 (10-28-21 @ 07:00)    [ ] PPSV2 < or = 30% [ ] significant weight loss [ ] poor nutritional intake [ ] anasarca   Artificial Nutrition [ ]     REFERRALS:   [x ]Chaplaincy  [ ] Hospice  [ ]Child Life  [x ]Social Work  [ ]Case management [ ]Holistic Therapy [ ] Physical Therapy [ ] Dietary   Goals of Care Document: Goals of Care Conversation - Advanced Care Planning [JAMIE Alberts] (10-14-20 @ 15:57)

## 2021-11-12 NOTE — PROGRESS NOTE ADULT - PROBLEM SELECTOR PLAN 2
-Xray 11/8: Multiple loops of small and large bowel appear to be distended and changes compatible with ileus  -CT 11/10: Multiple air and fluid distended loops of large bowel suggestive of ileus. No evidence of small bowel obstruction. Rectum is distended with fluid.  -NG feeds stopped over weekend given increasing abdominal distention and frequent need for suction with high concern for recurrent aspiration risk   -aspiration precautions  -per GI, ok to continue NG trickle feeds, 10cc/hr per sons wishes, will not increase dose for now  -alb 1.9, severe protein calorie malnutrition   -called son multiple times again today to further discuss GOC etc,, no answer, left VM for callback,   -discussed with son SRIKANTH Nunez on 11/10, explained to him high risk of aspiration especially in setting of ileus, aspiration pna, resp failure, choking and death, he requested to have trickle feeds,   -also explained to son that patient has no mental status and cannot eat on her own thus NG feeds are temporary measure and there is no reasonable long term solution to provide her nutrition.  -c/w d5 1/2 NS 35cc/hr, CT with moderate b/l pleural effusions  -stop fluids if any sign of resp distress  -discussed with ethics and palliative who are also attempting to reach son but cannot

## 2021-11-12 NOTE — CHART NOTE - NSCHARTNOTEFT_GEN_A_CORE
Nutrition Follow Up Note: Nutrition Consult for "Pressure Injury Stage 2 or >"    Chart reviewed, events noted. "89 y/o Female with hx of dementia and MDS w/ recent hospitalization (9/28/21) for fall, found to have tSDH w/ MLS and femoral fx s/p CRPP. 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 non-surgical candidate d/t poor functional status. Palliative consulted for goals of care." Pt now in PCU.    Source: [] Patient       [x] EMR        [x] RN        [] Family at bedside       [] Other:    -If unable to interview patient: [] Trach/Vent/BiPAP  [x] Disoriented/confused/inappropriate to interview as per chart, sleeping     Diet, NPO with Tube Feed:   Tube Feeding Modality: Nasogastric  Jevity 1.2 Ray (JEVITY1.2RTH)  Total Volume for 24 Hours (mL): 240  Continuous  Starting Tube Feed Rate {mL per Hour}: 10  Until Goal Tube Feed Rate (mL per Hour): 10  Tube Feed Duration (in Hours): 24  Tube Feed Start Time: 16:00 (11-11-21 @ 16:20) [Active]    EN order provides: 240 ml total volume, 288 kcal, 13 gm protein and 194 ml free water.    Nutrition-Related Events:  - Pt with abdominal distention, trickle feeds have resumed, continues to be followed by GI  - GOC discussions ongoing  - Pt ordered for bowel regimen; dulcolax  - Pt remains ordered for folic acid and thiamine    No daily weights noted    MEDICATIONS  (STANDING):  dextrose 5% + sodium chloride 0.45%  folic acid Injectable  thiamine Injectable    Pertinent Labs: 11-12 @ 12:41: Na 135, BUN 11, Cr 0.36<L>, <H>, K+ 3.7, Phos --, Mg --, Alk Phos --, ALT/SGPT --, AST/SGOT --, HbA1c --    A1C with Estimated Average Glucose Result: 5.1 % (09-29-21 @ 04:28)    Finger Sticks: n/a    Skin per nursing documentation: pressure ulcers in sacrum unstageable and unstageable chin/neck  Edema as per flow sheets: 2+ generalized, 3+ left arm; right arm, 4+ left hand right hand    Estimated Needs:   [x] no change since previous assessment (meets estimated needs for pressure ulcer healing).     Previous Nutrition Diagnosis: Increased Nutrient Needs, Severe malnutrition  Nutrition Diagnosis is: [x] ongoing - previously addressed with TF, pending feeding plans/ GOC     New Nutrition Diagnosis: [x] N/A    Nutrition Care Plan:  [x] In Progress     Nutrition Interventions:     Education Provided:       [] Yes  [x] No    Recommendations:      1. Nutrition care plan to remain consistent with GOC   2. Defer EN feeds/ feeding plans to medical team at this time    Monitoring and Evaluation:   Continue to monitor feeding plans, weights, labs, skin integrity    RD remains available upon request and will follow up per protocol  Cara Tyson MS, RD, CDN, [facility] pgr #[phone] Nutrition Follow Up Note: Nutrition Consult for "Pressure Injury Stage 2 or >"    Chart reviewed, events noted. "89 y/o Female with hx of dementia and MDS w/ recent hospitalization (9/28/21) for fall, found to have tSDH w/ MLS and femoral fx s/p CRPP. 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 non-surgical candidate d/t poor functional status. Palliative consulted for goals of care." Pt now in PCU.    Source: [] Patient       [x] EMR        [x] RN        [] Family at bedside       [] Other:    -If unable to interview patient: [] Trach/Vent/BiPAP  [x] Disoriented/confused/inappropriate to interview as per chart, sleeping     Diet, NPO with Tube Feed:   Tube Feeding Modality: Nasogastric  Jevity 1.2 Ray (JEVITY1.2RTH)  Total Volume for 24 Hours (mL): 240  Continuous  Starting Tube Feed Rate {mL per Hour}: 10  Until Goal Tube Feed Rate (mL per Hour): 10  Tube Feed Duration (in Hours): 24  Tube Feed Start Time: 16:00 (11-11-21 @ 16:20) [Active]    EN order provides: 240 ml total volume, 288 kcal, 13 gm protein and 194 ml free water.    Nutrition-Related Events:  - Pt with abdominal distention, ileus, trickle feeds have resumed, continues to be followed by GI  - GOC discussions ongoing  - Pt ordered for bowel regimen; dulcolax  - Pt remains ordered for folic acid and thiamine    No daily weights noted    MEDICATIONS  (STANDING):  dextrose 5% + sodium chloride 0.45%  folic acid Injectable  thiamine Injectable    Pertinent Labs: 11-12 @ 12:41: Na 135, BUN 11, Cr 0.36<L>, <H>, K+ 3.7, Phos --, Mg --, Alk Phos --, ALT/SGPT --, AST/SGOT --, HbA1c --    A1C with Estimated Average Glucose Result: 5.1 % (09-29-21 @ 04:28)    Finger Sticks: n/a    Skin per nursing documentation: pressure ulcers in sacrum unstageable and unstageable chin/neck  Edema as per flow sheets: 2+ generalized, 3+ left arm; right arm, 4+ left hand right hand    Estimated Needs:   [x] no change since previous assessment (meets estimated needs for pressure ulcer healing).     Previous Nutrition Diagnosis: Increased Nutrient Needs, Severe malnutrition  Nutrition Diagnosis is: [x] ongoing - previously addressed with TF, pending feeding plans/ GOC     New Nutrition Diagnosis: [x] N/A    Nutrition Care Plan:  [x] In Progress     Nutrition Interventions:     Education Provided:       [] Yes  [x] No    Recommendations:      1. Nutrition care plan to remain consistent with GOC   2. Defer EN feeds/ feeding plans to medical team at this time  3. If within GOC, consider multivitamin daily     Monitoring and Evaluation:   Continue to monitor feeding plans, weights, labs, skin integrity    RD remains available upon request and will follow up per protocol  Cara Tyson MS, RD, CDN, [facility] pgr #[phone]

## 2021-11-12 NOTE — PROGRESS NOTE ADULT - ASSESSMENT
89 y/o F with hx of dementia and MDS w/ recent hospitalization (9/28/21) for fall, found to have tSDH w/ MLS and femoral fx s/p CRPP. 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 non-surgical candidate d/t poor functional status. Palliative consulted for goals of care.

## 2021-11-12 NOTE — CONSULT NOTE ADULT - SUBJECTIVE AND OBJECTIVE BOX
Consult requested by: Dr. Urszula Hoffmann MD ROLE: Attending  Service: Medicine  Primary Attending: Dr. Tahira ANDUJAR MD: ANAIS Gray MD, Palliative Care Attending    Consultants: Sissy Hernandez MD Palliative Fellow Rotating in Ethics, Adina Schwab, PA Ethics Fellow, Denise Contreras MD Ethics Attending    Consult purpose: To assist in the ethical dilemma posed by an 89-year-old female [facility], now with acute on chronic subdural hematoma and subfalcine herniation regarding appropriate decision maker.    Clinical summary: This is an 89 year old female with PMH dementia and Myelodysplastic syndrome, prior fall resulting in traumatic SDH and femoral neck fracture status post surgical repair, who was brought into [facility] from rehab for altered mental status and was found to have acute on chronic subdural hematoma. She was admitted to intensive care and required vasopressor support. INR elevated treated with Kcentra. Neurosurgery deemed patient not a surgical candidate due to poor functional status however, they did place 2 SEPS drains on 10/29. CT head post SEPS drains was worse showing midline shift to the right >2cm with subfalcine and uncal herniation. Hospital course was further complicated by recurrent abdominal distension. 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 NGT was placed prior for tube feeding and feeds were held. Patient also completed antibiotic course for suspected colitis. Per GI patient not a candidate for a PEG tube. EEG abnormal but no seizures seen, and started on Vimpat for seizure prophylaxis. Neurosurgery spoke with son on 10/31 regarding poor prognosis and son made pt DNR/DNI at that time. Tranferred to Medicine service in palliative care unit on . Palliative medicine spoke with son on  and at that time he wanted his mother to continue antibiotics (which have completed) and continue tube feeds. He also informed them that his wife was having a  the following day (11/3) and that he would not be as available over the next few days. Patient’s tube feeds have been held since the weekend due to abdominal distention and ileus. GI evaluated in the patient and stated to hold off on "further interventions" as patient’s "[place]’s syndrome" was recurring. CT abdomen done to rule out obstruction and did not show obstruction but did show ileus. Since last week the son has been challenging to reach, and when the team did reach him he was very brief and said to continue tube feeds. The team is unsure how to proceed with goals of care for this pateint without being able to communicate with the patient’s son. There are no other involved family members that we are aware of.    Ethics consulted initiated to facilitate communication between the medical team and the family to identify the appropriate surrogate decision maker and goals of care.      PROGNOSIS ESTIMATE (SURVIVAL IN HRS, DAYS, WKS, MOS, YRS): poor , days to weeks  PATIENT DECISION-MAKING CAPACITY:  Has Capacity  Patient lacks capacity due to current medical condition  PATIENT AWARE OF:  DIAGNOSIS:  Yes  No  Unknown		PROGNOSIS:  Yes  No  Unknown   NAME OF MEDICAL DECISION-MAKER SHOULD PATIENT LACK CAPACITY (RELATIONSHIP): Enrique Méndezsobia, Patient’s son  ROLE:  Health Care Agent  Legal Surrogate 	CONTACT#: [phone]  OTHER DECISION-MAKER (IE, [facility] OR SURROGATE) AWARE OF:   DIAGNOSIS:  Yes  No  Unknown		PROGNOSIS:  Yes  No  Unknown  OTHER STAKEHOLDERS: Daughter in law and grandchild  EVIDENCE OF PATIENT’S PREFERENCE OF LIFE-SUSTAINING TREATMENT (WRITTEN OR ORAL): DNR, DNI MOLST completed on 10/31/21 via verbal consent from son  RESUSCITATION STATUS: DNR:  Yes  No DNI:  Yes  No    Discussions between Ethic Fellow (Adina Schwab) and medicine Attending (Dr. Hoffmann) on 21: Discussed patient is actively dying and son, who is only known family member, is not available. She has an NGT but is not getting feeds through it at this time due to distended abdomen and abdominal ileus. GI evaluated patient and recommended keeping her NPO for now until review of CT scan. Son would like team to keep feeding her if possible, and is not available or responsive for communication. Team is concerned regarding feeding patient at this time, and since NGT is not a long term option, and GI states she is not a candidate for a PEG tube, further discussion needs to be had regarding longer term goals of care.    A Schwab PA (Ethics Fellow) visited patient and she was laying comfortably in bed, not responsive.  Attempted to reach son but he did not answer. I left a voicemail for return call.    Bioethical analysis: The central issues in this case pertain to the patient’s autonomy and the practitioner’s duty to beneficence and nonmaleficence.      The principle of respect for autonomous persons acknowledges a patient’s right to hold views, to make choices and to take actions based on their values and beliefs(i).  Furthermore, this principle acknowledges the patient’s dignity and bodily integrity(i).  Essential to this principle is the capacity for autonomous choice(i).  In other words, the patient’s capacity to decide what can and cannot be done to her according to her set of values. When a patient does not have the ability to make decisions for herself, we must protect her autonomy by finding an appropriate surrogate decision maker. In this case, the patient has an appropriate surrogate, her son Enrique. However, he has very limited availability at this time, likely due to personal situationat home, and has had limited conversation with the healthcare team. The healthcare team has made several attempts to reach him recently and speak with him but has not had much success.    The medical practitioner is tasked by professional, ethical and legal duties, to make clinical decisions on a daily basis. This clinical decision making involves the judicious use of evidence, taking into account both clinical expertise and needs and wishes of individual patients.(ii) This specialized knowledge is beyond the scope of most patients and their families, so they must rely on the medical practitioner to present them with the information they lack and to clinically guide them through the patient’s illness. These are part of the nondelegable duty of the medical practitioner: (1) to make clinical decisions based on their professional knowledge and expertise and (2) to present information to patients and their families so they can fully participate in the decisions that are in the patient’s power to make. In other words, Enrique may choose from the medically appropriate options available to him.    While medical practitioners should strive for shared decision-making between patients and their families and the medical team, there are clinical decisions that are nondelegable and solely under medical practitioners’ purview. It is helpful to understand the patient’s wishes and goals, but the clinical expertise takes precedence and determines what is the clinically appropriate intervention that is "indicated" or not for each patient and its illness. Practitioners are not obligated to perform procedures that are not in accordance with acceptable practice and where harm exceeds benefits. This way we protect a patient’s autonomy –by carefully unfolding the medical practitioner’s professional knowledge and expertise to make clinical decisions that adhere to principles of beneficence and nonmaleficence and are indicated for this patient and their illness. It is a careful balance between the practitioners’ duty to their patient and the appropriate input from patients and their families, to deliver patient-centered care.    In this case, it is a medical decision whether or not it is safe to resume tube feeds in this patient at this time. If it is determined by the medical teams involved that due to her ileus she should remain NPO then the son cannot force the team to resume tube feeds. If however, the clinic team determines it is safe to resume tube feeds, the pateint already has an NGT tube and it is appropriate to respect the son’s wishes and resume tube feeds via the NGT. It is also important to realize in this situation that if the patient requires control of her symptoms like pain or dyspnea, physicians should address as clinically indicated.     This case also invokes the consideration of beneficence and nonmaleficence. Nonmaleficence obligates us to refrain from causing harm to patients while treating them, while beneficence requires us to promote the best possible health or quality of life, or quality of dying when appropriate–in other words, to remediate a harm and to maximize benefit for the patient’s health and well-being.(iii)  A central ethical issue in this case is whether additional medical interventions can improve the outcome of this patient. It appears that a chance for a meaningful recovery in this instance is not likely. It also appears that the patient will continue to decline. Although technically difficult for the clinician to watch, her son determines quality of life.    Ms. Muir has an innate MORAL STATUS –that is, her distinct personhood, personal experience and interpretation of suffering, life-story, etc.– which must be respected. The patient’s prognosis is poor, and she is unlikely to recover. The benefits of life sustaining treatments (artificial nutrition and hydration in this case) must be weighed against the harm that these LSTs may cause when they no longer improve prognosis but introduce suffering and risk. Therefore, unless it is clear that certain specific treatments will be nonbeneficial for their proposed goal, current treatments should be continued per the son’s wishes.    In this specific case, in accordance with the Palliative Care Access Act, the attending medical practitioner is required to provide patients with a terminal illness not only prognosis, risks and benefits of treatment options but also patient’s legal rights to symptom management at the end of life.(iv) Fundamental to a palliative approach is the aim to reduce suffering and improve the quality of life for dying patients.    A particular concern regarding this ethical dilemma is the framework for determining the balance between nonmaleficence and beneficence. Specifically, physicians are NOT obligated to perform procedures that are not in accordance with acceptable practice and where harm exceeds benefits. In this specific case it is important to recognize that beneficence extends to easing the caregiver burden and stress of the patient’s family. Patients and families may be protected from making detailed medical decisions about treatments that offer no medical benefit through the use of not escalating treatments that have no expectation of improving goals of restoring patient to function.(v) Continuing treatment may not be without significant risk thus maintaining this patient’s existence may become increasingly difficult thus her son requires the tincture of time to prepare for the bereavement process of Mr. Muir, who has a high likelihood of succumbing to this illness soon.       Directly interconnected to a patient’s autonomy and the medical team’s beneficence and nonmaleficence’s duties is the trust that must permeate the clinician-patient relationship.  In practical terms, the clinicians have a duty to place themselves in the lived experience of the patient’s illness, in order to appreciate the situation as the patient understands, perceives, and feels it (it is both to understand and to be understanding).(vi)  It is also to be mindful of the power imbalance that can determine negatively the practitioner-patient relationship and thus erode trust in a much-needed moment.  We should approach every task that involves the patient with humility to ameliorate that power imbalance.     Communication is central in the practitioner-patient relationship.(vii)  This is not only owed to patients. It also extends to a patient’s surrogate decision-maker when a patient loses capacity.  Sometimes surrogate decision makers can create several concerns for the medical team.  However, surrogates are sometimes thrown to a difficult situation and can bring their fears and anxieties to an already delicate situation.  Understanding the root of the problem can help diffuse these difficult moments.  Practical tools such as open, honest, compassionate communication with the surrogate and active listening can help in this regard.    Particularly critical is ensuring palliation and symptom-focused care measures for this patient as providing care is "everything that is medically possible at present".

## 2021-11-12 NOTE — PROGRESS NOTE ADULT - PROBLEM SELECTOR PLAN 7
-leukocytosis likely reactive to acute critical illness, has known MPN and elevated wbc 20-50k at baseline  -not a candidate for chemo  -afebrile, if febrile, pan-cx and start broad spectrum antitonics   -defer heme consult for now as unlikely to

## 2021-11-12 NOTE — PROGRESS NOTE ADULT - ASSESSMENT
1. Subdural hematoma  -per NSX  -the patient is not a candidate for PEG placement at this time.  -poor prognosis, f/u ongoing GOC discussions     2. Abdominal distention. Suspect [place] syndrome in the setting of SDH, critical illness, now s/p successful decompression but appears to be recurring. Still abdomen is soft. Prognosis seems poor, would hold off on further interventions, clarify goals of care. Check residuals when giving NG feeds.  - stable  - on trickle feeds     3. Leukocytosis    4. Anemia, MDS, no overt GI bleeding, labs consistent w hemolysis, consider heme consult    5. Shock liver improving    The plan of care was discussed with the physician assistant and modifications were made to the notation where appropriate.   Differential diagnosis and plan of care discussed with patient after the evaluation  35 minutes spent on total encounter of which more than fifty percent of the encounter was spent counseling and/or coordinating care by the attending physician.    [facility]  Gastroenterology and Hepatology  [address] [place], [place]  Office: [phone]  Cell: [phone]   1. Subdural hematoma  -per NSX  -the patient is not a candidate for PEG placement at this time.  -poor prognosis, f/u ongoing GOC discussions     2. Abdominal distention. Suspect [place] syndrome in the setting of SDH, critical illness, now s/p successful decompression but appears to be recurring. Still abdomen is soft. Prognosis seems poor, would hold off on further interventions, clarify goals of care. Check residuals when giving NG feeds.  - stable  - on trickle feeds , family understands risks of comfort feeding    3. Leukocytosis    4. Anemia, MDS, no overt GI bleeding, labs consistent w hemolysis, consider heme consult    5. Shock liver improving    The plan of care was discussed with the physician assistant and modifications were made to the notation where appropriate.   Differential diagnosis and plan of care discussed with patient after the evaluation  35 minutes spent on total encounter of which more than fifty percent of the encounter was spent counseling and/or coordinating care by the attending physician.    [facility]  Gastroenterology and Hepatology  [address] [place], [place]  Office: [phone]  Cell: [phone]

## 2021-11-12 NOTE — PROGRESS NOTE ADULT - SUBJECTIVE AND OBJECTIVE BOX
INTERVAL HPI/OVERNIGHT EVENTS:  Pt seen and examined  lethargic  tolerating trickle feeds    MEDICATIONS  (STANDING):  dextrose 5% + sodium chloride 0.45% 1000 milliLiter(s) (35 mL/Hr) IV Continuous <Continuous>  folic acid Injectable 1 milliGRAM(s) IV Push daily  lacosamide IVPB 200 milliGRAM(s) IV Intermittent every 12 hours  thiamine Injectable 100 milliGRAM(s) IV Push daily    MEDICATIONS  (PRN):  HYDROmorphone  Injectable 0.2 milliGRAM(s) IV Push every 4 hours PRN dyspnea  HYDROmorphone  Injectable 0.2 milliGRAM(s) IV Push every 4 hours PRN Severe Pain (7 - 10)      Allergies    No Known Allergies    Intolerances        Review of Systems:    does not provide      Vital Signs Last 24 Hrs  T(C): 37.1 (12 Nov 2021 08:47), Max: 37.1 (12 Nov 2021 08:47)  T(F): 98.8 (12 Nov 2021 08:47), Max: 98.8 (12 Nov 2021 08:47)  HR: 83 (12 Nov 2021 08:47) (80 - 101)  BP: 92/59 (12 Nov 2021 08:47) (92/59 - 134/73)  BP(mean): --  RR: 16 (12 Nov 2021 08:47) (16 - 18)  SpO2: 98% (12 Nov 2021 08:47) (97% - 99%)    PHYSICAL EXAM:    GENERAL:  Appears ill  HEENT:  NC/AT,  conjunctivae anicteric, clear and pink, + CHIN tube in place   NECK: supple, trachea midline  CHEST:  Full & symmetric excursion, no increased effort, breath sounds clear  HEART:  Regular rhythm, no JVD  ABDOMEN:  Soft, non-tender, distended, normoactive bowel sounds,  no masses , no hepatosplenomegaly  EXTREMITIES:  no cyanosis,clubbing or edema  SKIN:  No rash, erythema, or, ecchymoses, no jaundice  NEURO:  nonverbal  RECTAL: Deferred      LABS:                        8.4    35.09 )-----------( 441      ( 11 Nov 2021 08:42 )             27.2     11-11    137  |  103  |  10  ----------------------------<  110<H>  3.9   |  27  |  <0.30<L>    Ca    7.3<L>      11 Nov 2021 08:40            RADIOLOGY & ADDITIONAL TESTS:

## 2021-11-12 NOTE — PROGRESS NOTE ADULT - SUBJECTIVE AND OBJECTIVE BOX
Patient is a 89y old  Female who presents with a chief complaint of SDH (12 Nov 2021 12:33)      SUBJECTIVE / OVERNIGHT EVENTS: No ON events. Seems to be tolerating trickle feeds thus far. nonresponsive        ADDITIONAL REVIEW OF SYSTEMS: Negative except for above    MEDICATIONS  (STANDING):  dextrose 5% + sodium chloride 0.45% 1000 milliLiter(s) (35 mL/Hr) IV Continuous <Continuous>  folic acid Injectable 1 milliGRAM(s) IV Push daily  lacosamide IVPB 200 milliGRAM(s) IV Intermittent every 12 hours  thiamine Injectable 100 milliGRAM(s) IV Push daily    MEDICATIONS  (PRN):  bisacodyl Suppository 10 milliGRAM(s) Rectal daily PRN Constipation  HYDROmorphone  Injectable 0.2 milliGRAM(s) IV Push every 4 hours PRN dyspnea  HYDROmorphone  Injectable 0.2 milliGRAM(s) IV Push every 4 hours PRN Severe Pain (7 - 10)      CAPILLARY BLOOD GLUCOSE        I&O's Summary    11 Nov 2021 07:01  -  12 Nov 2021 07:00  --------------------------------------------------------  IN: 610 mL / OUT: 600 mL / NET: 10 mL        PHYSICAL EXAM:  Vital Signs Last 24 Hrs  T(C): 37.1 (12 Nov 2021 08:47), Max: 37.1 (12 Nov 2021 08:47)  T(F): 98.8 (12 Nov 2021 08:47), Max: 98.8 (12 Nov 2021 08:47)  HR: 83 (12 Nov 2021 08:47) (80 - 101)  BP: 92/59 (12 Nov 2021 08:47) (92/59 - 134/73)  BP(mean): --  RR: 16 (12 Nov 2021 08:47) (16 - 18)  SpO2: 98% (12 Nov 2021 08:47) (97% - 99%)      PHYSICAL EXAM:  GENERAL: elderly obtunded, eyes closed  HEAD:  Atraumatic, Normocephalic, NG in place, NC in place,   CHEST/LUNG: decrease bases, poor effort  HEART: Regular rate and rhythm;   ABDOMEN: Soft, Nontender, +distended; Bowel sounds present  EXTREMITIES:  2+ Peripheral Pulses, gross anasarca   PSYCH: AAOx0, not following commands  Skin: multiple chronic pressure ulcers including her chin      LABS:                        9.3    40.93 )-----------( 570      ( 12 Nov 2021 12:41 )             30.5     11-12    135  |  101  |  11  ----------------------------<  134<H>  3.7   |  25  |  0.36<L>    Ca    7.8<L>      12 Nov 2021 12:41                  RADIOLOGY & ADDITIONAL TESTS:    Imaging Personally Reviewed:    Electrocardiogram Personally Reviewed:    COORDINATION OF CARE:  Care Discussed with Consultants/Other Providers [Y/N]:  Prior or Outpatient Records Reviewed [Y/N]:

## 2021-11-12 NOTE — CONSULT NOTE ADULT - CONSULT REQUESTED DATE/TIME
28-Oct-2021 23:29
11-Nov-2021
28-Oct-2021 17:21
29-Oct-2021 12:24
28-Oct-2021 11:39
28-Oct-2021 13:20
28-Oct-2021 12:27
28-Oct-2021 14:44

## 2021-11-12 NOTE — PROGRESS NOTE ADULT - PROBLEM SELECTOR PLAN 1
c/w Dilaudid 0.2mg IV q4hr PRN   tube feeds may be contributing to the same, trickle feeds now per son's decision  Bowel regimen while on opioids

## 2021-11-12 NOTE — CONSULT NOTE ADULT - ASSESSMENT
Recommendation: Ethics commends the team for their virtue in the care and support for Ms. Muir.   Her son, Enrique, is the patient’s valid surrogate. He has been available and explained to the team why he would have limited availability these days. Therefore, the team must continue to try to contact him when decisions are needed and be mindful of his complex social situation.     If a patient needs an emergent intervention in this time period, two physicians may consent.     Physicians are NOT obligated to perform, or offer, procedures that are not in accordance with acceptable practice and where harm exceeds benefits. Also, certain clinical decisions are in the providers purview to decide. The son may choose from medically appropriate options where appropriate.    The benefits of interventions and life-sustaining treatments (LSTs) must be weighed against the harm these LSTs may cause when it no longer improves prognosis but introduces suffering and risk.     Particularly critical is ensuring palliation and symptom-focused care measures for this patient as providing care is "everything that is medically possible at present".    Thank you for including the Ethics Consultation Service. Ethics will remain available to aid in this process and for any discussions and decision points that may occur.   CASE DISCUSSED with  ETHICS ATTENDING: Denise Contreras MS, MD, MPH, [facility].  MORE THAN 50% OF THE TIME OF THIS CONSULTATION WAS SPENT IN COORDINATION OF CARE OF PATIENT.	  REFERENCES:   (i)	Maile TL & Elio JF. Principles of Biomedical Ethics. [place]: [employer]; 2019.   (ii)	Base L et al. How clinical decisions are made. Br J Clin Pharmacol 2012;74(4):614-620.  (iii)	Handy CUELLO, Jonathan PATINO & James HAMILTON. Ethics, Trust, and the Professions: Philosophical and Cultural Aspects. [place], [place]: [employer]; 1991.  (iv)	PHL § 2997-d et seq. (2020).  (v)	Luis Angel J & Hilary A. Easing the Page of Surrogate Decision Making: The Role of a Do-Not-Escalate-Treatment Order. J Pall Med 2015;18(3):306-309.  (vi)	Handy CUELLO. "Trust and Distrust in Professional Ethics" in [place] CUELLO, Jonathan PATINO & James HAMILTON. Ethics, Trust, and the Professions: Philosophical and Cultural Aspects. [place], [place]: [facility] [employer]; 1991.   (vii)	Jose EK, Robert H, et al. Surviving Surrogate Decision-Making: What Helps and Hampers the Experience of Making Medical Decisions for Others. Soc Gen Int Med 2007;22:0102-5372

## 2021-11-12 NOTE — CONSULT NOTE ADULT - CONSULT REASON
Abdominal distention
Leukocytosis
decreased urine ouput
Hx of MDS
Subdural hematoma
To assist in the ethical dilemma posed by an 89-year-old female [facility], now with acute on chronic subdural hematoma and subfalcine herniation regarding appropriate decision maker.
NSTEMI
goals of care

## 2021-11-13 NOTE — PROGRESS NOTE ADULT - PROBLEM SELECTOR PLAN 2
-Xray 11/8: Multiple loops of small and large bowel appear to be distended and changes compatible with ileus  -CT 11/10: Multiple air and fluid distended loops of large bowel suggestive of ileus. No evidence of small bowel obstruction. Rectum is distended with fluid.  -aspiration precautions  -per GI, ok to continue NG trickle feeds, 10cc/hr per sons wishes, will not increase dose for now  -alb 1.9, severe protein calorie malnutrition   -called son multiple times again today to further discuss GOC etc,, no answer, left VM for callback,   -discussed with son SRIKANTH Nunez on 11/10, explained to him high risk of aspiration especially in setting of ileus, aspiration pna, resp failure, choking and death, he requested to have trickle feeds,   -also explained to son that patient has no mental status and cannot eat on her own thus NG feeds are temporary measure and there is no reasonable long term solution to provide her nutrition.  -c/w d5 1/2 NS 35cc/hr, CT with moderate b/l pleural effusions  -stop fluids if any sign of resp distress  -discussed with ethics and palliative who are also attempting to reach son but cannot

## 2021-11-13 NOTE — PROGRESS NOTE ADULT - SUBJECTIVE AND OBJECTIVE BOX
GAP TEAM PALLIATIVE CARE UNIT PROGRESS NOTE:      [  ] Patient on hospice program.    INDICATION FOR PALLIATIVE CARE UNIT SERVICES: symptom management    11/13  - Patient unresponsive, NAD    DNR on chart: yes  Allergies    No Known Allergies    Intolerances    MEDICATIONS  (STANDING):  dextrose 5% + sodium chloride 0.45% 1000 milliLiter(s) (35 mL/Hr) IV Continuous <Continuous>  folic acid Injectable 1 milliGRAM(s) IV Push daily  glycopyrrolate Injectable 0.4 milliGRAM(s) IV Push every 6 hours  HYDROmorphone  Injectable 0.2 milliGRAM(s) IV Push every 4 hours  lacosamide IVPB 200 milliGRAM(s) IV Intermittent every 12 hours  thiamine Injectable 100 milliGRAM(s) IV Push daily    MEDICATIONS  (PRN):  bisacodyl Suppository 10 milliGRAM(s) Rectal daily PRN Constipation  HYDROmorphone  Injectable 0.5 milliGRAM(s) IV Push every 4 hours PRN Severe Pain (7 - 10)    ITEMS UNCHECKED ARE NOT PRESENT    PRESENT SYMPTOMS: [x ]Unable to obtain due to poor mentation   Source if other than patient:  [ ]Family   [ ]Team     Pain: [ ] yes [ ] no  QOL impact -   Location -                    Aggravating factors -  Quality -  Radiation -  Timing-  Severity (0-10 scale):  Minimal acceptable level (0-10 scale):     Dyspnea:                           [ ]Mild [ ]Moderate [ ]Severe  Anxiety:                             [ ]Mild [ ]Moderate [ ]Severe  Fatigue:                             [ ]Mild [ ]Moderate [ ]Severe  Nausea:                             [ ]Mild [ ]Moderate [ ]Severe  Loss of appetite:              [ ]Mild [ ]Moderate [ ]Severe  Constipation:                    [ ]Mild [ ]Moderate [ ]Severe    PAINAD Score: 0    http://geriatrictoolkit.[place].[facility]/cog/painad.pdf (Ctrl +  left click to view)  		  Other Symptoms:  [ ]All other review of systems negative     Palliative Performance Status Version 2:   10      %         http://npcrc.org/files/news/palliative_performance_scale_ppsv2.pdf  PHYSICAL EXAM:  Vital Signs Last 24 Hrs  T(C): 36.7 (13 Nov 2021 06:43), Max: 36.7 (13 Nov 2021 00:14)  T(F): 98.1 (13 Nov 2021 06:43), Max: 98.1 (13 Nov 2021 00:14)  HR: 105 (13 Nov 2021 06:43) (105 - 105)  BP: 108/62 (13 Nov 2021 06:43) (87/56 - 108/62)  BP(mean): --  RR: 16 (13 Nov 2021 06:43) (16 - 16)  SpO2: 97% (13 Nov 2021 06:43) (93% - 97%)    GENERAL:  [ ]Alert  [ ]Oriented x   [ ]Lethargic  [ ]Cachexia  [x ]Unarousable  [ ]Verbal  [ ]Non-Verbal  Behavioral:   [ ] Anxiety  [ ] Delirium [ ] Agitation [ ] Other  HEENT:  [ ]Normal   [ ]Dry mouth   [ ]ET Tube/Trach  [ ]Oral lesions  PULMONARY:   [ ]Clear [ ]Tachypnea  [ ]Audible excessive secretions   [ ]Rhonchi        [ ]Right [ ]Left [ ]Bilateral  [ ]Crackles        [ ]Right [ ]Left [ ]Bilateral  [ ]Wheezing     [ ]Right [ ]Left [ ]Bilateral  [x ]Diminished BS [ ]Right [ ]Left [x ]Bilateral    CARDIOVASCULAR:    [x ]Regular [ ]Irregular [ ]Tachy  [ ]Jt [ ]Murmur [ ]Other  GASTROINTESTINAL:  [x ]Soft  [ ]Distended   [ ]+BS  [ ]Non tender [ ]Tender  [ ]PEG [ ]OGT/ NGT   Last BM:     GENITOURINARY:  [ ]Normal [ ] Incontinent   [ ]Oliguria/Anuria   [ ]Julio  MUSCULOSKELETAL:   [ ]Normal   [x ]Weakness  [ ]Bed/Wheelchair bound [ ]Edema  NEUROLOGIC:   [ ]No focal deficits  [ ] Cognitive impairment  [ ] Dysphagia [ ]Dysarthria [ ] Paresis [ ]Other   SKIN:   [ ]Normal  [ ]Rash     [ ]Pressure ulcer(s)  [ ]y [ ]n  Present on admission      CRITICAL CARE:  [ ] Shock Present  [ ]Septic [ ]Cardiogenic [ ]Neurologic [ ]Hypovolemic  [ ]  Vasopressors [ ]  Inotropes   [ ] Respiratory failure present [ ] Mechanical Ventilation [ ] Non-invasive ventilatory support [ ] High-Flow  [ ] Acute  [ ] Chronic [ ] Hypoxic  [ ] Hypercarbic [ ] Other  [ ] Other organ failure     LABS:                        9.3    40.93 )-----------( 570      ( 12 Nov 2021 12:41 )             30.5   11-12    135  |  101  |  11  ----------------------------<  134<H>  3.7   |  25  |  0.36<L>    Ca    7.8<L>      12 Nov 2021 12:41          RADIOLOGY & ADDITIONAL STUDIES: none new    PROTEIN CALORIE MALNUTRITION: [ ] mild [ ] moderate [ ] severe  [ ] underweight [ ] morbid obesity    https://www.andeal.org/vault/2440/web/files/ONC/Table_Clinical%20Characteristics%20to%20Document%20Malnutrition-White%20JV%20et%20al%202012.pdf    Height (cm): 147.3 (10-28-21 @ 08:47), 147.3 (10-28-21 @ 07:00), 147.3 (09-27-21 @ 22:20)  Weight (kg): 49.9 (10-28-21 @ 08:47), 49.9 (10-28-21 @ 07:00), 41.8 (09-27-21 @ 22:20)  BMI (kg/m2): 23 (10-28-21 @ 08:47), 23 (10-28-21 @ 08:47), 23 (10-28-21 @ 07:00)    [ ] PPSV2 < or = 30% [ ] significant weight loss [ ] poor nutritional intake [ ] anasarca   Artificial Nutrition [ ]     REFERRALS:   [x ]Chaplaincy  [ ] Hospice  [ ]Child Life  [x ]Social Work  [ ]Case management [ ]Holistic Therapy [ ] Physical Therapy [ ] Dietary   Goals of Care Document: Goals of Care Conversation - Advanced Care Planning [JAMIE Alberts] (10-14-20 @ 15:57)

## 2021-11-13 NOTE — PROGRESS NOTE ADULT - PROBLEM SELECTOR PLAN 1
11/13: Dilaudid 0.2mg IV q4hr ATC + 0.5mg IV q4h PRN + Glycopyrrolate 0.4mg IV q6h ATC  tube feeds may be contributing to the same, trickle feeds now per son's decision  Bowel regimen while on opioids

## 2021-11-13 NOTE — PROGRESS NOTE ADULT - SUBJECTIVE AND OBJECTIVE BOX
Patient is a 89y old  Female who presents with a chief complaint of SDH (12 Nov 2021 22:00)      SUBJECTIVE / OVERNIGHT EVENTS: Patient seen and examined at bedside. she remains unresponsive, on NGT feeds.     ROS:  All other review of systems negative    Allergies    No Known Allergies    Intolerances        MEDICATIONS  (STANDING):  dextrose 5% + sodium chloride 0.45% 1000 milliLiter(s) (35 mL/Hr) IV Continuous <Continuous>  folic acid Injectable 1 milliGRAM(s) IV Push daily  lacosamide IVPB 200 milliGRAM(s) IV Intermittent every 12 hours  thiamine Injectable 100 milliGRAM(s) IV Push daily    MEDICATIONS  (PRN):  bisacodyl Suppository 10 milliGRAM(s) Rectal daily PRN Constipation  HYDROmorphone  Injectable 0.2 milliGRAM(s) IV Push every 4 hours PRN dyspnea  HYDROmorphone  Injectable 0.2 milliGRAM(s) IV Push every 4 hours PRN Severe Pain (7 - 10)      Vital Signs Last 24 Hrs  T(C): 36.7 (13 Nov 2021 06:43), Max: 36.7 (13 Nov 2021 00:14)  T(F): 98.1 (13 Nov 2021 06:43), Max: 98.1 (13 Nov 2021 00:14)  HR: 105 (13 Nov 2021 06:43) (105 - 105)  BP: 108/62 (13 Nov 2021 06:43) (87/56 - 108/62)  BP(mean): --  RR: 16 (13 Nov 2021 06:43) (16 - 16)  SpO2: 97% (13 Nov 2021 06:43) (93% - 97%)  CAPILLARY BLOOD GLUCOSE        I&O's Summary      PHYSICAL EXAM:  GENERAL: elderly obtunded, eyes closed  HEAD:  Atraumatic, Normocephalic, NG in place, NC in place,   CHEST/LUNG: decrease bases, poor effort  HEART: Regular rate and rhythm;   ABDOMEN: Soft, Nontender, +distended; Bowel sounds present  EXTREMITIES:  2+ Peripheral Pulses, gross anasarca   PSYCH: AAOx0, not following commands  Skin: multiple chronic pressure ulcers including her chin    LABS:                        9.3    40.93 )-----------( 570      ( 12 Nov 2021 12:41 )             30.5     11-12    135  |  101  |  11  ----------------------------<  134<H>  3.7   |  25  |  0.36<L>    Ca    7.8<L>      12 Nov 2021 12:41                RADIOLOGY & ADDITIONAL TESTS:      Care Discussed with Consultants/Other Providers: Medicine ACP

## 2021-11-13 NOTE — PROGRESS NOTE ADULT - ASSESSMENT
89 y/o F with hx of dementia and MDS w/ recent hospitalization (9/28/21) for fall, found to have tSDH w/ MLS and femoral fx s/p CRPP. 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 non-surgical candidate d/t poor functional status. Palliative consulted for goals of care.
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
## 2021-11-14 NOTE — PROGRESS NOTE ADULT - PROBLEM SELECTOR PLAN 7
.  11/14: Discussed with Medicine attending: I do not recommend reinserting an NGT currently as 1) there is no urgency to do so (no need for GI decompression, no urgent meds need to be administered through it, 2) the risk of adverse events heavily outweigh any potential, minimal benefits at this point. Since the son cannot be reached, I would not reinsert the NGT now

## 2021-11-14 NOTE — PROGRESS NOTE ADULT - SUBJECTIVE AND OBJECTIVE BOX
GAP TEAM PALLIATIVE CARE UNIT PROGRESS NOTE:      [  ] Patient on hospice program.    INDICATION FOR PALLIATIVE CARE UNIT SERVICES: symptom management    11/14  - Patient unresponsive, NAD  - Tried to call son Enrique - left a VM.   - Discussed with Medicine attending: I do not recommend reinserting an NGT currently as 1) there is no urgency to do so (no need for GI decompression, no urgent meds need to be administered through it, 2) the risk of adverse events heavily outweigh any potential, minimal benefits at this point. Since the son cannot be reached, I would not reinsert the NGT now    DNR on chart: yes  Allergies    No Known Allergies    Intolerances    MEDICATIONS  (STANDING):  dextrose 5% + sodium chloride 0.45% 1000 milliLiter(s) (35 mL/Hr) IV Continuous <Continuous>  folic acid Injectable 1 milliGRAM(s) IV Push daily  glycopyrrolate Injectable 0.4 milliGRAM(s) IV Push every 6 hours  HYDROmorphone  Injectable 0.2 milliGRAM(s) IV Push every 4 hours  lacosamide IVPB 200 milliGRAM(s) IV Intermittent every 12 hours  thiamine Injectable 100 milliGRAM(s) IV Push daily    MEDICATIONS  (PRN):  bisacodyl Suppository 10 milliGRAM(s) Rectal daily PRN Constipation  HYDROmorphone  Injectable 0.5 milliGRAM(s) IV Push every 4 hours PRN Severe Pain (7 - 10)  LORazepam   Injectable 0.2 milliGRAM(s) IV Push every 1 hour PRN Anxiety      ITEMS UNCHECKED ARE NOT PRESENT    PRESENT SYMPTOMS: [x ]Unable to obtain due to poor mentation   Source if other than patient:  [ ]Family   [ ]Team     Pain: [ ] yes [ ] no  QOL impact -   Location -                    Aggravating factors -  Quality -  Radiation -  Timing-  Severity (0-10 scale):  Minimal acceptable level (0-10 scale):     Dyspnea:                           [ ]Mild [ ]Moderate [ ]Severe  Anxiety:                             [ ]Mild [ ]Moderate [ ]Severe  Fatigue:                             [ ]Mild [ ]Moderate [ ]Severe  Nausea:                             [ ]Mild [ ]Moderate [ ]Severe  Loss of appetite:              [ ]Mild [ ]Moderate [ ]Severe  Constipation:                    [ ]Mild [ ]Moderate [ ]Severe    PAINAD Score: 0    [email or web address].[email or web address]/cog/painad.pdf (Ctrl +  left click to view)  		  Other Symptoms:  [ ]All other review of systems negative     Palliative Performance Status Version 2:   10      %         http://[facility].org/files/news/palliative_performance_scale_[email or web address].pdf  PHYSICAL EXAM:  Vital Signs Last 24 Hrs  T(C): 37.1 (14 Nov 2021 09:13), Max: 37.1 (14 Nov 2021 09:13)  T(F): 98.7 (14 Nov 2021 09:13), Max: 98.7 (14 Nov 2021 09:13)  HR: 87 (14 Nov 2021 09:13) (87 - 97)  BP: 134/77 (14 Nov 2021 09:13) (100/63 - 134/77)  BP(mean): --  RR: 18 (14 Nov 2021 09:13) (16 - 18)  SpO2: 97% (14 Nov 2021 09:13) (96% - 99%)    GENERAL:  [ ]Alert  [ ]Oriented x   [ ]Lethargic  [ ]Cachexia  [x ]Unarousable  [ ]Verbal  [ ]Non-Verbal  Behavioral:   [ ] Anxiety  [ ] Delirium [ ] Agitation [ ] Other  HEENT:  [ ]Normal   [ ]Dry mouth   [ ]ET Tube/Trach  [ ]Oral lesions  PULMONARY:   [ ]Clear [ ]Tachypnea  [ ]Audible excessive secretions   [ ]Rhonchi        [ ]Right [ ]Left [ ]Bilateral  [ ]Crackles        [ ]Right [ ]Left [ ]Bilateral  [ ]Wheezing     [ ]Right [ ]Left [ ]Bilateral  [x ]Diminished BS [ ]Right [ ]Left [x ]Bilateral    CARDIOVASCULAR:    [x ]Regular [ ]Irregular [ ]Tachy  [ ]Tj [ ]Murmur [ ]Other  GASTROINTESTINAL:  [x ]Soft  [ ]Distended   [ ]+BS  [ ]Non tender [ ]Tender  [ ]PEG [ ]OGT/ NGT   Last BM:     GENITOURINARY:  [ ]Normal [ ] Incontinent   [ ]Oliguria/Anuria   [ ]Julio  MUSCULOSKELETAL:   [ ]Normal   [x ]Weakness  [ ]Bed/Wheelchair bound [ ]Edema  NEUROLOGIC:   [ ]No focal deficits  [ ] Cognitive impairment  [ ] Dysphagia [ ]Dysarthria [ ] Paresis [ ]Other   SKIN:   [ ]Normal  [ ]Rash     [ ]Pressure ulcer(s)  [ ]y [ ]n  Present on admission      CRITICAL CARE:  [ ] Shock Present  [ ]Septic [ ]Cardiogenic [ ]Neurologic [ ]Hypovolemic  [ ]  Vasopressors [ ]  Inotropes   [ ] Respiratory failure present [ ] Mechanical Ventilation [ ] Non-invasive ventilatory support [ ] High-Flow  [ ] Acute  [ ] Chronic [ ] Hypoxic  [ ] Hypercarbic [ ] Other  [ ] Other organ failure     LABS:                    11-14    139  |  103  |  10  ----------------------------<  79  4.4   |  28  |  0.34<L>    Ca    7.6<L>      14 Nov 2021 06:51

## 2021-11-14 NOTE — PROGRESS NOTE ADULT - PROBLEM SELECTOR PLAN 2
-Xray 11/8: Multiple loops of small and large bowel appear to be distended and changes compatible with ileus  -CT 11/10: Multiple air and fluid distended loops of large bowel suggestive of ileus. No evidence of small bowel obstruction. Rectum is distended with fluid.  -NG feeds stopped over weekend given increasing abdominal distention and frequent need for suction with high concern for recurrent aspiration risk   -aspiration precautions  -per GI, ok to continue NG trickle feeds, 10cc/hr per sons wishes, will not increase dose for now  -alb 1.9, severe protein calorie malnutrition   -called son multiple times again today to further discuss GOC etc,, no answer, left VM for callback,   -discussed with son SRIKANTH Nunez on 11/10, explained to him high risk of aspiration especially in setting of ileus, aspiration pna, resp failure, choking and death, he requested to have trickle feeds,   -also explained to son that patient has no mental status and cannot eat on her own thus NG feeds are temporary measure and there is no reasonable long term solution to provide her nutrition.  -c/w d5 1/2 NS 35cc/hr, CT with moderate b/l pleural effusions  -stop fluids if any sign of resp distress  -discussed with ethics and palliative who are also attempting to reach son but cannot -Xray 11/8: Multiple loops of small and large bowel appear to be distended and changes compatible with ileus  -CT 11/10: Multiple air and fluid distended loops of large bowel suggestive of ileus. No evidence of small bowel obstruction. Rectum is distended with fluid.  *****NG fell out yesterday [id number], I, pervious hospitalist have been unable to reach son, currently has increased airway secretions w/ frequent need for suctioning will hold off on replacing NGT today 11/14 at risks out weight the benefits, primary Team to f/u tomorrow with family.    -aspiration precautions  -alb 1.9, severe protein calorie malnutrition   -called son multiple times again today to further discuss GOC etc,, no answer, left VM for callback,   -pervious hospitalist son SRIKANTH Nunez on 11/10, explained to him high risk of aspiration especially in setting of ileus, aspiration pna, resp failure, choking and death, he requested to have trickle feeds, he was also explained to son that patient has no mental status and cannot eat on her own thus NG feeds are temporary measure and there is no reasonable long term solution to provide her nutrition.  -c/w d5 1/2 NS 35cc/hr, CT with moderate b/l pleural effusions  -stop fluids if any sign of resp distress  -discussed with ethics and palliative who are also attempting to reach son but cannot

## 2021-11-14 NOTE — PROGRESS NOTE ADULT - PROBLEM SELECTOR PLAN 3
-s/p decompression w/ rectal tube  - would not resume NG feeds  at this time, high risk for aspiration given abdominal distention and need for suctioning  -dc abx being monitored off  -c/w maintenance fluids, -s/p decompression w/ rectal tube  - would not replace NG feeds  at this time, high risk for aspiration given significant need for suctioning, team to f/u tomorrow 11/15  -dc abx being monitored off  -c/w maintenance fluids,
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
## 2021-11-14 NOTE — PROGRESS NOTE ADULT - NSPROGADDITIONALINFOA_GEN_ALL_CORE
d/w Medicine ACP Aracelis
disha w/ son Enrique and his wife over the phone that patient is not expected to recover consciousness and as a result will decline further (recurrent aspiration, infection, respiratory failure etc) and that PEG only addresses nutrition and would be unlikely to significantly alter her prognosis as other body systems will invariably breakdown. Advised that  is not offering PEG at this time. I advised my recommendation that we do not pursue PEG even should she remain stable and instead transition to hospice based approach. For now Enrique does not wish to stop tube feeds. Goals are to continue supportive measures and reassess next week.
Krystal Davis, PGY-5  Hospice and Palliative Care Medicine Fellow
d/w Medicine ACP Susanna

## 2021-11-14 NOTE — PROGRESS NOTE ADULT - SUBJECTIVE AND OBJECTIVE BOX
Patient is a 89y old  Female who presents with a chief complaint of SDH (13 Nov 2021 13:28)      SUBJECTIVE / OVERNIGHT EVENTS: Patient seen and examined at bedside. She remains unresponsive, yesterday her NGT fell out, noted to have increased upper airway secretion requiring suctioning      ROS:  unable to assess as pt is non verbal     Allergies    No Known Allergies    Intolerances        MEDICATIONS  (STANDING):  dextrose 5% + sodium chloride 0.45% 1000 milliLiter(s) (35 mL/Hr) IV Continuous <Continuous>  folic acid Injectable 1 milliGRAM(s) IV Push daily  glycopyrrolate Injectable 0.4 milliGRAM(s) IV Push every 6 hours  HYDROmorphone  Injectable 0.2 milliGRAM(s) IV Push every 4 hours  lacosamide IVPB 200 milliGRAM(s) IV Intermittent every 12 hours  thiamine Injectable 100 milliGRAM(s) IV Push daily    MEDICATIONS  (PRN):  bisacodyl Suppository 10 milliGRAM(s) Rectal daily PRN Constipation  HYDROmorphone  Injectable 0.5 milliGRAM(s) IV Push every 4 hours PRN Severe Pain (7 - 10)  LORazepam   Injectable 0.2 milliGRAM(s) IV Push every 1 hour PRN Anxiety      Vital Signs Last 24 Hrs  T(C): 37.1 (14 Nov 2021 09:13), Max: 37.1 (14 Nov 2021 09:13)  T(F): 98.7 (14 Nov 2021 09:13), Max: 98.7 (14 Nov 2021 09:13)  HR: 87 (14 Nov 2021 09:13) (87 - 97)  BP: 134/77 (14 Nov 2021 09:13) (100/63 - 134/77)  BP(mean): --  RR: 18 (14 Nov 2021 09:13) (16 - 18)  SpO2: 97% (14 Nov 2021 09:13) (96% - 99%)  CAPILLARY BLOOD GLUCOSE        I&O's Summary    13 Nov 2021 07:01  -  14 Nov 2021 07:00  --------------------------------------------------------  IN: 490 mL / OUT: 975 mL / NET: -485 mL        PHYSICAL EXAM:  GENERAL: elderly obtunded, eyes closed  HEAD:  Atraumatic, Normocephalic,  CHEST/LUNG: decrease bases, poor effort  HEART: Regular rate and rhythm;   ABDOMEN: Soft, Nontender, +distended; Bowel sounds present  EXTREMITIES:  2+ Peripheral Pulses, gross anasarca   PSYCH: AAOx0, not following commands  Skin: multiple chronic pressure ulcers including her chin    LABS:                        8.2    38.96 )-----------( 379      ( 14 Nov 2021 08:34 )             26.8     11-14    139  |  103  |  10  ----------------------------<  79  4.4   |  28  |  0.34<L>    Ca    7.6<L>      14 Nov 2021 06:51                RADIOLOGY & ADDITIONAL TESTS:    Care Discussed with Consultants/Other Providers: Medicine ACP and palliative care     I have been unable to reach the son over the phone today

## 2021-11-14 NOTE — PROGRESS NOTE ADULT - ASSESSMENT
89 y/o F with hx of dementia and MDS w/ recent hospitalization (9/28/21) for fall, found to have tSDH w/ MLS and femoral fx s/p CRPP. 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 non-surgical candidate d/t poor functional status. Palliative consulted for goals of care.

## 2021-11-15 NOTE — PROGRESS NOTE ADULT - SUBJECTIVE AND OBJECTIVE BOX
GAP TEAM PALLIATIVE CARE UNIT PROGRESS NOTE:      [  ] Patient on hospice program.    INDICATION FOR PALLIATIVE CARE UNIT SERVICES:    INTERVAL HPI/OVERNIGHT EVENTS: Chart reviewed. The patient is seen and examined at the bedside.    DNR on chart:   Allergies    No Known Allergies    Intolerances    MEDICATIONS  (STANDING):  dextrose 5% + sodium chloride 0.45% 1000 milliLiter(s) (35 mL/Hr) IV Continuous <Continuous>  glycopyrrolate Injectable 0.4 milliGRAM(s) IV Push every 6 hours  HYDROmorphone  Injectable 0.2 milliGRAM(s) IV Push every 4 hours  lacosamide IVPB 200 milliGRAM(s) IV Intermittent every 12 hours    MEDICATIONS  (PRN):  bisacodyl Suppository 10 milliGRAM(s) Rectal daily PRN Constipation  HYDROmorphone  Injectable 0.5 milliGRAM(s) IV Push every 4 hours PRN Severe Pain (7 - 10)  LORazepam   Injectable 0.2 milliGRAM(s) IV Push every 1 hour PRN Anxiety    ITEMS UNCHECKED ARE NOT PRESENT    PRESENT SYMPTOMS: [ X]Unable to obtain due to poor mentation   Source if other than patient:  [ ]Family   [ X]Team     Pain: [ ] yes [X ] no see pain ad score   QOL impact -   Location -                    Aggravating factors -  Quality -  Radiation -  Timing-  Severity (0-10 scale):  Minimal acceptable level (0-10 scale):     Dyspnea:                           [ ]Mild [ ]Moderate [ ]Severe  Anxiety:                             [ ]Mild [ ]Moderate [ ]Severe  Fatigue:                             [ ]Mild [ ]Moderate [ ]Severe  Nausea:                             [ ]Mild [ ]Moderate [ ]Severe  Loss of appetite:              [ ]Mild [ ]Moderate [ ]Severe  Constipation:                    [ ]Mild [ ]Moderate [ ]Severe    PAINAD Score: 0    [email or web address].[email or web address].[email or web address].pdf (Ctrl +  left click to view)  		  Other Symptoms:  [ X]All other review of systems negative     Palliative Performance Status Version 2:  10 %         http://npcrc.org/files/news/palliative_performance_scale_ppsv2.pdf  PHYSICAL EXAM:  Vital Signs Last 24 Hrs  T(C): 36.8 (15 Nov 2021 07:06), Max: 36.8 (15 Nov 2021 07:06)  T(F): 98.2 (15 Nov 2021 07:06), Max: 98.2 (15 Nov 2021 07:06)  HR: 98 (15 Nov 2021 07:06) (98 - 101)  BP: 128/77 (15 Nov 2021 07:06) (128/77 - 138/78)  BP(mean): --  RR: 18 (15 Nov 2021 07:06) (18 - 18)  SpO2: 95% (15 Nov 2021 07:06) (95% - 95%) I&O's Summary    14 Nov 2021 07:01  -  15 Nov 2021 07:00  --------------------------------------------------------  IN: 0 mL / OUT: 800 mL / NET: -800 mL    GENERAL:  [ ]Alert  [ ]Oriented x   [ ]Lethargic  [ ]Cachexia  [ X]Unarousable  [ ]Verbal  [X ]Non-Verbal  Behavioral:   [ ] Anxiety  [ ] Delirium [ ] Agitation [ ] Other  HEENT:  [ ]Normal   [ ]Dry mouth   [ ]ET Tube/Trach  [ ]Oral lesions  PULMONARY:   [ ]Clear [ ]Tachypnea  [ ]Audible excessive secretions   [ ]Rhonchi        [ ]Right [ ]Left [ ]Bilateral  [ ]Crackles        [ ]Right [ ]Left [ ]Bilateral  [ ]Wheezing     [ ]Right [ ]Left [ ]Bilateral  [ ]Diminshed BS [ ]Right [ ]Left [ ]Bilateral    CARDIOVASCULAR:    [ X]Regular [ ]Irregular [ ]Tachy  [ ]Tj [ ]Murmur [ ]Other  GASTROINTESTINAL:  [ X]Soft  [ ]Distended   [X ]+BS  [ ]Non tender [ ]Tender  [ ]PEG [ ]OGT/ NGT   Last BM:  11/05/21  GENITOURINARY:  [ ]Normal [X ] Incontinent   [ ]Oliguria/Anuria   [ X]Julio  MUSCULOSKELETAL:   [ ]Normal   [X ]Weakness  [ X]Bed/Wheelchair bound [ ]Edema  NEUROLOGIC:   [ ]No focal deficits  [ X] Cognitive impairment  [ ] Dysphagia [ ]Dysarthria [ ] Paresis [ ]Other   SKIN:   [ ]Normal  [ ]Rash     [X ]Pressure ulcer(s)  [ ]y [ ]n  Present on admission  unstageable on sacrum and chin. Please see nursing assessment for further details.    CRITICAL CARE:  [ ] Shock Present  [ ]Septic [ ]Cardiogenic [ ]Neurologic [ ]Hypovolemic  [ ]  Vasopressors [ ]  Inotropes   [ ] Respiratory failure present [ ] Mechanical Ventilation [ ] Non-invasive ventilatory support [ ] High-Flow  [ ] Acute  [ ] Chronic [ ] Hypoxic  [ ] Hypercarbic [ ] Other  [X ] Other organ failure- Skin, Brain        LABS:                        8.2    38.96 )-----------( 379      ( 14 Nov 2021 08:34 )             26.8   11-14    139  |  103  |  10  ----------------------------<  79  4.4   |  28  |  0.34<L>    Ca    7.6<L>      14 Nov 2021 06:51    RADIOLOGY & ADDITIONAL STUDIES: None new    PROTEIN CALORIE MALNUTRITION: [ ] mild [ ] moderate [X ] severe- please see nutritionist note  [ ] underweight [ ] morbid obesity    https://www.andeal.org/vault/2440/web/files/ONC/Table_Clinical%20Characteristics%20to%20Document%20Malnutrition-White%20JV%20et%20al%202012.pdf    Height (cm): 147.3 (10-28-21 @ 08:47), 147.3 (10-28-21 @ 07:00), 147.3 (09-27-21 @ 22:20)  Weight (kg): 49.9 (10-28-21 @ 08:47), 49.9 (10-28-21 @ 07:00), 41.8 (09-27-21 @ 22:20)  BMI (kg/m2): 23 (10-28-21 @ 08:47), 23 (10-28-21 @ 08:47), 23 (10-28-21 @ 07:00)    [ X] PPSV2 < or = 30% [ ] significant weight loss [ ] poor nutritional intake [ ] anasarca   Artificial Nutrition [ ]     REFERRALS:   [ ]Chaplaincy  [ ] Hospice  [ ]Child Life  [X ]Social Work  [ ]Case management [ ]Holistic Therapy [ ] Physical Therapy [ ] Dietary   Goals of Care Document:    GAP TEAM PALLIATIVE CARE UNIT PROGRESS NOTE:      [  ] Patient on hospice program.    INDICATION FOR PALLIATIVE CARE UNIT SERVICES: End of life care in the setting of SDH with other co-morbidities    INTERVAL HPI/OVERNIGHT EVENTS: Chart reviewed. The patient is seen and examined at the bedside.  Unresponsive    DNR on chart:   Allergies    No Known Allergies    Intolerances    MEDICATIONS  (STANDING):  dextrose 5% + sodium chloride 0.45% 1000 milliLiter(s) (35 mL/Hr) IV Continuous <Continuous>  glycopyrrolate Injectable 0.4 milliGRAM(s) IV Push every 6 hours  HYDROmorphone  Injectable 0.2 milliGRAM(s) IV Push every 4 hours  lacosamide IVPB 200 milliGRAM(s) IV Intermittent every 12 hours    MEDICATIONS  (PRN):  bisacodyl Suppository 10 milliGRAM(s) Rectal daily PRN Constipation  HYDROmorphone  Injectable 0.5 milliGRAM(s) IV Push every 4 hours PRN Severe Pain (7 - 10)  LORazepam   Injectable 0.2 milliGRAM(s) IV Push every 1 hour PRN Anxiety    ITEMS UNCHECKED ARE NOT PRESENT    PRESENT SYMPTOMS: [ X]Unable to obtain due to poor mentation   Source if other than patient:  [ ]Family   [ X]Team     Pain: [ ] yes [X ] no see pain ad score   QOL impact -   Location -                    Aggravating factors -  Quality -  Radiation -  Timing-  Severity (0-10 scale):  Minimal acceptable level (0-10 scale):     Dyspnea:                           [ ]Mild [ ]Moderate [ ]Severe  Anxiety:                             [ ]Mild [ ]Moderate [ ]Severe  Fatigue:                             [ ]Mild [ ]Moderate [ ]Severe  Nausea:                             [ ]Mild [ ]Moderate [ ]Severe  Loss of appetite:              [ ]Mild [ ]Moderate [ ]Severe  Constipation:                    [ ]Mild [ ]Moderate [ ]Severe    PAINAD Score: 0    http://geriatrictoolkit.[email or web address].[email or web address]/cog/painad.pdf (Ctrl +  left click to view)  		  Other Symptoms:  [ X]All other review of systems negative     Palliative Performance Status Version 2:  10 %         http://npcrc.org/files/news/palliative_performance_scale_ppsv2.pdf  PHYSICAL EXAM:  Vital Signs Last 24 Hrs  T(C): 36.8 (15 Nov 2021 07:06), Max: 36.8 (15 Nov 2021 07:06)  T(F): 98.2 (15 Nov 2021 07:06), Max: 98.2 (15 Nov 2021 07:06)  HR: 98 (15 Nov 2021 07:06) (98 - 101)  BP: 128/77 (15 Nov 2021 07:06) (128/77 - 138/78)  BP(mean): --  RR: 18 (15 Nov 2021 07:06) (18 - 18)  SpO2: 95% (15 Nov 2021 07:06) (95% - 95%) I&O's Summary    14 Nov 2021 07:01  -  15 Nov 2021 07:00  --------------------------------------------------------  IN: 0 mL / OUT: 800 mL / NET: -800 mL    GENERAL:  [ ]Alert  [ ]Oriented x   [ ]Lethargic  [ ]Cachexia  [ X]Unarousable  [ ]Verbal  [X ]Non-Verbal  Behavioral:   [ ] Anxiety  [ ] Delirium [ ] Agitation [ ] Other  HEENT:  [ ]Normal   [ ]Dry mouth   [ ]ET Tube/Trach  [ ]Oral lesions  PULMONARY:   [ ]Clear [ ]Tachypnea  [x]Audible excessive secretions   [ ]Rhonchi        [ ]Right [ ]Left [ ]Bilateral  [ ]Crackles        [ ]Right [ ]Left [ ]Bilateral  [ ]Wheezing     [ ]Right [ ]Left [ ]Bilateral  [ ]Diminished BS [ ]Right [ ]Left [ ]Bilateral    CARDIOVASCULAR:    [ X]Regular [ ]Irregular [ ]Tachy  [ ]Tj [ ]Murmur [ ]Other  GASTROINTESTINAL:  [ X]Soft  [ ]Distended   [X ]+BS  [x ]Non tender [ ]Tender  [ ]PEG [ ]OGT/ NGT   Last BM:  11/05/21  GENITOURINARY:  [ ]Normal [X ] Incontinent   [ ]Oliguria/Anuria   [ X]Julio  MUSCULOSKELETAL:   [ ]Normal   [X ]Weakness  [ X]Bed/Wheelchair bound [ ]Edema  NEUROLOGIC:   [ ]No focal deficits  [ X] Cognitive impairment  [ ] Dysphagia [ ]Dysarthria [ ] Paresis [ ]Other   SKIN:   [ ]Normal  [ ]Rash     [X ]Pressure ulcer(s)  [ ]y [ ]n  Present on admission  unstageable on sacrum and chin. Please see nursing assessment for further details.    CRITICAL CARE:  [ ] Shock Present  [ ]Septic [ ]Cardiogenic [ ]Neurologic [ ]Hypovolemic  [ ]  Vasopressors [ ]  Inotropes   [ ] Respiratory failure present [ ] Mechanical Ventilation [ ] Non-invasive ventilatory support [ ] High-Flow  [ ] Acute  [ ] Chronic [ ] Hypoxic  [ ] Hypercarbic [ ] Other  [X ] Other organ failure- Skin, Brain        LABS:                        8.2    38.96 )-----------( 379      ( 14 Nov 2021 08:34 )             26.8   11-14    139  |  103  |  10  ----------------------------<  79  4.4   |  28  |  0.34<L>    Ca    7.6<L>      14 Nov 2021 06:51    RADIOLOGY & ADDITIONAL STUDIES: ct< from: Xray Abdomen 1 View PORTABLE -Routine (Xray Abdomen 1 View PORTABLE -Routine .) (11.08.21 @ 18:18) >    EXAM:  XR ABDOMEN PORTABLE ROUTINE 1V                            PROCEDURE DATE:  11/08/2021            INTERPRETATION:  A single x-ray of the abdomen was obtained on November 8, 2021.    INDICATION: Abdominal distention.    IMPRESSION:    Nonspecific gas pattern. Multiple loops of small and large bowel appear to be distended and changes compatible with ileus. NG tube is in the stomach. If clinically warranted CT scan should be obtained. Orthopedic hardware is seen in both hips. Calcified fibroid is seen in the pelvis.    --- End of Report ---                ENRRIQUE KEANE MD; Attending Radiologist  This document has been electronically signed. Nov 9 2021  7:57AM    < end of copied text >  < from: CT Head No Cont (10.31.21 @ 14:44) >    EXAM:  CT BRAIN                            PROCEDURE DATE:  10/31/2021            INTERPRETATION:  INDICATIONS:  SDH s/p SEPs now removed    TECHNIQUE:  Serial axial images were obtained from the skull base to the vertex without intravenous contrast. Sagittal and Coronal reformats were performed    COMPARISON EXAMINATION: 10/30/2021    FINDINGS:  VENTRICLES AND SULCI:  Mass effect on the left lateral ventricle now with greater than 2 cm midline shift to the right with progressive dilatation of the right lateral ventricle likely on the basis of herniation and compromise at the level of the right foramen of Rajni. Subfalcine herniation and uncal herniation apparent.  INTRA-AXIAL:  No mass, blood or abnormal attenuation is seen.  EXTRA-AXIAL: Very large mixed attenuation left subdural collection with hemorrhage and fluid and trace air measuring 3.6 cm in greatest width may be slightly decreased in size compared with the prior previously measuring 3.9 cm at roughly the same level. The priordid have the SEPs device in place. Small right frontal subdural fluid collection  VISUALIZED SINUSES: Right maxillary sinus osseous thickening  VISUALIZED MASTOIDS:  Clear.  CALVARIUM: Small calvarial defect on the left for placement of the SEPS device  MISCELLANEOUS:  None.    IMPRESSION:  Very large mixed attenuation left subdural hematoma with air fluid and acute hemorrhage recognized. Midline shift to the right now greater than 2 cm. Evidence of subfalcine and uncal herniation. Previously seen SEPS device has been removed. Small right frontal subdural fluid collection    --- End of Report ---                BRUNA MALLOY MD; Attending Radiologist  This document has been electronically signed. Oct 31 2021  3:14PM    < end of copied text >    PROTEIN CALORIE MALNUTRITION: [ ] mild [ ] moderate [X ] severe- please see nutritionist note  [ ] underweight [ ] morbid obesity    https://www.andeal.org/vault/2440/web/files/ONC/Table_Clinical%20Characteristics%20to%20Document%20Malnutrition-White%20JV%20et%20al%115325.pdf    Height (cm): 147.3 (10-28-21 @ 08:47), 147.3 (10-28-21 @ 07:00), 147.3 (09-27-21 @ 22:20)  Weight (kg): 49.9 (10-28-21 @ 08:47), 49.9 (10-28-21 @ 07:00), 41.8 (09-27-21 @ 22:20)  BMI (kg/m2): 23 (10-28-21 @ 08:47), 23 (10-28-21 @ 08:47), 23 (10-28-21 @ 07:00)    [ X] PPSV2 < or = 30% [ ] significant weight loss [ ] poor nutritional intake [ ] anasarca   Artificial Nutrition [ ]     REFERRALS:   [ ]Chaplaincy  [ ] Hospice  [ ]Child Life  [X ]Social Work  [ ]Case management [ ]Holistic Therapy [ ] Physical Therapy [ ] Dietary   Goals of Care Document:
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
## 2021-11-15 NOTE — PROGRESS NOTE ADULT - ASSESSMENT
89 y/o F with hx of dementia and MDS w/ recent hospitalization (9/28/21) for fall, found to have tSDH w/ MLS and femoral fx s/p CRPP. 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 non-surgical candidate d/t poor functional status. Palliative consulted for goals of care.

## 2021-11-15 NOTE — PROGRESS NOTE ADULT - SUBJECTIVE AND OBJECTIVE BOX
Patient is a 89y old  Female who presents with a chief complaint of SDH (14 Nov 2021 10:30)      SUBJECTIVE / OVERNIGHT EVENTS: Over weekend NG fell out, increasing secretion requiring suctioning. Per palliative, NG was not reinserted as R>B and son did not answer despite calls/voicemails. Pt nonresponsive this AM. Called son this AM, didnt answer, left  for callback.          ADDITIONAL REVIEW OF SYSTEMS: Negative except for above    MEDICATIONS  (STANDING):  dextrose 5% + sodium chloride 0.45% 1000 milliLiter(s) (35 mL/Hr) IV Continuous <Continuous>  folic acid Injectable 1 milliGRAM(s) IV Push daily  glycopyrrolate Injectable 0.4 milliGRAM(s) IV Push every 6 hours  HYDROmorphone  Injectable 0.2 milliGRAM(s) IV Push every 4 hours  lacosamide IVPB 200 milliGRAM(s) IV Intermittent every 12 hours  thiamine Injectable 100 milliGRAM(s) IV Push daily    MEDICATIONS  (PRN):  bisacodyl Suppository 10 milliGRAM(s) Rectal daily PRN Constipation  HYDROmorphone  Injectable 0.5 milliGRAM(s) IV Push every 4 hours PRN Severe Pain (7 - 10)  LORazepam   Injectable 0.2 milliGRAM(s) IV Push every 1 hour PRN Anxiety      CAPILLARY BLOOD GLUCOSE        I&O's Summary    14 Nov 2021 07:01  -  15 Nov 2021 07:00  --------------------------------------------------------  IN: 0 mL / OUT: 800 mL / NET: -800 mL        PHYSICAL EXAM:  Vital Signs Last 24 Hrs  T(C): 36.8 (15 Nov 2021 07:06), Max: 36.9 (14 Nov 2021 14:04)  T(F): 98.2 (15 Nov 2021 07:06), Max: 98.5 (14 Nov 2021 14:04)  HR: 98 (15 Nov 2021 07:06) (92 - 101)  BP: 128/77 (15 Nov 2021 07:06) (128/77 - 138/78)  BP(mean): --  RR: 18 (15 Nov 2021 07:06) (18 - 18)  SpO2: 95% (15 Nov 2021 07:06) (95% - 98%)      PHYSICAL EXAM:  GENERAL: elderly obtunded, eyes closed  HEAD:  Atraumatic, Normocephalic, NG in place, NC in place,   CHEST/LUNG: decrease bases, poor effort  HEART: Regular rate and rhythm;   ABDOMEN: Soft, Nontender, +distended; Bowel sounds present  EXTREMITIES:  2+ Peripheral Pulses, gross anasarca   PSYCH: AAOx0, not following commands  Skin: multiple chronic pressure ulcers including her chin        LABS:                        8.2    38.96 )-----------( 379      ( 14 Nov 2021 08:34 )             26.8     11-14    139  |  103  |  10  ----------------------------<  79  4.4   |  28  |  0.34<L>    Ca    7.6<L>      14 Nov 2021 06:51                  RADIOLOGY & ADDITIONAL TESTS:    Imaging Personally Reviewed:    Electrocardiogram Personally Reviewed:    COORDINATION OF CARE:  Care Discussed with Consultants/Other Providers [Y/N]:  Prior or Outpatient Records Reviewed [Y/N]:

## 2021-11-15 NOTE — PROGRESS NOTE ADULT - PROBLEM SELECTOR PLAN 3
-s/p decompression w/ rectal tube  - would not replace NG feeds  at this time, high risk for aspiration given significant need for suctioning, called son today to rediscuss today, didn't answer left VM for CB  -completed antibiotics

## 2021-11-16 NOTE — PROGRESS NOTE ADULT - PROBLEM SELECTOR PLAN 2
-Xray 11/8: Multiple loops of small and large bowel appear to be distended and changes compatible with ileus  -CT 11/10: Multiple air and fluid distended loops of large bowel suggestive of ileus. No evidence of small bowel obstruction. Rectum is distended with fluid.  -was placed on NG  trickle feeds last week per son wishes however NG fell out 11/13 with increasing secretions and need for suctioning  -palliative rec holding off on reinserting NG tube as R>B and son does not answer  -aspiration precautions  -alb 1.9, severe protein calorie malnutrition   -called son multiple times again today and last week to further discuss GOC etc,, no answer, left VM for callback,   -c/w d5 1/2 NS 35cc/hr, CT with moderate b/l pleural effusions  -stop fluids if any sign of resp distress  -per GI, she is not candidate for PEG  -discussed with ethics and palliative who are also attempting to reach son but cannot  -defer glycopyrrolate and Iv hydromorphone dosing to palliative given dyspnea and oral secretions
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
## 2021-11-16 NOTE — PROGRESS NOTE ADULT - PROBLEM SELECTOR PLAN 1
Continue with Dilaudid 0.2mg IV q4hr ATC   Continue with Dilaudid 0.5mg IV q4h PRN  Bowel regimen while on opioids

## 2021-11-16 NOTE — PROGRESS NOTE ADULT - SUBJECTIVE AND OBJECTIVE BOX
Patient is a 89y old  Female who presents with a chief complaint of SDH (16 Nov 2021 14:14)      SUBJECTIVE / OVERNIGHT EVENTS: No On events. Remains unresponsive.         ADDITIONAL REVIEW OF SYSTEMS: Negative except for above    MEDICATIONS  (STANDING):  dextrose 5% + sodium chloride 0.45% 1000 milliLiter(s) (20 mL/Hr) IV Continuous <Continuous>  glycopyrrolate Injectable 0.4 milliGRAM(s) IV Push every 6 hours  HYDROmorphone  Injectable 0.2 milliGRAM(s) IV Push every 4 hours  lacosamide IVPB 200 milliGRAM(s) IV Intermittent every 12 hours    MEDICATIONS  (PRN):  bisacodyl Suppository 10 milliGRAM(s) Rectal daily PRN Constipation  HYDROmorphone  Injectable 0.5 milliGRAM(s) IV Push every 4 hours PRN Severe Pain (7 - 10)  LORazepam   Injectable 0.2 milliGRAM(s) IV Push every 1 hour PRN Anxiety      CAPILLARY BLOOD GLUCOSE        I&O's Summary    15 Nov 2021 07:01  -  16 Nov 2021 07:00  --------------------------------------------------------  IN: 0 mL / OUT: 1400 mL / NET: -1400 mL        PHYSICAL EXAM:  Vital Signs Last 24 Hrs  T(C): 36.7 (16 Nov 2021 08:51), Max: 37.1 (16 Nov 2021 00:30)  T(F): 98 (16 Nov 2021 08:51), Max: 98.8 (16 Nov 2021 00:30)  HR: 64 (16 Nov 2021 08:51) (64 - 107)  BP: 122/76 (16 Nov 2021 08:51) (117/78 - 122/76)  BP(mean): --  RR: 18 (16 Nov 2021 08:51) (16 - 18)  SpO2: 100% (16 Nov 2021 08:51) (94% - 100%)    PHYSICAL EXAM:  GENERAL: elderly obtunded, eyes closed  HEAD:  Atraumatic, Normocephalic,   CHEST/LUNG: decrease bases, poor effort  HEART: Regular rate and rhythm;   ABDOMEN: Soft, Nontender, +distended; Bowel sounds present  EXTREMITIES:  2+ Peripheral Pulses, gross anasarca   PSYCH: AAOx0, not following commands  Skin: multiple chronic pressure ulcers including her chin      LABS:                    RADIOLOGY & ADDITIONAL TESTS:    Imaging Personally Reviewed:    Electrocardiogram Personally Reviewed:    COORDINATION OF CARE:  Care Discussed with Consultants/Other Providers [Y/N]:  Prior or Outpatient Records Reviewed [Y/N]:

## 2021-11-16 NOTE — PROGRESS NOTE ADULT - SUBJECTIVE AND OBJECTIVE BOX
GAP TEAM PALLIATIVE CARE UNIT PROGRESS NOTE:      [  ] Patient on hospice program.    INDICATION FOR PALLIATIVE CARE UNIT SERVICES: End of life care in the setting of SDH with other co-morbidities    INTERVAL HPI/OVERNIGHT EVENTS: Chart reviewed. The patient is seen and examined at the bedside.  Patient remains unresponsive. She did not require any PRN medications within a 24hr period 8am-8am.    DNR on chart:   Allergies    No Known Allergies    Intolerances    MEDICATIONS  (STANDING):  dextrose 5% + sodium chloride 0.45% 1000 milliLiter(s) (20 mL/Hr) IV Continuous <Continuous>  glycopyrrolate Injectable 0.4 milliGRAM(s) IV Push every 6 hours  HYDROmorphone  Injectable 0.2 milliGRAM(s) IV Push every 4 hours  lacosamide IVPB 200 milliGRAM(s) IV Intermittent every 12 hours    MEDICATIONS  (PRN):  bisacodyl Suppository 10 milliGRAM(s) Rectal daily PRN Constipation  HYDROmorphone  Injectable 0.5 milliGRAM(s) IV Push every 4 hours PRN Severe Pain (7 - 10)  LORazepam   Injectable 0.2 milliGRAM(s) IV Push every 1 hour PRN Anxiety    ITEMS UNCHECKED ARE NOT PRESENT    PRESENT SYMPTOMS: [X ]Unable to obtain due to poor mentation   Source if other than patient:  [ ]Family   [X ]Team     Pain: [ ] yes [X] no see pain ad score   QOL impact -   Location -                    Aggravating factors -  Quality -  Radiation -  Timing-  Severity (0-10 scale):  Minimal acceptable level (0-10 scale):     Dyspnea:                           [ ]Mild [ ]Moderate [ ]Severe  Anxiety:                             [ ]Mild [ ]Moderate [ ]Severe  Fatigue:                             [ ]Mild [ ]Moderate [ ]Severe  Nausea:                             [ ]Mild [ ]Moderate [ ]Severe  Loss of appetite:              [ ]Mild [ ]Moderate [ ]Severe  Constipation:                    [ ]Mild [ ]Moderate [ ]Severe    PAINAD Score: 0    http://geriatrictoolkit.[employer].edu/cog/painad.pdf (Ctrl +  left click to view)  		  Other Symptoms:  [X ]All other review of systems negative     Palliative Performance Status Version 2: 10%         http://npcrc.org/files/news/palliative_performance_scale_ppsv2.pdf  PHYSICAL EXAM:  Vital Signs Last 24 Hrs  T(C): 36.7 (16 Nov 2021 08:51), Max: 37.1 (15 Nov 2021 15:36)  T(F): 98 (16 Nov 2021 08:51), Max: 98.8 (15 Nov 2021 15:36)  HR: 64 (16 Nov 2021 08:51) (64 - 107)  BP: 122/76 (16 Nov 2021 08:51) (117/78 - 134/81)  BP(mean): --  RR: 18 (16 Nov 2021 08:51) (16 - 18)  SpO2: 100% (16 Nov 2021 08:51) (94% - 100%) I&O's Summary    15 Nov 2021 07:01  -  16 Nov 2021 07:00  --------------------------------------------------------  IN: 0 mL / OUT: 1400 mL / NET: -1400 mL    GENERAL:  [ ]Alert  [ ]Oriented x   [ ]Lethargic  [ ]Cachexia  [ X]Unarousable  [ ]Verbal  [X ]Non-Verbal  Behavioral:   [ ] Anxiety  [ ] Delirium [ ] Agitation [ ] Other  HEENT:  [ ]Normal   [ ]Dry mouth   [ ]ET Tube/Trach  [ ]Oral lesions  PULMONARY:   [ ]Clear [ ]Tachypnea  [X]Audible excessive secretions   [ ]Rhonchi        [ ]Right [ ]Left [ ]Bilateral  [ ]Crackles        [ ]Right [ ]Left [ ]Bilateral  [ ]Wheezing     [ ]Right [ ]Left [ ]Bilateral  [ ]Diminished BS [ ]Right [ ]Left [ ]Bilateral    CARDIOVASCULAR:    [ X]Regular [ ]Irregular [ ]Tachy  [ ]Tj [ ]Murmur [ ]Other  GASTROINTESTINAL:  [ X]Soft  [ ]Distended   [X ]+BS  [x ]Non tender [ ]Tender  [ ]PEG [ ]OGT/ NGT   Last BM:  11/12/21  GENITOURINARY:  [ ]Normal [X ] Incontinent   [ ]Oliguria/Anuria   [ X]Julio  MUSCULOSKELETAL:   [ ]Normal   [X ]Weakness  [ X]Bed/Wheelchair bound [ ]Edema  NEUROLOGIC:   [ ]No focal deficits  [ X] Cognitive impairment  [X ] Dysphagia [ ]Dysarthria [ ] Paresis [ ]Other   SKIN:   [ ]Normal  [ ]Rash     [X ]Pressure ulcer(s)  [ ]y [ ]n  Present on admission  unstageable on sacrum and chin. Please see nursing assessment for further details.    CRITICAL CARE:  [ ] Shock Present  [ ]Septic [ ]Cardiogenic [ ]Neurologic [ ]Hypovolemic  [ ]  Vasopressors [ ]  Inotropes   [ ] Respiratory failure present [ ] Mechanical Ventilation [ ] Non-invasive ventilatory support [ ] High-Flow  [ ] Acute  [ ] Chronic [ ] Hypoxic  [ ] Hypercarbic [ ] Other  [X ] Other organ failure- Skin, Brain      LABS: None new    RADIOLOGY & ADDITIONAL STUDIES: None new    PROTEIN CALORIE MALNUTRITION: [ ] mild [ ] moderate [X ] severe- please see the nutritionist note  [ ] underweight [ ] morbid obesity    https://www.andeal.org/vault/2440/web/files/ONC/Table_Clinical%20Characteristics%20to%20Document%20Malnutrition-White%20JV%20et%20al%647633.pdf    Height (cm): 147.3 (10-28-21 @ 08:47), 147.3 (10-28-21 @ 07:00), 147.3 (09-27-21 @ 22:20)  Weight (kg): 49.9 (10-28-21 @ 08:47), 49.9 (10-28-21 @ 07:00), 41.8 (09-27-21 @ 22:20)  BMI (kg/m2): 23 (10-28-21 @ 08:47), 23 (10-28-21 @ 08:47), 23 (10-28-21 @ 07:00)    [ X] PPSV2 < or = 30% [ ] significant weight loss [ ] poor nutritional intake [ ] anasarca   Artificial Nutrition [ ]     REFERRALS:   [ ]Chaplaincy  [ ] Hospice  [ ]Child Life  [ X]Social Work  [ ]Case management [ ]Holistic Therapy [ ] Physical Therapy [ ] Dietary   Goals of Care Document:

## 2021-11-16 NOTE — PROGRESS NOTE ADULT - ASSESSMENT
89 y/o F with hx of dementia and MDS w/ recent hospitalization (9/28/21) for fall, found to have tSDH w/ MLS and femoral fx s/p CRPP. 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 non-surgical candidate d/t poor functional status. Palliative consulted for goals of care.

## 2021-11-16 NOTE — PROGRESS NOTE ADULT - PROBLEM SELECTOR PLAN 3
-s/p decompression w/ rectal tube  - would not replace NG feeds  at this time, high risk for aspiration given significant need for suctioning per pallative  -son not answering again today  -completed antibiotics
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
## 2021-11-17 NOTE — PROGRESS NOTE ADULT - SUBJECTIVE AND OBJECTIVE BOX
Patient is a 89y old  Female who presents with a chief complaint of SDH (17 Nov 2021 13:03)      SUBJECTIVE / OVERNIGHT EVENTS: No On events.  Nonresponsive.         ADDITIONAL REVIEW OF SYSTEMS: Negative except for above    MEDICATIONS  (STANDING):  dextrose 5% + sodium chloride 0.45% 1000 milliLiter(s) (20 mL/Hr) IV Continuous <Continuous>  glycopyrrolate Injectable 0.4 milliGRAM(s) IV Push every 6 hours  HYDROmorphone  Injectable 0.2 milliGRAM(s) IV Push every 4 hours  lacosamide IVPB 200 milliGRAM(s) IV Intermittent every 12 hours    MEDICATIONS  (PRN):  bisacodyl Suppository 10 milliGRAM(s) Rectal daily PRN Constipation  HYDROmorphone  Injectable 0.5 milliGRAM(s) IV Push every 4 hours PRN Severe Pain (7 - 10)  LORazepam   Injectable 0.2 milliGRAM(s) IV Push every 1 hour PRN Anxiety      CAPILLARY BLOOD GLUCOSE        I&O's Summary    16 Nov 2021 07:01  -  17 Nov 2021 07:00  --------------------------------------------------------  IN: 0 mL / OUT: 300 mL / NET: -300 mL        PHYSICAL EXAM:  Vital Signs Last 24 Hrs  T(C): 36.8 (17 Nov 2021 08:12), Max: 36.8 (16 Nov 2021 16:30)  T(F): 98.3 (17 Nov 2021 08:12), Max: 98.3 (16 Nov 2021 16:30)  HR: 118 (17 Nov 2021 08:12) (85 - 118)  BP: 92/55 (17 Nov 2021 08:12) (92/55 - 116/72)  BP(mean): --  RR: 18 (17 Nov 2021 08:12) (18 - 18)  SpO2: 88% (17 Nov 2021 08:12) (88% - 99%)      PHYSICAL EXAM:  GENERAL: elderly obtunded, eyes closed  HEAD:  Atraumatic, Normocephalic,   CHEST/LUNG: decrease bases, poor effort  HEART: Regular rate and rhythm;   ABDOMEN: Soft, Nontender, +distended; Bowel sounds present  EXTREMITIES:  2+ Peripheral Pulses, gross anasarca   PSYCH: AAOx0, not following commands  Skin: multiple chronic pressure ulcers including her chin        LABS:                        8.6    52.48 )-----------( 412      ( 17 Nov 2021 09:58 )             27.9     11-17    137  |  100  |  15  ----------------------------<  102<H>  4.2   |  27  |  0.48<L>    Ca    7.8<L>      17 Nov 2021 09:58                  RADIOLOGY & ADDITIONAL TESTS:    Imaging Personally Reviewed:    Electrocardiogram Personally Reviewed:    COORDINATION OF CARE:  Care Discussed with Consultants/Other Providers [Y/N]:  Prior or Outpatient Records Reviewed [Y/N]:

## 2021-11-17 NOTE — CHART NOTE - NSCHARTNOTEFT_GEN_A_CORE
Ethics Follow up note      Per chart review and my conversation with  and Palliative Care NP, patient's son continues to be called and voice messages left but he has not been reached and he has not returned any calls in the past week. I discussed with  and Palliative Care NP regarding the necessity to do due diligence in trying to reach this patient's surrogate, and recommended sending a registered letter to his home stating that we are trying to reach him and that he should please get in contact with us. SW will work on sending that out as soon as possible.    Patient at this time has a poor prognosis with life expectancy in the order of days per the Palliative Care team, and as such, and in addition to the fact that we cannot reach the son to discuss discharge options, she will remain in the palliative care unit for now.    Additionally, symptom management is a clinical decision in the purview of the provider and therefore any symptoms should be managed as indicated.    Ethics will continue to follow and remain available for further discussions or assistance.    Discussed with Denise Contreras, Ethicist.    Adina Schwab  Ethics Fellow  [phone] Ethics Follow up note      Per chart review and my conversation with  and Palliative Care NP, patient's son continues to be called and voice messages left but he has not been reached and he has not returned any calls in the past week. I discussed with  and Palliative Care NP regarding the necessity to do due diligence in trying to reach this patient's surrogate, and recommended sending a registered letter to his home stating that we are trying to reach him and that he should please get in contact with us. SW will work on sending that out as soon as possible.    Patient at this time has a poor prognosis with life expectancy in the order of days per the Palliative Care team, and as such, and in addition to the fact that we cannot reach the son to discuss discharge options, she will remain in the palliative care unit for now.    Additionally, symptom management is a clinical decision in the purview of the provider and therefore any symptoms should be managed as indicated weighing the risks vs benefits of each clinical decision.     Ethics will continue to follow and remain available for further discussions or assistance.    Discussed with Denise Contreras Ethicist.    Adina Schwab  Ethics Fellow  [phone]

## 2021-11-17 NOTE — PROVIDER CONTACT NOTE (CRITICAL VALUE NOTIFICATION) - BACKGROUND
Subdural hematoma
CVA
HISTORY OF ELEVATED BEFORE
Subdural hematoma
SDH
SDH
Pt. admitted after fall and subfalacine herniation
Subdural hematoma

## 2021-11-17 NOTE — PROGRESS NOTE ADULT - PROBLEM SELECTOR PLAN 7
Called the patient son Enrique and he did not answer.   Will continue to follow up with symptom management

## 2021-11-17 NOTE — PROGRESS NOTE ADULT - PROBLEM SELECTOR PLAN 3
-s/p decompression w/ rectal tube  - would not replace NG feeds  at this time, high risk for aspiration given significant need for suctioning per palliative  -son not answering again today,  -completed antibiotics

## 2021-11-17 NOTE — PROGRESS NOTE ADULT - ASSESSMENT
89 y/o F with hx of dementia and MDS w/ recent hospitalization (9/28/21) for fall, found to have tSDH w/ MLS and femoral fx s/p CRPP. 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 non-surgical candidate d/t poor functional status. Palliative consulted for goals of care.

## 2021-11-17 NOTE — PROVIDER CONTACT NOTE (CRITICAL VALUE NOTIFICATION) - SITUATION
Patient with a critical hemoglobin and Hematocrit of 3.4/11.5, however pt is 3rd spacing and specimen is diluted.
Patient with clotted CBC and K 2.6
Pt is admitted with low potassium. Pt potassium 2.9
WBC 52.48
hgb 6.3, Hct 19.5
Patient with a critical hemoglobin and Hematocrit of 6.2/22.2
Hemoglobin is 4.8, Hematocrit is 16.4
Ca: 4.7

## 2021-11-17 NOTE — PROGRESS NOTE ADULT - SUBJECTIVE AND OBJECTIVE BOX
GAP TEAM PALLIATIVE CARE UNIT PROGRESS NOTE:      [  ] Patient on hospice program.    INDICATION FOR PALLIATIVE CARE UNIT SERVICES:  End of life care in the setting of SDH with other co-morbidities    INTERVAL HPI/OVERNIGHT EVENTS: Chart reviewed. The patient is seen and examined at the bedside. Patient remains unresponsive. She did not require any PRN medications within a 24hr period 8am-8am.      DNR on chart:   Allergies    No Known Allergies    Intolerances    MEDICATIONS  (STANDING):  dextrose 5% + sodium chloride 0.45% 1000 milliLiter(s) (20 mL/Hr) IV Continuous <Continuous>  glycopyrrolate Injectable 0.4 milliGRAM(s) IV Push every 6 hours  HYDROmorphone  Injectable 0.2 milliGRAM(s) IV Push every 4 hours  lacosamide IVPB 200 milliGRAM(s) IV Intermittent every 12 hours    MEDICATIONS  (PRN):  bisacodyl Suppository 10 milliGRAM(s) Rectal daily PRN Constipation  HYDROmorphone  Injectable 0.5 milliGRAM(s) IV Push every 4 hours PRN Severe Pain (7 - 10)  LORazepam   Injectable 0.2 milliGRAM(s) IV Push every 1 hour PRN Anxiety    ITEMS UNCHECKED ARE NOT PRESENT    PRESENT SYMPTOMS: [ X]Unable to obtain due to poor mentation   Source if other than patient:  [ ]Family   [X ]Team     Pain: [ ] yes [X ] no see pain ad score  QOL impact -   Location -                    Aggravating factors -  Quality -  Radiation -  Timing-  Severity (0-10 scale):  Minimal acceptable level (0-10 scale):     Dyspnea:                           [ ]Mild [ ]Moderate [ ]Severe  Anxiety:                             [ ]Mild [ ]Moderate [ ]Severe  Fatigue:                             [ ]Mild [ ]Moderate [ ]Severe  Nausea:                             [ ]Mild [ ]Moderate [ ]Severe  Loss of appetite:              [ ]Mild [ ]Moderate [ ]Severe  Constipation:                    [ ]Mild [ ]Moderate [ ]Severe    PAINAD Score: 0    http://geriatrictoolkit.[employer].edu/cog/painad.pdf (Ctrl +  left click to view)  		  Other Symptoms:  [X ]All other review of systems negative     Palliative Performance Status Version 2: 10 %         http://npcrc.org/files/news/palliative_performance_scale_ppsv2.pdf  PHYSICAL EXAM:  Vital Signs Last 24 Hrs  T(C): 36.8 (17 Nov 2021 08:12), Max: 36.8 (16 Nov 2021 16:30)  T(F): 98.3 (17 Nov 2021 08:12), Max: 98.3 (16 Nov 2021 16:30)  HR: 118 (17 Nov 2021 08:12) (85 - 118)  BP: 92/55 (17 Nov 2021 08:12) (92/55 - 116/72)  BP(mean): --  RR: 18 (17 Nov 2021 08:12) (18 - 18)  SpO2: 88% (17 Nov 2021 08:12) (88% - 99%) I&O's Summary    16 Nov 2021 07:01  -  17 Nov 2021 07:00  --------------------------------------------------------  IN: 0 mL / OUT: 300 mL / NET: -300 mL    GENERAL:  [ ]Alert  [ ]Oriented x   [ ]Lethargic  [ ]Cachexia  [ X]Unarousable  [ ]Verbal  [X ]Non-Verbal  Behavioral:   [ ] Anxiety  [ ] Delirium [ ] Agitation [ ] Other  HEENT:  [ ]Normal   [ ]Dry mouth   [ ]ET Tube/Trach  [ ]Oral lesions  PULMONARY:   [ ]Clear [ ]Tachypnea  [X]Audible excessive secretions   [ ]Rhonchi        [ ]Right [ ]Left [ ]Bilateral  [ ]Crackles        [ ]Right [ ]Left [ ]Bilateral  [ ]Wheezing     [ ]Right [ ]Left [ ]Bilateral  [ ]Diminished BS [ ]Right [ ]Left [ ]Bilateral    CARDIOVASCULAR:    [ ]Regular [ ]Irregular [ X]Tachy  [ ]Jt [ ]Murmur [ ]Other  GASTROINTESTINAL:  [ X]Soft  [ X]Distended   [X ]+BS  [x ]Non tender [ ]Tender  [ ]PEG [ ]OGT/ NGT   Last BM:  11/12/21  GENITOURINARY:  [ ]Normal [X ] Incontinent   [ ]Oliguria/Anuria   [ X]Julio  MUSCULOSKELETAL:   [ ]Normal   [X ]Weakness  [ X]Bed/Wheelchair bound [ ]Edema  NEUROLOGIC:   [ ]No focal deficits  [ X] Cognitive impairment  [X ] Dysphagia [ ]Dysarthria [ ] Paresis [ ]Other   SKIN:   [ ]Normal  [ ]Rash     [X ]Pressure ulcer(s)  [ ]y [ ]n  Present on admission  unstageable on sacrum and chin. Please see nursing assessment for further details.    CRITICAL CARE:  [ ] Shock Present  [ ]Septic [ ]Cardiogenic [ ]Neurologic [ ]Hypovolemic  [ ]  Vasopressors [ ]  Inotropes   [ ] Respiratory failure present [ ] Mechanical Ventilation [ ] Non-invasive ventilatory support [ ] High-Flow  [ ] Acute  [ ] Chronic [ ] Hypoxic  [ ] Hypercarbic [ ] Other  [X ] Other organ failure- Skin, Brain    LABS:                        8.6    52.48 )-----------( 412      ( 17 Nov 2021 09:58 )             27.9   11-17    137  |  100  |  15  ----------------------------<  102<H>  4.2   |  27  |  0.48<L>    Ca    7.8<L>      17 Nov 2021 09:58    RADIOLOGY & ADDITIONAL STUDIES: None new    PROTEIN CALORIE MALNUTRITION: [ ] mild [ ] moderate [X ] severe- see nutritionist note  [ ] underweight [ ] morbid obesity    https://www.andeal.org/vault/2440/web/files/ONC/Table_Clinical%20Characteristics%20to%20Document%20Malnutrition-White%20JV%20et%20al%202012.pdf    Height (cm): 147.3 (10-28-21 @ 08:47), 147.3 (10-28-21 @ 07:00), 147.3 (09-27-21 @ 22:20)  Weight (kg): 49.9 (10-28-21 @ 08:47), 49.9 (10-28-21 @ 07:00), 41.8 (09-27-21 @ 22:20)  BMI (kg/m2): 23 (10-28-21 @ 08:47), 23 (10-28-21 @ 08:47), 23 (10-28-21 @ 07:00)    [X ] PPSV2 < or = 30% [ ] significant weight loss [ ] poor nutritional intake [ ] anasarca   Artificial Nutrition [ ]     REFERRALS:   [ ]Chaplaincy  [ ] Hospice  [ ]Child Life  [X ]Social Work  [ ]Case management [ ]Holistic Therapy [ ] Physical Therapy [ ] Dietary   Goals of Care Document:

## 2021-11-17 NOTE — PROVIDER CONTACT NOTE (CRITICAL VALUE NOTIFICATION) - RECOMMENDATIONS
NP will order potassium for pt via IV
No intervention is required at this time, patient is receiving blood transfusion
Repeat
DWIGHT Hsu made aware
DWIGHT Goldstein made aware
Replete
NO ORDERS GIVEN

## 2021-11-17 NOTE — PROVIDER CONTACT NOTE (CRITICAL VALUE NOTIFICATION) - ASSESSMENT
Hemodynamically stable
Repeat Labs
NO ELEVATED TEMP
VS are wnl
Pt potassium 2.9
Unclear source of bleeding, receiving 2 units of blood transfusion
Patient remains non-verbal, no change
Patient remains non-verbal, no change

## 2021-11-17 NOTE — PROGRESS NOTE ADULT - PROBLEM SELECTOR PLAN 1
Continue with Dilaudid 0.2mg IV q4hr ATC   Continue with Dilaudid 0.5mg IV q4h PRN ( 0 required within a 24hr period 8am-8am)  Bowel regimen while on opioids

## 2021-11-17 NOTE — PROGRESS NOTE ADULT - PROBLEM SELECTOR PLAN 2
Last recorded BM on 11/12. Abdomen distended but soft. +Bowel sounds  Continue with Dulcolax 10mg suppository PRN

## 2021-11-18 NOTE — PROGRESS NOTE ADULT - ASSESSMENT
89 y/o F with hx of dementia and MDS w/ recent hospitalization (9/28/21) for fall, found to have tSDH w/ MLS and femoral fx s/p CRPP. 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 non-surgical candidate d/t poor functional status. Palliative consulted for goals of care.

## 2021-11-18 NOTE — PROGRESS NOTE ADULT - PROBLEM SELECTOR PROBLEM 8
Dementia
Palliative care encounter
Dementia
Palliative care encounter
Type 2 myocardial infarction
MDS (myelodysplastic syndrome)
Dementia

## 2021-11-18 NOTE — PROGRESS NOTE ADULT - PROBLEM SELECTOR PROBLEM 9
Type 2 myocardial infarction
Dementia
Type 2 myocardial infarction
Prophylactic measure
Type 2 myocardial infarction

## 2021-11-18 NOTE — PROVIDER CONTACT NOTE (OTHER) - ASSESSMENT
Curved nasogastric tube visible in mouth
Pt with NGT. Pt noted with drooling. Oral cavity was suctioned, but found clumpy tube feeding particles in suction.
small amount of stool noted in drainage bag to rectal tube.
No response to external stimuli, no spontaneous respirations, no apical heart rate, negative papillary response to light

## 2021-11-18 NOTE — PROGRESS NOTE ADULT - PROBLEM SELECTOR PLAN 1
Increase ATC Dilaudid  to 0.5mg IV q4hr ATC   Continue with Dilaudid 0.5mg IV q4h PRN ( 2 required within a 24hr period 8am-8am)  Bowel regimen while on opioids

## 2021-11-18 NOTE — PROGRESS NOTE ADULT - PROBLEM SELECTOR PROBLEM 5
Hypernatremia
MDS (myelodysplastic syndrome)
Functional quadriplegia
Hypokalemia
MDS (myelodysplastic syndrome)
Hypokalemia
MDS (myelodysplastic syndrome)
Functional quadriplegia
Hypokalemia
MDS (myelodysplastic syndrome)
Functional quadriplegia
MDS (myelodysplastic syndrome)
Hypokalemia
MDS (myelodysplastic syndrome)
Dementia
Dementia
Functional quadriplegia
Functional quadriplegia
Hypokalemia
Functional quadriplegia
MDS (myelodysplastic syndrome)
ACP (advance care planning)
MDS (myelodysplastic syndrome)
Hypokalemia
Severe sepsis with septic shock

## 2021-11-18 NOTE — PROGRESS NOTE ADULT - PROBLEM SELECTOR PLAN 6
hx of MDS with persistent leukocytosis  symptomatic management only
FAST score 7F  supportive care  Turn and position  Continue with good skin care
-due to poor Po   resolved with IVF
-per report b/l is AAOx1, now no mental status
-per report b/l is AAOx1, now no mental status
FAST score 7F  supportive care  Turn and position  Continue with good skin care
FAST score 7F  supportive care  Turn and position  Continue with good skin care
-per report b/l is AAOx1, now no mental status
-due to poor Po   resolved with IVF
-per report b/l is AAOx1
FAST score 7F  supportive care  Turn and position  Continue with good skin care
Called the patient son Enrique. He did not answer. Left a brief voicemail for call back.  Will continue to follow up with symptom management
-due to poor Po   resolved with IVF
-leukocytosis likely reactive to acute critical illness, has known MPN and elevated wbc 20-50k at baseline  -unlikely a candidate for chemo  -defer heme consult for now as unlikely to 
Will continue to follow up with symptom management
Will continue to follow for goals of care.    Smiley Padilla MD  Palliative Medicine Attending   [facility] Pager [phone]
-per report b/l is AAOx1, now no mental status
FAST score 7F  supportive care  Turn and position  Continue with good skin care
resolved   -c/w maintenance fluids  -monitor bmp
-per report b/l is AAOx1, now no mental status
FAST score 7F  supportive care  Turn and position  Continue with good skin care
-due to poor Po   resolved with IVF
-per report b/l is AAOx1
-due to poor Po   resolved with IVF

## 2021-11-18 NOTE — PROGRESS NOTE ADULT - PROBLEM SELECTOR PROBLEM 6
Hypernatremia
Hypernatremia
Palliative care encounter
Hypernatremia
Dementia
Dementia
Hypernatremia
Dementia
Dementia
Hypernatremia
MDS (myelodysplastic syndrome)
Dementia
Palliative care encounter
Dementia
Dementia
Palliative care encounter
Hypernatremia
MDS (myelodysplastic syndrome)
Dementia
Hypernatremia
Hypernatremia
Hypokalemia

## 2021-11-18 NOTE — PROVIDER CONTACT NOTE (OTHER) - RECOMMENDATIONS
Pt pronounced dead at 1929, son Enrique Muir called and made aware. He was aware pt was actively dying. Declined autopsy
Provider was notified. Provider will put in order if repeat test is needed.
D/c tube feed Jevity 1.2   Nasogastric tube to be D/c
Notify ACP, obtain CXR?
Notify MAR, leave rectal tube out or necessary to reinsert?

## 2021-11-18 NOTE — PROVIDER CONTACT NOTE (CRITICAL VALUE NOTIFICATION) - TEST AND RESULT REPORTED:
H/H 6.2/22.2
WBC 40.93
Ca: 4.7
K= 2.4 , Ca+ = 5.8, Blood Glucose 815
WBC 89.95
K 2.6 CBC clotted
Potassium
Ca+ 6.0
Hemoglobin is 4.8, Hematocrit is 16.4
WBC 40.93
WBC 52.48
Low H&H
H/H 3.4/11.5

## 2021-11-18 NOTE — PROVIDER CONTACT NOTE (CRITICAL VALUE NOTIFICATION) - ACTION/TREATMENT ORDERED:
No interventions at this time
MD ordered Vanco IV and Zosyn IV and Blood Cultures
No intervention, will contact the attending.
ordered stat CBC and BMP
Repeat CBC, 1 Unit of PRBC ordered
New CBC/BMP to be ordered
No Intervention History of MDS baseline 22.5
repeat CBC
New CBC to be ordered
No intervention is required at this time, patient is receiving blood transfusion

## 2021-11-18 NOTE — PROGRESS NOTE ADULT - PROBLEM SELECTOR PROBLEM 3
Subdural hematoma
Colitis
Colitis
Subdural hematoma
Severe sepsis with septic shock
Subdural hematoma
Subdural hematoma
Colitis
Subdural hematoma
Severe sepsis with septic shock
Colitis
Severe sepsis with septic shock
Subdural hematoma
MDS (myelodysplastic syndrome)
Subdural hematoma
Severe sepsis with septic shock
Subdural hematoma
Colitis
Severe sepsis with septic shock
Severe sepsis with septic shock
Subdural hematoma
Functional quadriplegia
Severe sepsis with septic shock
Severe sepsis with septic shock
Subdural hematoma
Severe sepsis with septic shock
Severe sepsis with septic shock
Colitis
Colitis
Severe sepsis with septic shock
Constipation
Functional quadriplegia
Subdural hematoma
Severe sepsis with septic shock
Severe sepsis with septic shock
Ileus
Colitis

## 2021-11-18 NOTE — CHART NOTE - NSCHARTNOTESELECT_GEN_ALL_CORE
Event Note
Event Note
Palliative Medicine/Event Note
Progress note/Event Note
transfer note
Acute drop in Hb/hct/Event Note
EEG/Epilepsy
Event Note
Expiration Note/Event Note
MAR Accept Note
Neurosurgery/Event Note
Nutrition Services
Nutrition Services
Palliative Medicine/Event Note
VTE Risk Assessment/Event Note

## 2021-11-18 NOTE — PROVIDER CONTACT NOTE (OTHER) - SITUATION
Blood test taken this morning, however, sample was clotted.
Patient noted to have nasogastric tube curved in patient mouth
Patient without spontaneous respirations or pulse
Pt with tube feed particles found in oral cavity
Rectal tube was inadvertently removed when turning pt.

## 2021-11-18 NOTE — PROGRESS NOTE ADULT - ATTENDING COMMENTS
89 yo female with known medical history of dementia, MDS, presented from nursing home for was found to have subdural hematoma with mass effect. Hematology consulted for coagulopathy and history of MDS     Hb is at her  baseline anemic around 8; requiring intermittent transfusions; with thrombocytosis and leukocytosis (20k-50k)   - Peripheral smear showing: minimal schistocytes non nucleated rbcs; clumped platelets with large platelets, Hyperactivity of neutrophils; no blasts. Consistent with BM from 2019 showing of mixed MDS MPN picture.   - continue with supportive care; pRBC above 8; plts above 100K given subdural hematoma. We signed off consult prn.
Agree with above.
Agree with above.
Patient seen, examined and case/plan discussed with fellow.
Agree with above.
Agree with above.
Patient seen, examined and case discussed with fellow.
Patient seen, examined and case discussed with Fellow.    Increase amiodarone dose.
Patient seen, examined and case discussed with fellow.
89 y/o F with hx of dementia and MDS w/ recent hospitalization (9/28/21) for fall, found to have tSDH w/ MLS and femoral fx s/p CRPP. 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 non-surgical candidate d/t poor functional status. Palliative consulted for goals of care.  We are presently co-managing with primary medical team.  Son remains out of contact.  Given patient has advanced dementia and terminal brain injury due to subdural hematoma with herniation, any attempt at re-inserting NGT would cause more harm than benefit.  We will continue to provide care to address symptoms.  Patient assessment and plan discussed on interdisciplinary team rounds today.  Prognosis is very poor.
#Derm-continue wound care  #encephalopathy-hypoactive delirium  #dysphagia-AXR versus CT, NG feeds per primary team
89 y/o F with hx of dementia and MDS w/ recent hospitalization (9/28/21) for fall, found to have tSDH w/ MLS and femoral fx s/p CRPP. 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 non-surgical candidate d/t poor functional status. Palliative consulted for goals of care.  We are presently co-managing with primary medical team.  Son has been difficult to contact.  Given patient has advanced dementia and terminal brain injury due to subdural hematoma with herniation, any attempt at re-inserting NGT would cause more harm than benefit.  Additionally, the patient has recurrent gut failure with ileus, so feeding could result in further harm as well.  We will continue to provide care to address symptoms.  Patient assessment and plan discussed on interdisciplinary team rounds today.  Prognosis is very poor.
89 y/o F with hx of dementia and MDS w/ recent hospitalization (9/28/21) for fall, found to have tSDH w/ MLS and femoral fx s/p CRPP. 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 non-surgical candidate d/t poor functional status. Palliative consulted for goals of care.  We are presently co-managing with primary medical team.  Son remains out of contact.  Given patient has advanced dementia and terminal brain injury due to subdural hematoma with herniation, any attempt at re-inserting NGT would cause more harm than benefit.  We will continue to provide care to address symptoms.  Patient assessment and plan discussed on interdisciplinary team rounds today.  Prognosis is very poor.
Minimal symptom burden  continue to follow  Ethics involvement forthcoming
Surrogate communication has been a challenge  Will aid primary team
89 y/o F with hx of dementia and MDS w/ recent hospitalization (9/28/21) for fall, found to have tSDH w/ MLS and femoral fx s/p CRPP. 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 non-surgical candidate d/t poor functional status. Palliative consulted for goals of care.  We are presently co-managing with primary medical team.  Son remains out of contact.  Given patient has advanced dementia and terminal brain injury due to subdural hematoma with herniation, any attempt at re-inserting NGT would cause more harm than benefit.  Patient now on IV antibiotics and getting sepsis work-up which is not likely to provide benefit.   We will continue to provide care to address symptoms.  Patient assessment and plan discussed on interdisciplinary team rounds today.  Prognosis is very poor.

## 2021-11-18 NOTE — PROGRESS NOTE ADULT - SUBJECTIVE AND OBJECTIVE BOX
GAP TEAM PALLIATIVE CARE UNIT PROGRESS NOTE:      [  ] Patient on hospice program.    INDICATION FOR PALLIATIVE CARE UNIT SERVICES:   End of life care in the setting of SDH with other co-morbidities    INTERVAL HPI/OVERNIGHT EVENTS: Chart reviewed. The patient is seen and examined at the bedside. Patient is unresponsive. She required PRN Dilaudid 0.5mg IV X2 for dyspnea within a 24hr period 8am-8am.     DNR on chart:   Allergies    No Known Allergies    Intolerances    MEDICATIONS  (STANDING):  dextrose 5% + sodium chloride 0.45% 1000 milliLiter(s) (20 mL/Hr) IV Continuous <Continuous>  glycopyrrolate Injectable 0.4 milliGRAM(s) IV Push every 6 hours  HYDROmorphone  Injectable 0.5 milliGRAM(s) IV Push every 4 hours  lacosamide IVPB 200 milliGRAM(s) IV Intermittent every 12 hours  piperacillin/tazobactam IVPB. 3.375 Gram(s) IV Intermittent once  piperacillin/tazobactam IVPB.. 3.375 Gram(s) IV Intermittent every 12 hours  vancomycin  IVPB 1000 milliGRAM(s) IV Intermittent once    MEDICATIONS  (PRN):  acetaminophen  Suppository .. 650 milliGRAM(s) Rectal every 6 hours PRN Temp greater or equal to 38C (100.4F)  bisacodyl Suppository 10 milliGRAM(s) Rectal daily PRN Constipation  HYDROmorphone  Injectable 0.5 milliGRAM(s) IV Push every 2 hours PRN Severe Pain (7 - 10)  HYDROmorphone  Injectable 0.5 milliGRAM(s) IV Push every 2 hours PRN dyspnea  LORazepam   Injectable 0.2 milliGRAM(s) IV Push every 1 hour PRN Anxiety    ITEMS UNCHECKED ARE NOT PRESENT    PRESENT SYMPTOMS: [X ]Unable to obtain due to poor mentation   Source if other than patient:  [ ]Family   [X ]Team     Pain: [ X] yes [ ] no see pain ad score  QOL impact -   Location -                    Aggravating factors -  Quality -  Radiation -  Timing-  Severity (0-10 scale):  Minimal acceptable level (0-10 scale):     Dyspnea:                           [ ]Mild [ ]Moderate [ ]Severe  Anxiety:                             [ ]Mild [ ]Moderate [ ]Severe  Fatigue:                             [ ]Mild [ ]Moderate [ ]Severe  Nausea:                             [ ]Mild [ ]Moderate [ ]Severe  Loss of appetite:              [ ]Mild [ ]Moderate [ ]Severe  Constipation:                    [ ]Mild [ ]Moderate [ ]Severe    PAINAD Score: 2 within a 24hr period 8am-8am    [email or web address] (Ctrl +  left click to view)  		  Other Symptoms:  [ X]All other review of systems negative     Palliative Performance Status Version 2:  10 %         [email or web address]  PHYSICAL EXAM:  Vital Signs Last 24 Hrs  T(C): 36.6 (18 Nov 2021 07:39), Max: 39.2 (17 Nov 2021 15:39)  T(F): 97.9 (18 Nov 2021 07:39), Max: 102.6 (17 Nov 2021 15:39)  HR: 105 (18 Nov 2021 07:39) (105 - 127)  BP: 81/50 (18 Nov 2021 07:39) (81/50 - 94/59)  BP(mean): --  RR: 18 (18 Nov 2021 07:39) (16 - 18)  SpO2: 88% (18 Nov 2021 07:39) (84% - 88%) I&O's Summary    18 Nov 2021 07:01  -  18 Nov 2021 10:54  --------------------------------------------------------  IN: 0 mL / OUT: 350 mL / NET: -350 mL    GENERAL:  [ ]Alert  [ ]Oriented x   [ ]Lethargic  [ ]Cachexia  [ X]Unarousable  [ ]Verbal  [X ]Non-Verbal  Behavioral:   [ ] Anxiety  [ ] Delirium [ ] Agitation [ ] Other  HEENT:  [ ]Normal   [ ]Dry mouth   [ ]ET Tube/Trach  [ ]Oral lesions  PULMONARY:   [ ]Clear [ ]Tachypnea  [X]Audible excessive secretions   [ ]Rhonchi        [ ]Right [ ]Left [ ]Bilateral  [ ]Crackles        [ ]Right [ ]Left [ ]Bilateral  [ ]Wheezing     [ ]Right [ ]Left [ ]Bilateral  [ ]Diminished BS [ ]Right [ ]Left [ ]Bilateral    CARDIOVASCULAR:    [ ]Regular [ ]Irregular [ X]Tachy  [ ]Tj [ ]Murmur [ ]Other  GASTROINTESTINAL:  [ X]Soft  [ X]Distended   [X ]+BS  [x ]Non tender [ ]Tender  [ ]PEG [ ]OGT/ NGT   Last BM:  11/12/21  GENITOURINARY:  [ ]Normal [X ] Incontinent   [ ]Oliguria/Anuria   [ X]Julio  MUSCULOSKELETAL:   [ ]Normal   [X ]Weakness  [ X]Bed/Wheelchair bound [ ]Edema  NEUROLOGIC:   [ ]No focal deficits  [ X] Cognitive impairment  [X ] Dysphagia [ ]Dysarthria [ ] Paresis [ ]Other   SKIN:   [ ]Normal  [ ]Rash     [X ]Pressure ulcer(s)  [ ]y [ ]n  Present on admission  unstageable on sacrum and chin. Please see nursing assessment for further details.    CRITICAL CARE:  [ ] Shock Present  [ ]Septic [ ]Cardiogenic [ ]Neurologic [ ]Hypovolemic  [ ]  Vasopressors [ ]  Inotropes   [ ] Respiratory failure present [ ] Mechanical Ventilation [ ] Non-invasive ventilatory support [ ] High-Flow  [ ] Acute  [ ] Chronic [ ] Hypoxic  [ ] Hypercarbic [ ] Other  [X ] Other organ failure- Skin, Brain        LABS:                        8.6    52.48 )-----------( 412      ( 17 Nov 2021 09:58 )             27.9   11-17    137  |  100  |  15  ----------------------------<  102<H>  4.2   |  27  |  0.48<L>    Ca    7.8<L>      17 Nov 2021 09:58    RADIOLOGY & ADDITIONAL STUDIES: None new    PROTEIN CALORIE MALNUTRITION: [ ] mild [ ] moderate [ X] severe- please see the nutritionist note  [ ] underweight [ ] morbid obesity    https://www.andeal.org/vault/2440/web/files/ONC/Table_Clinical%20Characteristics%20to%20Document%20Malnutrition-White%20JV%20et%20al%202012.pdf    Height (cm): 147.3 (10-28-21 @ 08:47), 147.3 (10-28-21 @ 07:00), 147.3 (09-27-21 @ 22:20)  Weight (kg): 49.9 (10-28-21 @ 08:47), 49.9 (10-28-21 @ 07:00), 41.8 (09-27-21 @ 22:20)  BMI (kg/m2): 23 (10-28-21 @ 08:47), 23 (10-28-21 @ 08:47), 23 (10-28-21 @ 07:00)    [ X] PPSV2 < or = 30% [ ] significant weight loss [ ] poor nutritional intake [ ] anasarca   Artificial Nutrition [ ]     REFERRALS:   [ ]Chaplaincy  [ ] Hospice  [ ]Child Life  [X ]Social Work  [ ]Case management [ ]Holistic Therapy [ ] Physical Therapy [ ] Dietary   Goals of Care Document:

## 2021-11-18 NOTE — PROVIDER CONTACT NOTE (CRITICAL VALUE NOTIFICATION) - PERSON GIVING RESULT:
Lalo Chen
LAB
[facility] Munoz
[facility]
Cara Munoz
barry/ Asher Jordan
Smiley Denny
Steven
Thad Blanco
Bella Perales
Chemistry Cara Salazar
Shilpa Conn

## 2021-11-18 NOTE — PROGRESS NOTE ADULT - PROBLEM SELECTOR PROBLEM 10
Prophylactic measure
Prophylactic measure
Type 2 myocardial infarction
Prophylactic measure

## 2021-11-18 NOTE — PROGRESS NOTE ADULT - NUTRITIONAL ASSESSMENT
This patient has been assessed with a concern for Malnutrition and has been determined to have a diagnosis/diagnoses of Severe protein-calorie malnutrition.      
This patient has been assessed with a concern for Malnutrition and has been determined to have a diagnosis/diagnoses of Severe protein-calorie malnutrition.    This patient is being managed with:   Diet NPO-  Except Medications  Entered: Nov 6 2021  4:24PM    
This patient has been assessed with a concern for Malnutrition and has been determined to have a diagnosis/diagnoses of Severe protein-calorie malnutrition.      
This patient has been assessed with a concern for Malnutrition and has been determined to have a diagnosis/diagnoses of Severe protein-calorie malnutrition.      
This patient has been assessed with a concern for Malnutrition and has been determined to have a diagnosis/diagnoses of Severe protein-calorie malnutrition.    This patient is being managed with:   Diet NPO-  Except Medications  Entered: Nov 6 2021  4:24PM    
This patient has been assessed with a concern for Malnutrition and has been determined to have a diagnosis/diagnoses of Severe protein-calorie malnutrition.      
This patient has been assessed with a concern for Malnutrition and has been determined to have a diagnosis/diagnoses of Severe protein-calorie malnutrition.    This patient is being managed with:   Diet NPO with Tube Feed-  Tube Feeding Modality: Nasogastric  Jevity 1.2 Ray (JEVITY1.2RTH)  Total Volume for 24 Hours (mL): 240  Continuous  Starting Tube Feed Rate {mL per Hour}: 10  Until Goal Tube Feed Rate (mL per Hour): 10  Tube Feed Duration (in Hours): 24  Tube Feed Start Time: 16:00  Entered: Nov 11 2021  4:20PM    
This patient has been assessed with a concern for Malnutrition and has been determined to have a diagnosis/diagnoses of Severe protein-calorie malnutrition.      
This patient has been assessed with a concern for Malnutrition and has been determined to have a diagnosis/diagnoses of Severe protein-calorie malnutrition.    This patient is being managed with:   Diet NPO with Tube Feed-  Tube Feeding Modality: Nasogastric  Jevity 1.2 Ray (JEVITY1.2RTH)  Total Volume for 24 Hours (mL): 240  Continuous  Starting Tube Feed Rate {mL per Hour}: 10  Until Goal Tube Feed Rate (mL per Hour): 10  Tube Feed Duration (in Hours): 24  Tube Feed Start Time: 16:00  Entered: Nov 11 2021  4:20PM    
This patient has been assessed with a concern for Malnutrition and has been determined to have a diagnosis/diagnoses of Severe protein-calorie malnutrition.    This patient is being managed with:   Diet NPO-  Except Medications  Entered: Nov 6 2021  4:24PM    
This patient has been assessed with a concern for Malnutrition and has been determined to have a diagnosis/diagnoses of Severe protein-calorie malnutrition.      
This patient has been assessed with a concern for Malnutrition and has been determined to have a diagnosis/diagnoses of Severe protein-calorie malnutrition.    This patient is being managed with:   Diet NPO with Tube Feed-  Tube Feeding Modality: Nasogastric  Jevity 1.2 Ray (JEVITY1.2RTH)  Total Volume for 24 Hours (mL): 240  Continuous  Starting Tube Feed Rate {mL per Hour}: 10  Until Goal Tube Feed Rate (mL per Hour): 10  Tube Feed Duration (in Hours): 24  Tube Feed Start Time: 16:00  Entered: Nov 11 2021  4:20PM    
This patient has been assessed with a concern for Malnutrition and has been determined to have a diagnosis/diagnoses of Severe protein-calorie malnutrition.      
This patient has been assessed with a concern for Malnutrition and has been determined to have a diagnosis/diagnoses of Severe protein-calorie malnutrition.    This patient is being managed with:   Diet NPO with Tube Feed-  Tube Feeding Modality: Nasogastric  Jevity 1.2 Ray (JEVITY1.2RTH)  Total Volume for 24 Hours (mL): 240  Continuous  Starting Tube Feed Rate {mL per Hour}: 10  Until Goal Tube Feed Rate (mL per Hour): 10  Tube Feed Duration (in Hours): 24  Tube Feed Start Time: 16:00  Entered: Nov 11 2021  4:20PM    
This patient has been assessed with a concern for Malnutrition and has been determined to have a diagnosis/diagnoses of Severe protein-calorie malnutrition.      
This patient has been assessed with a concern for Malnutrition and has been determined to have a diagnosis/diagnoses of Severe protein-calorie malnutrition.      
This patient has been assessed with a concern for Malnutrition and has been determined to have a diagnosis/diagnoses of Severe protein-calorie malnutrition.    This patient is being managed with:   Diet NPO with Tube Feed-  Tube Feeding Modality: Nasogastric  Jevity 1.2 Ray (JEVITY1.2RTH)  Total Volume for 24 Hours (mL): 240  Continuous  Starting Tube Feed Rate {mL per Hour}: 10  Until Goal Tube Feed Rate (mL per Hour): 10  Tube Feed Duration (in Hours): 24  Tube Feed Start Time: 16:00  Entered: Nov 11 2021  4:20PM

## 2021-11-18 NOTE — PROGRESS NOTE ADULT - SUBJECTIVE AND OBJECTIVE BOX
Patient is a 89y old  Female who presents with a chief complaint of SDH (2021 10:53)      SUBJECTIVE / OVERNIGHT EVENTS: ON, Febrile, tachycardiac and bp 80/50, 02 sat 88% on NC. Nonresponsive        ADDITIONAL REVIEW OF SYSTEMS: Negative except for above    MEDICATIONS  (STANDING):  dextrose 5% + sodium chloride 0.45% 1000 milliLiter(s) (20 mL/Hr) IV Continuous <Continuous>  glycopyrrolate Injectable 0.4 milliGRAM(s) IV Push every 6 hours  HYDROmorphone  Injectable 0.5 milliGRAM(s) IV Push every 4 hours  lacosamide IVPB 200 milliGRAM(s) IV Intermittent every 12 hours  piperacillin/tazobactam IVPB.. 3.375 Gram(s) IV Intermittent every 12 hours    MEDICATIONS  (PRN):  acetaminophen  Suppository .. 650 milliGRAM(s) Rectal every 6 hours PRN Temp greater or equal to 38C (100.4F)  bisacodyl Suppository 10 milliGRAM(s) Rectal daily PRN Constipation  HYDROmorphone  Injectable 0.5 milliGRAM(s) IV Push every 2 hours PRN Severe Pain (7 - 10)  HYDROmorphone  Injectable 0.5 milliGRAM(s) IV Push every 2 hours PRN dyspnea  LORazepam   Injectable 0.2 milliGRAM(s) IV Push every 1 hour PRN Anxiety      CAPILLARY BLOOD GLUCOSE        I&O's Summary    2021 07:01  -  2021 15:09  --------------------------------------------------------  IN: 0 mL / OUT: 350 mL / NET: -350 mL        PHYSICAL EXAM:  Vital Signs Last 24 Hrs  T(C): 36.6 (2021 07:39), Max: 39.2 (2021 15:39)  T(F): 97.9 (2021 07:39), Max: 102.6 (2021 15:39)  HR: 105 (2021 07:39) (105 - 127)  BP: 81/50 (2021 07:39) (81/50 - 94/59)  BP(mean): --  RR: 18 (2021 07:39) (16 - 18)  SpO2: 88% (2021 07:39) (84% - 88%)      PHYSICAL EXAM:  GENERAL: elderly obtunded, eyes closed  HEAD:  Atraumatic, Normocephalic,   CHEST/LUNG: decrease bases, poor effort  HEART: Regular rate and rhythm;   ABDOMEN: Soft, Nontender, +distended; Bowel sounds present  EXTREMITIES:  2+ Peripheral Pulses, gross anasarca   PSYCH: AAOx0, not following commands  Skin: multiple chronic pressure ulcers including her chin    LABS:                        9.3    89.95 )-----------( 408      ( 2021 11:51 )             30.7         138  |  100  |  19  ----------------------------<  111<H>  4.5   |  25  |  0.47<L>    Ca    7.9<L>      2021 11:51    TPro  4.6<L>  /  Alb  2.1<L>  /  TBili  0.7  /  DBili  x   /  AST  22  /  ALT  7<L>  /  AlkPhos  171<H>            Urinalysis Basic - ( 2021 12:17 )    Color: Orange / Appearance: Turbid / S.020 / pH: x  Gluc: x / Ketone: Negative  / Bili: Negative / Urobili: 2 mg/dL   Blood: x / Protein: 300 mg/dL / Nitrite: Positive   Leuk Esterase: Large / RBC: 349.3 /hpf / WBC 2477.3 /HPF   Sq Epi: x / Non Sq Epi: Moderate / Bacteria: Many          RADIOLOGY & ADDITIONAL TESTS:    Imaging Personally Reviewed:    Electrocardiogram Personally Reviewed:    COORDINATION OF CARE:  Care Discussed with Consultants/Other Providers [Y/N]:  Prior or Outpatient Records Reviewed [Y/N]:

## 2021-11-18 NOTE — PROGRESS NOTE ADULT - PROBLEM SELECTOR PROBLEM 1
Non-ST elevation MI (NSTEMI)
Non-ST elevation MI (NSTEMI)
Dyspnea
Non-ST elevation MI (NSTEMI)
Non-ST elevation MI (NSTEMI)
Subdural hematoma
Dyspnea
Non-ST elevation MI (NSTEMI)
Non-ST elevation MI (NSTEMI)
Subdural hematoma
Non-ST elevation MI (NSTEMI)
Non-ST elevation MI (NSTEMI)
Dyspnea
Dyspnea
Non-ST elevation MI (NSTEMI)
Non-ST elevation MI (NSTEMI)
Subdural hematoma
Non-ST elevation MI (NSTEMI)
Subdural hematoma
Dyspnea
Dyspnea
Non-ST elevation MI (NSTEMI)
Subdural hematoma
Dyspnea
Dyspnea
Subdural hematoma
Dyspnea
Subdural hematoma

## 2021-11-18 NOTE — DISCHARGE NOTE FOR THE EXPIRED PATIENT - HOSPITAL COURSE
HPI:  89F w/ hx of MDS, dementia with recent hospitalization s/p fall, found to have tSDH with MLS and L subcapital femoral nexk fx s/p CRPP (9/28/21) with course c/b acute blood loss anemia 2/2 hematoma/ileus, now presented from  for AMS CTH w/ AoC SDH and R 1.4cm MLS , also with c/f sepsis.     HPI:  89F w/ hx of MDS, dementia with recent hospitalization s/p fall, found to have tSDH with MLS and L subcapital femoral nexk fx s/p CRPP (21) with course c/b acute blood loss anemia 2/2 hematoma/ileus, now presented from  for AMS CTH w/ AoC SDH and R 1.4cm MLS , also with c/f sepsis.  -Per Neurosurgery, no further imaging indicated for further intervention. Continue with Vimpat for seizure ppx  -Evidence of subfalcine and uncal herniation  -Fever: recurrent sepsis, multiple sources possible, pt maintained on IV antibiotics, and pancultured    -Pt deemed DNR, MOLST in chart.   -Pt pronounced  on 21 @ 19:29 while in Palliative Care Unit. Son informed and condolences offered.  Case presented to [facility], and was accepted; pt's son informed    Dr. Hoffmann informed       HPI:  89F w/ hx of MDS, dementia with recent hospitalization s/p fall, found to have tSDH with MLS and L subcapital femoral nexk fx s/p CRPP (21) with course c/b acute blood loss anemia 2/2 hematoma/ileus, now presented from  for AMS CTH w/ AoC SDH and R 1.4cm MLS , also with c/f sepsis.  -Per Neurosurgery, no further imaging indicated for further intervention rec pallative. Continue with Vimpat for seizure ppx  -Evidence of subfalcine and uncal herniation  -Fever: recurrent sepsis, multiple sources possible, pt maintained on IV antibiotics, and pancultured    please see note from  for full details  -Pt deemed DNR, MOLST in chart.   -Pt pronounced  on 21 @ 19:29 while in Palliative Care Unit. Son informed and condolences offered.  Case presented to [facility], and was accepted; pt's son informed    Dr. Hoffmann informed       HPI:  89F w/ hx of MDS, dementia with recent hospitalization s/p fall, found to have tSDH with MLS and L subcapital femoral nexk fx s/p CRPP (21) with course c/b acute blood loss anemia 2/2 hematoma/ileus, now presented from  for AMS CTH w/ AoC SDH and R 1.4cm MLS , also with c/f sepsis.  -Per Neurosurgery, no further imaging indicated for further intervention rec pallative. Continue with Vimpat for seizure ppx  -Evidence of subfalcine and uncal herniation  -Fever: recurrent sepsis, multiple sources possible, pt maintained on IV antibiotics, and pancultured  son not communicating much, neglecting mother  ethic and palliative following     please see note from  for full details  -Pt deemed DNR, MOLST in chart.   -Pt pronounced  on 21 @ 19:29 while in Palliative Care Unit. Son informed and condolences offered.  Case presented to [facility], and was accepted; pt's son informed    Dr. Hoffmann informed

## 2021-11-18 NOTE — PROVIDER CONTACT NOTE (OTHER) - REASON
CBC sample clotted
Nasogastric tube noted to be curved in patient mouth
Rectal tube removed
Tube feed particles found in oral cavity
Pt expiration

## 2021-11-18 NOTE — PROGRESS NOTE ADULT - PROBLEM/PLAN-7
DISPLAY PLAN FREE TEXT
Pt was eating when he aspirated.  Pt bit tongue on R side and is bleeding slightly.  Pt is non-verbal and is not able to follow commands at baseline  
DISPLAY PLAN FREE TEXT

## 2021-11-18 NOTE — PROGRESS NOTE ADULT - REASON FOR ADMISSION
SDH

## 2021-11-18 NOTE — PROGRESS NOTE ADULT - PROBLEM SELECTOR PROBLEM 7
MDS (myelodysplastic syndrome)
Dementia
MDS (myelodysplastic syndrome)
Palliative care encounter
Prophylactic measure
MDS (myelodysplastic syndrome)
Prophylactic measure
Palliative care encounter
Hypernatremia
MDS (myelodysplastic syndrome)
Dysphagia
Prophylactic measure
MDS (myelodysplastic syndrome)
Palliative care encounter
Prophylactic measure
Prophylactic measure
Palliative care encounter
Palliative care encounter
Prophylactic measure
Dementia
Prophylactic measure
MDS (myelodysplastic syndrome)

## 2021-11-18 NOTE — PROGRESS NOTE ADULT - PROBLEM SELECTOR PLAN 5
PPSV 10%. The patient requires nursing assistance with all ADLs  Supportive care
-leukocytosis likely reactive to acute critical illness, has known MPN and elevated wbc 20-50k at baseline  -unlikely a candidate for chemo  -defer heme consult for now as unlikely to 
PPSV 10%. The patient requires nursing assistance with all ADLs  Supportive care
PPSV 10%. The patient requires nursing assistance with all ADLs  Supportive care
-leukocytosis likely reactive to acute critical illness  -defer heme consult for now as unlikely to 
-leukocytosis likely reactive to acute critical illness, has known MPN and elevated wbc 20-50k at baseline  -unlikely a candidate for chemo  -defer heme consult for now as unlikely to 
Called patient's son Enrique for ongoing goals of care discussion. He understands pt has devastating neurologic injury and is unresponsive. We discussed next steps and option for symptom-directed care. At this time he would like to continue artificial nutrition. He understands NGT is a temporary measure and would like to proceed with PEG if able to be placed. All questions answered and emotional support provided.    -Discussed with primary team attending. Recommend GI eval for PEG placement. If not being offered will need to discuss plan for removal of NGT with son.
PPSV 10%. The patient requires nursing assistance with all ADLs  Supportive care
FAST score 7F  supportive care  Turn and position  Continue with good skin care
-due to poor Po   resolved with tube feeds
resolved 3.9  -c/w maintenance fluids  -monitor bmp
FAST score 7F  supportive care  Turn and position  Continue with good skin care
resolved 3.9  -c/w maintenance fluids  -monitor bmp
-leukocytosis likely reactive to acute critical illness, has known MPN and elevated wbc 20-50k at baseline  -unlikely a candidate for chemo  -defer heme consult for now as unlikely to 
hx of MDS with persistent leukocytosis  symptomatic management only
PPSV 10%. The patient requires nursing assistance with all ADLs  Supportive care
-leukocytosis likely reactive to acute critical illness  -defer heme consult for now as unlikely to 
-leukocytosis likely reactive to acute critical illness, has known MPN and elevated wbc 20-50k at baseline  -unlikely a candidate for chemo  -defer heme consult for now as unlikely to 
-leukocytosis likely reactive to acute critical illness, has known MPN and elevated wbc 20-50k at baseline  -unlikely a candidate for chemo  -defer heme consult for now as unlikely to 
PPSV 10%. The patient requires nursing assistance with all ADLs  Supportive care
resolved 3.9  -c/w maintenance fluids  -monitor bmp
K 2.9  increase 40meq in d5 1/2 NS @ 40meq  -potassium rider x 3  -monitor bmp
resolved 3.9  -c/w maintenance fluids  -monitor bmp
resolved 3.9  -c/w maintenance fluids  -monitor bmp
resolved   -c/w maintenance fluids  -monitor bmp
resolved 3.9  -c/w maintenance fluids  -monitor bmp
shock resolved on admission now with sepsis again, plan above  -likely due to colitis  -s/p abx per ID  -management as above
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
## 2021-11-18 NOTE — PROGRESS NOTE ADULT - PROVIDER SPECIALTY LIST ADULT
Cardiology
Gastroenterology
Infectious Disease
Infectious Disease
NSICU
NSICU
Nephrology
Neurosurgery
Neurosurgery
Cardiology
Gastroenterology
Gastroenterology
Infectious Disease
NSICU
NSICU
Neurosurgery
Palliative Care
Palliative Care
Gastroenterology
Heme/Onc
Hospitalist
Hospitalist
Infectious Disease
NSICU
NSICU
Gastroenterology
NSICU
NSICU
Palliative Care
Cardiology
Cardiology
NSICU
Cardiology
Hospitalist
Cardiology
Hospitalist
Hospitalist
Palliative Care
Cardiology
Palliative Care
Cardiology
Palliative Care
Cardiology
Hospitalist
Cardiology
Hospitalist
Palliative Care
Hospitalist
Palliative Care
Hospitalist
Palliative Care
Palliative Care
Hospitalist
Hospitalist

## 2021-11-18 NOTE — PROGRESS NOTE ADULT - PROBLEM SELECTOR PLAN 8
In the event of newly developing, evolving, or worsening symptoms, please contact the Palliative Medicine team via pager  #[phone]. The Geriatric and Palliative Medicine service has coverage 24 hours a day/ 7 days a week to provide medical recommendations regarding symptom management needs via telephone. Please see recommendations above for symptom management   Patient to be discharged from the [facility] team consult service today.  In the event of newly developing, evolving, or worsening symptoms, please contact the Palliative Medicine team via pager  #[phone]. The Geriatric and Palliative Medicine service has coverage 24 hours a day/ 7 days a week to provide medical recommendations regarding symptom management needs via telephone. Ethics on board. Spoke with   Please see recommendations above for symptom management   Patient to be discharged from the [facility] team consult service today.  In the event of newly developing, evolving, or worsening symptoms, please contact the Palliative Medicine team via pager  #[phone]. The Geriatric and Palliative Medicine service has coverage 24 hours a day/ 7 days a week to provide medical recommendations regarding symptom management needs via telephone. Ethics on board. Spoke with Schwab, Adina PA from ethics.  to send registered letter to patient's son home. Patient with elevated WBC. Primary team ordered IV antibiotics and cultures. Should consider risk vs benefits of antibiotics in a dying patient with a serious illness. At this point in the patient's illness trajectory antibiotics may be more of a risk than benefit ( diarrhea, skin breakdown, fluid overload, fungemia)  Please see recommendations above for symptom management   Patient to be discharged from the [facility] team consult service today.  In the event of newly developing, evolving, or worsening symptoms, please contact the Palliative Medicine team via pager  #[phone]. The Geriatric and Palliative Medicine service has coverage 24 hours a day/ 7 days a week to provide medical recommendations regarding symptom management needs via telephone. Ethics on board. Spoke with Schwab, Adina PA from ethics.  to send registered letter to patient's son home. Patient with elevated WBC. Primary team ordered IV antibiotics and cultures. Should consider risk vs benefits of antibiotics in a dying patient with a serious illness. At this point in the patient's illness trajectory antibiotics may be more of a risk than benefit ( diarrhea, skin breakdown, fluid overload, fungemia)

## 2021-11-18 NOTE — PROGRESS NOTE ADULT - PROBLEM SELECTOR PLAN 10
Likely type 2 demand mediated ischemia due to sepsis/vasodilation and anemia/hypovolemia per cards  Hyperdynamic LV on TTE   would not trend trops as it will not

## 2021-11-18 NOTE — PROGRESS NOTE ADULT - PROBLEM SELECTOR PLAN 2
recurrent sepsis now as febrile, tachycardiac and hypotensive with uptrending wbc to 89 (baseline 20-50 from AML) elevated procal  -multiple sources possible, ?UTI, ?asp PNA, decubitus ulcer, ?bacteremia, ?gut translocation, colitis  -called son again this AM to discuss antibiotics, workup etc, but does not answer  -given unclear GOC as son doesn't answer and unclear MOLST, discussed case with Ethics and palliative who recommend pancx and starting antibiotics as minimal harm/risk to patient  -blood cx pending  -UA positive, send urine cx  -CXR small left effusion  -start vanc 1 gram x 1, vanc level in AM given uptrending cr  -started zosyn q8  -IVF as tolerated however already overloaded with ansarca requiring NC and requiring suctioning thus limited fluid amounts as to not worsen resp status cause dyspnea (HArm) to patient  -trend cbc  -also discussed with pallative/ethics, rec avoiding extreme measure such as pressors, invasive central lines etc as R>B given patients terminal neuro event

## 2021-11-18 NOTE — PROVIDER CONTACT NOTE (OTHER) - ACTION/TREATMENT ORDERED:
ACP Richard made aware. advised to check for residual. Minimal amount (0.2mL) found. Per ACP, place tube feedings on hold. Will continue to monitor pt.
MAR notified. Okay to leave rectal tube out since not a lot of stool output. If amount of stool becomes unmanageable, notify again to reinsert rectal tube. Will continue to monitor.
orders followed
see pt expiration

## 2021-11-18 NOTE — PROGRESS NOTE ADULT - PROBLEM SELECTOR PLAN 4
-due to poor PO, improved w/ TF  -will tolerate for now to minimize further cerebral edema  -cont tube feeds, check residuals and monitor for obstruction
aspiration precautions
aspiration precautions
prior baseline A&O x1
shock resolved  -likely due to colitis  -s/p abx per ID  -management as above
-due to poor Po   resolved with tube feeds
aspiration precautions
aspiration precautions
hx of MDS with persistent leukocytosis  symptomatic management only
aspiration precautions
-due to poor PO  -will tolerate for now to minimize further cerebral edema  -cont tube feeds, check residuals and monitor for obstruction
K 2.6 but unclear if diluted sample  repeat k pending, if low will replete along with maintenance fluids
acute on chronic SDH  s/p SEPS intervention   CT head with evidence of large hematoma midline shift and herniation  no further neurosurgical intervention being offered
hx of MDS with persistent leukocytosis  symptomatic management only
aspiration precautions
aspiration precautions
hx of MDS with persistent leukocytosis
aspiration precautions
aspiration precautions
hx of MDS with persistent leukocytosis  symptomatic management only
aspiration precautions
-due to poor PO  -will tolerate for now to minimize further cerebral edema  -cont tube feeds, check residuals and monitor for obstruction
PPSV 10%. The patient requires nursing assistance with all ADLs  Supportive care
shock resolved  -likely due to colitis  -s/p abx per ID  -management as above
-due to poor PO  -will tolerate for now to minimize further cerebral edema  -cont tube feeds
-due to poor PO  -will tolerate for now to minimize further cerebral edema  -cont tube feeds, check residuals and monitor for obstruction
-due to poor PO, improved w/ TF  -monitor BMP
hx of MDS with persistent leukocytosis
shock resolved  -likely due to colitis  -s/p abx per ID  -management as above
-likely due to colitis  -s/p abx  -management as above
shock resolved  -likely due to colitis  -s/p abx per ID  -management as above
-s/p decompression w/ rectal tube  - would not replace NG feeds  at this time, high risk for aspiration given significant need for suctioning per palliative  -son not answering   -completed antibiotics

## 2021-11-18 NOTE — PROGRESS NOTE ADULT - PROBLEM SELECTOR PROBLEM 2
Ileus
Colitis
Ileus
Severe sepsis
Colitis
Severe sepsis
Colitis
Ileus
Colitis
Colitis
Subdural hematoma
Subdural hematoma
Ileus
Colitis
Gurgling breath sounds
Severe sepsis
Subdural hematoma
Severe sepsis
Severe sepsis
Subdural hematoma
Severe sepsis
Subdural hematoma
Subdural hematoma
Colitis
Ileus
Ileus
Colitis
Constipation
Severe sepsis with septic shock
Fever
Subdural hematoma
Ileus
Ileus

## 2021-11-18 NOTE — CHART NOTE - NSCHARTNOTEFT_GEN_A_CORE
-Informed by [email or web address]:klaus Olivarez RN in Palliative Care Unit that pt. was pronounced  at 19:29.  -Son Enrique Muir was informed by RN, and he declined autopsy  -Pt seen at bedside, no family at bedside. Heart sounds absent, no spontaneous respirations noted, no palpable pulse, pupils fixed and dilated, and no corneal reflex.  -Dr. Urszula Hoffmann aware of pt's expiration,  -[facility] -Informed by [email or web address]:klaus Olivarez RN in Palliative Care Unit that pt. was pronounced  at 19:29.  -Son Enrique Muir was informed by RN, and he declined autopsy  -Pt seen at bedside, no family at bedside. Heart sounds absent, no spontaneous respirations noted, no palpable pulse, pupils fixed and dilated, and no corneal reflex.  -Dr. Urszula Hoffmann aware of pt's expiration,  -Case presented to [facility], and case was accepted by LEATHA LEE in [facility] office. Death certificate would be completed by LEATHA Lee.   -Per LEATHA Lee, pt's son was called and informed of above decision  -Primary RN notified  -Will notify Dr. Hoffmann of above

## 2021-11-18 NOTE — PROGRESS NOTE ADULT - PROBLEM SELECTOR PLAN 9
Likely type 2 demand mediated ischemia due to sepsis/vasodilation and anemia/hypovolemia per cards  Hyperdynamic LV on TTE   would not trend trops as it will not    d/c metoprolol given soft pressures, poor intake
-no pharm dvt ppx due ICH    dnr/dni    #GOC  -ongoing, fam requesting cont current interventions for now (eg TFs or abx if indicated), however patient is no longer tolerating NGT feeds.   Family okay w/ low dose narcotics if needed for discomfort. Feel hospice is most appropriate and that pt is essentially dying from catastrophic neurologic event and failure of the gut.    very poor prognosis  palliative consulted: also tried to reach son but no answer, awaiting final recs  called felisha Nunez several times again today, no answer, left VM for call back    discussed with GARFIELD Hsu
Likely type 2 demand mediated ischemia due to sepsis/vasodilation and anemia/hypovolemia per cards  Hyperdynamic LV on TTE   would not trend trops as it will not 
Likely type 2 demand mediated ischemia due to sepsis/vasodilation and anemia/hypovolemia per cards  Hyperdynamic LV on TTE   would not trend trops as it will not    d/c metoprolol given soft pressures, poor intake
-per report was AAOx0, now no mental status
Likely type 2 demand mediated ischemia due to sepsis/vasodilation and anemia/hypovolemia per cards  Hyperdynamic LV on TTE   would not trend trops as it will not    d/c metoprolol given soft pressures, poor intake
Likely type 2 demand mediated ischemia due to sepsis/vasodilation and anemia/hypovolemia per cards  Hyperdynamic LV on TTE   would not trend trops as it will not

## 2021-11-18 NOTE — PROGRESS NOTE ADULT - PROBLEM SELECTOR PLAN 3
Last recorded BM on 11/12. Abdomen distended but soft. +Bowel sounds  Continue with Dulcolax 10mg suppository PRN. Primary RN to give a dose today

## 2021-11-18 NOTE — PROGRESS NOTE ADULT - PROBLEM SELECTOR PROBLEM 4
Dementia
Dementia
Severe sepsis with septic shock
Subdural hematoma
Dementia
Dementia
Hypernatremia
Hypokalemia
Colitis
Hypernatremia
Severe sepsis with septic shock
MDS (myelodysplastic syndrome)
Hypernatremia
Severe sepsis with septic shock
Severe sepsis with septic shock
Hypernatremia
Dementia
Hypernatremia
Dementia
Dementia
MDS (myelodysplastic syndrome)
Hypernatremia
Severe sepsis with septic shock
MDS (myelodysplastic syndrome)
Dementia
Dementia
MDS (myelodysplastic syndrome)
Dementia
Severe sepsis with septic shock
Functional quadriplegia
Hypernatremia
Severe sepsis with septic shock
Severe sepsis with septic shock

## 2021-11-18 NOTE — PROVIDER CONTACT NOTE (OTHER) - BACKGROUND
Pt has DNR order
Noted to be obtunded  Suctioned for secretions during the shift
Pt admitted for AMS/ cerebral infarct/ sepsis
Pt admitted for AMS/ cerebral infarct/ sepsis

## 2021-12-22 NOTE — DIETITIAN INITIAL EVALUATION ADULT. - BUCCAL DEPLETION IS
What Is The Reason For Today's Visit?: Upper Body Skin Exam How Severe Are Your Spot(S)?: mild moderate

## 2021-12-30 PROCEDURE — 82330 ASSAY OF CALCIUM: CPT

## 2021-12-30 PROCEDURE — 83690 ASSAY OF LIPASE: CPT

## 2021-12-30 PROCEDURE — 80053 COMPREHEN METABOLIC PANEL: CPT

## 2021-12-30 PROCEDURE — U0005: CPT

## 2021-12-30 PROCEDURE — 82962 GLUCOSE BLOOD TEST: CPT

## 2021-12-30 PROCEDURE — 97116 GAIT TRAINING THERAPY: CPT

## 2021-12-30 PROCEDURE — 86850 RBC ANTIBODY SCREEN: CPT

## 2021-12-30 PROCEDURE — 86901 BLOOD TYPING SEROLOGIC RH(D): CPT

## 2021-12-30 PROCEDURE — 85027 COMPLETE CBC AUTOMATED: CPT

## 2021-12-30 PROCEDURE — 97530 THERAPEUTIC ACTIVITIES: CPT

## 2021-12-30 PROCEDURE — 86923 COMPATIBILITY TEST ELECTRIC: CPT

## 2021-12-30 PROCEDURE — 83036 HEMOGLOBIN GLYCOSYLATED A1C: CPT

## 2021-12-30 PROCEDURE — C9254: CPT

## 2021-12-30 PROCEDURE — 85018 HEMOGLOBIN: CPT

## 2021-12-30 PROCEDURE — 83735 ASSAY OF MAGNESIUM: CPT

## 2021-12-30 PROCEDURE — 99291 CRITICAL CARE FIRST HOUR: CPT | Mod: 25

## 2021-12-30 PROCEDURE — 71260 CT THORAX DX C+: CPT | Mod: MA

## 2021-12-30 PROCEDURE — P9040: CPT

## 2021-12-30 PROCEDURE — 84132 ASSAY OF SERUM POTASSIUM: CPT

## 2021-12-30 PROCEDURE — 73702 CT LWR EXTREMITY W/O&W/DYE: CPT

## 2021-12-30 PROCEDURE — 36415 COLL VENOUS BLD VENIPUNCTURE: CPT

## 2021-12-30 PROCEDURE — 80076 HEPATIC FUNCTION PANEL: CPT

## 2021-12-30 PROCEDURE — U0003: CPT

## 2021-12-30 PROCEDURE — 82947 ASSAY GLUCOSE BLOOD QUANT: CPT

## 2021-12-30 PROCEDURE — 73502 X-RAY EXAM HIP UNI 2-3 VIEWS: CPT

## 2021-12-30 PROCEDURE — 86769 SARS-COV-2 COVID-19 ANTIBODY: CPT

## 2021-12-30 PROCEDURE — 76000 FLUOROSCOPY <1 HR PHYS/QHP: CPT

## 2021-12-30 PROCEDURE — 36600 WITHDRAWAL OF ARTERIAL BLOOD: CPT

## 2021-12-30 PROCEDURE — 72170 X-RAY EXAM OF PELVIS: CPT

## 2021-12-30 PROCEDURE — 97166 OT EVAL MOD COMPLEX 45 MIN: CPT

## 2021-12-30 PROCEDURE — 74177 CT ABD & PELVIS W/CONTRAST: CPT

## 2021-12-30 PROCEDURE — 80307 DRUG TEST PRSMV CHEM ANLYZR: CPT

## 2021-12-30 PROCEDURE — 97535 SELF CARE MNGMENT TRAINING: CPT

## 2021-12-30 PROCEDURE — 82435 ASSAY OF BLOOD CHLORIDE: CPT

## 2021-12-30 PROCEDURE — 83605 ASSAY OF LACTIC ACID: CPT

## 2021-12-30 PROCEDURE — 84100 ASSAY OF PHOSPHORUS: CPT

## 2021-12-30 PROCEDURE — 70450 CT HEAD/BRAIN W/O DYE: CPT | Mod: MA

## 2021-12-30 PROCEDURE — 82803 BLOOD GASES ANY COMBINATION: CPT

## 2021-12-30 PROCEDURE — 74018 RADEX ABDOMEN 1 VIEW: CPT

## 2021-12-30 PROCEDURE — 73552 X-RAY EXAM OF FEMUR 2/>: CPT

## 2021-12-30 PROCEDURE — 93005 ELECTROCARDIOGRAM TRACING: CPT

## 2021-12-30 PROCEDURE — 87040 BLOOD CULTURE FOR BACTERIA: CPT

## 2021-12-30 PROCEDURE — 85396 CLOTTING ASSAY WHOLE BLOOD: CPT

## 2021-12-30 PROCEDURE — C9399: CPT

## 2021-12-30 PROCEDURE — 86900 BLOOD TYPING SEROLOGIC ABO: CPT

## 2021-12-30 PROCEDURE — 97162 PT EVAL MOD COMPLEX 30 MIN: CPT

## 2021-12-30 PROCEDURE — 81001 URINALYSIS AUTO W/SCOPE: CPT

## 2021-12-30 PROCEDURE — 85025 COMPLETE CBC W/AUTO DIFF WBC: CPT

## 2021-12-30 PROCEDURE — 84145 PROCALCITONIN (PCT): CPT

## 2021-12-30 PROCEDURE — 80048 BASIC METABOLIC PNL TOTAL CA: CPT

## 2021-12-30 PROCEDURE — 84295 ASSAY OF SERUM SODIUM: CPT

## 2021-12-30 PROCEDURE — 71045 X-RAY EXAM CHEST 1 VIEW: CPT

## 2021-12-30 PROCEDURE — 85014 HEMATOCRIT: CPT

## 2021-12-30 PROCEDURE — 97110 THERAPEUTIC EXERCISES: CPT

## 2021-12-30 PROCEDURE — 85610 PROTHROMBIN TIME: CPT

## 2021-12-30 PROCEDURE — 85730 THROMBOPLASTIN TIME PARTIAL: CPT

## 2021-12-30 PROCEDURE — C1713: CPT

## 2021-12-30 PROCEDURE — 36430 TRANSFUSION BLD/BLD COMPNT: CPT

## 2022-01-01 NOTE — CONSULT LETTER
Patient has been exclusively breastfeeding her . She has now requested formula.  Re-educated about the benefits of exclusive breastfeeding, donor milk and the risks of giving supplements to breastfeeding infants in the first six months of life.  Patient still requesting formula. Formula given to patient.   [Dear  ___] : Dear  [unfilled], [Courtesy Letter:] : I had the pleasure of seeing your patient, [unfilled], in my office today. [Consult Closing:] : Thank you very much for allowing me to participate in the care of this patient.  If you have any questions, please do not hesitate to contact me. [Please see my note below.] : Please see my note below. [Sincerely,] : Sincerely,
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
## 2022-03-16 NOTE — PROGRESS NOTE ADULT - PROBLEM SELECTOR PLAN 1
-no operative  -minimal mental status  -herniation noted  -defer further imaging as not candidate for further intervention  -called son at number in chart, wishes to cont current limited medical interventions for now  -currently getting NGT feeds,  if stable will need to address GOC re: ongoing nutrition w/ family as NGT is a temporary measure and pt may not be candidate for PEG at this time 4 = No assist / stand by assistance

## 2022-07-04 NOTE — DISCHARGE NOTE PROVIDER - NSDCHOSPICE_GEN_A_CORE
Patient arrived today for appointment and when confirming she was here to see us for Type 2 DM management, patient reported that is not what she wanted or thought she was being seen for. She had requested a referral specifically for further evaluation and treatment of PCOS symptoms. Discussed that unfortunately the referral placed was specifically for Type 2 Diabetes and this is why we had her see education and nutrition prior to today's office visit. Unfortunately, we do not manage PCOS. Patient did not wish to continue with visit to be evaluated for diabetic management. Was also offered weight management consultation, which was declined. Referral to OB/GYN was placed for her today for further evaluation of PCOS. She also has concerns regarding possible Cushing's.  Discussed that if further evaluation of Cushing's desired, she would need to discuss with primary care who can then again refer to Endocrinology, but this would be evaluated specifically by our department MD.
No

## 2022-09-02 NOTE — ED ADULT NURSE NOTE - BREATHING, MLM
Spontaneous, unlabored and symmetrical in a  using 4 + 4 steps to enter, can stay in 1st floor with access to shower/spouse

## 2022-09-17 NOTE — ED ADULT NURSE NOTE - CHIEF COMPLAINT
Pt tripped and fell.  Pt hurt left elbow.  Obvious deformity.  CMS intact.  Denies hitting head.  No LOC.      Triage Assessment     Row Name 09/17/22 1342       Triage Assessment (Adult)    Airway WDL WDL       Respiratory WDL    Respiratory WDL WDL       Skin Circulation/Temperature WDL    Skin Circulation/Temperature WDL WDL       Cardiac WDL    Cardiac WDL WDL       Peripheral/Neurovascular WDL    Peripheral Neurovascular WDL WDL       Cognitive/Neuro/Behavioral WDL    Cognitive/Neuro/Behavioral WDL WDL              
The patient is a 88y Female complaining of

## 2022-09-20 NOTE — PATIENT PROFILE ADULT - HAS THE PATIENT USED TOBACCO IN THE PAST 30 DAYS?
Patient with long history of non-compliance with medical care and also with drug use.  Currently gravely disabled due to poor self care.  Agree with PEC and 1:1 sitter.  Notify  of commitment process.  Note that the PEC is filled out incorrectly and demographics needs to be completed and back signed by a treating physician (the [facility] sticker does not count for demographics on state PEC form).  Utilize olanzapine 10 mg IM every 8 hours PRN acute psychotic or non-redirectable agitation, first dose now.  Hopefully as he medically improves and a few doses of mood stabilizing anti-psychotic, he will return to somewhat of a functional baseline.      9/20/22 - Patient likely at his unfortunate and irritable baseline.  Calmer and not as labile as when he came in.  Stop PEC and 1:1 sitter.  Can discharge from psychiatric standpoint with referral to [facility] for ongoing outpatient psychiatric care and drug counseling.  Continue quetiapine 50 mg nightly.   Unable to assess due to patient's cognitive impairment

## 2022-10-17 NOTE — DIETITIAN INITIAL EVALUATION ADULT. - DATE OF WEIGHT PRIOR TO ADM
Received fax from pharmacy requesting refill on pts medication(s). Pt was last seen in office on 8/29/2022  and has a follow up scheduled for Visit date not found. Will send request to  [facility]  for authorization.      Requested Prescriptions     Pending Prescriptions Disp Refills    busPIRone (BUSPAR) 15 MG tablet [Pharmacy Med Name: BUSPIRONE 15MG TABLETS] 90 tablet 3     Sig: Take 15 mg by mouth 3 times daily
16-Oct-2020

## 2023-01-27 NOTE — ED ADULT NURSE NOTE - ALCOHOL PRE SCREEN (AUDIT - C)
Had the pleasure of seeing this patient today.As you know, he is a 63-year-old man, asymptomatic except for some occasional mild prolapse, who comes to see me for a screening colonoscopy. He believes his last colonoscopy was 10 or 12 years ago and was normal.He denies any nausea, vomiting, hematemesis, melena, hematochezia, or unintentional weight loss. 
Statement Selected

## 2023-05-17 NOTE — ADVANCED PRACTICE NURSE CONSULT - REASON FOR CONSULT
----- Message from Suzan Garrett PA-C sent at 5/16/2023  8:12 PM CDT -----  Please assist in rescheduling [id number]. Requested by staff to assess skin status of pt a/w a pressure injury. PMH is noted:  89y Female presents after being found down, transferred from OSH with L subcap FN fx and SDH. Complaining of severe L  hip pain and inability to ambulate.   Patient denies radiation of pain. Patient denies numbness/tingling/burning in the LLE. No other bone/joint complaints.   PAST MEDICAL & SURGICAL HISTORY:  MDS (myelodysplastic syndrome)  Dementia  S/P ORIF (open reduction internal fixation) fracture  elbow fracture 02/2021  The pt is s/p Closed reduction and percutaneous pinning of left hip 28-Sep-2021

## 2023-08-01 NOTE — DISCHARGE NOTE NURSING/CASE MANAGEMENT/SOCIAL WORK - NSDCPETBCESMAN_GEN_ALL_CORE
"Physical Therapy Treatment    Patient Name:  Danae Sanchez   MRN:  [id number]    Recommendations:     Discharge Recommendations: SNF  Discharge Equipment Recommendations: none  Barriers to discharge: None    Assessment:     Danae Sanchez is a 76 y.o. female admitted with a medical diagnosis of <principal problem not specified>.  She presents with the following impairments/functional limitations:   gen. Weakness, SOB on exertion, difficulty walking.    Pt tolerated gait trials of 60-75' within her room. She had mild SOB with fatigue but was able to recover within a few minutes with a seated rest break. She is feeling stronger, requiring decreased assistance with mobility but limited by endurance.    Rehab Prognosis: Good; patient would benefit from acute skilled PT services to address these deficits and reach maximum level of function.    Recent Surgery: * No surgery found *      Plan:     Pt agrees to participate in Skilled therapy when she leaves the hospital.  During this hospitalization, patient to be seen 5 x/week to address the identified rehab impairments via therapeutic exercises, therapeutic activities, gait training and progress toward the following goals:    Plan of Care Expires:       Subjective     Chief Complaint: "I get so short of breath when I walk"  Patient/Family Comments/goals: SNF at [place]  Pain/Comfort:         Objective:     Communicated with pt prior to session.  Patient found up in chair with   upon PT entry to room.     General Precautions: Standard,    Orthopedic Precautions:    Braces:    Respiratory Status: Nasal cannula, flow 2 L/min     Functional Mobility:  Transfers:     Sit to Stand:  stand by assistance with rolling walker  Gait: trials of 60-75' with RW and SBA, rest breaks required to recover from SOB  Balance: SBA in supported standing      AM-PAC 6 CLICK MOBILITY          Treatment & Education:  See above, reviewed BLE exercises with demonstration    Patient left up in chair " with all lines intact and call button in reach..    GOALS:   Multidisciplinary Problems       Physical Therapy Goals          Problem: Physical Therapy    Goal Priority Disciplines Outcome Goal Variances Interventions   Physical Therapy Goal     PT, PT/OT Ongoing, Progressing     Description: 1.  Pt will be able to amb In room and halls with RW supervision  2.  Pt will tf with RW SBA to chair  3.  Pt will be I HEP                       Time Tracking:     PT Received On:  8/1/23  PT Start Time:  0815     PT Stop Time:  0855  PT Total Time (min):   40 min    Billable Minutes: Gait Training 25 and Therapeutic Activity 15                   08/01/2023   If you are a smoker, it is important for your health to stop smoking. Please be aware that second hand smoke is also harmful.

## 2023-08-14 NOTE — DIETITIAN INITIAL EVALUATION ADULT. - IDEAL BODY WEIGHT (LBS)
120 Picato Counseling:  I discussed with the patient the risks of Picato including but not limited to erythema, scaling, itching, weeping, crusting, and pain.

## 2023-09-21 NOTE — ASSESSMENT
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 \par \par -anemia improved on Aranesp -last given on 3/19/19. Hb 10.9g/dl today\par \par -cont Allopurinol 300mg po daily\par \par -CBC in 1 month\par \par -follow up in 3 months\par \par 
General Sunscreen Counseling: I recommended a broad spectrum sunscreen with a SPF of 30 or higher.  I explained that SPF 30 sunscreens block approximately 97 percent of the sun's harmful rays.  Sunscreens should be applied at least 15 minutes prior to expected sun exposure and then every 2 hours after that as long as sun exposure continues. If swimming or exercising sunscreen should be reapplied every 45 minutes to an hour after getting wet or sweating.  One ounce, or the equivalent of a shot glass full of sunscreen, is adequate to protect the skin not covered by a bathing suit. I also recommended a lip balm with a sunscreen as well. Sun protective clothing can be used in lieu of sunscreen but must be worn the entire time you are exposed to the sun's rays.
Detail Level: Zone
Products Recommended: recommended Neutragena Baby Faces stick with titanium dioxide for the face and Vanicream products of the body

## 2024-01-16 NOTE — PROGRESS NOTE ADULT - PROBLEM SELECTOR PLAN 2
Orthopedic (Pediatric) -Xray 11/8: Multiple loops of small and large bowel appear to be distended and changes compatible with ileus  -CT 11/10: Multiple air and fluid distended loops of large bowel suggestive of ileus. No evidence of small bowel obstruction. Rectum is distended with fluid.  -was placed on NG  trickle feeds last week per son wishes however NG fell out 11/13 with increasing secretions and need for suctioning  -palliative rec holding off on reinserting NG tube as R>B and son does not answer  -aspiration precautions  -alb 1.9, severe protein calorie malnutrition   -called son multiple times again today and last week to further discuss GOC etc,, no answer, left VM for callback,   -c/w d5 1/2 NS 35cc/hr, CT with moderate b/l pleural effusions  -stop fluids if any sign of resp distress  -per GI, she is not candidate for PEG  -discussed with ethics and palliative who are also attempting to reach son but cannot  -defer glycopyrrolate and Iv hydromorphone dosing to palliative given dyspnea and oral secretions General (Pediatric)

## 2024-05-28 NOTE — ED ADULT NURSE NOTE - PRIVACY CONCERNS
Sanaz Moncada  2019     Is the child sick today? no  Does the child have allergies to medications, food, a vaccine component, or latex? no  Has the child had a serious reaction to a vaccine in the past? no  Does the child have a long-term health problem with lung, kidney, or metabolic disease (e.g. diabetes), asthma, a blood disorder, no spleen, complement component deficiency, a cochlear implant, or a spinal fluid leak?  Is he/she on long term aspirin therapy? no  If the child to be vaccinated is 2 through 4 years of age, has a healthcare provider told you that the child had wheezing or asthma in the past 12 months? no  If your child is a baby, have you ever been told She has had intussusception? no  Has the child, a sibling, or a parent had a seizure; has the child had brain or other nervous system problems? no  Does the child have cancer, leukemia, HIV/AIDS, or any other immune system problem? no  Does the child have a parent, brother, or sister with an immune system problem? no  In the past 3 months, has the child taken medications that affect the immune system such as prednisone, other steroids, or anticancer drugs; drugs for the treatment of rheumatoid arthritis, Crohn's disease, or psoriasis; or had radiation treatments?  no  In the past year, has the child received a transfusion of blood or blood products, or been given immune (gamma) globulin or an antiviral drug? no  Is the child/teen pregnant or is there a chance she could become pregnant during the next month? no  Has the child received vaccinations in the past 4 weeks? no    Form completed by:  Mattie Moncada  on 5/28/2024 at 1:26 PM EDT  Form reviewed by: Francie Lopez MA  on 5/28/2024 at 1:26 PM EDT    Did you bring your immunization card with you? No      No

## 2024-06-11 NOTE — PHYSICAL THERAPY INITIAL EVALUATION ADULT - PRECAUTIONS/LIMITATIONS, REHAB EVAL
Patient states she called [facility] pharmacy and they said she needs doctor approval to get this medication filled and that they don't have anything there. Please resend.     Reason for call:   [x] Refill   [] Prior Auth  [] Other:     Office:   [x] PCP/Provider -   [] Specialty/Provider -     Medication: Hydrochlorothiazide     Dose/Frequency: 12.5 mg tablet taken by mouth once daily     Quantity: 90    Pharmacy:   [facility]/pharmacy #[id number] [address] [phone]     Does the patient have enough for 3 days?   [] Yes   [x] No - Send as HP to POD     HISTORY AND RESULTS CONTINUED: CT CHEST/ABDOMEN/PELVIS: Acute impacted subcapital left hip fracture. Acute appearing fracture through the L1 vertebral body with moderate compression and mild bony retropulsion. Indeterminate age moderate to severe compression fractures of T6 and T7 and mild compression fractures of T5 and T8 which are new compared with CT of the chest from 12/8/2017. No evidence of acute intrathoracic or intra-abdominal/pelvic traumatic injury. Atrophy of the pancreatic neck, body, and tail with pancreatic ductal dilatation to the level of the pancreatic head similar to but increased compared with prior exam from 1/17/2018. CT HEAD: Increased size of acute near holohemispheric left-sided subdural hematoma which currently measures up to maximum 1.6 cm over the occipital convexity. Thin component along the left tentorial leaflet. Associated mass effect with up to 8 mm shift of the midline towards the right. XRAY PELVIS/L FEMUR/HIP: Acute impacted subcapital left hip fracture. No evidence of acute dislocations./fall precautions

## 2024-12-24 NOTE — PROGRESS NOTE ADULT - ASSESSMENT
Txfered from  from nursing home due to AMS. Baseline has dementia, forgetting things like where she lives. Has been in a nursing home since 2/2021. 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 5hr repeat CT on Left stable. Exam: Somnolent, Ox1, FC, pupils 2R, EOMI, R facial, LUE 4+/5, RUE 4-/5, BLE minimal movement likely 2/2 deconditioning but wiggles toes  +Trops   Septic      10 (severe pain)

## 2025-03-25 NOTE — ED PROVIDER NOTE - NSICDXPASTMEDICALHX_GEN_ALL_CORE_FT
FEMALE ANNUAL EXAM NOTE        HISTORY    Kassi Meyers is a 46 year old female who presents for an annual exam and to establish care.         Patient is fairly healthy at baseline and does not have any significant chronic comorbid conditions.    At today's appointment patient reports she used amoxicillin around 2 weeks ago for sinusitis.  Patient reports she started having vaginal itching last week and now is having urinary frequency and foul-smelling odor.  Patient denies any hesitancy, sensation of incomplete bladder emptying, hematuria, abnormal vaginal discharge, suprapubic tenderness, flank pain, nausea, vomiting, any other associated symptoms.    Patient reports that she is due for her menstrual period Today.  Patient reports last week she noticed some mucus like vaginal discharge with very slight pinkish bloody tinge.  Patient reports she never had this kind of symptoms in the past. Patient states that she is concerned that might be she is having perimenopause and wants to know if there is any hormonal test that can be done to confirm.  Patient denies any hot flashes, sleep disturbance, mood changes, anger spells, low sex drive, vaginal dryness or any other urogenital issues at this time.  Patient is sexually active and not using any kind of birth control.  Patient denies any morning sickness or breast tenderness.  No abnormal gynecological history.  Patient is having 3 biological kids.    Patient reports feeling tired and fatigued for last couple of months.  Patient reports she never felt this way but just feeling tired and exhausted for no reason. No other symptoms.  9 point review of system is otherwise negative.  No recent travel, tick bite, sleep disturbance, appetite disturbance, new stressors in the life.    No smoker.   Occasional alcohol.   No illicit drugs.   No STD  One sexual partner.     Health maintenance  - Flu shot this year - Declines  - COVID vaccination with Boosters -Declines  - Hep B  vaccination. No.  Patient will get it today  - Hep A vaccination.No  - Pneumococcal vaccination - na  - Tdap vaccination - Yes  - Shingles vaccination -na    - Colonoscopy -Yes  - Pap smear -Yes  - Mammogram - No.  Orders placed  - PSA within last 12 months - na  - LDCT -na  - AAA screening - na       MEDICAL HISTORY    Past Medical History:   Diagnosis Date    Headache(784.0)     Injury 2015    Fx finger    Low back pain     Overweight(278.02) 10/03/2012    BMI 29       SURGICAL HISTORY    History reviewed. No pertinent surgical history.    MEDICATIONS    Outpatient Medications Marked as Taking for the 3/25/25 encounter (Office Visit) with Tatiana Trevino MD   Medication Sig Dispense Refill    ZINC SULFATE PO       NON FORMULARY Fruit and veggie vitamin      Probiotic Product (PROBIOTIC BLEND PO)       Ferrous Sulfate (Iron) 325 (65 Fe) MG Tab       TURMERIC CURCUMIN PO       Multiple Vitamin (MULTIVITAMIN ADULT PO) Take 1 tablet by mouth daily.      Ascorbic Acid (VITAMIN C) 100 MG tablet Take 100 mg by mouth daily.      cholecalciferol (VITAMIN D3) 1000 UNITS tablet Take 1,000 Units by mouth daily.      ibuprofen (MOTRIN) 400 MG tablet Take 400 mg by mouth every 8 hours as needed for Pain.      Calcium Carbonate-Vitamin D (CALCIUM 500+D PO) Take 1 tablet by mouth.      GLUCOSAMINE PO Take  by mouth.      fish oil-omega-3 fatty acids 1000 MG CAPS Take by mouth daily.          ALLERGIES    ALLERGIES:   Allergen Reactions    Bacitracin HIVES    Triple Antibiotic [Neomycin-Bacitracin-Polymyxin]        SOCIAL HISTORY    Social History     Tobacco Use    Smoking status: Never    Smokeless tobacco: Never   Vaping Use    Vaping status: never used   Substance Use Topics    Alcohol use: Yes     Alcohol/week: 2.0 standard drinks of alcohol     Types: 2 Standard drinks or equivalent per week    Drug use: No       FAMILY HISTORY    Family History   Problem Relation Age of Onset    Diabetes Father         Oral meds    Cancer  Father         Tongue    Asthma Mother     Cancer, Skin Melanoma Mother     Asthma Sister     Cancer, Skin Melanoma Sister     Amblyopia Neg Hx     Blindness Neg Hx     Cataracts Neg Hx     Glaucoma Neg Hx     Macular degeneration Neg Hx     Retinal detachment Neg Hx     Strabismus Neg Hx        REVIEW OF SYSTEMS    Review of Systems   Constitutional:  Positive for malaise/fatigue.   HENT: Negative.     Respiratory: Negative.     Cardiovascular: Negative.    Genitourinary:         As above   Musculoskeletal: Negative.    Skin: Negative.    Neurological: Negative.    Psychiatric/Behavioral: Negative.          PHYSICAL EXAM    Vital Signs:    Vitals:    03/25/25 0922   BP: 112/76   BP Location: LUE - Left upper extremity   Patient Position: Sitting   Cuff Size: Regular   Pulse: 72   Temp: 97.6 °F (36.4 °C)   TempSrc: Temporal   SpO2: 96%   Weight: 103.8 kg (228 lb 14.4 oz)   Height: 5' 10\" (1.778 m)   LMP: 06/20/2024     Physical Exam  Constitutional:       Appearance: Normal appearance.   HENT:      Head: Normocephalic and atraumatic.   Cardiovascular:      Rate and Rhythm: Normal rate. Rhythm irregular.      Pulses: Normal pulses.      Heart sounds: Normal heart sounds.   Pulmonary:      Effort: Pulmonary effort is normal.   Skin:     General: Skin is warm.   Neurological:      General: No focal deficit present.      Mental Status: She is alert and oriented to person, place, and time.   Psychiatric:         Mood and Affect: Mood normal.         Behavior: Behavior normal.          Recent PHQ 2/9 Score    PHQ 2:  PHQ 2 Score Adult PHQ 2 Score Adult PHQ 2 Interpretation Little interest or pleasure in activity?   3/25/2025  10:40 AM 0 No further screening needed 0       PHQ 9:  PHQ 9 Score Adult PHQ 9 Score   3/25/2025  10:40 AM 0     PHQ-2/9 Depression Screening  Little interest or pleasure in activity?: Not at all  Feeling down, depressed or hopeless?: Not at all  Initial depression screening score:: 0  PHQ2  Interpretation: No further screening needed  Trouble falling or staying asleep or sleeping all the time?: Not at all  Feeling tired or having little energy?: Not at all  Poor appetite or overeating?: Not at all  Feeling bad about yourself or that you are a failure or have let yourself or family down?: Not at all  Trouble concentrating on things such as reading the newspaper or watching TV?: Not at all  Moving or speaking slowly that other people have noticed or the opposite - being so fidgety or restless that you have been moving around a lot more than usual?: Not at all  Thoughts that you would be better off dead or of hurting yourself in some way?: Not at all  Total depressive symptoms score (PHQ9): : 0            Most Recent NILTON 7 Score       NILTON 7 Score NILTON 7 Score   3/25/2025   9:30 AM 0                RESULTS    Pertinent labs and imaging studies reviewed.      ASSESSMENT/PLAN  1. Annual physical exam  Will get baseline lab workup including CBC and CMP.    - CBC with Automated Differential; Future  - Comprehensive Metabolic Panel; Future    2. Screening for hypothyroidism  We will check TSH.    - Thyroid Stimulating Hormone; Future    3. Screening for hyperlipidemia  Check lipid panel.    - Lipid Panel With Reflex; Future    4. Screening for diabetes mellitus (DM)  Check A1c.    - Glycohemoglobin; Future    5. Visit for screening mammogram  Orders placed for mammogram.    - MAMMO SCREENING BILATERAL W JOSEPH; Future    6. Other fatigue  Will check vitamin D levels, TSH, CBC and CMP.  Will check sedimentation rate and CRP.    - Vitamin D -25 Hydroxy; Future  - Sedimentation Rate; Future  - C Reactive Protein; Future    7. Increased urinary frequency  Will check UA.  Vaginitis panel.    - Urinalysis & Reflex Microscopy With Culture If Indicated; Future  - SwabOne Vaginitis Panel; Future    8. Irregular menstrual cycle  Will check urine pregnancy test at this time.  I informed the patient that getting hormonal test  at this time would be a little bit premature as it will not change her management as she is not having any other symptoms of menopause.  I informed the patient the first thing would be to rule out pregnancy.  If she is not pregnant I would like to watch her symptoms at this time.  Patient was informed that irregular menstrual period is a common thing in perimenopause.  I informed the patient if pregnancy test comes back negative she has to continue watching her symptoms and keep close follow-up in the clinic.  Will consider referral to OB/GYN down the road as needed.    - Pregnancy Test, Urine; Future    9. Irregular heart rate  On physical exam extra heartbeat was heard.  Patient said she was not been informed about any irregular heartbeat in the past.  Patient is asymptomatic.  Will put the patient on event monitor to rule out arrhythmias.  Patient is agreeable.    - Event or Mobile Cardiac Telemetry Monitor; Future     Orders Placed This Encounter    MAMMO SCREENING BILATERAL W JOSEPH    CBC with Automated Differential    Comprehensive Metabolic Panel    Thyroid Stimulating Hormone    Lipid Panel With Reflex    Glycohemoglobin    Vitamin D -25 Hydroxy    Urinalysis & Reflex Microscopy With Culture If Indicated    SwabOne Vaginitis Panel    Pregnancy Test, Urine    Sedimentation Rate    C Reactive Protein    Event or Mobile Cardiac Telemetry Monitor       Patient Instructions   Patient needs to FU in in 3 months    Patient needs to schedule labs nonfasting . Today        Patient was advised to go to nearest ER immediately / dial 911 with any worsening of the preexisting symptoms / onset of new symptoms or distress     Call office with any concerns.       Return in about 3 months (around 6/25/2025).       PAST MEDICAL HISTORY:  Dementia     MDS (myelodysplastic syndrome)

## 2025-04-21 NOTE — PATIENT PROFILE ADULT - CAREGIVER NAME
Today Joy Miramontes was handed the Physical vs. Office visit form explaining the services.      
Enrique Muir